# Patient Record
Sex: FEMALE | Race: WHITE | NOT HISPANIC OR LATINO | Employment: OTHER | URBAN - METROPOLITAN AREA
[De-identification: names, ages, dates, MRNs, and addresses within clinical notes are randomized per-mention and may not be internally consistent; named-entity substitution may affect disease eponyms.]

---

## 2017-01-04 ENCOUNTER — APPOINTMENT (OUTPATIENT)
Dept: LAB | Facility: CLINIC | Age: 72
End: 2017-01-04
Payer: MEDICARE

## 2017-01-04 ENCOUNTER — TRANSCRIBE ORDERS (OUTPATIENT)
Dept: LAB | Facility: CLINIC | Age: 72
End: 2017-01-04

## 2017-01-04 DIAGNOSIS — E78.2 MIXED HYPERLIPIDEMIA: ICD-10-CM

## 2017-01-04 DIAGNOSIS — I10 ESSENTIAL (PRIMARY) HYPERTENSION: ICD-10-CM

## 2017-01-04 LAB
ALBUMIN SERPL BCP-MCNC: 3.8 G/DL (ref 3.5–5)
ALP SERPL-CCNC: 82 U/L (ref 46–116)
ALT SERPL W P-5'-P-CCNC: 25 U/L (ref 12–78)
ANION GAP SERPL CALCULATED.3IONS-SCNC: 3 MMOL/L (ref 4–13)
AST SERPL W P-5'-P-CCNC: 17 U/L (ref 5–45)
BILIRUB SERPL-MCNC: 0.81 MG/DL (ref 0.2–1)
BUN SERPL-MCNC: 16 MG/DL (ref 5–25)
CALCIUM SERPL-MCNC: 8.6 MG/DL (ref 8.3–10.1)
CHLORIDE SERPL-SCNC: 106 MMOL/L (ref 100–108)
CHOLEST SERPL-MCNC: 189 MG/DL (ref 50–200)
CO2 SERPL-SCNC: 32 MMOL/L (ref 21–32)
CREAT SERPL-MCNC: 1.01 MG/DL (ref 0.6–1.3)
ERYTHROCYTE [DISTWIDTH] IN BLOOD BY AUTOMATED COUNT: 13.1 % (ref 11.6–15.1)
GFR SERPL CREATININE-BSD FRML MDRD: 53.9 ML/MIN/1.73SQ M
GLUCOSE SERPL-MCNC: 102 MG/DL (ref 65–140)
HCT VFR BLD AUTO: 47.1 % (ref 34.8–46.1)
HDLC SERPL-MCNC: 64 MG/DL (ref 40–60)
HGB BLD-MCNC: 16.2 G/DL (ref 11.5–15.4)
LDLC SERPL CALC-MCNC: 110 MG/DL (ref 0–100)
MCH RBC QN AUTO: 33.7 PG (ref 26.8–34.3)
MCHC RBC AUTO-ENTMCNC: 34.4 G/DL (ref 31.4–37.4)
MCV RBC AUTO: 98 FL (ref 82–98)
PLATELET # BLD AUTO: 203 THOUSANDS/UL (ref 149–390)
PMV BLD AUTO: 12.3 FL (ref 8.9–12.7)
POTASSIUM SERPL-SCNC: 4.1 MMOL/L (ref 3.5–5.3)
PROT SERPL-MCNC: 7.2 G/DL (ref 6.4–8.2)
RBC # BLD AUTO: 4.81 MILLION/UL (ref 3.81–5.12)
SODIUM SERPL-SCNC: 141 MMOL/L (ref 136–145)
TRIGL SERPL-MCNC: 75 MG/DL
TSH SERPL DL<=0.05 MIU/L-ACNC: 1.57 UIU/ML (ref 0.36–3.74)
WBC # BLD AUTO: 8.37 THOUSAND/UL (ref 4.31–10.16)

## 2017-01-04 PROCEDURE — 80061 LIPID PANEL: CPT

## 2017-01-04 PROCEDURE — 85027 COMPLETE CBC AUTOMATED: CPT

## 2017-01-04 PROCEDURE — 84443 ASSAY THYROID STIM HORMONE: CPT

## 2017-01-04 PROCEDURE — 80053 COMPREHEN METABOLIC PANEL: CPT

## 2017-01-04 PROCEDURE — 36415 COLL VENOUS BLD VENIPUNCTURE: CPT

## 2017-01-09 ENCOUNTER — ALLSCRIPTS OFFICE VISIT (OUTPATIENT)
Dept: OTHER | Facility: OTHER | Age: 72
End: 2017-01-09

## 2017-07-18 ENCOUNTER — ALLSCRIPTS OFFICE VISIT (OUTPATIENT)
Dept: OTHER | Facility: OTHER | Age: 72
End: 2017-07-18

## 2017-07-18 ENCOUNTER — APPOINTMENT (OUTPATIENT)
Dept: LAB | Facility: CLINIC | Age: 72
End: 2017-07-18
Payer: MEDICARE

## 2017-07-18 DIAGNOSIS — R50.9 FEVER: ICD-10-CM

## 2017-07-18 LAB
ERYTHROCYTE [DISTWIDTH] IN BLOOD BY AUTOMATED COUNT: 13.7 % (ref 11.6–15.1)
ERYTHROCYTE [SEDIMENTATION RATE] IN BLOOD: 14 MM/HOUR (ref 0–20)
HCT VFR BLD AUTO: 44.4 % (ref 34.8–46.1)
HGB BLD-MCNC: 15.2 G/DL (ref 11.5–15.4)
MCH RBC QN AUTO: 33.1 PG (ref 26.8–34.3)
MCHC RBC AUTO-ENTMCNC: 34.2 G/DL (ref 31.4–37.4)
MCV RBC AUTO: 97 FL (ref 82–98)
PLATELET # BLD AUTO: 214 THOUSANDS/UL (ref 149–390)
PMV BLD AUTO: 11.9 FL (ref 8.9–12.7)
RBC # BLD AUTO: 4.59 MILLION/UL (ref 3.81–5.12)
WBC # BLD AUTO: 5.52 THOUSAND/UL (ref 4.31–10.16)

## 2017-07-18 PROCEDURE — 85027 COMPLETE CBC AUTOMATED: CPT

## 2017-07-18 PROCEDURE — 36415 COLL VENOUS BLD VENIPUNCTURE: CPT

## 2017-07-18 PROCEDURE — 85652 RBC SED RATE AUTOMATED: CPT

## 2017-07-18 PROCEDURE — 86618 LYME DISEASE ANTIBODY: CPT

## 2017-07-18 PROCEDURE — 86617 LYME DISEASE ANTIBODY: CPT

## 2017-07-19 ENCOUNTER — GENERIC CONVERSION - ENCOUNTER (OUTPATIENT)
Dept: OTHER | Facility: OTHER | Age: 72
End: 2017-07-19

## 2017-07-19 LAB
B BURGDOR IGG SER IA-ACNC: 0.57
B BURGDOR IGM SER IA-ACNC: 7.93

## 2017-07-21 LAB
B BURGDOR IGG PATRN SER IB-IMP: POSITIVE
B BURGDOR IGM PATRN SER IB-IMP: POSITIVE
B BURGDOR18KD IGG SER QL IB: PRESENT
B BURGDOR23KD IGG SER QL IB: PRESENT
B BURGDOR23KD IGM SER QL IB: PRESENT
B BURGDOR28KD IGG SER QL IB: ABNORMAL
B BURGDOR30KD IGG SER QL IB: PRESENT
B BURGDOR39KD IGG SER QL IB: PRESENT
B BURGDOR39KD IGM SER QL IB: PRESENT
B BURGDOR41KD IGG SER QL IB: PRESENT
B BURGDOR41KD IGM SER QL IB: PRESENT
B BURGDOR45KD IGG SER QL IB: PRESENT
B BURGDOR58KD IGG SER QL IB: PRESENT
B BURGDOR66KD IGG SER QL IB: ABNORMAL
B BURGDOR93KD IGG SER QL IB: ABNORMAL

## 2017-09-12 ENCOUNTER — ALLSCRIPTS OFFICE VISIT (OUTPATIENT)
Dept: OTHER | Facility: OTHER | Age: 72
End: 2017-09-12

## 2017-10-02 ENCOUNTER — ALLSCRIPTS OFFICE VISIT (OUTPATIENT)
Dept: OTHER | Facility: OTHER | Age: 72
End: 2017-10-02

## 2017-10-02 ENCOUNTER — TRANSCRIBE ORDERS (OUTPATIENT)
Dept: ADMINISTRATIVE | Facility: HOSPITAL | Age: 72
End: 2017-10-02

## 2017-10-02 DIAGNOSIS — R47.81 SLURRED SPEECH: Primary | ICD-10-CM

## 2017-10-02 DIAGNOSIS — R47.81 SLURRED SPEECH: ICD-10-CM

## 2017-10-06 ENCOUNTER — HOSPITAL ENCOUNTER (OUTPATIENT)
Dept: ULTRASOUND IMAGING | Facility: HOSPITAL | Age: 72
Discharge: HOME | End: 2017-10-06
Attending: PSYCHIATRY & NEUROLOGY
Payer: MEDICARE

## 2017-10-06 ENCOUNTER — HOSPITAL ENCOUNTER (OUTPATIENT)
Dept: MRI IMAGING | Facility: HOSPITAL | Age: 72
Discharge: HOME | End: 2017-10-06
Attending: PSYCHIATRY & NEUROLOGY
Payer: MEDICARE

## 2017-10-06 DIAGNOSIS — R47.81 SLURRED SPEECH: ICD-10-CM

## 2017-10-06 PROCEDURE — 93880 EXTRACRANIAL BILAT STUDY: CPT

## 2017-10-10 ENCOUNTER — HOSPITAL ENCOUNTER (OUTPATIENT)
Dept: MRI IMAGING | Facility: HOSPITAL | Age: 72
Discharge: HOME | End: 2017-10-10
Attending: PSYCHIATRY & NEUROLOGY
Payer: MEDICARE

## 2017-10-10 PROCEDURE — 70551 MRI BRAIN STEM W/O DYE: CPT

## 2017-10-27 NOTE — CONSULTS
Assessment  1  Slurred speech (424 59) (R47 81)    Plan  Slurred speech    · Aspirin EC 81 MG Oral Tablet Delayed Release; take 2 tablet daily   Rx By: Berenice Arvizu; Dispense: 0 Days ; #:60 X 500 Tablet Delayed Release Bottle; Refill: 5;For: Slurred speech; GENE = N; Record   · * MRI BRAIN WO CONTRAST; Status:Need Information - Financial Authorization; Requested FZM:[id number];    Perform:[facility]; FUE:60UOQ6266; Ordered; For:Slurred speech; Ordered By:Tyler Huggins;   · VAS CAROTID COMPLETE STUDY; SIDE:Bilateral; Status:Hold For - Scheduling;  Requested SZW:88EMK4123;    Perform:[facility]; LGR:42AAK5817; Ordered; For:Slurred speech; Ordered By:Tyler Huggins;   · Follow-up visit in 2 months Evaluation and Treatment  Follow-up  Status: Complete   Done: 70XBW2020   Ordered; For: Slurred speech; Ordered By: Berenice Arvizu Performed:  Due: 70ZFQ7744; Last Updated By: Freddy Askew; 10/2/2017 1:41:35 PM    Discussion/Summary  Discussion Summary:   Transient speech disturbance, rule out related to her underlying Lyme disease, rule out TIA  Have recommended increasing her aspirin to 2 and will obtain an MRI of her head as well as carotid ultrasound  If she has any further recurrent symptoms she will report to the emergency room right away I will otherwise see her back in follow-up in 6-8 weeks  Chief Complaint  Chief Complaint Free Text Note Form: Patient is a 66year old right handed lady with a diagnosis of Lyme disease 7/11/2017, referred by Dr Yaneth Padron for slurred speech on 7/27/2017  History of Present Illness  HPI: Bassam Grijalva presents today with the above complaints  She states that she was evaluated on July 18 due to recent fevers as well as increasing arthralgias, increasing urinary incontinence and fatigues  She was diagnosed with Lyme disease with both positive IgM and IgG  She states she was started on doxycycline which she took for 4 weeks   She states that around 27 July she was hurrying up the steps as she was late for dinner appointment  She states when she got to the steps she noticed that she was drooling out of the right side of her mouth and when she went to speak to her  she noticed that her speech was slurred  She states this whole episode lasted for less than a minute and resolved  She states she did not look at her face in the mirror to see if there was an asymmetry in either her  and her son noticed any change in the way she looked  She denied any weakness or numbness in her arms or legs and did not notice any problems with her balance  She states she never had an episode like this occurring in the past and has not had any similar episodes since that time  She states she has always been taking an 81 mg aspirin  She states that she did continue to go out to dinner with her family and later spoke with her primary doctor who recommended that she schedule appointment to be evaluated here  Review of Systems  Neurological ROS:   Constitutional: no fever, no chills, no recent weight gain, no recent weight loss, no complaints of feeling tired, no changes in appetite  HEENT:  no sinus problems, not feeling congested, no blurred vision, no dryness of the eyes, no eye pain, no hearing loss, no tinnitus, no mouth sores, no sore throat, no hoarseness, no dysphagia, no masses, no bleeding  Cardiovascular:  no chest pain or pressure, no palpitations present, the heart rate was not rapid or irregular, no swelling in the arms or legs, no poor circulation  Respiratory:  no unusual or persistant cough, no shortness of breath with or without exertion  Gastrointestinal:  no nausea, no vomiting, no diarrhea, no abdominal pain, no changes in bowel habits, no melena, no loss of bowel control  Genitourinary: incontinence  Musculoskeletal: arthralgias  Integumentary skin lesion(s): Mary Tamez    Psychiatric:  no anxiety, no depression, no mood swings, no psychiatric hospitalizations, no sleep problems  Endocrine  no unusual weight loss or gain, no excessive urination, no excessive thirst, no hair loss or gain, no hot or cold intolerance, no menstrual period change or irregularity, no loss of sexual ability or drive, no erection difficulty, no nipple discharge  Hematologic/Lymphatic:  no unusual bleeding, no tendency for easy bruising, no clotting skin or lumps  Neurological General: waking up at night  Neurological Mental Status:  no confusion, no mood swings, no alteration or loss of consciousness, no difficulty expressing/understanding speech, no memory problems  Neurological Cranial Nerves:  no blurry or double vision, no loss of vision, no face drooping, no facial numbness or weakness, no taste or smell loss/changes, no hearing loss or ringing, no vertigo or dizziness, no dysphagia, no slurred speech  Neurological Motor findings include:  no tremor, no twitching, no cramping(pre/post exercise), no atrophy  Neurological Coordination:  no unsteadiness, no vertigo or dizziness, no clumsiness, no problems reaching for objects  Neurological Sensory:  no numbness, no pain, no tingling, does not fall when eyes closed or taking a shower  Neurological Gait:  no difficulty walking, not falling to one side, no sensation of being pushed, has not had falls  ROS Reviewed:   ROS reviewed  Active Problems  1  Abscess of back (682 2) (L02 212)   2  Aphasia, mixed (784 3) (R47 01)   3  Elevated hematocrit (282 7) (R71 8)   4  Encounter for routine gynecological examination with Papanicolaou smear of cervix   (V72 31,V76 2) (Z01 419)   5  Encounter for screening colonoscopy (V76 51) (Z12 11)   6  Fever, unspecified fever cause (780 60) (R50 9)   7  Follow up (V67 9) (Z09)   8  Hyperlipidemia, mixed (272 2) (E78 2)   9  Hypertension, essential (401 9) (I10)   10  Lyme disease (088 81) (A69 20)   11   Migraine with aura and without status migrainosus, not intractable (346 00) (G43 109) 12  Need for influenza vaccination (V04 81) (Z23)   13  Post-menopausal bleeding (627 1) (N95 0)   14  Screening for genitourinary condition (V81 6) (Z13 89)   15  Screening for neurological condition (V80 09) (Z13 89)   16  Vertigo (780 4) (R42)   17  Visit for screening mammogram (V76 12) (Z12 31)    Past Medical History  1  History of hyperlipidemia (V12 29) (Z86 39)   2  History of hypertension (V12 59) (Z86 79)   3  History of transient cerebral ischemia (V12 54) (Z49 72)  Active Problems And Past Medical History Reviewed: The active problems and past medical history were reviewed and updated today  Surgical History  1  Denied: History Of Prior Surgery  Surgical History Reviewed: The surgical history was reviewed and updated today  Family History  Mother    1  Family history of lung cancer (V16 1) (Z80 1)   2  Denied: Family history of mental disorder   3  Denied: Family history of substance abuse  Father    4  Family history of cardiac disorder (V17 49) (Z82 49)  Family History Reviewed: The family history was reviewed and updated today  Social History   · Always uses seat belt   · Does not use illicit drugs (N38 02) (Z78 9)   · Former smoker (Q18 16) (D22 865)   ·    · Occasional alcohol use   · Retired  Social History Reviewed: The social history was reviewed and updated today  Current Meds   1  Lovastatin 10 MG Oral Tablet; TAKE 1 TABLET BY MOUTH AT  BEDTIME; Therapy: 22ECG7369 to (KNROVIIC:50FWE1803)  Requested for: 33XDP5043; Last   Rx:09Jan2017 Ordered   2  Metoprolol Tartrate 25 MG Oral Tablet; take 1 tablet by mouth once daily; Therapy: 77UGF2482 to (GLQUYQSD:78EWD1868)  Requested for: 12BZM8884; Last   Rx:09Jan2017 Ordered   3  Rizatriptan Benzoate 10 MG Oral Tablet; TAKE 1 TABLET AT ONSET OF HEADACHE  MAY   REPEAT EVERY 2 HOURS AS NEEDED  MAXIMUM 3 TABLETS IN 24 HOURS; Therapy: 73HUF5583 to (Last Rx:11Hsf1532) Ordered  Medication List Reviewed:    The medication list was reviewed and updated today  Allergies  1  No Known Drug Allergies    Vitals  Signs   Recorded: 37HEL0615 01:02PM   Heart Rate: 60  Systolic: 154, LUE, Sitting  Diastolic: 73, LUE, Sitting  Height: 5 ft 4 5 in  Weight: 219 lb 2 oz  BMI Calculated: 37 03  BSA Calculated: 2 04  Pain Scale: 0    Physical Exam  GENERAL:  Cooperative in no acute distress  Well-developed and well-nourished    HEAD and NECK   Head is atraumatic normocephalic with no lesions or masses  Neck is supple with full range of motion    CARDIOVASCULAR  Carotid Arteries-no carotid bruits  NEUROLOGIC:  Mental Status-the patient is awake alert and oriented without aphasia or apraxia  Cranial Nerves: Visual fields are full to confrontation  Disks are flat  Extraocular movements are full  Pupils are 2-1/2 mm and reactive  Face is symmetrical to light touch  Movements of facial expression move symmetrically  Hearing is normal to finger rub bilaterally  Soft palate lifts symmetrically  Shoulder shrug is symmetrical  Tongue is midline without atrophy  Motor: No drift is noted on arm extension  Strength is full in the upper and lower extremities with normal bulk and tone  Sensory: Intact to temperature and vibratory sensation in the upper and lower extremities bilaterally  Cortical function is intact  Coordination: Finger to nose testing is performed accurately  Romberg is negative  Gait reveals a normal base with symmetrical arm swing   Tandem walk is normal   Reflexes: 1/4 in the biceps, triceps, brachial radialis, knee jerk and ankle jerk regions Toes are downgoing        Future Appointments    Date/Time Provider Specialty Site   01/19/2018 09:00 AM Chela Bowling DO Family Medicine [address]     Signatures   Electronically signed by : Damian Luciano MD; Oct  2 2017  1:42PM EST                       (Author)

## 2017-11-21 ENCOUNTER — GENERIC CONVERSION - ENCOUNTER (OUTPATIENT)
Dept: OTHER | Facility: OTHER | Age: 72
End: 2017-11-21

## 2017-11-27 RX ORDER — LOVASTATIN 10 MG/1
10 TABLET ORAL
COMMUNITY
End: 2018-11-19 | Stop reason: SDUPTHER

## 2017-11-27 RX ORDER — RIZATRIPTAN BENZOATE 10 MG/1
10 TABLET ORAL ONCE AS NEEDED
Status: ON HOLD | COMMUNITY
End: 2017-11-28 | Stop reason: ALTCHOICE

## 2017-11-27 RX ORDER — ASPIRIN 81 MG/1
81 TABLET ORAL DAILY
COMMUNITY
End: 2018-02-23 | Stop reason: ALTCHOICE

## 2017-11-28 ENCOUNTER — ANESTHESIA EVENT (OUTPATIENT)
Dept: PERIOP | Facility: HOSPITAL | Age: 72
End: 2017-11-28
Payer: MEDICARE

## 2017-11-28 ENCOUNTER — HOSPITAL ENCOUNTER (OUTPATIENT)
Facility: HOSPITAL | Age: 72
Setting detail: OUTPATIENT SURGERY
Discharge: HOME | End: 2017-11-28
Attending: INTERNAL MEDICINE | Admitting: INTERNAL MEDICINE
Payer: MEDICARE

## 2017-11-28 ENCOUNTER — GENERIC CONVERSION - ENCOUNTER (OUTPATIENT)
Dept: OTHER | Facility: OTHER | Age: 72
End: 2017-11-28

## 2017-11-28 ENCOUNTER — ANESTHESIA (OUTPATIENT)
Dept: PERIOP | Facility: HOSPITAL | Age: 72
End: 2017-11-28
Payer: MEDICARE

## 2017-11-28 VITALS
WEIGHT: 218 LBS | HEIGHT: 66 IN | TEMPERATURE: 97.5 F | DIASTOLIC BLOOD PRESSURE: 69 MMHG | RESPIRATION RATE: 20 BRPM | OXYGEN SATURATION: 97 % | BODY MASS INDEX: 35.03 KG/M2 | HEART RATE: 79 BPM | SYSTOLIC BLOOD PRESSURE: 128 MMHG

## 2017-11-28 DIAGNOSIS — K62.5 BRBPR (BRIGHT RED BLOOD PER RECTUM): ICD-10-CM

## 2017-11-28 PROCEDURE — 88305 TISSUE EXAM BY PATHOLOGIST: CPT | Performed by: INTERNAL MEDICINE

## 2017-11-28 NOTE — DISCHARGE INSTRUCTIONS
Flexible Sigmoidoscopy   WHAT YOU NEED TO KNOW:   A flexible sigmoidoscopy is a procedure to look inside your rectum and sigmoid colon  The sigmoid colon is the lower part of your intestines, closest to your rectum  Healthcare providers insert a sigmoidoscope into your rectum  This is a soft, bendable tube with a light and tiny camera on the end  Pictures of your colon appear on a monitor during the procedure  A flexible sigmoidoscopy may help diagnose colon diseases, inflammation, polyps (growths), or infections  DISCHARGE INSTRUCTIONS:   Medicines:   · Pain medicine: You may be given medicine to take away or decrease pain  Do not wait until the pain is severe before you take your medicine  · Bowel movement softeners: This medicine makes it easier for you to have a bowel movement  You may need this medicine to prevent constipation  · Take your medicine as directed  Contact your healthcare provider if you think your medicine is not helping or if you have side effects  Tell him or her if you are allergic to any medicine  Keep a list of the medicines, vitamins, and herbs you take  Include the amounts, and when and why you take them  Bring the list or the pill bottles to follow-up visits  Carry your medicine list with you in case of an emergency  Follow up with your healthcare provider as directed: Ask when you should expect the results from your procedure  Write down your questions so you remember to ask them during your visits  Prevent constipation:   · Eat a variety of healthy foods:  Healthy foods include fruits, vegetables, whole-grain breads, low-fat dairy products, beans, lean meats, and fish  · Drink liquids as directed:  Ask your healthcare provider how much liquid to drink each day and which liquids are best for you  · Exercise:  Ask your healthcare provider about the best exercise plan for you  Contact your healthcare provider if:   · You have a fever      · You feel full or bloated  · Your signs and symptoms get worse  · You have nausea or vomiting  · You have questions or concerns about your condition or care  Seek care immediately or call 911 if:   · You are not able to have a bowel movement  · You have blood in your bowel movement  · Your vomit has blood or bile (yellow or green fluid) in it  · Your abdomen becomes tender and hard  © 2017 [address] Information is for End User's use only and may not be sold, redistributed or otherwise used for commercial purposes  All illustrations and images included in CareNotes® are the copyrighted property of [employer]  or [address]  The above information is an  only  It is not intended as medical advice for individual conditions or treatments  Talk to your doctor, nurse or pharmacist before following any medical regimen to see if it is safe and effective for you

## 2017-11-28 NOTE — OP NOTE
**** GI/ENDOSCOPY REPORT ****     PATIENT NAME: Samuel Nieves - VISIT ID:  Patient ID: [id number] YOB: 1945     INTRODUCTION: Sigmoidoscopy - A 67 female patient presents for an   outpatient Sigmoidoscopy at [address] [place] [address]  PREVIOUS COLONOSCOPY: 1 year     INDICATIONS: Rectal bleeding  CONSENT: The benefits, risks, and alternatives to the procedure were   discussed and informed consent was obtained from the patient  PREPARATION:  EKG, pulse, pulse oximetry and blood pressure were monitored   throughout the procedure  MEDICATIONS: Anesthesia-check records     RECTAL EXAM: Normal rectal exam      PROCEDURE:  The endoscope was passed with ease through the anus under   direct visualization and advanced to the splenic flexure  The scope was   withdrawn and the mucosa was carefully examined  FINDINGS:  There was evidence of moderately severe diverticulosis in the   descending and sigmoid colon  There was evidence of colitis in the   mid-sigmoid, 35 cm from the entry site, in a 5cm segment  The mucosa   appeared edematous, erythematous, and hemorrhagic  A biopsy was taken  Normal retroversion     COMPLICATIONS: There were no complications  IMPRESSIONS: Moderately severe diverticulosis found in the descending and   sigmoid colon  Colitis found in the mid-sigmoid, 35 cm from the entry   site  Biopsy taken  RECOMMENDATIONS: Begin medication as prescribed  Begin taking Rowasa enema   via rectum every 12 hours  Follow-up on the results of the biopsy   specimens in 1 week  ESTIMATED BLOOD LOSS: Insignificant  PATHOLOGY SPECIMENS: Biopsy taken  Associated finding: Colitis       PROCEDURE CODES: 61953 - flexible sigmoidoscopy with biopsy     ICD-9 Codes: 569 3 Hemorrhage of rectum and anus 562 10 Diverticulosis of   colon (without mention of hemorrhage) 558 9 Other and unspecified   noninfectious gastroenteritides and colitis     ICD-10 Codes: K62 5 Hemorrhage of anus and rectum K57 Diverticular disease   of intestine K52 9 Noninfective gastroenteritis and colitis, unspecified     PERFORMED BY: MARCOS Rivero  on 11/28/2017  Version 1, electronically signed by MARCOS Abraham Mt , D O  on   11/28/2017 at 11:03

## 2018-01-09 ENCOUNTER — ALLSCRIPTS OFFICE VISIT (OUTPATIENT)
Dept: OTHER | Facility: OTHER | Age: 73
End: 2018-01-09

## 2018-01-10 NOTE — PROGRESS NOTES
Assessment   1  Slurred speech (787 49) (U12 44)    Plan   Slurred speech    · Follow-up PRN Evaluation and Treatment  Follow-up  Status: Complete  Done:    37SNP6223 05:10PM   Ordered; For: Slurred speech; Ordered By: Milton Thompson Performed:  Due: 66BDY9747    Discussion/Summary   Discussion Summary:    Vel Crum will continue with present dose of aspirin  If she has new neurologic symptoms she will notify me otherwise I will plan to see her back on an as-needed basis  Chief Complaint   Chief Complaint Free Text Note Form: Patient present for slurred speech  History of Present Illness   HPI: Vel Crum returns in follow-up today  She reports no complaints since here last  She has had no speech disturbance, no vision disturbances not had any migraine headaches  She denies any localizing numbness or weakness  Review of Systems   Neurological ROS:      Constitutional: no fever, no chills, no recent weight gain, no recent weight loss, no complaints of feeling tired, no changes in appetite  HEENT:  no sinus problems, not feeling congested, no blurred vision, no dryness of the eyes, no eye pain, no hearing loss, no tinnitus, no mouth sores, no sore throat, no hoarseness, no dysphagia, no masses, no bleeding  Cardiovascular:  no chest pain or pressure, no palpitations present, the heart rate was not rapid or irregular, no swelling in the arms or legs, no poor circulation  Respiratory:  no unusual or persistant cough, no shortness of breath with or without exertion  Gastrointestinal:  no nausea, no vomiting, no diarrhea, no abdominal pain, no changes in bowel habits, no melena, no loss of bowel control  Genitourinary:  no incontinence, no feelings of urinary urgency, no increase in frequency, no urinary hesitancy, no dysuria, no hematuria  Musculoskeletal:  no arthralgias, no myalgias, no immobility or loss of function, no head/neck/back pain, no pain while walking  Integumentary  no masses, no rash, no skin lesions, no livedo reticularis  Psychiatric:  no anxiety, no depression, no mood swings, no psychiatric hospitalizations, no sleep problems  Endocrine  no unusual weight loss or gain, no excessive urination, no excessive thirst, no hair loss or gain, no hot or cold intolerance, no menstrual period change or irregularity, no loss of sexual ability or drive, no erection difficulty, no nipple discharge  Hematologic/Lymphatic:  no unusual bleeding, no tendency for easy bruising, no clotting skin or lumps  Neurological General:  no headache, no nausea or vomiting, no lightheadedness, no convulsions, no blackouts, no syncope, no trauma, no photopsia, no increased sleepiness, no trouble falling asleep, no snoring, no awakening at night  Neurological Mental Status:  no confusion, no mood swings, no alteration or loss of consciousness, no difficulty expressing/understanding speech, no memory problems  Neurological Cranial Nerves:  no blurry or double vision, no loss of vision, no face drooping, no facial numbness or weakness, no taste or smell loss/changes, no hearing loss or ringing, no vertigo or dizziness, no dysphagia, no slurred speech  Neurological Motor findings include:  no tremor, no twitching, no cramping(pre/post exercise), no atrophy  Neurological Coordination:  no unsteadiness, no vertigo or dizziness, no clumsiness, no problems reaching for objects  Neurological Sensory:  no numbness, no pain, no tingling, does not fall when eyes closed or taking a shower  Neurological Gait:  no difficulty walking, not falling to one side, no sensation of being pushed, has not had falls  ROS Reviewed:    ROS reviewed  Active Problems   1  Abscess of back (682 2) (L02 212)   2  Aphasia, mixed (784 3) (R47 01)   3  Bright red rectal bleeding (569 3) (K62 5)   4  Chronic colitis (558 9) (K52 9)   5  Elevated hematocrit (282 7) (R71 8)   6  Encounter for routine gynecological examination with Papanicolaou smear of cervix     (V72 31,V76 2) (Z01 419)   7  Encounter for screening colonoscopy (V76 51) (Z12 11)   8  Fever, unspecified fever cause (780 60) (R50 9)   9  Follow up (V67 9) (Z09)   10  Hyperlipidemia, mixed (272 2) (E78 2)   11  Hypertension, essential (401 9) (I10)   12  Lyme disease (088 81) (A69 20)   13  Migraine with aura and without status migrainosus, not intractable (346 00) (G43 109)   14  Need for influenza vaccination (V04 81) (Z23)   15  Post-menopausal bleeding (627 1) (N95 0)   16  Screening for genitourinary condition (V81 6) (Z13 89)   17  Screening for neurological condition (V80 09) (Z13 89)   18  Slurred speech (784 59) (R47 81)   19  Vertigo (780 4) (R42)   20  Visit for screening mammogram (V76 12) (Z12 31)    Past Medical History   1  History of hyperlipidemia (V12 29) (Z86 39)   2  History of hypertension (V12 59) (Z86 79)   3  History of transient cerebral ischemia (V12 54) (U05 79)  Active Problems And Past Medical History Reviewed: The active problems and past medical history were reviewed and updated today  Surgical History   1  Denied: History Of Prior Surgery  Surgical History Reviewed: The surgical history was reviewed and updated today  Family History   Mother    1  Family history of lung cancer (V16 1) (Z80 1)   2  Denied: Family history of mental disorder   3  Denied: Family history of substance abuse  Father    4  Family history of cardiac disorder (V17 49) (Z82 49)  Family History Reviewed: The family history was reviewed and updated today  Social History    · Always uses seat belt   · Does not use illicit drugs (I91 77) (Z78 9)   · Former smoker (U83 73) (N01 639)   ·    · Occasional alcohol use   · Retired  Social History Reviewed: The social history was reviewed and updated today  Current Meds    1   Aspirin EC 81 MG Oral Tablet Delayed Release; take 2 tablet daily; Therapy: 77SXZ9717 to (Last Rx:69Oie8256) Ordered   2  Lovastatin 10 MG Oral Tablet; TAKE 1 TABLET BY MOUTH AT  BEDTIME; Therapy: 92BCU5335 to (Alexis Giron)  Requested for: 95VUT3587; Last     Rx:91Xad0043 Ordered   3  Metoprolol Tartrate 25 MG Oral Tablet; take 1 tablet by mouth once daily; Therapy: 87DNN0122 to (Evaluate:37Xxo6459)  Requested for: 00PLS9718; Last     Rx:07Qrx4902 Ordered  Medication List Reviewed: The medication list was reviewed and updated today  Allergies   1  No Known Drug Allergies  2  No Known Environmental Allergies   3  No Known Food Allergies    Vitals   Signs   Recorded: 91TED5889 04:57PM   Heart Rate: 95  Systolic: 169  Diastolic: 84  Height: 5 ft 3 in  Weight: 220 lb   BMI Calculated: 38 97  BSA Calculated: 2 01    Physical Exam        Constitutional      General appearance: No acute distress, well appearing and well nourished  HEENT/NECK: Head is atraumatic normocephalic, neck is supple     NEUROLOGIC:     Mental Status: Awake and alert without aphasia     Cranial Nerves: Extraocular movements are full  Face is symmetrical     Motor:  No drift is noted on arm extension     Coordination:  Finger-to-nose testing is performed accurately  Romberg is negative  Gait is stable          Results/Data   Carotid ultrasound demonstrated no stenosis    MRI of the brain was remarkable for 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

## 2018-01-12 VITALS
BODY MASS INDEX: 36.51 KG/M2 | SYSTOLIC BLOOD PRESSURE: 122 MMHG | DIASTOLIC BLOOD PRESSURE: 73 MMHG | HEIGHT: 65 IN | WEIGHT: 219.13 LBS | HEART RATE: 60 BPM

## 2018-01-13 VITALS
DIASTOLIC BLOOD PRESSURE: 88 MMHG | OXYGEN SATURATION: 98 % | HEART RATE: 65 BPM | WEIGHT: 215 LBS | HEIGHT: 66 IN | BODY MASS INDEX: 34.55 KG/M2 | SYSTOLIC BLOOD PRESSURE: 128 MMHG

## 2018-01-14 VITALS
BODY MASS INDEX: 35.03 KG/M2 | WEIGHT: 218 LBS | HEART RATE: 64 BPM | HEIGHT: 66 IN | DIASTOLIC BLOOD PRESSURE: 82 MMHG | SYSTOLIC BLOOD PRESSURE: 134 MMHG | OXYGEN SATURATION: 98 %

## 2018-01-14 VITALS
HEART RATE: 62 BPM | SYSTOLIC BLOOD PRESSURE: 140 MMHG | HEIGHT: 66 IN | BODY MASS INDEX: 35.36 KG/M2 | OXYGEN SATURATION: 99 % | DIASTOLIC BLOOD PRESSURE: 90 MMHG | WEIGHT: 220 LBS

## 2018-01-19 ENCOUNTER — ALLSCRIPTS OFFICE VISIT (OUTPATIENT)
Dept: OTHER | Facility: OTHER | Age: 73
End: 2018-01-19

## 2018-01-19 DIAGNOSIS — E78.2 MIXED HYPERLIPIDEMIA: ICD-10-CM

## 2018-01-19 DIAGNOSIS — Z12.31 ENCOUNTER FOR SCREENING MAMMOGRAM FOR MALIGNANT NEOPLASM OF BREAST: ICD-10-CM

## 2018-01-19 DIAGNOSIS — I48.91 ATRIAL FIBRILLATION (HCC): ICD-10-CM

## 2018-01-19 DIAGNOSIS — I10 ESSENTIAL (PRIMARY) HYPERTENSION: ICD-10-CM

## 2018-01-20 NOTE — PROGRESS NOTES
Assessment   1  Encounter for preventive health examination (V70 0) (Z00 00)   2  Visit for screening mammogram (V76 12) (Z12 31)   3  Hyperlipidemia, mixed (272 2) (E78 2)   4  Hypertension, essential (401 9) (I10)   5  Other insomnia (780 52) (G47 09)   6  Right hip pain (719 45) (M25 551)   7  Persistent atrial fibrillation (427 31) (I48 1)    Plan   Hyperlipidemia, mixed, Hypertension, essential    · (1) CBC/ PLT (NO DIFF); Status:Active; Requested URX:[id number];    · (1) COMPREHENSIVE METABOLIC PANEL; Status:Active; Requested TCO:06WGK2031;    · (1) LIPID PANEL, FASTING; Status:Active; Requested YGC:25KVG5442;    · (1) TSH; Status:Active; Requested NQT:93DCW4488; Persistent atrial fibrillation    · 1 Radha Lee MD, Hesham Dueñas (Cardiology) Co-Management  *  Status: Active  Requested for:    42NGS0969  Care Summary provided  : Yes   · EKG/ECG- POC; Status:Complete;   Done: 87BKM6622 10:09AM  Screening for genitourinary condition    · *VB - Urinary Incontinence Screen (Dx Z13 89 Screen for UI); Status:Complete -    Retrospective By Protocol Authorization;   Done: 41BRO0318 09:04AM  Visit for screening mammogram    · * MAMMO SCREENING BILATERAL W CAD; Status:Hold For - Scheduling; Requested    for:19Jan2018; Discussion/Summary      She does not want to take anything for sleep, will let us know if she does aspirin for now, will refer to cardiology 2 tylenol twice daily for the hip, avoid nsaids due to a-fib        The patient was counseled regarding diagnostic results,-- instructions for management,-- prognosis  Possible side effects of new medications were reviewed with the patient/guardian today  The treatment plan was reviewed with the patient/guardian  The patient/guardian understands and agrees with the treatment plan      History of Present Illness   The patient is being seen for an initial evaluation of hip pain        The patient presents with complaints of gradual onset of constant episodes of moderate right hip pain, described as aching, non-radiating  Episodes started about 1 week ago  Symptoms are made worse by movement  Symptoms are worsening  The patient is currently experiencing symptoms  The patient is not currently being treated for this problem  The patient states her hyperlipidemia has been under good control since the last visit  Comorbid Illnesses: hypertension  Symptoms:      Medications: the patient is adherent with her medication regimen  -- She denies medication side effects  The patient presents for follow-up of essential hypertension  The patient states she has been doing well with her blood pressure control since the last visit  She has no significant interval events  Symptoms: The patient is currently asymptomatic  Medications: the patient is adherent with her medication regimen  -- She denies medication side effects  Review of Systems        Constitutional: No fever, no chills, feels well, no tiredness, no recent weight gain or weight loss  Eyes: No complaints of eye pain, no red eyes, no eyesight problems, no discharge, no dry eyes, no itching of eyes  ENT: no complaints of earache, no loss of hearing, no nose bleeds, no nasal discharge, no sore throat, no hoarseness  Cardiovascular: No complaints of slow heart rate, no fast heart rate, no chest pain, no palpitations, no leg claudication, no lower extremity edema  Respiratory: No complaints of shortness of breath, no wheezing, no cough, no SOB on exertion, no orthopnea, no PND  Gastrointestinal: No complaints of abdominal pain, no constipation, no nausea or vomiting, no diarrhea, no bloody stools  Genitourinary: No complaints of dysuria, no incontinence, no pelvic pain, no dysmenorrhea, no vaginal discharge or bleeding  Musculoskeletal: right hip pain  Integumentary: No complaints of skin rash or lesions, no itching, no skin wounds, no breast pain or lump  Neurological: No complaints of headache, no confusion, no convulsions, no numbness, no dizziness or fainting, no tingling, no limb weakness, no difficulty walking  Psychiatric: insomnia  Endocrine: No complaints of proptosis, no hot flashes, no muscle weakness, no deepening of the voice, no feelings of weakness  Hematologic/Lymphatic: No complaints of swollen glands, no swollen glands in the neck, does not bleed easily, does not bruise easily  Active Problems   1  Abscess of back (682 2) (L02 212)   2  Aphasia, mixed (784 3) (R47 01)   3  Bright red rectal bleeding (569 3) (K62 5)   4  Chronic colitis (558 9) (K52 9)   5  Elevated hematocrit (282 7) (R71 8)   6  Encounter for routine gynecological examination with Papanicolaou smear of cervix     (V72 31,V76 2) (Z01 419)   7  Encounter for screening colonoscopy (V76 51) (Z12 11)   8  Fever, unspecified fever cause (780 60) (R50 9)   9  Follow up (V67 9) (Z09)   10  Hyperlipidemia, mixed (272 2) (E78 2)   11  Hypertension, essential (401 9) (I10)   12  Lyme disease (088 81) (A69 20)   13  Migraine with aura and without status migrainosus, not intractable (346 00) (G43 109)   14  Need for influenza vaccination (V04 81) (Z23)   15  Post-menopausal bleeding (627 1) (N95 0)   16  Screening for genitourinary condition (V81 6) (Z13 89)   17  Screening for neurological condition (V80 09) (Z13 89)   18  Slurred speech (784 59) (R47 81)   19  Vertigo (780 4) (R42)   20  Visit for screening mammogram (V76 12) (Z12 31)    Past Medical History   1  History of hyperlipidemia (V12 29) (Z86 39)   2  History of hypertension (V12 59) (Z86 79)   3  History of transient cerebral ischemia (V12 54) (Z86 73)     The active problems and past medical history were reviewed and updated today  Surgical History   1  Denied: History Of Prior Surgery     The surgical history was reviewed and updated today  Family History   Mother    1   Family history of lung cancer (V16 1) (Z80 1)   2  Denied: Family history of mental disorder   3  Denied: Family history of substance abuse  Father    4  Family history of cardiac disorder (V17 49) (Z82 49)     The family history was reviewed and updated today  Social History    · Always uses seat belt   · Does not use illicit drugs (Z97 43) (Z78 9)   · Former smoker (V15 82) (G78 452)   ·    · Occasional alcohol use   · Retired  The social history was reviewed and updated today  Current Meds    1  Aspirin EC 81 MG Oral Tablet Delayed Release; take 2 tablet daily; Therapy: 26NGJ6602 to (Last Rx:02Oct2017) Ordered   2  Lovastatin 10 MG Oral Tablet; TAKE 1 TABLET BY MOUTH AT  BEDTIME; Therapy: 95MKF5397 to (Patience Rim)  Requested for: 55HQF7059; Last     Rx:06Oct2017 Ordered   3  Metoprolol Tartrate 25 MG Oral Tablet; take 1 tablet by mouth once daily; Therapy: 13BBP4370 to (Evaluate:57Xgs8760)  Requested for: 59Vfp5763; Last     Rx:77Nfx1757 Ordered     The medication list was reviewed and updated today  Allergies   1  No Known Drug Allergies  2  No Known Environmental Allergies   3  No Known Food Allergies    Vitals   Vital Signs    Recorded: 95QIJ3432 08:52AM   Heart Rate 595   Systolic 759   Diastolic 86   Height 5 ft 3 in   Weight 223 lb    BMI Calculated 39 5   BSA Calculated 2 03   O2 Saturation 96     Physical Exam        Constitutional      General appearance: No acute distress, well appearing and well nourished  Ears, Nose, Mouth, and Throat      Oropharynx: Normal with no erythema, edema, exudate or lesions  Pulmonary      Auscultation of lungs: Clear to auscultation  Cardiovascular      Auscultation of heart: Abnormal   The rhythm was irregularly irregular  Abdomen      Abdomen: Non-tender, no masses  Musculoskeletal      Gait and station: Normal        Neurologic      Cranial nerves: Cranial nerves 2-12 intact         Psychiatric      Orientation to person, place, and time: Normal        Mood and affect: Normal           Results/Data   PHQ-9 Adult Depression Screening 49JXI5363 09:04AM User, [email or web address]      Test Name Result Flag Reference   PHQ-9 Adult Depression Score 0     Over the last two weeks, how often have you been bothered by any of the following problems? Little interest or pleasure in doing things: Not at all - 0     Feeling down, depressed, or hopeless: Not at all - 0     Trouble falling or staying asleep, or sleeping too much: Not at all - 0     Feeling tired or having little energy: Not at all - 0     Poor appetite or over eating: Not at all - 0     Feeling bad about yourself - or that you are a failure or have let yourself or your family down: Not at all - 0     Trouble concentrating on things, such as reading the newspaper or watching television: Not at all - 0     Moving or speaking so slowly that other people could have noticed  Or the opposite -  being so fidgety or restless that you have been moving around a lot more than usual: Not at all - 0     Thoughts that you would be better off dead, or of hurting yourself in some way: Not at all - 0   PHQ-9 Adult Depression Screening Negative     PHQ-9 Difficulty Level Not difficult at all     PHQ-9 Severity No Depression        *VB - Urinary Incontinence Screen (Dx Z13 89 Screen for UI) 01TEE7315 09:04AM Josh Gabriel   Pt reports no UI      Test Name Result Flag Reference   Urinary Incontinence Assessment [id number]        Falls Risk Assessment (Dx Z13 89 Screen for Neurologic Disorder) 68XUX5848 09:03AM User, [facility]      Test Name Result Flag Reference   Falls Risk      No falls in the past year           Rhythm and rate: atrial fibrillation  ST segment: the ST segments are normal       Health Management   Encounter for screening colonoscopy   COLONOSCOPY; every 5 years; Last 85Kxx0599; Next Due: 03Dwf7237;  Active    Signatures    Electronically signed by : Julisa Shaikh DO; Jan 19 2018 10:10AM EST (Author)

## 2018-01-22 ENCOUNTER — TRANSCRIBE ORDERS (OUTPATIENT)
Dept: LAB | Facility: CLINIC | Age: 73
End: 2018-01-22

## 2018-01-22 ENCOUNTER — APPOINTMENT (OUTPATIENT)
Dept: LAB | Facility: CLINIC | Age: 73
End: 2018-01-22
Payer: MEDICARE

## 2018-01-22 ENCOUNTER — TRANSCRIBE ORDERS (OUTPATIENT)
Dept: MAMMOGRAPHY | Facility: CLINIC | Age: 73
End: 2018-01-22

## 2018-01-22 VITALS
BODY MASS INDEX: 38.98 KG/M2 | WEIGHT: 220 LBS | DIASTOLIC BLOOD PRESSURE: 84 MMHG | HEART RATE: 95 BPM | HEIGHT: 63 IN | SYSTOLIC BLOOD PRESSURE: 120 MMHG

## 2018-01-22 VITALS
HEIGHT: 65 IN | BODY MASS INDEX: 36.49 KG/M2 | DIASTOLIC BLOOD PRESSURE: 80 MMHG | WEIGHT: 219 LBS | SYSTOLIC BLOOD PRESSURE: 122 MMHG

## 2018-01-22 DIAGNOSIS — I10 ESSENTIAL (PRIMARY) HYPERTENSION: ICD-10-CM

## 2018-01-22 DIAGNOSIS — E78.2 MIXED HYPERLIPIDEMIA: ICD-10-CM

## 2018-01-22 LAB
ALBUMIN SERPL BCP-MCNC: 3.8 G/DL (ref 3.5–5)
ALP SERPL-CCNC: 90 U/L (ref 46–116)
ALT SERPL W P-5'-P-CCNC: 27 U/L (ref 12–78)
ANION GAP SERPL CALCULATED.3IONS-SCNC: 6 MMOL/L (ref 4–13)
AST SERPL W P-5'-P-CCNC: 21 U/L (ref 5–45)
BILIRUB SERPL-MCNC: 0.92 MG/DL (ref 0.2–1)
BUN SERPL-MCNC: 17 MG/DL (ref 5–25)
CALCIUM SERPL-MCNC: 8.8 MG/DL (ref 8.3–10.1)
CHLORIDE SERPL-SCNC: 108 MMOL/L (ref 100–108)
CHOLEST SERPL-MCNC: 156 MG/DL (ref 50–200)
CO2 SERPL-SCNC: 25 MMOL/L (ref 21–32)
CREAT SERPL-MCNC: 1.01 MG/DL (ref 0.6–1.3)
ERYTHROCYTE [DISTWIDTH] IN BLOOD BY AUTOMATED COUNT: 13.5 % (ref 11.6–15.1)
GFR SERPL CREATININE-BSD FRML MDRD: 55 ML/MIN/1.73SQ M
GLUCOSE P FAST SERPL-MCNC: 92 MG/DL (ref 65–99)
HCT VFR BLD AUTO: 47.5 % (ref 34.8–46.1)
HDLC SERPL-MCNC: 63 MG/DL (ref 40–60)
HGB BLD-MCNC: 15.9 G/DL (ref 11.5–15.4)
LDLC SERPL CALC-MCNC: 76 MG/DL (ref 0–100)
MCH RBC QN AUTO: 32.5 PG (ref 26.8–34.3)
MCHC RBC AUTO-ENTMCNC: 33.5 G/DL (ref 31.4–37.4)
MCV RBC AUTO: 97 FL (ref 82–98)
PLATELET # BLD AUTO: 254 THOUSANDS/UL (ref 149–390)
PMV BLD AUTO: 12 FL (ref 8.9–12.7)
POTASSIUM SERPL-SCNC: 4 MMOL/L (ref 3.5–5.3)
PROT SERPL-MCNC: 7.5 G/DL (ref 6.4–8.2)
RBC # BLD AUTO: 4.89 MILLION/UL (ref 3.81–5.12)
SODIUM SERPL-SCNC: 139 MMOL/L (ref 136–145)
TRIGL SERPL-MCNC: 85 MG/DL
TSH SERPL DL<=0.05 MIU/L-ACNC: 2.05 UIU/ML (ref 0.36–3.74)
WBC # BLD AUTO: 8.19 THOUSAND/UL (ref 4.31–10.16)

## 2018-01-22 PROCEDURE — 80053 COMPREHEN METABOLIC PANEL: CPT

## 2018-01-22 PROCEDURE — 36415 COLL VENOUS BLD VENIPUNCTURE: CPT

## 2018-01-22 PROCEDURE — 80061 LIPID PANEL: CPT

## 2018-01-22 PROCEDURE — 84443 ASSAY THYROID STIM HORMONE: CPT

## 2018-01-22 PROCEDURE — 85027 COMPLETE CBC AUTOMATED: CPT

## 2018-01-23 ENCOUNTER — GENERIC CONVERSION - ENCOUNTER (OUTPATIENT)
Dept: OTHER | Facility: OTHER | Age: 73
End: 2018-01-23

## 2018-01-23 ENCOUNTER — ALLSCRIPTS OFFICE VISIT (OUTPATIENT)
Dept: OTHER | Facility: OTHER | Age: 73
End: 2018-01-23

## 2018-01-23 VITALS
HEART RATE: 120 BPM | HEIGHT: 63 IN | SYSTOLIC BLOOD PRESSURE: 120 MMHG | DIASTOLIC BLOOD PRESSURE: 86 MMHG | OXYGEN SATURATION: 96 % | WEIGHT: 223 LBS | BODY MASS INDEX: 39.51 KG/M2

## 2018-01-23 NOTE — PROGRESS NOTES
Assessment    1  Encounter for preventive health examination (V70 0) (Z00 00)   2  Visit for screening mammogram (V76 12) (Z12 31)    Plan  Screening for genitourinary condition    · *VB - Urinary Incontinence Screen (Dx Z13 89 Screen for UI); Status:Complete -  Retrospective By Protocol Authorization;   Done: 74PNJ2945 09:04AM  Visit for screening mammogram    · * MAMMO SCREENING BILATERAL W CAD; Status:Hold For - Scheduling; Requested  for:19Jan2018; Discussion/Summary  Impression: Subsequent Annual Wellness Visit  Cardiovascular screening and counseling: screening is current and counseling was given on maintaining a healthy weight  Diabetes screening and counseling: screening is current and counseling was given on maintaining a healthy weight  Colorectal cancer screening and counseling: screening is current  Breast cancer screening and counseling: due for a screening mammogram    Cervical cancer screening and counseling: screening is current  Osteoporosis screening and counseling: screening is current  Abdominal aortic aneurysm screening and counseling: screening not indicated  Glaucoma screening and counseling: screening is current  HIV screening and counseling: screening not indicated  Immunizations: the patient declines the influenza vaccination, influenza vaccination is recommended annually, the risks and benefits of pneumococcal vaccination were discussed with the patient, the patient declines the pneumococcal vaccination, the risks and benefits of the Zostavax vaccine were discussed with the patient, the patient declines the Zostavax vaccine, the risks and benefits of the Tdap vaccine were discussed with the patient and the patient declines the Tdap vaccine  Advance Directive Planning: complete and up to date  Patient Discussion: plan discussed with the patient, follow-up visit needed in one year        Chief Complaint  AWV      Advance Directives  Advance Directive [facility]:   YES - Patient has an advance health care directive  History of Present Illness  Welcome to Medicare and Wellness Visits: The patient is being seen for the subsequent annual wellness visit  Medicare Screening and Risk Factors   Hospitalizations: no previous hospitalizations  Medicare Screening Tests Risk Questions   Osteoporosis risk assessment: , female gender and over 48years of age  HIV risk assessment: none indicated  Drug and Alcohol Use: The patient is a former cigarette smoker, quit smoking 1977 and is a former smokeless tobacco user  The patient reports occasional alcohol use  She has never used illicit drugs  Diet and Physical Activity: Current diet includes well balanced meals  She exercises infrequently  Mood Disorder and Cognitive Impairment Screening: PHQ-9 Depression Scale   Over the past 2 weeks, how often have you been bothered by the following problems? 1 ) Little interest or pleasure in doing things? Not at all    2 ) Feeling down, depressed or hopeless? Not at all    3 ) Trouble falling asleep or sleeping too much? Not at all    4 ) Feeling tired or having little energy? Not at all    5 ) Poor appetite or overeating? Not at all    6 ) Feeling bad about yourself, or that you are a failure, or have let yourself or your family down? Not at all    7 ) Trouble concentrating on things, such as reading a newspaper or watching television? Not at all    8 ) Moving or speaking so slowly that other people could have noticed, or the opposite, moving or speaking faster than usual? Not at all    9 ) Thoughts that you would be off dead or of hurting yourself in some way? Not at all  Functional Ability/Level of Safety: Hearing is normal bilaterally  She denies hearing difficulties  She does not use a hearing aid   The patient is currently able to do activities of daily living without limitations, able to do instrumental activities of daily living without limitations, able to participate in social activities without limitations and able to drive without limitations  Activities of daily living details: does not need help using the phone, no transportation help needed, does not need help shopping, no meal preparation help needed, does not need help doing housework, does not need help doing laundry, does not need help managing medications and does not need help managing money  Fall risk factors: The patient fell 0 times in the past 12 months  Home safety risk factors:  no unfamiliar surroundings, no loose rugs, no poor household lighting, no uneven floors, no household clutter, grab bars in the bathroom and handrails on the stairs  Advance Directives: Advance directives: living will, durable power of  for health care directives and advance directives  Co-Managers and Medical Equipment/Suppliers: See Patient Care Team      Patient Care Team    Care Team Member Role Specialty Office Number   [facility]  Neurology [phone]     Active Problems    1  Abscess of back (682 2) (L02 212)   2  Aphasia, mixed (784 3) (R47 01)   3  Bright red rectal bleeding (569 3) (K62 5)   4  Chronic colitis (558 9) (K52 9)   5  Elevated hematocrit (282 7) (R71 8)   6  Encounter for routine gynecological examination with Papanicolaou smear of cervix   (V72 31,V76 2) (Z01 419)   7  Encounter for screening colonoscopy (V76 51) (Z12 11)   8  Fever, unspecified fever cause (780 60) (R50 9)   9  Follow up (V67 9) (Z09)   10  Hyperlipidemia, mixed (272 2) (E78 2)   11  Hypertension, essential (401 9) (I10)   12  Lyme disease (088 81) (A69 20)   13  Migraine with aura and without status migrainosus, not intractable (346 00) (G43 109)   14  Need for influenza vaccination (V04 81) (Z23)   15  Post-menopausal bleeding (627 1) (N95 0)   16  Screening for genitourinary condition (V81 6) (Z13 89)   17  Screening for neurological condition (V80 09) (Z13 89)   18  Slurred speech (784 59) (R47 81)   19   Vertigo (780 4) (R42)   20  Visit for screening mammogram (V76 12) (Z12 31)    Past Medical History    · History of hyperlipidemia (V12 29) (Z86 39)   · History of hypertension (V12 59) (Z86 79)   · History of transient cerebral ischemia (V12 54) (Z86 73)    Surgical History    · Denied: History Of Prior Surgery    Family History  Mother    · Family history of lung cancer (V16 1) (Z80 1)   · Denied: Family history of mental disorder   · Denied: Family history of substance abuse  Father    · Family history of cardiac disorder (V17 49) (Z82 49)    Social History    · Always uses seat belt   · Does not use illicit drugs (V98 03) (Z78 9)   · Former smoker (J44 84) (H99 462)   ·    · Occasional alcohol use   · Retired    Current Meds   1  Aspirin EC 81 MG Oral Tablet Delayed Release; take 2 tablet daily; Therapy: 42ZDR2964 to (Last Rx:02Oct2017) Ordered   2  Lovastatin 10 MG Oral Tablet; TAKE 1 TABLET BY MOUTH AT  BEDTIME; Therapy: 54LPS1147 to (Cinda May)  Requested for: 38WHF6553; Last   Rx:13Bgl6631 Ordered   3  Metoprolol Tartrate 25 MG Oral Tablet; take 1 tablet by mouth once daily; Therapy: 33BPX4391 to (Evaluate:44Niy1737)  Requested for: 23Guv5939; Last   Rx:24Ygz7700 Ordered    Allergies    1  No Known Drug Allergies    2  No Known Environmental Allergies   3  No Known Food Allergies    Immunizations   1    Influenza  Temporarily Deferred: Pt requests deferral, As of: 73WWW7206, Defer for 5 Years     Vitals  Signs    Heart Rate: 053  Systolic: 979  Diastolic: 86  Height: 5 ft 3 in  Weight: 223 lb   BMI Calculated: 39 5  BSA Calculated: 2 03  O2 Saturation: 96    Results/Data  PHQ-9 Adult Depression Screening 81ZFD6240 09:04AM User, [facility]     Test Name Result Flag Reference   PHQ-9 Adult Depression Score 0     Over the last two weeks, how often have you been bothered by any of the following problems?   Little interest or pleasure in doing things: Not at all - 0  Feeling down, depressed, or hopeless: Not at all - 0  Trouble falling or staying asleep, or sleeping too much: Not at all - 0  Feeling tired or having little energy: Not at all - 0  Poor appetite or over eating: Not at all - 0  Feeling bad about yourself - or that you are a failure or have let yourself or your family down: Not at all - 0  Trouble concentrating on things, such as reading the newspaper or watching television: Not at all - 0  Moving or speaking so slowly that other people could have noticed  Or the opposite -  being so fidgety or restless that you have been moving around a lot more than usual: Not at all - 0  Thoughts that you would be better off dead, or of hurting yourself in some way: Not at all - 0   PHQ-9 Adult Depression Screening Negative     PHQ-9 Difficulty Level Not difficult at all     PHQ-9 Severity No Depression       *VB - Urinary Incontinence Screen (Dx Z13 89 Screen for UI) 63LEV3087 09:04AM Livia Rodriguez   Pt reports no UI     Test Name Result Flag Reference   Urinary Incontinence Assessment 11YOH6496       Falls Risk Assessment (Dx Z13 89 Screen for Neurologic Disorder) 40FHN0840 09:03AM User, [facility]     Test Name Result Flag Reference   Falls Risk      No falls in the past year       Health Management  Encounter for screening colonoscopy   COLONOSCOPY; every 5 years; Last 47Mva8754; Next Due: 08Ous9541;  Active    Signatures   Electronically signed by : Néstor Esqueda DO; Jan 19 2018  9:31AM EST                       (Author)

## 2018-01-24 NOTE — PROGRESS NOTES
Assessment    1  Atrial fibrillation (427 31) (I48 91)   2  Hypertension, essential (401 9) (I10)   3  Hyperlipidemia, mixed (272 2) (E78 2)   4  History of transient cerebral ischemia (V12 54) (Z86 73)   5  Obesity (BMI 30-39 9) (278 00) (E66 9)    Plan    · Eliquis 5 MG Oral Tablet; Take 1 tablet twice daily   Rx By: Miles Cruz; Dispense: 30 Days ; #:60 Tablet; Refill: 2; For: Atrial fibrillation; GENE = N; Sent To: [id number]  · ECHO COMPLETE WITH CONTRAST IF INDICATED; Status:Hold For - Scheduling;  Requested KXH:53YPM9670; Perform:[facility]; OBA:06YKN3529;EAGTQLV; For:Atrial fibrillation; Ordered By:Marycruz Gomez;   · EKG/ECG- POC; Status:Complete;   Done: 31SCL8552   Perform: In Office; WPJ:[id number];FRANCOISE; For:Atrial fibrillation; Ordered By:Marycruz Gomez;   · NM MYOCARDIAL PERFUSION SPECT (RX STRESS AND/OR REST); Status:Hold For -  Scheduling; Requested GAT:48WPA7531; Perform:[facility]; FLK:73QGI6556;CZNIVSZ; For:Atrial fibrillation; Ordered By:Marycruz Gomez;    · From  Metoprolol Tartrate 25 MG Oral Tablet take 1 tablet by mouth once daily  To Metoprolol Tartrate 25 MG Oral Tablet TAKE 1 TABLET TWICE DAILY   Rx By: Miles Cruz; Dispense: 90 Days ; #:180 Tablet; Refill: 1; For: Hypertension, essential; GENE = N; Faxed To: [facility]    · Aspirin EC 81 MG Oral Tablet Delayed Release   Rx By: Sanjiv Jung; Dispense: 0 Days ; #:60 X 500 Tablet Delayed Release Bottle; Refill: 5; For: Slurred speech; GENE = N; Record; Last Updated By: Miles Cruz; 10/2/2017 1:36:11 PM    Discussion/Summary    EKG 01/23/2018 - atrial fibrillation with RVR  120bpm  RAD   Inferior and anterior ST-T changes are nonspecific   2D echocardiogram ordered to rule out underlying structural heart disease and pharmacological nuclear stress test ordered to rule out underlying ischemia as a cause of her atrial fibrillation    I had a detailed discussion with the patient regarding atrial fibrillation, its pathophysiology, stroke risk, need for anticoagulation, bleeding risk, rate versus rhythm control etc  Qs answered  Patient wants to start anticoagulation  CHADS2-VASC score = 5 (HTN, TIA, Age, Sex)  patient started on Eliquis 5 mg twice daily  Metoprolol dose increased to b i d  for better rate control  Continue beta-blocker for HTN     continue lovastatin for HLP  Lipid panel at goal    Recommend aggressive risk factor modification and therapeutic lifestyle changes  Low salt, low calorie, low fat, low cholesterol diet with regular exercise and to optimize weight  I will defer the ordering and monitoring of necessary lab studies to you, but I am available and happy to review and manage any of that data at your request in the future  Discussed concepts of atherosclerosis, including signs and symptoms of cardiac disease  Previous studies were reviewed  Safety measures were reviewed  Questions were entertained and answered  Patient was advised to report any problem requiring medical attention  Follow up with appropriate specialists and lab work as discussed  Return for follow up visit as scheduled or earlier, if needed  Further recommendations will be forthcoming after the above testing is performed and results available  Thank you for allowing me to participate in the care and evaluation of your patient  Should you have any questions, please feel free to contact me  Chief Complaint  Atrial fibrillation      History of Present Illness  HPI: Referred by Dr Rere Hilton for atrial fibrillation    Patient was recently diagnosed with atrial fibrillation on an EKG done at her PCP office  Patient had no symptoms from it  Patient denies palpitations, lightheadedness, syncope, SOB, chest pain / pressure, swelling feet, orthopnea, PND  HTN- she has had it for many years  Takes her medications regularly  Denies lightheadedness, headache, medication side effects      HLP -has had it for many years  Recent FLP reviewed- at goal  On lovastatin  No myalgia      Review of Systems      Cardiac: No complaints of chest pain, no palpitations, no fainting  Skin: No complaints of nonhealing sores or skin rash  Genitourinary: No complaints of recurrent urinary tract infections, frequent urination at night, difficult urination, blood in urine, kidney stones, loss of bladder control, kidney problems, denies any birth control or hormone replacement, is not post menopausal, not currently pregnant  Psychological: No complaints of feeling depressed, anxiety, panic attacks, or difficulty concentrating  General: No complaints of trouble sleeping, lack of energy, fatigue, appetite changes, weight changes, fever, frequent infections, or night sweats  Respiratory: No complaints of shortness of breath, cough with sputum, or wheezing  HEENT: No complaints of serious problems, hearing problems, nose problems, throat problems, or snoring  Gastrointestinal: No complaints of liver problems, nausea, vomiting, heartburn, constipation, bloody stools, diarrhea, problems swallowing, adbominal pain, or rectal bleeding  Hematologic: No complaints of bleeding disorders, anemia, blood clots, or excessive brusing  Neurological: No complaints of numbness, tingling, dizziness, weakness, seizures, headaches, syncope or fainting, AM fatigue, daytime sleepiness, no witnessed apnea episodes  Musculoskeletal: No complaints of arthritis, back pain, or painfull swelling  ROS reviewed  Active Problems    1  Abscess of back (682 2) (L02 212)   2  Aphasia, mixed (784 3) (R47 01)   3  Arthritis (716 90) (M19 90)   4  Bright red rectal bleeding (569 3) (K62 5)   5  Chronic colitis (558 9) (K52 9)   6  Elevated hematocrit (282 7) (R71 8)   7  Encounter for routine gynecological examination with Papanicolaou smear of cervix   (V72 31,V76 2) (Z01 419)   8   Encounter for screening colonoscopy (V76 51) (Z12 11)   9  Fever, unspecified fever cause (780 60) (R50 9)   10  Follow up (V67 9) (Z09)   11  Hyperlipidemia, mixed (272 2) (E78 2)   12  Hypertension, essential (401 9) (I10)   13  Lyme disease (088 81) (A69 20)   14  Migraine with aura and without status migrainosus, not intractable (346 00) (G43 109)   15  Need for influenza vaccination (V04 81) (Z23)   16  Other insomnia (780 52) (G47 09)   17  Persistent atrial fibrillation (427 31) (I48 1)   18  Post-menopausal bleeding (627 1) (N95 0)   19  Right hip pain (719 45) (M25 551)   20  Screening for genitourinary condition (V81 6) (Z13 89)   21  Screening for neurological condition (V80 09) (Z13 89)   22  Slurred speech (784 59) (R47 81)   23  Vertigo (780 4) (R42)   24  Visit for screening mammogram (V76 12) (Z12 31)    Past Medical History    · History of hyperlipidemia (V12 29) (Z86 39)   · History of hypertension (V12 59) (Z86 79)   · History of transient cerebral ischemia (V12 54) (Z86 73)    The active problems and past medical history were reviewed and updated today  Surgical History    · Denied: History Of Prior Surgery    The surgical history was reviewed and updated today  Family History  Mother    · Family history of lung cancer (V16 1) (Z80 1)   · Denied: Family history of mental disorder   · Denied: Family history of substance abuse  Father    · Family history of cardiac disorder (V17 49) (Z82 49)    The family history was reviewed and updated today  Social History    · Always uses seat belt   · Does not use illicit drugs (T62 76) (Z78 9)   · Former smoker (V15 82) (X14 543)   ·    · Occasional alcohol use   · Retired  The social history was reviewed and updated today  The social history was reviewed and is unchanged  Current Meds   1  Aspirin EC 81 MG Oral Tablet Delayed Release; take 2 tablet daily; Therapy: 08EZR9857 to (Last Rx:02Oct2017) Ordered   2   Lovastatin 10 MG Oral Tablet; TAKE 1 TABLET BY MOUTH AT BEDTIME; Therapy: 82OLL2658 to (Claudeen Poplar)  Requested for: 04LIQ8580; Last   Rx:06Oct2017 Ordered   3  Metoprolol Tartrate 25 MG Oral Tablet; take 1 tablet by mouth once daily; Therapy: 61MTH3809 to (Evaluate:38Yyg4211)  Requested for: 78Vdv0298; Last   Rx:93Nvb8417 Ordered   4  Tylenol 325 MG Oral Tablet; 2 TABLETS 2 TIMES A DAY; Therapy: 33MRA6934 to Recorded    The medication list was reviewed and updated today  Allergies    1  No Known Drug Allergies    2  No Known Environmental Allergies   3  No Known Food Allergies    Vitals  Signs    Heart Rate: 92  Systolic: 481  Diastolic: 82  Height: 5 ft 3 in  Weight: 219 lb 6 0 oz  BMI Calculated: 38 86  BSA Calculated: 2 01  O2 Saturation: 98    Physical Exam    Constitutional   General appearance: Abnormal   obese  Eyes   Conjunctiva and Sclera examination: Conjunctiva pink, sclera anicteric  Ears, Nose, Mouth, and Throat - External inspection of ears and nose: Normal without deformities or discharge  Oropharynx: Clear, nares are clear, mucous membranes are moist    Neck   Neck and thyroid: Normal, supple, trachea midline, no thyromegaly  Pulmonary   Respiratory effort: No increased work of breathing or signs of respiratory distress  Auscultation of lungs: Clear to auscultation, no rales, no rhonchi, no wheezing, good air movement  Cardiovascular   Palpation of heart: Abnormal   irregular rhythm  Auscultation of heart: Abnormal   S1 variable and S2 normal    Carotid pulses: Normal, 2+ bilaterally  Pedal pulses: Normal, 2+ bilaterally  Examination of extremities for edema and/or varicosities: Normal     Chest - Chest: Normal    Abdomen   Abdomen: Non-tender and no distention  Liver and spleen: No hepatomegaly or splenomegaly  Musculoskeletal Gait and station: Normal gait  Digits and nails: Normal without clubbing or cyanosis  Skin - Skin and subcutaneous tissue: Normal without rashes or lesions   Skin is warm and well perfused, normal turgor      Neurologic - Speech: Normal     Psychiatric - Orientation to person, place, and time: Normal  Mood and affect: Normal       Signatures   Electronically signed by : MARCOS Busby ; Jan 23 2018 12:10PM EST                       (Author)

## 2018-01-24 NOTE — RESULT NOTES
Verified Results  (1) CBC/ PLT (NO DIFF) [id number] 04:07PM [facility]Former[facility] Order Number: [id number]     Test Name Result Flag Reference   HEMATOCRIT 47 5 % H 34 8-46 1   HEMOGLOBIN 15 9 g/dL H 11 5-15 4   MCHC 33 5 g/dL  31 4-37 4   MCH 32 5 pg  26 8-34 3   MCV 97 fL  82-98   PLATELET COUNT 195 Thousands/uL  149-390   RBC COUNT 4 89 Million/uL  3 81-5 12   RDW 13 5 %  11 6-15 1   WBC COUNT 8 19 Thousand/uL  4 31-10 16   MPV 12 0 fL  8 9-12 7     (1) COMPREHENSIVE METABOLIC PANEL 42BCO6043 17:72AN Alex GreFormerly [facility] Order Number: [id number]     Test Name Result Flag Reference   SODIUM 139 mmol/L  136-145   POTASSIUM 4 0 mmol/L  3 5-5 3   CHLORIDE 108 mmol/L  100-108   CARBON DIOXIDE 25 mmol/L  21-32   ANION GAP (CALC) 6 mmol/L  4-13   BLOOD UREA NITROGEN 17 mg/dL  5-25   CREATININE 1 01 mg/dL  0 60-1 30   Standardized to IDMS reference method   CALCIUM 8 8 mg/dL  8 3-10 1   BILI, TOTAL 0 92 mg/dL  0 20-1 00   ALK PHOSPHATAS 90 U/L     ALT (SGPT) 27 U/L  12-78   Specimen collection should occur prior to Sulfasalazine and/or Sulfapyridine administration due to the potential for falsely depressed results  AST(SGOT) 21 U/L  5-45   Specimen collection should occur prior to Sulfasalazine administration due to the potential for falsely depressed results  ALBUMIN 3 8 g/dL  3 5-5 0   TOTAL PROTEIN 7 5 g/dL  6 4-8 2   eGFR 55 ml/min/1 73sq m     [facility] Disease Education Program recommendations are as follows:  GFR calculation is accurate only with a steady state creatinine  Chronic Kidney disease less than 60 ml/min/1 73 sq  meters  Kidney failure less than 15 ml/min/1 73 sq  meters  GLUCOSE FASTING 92 mg/dL  65-99   Specimen collection should occur prior to Sulfasalazine administration due to the potential for falsely depressed results  Specimen collection should occur prior to Sulfapyridine administration due to the potential for falsely elevated results       (1) LIPID PANEL, FASTING [id number] 04:07PM Tehresa Sapp Order Number: [id number]     Test Name Result Flag Reference   CHOLESTEROL 156 mg/dL     Cholesterol:    Desirable <200 mg/dl    Borderline 200-239 mg/dl    High>239   HDL,DIRECT 63 mg/dL H 40-60   HDL Cholesterol:    High>59 mg/dL    Low <41 mg/dL   LDL CHOLESTEROL CALCULATED 76 mg/dL  0-100   This screening LDL is a calculated result  It does not have the accuracy of the Direct Measured LDL in the monitoring of patients with hyperlipidemia and/or statin therapy  Direct Measure LDL (QXZ705) must be ordered separately in these patients  Triglyceride:        Normal <150 mg/dl   Borderline High 150-199 mg/dl   High 200-499 mg/dl   Very High >499 mg/dl   TRIGLYCERIDES 85 mg/dL  <=150   Specimen collection should occur prior to N-Acetylcysteine or Metamizole administration due to the potential for falsely depressed results  (1) TSH 22Jan2018 04:07PM Theresa Marin Order Number: [id number]     Test Name Result Flag Reference   TSH 2 050 uIU/mL  0 358-3 740   Patients undergoing fluorescein dye angiography may retain small amounts of fluorescein in the body for 48-72 hours post procedure  Samples containing fluorescein can produce falsely depressed TSH values  If the patient had this procedure,a specimen should be resubmitted post fluorescein clearance            The recommended reference ranges for TSH during pregnancy are as follows:  First trimester 0 1 to 2 5 uIU/mL  Second trimester  0 2 to 3 0 uIU/mL  Third trimester 0 3 to 3 0 uIU/m

## 2018-01-31 ENCOUNTER — HOSPITAL ENCOUNTER (OUTPATIENT)
Dept: MAMMOGRAPHY | Facility: CLINIC | Age: 73
Discharge: HOME | End: 2018-01-31
Payer: MEDICARE

## 2018-01-31 DIAGNOSIS — Z12.31 ENCOUNTER FOR SCREENING MAMMOGRAM FOR MALIGNANT NEOPLASM OF BREAST: ICD-10-CM

## 2018-01-31 PROCEDURE — 77067 SCR MAMMO BI INCL CAD: CPT

## 2018-01-31 PROCEDURE — 77063 BREAST TOMOSYNTHESIS BI: CPT

## 2018-02-08 ENCOUNTER — HOSPITAL ENCOUNTER (OUTPATIENT)
Dept: NON INVASIVE DIAGNOSTICS | Facility: CLINIC | Age: 73
Discharge: HOME | End: 2018-02-08
Payer: MEDICARE

## 2018-02-08 DIAGNOSIS — I48.91 ATRIAL FIBRILLATION (HCC): ICD-10-CM

## 2018-02-08 PROCEDURE — 78452 HT MUSCLE IMAGE SPECT MULT: CPT

## 2018-02-08 PROCEDURE — A9502 TC99M TETROFOSMIN: HCPCS

## 2018-02-08 PROCEDURE — 93306 TTE W/DOPPLER COMPLETE: CPT

## 2018-02-08 PROCEDURE — 93017 CV STRESS TEST TRACING ONLY: CPT

## 2018-02-08 RX ADMIN — REGADENOSON 0.4 MG: 0.08 INJECTION, SOLUTION INTRAVENOUS at 13:20

## 2018-02-09 LAB
ARRHY DURING EX: NORMAL
CHEST PAIN STATEMENT: NORMAL
MAX DIASTOLIC BP: 98 MMHG
MAX HEART RATE: 151 BPM
MAX PREDICTED HEART RATE: 147 BPM
MAX. SYSTOLIC BP: 148 MMHG
PROTOCOL NAME: NORMAL
REASON FOR TERMINATION: NORMAL
TARGET HR FORMULA: NORMAL
TEST INDICATION: NORMAL
TIME IN EXERCISE PHASE: NORMAL

## 2018-02-09 PROCEDURE — 78452 HT MUSCLE IMAGE SPECT MULT: CPT | Performed by: INTERNAL MEDICINE

## 2018-02-09 PROCEDURE — 93018 CV STRESS TEST I&R ONLY: CPT | Performed by: INTERNAL MEDICINE

## 2018-02-09 PROCEDURE — 93016 CV STRESS TEST SUPVJ ONLY: CPT | Performed by: INTERNAL MEDICINE

## 2018-02-23 ENCOUNTER — OFFICE VISIT (OUTPATIENT)
Dept: CARDIOLOGY CLINIC | Facility: CLINIC | Age: 73
End: 2018-02-23
Payer: MEDICARE

## 2018-02-23 ENCOUNTER — TELEPHONE (OUTPATIENT)
Dept: CARDIOLOGY CLINIC | Facility: CLINIC | Age: 73
End: 2018-02-23

## 2018-02-23 VITALS
BODY MASS INDEX: 34.87 KG/M2 | WEIGHT: 217 LBS | DIASTOLIC BLOOD PRESSURE: 76 MMHG | SYSTOLIC BLOOD PRESSURE: 124 MMHG | HEIGHT: 66 IN | OXYGEN SATURATION: 98 % | HEART RATE: 60 BPM

## 2018-02-23 DIAGNOSIS — E78.2 MIXED HYPERLIPIDEMIA: ICD-10-CM

## 2018-02-23 DIAGNOSIS — E66.9 OBESITY (BMI 30-39.9): ICD-10-CM

## 2018-02-23 DIAGNOSIS — I48.0 PAROXYSMAL ATRIAL FIBRILLATION (HCC): Primary | ICD-10-CM

## 2018-02-23 DIAGNOSIS — Z86.73 H/O TIA (TRANSIENT ISCHEMIC ATTACK) AND STROKE: ICD-10-CM

## 2018-02-23 PROBLEM — I10 ESSENTIAL HYPERTENSION: Status: RESOLVED | Noted: 2018-02-23 | Resolved: 2018-02-23

## 2018-02-23 PROBLEM — I10 ESSENTIAL HYPERTENSION: Status: ACTIVE | Noted: 2018-02-23

## 2018-02-23 PROCEDURE — 99214 OFFICE O/P EST MOD 30 MIN: CPT | Performed by: INTERNAL MEDICINE

## 2018-02-23 NOTE — PROGRESS NOTES
CARDIOLOGY OFFICE VISIT  [facility]  [address], Rolanda Hinds, [address] [facility], Kathy Arreola, [address]  Tel: [phone]      NAME: Jayant Hernandez  AGE: 67 y o  SEX: female  : 1945   MRN: [id number]      Chief Complaint:  Chief Complaint   Patient presents with    Results     echo and stress test         History of Present Illness:   Patient comes for follow up  States  she is doing well from cardiac stand point and denies chest pain / pressure / tightness, SOB, palpitations, lightheadedness, syncope, swelling feet, orthopnea, PND, claudication  PAF - Patient was diagnosed with atrial fibrillation on an EKG done at her PCP office  Patient had no symptoms from it  Patient denied palpitations, lightheadedness, syncope, SOB, chest pain / pressure, swelling feet, orthopnea, PND  HLP -  Has had hyperlipidemia for many years  Taking statin regularly along with diet control  Denies myalgia  PCP closely monitoring the blood work  Obesity -  Trying to lose weight  Past Medical History:  Past Medical History:   Diagnosis Date    Headache     Hyperlipidemia     Hypertension     Lyme disease     TIA (transient ischemic attack)     Vertigo          Past Surgical History:  Past Surgical History:   Procedure Laterality Date    COLONOSCOPY      NV SIGMOIDOSCOPY FLX DX W/COLLJ SPEC BR/WA IF PFRMD N/A 2017    Procedure: Swtai Damon;  Surgeon: Jazzy Leo MD;  Location: [facility]; Service: Gastroenterology    TONSILLECTOMY           Family History:  No family history on file        Social History:  Social History     Social History    Marital status: /Civil Union     Spouse name: N/A    Number of children: N/A    Years of education: N/A     Social History Main Topics    Smoking status: Former Smoker    Smokeless tobacco: Never Used    Alcohol use 4 2 oz/week     7 Glasses of wine per week      Comment: occ    Drug use: No    Sexual activity: Not on file     Other Topics Concern    Not on file     Social History Narrative    No narrative on file         Active Problems:  Patient Active Problem List   Diagnosis    Paroxysmal atrial fibrillation ([address] )    Mixed hyperlipidemia    Obesity (BMI 30-39  9)    H/O TIA (transient ischemic attack) and stroke         The following portions of the patient's history were reviewed and updated as appropriate: past medical history, past surgical history, past family history,  past social history, current medications, allergies and problem list       Review of Systems:  Constitutional: Denies fever, chills, fatigue  Eyes: Denies eye redness, eye discharge, double vision  ENT: Denies hearing loss, tinnitus, sneezing, nasal discharge, sore throat   Respiratory: Denies cough, expectoration, hemoptysis, shortness of breath  Cardiovascular: Denies chest pain, palpitations, orthopnea, PND, lower extremity swelling  Gastrointestinal: Denies abdominal pain, nausea, vomiting, hematemesis, diarrhea, bloody stools  Genito-Urinary: Denies dysuria, incontinence  Musculoskeletal: Denies back pain, joint pain, muscle pain  Neurologic: Denies confusion, lightheadedness, syncope, headache, focal weakness, sensory changes, seizures  Endocrine: Denies polyuria, polydipsia, temperature intolerance  Allergy and Immunology: Denies hives, insect bite sensitivity  Hematological and Lymphatic: Denies bleeding problems, swollen glands   Psychological: Denies depression, suicidal ideation, anxiety, panic  Dermatological: Denies pruritus, rash, skin lesion changes      Vitals:  Vitals:    02/23/18 1043   BP: 124/76   Pulse: 60   SpO2: 98%       Body mass index is 35 02 kg/m²  Weight (last 2 days)     Date/Time   Weight    02/23/18 1043  98 4 (217)                Physical Examination:  General: Patient is not in acute distress  Awake, alert, oriented in time, place and person   Responding to commands  Head: Normocephalic  Atraumatic  Eyes: Both pupils normal sized, round and reactive to light  Nonicteric  ENT: Normal external ear canals  Nares normal, no drainage  Lips and oral mucosa normal  Neck: Supple  JVP not raised  Trachea central  No thyromegaly  Lungs: Bilateral bronchovascular breath sounds with no crackles or rhonchi  Chest wall: No tenderness  Cardiovascular: RRR  S1 and S2 normal  Grade 3/6 PSM at LLSB  No rub or gallop  Gastrointestinal: Abdomen soft, nontender  No guarding or rigidity  Liver and spleen not palpable  Bowel sounds present  Neurologic: Patient is awake, alert, oriented in time, place and person  Responding to command  Moving all extremities  Integumentary:  No skin rash  Lymphatic: No cervical lymphadenopathy  Back: Symmetric   No CVA tenderness  Extremities: No clubbing, cyanosis or edema      Laboratory Results:  CBC with diff:   Lab Results   Component Value Date    WBC 8 19 01/22/2018    RBC 4 89 01/22/2018    HGB 15 9 (H) 01/22/2018    HCT 47 5 (H) 01/22/2018    MCV 97 01/22/2018    MCH 32 5 01/22/2018    RDW 13 5 01/22/2018     01/22/2018       CMP:  Lab Results   Component Value Date    CREATININE 1 01 01/22/2018    BUN 17 01/22/2018     01/22/2018    K 4 0 01/22/2018     01/22/2018    CO2 25 01/22/2018    GLUCOSE 102 01/04/2017    PROT 7 5 01/22/2018    ALKPHOS 90 01/22/2018    ALT 27 01/22/2018    AST 21 01/22/2018         Lipid Profile:   Lab Results   Component Value Date    CHOL 156 01/22/2018    CHOL 189 01/04/2017    CHOL 169 01/12/2016     Lab Results   Component Value Date    HDL 63 (H) 01/22/2018    HDL 64 (H) 01/04/2017    HDL 60 01/12/2016     Lab Results   Component Value Date    LDLCALC 76 01/22/2018    LDLCALC 110 (H) 01/04/2017    LDLCALC 93 01/12/2016     Lab Results   Component Value Date    TRIG 85 01/22/2018    TRIG 75 01/04/2017    TRIG 80 01/12/2016       Cardiac testing:   Results for orders placed during the hospital encounter of 02/08/18 Echo complete with contrast if indicated    Narrative [address], [address] [facility]  [phone]    Transthoracic Echocardiogram  2D, M-mode, and Color Doppler    Study date:  2018    Patient: Dirk Santana  MR number: [id number]  Account number: [de-identified]  : 1945  Age: 67 years  Gender: Female  Status: Outpatient  Location: [facility]  Height: 63 in  Weight: 219 lb  BP: 138/ 82 mmHg    Indications: Atrial Fibrillation  Diagnoses: I48 0 - Atrial fibrillation    Sonographer:  Hatch RCS  Interpreting Physician:  Arleth May MD  Primary Physician:  Parris Schwab DO  Referring Physician:  Arleth May MD  Group:  [facility]    SUMMARY    LEFT VENTRICLE:  Systolic function was moderately reduced  Ejection fraction was estimated in the range of 40 % to 45 %, likely underestimated due to rapid atrial fibrillation  Although no diagnostic regional wall motion abnormality was identified, this possibility cannot be completely excluded on the basis of this study  Wall thickness was mildly increased  There was mild concentric hypertrophy  RIGHT VENTRICLE:  The size was normal   Systolic function was mildly reduced  LEFT ATRIUM:  The atrium was mildly dilated  MITRAL VALVE:  There was mild regurgitation  AORTIC VALVE:  There was mild regurgitation  TRICUSPID VALVE:  There was mild to moderate regurgitation  Pulmonary artery systolic pressure was within the normal range  PULMONIC VALVE:  There was trace regurgitation  HISTORY: PRIOR HISTORY: Hyperlipidemia  Atrial fibrillation  Risk factors: hypertension and morbid obesity  Cerebrovascular disease  PROCEDURE: The study was performed in the [address] [place] [address]  This was a routine study  The transthoracic approach was used  The study included complete 2D imaging, M-mode, and color Doppler   The heart rate was 127 bpm, at the  start of the study  Images were obtained from the parasternal, apical, subcostal, and suprasternal notch acoustic windows  Image quality was adequate  LEFT VENTRICLE: Size was normal  Systolic function was moderately reduced  Ejection fraction was estimated in the range of 40 % to 45 %, likely underestimated due to rapid atrial fibrillation  Although no diagnostic regional wall motion  abnormality was identified, this possibility cannot be completely excluded on the basis of this study  Wall thickness was mildly increased  There was mild concentric hypertrophy  No evidence of apical thrombus  RIGHT VENTRICLE: The size was normal  Systolic function was mildly reduced  Wall thickness was normal     LEFT ATRIUM: The atrium was mildly dilated  RIGHT ATRIUM: Size was normal     MITRAL VALVE: There was annular calcification  There was normal leaflet separation  DOPPLER: The transmitral velocity was within the normal range  There was no evidence for stenosis  There was mild regurgitation  AORTIC VALVE: The valve was trileaflet  Leaflets exhibited mildly increased thickness and normal cuspal separation  DOPPLER: Transaortic velocity was within the normal range  There was no evidence for stenosis  There was mild  regurgitation  TRICUSPID VALVE: The valve structure was normal  There was normal leaflet separation  DOPPLER: The transtricuspid velocity was within the normal range  There was no evidence for stenosis  There was mild to moderate regurgitation  Pulmonary  artery systolic pressure was within the normal range  PULMONIC VALVE: Leaflets exhibited normal thickness, no calcification, and normal cuspal separation  DOPPLER: The transpulmonic velocity was within the normal range  There was trace regurgitation  PERICARDIUM: There was no pericardial effusion  The pericardium was normal in appearance  AORTA: The root exhibited normal size      SYSTEMIC VEINS: IVC: The inferior vena cava was not well visualized  SYSTEM MEASUREMENT TABLES    Apical four chamber  4 chamber Left Atrium Volume Index; Planimetry; End Systole; Apical four chamber;: 21 26 cm2  Left Ventricular Diastolic Area; Method of Disks, Single Plane; End Diastole; Apical four chamber;: 23 86 cm2  Left Ventricular Ejection Fraction; Method of Disks, Single Plane; Apical four chamber;: 49 7 %  Left Ventricular systolic Area; Method of Disks, Single Plane; End Systole; Apical four chamber;: 15 54 cm2  Right Atrium Systolic Area; Planimetry; End Systole; Apical four chamber;: 15 89 cm2  Right Ventricular Internal Diastolic Dimension; End Diastole; Apical four chamber;: 25 6 mm  TAPSE: 14 4 mm    Unspecified Scan Mode  AR Vmax; Regurgitant Flow; Diastole;: 390 6 cm/s  Aortic Root Diameter; End Systole;: 33 mm  Gradient Pressure, Peak; Simplified Bernoulli; Antegrade Flow; Systole;: 4 9 mm[Hg]  Gradient pressure, average; Simplified Bernoulli; Antegrade Flow; Systole;: 2 3 mm[Hg]  Left Atrium to Aortic Root Ratio;: 1 33  Left atrial diameter; End Diastole;: 44 mm  Pressure Half Time;: 0 46 s  Cardiac Output; Teichholz; Systole;: 2 73 L/min  Heart rate; Teichholz;: 123 /min  Interventricular Septum Diastolic Thickness; Teichholz; End Diastole;: 12 7 mm  Left Ventricle Internal End Diastolic Dimension; Teichholz;: 42 9 mm  Left Ventricle Internal Systolic Dimension; Teichholz; End Systole;: 37 6 mm  Left Ventricle Mass; Mass AVCube with Teichholz; End Diastole;: 167 g  Left Ventricle Posterior Wall Diastolic Thickness; Teichholz; End Diastole;: 9 7 mm  Left Ventricular Ejection Fraction; Teichholz;: 26 9 %  Left Ventricular End Diastolic Volume; Teichholz;: 82 6 ml  Left Ventricular End Systolic Volume; Teichholz;: 60 4 ml  Left Ventricular Fractional Shortening;: 12 4 %  Stroke volume;  Teichholz; Systole;: 22 2 ml  Maximum Tricuspid valve regurgitation pressure gradient; Regurgitant Flow; Systole;: 28 1 mm[Hg]    [facility]    Prepared and electronically signed by    Matt Carr MD  Signed 10-Feb-2018 13:06:38           Medications:    Current Outpatient Prescriptions:     apixaban (ELIQUIS) 5 mg, Take 5 mg by mouth 2 (two) times a day, Disp: , Rfl:     lovastatin (MEVACOR) 10 MG tablet, Take 10 mg by mouth daily at bedtime, Disp: , Rfl:     metoprolol tartrate (LOPRESSOR) 25 mg tablet, Take 25 mg by mouth every 12 (twelve) hours, Disp: , Rfl:       Allergies:  No Known Allergies      Assessment and Plan:  1  Paroxysmal atrial fibrillation (HCC)  I have had a detailed discussion with the patient regarding atrial fibrillation, its pathophysiology, stroke risk, need for anticoagulation, bleeding risk, rate versus rhythm control etc  Qs were answered  Patient was started on  Anticoagulation as CHADS2-VASC score = 5 (HTN, TIA, Age, Sex)  patient started on Eliquis 5 mg twice daily  Metoprolol for rate control -  Dose was decreased as patient was bradycardic at home along with soft BP    2  Mixed hyperlipidemia   continue statin    3  Obesity (BMI 30-39 9)   counseled to try to lose weight    4  CMP   on beta-blocker  Ace inhibitor/ARB could not be added due to soft BP  Tight rate/rhythm control  EP evaluation    Recommend aggressive risk factor modification and therapeutic lifestyle changes  Low-salt, low-calorie, low-fat, low-cholesterol diet with regular exercise and to optimize weight  I will defer the ordering and monitoring of necessity lab studies to you, but I am available and happy to review and manage any of the data at your request in the future  Discussed concepts of atherosclerosis, including signs and symptoms of cardiac disease  Previous studies were reviewed  Safety measures were reviewed  Questions were entertained and answered  Patient was advised to report any problems requiring medical attention  Follow-up with PCP and appropriate specialist and lab work as discussed  Return for follow up visit as scheduled or earlier, if needed  Thank you for allowing me to participate in the care and evaluation of your patient  Should you have any questions, please feel free to contact me        Arleth May MD  7/37/1486,18:67 AM

## 2018-02-27 DIAGNOSIS — I48.0 PAROXYSMAL ATRIAL FIBRILLATION (HCC): Primary | ICD-10-CM

## 2018-03-28 ENCOUNTER — OFFICE VISIT (OUTPATIENT)
Dept: FAMILY MEDICINE CLINIC | Facility: CLINIC | Age: 73
End: 2018-03-28
Payer: MEDICARE

## 2018-03-28 VITALS
HEIGHT: 66 IN | SYSTOLIC BLOOD PRESSURE: 122 MMHG | BODY MASS INDEX: 34.55 KG/M2 | HEART RATE: 60 BPM | TEMPERATURE: 96.7 F | DIASTOLIC BLOOD PRESSURE: 88 MMHG | WEIGHT: 215 LBS | OXYGEN SATURATION: 97 %

## 2018-03-28 DIAGNOSIS — J32.9 RHINOSINUSITIS: Primary | ICD-10-CM

## 2018-03-28 DIAGNOSIS — J31.0 RHINOSINUSITIS: Primary | ICD-10-CM

## 2018-03-28 PROBLEM — R47.01 APHASIA, MIXED: Status: ACTIVE | Noted: 2017-07-28

## 2018-03-28 PROCEDURE — 99213 OFFICE O/P EST LOW 20 MIN: CPT | Performed by: FAMILY MEDICINE

## 2018-03-28 RX ORDER — HYDROCORTISONE 100 MG/60ML
SUSPENSION RECTAL
COMMUNITY
Start: 2017-11-29 | End: 2018-05-29

## 2018-03-28 RX ORDER — RIZATRIPTAN BENZOATE 10 MG/1
1 TABLET ORAL EVERY 2 HOUR PRN
COMMUNITY
Start: 2016-01-07 | End: 2018-05-29

## 2018-03-28 RX ORDER — AMOXICILLIN AND CLAVULANATE POTASSIUM 875; 125 MG/1; MG/1
1 TABLET, FILM COATED ORAL EVERY 12 HOURS SCHEDULED
Qty: 20 TABLET | Refills: 0 | Status: SHIPPED | OUTPATIENT
Start: 2018-03-28 | End: 2018-04-07

## 2018-03-28 RX ORDER — ASPIRIN 81 MG/1
2 TABLET ORAL DAILY
COMMUNITY
Start: 2017-10-02 | End: 2018-05-01 | Stop reason: ALTCHOICE

## 2018-03-28 RX ORDER — ACETAMINOPHEN 325 MG/1
TABLET ORAL
COMMUNITY
Start: 2018-01-23 | End: 2018-11-05

## 2018-03-28 NOTE — PROGRESS NOTES
Assessment/Plan:       Diagnoses and all orders for this visit:    Rhinosinusitis  -     amoxicillin-clavulanate (AUGMENTIN) 875-125 mg per tablet; Take 1 tablet by mouth every 12 (twelve) hours for 10 days    Other orders  -     acetaminophen (TYLENOL) 325 mg tablet; Take by mouth  -     Mesalamine (SFROWASA) 4 GM/60ML SF ENEM; Insert into the rectum  -     rizatriptan (MAXALT) 10 MG tablet; Take 1 tablet by mouth every 2 (two) hours as needed  -     hydrocortisone (CORTENEMA) 100 mg/60 mL enema; Insert into the rectum  -     aspirin (ECOTRIN LOW STRENGTH) 81 mg EC tablet; Take 2 tablets by mouth daily              Subjective:      Patient ID: Niesha Silverio is a 67 y o  female  Sinus Problem   This is a new problem  The current episode started in the past 7 days  The problem has been gradually worsening since onset  There has been no fever  Associated symptoms include chills, coughing, ear pain, sinus pressure and a sore throat  Past treatments include nothing  The following portions of the patient's history were reviewed and updated as appropriate:   She has a past medical history of Headache; Hyperlipidemia; Hypertension; Lyme disease; TIA (transient ischemic attack); and Vertigo  ,   does not have any pertinent problems on file  ,   has a past surgical history that includes Tonsillectomy; Colonoscopy; and pr sigmoidoscopy flx dx w/collj spec br/wa if pfrmd (N/A, 11/28/2017)  ,  family history includes Alzheimer's disease in her father; Dementia in her father; Lung cancer in her mother  ,   reports that she has quit smoking  She has never used smokeless tobacco  She reports that she drinks about 4 2 oz of alcohol per week   She reports that she does not use drugs  ,  has No Known Allergies     Current Outpatient Prescriptions   Medication Sig Dispense Refill    acetaminophen (TYLENOL) 325 mg tablet Take by mouth      apixaban (ELIQUIS) 5 mg Take 1 tablet (5 mg total) by mouth 2 (two) times a day 180 tablet 3    lovastatin (MEVACOR) 10 MG tablet Take 10 mg by mouth daily at bedtime      metoprolol tartrate (LOPRESSOR) 25 mg tablet Take 0 5 tablets (12 5 mg total) by mouth every 12 (twelve) hours 90 tablet 1    amoxicillin-clavulanate (AUGMENTIN) 875-125 mg per tablet Take 1 tablet by mouth every 12 (twelve) hours for 10 days 20 tablet 0    aspirin (ECOTRIN LOW STRENGTH) 81 mg EC tablet Take 2 tablets by mouth daily      hydrocortisone (CORTENEMA) 100 mg/60 mL enema Insert into the rectum      Mesalamine (SFROWASA) 4 GM/60ML SF ENEM Insert into the rectum      rizatriptan (MAXALT) 10 MG tablet Take 1 tablet by mouth every 2 (two) hours as needed       No current facility-administered medications for this visit  Review of Systems   Constitutional: Positive for chills  HENT: Positive for ear pain, rhinorrhea, sinus pain, sinus pressure and sore throat  Eyes: Negative for pain  Respiratory: Positive for cough  Cardiovascular: Negative for chest pain  Gastrointestinal: Negative for abdominal pain  Genitourinary: Negative for difficulty urinating  Objective:  Vitals:    03/28/18 1409   BP: 122/88   Pulse: 60   Temp: (!) 96 7 °F (35 9 °C)   SpO2: 97%   Weight: 97 5 kg (215 lb)   Height: 5' 6" (1 676 m)     Body mass index is 34 7 kg/m²  Physical Exam   Constitutional: She appears well-developed  HENT:   Head: Normocephalic  Nose: Mucosal edema, rhinorrhea and sinus tenderness present  Right sinus exhibits maxillary sinus tenderness and frontal sinus tenderness  Left sinus exhibits maxillary sinus tenderness and frontal sinus tenderness  Eyes: Conjunctivae are normal    Neck: Neck supple  Cardiovascular:   irregularly irregular   Pulmonary/Chest: No respiratory distress  She has no wheezes  Abdominal: There is no tenderness  Lymphadenopathy:     She has no cervical adenopathy  Nursing note and vitals reviewed

## 2018-03-28 NOTE — PATIENT INSTRUCTIONS
Rhinosinusitis   AMBULATORY CARE:   Rhinosinusitis (RS)  is inflammation of your nose and sinuses  It commonly begins as a virus, often as a common cold  Viruses usually last 7 to 10 days and do not need treatment  When the virus does not get better on its own, you may have bacterial RS  This means that bacteria have begun to grow inside your sinuses  Acute RS lasts less than 4 weeks  Chronic RS lasts 12 weeks or more  Recurrent RS is when you have 4 or more episodes of RS in one year  Your signs and symptoms  may be worse when you lie on your back or try to sleep  You may have any of the following:  · Stuffy nose and reduced sense of smell     · Runny nose with thick yellow or green mucus     · Pressure or pain on your face or a headache     · Pain in your teeth or bad breath     · Ear pain or pressure     · Fever or cough     · Tiredness  Seek care immediately if:   · You have double vision or you cannot see  · You have a stiff neck, a fever, or a bad headache  · Your eyeball bulges out or you cannot move your eye  · Your eye and eyelid are red, swollen, and painful  · You cannot open your eye  · You are more sleepy than normal, or you notice changes in your ability to think, move, or talk  · You have swelling of your forehead or scalp  Contact your healthcare provider if:   · Your symptoms are worse or do not improve after 3 to 5 days of treatment  · You have questions or concerns about your condition or care  Treatment for rhinosinusitis  may include any of the following:  · Acetaminophen  decreases pain and fever  It is available without a doctor's order  Ask how much to take and how often to take it  Follow directions  Acetaminophen can cause liver damage if not taken correctly  · NSAIDs , such as ibuprofen, help decrease swelling, pain, and fever  This medicine is available with or without a doctor's order   NSAIDs can cause stomach bleeding or kidney problems in certain people  If you take blood thinner medicine, always ask your healthcare provider if NSAIDs are safe for you  Always read the medicine label and follow directions  · Nasal steroid sprays  decrease inflammation in your nose and sinuses  · Decongestants  reduce swelling and drain mucus in the nose and sinuses  They may help you breathe easier  · Antihistamines  dry mucus in the nose and relieve sneezing  · Antibiotics  treat a bacterial infection and may be needed if your symptoms do not improve or they get worse  · Take your medicine as directed  Contact your healthcare provider if you think your medicine is not helping or if you have side effects  Tell him or her if you are allergic to any medicine  Keep a list of the medicines, vitamins, and herbs you take  Include the amounts, and when and why you take them  Bring the list or the pill bottles to follow-up visits  Carry your medicine list with you in case of an emergency  Self-care:   · Rinse your sinuses  Use a sinus rinse device to rinse your nasal passages with a saline (salt water) solution  This will help thin the mucus in your nose and rinse away pollen and dirt  It will also help reduce swelling so you can breathe normally  Ask your healthcare provider how often to do this  · Breathe in steam   Heat a bowl of water until you see steam  Lean over the bowl and make a tent over your head with a large towel  Breathe deeply for about 20 minutes  Be careful not to get too close to the steam or burn yourself  Do this 3 times a day  You can also breathe deeply when you take a hot shower  · Sleep with your head elevated  Place an extra pillow under your head before you go to sleep to help your sinuses drain  · Drink liquids as directed  Ask your healthcare provider how much liquid to drink each day and which liquids are best for you  Liquids will thin the mucus in your nose and help it drain   Avoid drinks that contain alcohol or caffeine  · Do not smoke, and avoid secondhand smoke  Nicotine and other chemicals in cigarettes and cigars can make your symptoms worse  Ask your healthcare provider for information if you currently smoke and need help to quit  E-cigarettes or smokeless tobacco still contain nicotine  Talk to your healthcare provider before you use these products  Follow up with your healthcare provider as directed: Follow up if your symptoms are worse or not better after 3 to 5 days of treatment  Write down your questions so you remember to ask them during your visits  © 2017 [address] [employer] Information is for End User's use only and may not be sold, redistributed or otherwise used for commercial purposes  All illustrations and images included in CareNotes® are the copyrighted property of [employer]  or [employer]  The above information is an  only  It is not intended as medical advice for individual conditions or treatments  Talk to your doctor, nurse or pharmacist before following any medical regimen to see if it is safe and effective for you

## 2018-04-09 ENCOUNTER — OFFICE VISIT (OUTPATIENT)
Dept: FAMILY MEDICINE CLINIC | Facility: CLINIC | Age: 73
End: 2018-04-09
Payer: MEDICARE

## 2018-04-09 VITALS
TEMPERATURE: 97.5 F | SYSTOLIC BLOOD PRESSURE: 134 MMHG | BODY MASS INDEX: 34.04 KG/M2 | HEART RATE: 113 BPM | HEIGHT: 66 IN | WEIGHT: 211.8 LBS | DIASTOLIC BLOOD PRESSURE: 86 MMHG | OXYGEN SATURATION: 98 %

## 2018-04-09 DIAGNOSIS — J06.9 URI, ACUTE: ICD-10-CM

## 2018-04-09 DIAGNOSIS — B09 VIRAL EXANTHEM: Primary | ICD-10-CM

## 2018-04-09 PROCEDURE — 99213 OFFICE O/P EST LOW 20 MIN: CPT | Performed by: FAMILY MEDICINE

## 2018-04-09 RX ORDER — TRIAMCINOLONE ACETONIDE 5 MG/G
CREAM TOPICAL 3 TIMES DAILY
Qty: 30 G | Refills: 0 | Status: SHIPPED | OUTPATIENT
Start: 2018-04-09 | End: 2018-05-29

## 2018-04-09 RX ORDER — AZITHROMYCIN 250 MG/1
TABLET, FILM COATED ORAL
Qty: 6 TABLET | Refills: 0 | Status: SHIPPED | OUTPATIENT
Start: 2018-04-09 | End: 2018-04-14

## 2018-04-09 RX ORDER — METHYLPREDNISOLONE 4 MG/1
TABLET ORAL
Qty: 21 TABLET | Refills: 0 | Status: SHIPPED | OUTPATIENT
Start: 2018-04-09 | End: 2018-05-29

## 2018-04-09 NOTE — PROGRESS NOTES
Assessment/Plan:           Problem List Items Addressed This Visit     URI, acute    Relevant Medications    azithromycin (ZITHROMAX) 250 mg tablet    Viral exanthem - Primary    Relevant Medications    triamcinolone (KENALOG) 0 5 % cream    Methylprednisolone 4 MG TBPK            Subjective:      Patient ID: Broderick Tejeda is a 67 y o  female  Patient comes in with persistent cough since he was treated with Augmentin for sinus infection  In last 2 days she has developed a rash on her left forearm        The following portions of the patient's history were reviewed and updated as appropriate: allergies, current medications, past family history, past medical history, past social history, past surgical history and problem list     Review of Systems   Constitutional: Negative  HENT: Positive for congestion and rhinorrhea  Respiratory: Positive for cough  Gastrointestinal: Negative  Objective:      /86   Pulse (!) 113   Temp 97 5 °F (36 4 °C)   Ht 5' 6" (1 676 m)   Wt 96 1 kg (211 lb 12 8 oz)   SpO2 98%   BMI 34 19 kg/m²          Physical Exam   Constitutional: She is oriented to person, place, and time  She appears well-developed  HENT:   Head: Normocephalic and atraumatic  Posterior oropharynx is injected   Eyes: EOM are normal  Pupils are equal, round, and reactive to light  Neck: Neck supple  No thyromegaly present  Cardiovascular: Normal rate, regular rhythm and normal heart sounds  Pulmonary/Chest: Effort normal and breath sounds normal    Abdominal: Soft  Musculoskeletal: Normal range of motion  Lymphadenopathy:     She has no cervical adenopathy  Neurological: She is alert and oriented to person, place, and time  Skin: Skin is warm and dry  Psychiatric: She has a normal mood and affect

## 2018-05-01 ENCOUNTER — TELEPHONE (OUTPATIENT)
Dept: FAMILY MEDICINE CLINIC | Facility: CLINIC | Age: 73
End: 2018-05-01

## 2018-05-01 ENCOUNTER — TELEPHONE (OUTPATIENT)
Dept: CARDIOLOGY CLINIC | Facility: CLINIC | Age: 73
End: 2018-05-01

## 2018-05-01 NOTE — TELEPHONE ENCOUNTER
Patient called with BP readings   She is currently on Metotoprol 25 mg, 1/2 tab q am and q pm  Patient is asymptomatic     76/62  77/55  65/44  96/72    Please advise and call back [phone]

## 2018-05-02 ENCOUNTER — TELEPHONE (OUTPATIENT)
Dept: CARDIOLOGY CLINIC | Facility: CLINIC | Age: 73
End: 2018-05-02

## 2018-05-02 NOTE — TELEPHONE ENCOUNTER
I called  Stepan Franks LMOM  Stepan Franks Asked her to not take the metoprolol   If BP still remains low, needs to be checked out in the ER

## 2018-05-02 NOTE — TELEPHONE ENCOUNTER
I called and s/w patient - she stated that Dr Be Males s/w her last night and that she was advised to HOLD her PM metoprolol dose  Patient did - took her BP his AM and BP was 91/57 (did not take AM med either)  Patient denies any symptoms  Please review and advise, thanks

## 2018-05-02 NOTE — TELEPHONE ENCOUNTER
Per second task    Radha Marcum MA       5/2/18 1:50 PM   Note      PT, called again and stated she is waiting for Dr Jessie Garcia to call her back regarding her blood pressure being to low  PT stated she will be going out and needs to speak to Jessie Kendrick ASAP so she can know what to do with her low blood pressure  Please review and advise,   I did tell patient earlier that you were not in the office

## 2018-05-02 NOTE — TELEPHONE ENCOUNTER
PT, called again and stated she is waiting for Dr Justin Dao to call her back regarding her blood pressure being to low  PT stated she will be going out and needs to speak to Justin Kendrikc ASAP so she can know what to do with her low blood pressure

## 2018-05-08 ENCOUNTER — TELEPHONE (OUTPATIENT)
Dept: CARDIOLOGY CLINIC | Facility: CLINIC | Age: 73
End: 2018-05-08

## 2018-05-08 NOTE — TELEPHONE ENCOUNTER
PT called & stated last week she was having trouble with her bp  PT was advised to stop taking her bp medication and take her bp machine to get checked  PT had to buy a new bp medication because her readings were off  Patient also stated she had to start taking her meds again because her pressure was high again  PT would like to know does Dr Nelson Sanchez want to see her in office

## 2018-05-10 ENCOUNTER — CLINICAL SUPPORT (OUTPATIENT)
Dept: CARDIOLOGY CLINIC | Facility: CLINIC | Age: 73
End: 2018-05-10

## 2018-05-10 VITALS — HEART RATE: 85 BPM | DIASTOLIC BLOOD PRESSURE: 110 MMHG | SYSTOLIC BLOOD PRESSURE: 134 MMHG

## 2018-05-10 DIAGNOSIS — I10 ESSENTIAL HYPERTENSION: Primary | ICD-10-CM

## 2018-05-10 NOTE — PROGRESS NOTES
Pt came in for bp check per Dr Tiffanie Cardoza (see prev encounter)  I checked her bp twice with a reading of 143/110 P85  to compare with her cuff  Her cuffs reading [id number] Pulse 150  I informed Dr Tiffanie Cardoza and he advised pt to purchase a different cuff because the numbers are totally off, pt stated her understanding and will purchase a new cuff

## 2018-05-15 ENCOUNTER — TELEPHONE (OUTPATIENT)
Dept: CARDIOLOGY CLINIC | Facility: CLINIC | Age: 73
End: 2018-05-15

## 2018-05-15 DIAGNOSIS — I48.0 PAROXYSMAL ATRIAL FIBRILLATION (HCC): ICD-10-CM

## 2018-05-15 NOTE — TELEPHONE ENCOUNTER
S/w pt, as per Dr Lindesy Monae pt is to take Metoprolol Tartrate 25mg BID instead of 12 5mg BID  Pt verbally understood

## 2018-05-15 NOTE — TELEPHONE ENCOUNTER
PT DROPPED OFF HER BP READINGS & WANTS DR VILLANUEVA TO LOOK THEM OVER & CALL HER IF HE NEEDS TO SEE HER FOR AN APPT

## 2018-05-29 ENCOUNTER — TELEPHONE (OUTPATIENT)
Dept: CARDIOLOGY CLINIC | Facility: CLINIC | Age: 73
End: 2018-05-29

## 2018-05-29 ENCOUNTER — OFFICE VISIT (OUTPATIENT)
Dept: CARDIOLOGY CLINIC | Facility: CLINIC | Age: 73
End: 2018-05-29
Payer: MEDICARE

## 2018-05-29 VITALS
SYSTOLIC BLOOD PRESSURE: 134 MMHG | OXYGEN SATURATION: 97 % | BODY MASS INDEX: 34.23 KG/M2 | DIASTOLIC BLOOD PRESSURE: 80 MMHG | HEIGHT: 66 IN | HEART RATE: 141 BPM | WEIGHT: 213 LBS

## 2018-05-29 DIAGNOSIS — I36.1 NONRHEUMATIC TRICUSPID VALVE REGURGITATION: ICD-10-CM

## 2018-05-29 DIAGNOSIS — E66.9 OBESITY (BMI 30-39.9): ICD-10-CM

## 2018-05-29 DIAGNOSIS — E78.2 MIXED HYPERLIPIDEMIA: ICD-10-CM

## 2018-05-29 DIAGNOSIS — Z86.73 H/O TIA (TRANSIENT ISCHEMIC ATTACK) AND STROKE: ICD-10-CM

## 2018-05-29 DIAGNOSIS — I34.0 NONRHEUMATIC MITRAL VALVE REGURGITATION: ICD-10-CM

## 2018-05-29 DIAGNOSIS — R06.02 SHORTNESS OF BREATH ON EXERTION: ICD-10-CM

## 2018-05-29 DIAGNOSIS — I10 ESSENTIAL HYPERTENSION: ICD-10-CM

## 2018-05-29 DIAGNOSIS — I42.8 OTHER CARDIOMYOPATHY (HCC): ICD-10-CM

## 2018-05-29 DIAGNOSIS — I48.0 PAROXYSMAL ATRIAL FIBRILLATION (HCC): Primary | ICD-10-CM

## 2018-05-29 DIAGNOSIS — I35.1 NONRHEUMATIC AORTIC VALVE INSUFFICIENCY: ICD-10-CM

## 2018-05-29 DIAGNOSIS — Z79.01 ANTICOAGULANT LONG-TERM USE: ICD-10-CM

## 2018-05-29 PROBLEM — I42.9 MYOCARDIOPATHY (HCC): Status: ACTIVE | Noted: 2018-05-29

## 2018-05-29 PROCEDURE — 99214 OFFICE O/P EST MOD 30 MIN: CPT | Performed by: INTERNAL MEDICINE

## 2018-05-29 RX ORDER — METOPROLOL SUCCINATE 100 MG/1
100 TABLET, EXTENDED RELEASE ORAL DAILY
Qty: 90 TABLET | Refills: 1
Start: 2018-05-29 | End: 2018-06-12 | Stop reason: SDUPTHER

## 2018-05-29 NOTE — PROGRESS NOTES
CARDIOLOGY OFFICE VISIT  [facility]  [facility] [address], [facility], [address] [facility], [place], [facility] Rohan Obando  Tel: [phone]      NAME: Lindsay Mabry  AGE: 67 y o  SEX: female  : 1945   MRN: [id number]      Chief Complaint:  Chief Complaint   Patient presents with    Shortness of Breath         History of Present Illness:   Patient comes for follow up  States she has been getting SOB easily and fatigued  Has a dry cough with occasional wheezing  Denies chest pain / pressure, palpitations, lightheadedness, syncope, swelling feet, orthopnea, PND, claudication  CMP - EF 40-45%  Normal stress test  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  Will need rhythm control or tight rate control    PAF - Patient was diagnosed with atrial fibrillation on an EKG done at her PCP office  Patient had no symptoms from it  Patient denied palpitations, lightheadedness, syncope, SOB, chest pain / pressure, swelling feet, orthopnea, PND       HLP -  Has had hyperlipidemia for many years  Taking statin regularly along with diet control  Denies myalgia  PCP closely monitoring the blood work      MR, AR, TR - stable  Asymptomatic    Obesity - Trying to lose weight  H/O TIA    Past Medical History:  Past Medical History:   Diagnosis Date    Headache     Hyperlipidemia     Hypertension     Lyme disease     TIA (transient ischemic attack)     Transient cerebral ischemia     Last assessed - 18    Vertigo          Past Surgical History:  Past Surgical History:   Procedure Laterality Date    COLONOSCOPY      WI SIGMOIDOSCOPY FLX DX W/COLLJ SPEC BR/WA IF PFRMD N/A 2017    Procedure: Kaitlynn Peace;  Surgeon: Ector Thakkar MD;  Location: [facility];   Service: Gastroenterology    TONSILLECTOMY           Family History:  Family History   Problem Relation Age of Onset    Lung cancer Mother     Dementia Father     Alzheimer's disease Father     Other Father Cardiac Disorder          Social History:  Social History     Social History    Marital status: /Civil Union     Spouse name: N/A    Number of children: N/A    Years of education: N/A     Occupational History    Retired      Social History Main Topics    Smoking status: Former Smoker    Smokeless tobacco: Never Used    Alcohol use 4 2 oz/week     7 Glasses of wine per week      Comment: occ    Drug use: No    Sexual activity: Not on file     Other Topics Concern    Not on file     Social History Narrative    Always uses seat belt             Active Problems:  Patient Active Problem List   Diagnosis    Paroxysmal atrial fibrillation ([address] )    Essential hypertension    Mixed hyperlipidemia    Obesity (BMI 30-39  9)    H/O TIA (transient ischemic attack) and stroke    Aphasia, mixed    Migraine with aura and without status migrainosus, not intractable    URI, acute    Viral exanthem    Myocardiopathy [address] [address] )    Shortness of breath on exertion    Anticoagulant long-term use    Nonrheumatic mitral valve regurgitation    Nonrheumatic aortic valve insufficiency    Nonrheumatic tricuspid valve regurgitation         The following portions of the patient's history were reviewed and updated as appropriate: past medical history, past surgical history, past family history,  past social history, current medications, allergies and problem list       Review of Systems:  Constitutional: Denies fever, chills  +fatigue  Eyes: Denies eye redness, eye discharge, double vision  ENT: Denies hearing loss, tinnitus, sneezing, nasal discharge, sore throat   Respiratory: Denies expectoration, hemoptysis   +shortness of breath, +cough,   Cardiovascular: Denies chest pain, palpitations, orthopnea, PND, lower extremity swelling  Gastrointestinal: Denies abdominal pain, nausea, vomiting, hematemesis, diarrhea, bloody stools  Genito-Urinary: Denies dysuria, incontinence  Musculoskeletal: Denies back pain, joint pain, muscle pain  Neurologic: Denies confusion, lightheadedness, syncope, headache, focal weakness, sensory changes, seizures  Endocrine: Denies polyuria, polydipsia, temperature intolerance  Allergy and Immunology: Denies hives, insect bite sensitivity  Hematological and Lymphatic: Denies bleeding problems, swollen glands   Psychological: Denies depression, suicidal ideation, anxiety, panic  Dermatological: Denies pruritus, rash, skin lesion changes      Vitals:  Vitals:    05/29/18 1353   BP: 134/80   Pulse: (!) 141   SpO2: 97%       Body mass index is 34 38 kg/m²  Weight (last 2 days)     Date/Time   Weight    05/29/18 1353  96 6 (213)                Physical Examination:  General: Patient is not in acute distress  Awake, alert, oriented in time, place and person  Responding to commands  Head: Normocephalic  Atraumatic  Eyes: Both pupils normal sized, round and reactive to light  Nonicteric  ENT: Normal external ear canals  Nares normal, no drainage  Lips and oral mucosa normal  Neck: Supple  JVP not raised  Trachea central  No thyromegaly  Lungs: Bilateral bronchovascular breath sounds with no crackles or rhonchi  Chest wall: No tenderness  Cardiovascular:  Tachycardic  Irregular rhythm  S1 variable and S2 normal  Grade 3/6 PSMs at apex and LLSB  No rub or gallop  Gastrointestinal: Abdomen soft, nontender  No guarding or rigidity  Liver and spleen not palpable  Bowel sounds present  Neurologic: Patient is awake, alert, oriented in time, place and person  Responding to command  Moving all extremities  Integumentary:  No skin rash  Lymphatic: No cervical lymphadenopathy  Back: Symmetric   No CVA tenderness  Extremities: No clubbing, cyanosis or edema      Laboratory Results:  CBC with diff:   Lab Results   Component Value Date    WBC 8 19 01/22/2018    RBC 4 89 01/22/2018    HGB 15 9 (H) 01/22/2018    HCT 47 5 (H) 01/22/2018    MCV 97 01/22/2018    MCH 32 5 01/22/2018    RDW 13 5 01/22/2018     01/22/2018 CMP:  Lab Results   Component Value Date    CREATININE 1 01 2018    BUN 17 2018     2018    K 4 0 2018     2018    CO2 25 2018    GLUCOSE 102 2017    PROT 7 5 2018    ALKPHOS 90 2018    ALT 27 2018    AST 21 2018         Lipid Profile:   Lab Results   Component Value Date    CHOL 156 2018    CHOL 189 2017    CHOL 169 2016     Lab Results   Component Value Date    HDL 63 (H) 2018    HDL 64 (H) 2017    HDL 60 2016     Lab Results   Component Value Date    LDLCALC 76 2018    LDLCALC 110 (H) 2017    LDLCALC 93 2016     Lab Results   Component Value Date    TRIG 85 2018    TRIG 75 2017    TRIG 80 2016       Cardiac testing:   Results for orders placed during the hospital encounter of 18   Echo complete with contrast if indicated    Narrative [place] [address] [address] [place] [address]  [phone]    Transthoracic Echocardiogram  2D, M-mode, and Color Doppler    Study date:  2018    Patient: Aniya Flores  MR number: [id number]  Account number: [de-identified]  : 1945  Age: 67 years  Gender: Female  Status: Outpatient  Location: [facility]  Height: 63 in  Weight: 219 lb  BP: 138/ 82 mmHg    Indications: Atrial Fibrillation  Diagnoses: I48 0 - Atrial fibrillation    Sonographer:  Cornelius RCS  Interpreting Physician:  Yolanda Colon MD  Primary Physician:  Roula Esparza DO  Referring Physician:  Yolanda Colon MD  Group:  [facility]    SUMMARY    LEFT VENTRICLE:  Systolic function was moderately reduced  Ejection fraction was estimated in the range of 40 % to 45 %, likely underestimated due to rapid atrial fibrillation    Although no diagnostic regional wall motion abnormality was identified, this possibility cannot be completely excluded on the basis of this study  Wall thickness was mildly increased  There was mild concentric hypertrophy  RIGHT VENTRICLE:  The size was normal   Systolic function was mildly reduced  LEFT ATRIUM:  The atrium was mildly dilated  MITRAL VALVE:  There was mild regurgitation  AORTIC VALVE:  There was mild regurgitation  TRICUSPID VALVE:  There was mild to moderate regurgitation  Pulmonary artery systolic pressure was within the normal range  PULMONIC VALVE:  There was trace regurgitation  HISTORY: PRIOR HISTORY: Hyperlipidemia  Atrial fibrillation  Risk factors: hypertension and morbid obesity  Cerebrovascular disease  PROCEDURE: The study was performed in the [address] [place] [address]  This was a routine study  The transthoracic approach was used  The study included complete 2D imaging, M-mode, and color Doppler  The heart rate was 127 bpm, at the  start of the study  Images were obtained from the parasternal, apical, subcostal, and suprasternal notch acoustic windows  Image quality was adequate  LEFT VENTRICLE: Size was normal  Systolic function was moderately reduced  Ejection fraction was estimated in the range of 40 % to 45 %, likely underestimated due to rapid atrial fibrillation  Although no diagnostic regional wall motion  abnormality was identified, this possibility cannot be completely excluded on the basis of this study  Wall thickness was mildly increased  There was mild concentric hypertrophy  No evidence of apical thrombus  RIGHT VENTRICLE: The size was normal  Systolic function was mildly reduced  Wall thickness was normal     LEFT ATRIUM: The atrium was mildly dilated  RIGHT ATRIUM: Size was normal     MITRAL VALVE: There was annular calcification  There was normal leaflet separation  DOPPLER: The transmitral velocity was within the normal range  There was no evidence for stenosis  There was mild regurgitation  AORTIC VALVE: The valve was trileaflet   Leaflets exhibited mildly increased thickness and normal cuspal separation  DOPPLER: Transaortic velocity was within the normal range  There was no evidence for stenosis  There was mild  regurgitation  TRICUSPID VALVE: The valve structure was normal  There was normal leaflet separation  DOPPLER: The transtricuspid velocity was within the normal range  There was no evidence for stenosis  There was mild to moderate regurgitation  Pulmonary  artery systolic pressure was within the normal range  PULMONIC VALVE: Leaflets exhibited normal thickness, no calcification, and normal cuspal separation  DOPPLER: The transpulmonic velocity was within the normal range  There was trace regurgitation  PERICARDIUM: There was no pericardial effusion  The pericardium was normal in appearance  AORTA: The root exhibited normal size  SYSTEMIC VEINS: IVC: The inferior vena cava was not well visualized  SYSTEM MEASUREMENT TABLES    Apical four chamber  4 chamber Left Atrium Volume Index; Planimetry; End Systole; Apical four chamber;: 21 26 cm2  Left Ventricular Diastolic Area; Method of Disks, Single Plane; End Diastole; Apical four chamber;: 23 86 cm2  Left Ventricular Ejection Fraction; Method of Disks, Single Plane; Apical four chamber;: 49 7 %  Left Ventricular systolic Area; Method of Disks, Single Plane; End Systole; Apical four chamber;: 15 54 cm2  Right Atrium Systolic Area; Planimetry; End Systole; Apical four chamber;: 15 89 cm2  Right Ventricular Internal Diastolic Dimension; End Diastole; Apical four chamber;: 25 6 mm  TAPSE: 14 4 mm    Unspecified Scan Mode  AR Vmax; Regurgitant Flow; Diastole;: 390 6 cm/s  Aortic Root Diameter; End Systole;: 33 mm  Gradient Pressure, Peak; Simplified Bernoulli; Antegrade Flow; Systole;: 4 9 mm[Hg]  Gradient pressure, average; Simplified Bernoulli; Antegrade Flow; Systole;: 2 3 mm[Hg]  Left Atrium to Aortic Root Ratio;: 1 33  Left atrial diameter;  End Diastole;: 44 mm  Pressure Half Time;: 0 46 s  Cardiac Output; Teichholz; Systole;: 2 73 L/min  Heart rate; Teichholz;: 123 /min  Interventricular Septum Diastolic Thickness; Teichholz; End Diastole;: 12 7 mm  Left Ventricle Internal End Diastolic Dimension; Teichholz;: 42 9 mm  Left Ventricle Internal Systolic Dimension; Teichholz; End Systole;: 37 6 mm  Left Ventricle Mass; Mass AVCube with Teichholz; End Diastole;: 167 g  Left Ventricle Posterior Wall Diastolic Thickness; Teichholz; End Diastole;: 9 7 mm  Left Ventricular Ejection Fraction; Teichholz;: 26 9 %  Left Ventricular End Diastolic Volume; Teichholz;: 82 6 ml  Left Ventricular End Systolic Volume; Teichholz;: 60 4 ml  Left Ventricular Fractional Shortening;: 12 4 %  Stroke volume; Juan Carlos Wills;: 22 2 ml  Maximum Tricuspid valve regurgitation pressure gradient; Regurgitant Flow; Systole;: 28 1 mm[Hg]    [facility]    Prepared and electronically signed by    Giovanna Griggs MD  Signed 10-Feb-2018 13:06:38         Medications:    Current Outpatient Prescriptions:     acetaminophen (TYLENOL) 325 mg tablet, Take by mouth, Disp: , Rfl:     apixaban (ELIQUIS) 5 mg, Take 1 tablet (5 mg total) by mouth 2 (two) times a day, Disp: 180 tablet, Rfl: 3    lovastatin (MEVACOR) 10 MG tablet, Take 10 mg by mouth daily at bedtime, Disp: , Rfl:     metoprolol tartrate (LOPRESSOR) 25 mg tablet, Take 0 5 tablets (12 5 mg total) by mouth every 12 (twelve) hours, Disp: 90 tablet, Rfl: 1      Allergies:  No Known Allergies      Assessment and Plan:  1  Paroxysmal atrial fibrillation (HCC)  In view of her new cardiomyopathy, cardioversion being planned  Risks benefits alternatives of cardioversion explained  In the meantime patient asked to increase the dose of beta-blocker (and change it to the succinate salt) for tighter rate control as she is in RVR today  Will add ACEI when able to    2  Other cardiomyopathy ([address] )  Likely tachycardia mediated  Beta-blocker dose increased  Patient is being set up for cardioversion  Will need to be started on ACE-inhibitor when she can tolerate it    3  Shortness of breath on exertion   multifactorial from mitral regurgitation / aortic regurgitation / tricuspid regurgitation, obesity, cardiomyopathy    4  Anticoagulant long-term use   continue Eliquis  Denies bleeding from any site    5  Essential hypertension   BP high, manolo diastolic values  Beta-blocker dose increased for better heart rate and BP control    6  Obesity (BMI 30-39 9)   counseled to try to lose weight    7  H/O TIA (transient ischemic attack) and stroke   currently on Eliquis    8  Mixed hyperlipidemia   continue statin and diet control  Her PCP closely monitors her blood work    9  Nonrheumatic mitral valve regurgitation, Nonrheumatic aortic valve insufficiency, Nonrheumatic tricuspid valve regurgitation   stable  Follow-up with serial echocardiograms    Recommend aggressive risk factor modification and therapeutic lifestyle changes  Low-salt, low-calorie, low-fat, low-cholesterol diet with regular exercise and to optimize weight  I will defer the ordering and monitoring of necessity lab studies to you, but I am available and happy to review and manage any of the data at your request in the future  Discussed concepts of atherosclerosis, including signs and symptoms of cardiac disease  Previous studies were reviewed  Safety measures were reviewed  Questions were entertained and answered  Patient was advised to report any problems requiring medical attention  Follow-up with PCP and appropriate specialist and lab work as discussed  Return for follow up visit as scheduled or earlier, if needed  Thank you for allowing me to participate in the care and evaluation of your patient  Should you have any questions, please feel free to contact me        Amol Rivera MD  5/29/2018,2:46 PM

## 2018-05-30 ENCOUNTER — TELEPHONE (OUTPATIENT)
Dept: CARDIOLOGY CLINIC | Facility: CLINIC | Age: 73
End: 2018-05-30

## 2018-05-30 DIAGNOSIS — I48.91 ATRIAL FIBRILLATION, UNSPECIFIED TYPE (HCC): Primary | ICD-10-CM

## 2018-06-05 ENCOUNTER — APPOINTMENT (OUTPATIENT)
Dept: LAB | Facility: CLINIC | Age: 73
End: 2018-06-05
Payer: MEDICARE

## 2018-06-05 LAB
ANION GAP SERPL CALCULATED.3IONS-SCNC: 6 MMOL/L (ref 4–13)
BASOPHILS # BLD AUTO: 0.07 THOUSANDS/ΜL (ref 0–0.1)
BASOPHILS NFR BLD AUTO: 1 % (ref 0–1)
BUN SERPL-MCNC: 15 MG/DL (ref 5–25)
CALCIUM SERPL-MCNC: 9.2 MG/DL (ref 8.3–10.1)
CHLORIDE SERPL-SCNC: 107 MMOL/L (ref 100–108)
CO2 SERPL-SCNC: 26 MMOL/L (ref 21–32)
CREAT SERPL-MCNC: 1.13 MG/DL (ref 0.6–1.3)
EOSINOPHIL # BLD AUTO: 0.15 THOUSAND/ΜL (ref 0–0.61)
EOSINOPHIL NFR BLD AUTO: 2 % (ref 0–6)
ERYTHROCYTE [DISTWIDTH] IN BLOOD BY AUTOMATED COUNT: 15.3 % (ref 11.6–15.1)
GFR SERPL CREATININE-BSD FRML MDRD: 48 ML/MIN/1.73SQ M
GLUCOSE SERPL-MCNC: 104 MG/DL (ref 65–140)
HCT VFR BLD AUTO: 51.1 % (ref 34.8–46.1)
HGB BLD-MCNC: 16.5 G/DL (ref 11.5–15.4)
IMM GRANULOCYTES # BLD AUTO: 0.03 THOUSAND/UL (ref 0–0.2)
IMM GRANULOCYTES NFR BLD AUTO: 0 % (ref 0–2)
INR PPP: 1.21 (ref 0.86–1.17)
LYMPHOCYTES # BLD AUTO: 1.46 THOUSANDS/ΜL (ref 0.6–4.47)
LYMPHOCYTES NFR BLD AUTO: 18 % (ref 14–44)
MCH RBC QN AUTO: 31.4 PG (ref 26.8–34.3)
MCHC RBC AUTO-ENTMCNC: 32.3 G/DL (ref 31.4–37.4)
MCV RBC AUTO: 97 FL (ref 82–98)
MONOCYTES # BLD AUTO: 0.97 THOUSAND/ΜL (ref 0.17–1.22)
MONOCYTES NFR BLD AUTO: 12 % (ref 4–12)
NEUTROPHILS # BLD AUTO: 5.49 THOUSANDS/ΜL (ref 1.85–7.62)
NEUTS SEG NFR BLD AUTO: 67 % (ref 43–75)
NRBC BLD AUTO-RTO: 0 /100 WBCS
PLATELET # BLD AUTO: 225 THOUSANDS/UL (ref 149–390)
PMV BLD AUTO: 11.9 FL (ref 8.9–12.7)
POTASSIUM SERPL-SCNC: 4.5 MMOL/L (ref 3.5–5.3)
PROTHROMBIN TIME: 15.4 SECONDS (ref 11.8–14.2)
RBC # BLD AUTO: 5.25 MILLION/UL (ref 3.81–5.12)
SODIUM SERPL-SCNC: 139 MMOL/L (ref 136–145)
WBC # BLD AUTO: 8.17 THOUSAND/UL (ref 4.31–10.16)

## 2018-06-05 PROCEDURE — 80048 BASIC METABOLIC PNL TOTAL CA: CPT

## 2018-06-05 PROCEDURE — 36415 COLL VENOUS BLD VENIPUNCTURE: CPT

## 2018-06-05 PROCEDURE — 85610 PROTHROMBIN TIME: CPT

## 2018-06-05 PROCEDURE — 85025 COMPLETE CBC W/AUTO DIFF WBC: CPT

## 2018-06-06 ENCOUNTER — TELEPHONE (OUTPATIENT)
Dept: CARDIOLOGY CLINIC | Facility: CLINIC | Age: 73
End: 2018-06-06

## 2018-06-06 NOTE — TELEPHONE ENCOUNTER
Called and spoke with pt, she said she's taking eliquis  I informed her she does not have to have her pt/inr checked any longer since she's on eliquis   She stated her understanding--MS

## 2018-06-06 NOTE — TELEPHONE ENCOUNTER
----- Message from Destinee Jones MD sent at 6/5/2018  7:07 PM EDT -----  Is she even taking Coumadin?    ----- Message -----  From: Lab, Background User  Sent: 6/5/2018   4:05 PM  To:  Destinee Jones MD

## 2018-06-08 ENCOUNTER — TELEPHONE (OUTPATIENT)
Dept: NON INVASIVE DIAGNOSTICS | Facility: HOSPITAL | Age: 73
End: 2018-06-08

## 2018-06-08 RX ORDER — SODIUM CHLORIDE 9 MG/ML
125 INJECTION, SOLUTION INTRAVENOUS CONTINUOUS
Status: CANCELLED | OUTPATIENT
Start: 2018-06-11

## 2018-06-11 ENCOUNTER — ANESTHESIA EVENT (OUTPATIENT)
Dept: INTERVENTIONAL RADIOLOGY/VASCULAR | Facility: HOSPITAL | Age: 73
End: 2018-06-11

## 2018-06-11 ENCOUNTER — HOSPITAL ENCOUNTER (OUTPATIENT)
Dept: INTERVENTIONAL RADIOLOGY/VASCULAR | Facility: HOSPITAL | Age: 73
Discharge: HOME | End: 2018-06-11
Admitting: PHYSICIAN ASSISTANT
Payer: MEDICARE

## 2018-06-11 ENCOUNTER — ANESTHESIA (OUTPATIENT)
Dept: INTERVENTIONAL RADIOLOGY/VASCULAR | Facility: HOSPITAL | Age: 73
End: 2018-06-11

## 2018-06-11 VITALS
BODY MASS INDEX: 35.16 KG/M2 | SYSTOLIC BLOOD PRESSURE: 99 MMHG | HEIGHT: 65 IN | HEART RATE: 59 BPM | OXYGEN SATURATION: 96 % | DIASTOLIC BLOOD PRESSURE: 63 MMHG | WEIGHT: 211 LBS | TEMPERATURE: 97.7 F | RESPIRATION RATE: 20 BRPM

## 2018-06-11 DIAGNOSIS — I48.0 PAROXYSMAL ATRIAL FIBRILLATION (HCC): ICD-10-CM

## 2018-06-11 PROCEDURE — 92960 CARDIOVERSION ELECTRIC EXT: CPT | Performed by: PHYSICIAN ASSISTANT

## 2018-06-11 PROCEDURE — 92960 CARDIOVERSION ELECTRIC EXT: CPT | Performed by: INTERNAL MEDICINE

## 2018-06-11 PROCEDURE — 92960 CARDIOVERSION ELECTRIC EXT: CPT

## 2018-06-11 RX ORDER — LIDOCAINE HYDROCHLORIDE 10 MG/ML
INJECTION, SOLUTION INFILTRATION; PERINEURAL AS NEEDED
Status: DISCONTINUED | OUTPATIENT
Start: 2018-06-11 | End: 2018-06-11 | Stop reason: SURG

## 2018-06-11 RX ORDER — PROPOFOL 10 MG/ML
INJECTION, EMULSION INTRAVENOUS AS NEEDED
Status: DISCONTINUED | OUTPATIENT
Start: 2018-06-11 | End: 2018-06-11 | Stop reason: SURG

## 2018-06-11 RX ORDER — SODIUM CHLORIDE 9 MG/ML
125 INJECTION, SOLUTION INTRAVENOUS CONTINUOUS
Status: DISCONTINUED | OUTPATIENT
Start: 2018-06-11 | End: 2018-06-15 | Stop reason: HOSPADM

## 2018-06-11 RX ADMIN — PROPOFOL 130 MG: 10 INJECTION, EMULSION INTRAVENOUS at 13:31

## 2018-06-11 RX ADMIN — LIDOCAINE HYDROCHLORIDE 50 MG: 10 INJECTION, SOLUTION INFILTRATION; PERINEURAL at 13:31

## 2018-06-11 RX ADMIN — SODIUM CHLORIDE: 0.9 INJECTION, SOLUTION INTRAVENOUS at 13:24

## 2018-06-11 RX ADMIN — PROPOFOL 20 MG: 10 INJECTION, EMULSION INTRAVENOUS at 13:33

## 2018-06-11 NOTE — DISCHARGE INSTRUCTIONS
Cardioversion   AMBULATORY CARE:   What you need to know about cardioversion:  Cardioversion is a procedure that uses medicine or electrical shocks to correct arrhythmias  An arrhythmias is a heartbeat that is too slow, too fast, or irregular  It may prevent your body from getting the blood and oxygen it needs  Your heart has 4 chambers, called the atria and ventricles  The atria are at the top of your heart, and the ventricles are at the bottom of your heart  Most arrhythmias that need cardioversion start in the atria  How to prepare for cardioversion:  You may need a transesophageal echocardiogram (LUIZA) before your cardioversion  A LUIZA is an ultrasound to check for clots in your heart  You may need to take blood thinner medicine for several weeks before your cardioversion  This will help prevent blood clots  Your healthcare provider will talk to you about how to prepare for cardioversion  He may tell you not to eat or drink anything after midnight on the day of your cardioversion  He will tell you what medicines to take or not take on the day of your cardioversion  Arrange for someone to drive you home and stay with you for 24 hours  This person can call 911 if you have problems after your cardioversion  What will happen during a chemical cardioversion:  You will be placed on a heart monitor  A heart monitor is an EKG that records your heart's electrical activity  Your healthcare provider will inject 1 or more medicines into your IV  The medicines will help change your heartbeat to a normal rhythm  You may feel light headed, dizzy, or nauseous  You may have pain or pressure in your chest, jaw, shoulders, or arms  These symptoms are normal, and usually last for a minute or less  You may need electrical cardioversion if chemical cardioversion does not change your heartbeat to a normal rhythm     What will happen during an electrical cardioversion:  You will be given medicine through your IV to keep you asleep and free from pain  You will be placed on a heart monitor  A heart monitor is an EKG that records your heart's electrical activity  A healthcare provider will also monitor your blood pressure and oxygen levels during the procedure  You may get oxygen through a mask placed over your nose and mouth or through small tubes placed in your nostrils  · In external cardioversion  your healthcare provider will place sticky pads on your chest and back  Your heart will be shocked with electricity through the pads  Your heart may be shocked more than once to help it return to its normal rhythm  · In internal cardioversion  your healthcare provider will insert a catheter through a vein and into your heart  Your heart will be shocked through the catheter  Your heart may be shocked more than once to help it return to its normal rhythm  What will happen after cardioversion:  Healthcare providers will monitor your heartbeat, blood pressure, and oxygen levels  You may feel drowsy from the medicine you were given during the procedure  Your chest may be red or sore where the pads were placed  This should go away in a few days  You may go home when they say it is okay or you may need to stay in the hospital    Risks of cardioversion:  Your skin may be burned from the electrical shocks  Cardioversion may cause a blood clot to travel to your heart or brain and cause a heart attack or stroke  You may develop a life-threatening arrhythmia or low blood pressure during cardioversion  You may need medicine or more electric shocks to treat this  Even with cardioversion, your heartbeat may not change or may not stay regular     Call 911 for any of the following:   · You have any of the following signs of a heart attack:      ¨ Squeezing, pressure, or pain in your chest that lasts longer than 5 minutes or returns    ¨ Discomfort or pain in your back, neck, jaw, stomach, or arm     ¨ Trouble breathing    ¨ Nausea or vomiting    ¨ Lightheadedness or a sudden cold sweat, especially with chest pain or trouble breathing    · You have any of the following signs of a stroke:      ¨ Numbness or drooping on one side of your face     ¨ Weakness in an arm or leg    ¨ Confusion or difficulty speaking    ¨ Dizziness, a severe headache, or vision loss    · You feel lightheaded, short of breath, and have chest pain  · You cough up blood  · You have trouble breathing  Seek care immediately if:   · You feel your heart beating fast or fluttering  · You feel weak or faint  · Your leg or arm is larger than usual, painful, and warm  Contact your healthcare provider if:   · Your skin is itchy, swollen, or you have a rash  · You have questions or concerns about your condition or care  Medicines: You may need any of the following:  · Heart medicines  help control your heart rate and rhythm  · Blood thinners    help prevent blood clots  Examples of blood thinners include heparin and warfarin  Clots can cause strokes, heart attacks, and death  The following are general safety guidelines to follow while you are taking a blood thinner:    ¨ Watch for bleeding and bruising while you take blood thinners  Watch for bleeding from your gums or nose  Watch for blood in your urine and bowel movements  Use a soft washcloth on your skin, and a soft toothbrush to brush your teeth  This can keep your skin and gums from bleeding  If you shave, use an electric shaver  Do not play contact sports  ¨ Tell your dentist and other healthcare providers that you take anticoagulants  Wear a bracelet or necklace that says you take this medicine  ¨ Do not start or stop any medicines unless your healthcare provider tells you to  Many medicines cannot be used with blood thinners  ¨ Tell your healthcare provider right away if you forget to take the medicine, or if you take too much  ¨ Warfarin  is a blood thinner that you may need to take  The following are things you should be aware of if you take warfarin  § Foods and medicines can affect the amount of warfarin in your blood  Do not make major changes to your diet while you take warfarin  Warfarin works best when you eat about the same amount of vitamin K every day  Vitamin K is found in green leafy vegetables and certain other foods  Ask for more information about what to eat when you are taking warfarin  § You will need to see your healthcare provider for follow-up visits when you are on warfarin  You will need regular blood tests  These tests are used to decide how much medicine you need  · Take your medicine as directed  Contact your healthcare provider if you think your medicine is not helping or if you have side effects  Tell him or her if you are allergic to any medicine  Keep a list of the medicines, vitamins, and herbs you take  Include the amounts, and when and why you take them  Bring the list or the pill bottles to follow-up visits  Carry your medicine list with you in case of an emergency  Self-care:   · Rest as directed  Do not drive for at least 24 hours  Ask your healthcare provider when you can return to your normal activities  · Check your heart rate and blood pressure as directed  Ask your healthcare provider what your heart rate and blood pressure should be  · Do not smoke  Nicotine and other chemicals in cigarettes and cigars can cause heart and lung damage  They can also increase your risk for another arrhythmia  Ask your healthcare provider for information if you currently smoke and need help to quit  E-cigarettes or smokeless tobacco still contain nicotine  Talk to your healthcare provider before you use these products  · Eat heart healthy foods  These include fruits, vegetables, whole-grain breads, low-fat dairy products, beans, lean meats, and fish  Replace butter and margarine with heart-healthy oils such as olive oil and canola oil  · Maintain a healthy weight  Ask your healthcare provider how much you should weigh  Ask him to help you create a weight loss plan if you are overweight  Follow up with your healthcare provider as directed:  Write down your questions so you remember to ask them during your visits  © 2017 [employer] [employer] Information is for End User's use only and may not be sold, redistributed or otherwise used for commercial purposes  All illustrations and images included in CareNotes® are the copyrighted property of [employer]  or [employer]  The above information is an  only  It is not intended as medical advice for individual conditions or treatments  Talk to your doctor, nurse or pharmacist before following any medical regimen to see if it is safe and effective for you

## 2018-06-11 NOTE — PROCEDURES
Cardioversion procedure including risks benefits alternatives were explained to the patient and spouse  Questions were answered  Informed consent was obtained from the patient  Patient was transferred to the GI lab  MAC was given by Anesthesia  200 J synchronized DC cardioversion was given and patient converted to sinus rhythm  MAc was eneded  Patient woke up without complications and was monitored in the recovery room till criteria were met and patient was discharged to home to follow up with me as an outpatient in 1 month

## 2018-06-11 NOTE — ANESTHESIA POSTPROCEDURE EVALUATION
Post-Op Assessment Note      CV Status:  Stable    Mental Status:  Alert, awake and lethargic    Hydration Status:  Euvolemic    PONV Controlled:  Controlled    Airway Patency:  Patent    Post Op Vitals Reviewed: Yes          Staff: CRNA           BP   108/72   Temp      Pulse 58   Resp   16   SpO2   94%

## 2018-06-12 DIAGNOSIS — I42.8 OTHER CARDIOMYOPATHY (HCC): ICD-10-CM

## 2018-06-12 DIAGNOSIS — I10 ESSENTIAL HYPERTENSION: Primary | ICD-10-CM

## 2018-06-12 RX ORDER — METOPROLOL SUCCINATE 50 MG/1
50 TABLET, EXTENDED RELEASE ORAL DAILY
Qty: 90 TABLET | Refills: 1 | Status: SHIPPED | OUTPATIENT
Start: 2018-06-12 | End: 2018-07-20 | Stop reason: SDUPTHER

## 2018-06-12 NOTE — TELEPHONE ENCOUNTER
Patient called and wanted to let Dr Teresa Hale know what she had experienced after her procedure on 6/11  PT stated she felt pain in her jaw and now today she woke up with it swollen, patient also she has a slight flutter that last about 2 seconds but patient still wanted to inform Dr Teresa Hale  PT would like a cb on # on file         You performed a Cardioversion on pt on Monday

## 2018-06-12 NOTE — TELEPHONE ENCOUNTER
Patient called and wanted to let Dr Sharyle Searles know what she had experienced after her procedure on 6/11  PT stated she felt pain in her jaw and now today she woke up with it swollen, patient also she has a slight flutter that last about 2 seconds but patient still wanted to inform Dr Sharyle Searles  PT would like a cb on # on file  Madie Lightning

## 2018-06-13 ENCOUNTER — TELEPHONE (OUTPATIENT)
Dept: CARDIOLOGY CLINIC | Facility: CLINIC | Age: 73
End: 2018-06-13

## 2018-06-13 NOTE — TELEPHONE ENCOUNTER
PT WAS TAKING METOPROLOL 25MG - 2 TABLETS IN THE AM AND 2 TABLETS IN THE PM  PT PICKED UP NEW SCRIPT FOR METOPROLOL 50MG AND IT SAYS TO TAKE 1 TABLET DAILY  PT WANTS TO KNOW WHICH IS CORRECT  PLEASE CALL SHANIQUA Caicedo

## 2018-06-13 NOTE — TELEPHONE ENCOUNTER
This is the long acting form of metoprolol - please take 2 pills once a day to make a total of 100 mg like before

## 2018-07-20 ENCOUNTER — OFFICE VISIT (OUTPATIENT)
Dept: CARDIOLOGY CLINIC | Facility: CLINIC | Age: 73
End: 2018-07-20
Payer: MEDICARE

## 2018-07-20 VITALS
OXYGEN SATURATION: 97 % | WEIGHT: 215 LBS | DIASTOLIC BLOOD PRESSURE: 82 MMHG | HEIGHT: 65 IN | SYSTOLIC BLOOD PRESSURE: 126 MMHG | HEART RATE: 86 BPM | BODY MASS INDEX: 35.82 KG/M2

## 2018-07-20 DIAGNOSIS — I42.8 OTHER CARDIOMYOPATHY (HCC): ICD-10-CM

## 2018-07-20 DIAGNOSIS — I10 ESSENTIAL HYPERTENSION: ICD-10-CM

## 2018-07-20 DIAGNOSIS — E66.9 OBESITY (BMI 30-39.9): ICD-10-CM

## 2018-07-20 DIAGNOSIS — I48.0 PAROXYSMAL ATRIAL FIBRILLATION (HCC): Primary | ICD-10-CM

## 2018-07-20 DIAGNOSIS — I42.9 CARDIOMYOPATHY, UNSPECIFIED TYPE (HCC): ICD-10-CM

## 2018-07-20 PROCEDURE — 99214 OFFICE O/P EST MOD 30 MIN: CPT | Performed by: INTERNAL MEDICINE

## 2018-07-20 RX ORDER — METOPROLOL SUCCINATE 100 MG/1
100 TABLET, EXTENDED RELEASE ORAL DAILY
Qty: 90 TABLET | Refills: 3 | Status: SHIPPED | OUTPATIENT
Start: 2018-07-20 | End: 2018-07-26 | Stop reason: SDUPTHER

## 2018-07-20 RX ORDER — LISINOPRIL 2.5 MG/1
2.5 TABLET ORAL DAILY
Qty: 90 TABLET | Refills: 3 | Status: SHIPPED | OUTPATIENT
Start: 2018-07-20 | End: 2018-07-26 | Stop reason: SDUPTHER

## 2018-07-20 RX ORDER — METOPROLOL SUCCINATE 100 MG/1
100 TABLET, EXTENDED RELEASE ORAL DAILY
Qty: 90 TABLET | Refills: 3 | Status: SHIPPED | OUTPATIENT
Start: 2018-07-20 | End: 2018-07-20 | Stop reason: SDUPTHER

## 2018-07-20 NOTE — PROGRESS NOTES
[facility] [place]  [address]  [id number]  Cardiology Consultation     Daphne Gutierrez  [id number]  1945      1  Paroxysmal atrial fibrillation (HCC)     2  Cardiomyopathy, unspecified type ([address] )     3  Essential hypertension     4  Obesity (BMI 30-39  9)         Chief Complaint   Patient presents with    Follow-up     cardioversion       HPI:  Patient with history of PAF s/p recent cardioversion, cardiomyopathy, HTN, HLP, MR, AR, TR, obesity, TIA / CVA here for follow up  Had some SOB and palpitations directly after cardioversion 6/11/2018, and subsequently had Toprol increased to 100 mg daily, with resolution of symptoms soon after  Currently, denies chest pain, SOB, lightheadedness, palpitations, leg swelling, syncope  CMP - EF 40-45%  Normal stress test  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  Will need rhythm control or tight rate control     PAF - Patient was diagnosed with atrial fibrillation on an EKG done at her PCP office  Patient had no symptoms from it  Patient denied palpitations, lightheadedness, syncope, SOB, chest pain / pressure, swelling feet, orthopnea, PND    HTN -  Takes her medications regularly  Denies lightheadedness, headache, medication side effects       HLP -  Has had hyperlipidemia for many years   Taking statin regularly along with diet control   Denies myalgia   PCP closely monitoring the blood work      MR, AR, TR - stable  Asymptomatic     Obesity - Trying to lose weight      H/O TIA      Patient Active Problem List   Diagnosis    Paroxysmal atrial fibrillation (HCC)    Essential hypertension    Mixed hyperlipidemia    Obesity (BMI 30-39  9)    H/O TIA (transient ischemic attack) and stroke    Aphasia, mixed    Migraine with aura and without status migrainosus, not intractable    URI, acute    Viral exanthem    Myocardiopathy (HCC)    Shortness of breath on exertion    Anticoagulant long-term use    Nonrheumatic mitral valve regurgitation    Nonrheumatic aortic valve insufficiency    Nonrheumatic tricuspid valve regurgitation     Past Medical History:   Diagnosis Date    Atrial fibrillation (HCC)     Headache     Hyperlipidemia     Hypertension     Lyme disease     Shortness of breath     TIA (transient ischemic attack)     Transient cerebral ischemia     Last assessed - 1/23/18    Vertigo      Social History     Social History    Marital status: /Civil Union     Spouse name: N/A    Number of children: N/A    Years of education: N/A     Occupational History    Retired      Social History Main Topics    Smoking status: Former Smoker    Smokeless tobacco: Never Used    Alcohol use 4 2 oz/week     7 Glasses of wine per week      Comment: occ    Drug use: No    Sexual activity: Not on file     Other Topics Concern    Not on file     Social History Narrative    Always uses seat belt          Family History   Problem Relation Age of Onset    Lung cancer Mother     Dementia Father     Alzheimer's disease Father     Other Father         Cardiac Disorder      Past Surgical History:   Procedure Laterality Date    COLONOSCOPY      [place] SIGMOIDOSCOPY FLX DX W/COLLJ SPEC BR/WA IF PFRMD N/A 11/28/2017    Procedure: [place] Plane;  Surgeon: Janett De León MD;  Location: [facility];   Service: Gastroenterology    TONSILLECTOMY         Current Outpatient Prescriptions:     acetaminophen (TYLENOL) 325 mg tablet, Take by mouth, Disp: , Rfl:     apixaban (ELIQUIS) 5 mg, Take 1 tablet (5 mg total) by mouth 2 (two) times a day, Disp: 180 tablet, Rfl: 3    lovastatin (MEVACOR) 10 MG tablet, Take 10 mg by mouth daily at bedtime, Disp: , Rfl:     metoprolol succinate (TOPROL-XL) 50 mg 24 hr tablet, Take 1 tablet (50 mg total) by mouth daily (Patient taking differently: Take 100 mg by mouth daily  ), Disp: 90 tablet, Rfl: 1  No Known Allergies  Vitals:    07/20/18 1236   BP: 126/82   Pulse: 86   SpO2: 97%   Weight: 97 5 kg (215 lb)   Height: 5' 5" (1 651 m)       Labs:  Orders Only on 05/30/2018   Component Date Value    WBC 06/05/2018 8 17     RBC 06/05/2018 5 25*    Hemoglobin 06/05/2018 16 5*    Hematocrit 06/05/2018 51 1*    MCV 06/05/2018 97     MCH 06/05/2018 31 4     MCHC 06/05/2018 32 3     RDW 06/05/2018 15 3*    MPV 06/05/2018 11 9     Platelets 21/33/0351 225     nRBC 06/05/2018 0     Neutrophils Relative 06/05/2018 67     Immat GRANS % 06/05/2018 0     Lymphocytes Relative 06/05/2018 18     Monocytes Relative 06/05/2018 12     Eosinophils Relative 06/05/2018 2     Basophils Relative 06/05/2018 1     Neutrophils Absolute 06/05/2018 5 49     Immature Grans Absolute 06/05/2018 0 03     Lymphocytes Absolute 06/05/2018 1 46     Monocytes Absolute 06/05/2018 0 97     Eosinophils Absolute 06/05/2018 0 15     Basophils Absolute 06/05/2018 0 07     Sodium 06/05/2018 139     Potassium 06/05/2018 4 5     Chloride 06/05/2018 107     CO2 06/05/2018 26     Anion Gap 06/05/2018 6     BUN 06/05/2018 15     Creatinine 06/05/2018 1 13     Glucose 06/05/2018 104     Calcium 06/05/2018 9 2     eGFR 06/05/2018 48     Protime 06/05/2018 15 4*    INR 06/05/2018 1 21*     Imaging: No results found  Review of Systems:  Review of Systems   Constitutional: Negative for diaphoresis, fatigue and fever  HENT: Negative for congestion, ear discharge, hearing loss, sinus pain and sore throat  Eyes: Negative for pain, redness and visual disturbance  Respiratory: Negative for cough, chest tightness, shortness of breath and wheezing  Cardiovascular: Negative for chest pain, palpitations and leg swelling  Gastrointestinal: Negative for abdominal distention, abdominal pain, constipation, diarrhea, nausea and vomiting  Endocrine: Negative for cold intolerance and heat intolerance  Genitourinary: Negative for difficulty urinating and hematuria     Musculoskeletal: Negative for arthralgias, back pain, gait problem, joint swelling, myalgias, neck pain and neck stiffness  Skin: Negative for color change, pallor, rash and wound  Neurological: Negative for dizziness, syncope, weakness, numbness and headaches  Hematological: Does not bruise/bleed easily  Psychiatric/Behavioral: Negative for agitation, behavioral problems and confusion  The patient is not nervous/anxious  /82   Pulse 86   Ht 5' 5" (1 651 m)   Wt 97 5 kg (215 lb)   SpO2 97%   BMI 35 78 kg/m²     Physical Exam:  Physical Exam   Constitutional: She is oriented to person, place, and time  She appears well-developed and well-nourished  HENT:   Head: Normocephalic and atraumatic  Eyes: Conjunctivae and EOM are normal  Pupils are equal, round, and reactive to light  Neck: Normal range of motion  Neck supple  Cardiovascular: Normal rate, regular rhythm, normal heart sounds and intact distal pulses  Exam reveals no gallop and no friction rub  No murmur heard  Pulmonary/Chest: Effort normal and breath sounds normal  No respiratory distress  She has no wheezes  She has no rales  She exhibits no tenderness  Abdominal: Soft  Bowel sounds are normal  She exhibits no distension  There is no rebound  Musculoskeletal: Normal range of motion  She exhibits no edema  Neurological: She is alert and oriented to person, place, and time  She has normal reflexes  Skin: Skin is warm and dry  Psychiatric: She has a normal mood and affect  Her behavior is normal  Judgment and thought content normal        Discussion/Summary:  1  Paroxysmal A Fib:   -s/p cardioversion  Currently in sinus rhythm  -Continue Toprol for rate control, Eliquis for anticoagulation    2  Cardiomyopathy:  -Previously suspected to be tachycardia-mediated  -ECHO Feb 2018 shows EF 40-45%  -Will order ECHO to assess EF and regional wall motion abnormalities, to be done October 2018  -Continue Toprol  Will start low dose lisinopril    3   HTN: Stable, continue present regimen    4  Obesity: Weight loss counseling    5  HLP: cont statin and diet control    6  MR, TR, AR:  Follow-up with serial echocardiograms    Recommend aggressive risk factor modification and therapeutic lifestyle changes  Low salt, low calorie, low fat, low cholesterol diet with regular exercise and to optimize weight  I will defer the ordering and monitoring of necessary lab studies to you, but I am available and happy to review and manage any of that data at your request in the future  Discussed concepts of atherosclerosis, including signs and symptoms of cardiac disease  Previous studies were reviewed  Safety measures were reviewed  Questions were entertained and answered  Patient was advised to report any problem requiring medical attention  Follow up with appropriate specialists and lab work as discussed  Return for follow up visit as scheduled or earlier, if needed  Thank you for allowing me to participate in the care and evaluation of your patient  Should you have any questions, please feel free to contact me

## 2018-07-26 ENCOUNTER — TELEPHONE (OUTPATIENT)
Dept: CARDIOLOGY CLINIC | Facility: CLINIC | Age: 73
End: 2018-07-26

## 2018-07-26 DIAGNOSIS — I42.8 OTHER CARDIOMYOPATHY (HCC): ICD-10-CM

## 2018-07-26 DIAGNOSIS — I10 ESSENTIAL HYPERTENSION: ICD-10-CM

## 2018-07-26 DIAGNOSIS — I42.9 CARDIOMYOPATHY, UNSPECIFIED TYPE (HCC): ICD-10-CM

## 2018-07-26 RX ORDER — LISINOPRIL 2.5 MG/1
2.5 TABLET ORAL DAILY
Qty: 90 TABLET | Refills: 3 | Status: SHIPPED | OUTPATIENT
Start: 2018-07-26 | End: 2019-02-15 | Stop reason: SDUPTHER

## 2018-07-26 RX ORDER — METOPROLOL SUCCINATE 100 MG/1
100 TABLET, EXTENDED RELEASE ORAL DAILY
Qty: 90 TABLET | Refills: 3 | Status: SHIPPED | OUTPATIENT
Start: 2018-07-26 | End: 2018-12-20 | Stop reason: SDUPTHER

## 2018-07-26 NOTE — TELEPHONE ENCOUNTER
PT SAID THAT DR VILLANUEVA ORDERED METOPROLOL & LISINOPRIL THROUGH [facility] & SHE RECEIVED AN EMAIL THAT THE MEDS CANT BE PROCESSED DUE TO "SOMETHING" MISSING ON THE REQ & PT WANTS THE MED REQ TO BE RESENT W/ THE CORRECTION

## 2018-08-03 ENCOUNTER — TELEPHONE (OUTPATIENT)
Dept: CARDIOLOGY CLINIC | Facility: CLINIC | Age: 73
End: 2018-08-03

## 2018-08-03 NOTE — TELEPHONE ENCOUNTER
Pt reports these blood pressures from the past week 110/96, 115/99, 104/89, 111/90, 113/98, 122/94, 103/84  Pt states that she feels a lot better since she has started Lisinopril

## 2018-08-03 NOTE — TELEPHONE ENCOUNTER
PT CALLED & WANTED TO LET DR Felton Yu KNOW THAT HER BP READINGS ARE NOT CONSISTENT SOME DAYS ITS HIGH & SOME DAYS ITS LOW  PT ALSO STATED SHE HAS BEEN TAKING HER LISINOPRIL AS DIRECTED  PT WOULD LIKE A CB TO DISCUSS

## 2018-09-07 ENCOUNTER — OFFICE VISIT (OUTPATIENT)
Dept: FAMILY MEDICINE CLINIC | Facility: CLINIC | Age: 73
End: 2018-09-07
Payer: MEDICARE

## 2018-09-07 VITALS
BODY MASS INDEX: 35.65 KG/M2 | WEIGHT: 214 LBS | HEIGHT: 65 IN | DIASTOLIC BLOOD PRESSURE: 90 MMHG | OXYGEN SATURATION: 98 % | SYSTOLIC BLOOD PRESSURE: 132 MMHG | HEART RATE: 90 BPM

## 2018-09-07 DIAGNOSIS — I48.0 PAROXYSMAL ATRIAL FIBRILLATION (HCC): Primary | ICD-10-CM

## 2018-09-07 DIAGNOSIS — I10 ESSENTIAL HYPERTENSION: ICD-10-CM

## 2018-09-07 DIAGNOSIS — E78.2 MIXED HYPERLIPIDEMIA: ICD-10-CM

## 2018-09-07 PROCEDURE — 99214 OFFICE O/P EST MOD 30 MIN: CPT | Performed by: FAMILY MEDICINE

## 2018-09-07 NOTE — PATIENT INSTRUCTIONS
Heart Healthy Diet   WHAT YOU NEED TO KNOW:   A heart healthy diet is an eating plan low in total fat, unhealthy fats, and sodium (salt)  A heart healthy diet helps decrease your risk for heart disease and stroke  Limit the amount of fat you eat to 25% to 35% of your total daily calories  Limit sodium to less than 2,300 mg each day  DISCHARGE INSTRUCTIONS:   Healthy fats:  Healthy fats can help improve cholesterol levels  The risk for heart disease is decreased when cholesterol levels are normal  Choose healthy fats, such as the following:  · Unsaturated fat  is found in foods such as soybean, canola, olive, corn, and safflower oils  It is also found in soft tub margarine that is made with liquid vegetable oil  · Omega-3 fat  is found in certain fish, such as salmon, tuna, and trout, and in walnuts and flaxseed  Unhealthy fats:  Unhealthy fats can cause unhealthy cholesterol levels in your blood and increase your risk of heart disease  Limit unhealthy fats, such as the following:  · Cholesterol  is found in animal foods, such as eggs and lobster, and in dairy products made from whole milk  Limit cholesterol to less than 300 milligrams (mg) each day  You may need to limit cholesterol to 200 mg each day if you have heart disease  · Saturated fat  is found in meats, such as terry and hamburger  It is also found in chicken or turkey skin, whole milk, and butter  Limit saturated fat to less than 7% of your total daily calories  Limit saturated fat to less than 6% if you have heart disease or are at increased risk for it  · Trans fat  is found in packaged foods, such as potato chips and cookies  It is also in hard margarine, some fried foods, and shortening  Avoid trans fats as much as possible    Heart healthy foods and drinks to include:  Ask your dietitian or healthcare provider how many servings to have from each of the following food groups:  · Grains:      ¨ Whole-wheat breads, cereals, and pastas, and brown rice    ¨ Low-fat, low-sodium crackers and chips    · Vegetables:      ¨ Broccoli, green beans, green peas, and spinach    ¨ Collards, kale, and lima beans    ¨ Carrots, sweet potatoes, tomatoes, and peppers    ¨ Canned vegetables with no salt added    · Fruits:      ¨ Bananas, peaches, pears, and pineapple    ¨ Grapes, raisins, and dates    ¨ Oranges, tangerines, grapefruit, orange juice, and grapefruit juice    ¨ Apricots, mangoes, melons, and papaya    ¨ Raspberries and strawberries    ¨ Canned fruit with no added sugar    · Low-fat dairy products:      ¨ Nonfat (skim) milk, 1% milk, and low-fat almond, cashew, or soy milks fortified with calcium    ¨ Low-fat cheese, regular or frozen yogurt, and cottage cheese    · Meats and proteins , such as lean cuts of beef and pork (loin, leg, round), skinless chicken and turkey, legumes, soy products, egg whites, and nuts  Foods and drinks to limit or avoid:  Ask your dietitian or healthcare provider about these and other foods that are high in unhealthy fat, sodium, and sugar:  · Snack or packaged foods , such as frozen dinners, cookies, macaroni and cheese, and cereals with more than 300 mg of sodium per serving    · Canned or dry mixes  for cakes, soups, sauces, or gravies    · Vegetables with added sodium , such as instant potatoes, vegetables with added sauces, or regular canned vegetables    · Other foods high in sodium , such as ketchup, barbecue sauce, salad dressing, pickles, olives, soy sauce, and miso    · High-fat dairy foods  such as whole or 2% milk, cream cheese, or sour cream, and cheeses     · High-fat protein foods  such as high-fat cuts of beef (T-bone steaks, ribs), chicken or turkey with skin, and organ meats, such as liver    · Cured or smoked meats , such as hot dogs, terry, and sausage    · Unhealthy fats and oils , such as butter, stick margarine, shortening, and cooking oils such as coconut or palm oil    · Food and drinks high in sugar , such as soft drinks (soda), sports drinks, sweetened tea, candy, cake, cookies, pies, and doughnuts  Other diet guidelines to follow:   · Eat more foods containing omega-3 fats  Eat fish high in omega-3 fats at least 2 times a week  · Limit alcohol  Too much alcohol can damage your heart and raise your blood pressure  Women should limit alcohol to 1 drink a day  Men should limit alcohol to 2 drinks a day  A drink of alcohol is 12 ounces of beer, 5 ounces of wine, or 1½ ounces of liquor  · Choose low-sodium foods  High-sodium foods can lead to high blood pressure  Add little or no salt to food you prepare  Use herbs and spices in place of salt  · Eat more fiber  to help lower cholesterol levels  Eat at least 5 servings of fruits and vegetables each day  Eat 3 ounces of whole-grain foods each day  Legumes (beans) are also a good source of fiber  Lifestyle guidelines:   · Do not smoke  Nicotine and other chemicals in cigarettes and cigars can cause lung and heart damage  Ask your healthcare provider for information if you currently smoke and need help to quit  E-cigarettes or smokeless tobacco still contain nicotine  Talk to your healthcare provider before you use these products  · Exercise regularly  to help you maintain a healthy weight and improve your blood pressure and cholesterol levels  Ask your healthcare provider about the best exercise plan for you  Do not start an exercise program without asking your healthcare provider  Follow up with your healthcare provider as directed:  Write down your questions so you remember to ask them during your visits  © 2017 [employer] [employer] Information is for End User's use only and may not be sold, redistributed or otherwise used for commercial purposes  All illustrations and images included in CareNotes® are the copyrighted property of [employer]  or [employer]  The above information is an  only   It is not intended as medical advice for individual conditions or treatments  Talk to your doctor, nurse or pharmacist before following any medical regimen to see if it is safe and effective for you

## 2018-09-07 NOTE — PROGRESS NOTES
Assessment/Plan:       Diagnoses and all orders for this visit:    Paroxysmal atrial fibrillation [facility])    Essential hypertension    Mixed hyperlipidemia        Paroxysmal atrial fibrillation [facility])  Seeing cardiologist, continue eliquis and metoprolol    Essential hypertension  Continue lisinopril    Mixed hyperlipidemia  Continue lovastatin      Continue current medications, will see her back in 6 months time, she is to follow up with her cardiologist in meantime  Subjective:      Patient ID: Archana Smith is a 67 y o  female  Patient is in today for checkup  She states she has seen her cardiologist since her last visit here with her new diagnosis of atrial fibrillation, she is taking Eliquis, and metoprolol  She does note some and of the day fatigue since she started the metoprolol  She is taking her lisinopril now for hypertension, she has not any problems with this, no compliance issues or side effects  She continues take her lovastatin for hyperlipidemia, no compliance issues, no side effects  The following portions of the patient's history were reviewed and updated as appropriate:   She has a past medical history of Atrial fibrillation ([facility] [address] ); Headache; Hyperlipidemia; Hypertension; Lyme disease; Shortness of breath; TIA (transient ischemic attack); Transient cerebral ischemia; and Vertigo  ,   does not have any pertinent problems on file  ,   has a past surgical history that includes Tonsillectomy; Colonoscopy; and pr sigmoidoscopy flx dx w/collj spec br/wa if pfrmd (N/A, 11/28/2017)  ,  family history includes Alzheimer's disease in her father; Dementia in her father; Lung cancer in her mother; Other in her father  ,   reports that she has quit smoking  She has never used smokeless tobacco  She reports that she drinks about 4 2 oz of alcohol per week   She reports that she does not use drugs  ,  has No Known Allergies     Current Outpatient Prescriptions   Medication Sig Dispense Refill    acetaminophen (TYLENOL) 325 mg tablet Take by mouth      apixaban (ELIQUIS) 5 mg Take 1 tablet (5 mg total) by mouth 2 (two) times a day 180 tablet 3    lisinopril (ZESTRIL) 2 5 mg tablet Take 1 tablet (2 5 mg total) by mouth daily 90 tablet 3    lovastatin (MEVACOR) 10 MG tablet Take 10 mg by mouth daily at bedtime      metoprolol succinate (TOPROL-XL) 100 mg 24 hr tablet Take 1 tablet (100 mg total) by mouth daily 90 tablet 3     No current facility-administered medications for this visit  Review of Systems   Constitutional: Positive for fatigue  Negative for chills and fever  HENT: Negative for congestion, ear pain, hearing loss, postnasal drip, rhinorrhea and sore throat  Eyes: Negative for pain and visual disturbance  Respiratory: Negative for chest tightness, shortness of breath and wheezing  Cardiovascular: Negative for chest pain and leg swelling  Gastrointestinal: Negative for abdominal distention, abdominal pain, constipation, diarrhea and vomiting  Endocrine: Negative for cold intolerance and heat intolerance  Genitourinary: Negative for difficulty urinating, frequency and urgency  Musculoskeletal: Negative for arthralgias and gait problem  Skin: Negative for color change  Neurological: Negative for dizziness, tremors, syncope, numbness and headaches  Hematological: Negative for adenopathy  Psychiatric/Behavioral: Negative for agitation, confusion and sleep disturbance  The patient is not nervous/anxious  Objective:  Vitals:    09/07/18 0819   BP: 132/90   Pulse: 90   SpO2: 98%   Weight: 97 1 kg (214 lb)   Height: 5' 5" (1 651 m)     Body mass index is 35 61 kg/m²  Physical Exam   Constitutional: She is oriented to person, place, and time  She appears well-developed and well-nourished  HENT:   Head: Normocephalic  Mouth/Throat: Oropharynx is clear and moist    Eyes: Conjunctivae are normal  No scleral icterus  Neck: Normal range of motion   No thyromegaly present  Cardiovascular: Normal rate and normal heart sounds  An irregularly irregular rhythm present  No murmur heard  Pulmonary/Chest: Effort normal and breath sounds normal  No respiratory distress  She has no wheezes  Abdominal: Soft  Bowel sounds are normal  She exhibits no distension  There is no tenderness  Musculoskeletal: Normal range of motion  She exhibits no edema or tenderness  Lymphadenopathy:     She has no cervical adenopathy  Neurological: She is alert and oriented to person, place, and time  No cranial nerve deficit  Skin: Skin is warm and dry  No rash noted  No pallor  Psychiatric: She has a normal mood and affect  Her behavior is normal    Nursing note and vitals reviewed

## 2018-10-22 ENCOUNTER — HOSPITAL ENCOUNTER (OUTPATIENT)
Dept: NON INVASIVE DIAGNOSTICS | Facility: CLINIC | Age: 73
Discharge: HOME | End: 2018-10-22
Payer: MEDICARE

## 2018-10-22 DIAGNOSIS — I48.0 PAROXYSMAL ATRIAL FIBRILLATION (HCC): ICD-10-CM

## 2018-10-22 DIAGNOSIS — I42.9 CARDIOMYOPATHY, UNSPECIFIED TYPE (HCC): ICD-10-CM

## 2018-10-22 PROCEDURE — 93325 DOPPLER ECHO COLOR FLOW MAPG: CPT | Performed by: INTERNAL MEDICINE

## 2018-10-22 PROCEDURE — 93308 TTE F-UP OR LMTD: CPT | Performed by: INTERNAL MEDICINE

## 2018-10-22 PROCEDURE — 93308 TTE F-UP OR LMTD: CPT

## 2018-10-22 PROCEDURE — 93321 DOPPLER ECHO F-UP/LMTD STD: CPT | Performed by: INTERNAL MEDICINE

## 2018-11-05 ENCOUNTER — OFFICE VISIT (OUTPATIENT)
Dept: CARDIOLOGY CLINIC | Facility: CLINIC | Age: 73
End: 2018-11-05
Payer: MEDICARE

## 2018-11-05 VITALS
HEART RATE: 56 BPM | BODY MASS INDEX: 35.74 KG/M2 | SYSTOLIC BLOOD PRESSURE: 122 MMHG | HEIGHT: 65 IN | RESPIRATION RATE: 18 BRPM | WEIGHT: 214.5 LBS | OXYGEN SATURATION: 98 % | DIASTOLIC BLOOD PRESSURE: 74 MMHG

## 2018-11-05 DIAGNOSIS — Z86.73 H/O TIA (TRANSIENT ISCHEMIC ATTACK) AND STROKE: ICD-10-CM

## 2018-11-05 DIAGNOSIS — I42.8 OTHER CARDIOMYOPATHY (HCC): ICD-10-CM

## 2018-11-05 DIAGNOSIS — I10 ESSENTIAL HYPERTENSION: ICD-10-CM

## 2018-11-05 DIAGNOSIS — I35.1 NONRHEUMATIC AORTIC VALVE INSUFFICIENCY: ICD-10-CM

## 2018-11-05 DIAGNOSIS — E66.9 OBESITY (BMI 30-39.9): ICD-10-CM

## 2018-11-05 DIAGNOSIS — I48.0 PAROXYSMAL ATRIAL FIBRILLATION (HCC): Primary | ICD-10-CM

## 2018-11-05 DIAGNOSIS — I48.91 ATRIAL FIBRILLATION, UNSPECIFIED TYPE (HCC): Primary | ICD-10-CM

## 2018-11-05 DIAGNOSIS — Z79.01 ANTICOAGULANT LONG-TERM USE: ICD-10-CM

## 2018-11-05 DIAGNOSIS — I36.1 NONRHEUMATIC TRICUSPID VALVE REGURGITATION: ICD-10-CM

## 2018-11-05 DIAGNOSIS — E78.2 MIXED HYPERLIPIDEMIA: ICD-10-CM

## 2018-11-05 DIAGNOSIS — I34.0 NONRHEUMATIC MITRAL VALVE REGURGITATION: ICD-10-CM

## 2018-11-05 PROCEDURE — 99215 OFFICE O/P EST HI 40 MIN: CPT | Performed by: INTERNAL MEDICINE

## 2018-11-05 RX ORDER — AMIODARONE HYDROCHLORIDE 400 MG/1
400 TABLET ORAL 2 TIMES DAILY
Qty: 100 TABLET | Refills: 1 | Status: SHIPPED | OUTPATIENT
Start: 2018-11-05 | End: 2018-12-20

## 2018-11-05 NOTE — PROGRESS NOTES
CARDIOLOGY OFFICE VISIT  [facility]  [address], [place], [facility] Rohan Obando  Tel: [phone]      NAME: Fritz Martinez  AGE: 75 y o  SEX: female  : 1945   MRN: [id number]      Chief Complaint:  Chief Complaint   Patient presents with    Follow-up     Pt states she had an echo done a few weeks ago, check up  Pt states she has been having fatigue and running of nose  History of Present Illness:   Patient comes for follow up  States she is actually feeling very good denies chest pain / pressure, SOB (at baseline), palpitations, lightheadedness, syncope, swelling feet, orthopnea, PND, claudication  CMP -  Even though the patient is feeling well, her EF by latest echo has gone down to 35-40% from prior EF 40-45%  And she is currently back in atrial fibrillation  Prior normal stress test  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  Plan on rhythm control now     PAF - Patient was diagnosed with atrial fibrillation on an EKG done at her PCP office  Patient had no symptoms from it  Patient denied palpitations, lightheadedness, syncope, SOB, chest pain / pressure, swelling feet, orthopnea, PND  She was cardioverted and was in sinus rhythm for a while but today she is back in atrial fibrillation     HLP -  Has had hyperlipidemia for many years  Maxwell Bras statin regularly along with diet control   Denies myalgia   PCP closely monitoring the blood work      MELISSA GALVEZ TR - stable   Asymptomatic     Obesity - Trying to lose weight      H/O TIA    Past Medical History:  Past Medical History:   Diagnosis Date    Atrial fibrillation ([address] )     Headache     Hyperlipidemia     Hypertension     Lyme disease     Shortness of breath     TIA (transient ischemic attack)     Transient cerebral ischemia     Last assessed - 18    Vertigo          Past Surgical History:  Past Surgical History:   Procedure Laterality Date    COLONOSCOPY      TN SIGMOIDOSCOPY FLX DX W/COLLJ SPEC BR/WA IF PFRMD N/A 11/28/2017    Procedure: Alfa Phipps;  Surgeon: Thomas Bowden MD;  Location: [facility]; Service: Gastroenterology    TONSILLECTOMY           Family History:  Family History   Problem Relation Age of Onset    Lung cancer Mother     Dementia Father     Alzheimer's disease Father     Other Father         Cardiac Disorder          Social History:  Social History     Social History    Marital status: /Civil Union     Spouse name: N/A    Number of children: N/A    Years of education: N/A     Occupational History    Retired      Social History Main Topics    Smoking status: Former Smoker    Smokeless tobacco: Never Used    Alcohol use 4 2 oz/week     7 Glasses of wine per week      Comment: occ    Drug use: No    Sexual activity: Not Asked     Other Topics Concern    None     Social History Narrative    Always uses seat belt             Active Problems:  Patient Active Problem List   Diagnosis    Paroxysmal atrial fibrillation ([address] )    Essential hypertension    Mixed hyperlipidemia    Obesity (BMI 30-39  9)    H/O TIA (transient ischemic attack) and stroke    Aphasia, mixed    Migraine with aura and without status migrainosus, not intractable    URI, acute    Viral exanthem    Myocardiopathy (HCC)    Shortness of breath on exertion    Anticoagulant long-term use    Nonrheumatic mitral valve regurgitation    Nonrheumatic aortic valve insufficiency    Nonrheumatic tricuspid valve regurgitation         The following portions of the patient's history were reviewed and updated as appropriate: past medical history, past surgical history, past family history,  past social history, current medications, allergies and problem list       Review of Systems:  Constitutional: Denies fever, chills, fatigue  Eyes: Denies eye redness, eye discharge, double vision  ENT: Denies hearing loss, tinnitus, sneezing, nasal discharge, sore throat   Respiratory: Denies cough, expectoration, hemoptysis  +shortness of breath  Cardiovascular: Denies chest pain, palpitations, orthopnea, PND, lower extremity swelling  Gastrointestinal: Denies abdominal pain, nausea, vomiting, hematemesis, diarrhea, bloody stools  Genito-Urinary: Denies dysuria, incontinence  Musculoskeletal: Denies back pain, joint pain, muscle pain  Neurologic: Denies confusion, lightheadedness, syncope, headache, focal weakness, sensory changes, seizures  Endocrine: Denies polyuria, polydipsia, temperature intolerance  Allergy and Immunology: Denies hives, insect bite sensitivity  Hematological and Lymphatic: Denies bleeding problems, swollen glands   Psychological: Denies depression, suicidal ideation, anxiety, panic  Dermatological: Denies pruritus, rash, skin lesion changes      Vitals:  Vitals:    11/05/18 1437   BP: 122/74   Pulse: 56   Resp: 18   SpO2: 98%       Body mass index is 35 69 kg/m²  Weight (last 2 days)     Date/Time   Weight    11/05/18 1437  97 3 (214 5)                Physical Examination:  General: Obese  Patient is not in acute distress  Awake, alert, oriented in time, place and person  Responding to commands  Head: Normocephalic  Atraumatic  Eyes: Both pupils normal sized, round and reactive to light  Nonicteric  ENT: Normal external ear canals  Nares normal, no drainage  Lips and oral mucosa normal  Neck: Supple  JVP not raised  Trachea central  No thyromegaly  Lungs: Bilateral bronchovascular breath sounds with no crackles or rhonchi  Chest wall: No tenderness  Cardiovascular:  Irregular rhythm  S1 variable and S2 normal  Grade 3/6 PSMs at apex and LLSB  No rub or gallop  Gastrointestinal: Abdomen soft, nontender  No guarding or rigidity  Liver and spleen not palpable  Bowel sounds present  Neurologic: Patient is awake, alert, oriented in time, place and person  Responding to command   Moving all extremities  Integumentary:  No skin rash  Lymphatic: No cervical lymphadenopathy  Back: Symmetric  No CVA tenderness  Extremities: No clubbing, cyanosis or edema      Laboratory Results:  CBC with diff:   Lab Results   Component Value Date    WBC 8 17 2018    RBC 5 25 (H) 2018    HGB 16 5 (H) 2018    HCT 51 1 (H) 2018    MCV 97 2018    MCH 31 4 2018    RDW 15 3 (H) 2018     2018       CMP:  Lab Results   Component Value Date    CREATININE 1 13 2018    BUN 15 2018    K 4 5 2018     2018    CO2 26 2018    ALKPHOS 90 2018    ALT 27 2018    AST 21 2018     Lipid Profile:   No results found for: CHOL  Lab Results   Component Value Date    HDL 63 (H) 2018    HDL 64 (H) 2017    HDL 60 2016     Lab Results   Component Value Date    LDLCALC 76 2018    LDLCALC 110 (H) 2017    LDLCALC 93 2016     Lab Results   Component Value Date    TRIG 85 2018    TRIG 75 2017    TRIG 80 2016       Cardiac testing:   Results for orders placed during the hospital encounter of 18   Echo complete with contrast if indicated    Narrative [address] [place] [address], [address] [place] [address]  [phone]    Transthoracic Echocardiogram  2D, M-mode, and Color Doppler    Study date:  2018    Patient: Ana Maria Gabriel  MR number: [id number]  Account number: [de-identified]  : 1945  Age: 67 years  Gender: Female  Status: Outpatient  Location: [facility]  Height: 63 in  Weight: 219 lb  BP: 138/ 82 mmHg    Indications: Atrial Fibrillation  Diagnoses: I48 0 - Atrial fibrillation    Sonographer:  Munson RCS  Interpreting Physician:  Hector Stout MD  Primary Physician:  Ravi Espinosa DO  Referring Physician:  Hector Stout MD  Group:  [facility]    SUMMARY    LEFT VENTRICLE:  Systolic function was moderately reduced   Ejection fraction was estimated in the range of 40 % to 45 %, likely underestimated due to rapid atrial fibrillation  Although no diagnostic regional wall motion abnormality was identified, this possibility cannot be completely excluded on the basis of this study  Wall thickness was mildly increased  There was mild concentric hypertrophy  RIGHT VENTRICLE:  The size was normal   Systolic function was mildly reduced  LEFT ATRIUM:  The atrium was mildly dilated  MITRAL VALVE:  There was mild regurgitation  AORTIC VALVE:  There was mild regurgitation  TRICUSPID VALVE:  There was mild to moderate regurgitation  Pulmonary artery systolic pressure was within the normal range  PULMONIC VALVE:  There was trace regurgitation  HISTORY: PRIOR HISTORY: Hyperlipidemia  Atrial fibrillation  Risk factors: hypertension and morbid obesity  Cerebrovascular disease  PROCEDURE: The study was performed in the [address] [place] [address]  This was a routine study  The transthoracic approach was used  The study included complete 2D imaging, M-mode, and color Doppler  The heart rate was 127 bpm, at the  start of the study  Images were obtained from the parasternal, apical, subcostal, and suprasternal notch acoustic windows  Image quality was adequate  LEFT VENTRICLE: Size was normal  Systolic function was moderately reduced  Ejection fraction was estimated in the range of 40 % to 45 %, likely underestimated due to rapid atrial fibrillation  Although no diagnostic regional wall motion  abnormality was identified, this possibility cannot be completely excluded on the basis of this study  Wall thickness was mildly increased  There was mild concentric hypertrophy  No evidence of apical thrombus  RIGHT VENTRICLE: The size was normal  Systolic function was mildly reduced  Wall thickness was normal     LEFT ATRIUM: The atrium was mildly dilated  RIGHT ATRIUM: Size was normal     MITRAL VALVE: There was annular calcification   There was normal leaflet separation  DOPPLER: The transmitral velocity was within the normal range  There was no evidence for stenosis  There was mild regurgitation  AORTIC VALVE: The valve was trileaflet  Leaflets exhibited mildly increased thickness and normal cuspal separation  DOPPLER: Transaortic velocity was within the normal range  There was no evidence for stenosis  There was mild  regurgitation  TRICUSPID VALVE: The valve structure was normal  There was normal leaflet separation  DOPPLER: The transtricuspid velocity was within the normal range  There was no evidence for stenosis  There was mild to moderate regurgitation  Pulmonary  artery systolic pressure was within the normal range  PULMONIC VALVE: Leaflets exhibited normal thickness, no calcification, and normal cuspal separation  DOPPLER: The transpulmonic velocity was within the normal range  There was trace regurgitation  PERICARDIUM: There was no pericardial effusion  The pericardium was normal in appearance  AORTA: The root exhibited normal size  SYSTEMIC VEINS: IVC: The inferior vena cava was not well visualized  SYSTEM MEASUREMENT TABLES    Apical four chamber  4 chamber Left Atrium Volume Index; Planimetry; End Systole; Apical four chamber;: 21 26 cm2  Left Ventricular Diastolic Area; Method of Disks, Single Plane; End Diastole; Apical four chamber;: 23 86 cm2  Left Ventricular Ejection Fraction; Method of Disks, Single Plane; Apical four chamber;: 49 7 %  Left Ventricular systolic Area; Method of Disks, Single Plane; End Systole; Apical four chamber;: 15 54 cm2  Right Atrium Systolic Area; Planimetry; End Systole; Apical four chamber;: 15 89 cm2  Right Ventricular Internal Diastolic Dimension; End Diastole; Apical four chamber;: 25 6 mm  TAPSE: 14 4 mm    Unspecified Scan Mode  AR Vmax; Regurgitant Flow; Diastole;: 390 6 cm/s  Aortic Root Diameter; End Systole;: 33 mm  Gradient Pressure, Peak; Simplified Bernoulli;  Antegrade Flow; Systole;: 4 9 mm[Hg]  Gradient pressure, average; Simplified Bernoulli; Antegrade Flow; Systole;: 2 3 mm[Hg]  Left Atrium to Aortic Root Ratio;: 1 33  Left atrial diameter; End Diastole;: 44 mm  Pressure Half Time;: 0 46 s  Cardiac Output; Teichholz; Systole;: 2 73 L/min  Heart rate; Teichholz;: 123 /min  Interventricular Septum Diastolic Thickness; Teichholz; End Diastole;: 12 7 mm  Left Ventricle Internal End Diastolic Dimension; Teichholz;: 42 9 mm  Left Ventricle Internal Systolic Dimension; Teichholz; End Systole;: 37 6 mm  Left Ventricle Mass; Mass AVCube with Teichholz; End Diastole;: 167 g  Left Ventricle Posterior Wall Diastolic Thickness; Teichholz; End Diastole;: 9 7 mm  Left Ventricular Ejection Fraction; Teichholz;: 26 9 %  Left Ventricular End Diastolic Volume; Teichholz;: 82 6 ml  Left Ventricular End Systolic Volume; Teichholz;: 60 4 ml  Left Ventricular Fractional Shortening;: 12 4 %  Stroke volume; Yoel Karla;: 22 2 ml  Maximum Tricuspid valve regurgitation pressure gradient; Regurgitant Flow; Systole;: 28 1 mm[Hg]    [facility]    Prepared and electronically signed by    Ivett Coyle MD  Signed 10-Feb-2018 13:06:38         EKG:  Atrial fibrillation with RVR  118 beats per min  Nonspecific anterior T-wave changes      Medications:    Current Outpatient Prescriptions:     acetaminophen (TYLENOL) 325 mg tablet, Take by mouth, Disp: , Rfl:     apixaban (ELIQUIS) 5 mg, Take 1 tablet (5 mg total) by mouth 2 (two) times a day, Disp: 180 tablet, Rfl: 3    lisinopril (ZESTRIL) 2 5 mg tablet, Take 1 tablet (2 5 mg total) by mouth daily, Disp: 90 tablet, Rfl: 3    lovastatin (MEVACOR) 10 MG tablet, Take 10 mg by mouth daily at bedtime, Disp: , Rfl:     metoprolol succinate (TOPROL-XL) 100 mg 24 hr tablet, Take 1 tablet (100 mg total) by mouth daily, Disp: 90 tablet, Rfl: 3      Allergies:  No Known Allergies      Assessment and Plan:  1   Paroxysmal atrial fibrillation ([address] )  In view of her new cardiomyopathy, cardioversion was prerformed  Patient stayed in sinus rhythm for a while but she is back in atrial fibrillation now  EF has also gone down  Rhythm control with Amiodarone is being started  Drug side effects discussed with the patient  Continue Eliquis for anticoagulation and beta-blocker for rate control     2  Other cardiomyopathy ([address] )  Likely tachycardia mediated  Cont Beta-blocker and ACEI  Rhythm control being started     3  Shortness of breath on exertion   multifactorial from mitral regurgitation / aortic regurgitation / tricuspid regurgitation, obesity, cardiomyopathy     4  Anticoagulant long-term use   continue Eliquis  Denies bleeding from any site     5  Essential hypertension   BP normal   Continue same meds     6  Obesity (BMI 30-39 9)   counseled to try to lose weight     7  H/O TIA (transient ischemic attack) and stroke   currently on Eliquis     8  Mixed hyperlipidemia   continue statin and diet control  Her PCP closely monitors her blood work     9  Nonrheumatic mitral valve regurgitation, Nonrheumatic aortic valve insufficiency, Nonrheumatic tricuspid valve regurgitation   stable  Follow-up with serial echocardiograms    Recommend aggressive risk factor modification and therapeutic lifestyle changes  Low-salt, low-calorie, low-fat, low-cholesterol diet with regular exercise and to optimize weight  I will defer the ordering and monitoring of necessity lab studies to you, but I am available and happy to review and manage any of the data at your request in the future  Discussed concepts of atherosclerosis, including signs and symptoms of cardiac disease  Previous studies were reviewed  Safety measures were reviewed  Questions were entertained and answered  Patient was advised to report any problems requiring medical attention  Follow-up with PCP and appropriate specialist and lab work as discussed      Return for follow up visit as scheduled or earlier, if needed  Thank you for allowing me to participate in the care and evaluation of your patient  Should you have any questions, please feel free to contact me        Tan Ness MD  11/5/2018,3:29 PM

## 2018-11-06 ENCOUNTER — TELEPHONE (OUTPATIENT)
Dept: CARDIOLOGY CLINIC | Facility: CLINIC | Age: 73
End: 2018-11-06

## 2018-11-06 DIAGNOSIS — I48.0 PAROXYSMAL ATRIAL FIBRILLATION (HCC): ICD-10-CM

## 2018-11-06 NOTE — TELEPHONE ENCOUNTER
Pt states that she will be willing to go to [facility] and wants to know if you have a specific doctor that you want her to see?

## 2018-11-09 NOTE — TELEPHONE ENCOUNTER
Please inform her that I have contacted [employer] again and hopefully she will hear from them soon    Otherwise let me know on Monday

## 2018-11-09 NOTE — TELEPHONE ENCOUNTER
Spoke with pt, she said she just got off the phone with the [facility] nettles and is scheduled on 12/20  She also has an appt with you on 12/10 and wants to know if she should keep that appt?

## 2018-11-19 DIAGNOSIS — E78.2 MIXED HYPERLIPIDEMIA: Primary | ICD-10-CM

## 2018-11-19 RX ORDER — LOVASTATIN 10 MG/1
TABLET ORAL
Qty: 90 TABLET | Refills: 1 | Status: SHIPPED | OUTPATIENT
Start: 2018-11-19 | End: 2019-02-15 | Stop reason: SDUPTHER

## 2018-12-16 DIAGNOSIS — I48.0 PAROXYSMAL ATRIAL FIBRILLATION (HCC): ICD-10-CM

## 2018-12-17 RX ORDER — APIXABAN 5 MG/1
TABLET, FILM COATED ORAL
Qty: 180 TABLET | Refills: 1 | Status: SHIPPED | OUTPATIENT
Start: 2018-12-17 | End: 2019-02-15 | Stop reason: SDUPTHER

## 2018-12-20 ENCOUNTER — OFFICE VISIT (OUTPATIENT)
Dept: CARDIOLOGY CLINIC | Facility: CLINIC | Age: 73
End: 2018-12-20
Payer: MEDICARE

## 2018-12-20 VITALS
HEART RATE: 114 BPM | WEIGHT: 214.8 LBS | HEIGHT: 65 IN | DIASTOLIC BLOOD PRESSURE: 82 MMHG | BODY MASS INDEX: 35.79 KG/M2 | SYSTOLIC BLOOD PRESSURE: 134 MMHG

## 2018-12-20 DIAGNOSIS — I48.19 PERSISTENT ATRIAL FIBRILLATION WITH RAPID VENTRICULAR RESPONSE (HCC): Primary | ICD-10-CM

## 2018-12-20 DIAGNOSIS — I48.91 ATRIAL FIBRILLATION, UNSPECIFIED TYPE (HCC): Primary | ICD-10-CM

## 2018-12-20 DIAGNOSIS — E66.9 OBESITY (BMI 30-39.9): ICD-10-CM

## 2018-12-20 DIAGNOSIS — E78.2 MIXED HYPERLIPIDEMIA: ICD-10-CM

## 2018-12-20 DIAGNOSIS — I42.8 OTHER CARDIOMYOPATHY (HCC): ICD-10-CM

## 2018-12-20 PROBLEM — I42.9 CARDIOMYOPATHY (HCC): Status: ACTIVE | Noted: 2018-12-20

## 2018-12-20 PROCEDURE — 99205 OFFICE O/P NEW HI 60 MIN: CPT | Performed by: INTERNAL MEDICINE

## 2018-12-20 PROCEDURE — 93000 ELECTROCARDIOGRAM COMPLETE: CPT | Performed by: INTERNAL MEDICINE

## 2018-12-20 RX ORDER — METOPROLOL SUCCINATE 100 MG/1
100 TABLET, EXTENDED RELEASE ORAL SEE ADMIN INSTRUCTIONS
Qty: 45 TABLET | Refills: 11 | Status: SHIPPED | OUTPATIENT
Start: 2018-12-20 | End: 2019-01-01 | Stop reason: HOSPADM

## 2018-12-20 NOTE — LETTER
December 23, 2018     Subhash Camacho, Doctor [id number] [address]    Patient: Kurt Rodriguez   YOB: 1945   Date of Visit: 12/20/2018       Dear Dr Caro Marie: Thank you for referring Bev Wood to me for evaluation  Below are my notes for this consultation  If you have questions, please do not hesitate to call me  I look forward to following your patient along with you  Sincerely,        Chemo Steiner MD        CC: Tobin Berumen MD  [facility]  Luzma Reddy MD  12/23/2018  9:59 AM  Sign at close encounter                                             Cardiology Consultation     Kurt Rodriguez  [id number]  1945  [facility] [address] [place] [address] Rd    1  Persistent atrial fibrillation with rapid ventricular response (HCC)  POCT ECG   2  Mixed hyperlipidemia     3   Obesity (BMI 30-39 9)     4  Other cardiomyopathy ([facility] [address] )             History of present illness  The patient is a very pleasant 75year old lady referred to me by Dr Orion Joshua for management of A fi + RVR + heart failure     She does have significant medical illnesses in the form of  PAF  Hypertension  Hyperlipidemia  CMP - suspected tachy mediated      It has been present for quite some time now  Initially to present as episodes of palpitation  This has progressively increased in frequency and duration    The patient is not complaining of anginal like chest pain or chest pressure  There is no worsening orthopnea, paroxysmal nocturnal dyspnea  There is some  leg swelling    Patient does get palpitation at times   There is no history of presyncope or syncope  There is rare history of transient  lightheadedness  There is exertional intolerance      There is history of snoring at night  There is occasional history of morning fatigability  There is occasional history of daytime sleepiness        Patient Active Problem List   Diagnosis    Persistent atrial fibrillation with rapid ventricular response (HCC)    Essential hypertension    Mixed hyperlipidemia    Obesity (BMI 30-39  9)    H/O TIA (transient ischemic attack) and stroke    Aphasia, mixed    Migraine with aura and without status migrainosus, not intractable    URI, acute    Viral exanthem    Myocardiopathy (HCC)    Shortness of breath on exertion    Anticoagulant long-term use    Nonrheumatic mitral valve regurgitation    Nonrheumatic aortic valve insufficiency    Nonrheumatic tricuspid valve regurgitation    Cardiomyopathy ([address] )     Past Medical History:   Diagnosis Date    Atrial fibrillation (HCC)     Headache     Hyperlipidemia     Hypertension     Lyme disease     Shortness of breath     TIA (transient ischemic attack)     Transient cerebral ischemia     Last assessed - 1/23/18    Vertigo      Social History     Social History    Marital status: /Civil Union     Spouse name: N/A    Number of children: N/A    Years of education: N/A     Occupational History    Retired      Social History Main Topics    Smoking status: Former Smoker    Smokeless tobacco: Never Used    Alcohol use 4 2 oz/week     7 Glasses of wine per week      Comment: occ    Drug use: No    Sexual activity: Not on file     Other Topics Concern    Not on file     Social History Narrative    Always uses seat belt          Family History   Problem Relation Age of Onset    Lung cancer Mother     Dementia Father     Alzheimer's disease Father     Other Father         Cardiac Disorder      Past Surgical History:   Procedure Laterality Date    COLONOSCOPY      DE SIGMOIDOSCOPY FLX DX W/COLLJ SPEC BR/WA IF PFRMD N/A 11/28/2017    Procedure: Hernán Mcfadden;  Surgeon: Adrianna Wasserman MD;  Location: [facility];   Service: Gastroenterology    TONSILLECTOMY         Current Outpatient Prescriptions:     ELIQUIS 5 MG, TAKE 1 TABLET BY MOUTH TWO TIMES DAILY, Disp: 180 tablet, Rfl: 1    lisinopril (ZESTRIL) 2 5 mg tablet, Take 1 tablet (2 5 mg total) by mouth daily, Disp: 90 tablet, Rfl: 3    lovastatin (MEVACOR) 10 MG tablet, TAKE 1 TABLET BY MOUTH AT  BEDTIME, Disp: 90 tablet, Rfl: 1    metoprolol succinate (TOPROL-XL) 100 mg 24 hr tablet, Take 1 tablet (100 mg total) by mouth daily, Disp: 90 tablet, Rfl: 3  No Known Allergies  Vitals:    12/20/18 0840   BP: 134/82   BP Location: Left arm   Patient Position: Sitting   Cuff Size: Large   Pulse: (!) 114   Weight: 97 4 kg (214 lb 12 8 oz)   Height: 5' 5" (1 651 m)       Labs:  Lab Results   Component Value Date    K 4 5 06/05/2018     06/05/2018    CO2 26 06/05/2018    BUN 15 06/05/2018    CREATININE 1 13 06/05/2018    CALCIUM 9 2 06/05/2018     No results found for: CKTOTAL, CKMB, CKMBINDEX, TROPONINI  Lab Results   Component Value Date    WBC 8 17 06/05/2018    HGB 16 5 (H) 06/05/2018    HCT 51 1 (H) 06/05/2018    MCV 97 06/05/2018     06/05/2018     Lab Results   Component Value Date    TRIG 85 01/22/2018    HDL 63 (H) 01/22/2018     Imaging:    Echo , Oct 22 2018  LEFT VENTRICLE:  Systolic function was moderately reduced  Ejection fraction was estimated in the range of 35 % to 40 %  This study was inadequate for the evaluation of regional wall motion  Wall thickness was mildly increased  There was mild concentric hypertrophy      RIGHT VENTRICLE:  The size was normal   Systolic function was mildly reduced      LEFT ATRIUM:  The atrium was mildly dilated      RIGHT ATRIUM:  The atrium was dilated      MITRAL VALVE:  There was mild regurgitation      AORTIC VALVE:  There was mild regurgitation      TRICUSPID VALVE:  There was mild to moderate regurgitation  Pulmonary artery systolic pressure was at the upper limits of normal      PERICARDIUM:  A small pericardial effusion was identified  There was no evidence of hemodynamic compromise              Nuclear stress  Feb 2018  SUMMARY:  - Stress results: There was no chest pain during stress  -  ECG conclusions: The stress ECG was negative for ischemia and normal   -  Perfusion imaging: There were no perfusion defects  A large sized perfusion defect seen in most part of the anterior and anterolateral wall in the rest and supine stress images was seen to be significantly improved in the prone stress images indicating that it was likely a breast  attenuation artifact   -  Gated SPECT: The calculated left ventricular ejection fraction was 50 %  Left ventricular ejection fraction was within normal limits by visual estimate  There was no diagnostic evidence for left ventricular regional abnormality      IMPRESSIONS: Normal study after pharmacologic stress  Myocardial perfusion imaging was normal at rest and with stress  Left ventricular systolic function was normal                Review of Systems:  Review of Systems   All other systems reviewed and are negative  as described in my history of present illness        Physical Exam:  Physical Exam   Constitutional: She is oriented to person, place, and time  She appears well-developed and well-nourished  No distress  Not in any distress at the current time   HENT:   Head: Normocephalic and atraumatic  Right Ear: External ear normal    Left Ear: External ear normal    Nose: Nose normal    Mouth/Throat: Uvula is midline and mucous membranes are normal    Posterior pharynx is crowded   Eyes: Pupils are equal, round, and reactive to light  Conjunctivae, EOM and lids are normal  No scleral icterus  No pallor  No cyanosis  No icterus   Neck: Trachea normal and normal range of motion  No JVD present  Carotid bruit is not present  No thyromegaly present  Short thick neck  No jugular lymphadenopathy   Cardiovascular: S1 normal, S2 normal, intact distal pulses and normal pulses  An irregularly irregular rhythm present  Tachycardia present  PMI is not displaced    Exam reveals no gallop, no S3, no S4 and no friction rub  Murmur heard  Pulmonary/Chest: Effort normal and breath sounds normal  No accessory muscle usage  No respiratory distress  She has no decreased breath sounds  She has no wheezes  She has no rhonchi  She has no rales  She exhibits no tenderness  Abdominal: Soft  Normal appearance and bowel sounds are normal  She exhibits no distension and no mass  There is no splenomegaly or hepatomegaly  There is no tenderness  Musculoskeletal: Normal range of motion  She exhibits no tenderness or deformity  Mild edema   Lymphadenopathy:     She has no cervical adenopathy  Neurological: She is alert and oriented to person, place, and time  Facial symmetry is retained  Extraocular movements are retained  Head neck tongue and palate movement are retained and symmetric   Skin: Skin is intact  No abrasion, no lesion and no rash noted  No erythema  Nails show no clubbing  Psychiatric: She has a normal mood and affect  Her speech is normal and behavior is normal  Thought content normal        Discussion/Summary:    1   Persistent atrial fibrillation  We had a very detailed discussion as far as management of atrial fibrillation   my detailed recommendation for this patient are as follows         1 - natural history of disease   atrial fibrillation is a disease of age   prevalence is 1% in the 4th decade , 2-3% by age 72 and 12-13% by age 80   since it increases with age , definitive therapy is indicated         2 - sleep apnea   untreated sleep apnea is the common cause of failure of medication as well as ablation   success rate of paroxysmal atrial fibrillation ablation falls from 80% in the 1st year, to 15% in the 1st year, for untreated severe sleep apnea   the patient has a history of occasional - snoring, morning fatigue at times and daytime sleepiness at times  physical examination for short thick neck and crowded posterior pharynx is -positive  patient is recommended to follow up with Pulmonary and Sleep Medicine - for REYNOLD        3 - thyroid function   hyperthyroidism is a common precipitator of atrial fibrillation   Check TSH - 2 05 in Jan 2018 and normal        4 -Aggressive management of  hypertension - controlled now  diabetes mellitus - check HbA1c  heart failure - currently systolic failure        5 - anticoagulation   patient's chads Vasc score is -5  congestive heart failure   hypertension   age   prior TIA   gender    patient is on eliquis     6 - rate control medication   beta-blocker - toprol XL  calcium channel blocker to be avoided as low EF        7 - rhythm control medication      class 1 C agent - flecainide and propafenone   patient will need a stress study to rule out any underlying ischemia before these can be started   flecainide will necessitate use of an additional beta-blocker -   propafenone has intrinsic beta-blocker activity    disease is persistent and less likely that Ic will work      class 3 agent - Sotalol and dofetilide   both will need hospital admission to monitor first 5 doses over the initial 2 and half days     sotalol has intrinsic beta-blocker properties   dofetilide may need an additional beta-blocker along with it for rate control  Keep potassium between 4 and 4 5   keep magnesium between 2 and 2 5   follow QTC   Follow creatinine     recommend dofetilide loading with DCCV         amiodarone   around this is very effective antiarrhythmic but has significantly long term toxicity   Hence certain basic studies are needed at baseline and thereafter at yearly intervals or   -hypo or hyperthyroidism , Check TSH    -can precipitate significant interstitial lung disease and hence DLCO at baseline and thereafter yearly   -long-term use causes cumulative toxicity in the liver- check  CMP   -corneal deposits advice and hence is ophthalmology evaluation    not a first choice agent before ablation       8 - ablation   this is the most effective method to achieve and maintain sinus rhythm    paroxysmal atrial fibrillation - 70-80% chance in the 1st year  and falls to 50% by 5 years   persistent atrial fibrillation - 50-60% chance in the 1st year and falls to 30% or less by 5 years      we use a combination of cryo and radiofrequency ablation      there is a 2-5% chance of serious complication which include - bleeding around access site, heart attack , stroke , bleeding around the heart requiring drainage , perforation requiring open heart surgery , phrenic nerve paralysis causing diaphragmatic paralysis, atrial esophageal fistula   we do deployed multiple methods to prevent such complication :  - heparin anticoagulation with ACT between 350 and 400s to prevent stroke   - coronary angiography if we are going to ablate close to any major blood vessel   -access to the pericardial drain if pericardial effusion were to happen   - pressure sensing catheters to reduce excessive pressure and chances of perforation   - esophageal temperature monitoring to reduce chances of injury             Summary of my recommendations:  Patient has develop CHF and hence all needs to be done relatively urgently  - check for REYNOLD  - HbA1c  - increase metoprolol for better rate control to 75 bid or 100 mg+50 mg  - Admit for dofetilide ( preferred) or sotalol loading   - DCCV if needed post medication loading   -If medication failed proceed with ablation  CRYO, NAVX, hold eliquis for day before and day of ablation

## 2018-12-20 NOTE — TELEPHONE ENCOUNTER
----- Message from Candida Younger sent at 12/20/2018 12:22 PM EST -----  Sotalol 160 BID 70 01 Q 180 Her plan does not cover generic Dofetilide Mail order price  ----- Message -----  From: Darlene [place]  Sent: 12/20/2018   9:39 AM  To: Geo Mendez, #    Please price Dofetilide 500mcg BID and Sotalol 160mg BID for this pt per Dr Ramana Singh   Pt will be admitted 12/26/2018 B

## 2018-12-20 NOTE — PROGRESS NOTES
Cardiology Consultation     Gary Herrera  [id number]  1945  HEART & VASCULAR Tabatha Rivera  [facility] [place]  [address]    1  Persistent atrial fibrillation with rapid ventricular response (HCC)  POCT ECG   2  Mixed hyperlipidemia     3  Obesity (BMI 30-39 9)     4  Other cardiomyopathy ([facility] [address] )             History of present illness  The patient is a very pleasant 75year old lady referred to me by Dr Kathryn Reina for management of A fi + RVR + heart failure     She does have significant medical illnesses in the form of  PAF  Hypertension  Hyperlipidemia  CMP - suspected tachy mediated      It has been present for quite some time now  Initially to present as episodes of palpitation  This has progressively increased in frequency and duration    The patient is not complaining of anginal like chest pain or chest pressure  There is no worsening orthopnea, paroxysmal nocturnal dyspnea  There is some  leg swelling    Patient does get palpitation at times   There is no history of presyncope or syncope  There is rare history of transient  lightheadedness  There is exertional intolerance      There is history of snoring at night  There is occasional history of morning fatigability  There is occasional history of daytime sleepiness        Patient Active Problem List   Diagnosis    Persistent atrial fibrillation with rapid ventricular response ([address] )    Essential hypertension    Mixed hyperlipidemia    Obesity (BMI 30-39  9)    H/O TIA (transient ischemic attack) and stroke    Aphasia, mixed    Migraine with aura and without status migrainosus, not intractable    URI, acute    Viral exanthem    Myocardiopathy (HCC)    Shortness of breath on exertion    Anticoagulant long-term use    Nonrheumatic mitral valve regurgitation    Nonrheumatic aortic valve insufficiency    Nonrheumatic tricuspid valve regurgitation    Cardiomyopathy [facility])     Past Medical History:   Diagnosis Date    Atrial fibrillation ([address] )     Headache     Hyperlipidemia     Hypertension     Lyme disease     Shortness of breath     TIA (transient ischemic attack)     Transient cerebral ischemia     Last assessed - 1/23/18    Vertigo      Social History     Social History    Marital status: /Civil Union     Spouse name: N/A    Number of children: N/A    Years of education: N/A     Occupational History    Retired      Social History Main Topics    Smoking status: Former Smoker    Smokeless tobacco: Never Used    Alcohol use 4 2 oz/week     7 Glasses of wine per week      Comment: occ    Drug use: No    Sexual activity: Not on file     Other Topics Concern    Not on file     Social History Narrative    Always uses seat belt          Family History   Problem Relation Age of Onset    Lung cancer Mother     Dementia Father     Alzheimer's disease Father     Other Father         Cardiac Disorder      Past Surgical History:   Procedure Laterality Date    COLONOSCOPY      TX SIGMOIDOSCOPY FLX DX W/COLLJ SPEC BR/WA IF PFRMD N/A 11/28/2017    Procedure: Freddie Mcfadden;  Surgeon: Sujatha Bell MD;  Location: [facility];   Service: Gastroenterology    TONSILLECTOMY         Current Outpatient Prescriptions:     ELIQUIS 5 MG, TAKE 1 TABLET BY MOUTH TWO  TIMES DAILY, Disp: 180 tablet, Rfl: 1    lisinopril (ZESTRIL) 2 5 mg tablet, Take 1 tablet (2 5 mg total) by mouth daily, Disp: 90 tablet, Rfl: 3    lovastatin (MEVACOR) 10 MG tablet, TAKE 1 TABLET BY MOUTH AT  BEDTIME, Disp: 90 tablet, Rfl: 1    metoprolol succinate (TOPROL-XL) 100 mg 24 hr tablet, Take 1 tablet (100 mg total) by mouth daily, Disp: 90 tablet, Rfl: 3  No Known Allergies  Vitals:    12/20/18 0840   BP: 134/82   BP Location: Left arm   Patient Position: Sitting   Cuff Size: Large   Pulse: (!) 114   Weight: 97 4 kg (214 lb 12 8 oz)   Height: 5' 5" (1 651 m)       Labs:  Lab Results   Component Value Date    K 4 5 06/05/2018     06/05/2018    CO2 26 06/05/2018    BUN 15 06/05/2018    CREATININE 1 13 06/05/2018    CALCIUM 9 2 06/05/2018     No results found for: CKTOTAL, CKMB, CKMBINDEX, TROPONINI  Lab Results   Component Value Date    WBC 8 17 06/05/2018    HGB 16 5 (H) 06/05/2018    HCT 51 1 (H) 06/05/2018    MCV 97 06/05/2018     06/05/2018     Lab Results   Component Value Date    TRIG 85 01/22/2018    HDL 63 (H) 01/22/2018     Imaging:    Echo , Oct 22 2018  LEFT VENTRICLE:  Systolic function was moderately reduced  Ejection fraction was estimated in the range of 35 % to 40 %  This study was inadequate for the evaluation of regional wall motion  Wall thickness was mildly increased  There was mild concentric hypertrophy      RIGHT VENTRICLE:  The size was normal   Systolic function was mildly reduced      LEFT ATRIUM:  The atrium was mildly dilated      RIGHT ATRIUM:  The atrium was dilated      MITRAL VALVE:  There was mild regurgitation      AORTIC VALVE:  There was mild regurgitation      TRICUSPID VALVE:  There was mild to moderate regurgitation  Pulmonary artery systolic pressure was at the upper limits of normal      PERICARDIUM:  A small pericardial effusion was identified  There was no evidence of hemodynamic compromise  Nuclear stress  Feb 2018  SUMMARY:  -  Stress results: There was no chest pain during stress  -  ECG conclusions: The stress ECG was negative for ischemia and normal   -  Perfusion imaging: There were no perfusion defects  A large sized perfusion defect seen in most part of the anterior and anterolateral wall in the rest and supine stress images was seen to be significantly improved in the prone stress images indicating that it was likely a breast  attenuation artifact   -  Gated SPECT: The calculated left ventricular ejection fraction was 50 %  Left ventricular ejection fraction was within normal limits by visual estimate   There was no diagnostic evidence for left ventricular regional abnormality      IMPRESSIONS: Normal study after pharmacologic stress  Myocardial perfusion imaging was normal at rest and with stress  Left ventricular systolic function was normal                Review of Systems:  Review of Systems   All other systems reviewed and are negative  as described in my history of present illness        Physical Exam:  Physical Exam   Constitutional: She is oriented to person, place, and time  She appears well-developed and well-nourished  No distress  Not in any distress at the current time   HENT:   Head: Normocephalic and atraumatic  Right Ear: External ear normal    Left Ear: External ear normal    Nose: Nose normal    Mouth/Throat: Uvula is midline and mucous membranes are normal    Posterior pharynx is crowded   Eyes: Pupils are equal, round, and reactive to light  Conjunctivae, EOM and lids are normal  No scleral icterus  No pallor  No cyanosis  No icterus   Neck: Trachea normal and normal range of motion  No JVD present  Carotid bruit is not present  No thyromegaly present  Short thick neck  No jugular lymphadenopathy   Cardiovascular: S1 normal, S2 normal, intact distal pulses and normal pulses  An irregularly irregular rhythm present  Tachycardia present  PMI is not displaced  Exam reveals no gallop, no S3, no S4 and no friction rub  Murmur heard  Pulmonary/Chest: Effort normal and breath sounds normal  No accessory muscle usage  No respiratory distress  She has no decreased breath sounds  She has no wheezes  She has no rhonchi  She has no rales  She exhibits no tenderness  Abdominal: Soft  Normal appearance and bowel sounds are normal  She exhibits no distension and no mass  There is no splenomegaly or hepatomegaly  There is no tenderness  Musculoskeletal: Normal range of motion  She exhibits no tenderness or deformity  Mild edema   Lymphadenopathy:     She has no cervical adenopathy  Neurological: She is alert and oriented to person, place, and time  Facial symmetry is retained  Extraocular movements are retained  Head neck tongue and palate movement are retained and symmetric   Skin: Skin is intact  No abrasion, no lesion and no rash noted  No erythema  Nails show no clubbing  Psychiatric: She has a normal mood and affect  Her speech is normal and behavior is normal  Thought content normal        Discussion/Summary:    1   Persistent atrial fibrillation  We had a very detailed discussion as far as management of atrial fibrillation   my detailed recommendation for this patient are as follows         1 - natural history of disease   atrial fibrillation is a disease of age   prevalence is 1% in the 4th decade , 2-3% by age 72 and 12-13% by age 80   since it increases with age , definitive therapy is indicated         2 - sleep apnea   untreated sleep apnea is the common cause of failure of medication as well as ablation   success rate of paroxysmal atrial fibrillation ablation falls from 80% in the 1st year, to 15% in the 1st year, for untreated severe sleep apnea   the patient has a history of occasional - snoring, morning fatigue at times and daytime sleepiness at times  physical examination for short thick neck and crowded posterior pharynx is -positive  patient is recommended to follow up with Pulmonary and Sleep Medicine - for REYNOLD        3 - thyroid function   hyperthyroidism is a common precipitator of atrial fibrillation   Check TSH - 2 05 in Jan 2018 and normal        4 -Aggressive management of  hypertension - controlled now  diabetes mellitus - check HbA1c  heart failure - currently systolic failure        5 - anticoagulation   patient's chads Vasc score is -5  congestive heart failure   hypertension   age   prior TIA   gender    patient is on eliquis     6 - rate control medication   beta-blocker - toprol XL  calcium channel blocker to be avoided as low EF        7 - rhythm control medication      class 1 C agent - flecainide and propafenone   patient will need a stress study to rule out any underlying ischemia before these can be started   flecainide will necessitate use of an additional beta-blocker -   propafenone has intrinsic beta-blocker activity    disease is persistent and less likely that Ic will work      class 3 agent - Sotalol and dofetilide   both will need hospital admission to monitor first 5 doses over the initial 2 and half days     sotalol has intrinsic beta-blocker properties   dofetilide may need an additional beta-blocker along with it for rate control  Keep potassium between 4 and 4 5   keep magnesium between 2 and 2 5   follow QTC   Follow creatinine     recommend dofetilide loading with DCCV         amiodarone   around this is very effective antiarrhythmic but has significantly long term toxicity   Hence certain basic studies are needed at baseline and thereafter at yearly intervals or   -hypo or hyperthyroidism , Check TSH    -can precipitate significant interstitial lung disease and hence DLCO at baseline and thereafter yearly   -long-term use causes cumulative toxicity in the liver- check  CMP   -corneal deposits advice and hence is ophthalmology evaluation    not a first choice agent before ablation       8 - ablation   this is the most effective method to achieve and maintain sinus rhythm      paroxysmal atrial fibrillation - 70-80% chance in the 1st year  and falls to 50% by 5 years   persistent atrial fibrillation - 50-60% chance in the 1st year and falls to 30% or less by 5 years      we use a combination of cryo and radiofrequency ablation      there is a 2-5% chance of serious complication which include - bleeding around access site, heart attack , stroke , bleeding around the heart requiring drainage , perforation requiring open heart surgery , phrenic nerve paralysis causing diaphragmatic paralysis, atrial esophageal fistula   we do deployed multiple methods to prevent such complication :  - heparin anticoagulation with ACT between 350 and 400s to prevent stroke   - coronary angiography if we are going to ablate close to any major blood vessel   -access to the pericardial drain if pericardial effusion were to happen   - pressure sensing catheters to reduce excessive pressure and chances of perforation   - esophageal temperature monitoring to reduce chances of injury             Summary of my recommendations:  Patient has develop CHF and hence all needs to be done relatively urgently  - check for REYNOLD  - HbA1c  - increase metoprolol for better rate control to 75 bid or 100 mg+50 mg  - Admit for dofetilide ( preferred) or sotalol loading   - DCCV if needed post medication loading   -If medication failed proceed with ablation  CRYO, NAVX, hold eliquis for day before and day of ablation          2  Cardiomyopathy , NYHA II, LVEF =35%  Suspected tachy mediated  Increasing beta blockers for better rate control  On ACEI        3  Hypertension  On lisinopril and metoprolol      4  Hyperlipidemia  On statin      5   Suspected REYNOLD  Need to be evaluated for CPAP

## 2018-12-26 ENCOUNTER — HOSPITAL ENCOUNTER (INPATIENT)
Facility: HOSPITAL | Age: 73
LOS: 6 days | Discharge: HOME | DRG: 244 | End: 2019-01-01
Attending: INTERNAL MEDICINE | Admitting: INTERNAL MEDICINE
Payer: MEDICARE

## 2018-12-26 DIAGNOSIS — I48.19 PERSISTENT ATRIAL FIBRILLATION WITH RAPID VENTRICULAR RESPONSE (HCC): Primary | ICD-10-CM

## 2018-12-26 LAB
ANION GAP SERPL CALCULATED.3IONS-SCNC: 8 MMOL/L (ref 4–13)
ATRIAL RATE: 129 BPM
ATRIAL RATE: 156 BPM
BASOPHILS # BLD AUTO: 0.05 THOUSANDS/ΜL (ref 0–0.1)
BASOPHILS NFR BLD AUTO: 1 % (ref 0–1)
BUN SERPL-MCNC: 14 MG/DL (ref 5–25)
CALCIUM SERPL-MCNC: 8.8 MG/DL (ref 8.3–10.1)
CHLORIDE SERPL-SCNC: 108 MMOL/L (ref 100–108)
CO2 SERPL-SCNC: 24 MMOL/L (ref 21–32)
CREAT SERPL-MCNC: 1 MG/DL (ref 0.6–1.3)
EOSINOPHIL # BLD AUTO: 0.14 THOUSAND/ΜL (ref 0–0.61)
EOSINOPHIL NFR BLD AUTO: 2 % (ref 0–6)
ERYTHROCYTE [DISTWIDTH] IN BLOOD BY AUTOMATED COUNT: 14 % (ref 11.6–15.1)
GFR SERPL CREATININE-BSD FRML MDRD: 56 ML/MIN/1.73SQ M
GLUCOSE SERPL-MCNC: 99 MG/DL (ref 65–140)
HCT VFR BLD AUTO: 48.3 % (ref 34.8–46.1)
HGB BLD-MCNC: 16.6 G/DL (ref 11.5–15.4)
IMM GRANULOCYTES # BLD AUTO: 0.02 THOUSAND/UL (ref 0–0.2)
IMM GRANULOCYTES NFR BLD AUTO: 0 % (ref 0–2)
LYMPHOCYTES # BLD AUTO: 1.24 THOUSANDS/ΜL (ref 0.6–4.47)
LYMPHOCYTES NFR BLD AUTO: 16 % (ref 14–44)
MCH RBC QN AUTO: 34.4 PG (ref 26.8–34.3)
MCHC RBC AUTO-ENTMCNC: 34.4 G/DL (ref 31.4–37.4)
MCV RBC AUTO: 100 FL (ref 82–98)
MONOCYTES # BLD AUTO: 0.88 THOUSAND/ΜL (ref 0.17–1.22)
MONOCYTES NFR BLD AUTO: 11 % (ref 4–12)
NEUTROPHILS # BLD AUTO: 5.67 THOUSANDS/ΜL (ref 1.85–7.62)
NEUTS SEG NFR BLD AUTO: 70 % (ref 43–75)
NRBC BLD AUTO-RTO: 0 /100 WBCS
PLATELET # BLD AUTO: 181 THOUSANDS/UL (ref 149–390)
PMV BLD AUTO: 11.2 FL (ref 8.9–12.7)
POTASSIUM SERPL-SCNC: 4 MMOL/L (ref 3.5–5.3)
QRS AXIS: 86 DEGREES
QRS AXIS: 87 DEGREES
QRSD INTERVAL: 82 MS
QRSD INTERVAL: 82 MS
QT INTERVAL: 334 MS
QT INTERVAL: 344 MS
QTC INTERVAL: 449 MS
QTC INTERVAL: 499 MS
RBC # BLD AUTO: 4.83 MILLION/UL (ref 3.81–5.12)
SODIUM SERPL-SCNC: 140 MMOL/L (ref 136–145)
T WAVE AXIS: 24 DEGREES
T WAVE AXIS: 8 DEGREES
VENTRICULAR RATE: 109 BPM
VENTRICULAR RATE: 127 BPM
WBC # BLD AUTO: 8 THOUSAND/UL (ref 4.31–10.16)

## 2018-12-26 PROCEDURE — 93005 ELECTROCARDIOGRAM TRACING: CPT

## 2018-12-26 PROCEDURE — 80048 BASIC METABOLIC PNL TOTAL CA: CPT | Performed by: PHYSICIAN ASSISTANT

## 2018-12-26 PROCEDURE — 93010 ELECTROCARDIOGRAM REPORT: CPT | Performed by: INTERNAL MEDICINE

## 2018-12-26 PROCEDURE — 99233 SBSQ HOSP IP/OBS HIGH 50: CPT | Performed by: INTERNAL MEDICINE

## 2018-12-26 PROCEDURE — 85025 COMPLETE CBC W/AUTO DIFF WBC: CPT | Performed by: PHYSICIAN ASSISTANT

## 2018-12-26 RX ORDER — SOTALOL HYDROCHLORIDE 120 MG/1
120 TABLET ORAL EVERY 12 HOURS SCHEDULED
Status: DISCONTINUED | OUTPATIENT
Start: 2018-12-26 | End: 2018-12-28

## 2018-12-26 RX ORDER — LISINOPRIL 2.5 MG/1
2.5 TABLET ORAL DAILY
Status: DISCONTINUED | OUTPATIENT
Start: 2018-12-26 | End: 2019-01-01 | Stop reason: HOSPADM

## 2018-12-26 RX ORDER — ACETAMINOPHEN 325 MG/1
650 TABLET ORAL EVERY 6 HOURS PRN
Status: DISCONTINUED | OUTPATIENT
Start: 2018-12-26 | End: 2019-01-01 | Stop reason: HOSPADM

## 2018-12-26 RX ORDER — METOPROLOL SUCCINATE 25 MG/1
25 TABLET, EXTENDED RELEASE ORAL DAILY
Status: DISCONTINUED | OUTPATIENT
Start: 2018-12-26 | End: 2018-12-28

## 2018-12-26 RX ORDER — PRAVASTATIN SODIUM 10 MG
10 TABLET ORAL
Status: DISCONTINUED | OUTPATIENT
Start: 2018-12-26 | End: 2019-01-01 | Stop reason: HOSPADM

## 2018-12-26 RX ADMIN — SOTALOL HYDROCHLORIDE 120 MG: 120 TABLET ORAL at 19:00

## 2018-12-26 RX ADMIN — PRAVASTATIN SODIUM 10 MG: 10 TABLET ORAL at 17:19

## 2018-12-26 RX ADMIN — APIXABAN 5 MG: 5 TABLET, FILM COATED ORAL at 17:18

## 2018-12-26 NOTE — UTILIZATION REVIEW
Initial Clinical Review    Admission: Date/Time/Statement: 12/26/18 @ 1149  Orders Placed This Encounter   Procedures    Inpatient Admission     Standing Status:   Standing     Number of Occurrences:   1     Order Specific Question:   Admitting Physician     Answer:   TAIWO RIVERA [[id number]]     Order Specific Question:   Level of Care     Answer:   Med Surg [[id number]]     Order Specific Question:   Estimated length of stay     Answer:   More than 2 Midnights     Order Specific Question:   Certification     Answer:   I certify that inpatient services are medically necessary for this patient for a duration of greater than two midnights  See H&P and MD Progress Notes for additional information about the patient's course of treatment  Chief Complaint: Direct Medical admission, A  Fib > intiate sotalol    History of Illness:   Iqra Hernandez is a 75y o  year old female with a history of persistent nonvalvular atrial fibrillation AC w/ eliquis, NICMP EF 35% on GDMT (ACEI, toprol XL) who presents to [facility] under direction of Dr Yoli Elliott for Sotalol loading for afib management  Vital Signs:   Temp Pulse Respirations Blood Pressure SpO2   -- 88 19 (!) 151/104 96 %      Temp src Heart Rate Source Patient Position - Orthostatic VS BP Location FiO2 (%)   -- -- Sitting Right arm --      Pain Score       --        Wt Readings from Last 1 Encounters:   12/26/18 96 4 kg (212 lb 8 oz)       Abnormal Labs/Diagnostic Test Results:  WBC Thousand/uL 8 00   HEMOGLOBIN g/dL 16 6*   HEMATOCRIT % 48 3*   PLATELETS Thousands/uL 181    Sodium  POTASSIUM mmol/L 4 0   CHLORIDE mmol/L 108   CO2 mmol/L 24   BUN mg/dL 14   CREATININE mg/dL 1 00   CALCIUM mg/dL 8 8           Past Medical/Surgical History:    Active Ambulatory Problems     Diagnosis Date Noted    Persistent atrial fibrillation with rapid ventricular response ([facility] [address] ) 02/23/2018    Essential hypertension 02/23/2018    Mixed hyperlipidemia 02/23/2018    Obesity (BMI 30-39 9) 02/23/2018  H/O TIA (transient ischemic attack) and stroke 02/23/2018    Aphasia, mixed 07/28/2017    Migraine with aura and without status migrainosus, not intractable 01/07/2016    URI, acute 04/09/2018    Viral exanthem 04/09/2018    Myocardiopathy ([facility] [address] ) 05/29/2018    Shortness of breath on exertion 05/29/2018    Anticoagulant long-term use 05/29/2018    Nonrheumatic mitral valve regurgitation 05/29/2018    Nonrheumatic aortic valve insufficiency 05/29/2018    Nonrheumatic tricuspid valve regurgitation 05/29/2018    Cardiomyopathy ([facility] [address] ) 12/20/2018     Past Medical History:   Diagnosis Date    Atrial fibrillation ([facility] )     Headache     Hyperlipidemia     Hypertension     Lyme disease     Shortness of breath     TIA (transient ischemic attack)     Transient cerebral ischemia     Vertigo        Admitting Diagnosis: Atrial fibrillation, unspecified type ([place] [address] )    Age/Sex: 75 y o  female    Assessment/Plan:   1  Persistent nonvalvular atrial fibrillation, symptomatic               * pt with documented EF 40-45% in feb 2018 with nuclear stress test showing no ischemia               * she is s/p DCCV in 6-2018 but has failed to maintain NSR w/ recent echo in oct showing a decrease in EF to 35-40%              * plan is to initiate Sotalol 120mg PO BID given her CrCl of >60 tonight at 7PM with 2 hours post ECG               * current QTc is difficult to calculate as she is in rate controlled a fib               * due to high addition of BB with Sotalol will cut back toprol XL to 25mg PO QDay               * plan for DCCV on Friday if she does not chemically convert to NSR     2   NICMP              * EF around 40%               * continue Toprol XL and ACEI               * no signs of acute CHF on exam   Admission Orders:  Scheduled Meds:   Current Facility-Administered Medications:  acetaminophen 650 mg Oral Q6H PRN Christiano Garduno PA-C   apixaban 5 mg Oral BID Christiano Garduno PA-C   lisinopril 2 5 mg Oral Daily Christiano FriendJENARO   metoprolol succinate 25 mg Oral Daily Christiano FriendJENARO   pravastatin 10 mg Oral Daily With [employer] FriendJENARO   sotalol 120 mg Oral Q12H [facility] Christiano FriendJENARO     CV diet  Up with assistance  ARMAAN Sinha SCDs  ECG 2 hours post sotalol

## 2018-12-26 NOTE — H&P
H&P Exam - Cardiology   Yajaira Granados 75 y o  female MRN: [id number]  Unit/Bed#: [facility] 402-01 Encounter: [id number]    Assessment/Plan   1  Persistent nonvalvular atrial fibrillation, symptomatic    * pt with documented EF 40-45% in feb 2018 with nuclear stress test showing no ischemia    * she is s/p DCCV in 6-2018 but has failed to maintain NSR w/ recent echo in oct showing a decrease in EF to 35-40%   * plan is to initiate Sotalol 120mg PO BID given her CrCl of >60 tonight at 7PM with 2 hours post ECG    * current QTc is difficult to calculate as she is in rate controlled a fib    * due to high addition of BB with Sotalol will cut back toprol XL to 25mg PO QDay    * plan for DCCV on Friday if she does not chemically convert to NSR    2  NICMP   * EF around 40%    * continue Toprol XL and ACEI    * no signs of acute CHF on exam       History of Present Illness   HPI:  Yajaira Granados is a 75y o  year old female with a history of persistent nonvalvular atrial fibrillation AC w/ eliquis, NICMP EF 35% on GDMT (ACEI, toprol XL) who presents to \[facility]"" under direction of Dr Astrid Reyes for Sotalol loading for afib management       From Dr Mayco Pederson last office note:   " The patient is a very pleasant 75year old lady referred to me by Dr Marlon Chan for management of A fi + RVR + heart failure      She does have significant medical illnesses in the form of  PAF  Hypertension  Hyperlipidemia  CMP - suspected tachy mediated        It has been present for quite some time now  Initially to present as episodes of palpitation  This has progressively increased in frequency and duration     The patient is not complaining of anginal like chest pain or chest pressure  There is no worsening orthopnea, paroxysmal nocturnal dyspnea  There is some  leg swelling     Patient does get palpitation at times   There is no history of presyncope or syncope  There is rare history of transient  lightheadedness  There is exertional intolerance        There is history of snoring at night  There is occasional history of morning fatigability  There is occasional history of daytime sleepiness    Summary of my recommendations:  Patient has develop CHF and hence all needs to be done relatively urgently  - check for REYNOLD  - HbA1c  - increase metoprolol for better rate control to 75 bid or 100 mg+50 mg  - Admit for dofetilide ( preferred) or sotalol loading   - DCCV if needed post medication loading   -If medication failed proceed with ablation  CRYO, NAVX, hold eliquis for day before and day of ablation"    Since this office visit no new changes occurred  Today she has no concerns or complaints  Review of Systems  ROS as noted above, otherwise 12 point review of systems was performed and is negative  Historical Information   Past Medical History:   Diagnosis Date    Atrial fibrillation ([address] )     Headache     Hyperlipidemia     Hypertension     Lyme disease     Shortness of breath     TIA (transient ischemic attack)     Transient cerebral ischemia     Last assessed - 1/23/18    Vertigo      Past Surgical History:   Procedure Laterality Date    COLONOSCOPY      TN SIGMOIDOSCOPY FLX DX W/COLLJ SPEC BR/WA IF PFRMD N/A 11/28/2017    Procedure: Tan Blount;  Surgeon: Herrera Cee MD;  Location: [facility];   Service: Gastroenterology    TONSILLECTOMY       Family History:   Family History   Problem Relation Age of Onset    Lung cancer Mother     Dementia Father     Alzheimer's disease Father     Other Father         Cardiac Disorder        Social History   History   Alcohol Use    4 2 oz/week    7 Glasses of wine per week     Comment: occ     History   Drug Use No     History   Smoking Status    Former Smoker   Smokeless Tobacco    Never Used         Meds/Allergies   all medications and allergies reviewed  Home Medications:   Prescriptions Prior to Admission   Medication    ELIQUIS 5 MG    lisinopril (ZESTRIL) 2 5 mg tablet    lovastatin (MEVACOR) 10 MG tablet    metoprolol succinate (TOPROL-XL) 100 mg 24 hr tablet       No Known Allergies    Objective   Vitals: Blood pressure (!) 151/104, pulse 88, resp  rate 19, height 5' 5" (1 651 m), weight 96 4 kg (212 lb 8 oz), SpO2 96 %  Orthostatic Blood Pressures      Most Recent Value   Blood Pressure   151/104 [nurse notified] filed at 12/26/2018 1142   Patient Position - Orthostatic VS  Sitting filed at 12/26/2018 1142          No intake or output data in the 24 hours ending 12/26/18 1329    Invasive Devices          No matching active lines, drains, or airways          Physical Exam   Constitutional: She is oriented to person, place, and time  She appears well-developed and well-nourished  HENT:   Head: Normocephalic and atraumatic  Eyes: Pupils are equal, round, and reactive to light  EOM are normal    Neck: Normal range of motion  Neck supple  Cardiovascular: Normal rate  An irregularly irregular rhythm present  Pulmonary/Chest: Effort normal and breath sounds normal    Abdominal: Soft  Bowel sounds are normal    Musculoskeletal: Normal range of motion  Neurological: She is alert and oriented to person, place, and time  Skin: Skin is warm and dry  Psychiatric: She has a normal mood and affect  Lab Results: I have personally reviewed pertinent lab results  Results from last 7 days  Lab Units 12/26/18  1247   WBC Thousand/uL 8 00   HEMOGLOBIN g/dL 16 6*   HEMATOCRIT % 48 3*   PLATELETS Thousands/uL 181       Results from last 7 days  Lab Units 12/26/18  1247   POTASSIUM mmol/L 4 0   CHLORIDE mmol/L 108   CO2 mmol/L 24   BUN mg/dL 14   CREATININE mg/dL 1 00   CALCIUM mg/dL 8 8                 Imaging: I have personally reviewed pertinent reports      Results for orders placed during the hospital encounter of 02/08/18   Echo complete with contrast if indicated    [facility]  [address] [facility], [address] [place] [address]  [phone]    Transthoracic Echocardiogram  2D, M-mode, and Color Doppler    Study date:  2018    Patient: Juan Reynolds  MR number: [id number]  Account number: [de-identified]  : 1945  Age: 67 years  Gender: Female  Status: Outpatient  Location: [facility]  Height: 63 in  Weight: 219 lb  BP: 138/ 82 mmHg    Indications: Atrial Fibrillation  Diagnoses: I48 0 - Atrial fibrillation    Sonographer:  Sheth RCS  Interpreting Physician:  Ashley Verma MD  Primary Physician:  Dayton Robison DO  Referring Physician:  Ashley Verma MD  Group:  [facility]    SUMMARY    LEFT VENTRICLE:  Systolic function was moderately reduced  Ejection fraction was estimated in the range of 40 % to 45 %, likely underestimated due to rapid atrial fibrillation  Although no diagnostic regional wall motion abnormality was identified, this possibility cannot be completely excluded on the basis of this study  Wall thickness was mildly increased  There was mild concentric hypertrophy  RIGHT VENTRICLE:  The size was normal   Systolic function was mildly reduced  LEFT ATRIUM:  The atrium was mildly dilated  MITRAL VALVE:  There was mild regurgitation  AORTIC VALVE:  There was mild regurgitation  TRICUSPID VALVE:  There was mild to moderate regurgitation  Pulmonary artery systolic pressure was within the normal range  PULMONIC VALVE:  There was trace regurgitation  HISTORY: PRIOR HISTORY: Hyperlipidemia  Atrial fibrillation  Risk factors: hypertension and morbid obesity  Cerebrovascular disease  PROCEDURE: The study was performed in the [address] [place] [address]  This was a routine study  The transthoracic approach was used  The study included complete 2D imaging, M-mode, and color Doppler  The heart rate was 127 bpm, at the  start of the study  Images were obtained from the parasternal, apical, subcostal, and suprasternal notch acoustic windows   Image quality was adequate  LEFT VENTRICLE: Size was normal  Systolic function was moderately reduced  Ejection fraction was estimated in the range of 40 % to 45 %, likely underestimated due to rapid atrial fibrillation  Although no diagnostic regional wall motion  abnormality was identified, this possibility cannot be completely excluded on the basis of this study  Wall thickness was mildly increased  There was mild concentric hypertrophy  No evidence of apical thrombus  RIGHT VENTRICLE: The size was normal  Systolic function was mildly reduced  Wall thickness was normal     LEFT ATRIUM: The atrium was mildly dilated  RIGHT ATRIUM: Size was normal     MITRAL VALVE: There was annular calcification  There was normal leaflet separation  DOPPLER: The transmitral velocity was within the normal range  There was no evidence for stenosis  There was mild regurgitation  AORTIC VALVE: The valve was trileaflet  Leaflets exhibited mildly increased thickness and normal cuspal separation  DOPPLER: Transaortic velocity was within the normal range  There was no evidence for stenosis  There was mild  regurgitation  TRICUSPID VALVE: The valve structure was normal  There was normal leaflet separation  DOPPLER: The transtricuspid velocity was within the normal range  There was no evidence for stenosis  There was mild to moderate regurgitation  Pulmonary  artery systolic pressure was within the normal range  PULMONIC VALVE: Leaflets exhibited normal thickness, no calcification, and normal cuspal separation  DOPPLER: The transpulmonic velocity was within the normal range  There was trace regurgitation  PERICARDIUM: There was no pericardial effusion  The pericardium was normal in appearance  AORTA: The root exhibited normal size  SYSTEMIC VEINS: IVC: The inferior vena cava was not well visualized  SYSTEM MEASUREMENT TABLES    Apical four chamber  4 chamber Left Atrium Volume Index; Planimetry; End Systole;  Apical four chamber;: 21 26 cm2  Left Ventricular Diastolic Area; Method of Disks, Single Plane; End Diastole; Apical four chamber;: 23 86 cm2  Left Ventricular Ejection Fraction; Method of Disks, Single Plane; Apical four chamber;: 49 7 %  Left Ventricular systolic Area; Method of Disks, Single Plane; End Systole; Apical four chamber;: 15 54 cm2  Right Atrium Systolic Area; Planimetry; End Systole; Apical four chamber;: 15 89 cm2  Right Ventricular Internal Diastolic Dimension; End Diastole; Apical four chamber;: 25 6 mm  TAPSE: 14 4 mm    Unspecified Scan Mode  AR Vmax; Regurgitant Flow; Diastole;: 390 6 cm/s  Aortic Root Diameter; End Systole;: 33 mm  Gradient Pressure, Peak; Simplified Bernoulli; Antegrade Flow; Systole;: 4 9 mm[Hg]  Gradient pressure, average; Simplified Bernoulli; Antegrade Flow; Systole;: 2 3 mm[Hg]  Left Atrium to Aortic Root Ratio;: 1 33  Left atrial diameter; End Diastole;: 44 mm  Pressure Half Time;: 0 46 s  Cardiac Output; Teichholz; Systole;: 2 73 L/min  Heart rate; Teichholz;: 123 /min  Interventricular Septum Diastolic Thickness; Teichholz; End Diastole;: 12 7 mm  Left Ventricle Internal End Diastolic Dimension; Teichholz;: 42 9 mm  Left Ventricle Internal Systolic Dimension; Teichholz; End Systole;: 37 6 mm  Left Ventricle Mass; Mass AVCube with Teichholz; End Diastole;: 167 g  Left Ventricle Posterior Wall Diastolic Thickness; Teichholz; End Diastole;: 9 7 mm  Left Ventricular Ejection Fraction; Teichholz;: 26 9 %  Left Ventricular End Diastolic Volume; Teichholz;: 82 6 ml  Left Ventricular End Systolic Volume; Teichholz;: 60 4 ml  Left Ventricular Fractional Shortening;: 12 4 %  Stroke volume;  Maxine Days;: 22 2 ml  Maximum Tricuspid valve regurgitation pressure gradient; Regurgitant Flow; Systole;: 28 1 mm[Hg]    [facility] Accredited Echocardiography Laboratory    Prepared and electronically signed by    Nani Crandall MD  Signed 10-Feb-2018 13:06:38         EKG (baseline ECG):

## 2018-12-26 NOTE — PLAN OF CARE
DISCHARGE PLANNING     Discharge to home or other facility with appropriate resources Progressing        SAFETY ADULT     Patient will remain free of falls Progressing     Maintain or return to baseline ADL function Progressing     Maintain or return mobility status to optimal level Progressing

## 2018-12-27 ENCOUNTER — EPISODE CHANGES (OUTPATIENT)
Dept: CASE MANAGEMENT | Facility: HOSPITAL | Age: 73
End: 2018-12-27

## 2018-12-27 LAB
ATRIAL RATE: 107 BPM
ATRIAL RATE: 136 BPM
QRS AXIS: 125 DEGREES
QRS AXIS: 29 DEGREES
QRSD INTERVAL: 80 MS
QRSD INTERVAL: 82 MS
QT INTERVAL: 352 MS
QT INTERVAL: 392 MS
QTC INTERVAL: 474 MS
QTC INTERVAL: 503 MS
T WAVE AXIS: -10 DEGREES
T WAVE AXIS: 213 DEGREES
VENTRICULAR RATE: 109 BPM
VENTRICULAR RATE: 99 BPM

## 2018-12-27 PROCEDURE — 93005 ELECTROCARDIOGRAM TRACING: CPT

## 2018-12-27 PROCEDURE — 93010 ELECTROCARDIOGRAM REPORT: CPT | Performed by: INTERNAL MEDICINE

## 2018-12-27 PROCEDURE — 99231 SBSQ HOSP IP/OBS SF/LOW 25: CPT | Performed by: INTERNAL MEDICINE

## 2018-12-27 RX ADMIN — APIXABAN 5 MG: 5 TABLET, FILM COATED ORAL at 09:31

## 2018-12-27 RX ADMIN — APIXABAN 5 MG: 5 TABLET, FILM COATED ORAL at 17:36

## 2018-12-27 RX ADMIN — METOPROLOL SUCCINATE 25 MG: 25 TABLET, EXTENDED RELEASE ORAL at 09:31

## 2018-12-27 RX ADMIN — SOTALOL HYDROCHLORIDE 120 MG: 120 TABLET ORAL at 18:51

## 2018-12-27 RX ADMIN — LISINOPRIL 2.5 MG: 2.5 TABLET ORAL at 09:31

## 2018-12-27 RX ADMIN — SOTALOL HYDROCHLORIDE 120 MG: 120 TABLET ORAL at 07:03

## 2018-12-27 RX ADMIN — PRAVASTATIN SODIUM 10 MG: 10 TABLET ORAL at 17:36

## 2018-12-27 NOTE — PROGRESS NOTES
Progress Note - Electrophysiology-Cardiology (EP)   Elio nAderson 75 y o  female MRN: [id number]  Unit/Bed#: [facility] 402-01 Encounter: [id number]      Assessment/Plan:   1  Persistent nonvalvular atrial fibrillation, symptomatic               * pt with documented EF 40-45% in feb 2018 with nuclear stress test showing no ischemia               * she is s/p DCCV in 6-2018 but has failed to maintain NSR w/ recent echo in oct showing a decrease in EF to 35-40%              * Sotalol dose 2/5 given with continued atrial fibrillation, QTc unable to be calculated due to her being in atrial fibrillation, will have better assessment of this once in NSR               * due to high addition of BB will continue with decreased dose of toprol XL 25mg PO QDay              * plan for DCCV tomorrow AM after her 4th dose of Sotalol, has not missed eliquis in the past 1 month, no need for LUIZA, keep NPO after midnight tonight       2  NICMP              * EF around 40%               * continue Toprol XL and ACEI     Subjective/Objective   Subjective:   Elio Anderson is a 75y o  year old female with a history of persistent nonvalvular atrial fibrillation AC w/ eliquis, NICMP EF 35% on GDMT (ACEI, toprol XL) who presents to [facility] under direction of Dr Carrie Ma for Sotalol loading for afib management  12-27-18:   Since admission patient has received 2 doses of Sotalol with continued atrial fibrillation  She has no concerns or complaints       Vitals: /84 (BP Location: Left arm)   Pulse 105   Temp 98 3 °F (36 8 °C) (Oral)   Resp 16   Ht 5' 5" (1 651 m)   Wt 96 4 kg (212 lb 8 oz)   SpO2 91%   BMI 35 36 kg/m²   Vitals:    12/26/18 1142   Weight: 96 4 kg (212 lb 8 oz)     Orthostatic Blood Pressures      Most Recent Value   Blood Pressure  121/84 filed at 12/27/2018 6013   Patient Position - Orthostatic VS  Sitting filed at 12/27/2018 8802            Intake/Output Summary (Last 24 hours) at 12/27/18 1051  Last data filed at 12/27/18 0733   Gross per 24 hour   Intake             1160 ml   Output              800 ml   Net              360 ml       Invasive Devices     Peripheral Intravenous Line            Peripheral IV 12/26/18 Left Wrist less than 1 day                            Scheduled Meds:  Current Facility-Administered Medications:  acetaminophen 650 mg Oral Q6H PRN Christiano Garduno, JENARO   apixaban 5 mg Oral BID Christiano Friend, JENARO   lisinopril 2 5 mg Oral Daily Christiano Friend, JENARO   metoprolol succinate 25 mg Oral Daily Christiano Friend, JENARO   pravastatin 10 mg Oral Daily [employer] Friend, JENARO   sotalol 120 mg Oral Q12H [address] Christiano Friend, JENARO     Continuous Infusions:   PRN Meds:   acetaminophen    Physical Exam:   GEN: NAD, alert and oriented, well appearing  SKIN: dry without significant lesions or rashes  HEENT: NCAT, PERRL, EOMs intact  NECK: No JVD or carotid bruits appreciated  CARDIOVASCULAR: regular rate, irregularly irregular rhythm, normal S1, S2 without murmurs, rubs, or gallops appreciated  LUNGS: Clear to auscultation bilaterally without wheezes, rhonchi, or rales  ABDOMEN: Soft, nontender, nondistended  EXTREMITIES/VASCULAR: perfused without clubbing, cyanosis, or edema b/l  PSYCH: Normal mood and affect  NEURO: CN ll-Xll grossly intact                Lab Results: I have personally reviewed pertinent lab results  Results from last 7 days  Lab Units 12/26/18  1247   WBC Thousand/uL 8 00   HEMOGLOBIN g/dL 16 6*   HEMATOCRIT % 48 3*   PLATELETS Thousands/uL 181       Results from last 7 days  Lab Units 12/26/18  1247   POTASSIUM mmol/L 4 0   CHLORIDE mmol/L 108   CO2 mmol/L 24   BUN mg/dL 14   CREATININE mg/dL 1 00   CALCIUM mg/dL 8 8                 Imaging: I have personally reviewed pertinent reports      Results for orders placed during the hospital encounter of 02/08/18   Echo complete with contrast if indicated    N[facility] [place] , [facility] [address]  [phone]    Transthoracic Echocardiogram  2D, M-mode, and Color Doppler    Study date:  2018    Patient: Marybel Montoya  MR number: [id number]  Account number: [de-identified]  : 1945  Age: 67 years  Gender: Female  Status: Outpatient  Location: [facility]  Height: 63 in  Weight: 219 lb  BP: 138/ 82 mmHg    Indications: Atrial Fibrillation  Diagnoses: I48 0 - Atrial fibrillation    Sonographer:  Brady RCS  Interpreting Physician:  Lea Sy MD  Primary Physician:  Eloisa Masterson DO  Referring Physician:  Lea Sy MD  Group:  [facility]    SUMMARY    LEFT VENTRICLE:  Systolic function was moderately reduced  Ejection fraction was estimated in the range of 40 % to 45 %, likely underestimated due to rapid atrial fibrillation  Although no diagnostic regional wall motion abnormality was identified, this possibility cannot be completely excluded on the basis of this study  Wall thickness was mildly increased  There was mild concentric hypertrophy  RIGHT VENTRICLE:  The size was normal   Systolic function was mildly reduced  LEFT ATRIUM:  The atrium was mildly dilated  MITRAL VALVE:  There was mild regurgitation  AORTIC VALVE:  There was mild regurgitation  TRICUSPID VALVE:  There was mild to moderate regurgitation  Pulmonary artery systolic pressure was within the normal range  PULMONIC VALVE:  There was trace regurgitation  HISTORY: PRIOR HISTORY: Hyperlipidemia  Atrial fibrillation  Risk factors: hypertension and morbid obesity  Cerebrovascular disease  PROCEDURE: The study was performed in the [address] [place] [address]  This was a routine study  The transthoracic approach was used  The study included complete 2D imaging, M-mode, and color Doppler  The heart rate was 127 bpm, at the  start of the study   Images were obtained from the parasternal, apical, subcostal, and suprasternal notch acoustic windows  Image quality was adequate  LEFT VENTRICLE: Size was normal  Systolic function was moderately reduced  Ejection fraction was estimated in the range of 40 % to 45 %, likely underestimated due to rapid atrial fibrillation  Although no diagnostic regional wall motion  abnormality was identified, this possibility cannot be completely excluded on the basis of this study  Wall thickness was mildly increased  There was mild concentric hypertrophy  No evidence of apical thrombus  RIGHT VENTRICLE: The size was normal  Systolic function was mildly reduced  Wall thickness was normal     LEFT ATRIUM: The atrium was mildly dilated  RIGHT ATRIUM: Size was normal     MITRAL VALVE: There was annular calcification  There was normal leaflet separation  DOPPLER: The transmitral velocity was within the normal range  There was no evidence for stenosis  There was mild regurgitation  AORTIC VALVE: The valve was trileaflet  Leaflets exhibited mildly increased thickness and normal cuspal separation  DOPPLER: Transaortic velocity was within the normal range  There was no evidence for stenosis  There was mild  regurgitation  TRICUSPID VALVE: The valve structure was normal  There was normal leaflet separation  DOPPLER: The transtricuspid velocity was within the normal range  There was no evidence for stenosis  There was mild to moderate regurgitation  Pulmonary  artery systolic pressure was within the normal range  PULMONIC VALVE: Leaflets exhibited normal thickness, no calcification, and normal cuspal separation  DOPPLER: The transpulmonic velocity was within the normal range  There was trace regurgitation  PERICARDIUM: There was no pericardial effusion  The pericardium was normal in appearance  AORTA: The root exhibited normal size  SYSTEMIC VEINS: IVC: The inferior vena cava was not well visualized      SYSTEM MEASUREMENT TABLES    Apical four chamber  4 chamber Left Atrium Volume Index; Planimetry; End Systole; Apical four chamber;: 21 26 cm2  Left Ventricular Diastolic Area; Method of Disks, Single Plane; End Diastole; Apical four chamber;: 23 86 cm2  Left Ventricular Ejection Fraction; Method of Disks, Single Plane; Apical four chamber;: 49 7 %  Left Ventricular systolic Area; Method of Disks, Single Plane; End Systole; Apical four chamber;: 15 54 cm2  Right Atrium Systolic Area; Planimetry; End Systole; Apical four chamber;: 15 89 cm2  Right Ventricular Internal Diastolic Dimension; End Diastole; Apical four chamber;: 25 6 mm  TAPSE: 14 4 mm    Unspecified Scan Mode  AR Vmax; Regurgitant Flow; Diastole;: 390 6 cm/s  Aortic Root Diameter; End Systole;: 33 mm  Gradient Pressure, Peak; Simplified Bernoulli; Antegrade Flow; Systole;: 4 9 mm[Hg]  Gradient pressure, average; Simplified Bernoulli; Antegrade Flow; Systole;: 2 3 mm[Hg]  Left Atrium to Aortic Root Ratio;: 1 33  Left atrial diameter; End Diastole;: 44 mm  Pressure Half Time;: 0 46 s  Cardiac Output; Teichholz; Systole;: 2 73 L/min  Heart rate; Teichholz;: 123 /min  Interventricular Septum Diastolic Thickness; Teichholz; End Diastole;: 12 7 mm  Left Ventricle Internal End Diastolic Dimension; Teichholz;: 42 9 mm  Left Ventricle Internal Systolic Dimension; Teichholz; End Systole;: 37 6 mm  Left Ventricle Mass; Mass AVCube with Teichholz; End Diastole;: 167 g  Left Ventricle Posterior Wall Diastolic Thickness; Teichholz; End Diastole;: 9 7 mm  Left Ventricular Ejection Fraction; Teichholz;: 26 9 %  Left Ventricular End Diastolic Volume; Teichholz;: 82 6 ml  Left Ventricular End Systolic Volume; Teichholz;: 60 4 ml  Left Ventricular Fractional Shortening;: 12 4 %  Stroke volume;  Teichholz; Systole;: 22 2 ml  Maximum Tricuspid valve regurgitation pressure gradient; Regurgitant Flow; Systole;: 28 1 mm[Hg]    [facility]    Prepared and electronically signed by    Alejandra Giraldo MD  Signed 10-Feb-2018 13:06:38         EKG (after 2nd dose of Sotalol):

## 2018-12-27 NOTE — SOCIAL WORK
CM met with patient and explained cm role  Pt alert and oriented  Pt reports she lives in a 2 story home with her  Naida Lamb [phone] with 2 cierra  Pt denies DME, VNA, and rehab  Pt reports being independent PTA, reports good support from family and friends, she drives and has transport home with  or family for d/c  Pts pharmacy is [facility] in [place]  POA is pts   Pt denies hx/admission for drugs/etoh and psych/mental health  CM reviewed d/c planning process including the following: identifying help at home, patient preference for d/c planning needs, Discharge Lounge, [facility] Meds to Bed program, availability of treatment team to discuss questions or concerns patient and/or family may have regarding understanding medications and recognizing signs and symptoms once discharged  CM also encouraged patient to follow up with all recommended appointments after discharge  Patient advised of importance for patient and family to participate in managing patients medical well being

## 2018-12-28 ENCOUNTER — APPOINTMENT (INPATIENT)
Dept: NON INVASIVE DIAGNOSTICS | Facility: HOSPITAL | Age: 73
DRG: 244 | End: 2018-12-28
Payer: MEDICARE

## 2018-12-28 LAB
ATRIAL RATE: 117 BPM
ATRIAL RATE: 133 BPM
ATRIAL RATE: 47 BPM
P AXIS: 73 DEGREES
PR INTERVAL: 180 MS
QRS AXIS: 21 DEGREES
QRS AXIS: 53 DEGREES
QRS AXIS: 96 DEGREES
QRSD INTERVAL: 78 MS
QRSD INTERVAL: 80 MS
QRSD INTERVAL: 82 MS
QT INTERVAL: 280 MS
QT INTERVAL: 348 MS
QT INTERVAL: 460 MS
QTC INTERVAL: 385 MS
QTC INTERVAL: 407 MS
QTC INTERVAL: 485 MS
T WAVE AXIS: -19 DEGREES
T WAVE AXIS: 10 DEGREES
T WAVE AXIS: 28 DEGREES
VENTRICULAR RATE: 114 BPM
VENTRICULAR RATE: 117 BPM
VENTRICULAR RATE: 47 BPM

## 2018-12-28 PROCEDURE — 92960 CARDIOVERSION ELECTRIC EXT: CPT

## 2018-12-28 PROCEDURE — 99232 SBSQ HOSP IP/OBS MODERATE 35: CPT | Performed by: PHYSICIAN ASSISTANT

## 2018-12-28 PROCEDURE — 99232 SBSQ HOSP IP/OBS MODERATE 35: CPT | Performed by: INTERNAL MEDICINE

## 2018-12-28 PROCEDURE — 93005 ELECTROCARDIOGRAM TRACING: CPT

## 2018-12-28 PROCEDURE — 5A2204Z RESTORATION OF CARDIAC RHYTHM, SINGLE: ICD-10-PCS | Performed by: INTERNAL MEDICINE

## 2018-12-28 PROCEDURE — 93010 ELECTROCARDIOGRAM REPORT: CPT | Performed by: INTERNAL MEDICINE

## 2018-12-28 RX ORDER — SOTALOL HYDROCHLORIDE 80 MG/1
80 TABLET ORAL EVERY 12 HOURS SCHEDULED
Status: DISCONTINUED | OUTPATIENT
Start: 2018-12-29 | End: 2019-01-01 | Stop reason: HOSPADM

## 2018-12-28 RX ORDER — PROPOFOL 10 MG/ML
INJECTION, EMULSION INTRAVENOUS AS NEEDED
Status: DISCONTINUED | OUTPATIENT
Start: 2018-12-28 | End: 2018-12-28 | Stop reason: SURG

## 2018-12-28 RX ORDER — SOTALOL HYDROCHLORIDE 80 MG/1
80 TABLET ORAL 2 TIMES DAILY
Qty: 60 TABLET | Refills: 11 | Status: SHIPPED | OUTPATIENT
Start: 2018-12-28 | End: 2019-01-04 | Stop reason: SDUPTHER

## 2018-12-28 RX ADMIN — APIXABAN 5 MG: 5 TABLET, FILM COATED ORAL at 08:25

## 2018-12-28 RX ADMIN — PRAVASTATIN SODIUM 10 MG: 10 TABLET ORAL at 17:11

## 2018-12-28 RX ADMIN — APIXABAN 5 MG: 5 TABLET, FILM COATED ORAL at 17:11

## 2018-12-28 RX ADMIN — PROPOFOL 90 MG: 10 INJECTION, EMULSION INTRAVENOUS at 11:28

## 2018-12-28 RX ADMIN — SOTALOL HYDROCHLORIDE 120 MG: 120 TABLET ORAL at 06:55

## 2018-12-28 RX ADMIN — LISINOPRIL 2.5 MG: 2.5 TABLET ORAL at 08:25

## 2018-12-28 RX ADMIN — METOPROLOL SUCCINATE 25 MG: 25 TABLET, EXTENDED RELEASE ORAL at 08:25

## 2018-12-28 NOTE — PROGRESS NOTES
Progress Note - Electrophysiology-Cardiology (EP)   Jayant Hernandez 75 y o  female MRN: [id number]  Unit/Bed#: [facility] 402-01 Encounter: [id number]      Assessment/Plan:   1  Persistent nonvalvular atrial fibrillation, symptomatic               * pt with documented EF 40-45% in feb 2018 with nuclear stress test showing no ischemia               * she is s/p DCCV in 6-2018 but has failed to maintain NSR w/ recent echo in oct showing a decrease in EF to 35-40%              * Sotalol dose 4/5 given and she is now s/p DCCV into sinus chad, QTc in sinus is 407ms              * plan to stop toprol XL due to bradycardia and reduce her Sotalol dose to 80mg PO BID     2  NICMP              * EF around 40%               * continue  ACEI    * toprol XL discontinued due to bradycardia     3  Sinus bradycardia   * patient post cardioversion had a HR in the mid 40's in sinus rhythm for which she was asymptomatic    * she likely has SSS due to her duration of a fib    * plan to decrease her Sotalol to 80mg PO BID start tomorrow AM with tonight's dose being held, plan to stop her toprol XL indefinitely    * if her HR does not recover on this dose of Sotalol prior to discharge she should have a DC PPM placed prior to discharge followed by an ablation for her a fib as an outpatient         Subjective/Objective   Subjective:   Jayant Hernandez is a 75y o  year old female with a history of persistent nonvalvular atrial fibrillation AC w/ eliquis, NICMP EF 35% on GDMT (ACEI, toprol XL) who presents to [facility] under direction of Dr Awa Henao for Sotalol loading for afib management  12-27-18:   Since admission patient has received 2 doses of Sotalol with continued atrial fibrillation  She has no concerns or complaints  12-28-18:   Since admission patient has underwent DCCV after her 4th dose of Sotalol  Restoring rhythm remains sinus bradycardia  She is without any concerns or complaints       Vitals: /63   Pulse (!) 120   Temp 97 8 °F (36 6 °C) (Oral)   Resp 20   Ht 5' 5" (1 651 m)   Wt 96 4 kg (212 lb 8 oz)   SpO2 97%   BMI 35 36 kg/m²   Vitals:    12/26/18 1142   Weight: 96 4 kg (212 lb 8 oz)     Orthostatic Blood Pressures      Most Recent Value   Blood Pressure  101/63 filed at 12/28/2018 1147   Patient Position - Orthostatic VS  Sitting filed at 12/28/2018 0801            Intake/Output Summary (Last 24 hours) at 12/28/18 1406  Last data filed at 12/28/18 1342   Gross per 24 hour   Intake             1485 ml   Output              850 ml   Net              635 ml       Invasive Devices     Peripheral Intravenous Line            Peripheral IV 12/26/18 Left Wrist 2 days                            Scheduled Meds:    Current Facility-Administered Medications:  acetaminophen 650 mg Oral Q6H  [place] JENARO Garduno   apixaban 5 mg Oral BID Christiano Garduno PA-C   lisinopril 2 5 mg Oral Daily Christiano Garduno PA-C   pravastatin 10 mg Oral Daily With [employer] FriendJENARO   [START ON 12/29/2018] sotalol 80 mg Oral Q12H [address] Hue Gan PA-C     Continuous Infusions:   PRN Meds:   acetaminophen    Physical Exam:   GEN: NAD, alert and oriented, well appearing  SKIN: dry without significant lesions or rashes  HEENT: NCAT, PERRL, EOMs intact  NECK: No JVD or carotid bruits appreciated  CARDIOVASCULAR: regular rate, irregularly irregular rhythm, normal S1, S2 without murmurs, rubs, or gallops appreciated  LUNGS: Clear to auscultation bilaterally without wheezes, rhonchi, or rales  ABDOMEN: Soft, nontender, nondistended  EXTREMITIES/VASCULAR: perfused without clubbing, cyanosis, or edema b/l  PSYCH: Normal mood and affect  NEURO: CN ll-Xll grossly intact                Lab Results: I have personally reviewed pertinent lab results        Results from last 7 days  Lab Units 12/26/18  1247   WBC Thousand/uL 8 00   HEMOGLOBIN g/dL 16 6*   HEMATOCRIT % 48 3*   PLATELETS Thousands/uL 181       Results from last 7 days  Lab Units 12/26/18  1247   POTASSIUM mmol/L 4  0   CHLORIDE mmol/L 108   CO2 mmol/L 24   BUN mg/dL 14   CREATININE mg/dL 1 00   CALCIUM mg/dL 8 8                 Imaging: I have personally reviewed pertinent reports  Results for orders placed during the hospital encounter of 18   Echo complete with contrast if indicated    Narrative [address], [address] [facility]  [phone]    Transthoracic Echocardiogram  2D, M-mode, and Color Doppler    Study date:  2018    Patient: Angela Wilder  MR number: [id number]  Account number: [de-identified]  : 1945  Age: 67 years  Gender: Female  Status: Outpatient  [facility]: [facility]  Height: 63 in  Weight: 219 lb  BP: 138/ 82 mmHg    Indications: Atrial Fibrillation  Diagnoses: I48 0 - Atrial fibrillation    Sonographer:  Urrutia RCS  Interpreting Physician:  Tan Ness MD  Primary Physician:  Theron Estrada DO  Referring Physician:  Tan Ness MD  Group:  [facility]    SUMMARY    LEFT VENTRICLE:  Systolic function was moderately reduced  Ejection fraction was estimated in the range of 40 % to 45 %, likely underestimated due to rapid atrial fibrillation  Although no diagnostic regional wall motion abnormality was identified, this possibility cannot be completely excluded on the basis of this study  Wall thickness was mildly increased  There was mild concentric hypertrophy  RIGHT VENTRICLE:  The size was normal   Systolic function was mildly reduced  LEFT ATRIUM:  The atrium was mildly dilated  MITRAL VALVE:  There was mild regurgitation  AORTIC VALVE:  There was mild regurgitation  TRICUSPID VALVE:  There was mild to moderate regurgitation  Pulmonary artery systolic pressure was within the normal range  PULMONIC VALVE:  There was trace regurgitation  HISTORY: PRIOR HISTORY: Hyperlipidemia  Atrial fibrillation  Risk factors: hypertension and morbid obesity   Cerebrovascular disease  PROCEDURE: The study was performed in the [address] [place] [address]  This was a routine study  The transthoracic approach was used  The study included complete 2D imaging, M-mode, and color Doppler  The heart rate was 127 bpm, at the  start of the study  Images were obtained from the parasternal, apical, subcostal, and suprasternal notch acoustic windows  Image quality was adequate  LEFT VENTRICLE: Size was normal  Systolic function was moderately reduced  Ejection fraction was estimated in the range of 40 % to 45 %, likely underestimated due to rapid atrial fibrillation  Although no diagnostic regional wall motion  abnormality was identified, this possibility cannot be completely excluded on the basis of this study  Wall thickness was mildly increased  There was mild concentric hypertrophy  No evidence of apical thrombus  RIGHT VENTRICLE: The size was normal  Systolic function was mildly reduced  Wall thickness was normal     LEFT ATRIUM: The atrium was mildly dilated  RIGHT ATRIUM: Size was normal     MITRAL VALVE: There was annular calcification  There was normal leaflet separation  DOPPLER: The transmitral velocity was within the normal range  There was no evidence for stenosis  There was mild regurgitation  AORTIC VALVE: The valve was trileaflet  Leaflets exhibited mildly increased thickness and normal cuspal separation  DOPPLER: Transaortic velocity was within the normal range  There was no evidence for stenosis  There was mild  regurgitation  TRICUSPID VALVE: The valve structure was normal  There was normal leaflet separation  DOPPLER: The transtricuspid velocity was within the normal range  There was no evidence for stenosis  There was mild to moderate regurgitation  Pulmonary  artery systolic pressure was within the normal range  PULMONIC VALVE: Leaflets exhibited normal thickness, no calcification, and normal cuspal separation   DOPPLER: The transpulmonic velocity was within the normal range  There was trace regurgitation  PERICARDIUM: There was no pericardial effusion  The pericardium was normal in appearance  AORTA: The root exhibited normal size  SYSTEMIC VEINS: IVC: The inferior vena cava was not well visualized  SYSTEM MEASUREMENT TABLES    Apical four chamber  4 chamber Left Atrium Volume Index; Planimetry; End Systole; Apical four chamber;: 21 26 cm2  Left Ventricular Diastolic Area; Method of Disks, Single Plane; End Diastole; Apical four chamber;: 23 86 cm2  Left Ventricular Ejection Fraction; Method of Disks, Single Plane; Apical four chamber;: 49 7 %  Left Ventricular systolic Area; Method of Disks, Single Plane; End Systole; Apical four chamber;: 15 54 cm2  Right Atrium Systolic Area; Planimetry; End Systole; Apical four chamber;: 15 89 cm2  Right Ventricular Internal Diastolic Dimension; End Diastole; Apical four chamber;: 25 6 mm  TAPSE: 14 4 mm    Unspecified Scan Mode  AR Vmax; Regurgitant Flow; Diastole;: 390 6 cm/s  Aortic Root Diameter; End Systole;: 33 mm  Gradient Pressure, Peak; Simplified Bernoulli; Antegrade Flow; Systole;: 4 9 mm[Hg]  Gradient pressure, average; Simplified Bernoulli; Antegrade Flow; Systole;: 2 3 mm[Hg]  Left Atrium to Aortic Root Ratio;: 1 33  Left atrial diameter; End Diastole;: 44 mm  Pressure Half Time;: 0 46 s  Cardiac Output; Teichholz; Systole;: 2 73 L/min  Heart rate; Teichholz;: 123 /min  Interventricular Septum Diastolic Thickness; Teichholz; End Diastole;: 12 7 mm  Left Ventricle Internal End Diastolic Dimension; Teichholz;: 42 9 mm  Left Ventricle Internal Systolic Dimension; Teichholz; End Systole;: 37 6 mm  Left Ventricle Mass; Mass AVCube with Teichholz; End Diastole;: 167 g  Left Ventricle Posterior Wall Diastolic Thickness; Teichholz; End Diastole;: 9 7 mm  Left Ventricular Ejection Fraction; Teichholz;: 26 9 %  Left Ventricular End Diastolic Volume;  Teichholz;: 82 6 ml  Left Ventricular End Systolic Volume; Angelina;: 60 4 ml  Left Ventricular Fractional Shortening;: 12 4 %  Stroke volume;  Siobhan Hubbard;: 22 2 ml  Maximum Tricuspid valve regurgitation pressure gradient; Regurgitant Flow; Systole;: 28 1 mm[Hg]    [facility]    Prepared and electronically signed by    Ashley Verma MD  Signed 10-Feb-2018 13:06:38     EKG (after 4th dose of Sotalol and cardioversion):       EKG (after 2nd dose of Sotalol):

## 2018-12-28 NOTE — DISCHARGE SUMMARY
Discharge Summary - Vinh Heath 75 y o  female MRN: [id number]    Unit/Bed#: [facility] 402-01 Encounter: [id number]      Admission Date: 12/26/2018   Discharge Date:  1/1/19    Discharge Diagnosis:   1  Persistent nonvalvular atrial fibrillation AC w/ eliquis now on Sotalol   2  NICMP EF 40%     Procedures Performed: DCCV after sotalol start; Medtronic dual chamber pacemaker  Physical Exam:  Vitals:    12/31/18 2307 01/01/19 0242 01/01/19 0707 01/01/19 1105   BP: 120/67 120/64 134/72 130/75   BP Location: Left arm Right arm Right arm Right arm   Pulse: 60 60 61 61   Resp: 17 17 18 18   Temp: 98 3 °F (36 8 °C) 98 3 °F (36 8 °C) 97 5 °F (36 4 °C) 97 5 °F (36 4 °C)   TempSrc: Oral Oral Oral Oral   SpO2: 94% 94% 93% 95%   Weight:       Height:           GEN: Madeline Good appears well, alert and oriented x 3, pleasant and cooperative   HEENT: pupils equal, round, and reactive to light; extraocular muscles intact  NECK: supple, no carotid bruits   HEART: regular rhythm, normal S1 and S2, no murmurs, clicks, gallops or rubs,   LUNGS: clear to auscultation bilaterally; no wheezes, rales, or rhonchi   ABDOMEN: normal bowel sounds, soft, no tenderness, no distention  EXTREMITIES: peripheral pulses normal; no clubbing, cyanosis, or edema  NEURO: no focal findings   SKIN: normal without suspicious lesions on exposed skin  Left subclavian dressing intact no hematoma or ecchymosis    HPI: Please refer to the initial history and physical as well as procedure notes for full details  Briefly, Vinh Heath is a 75y o  year old female with a history of persistent nonvalvular atrial fibrillation AC w/ eliquis, tachy induced cardiomyopathy EF 40%   She was seen by Dr Uli Smith as an outpatient, and Sotalol initiation was recommended  Patient presented this hospital admission to undergo this AAD loading  Hospital Course:   Vinh Heath is a 75 y o  female who was admitted elective for antiarrhythmic medication initiation    Patient was seen in the office by Dr Astrid Reyes and recommended to initiate therapy with Sotalol 120mg PO BID  They have been on chronic anticoagulation therapy with eliquis without missed dose  Patient underwent first 4 doses of AAD without complications  She presented in atrial fibrillation and remained throughout the hospital stay until she underwent DCCV on 12-28-18  Serial EKGs were performed 2 hours after each dose of AAD  There were no significant changes in QT/QTc with intiating doses  There were no significant occurrence of ventricular ectopy on telemetry  Electrolytes and renal function were monitored throughout their stay  After her cardioversion on 12-28-18 patient was found to be sinus bradycardia in the mid 40s for which she was asmyptomatic  On admission her toprol XL dose was reduced to 25mg PO QDay but after found to be markedly bradycardic her toprol XL was discontinued entirely  Her Sotalol was decreased to 80mg PO BID due to this bradycardia with her 5th dose on 12-28-18 being held  Therefore, a pacemaker was recommended at that time  She tolerated the procedure well  CXR immediately following the procedure showed appropriate lead placement without pneumothorax  She was then monitored overnight for further observation  There were no acute issues or events overnight  The following morning she denied all cardiac complaints, including chest pain/pressure, dyspnea, palpitations, dizziness, lightheadedness, or syncope  Her vital signs were reviewed and labs are stable  Telemetry showed atrial pacing  Chest xray this morning again showed appropriate device placement without pneumothorax  Device interrogation showed appropriate device function, including lead sensing, threshold, and impedance  2D echocardiogram today revealed no pericardial effusion  Her incision was clean, dry, and intact without swelling, hematoma, redness, bleeding, drainage, or signs of infection   Physical exam showed regular rate and rhythm without significant murmurs, rubs or gallops  Lungs were clear to auscultation bilaterally without wheezes, rhonchi, or rales  There was no significant edema  She was given routine post implantation discharge instructions and restrictions, including wound care, and these were explained in detail  She was instructed to remove her outer bandage tomorrow, leaving the steri-strips in place, and to keep the incision dry for one week  She was given a two week follow up appointment with our device clinic for device interrogation and site check, and she was instructed to follow up with her primary cardiologist as previously instructed  At time of discharge, patient was ambulating the cardiac unit without complaints of  lightheadedness, presyncope, syncope, chest pain, shortness of breath, orthopnea, PND, palpitations, or bleeding problems  Patient received education regarding AAD therapy, avoiding missed doses, pharmacy moitoring for drug to drug interactions, and concerning signs and symptoms that would prompt urgent evaluation  She is stable for discharge at this time with all questions answered  She was discussed in detail with Dr Muniz  who is in agreement with this discharge summary  She will follow with an ECG in 1 week and then with Isreal Fernandes PA-C on 2-3-18    Discharge Medications:  See after visit summary for reconciled discharge medications provided to patient and family      Prescriptions Prior to Admission   Medication    ELIQUIS 5 MG    lisinopril (ZESTRIL) 2 5 mg tablet    lovastatin (MEVACOR) 10 MG tablet    metoprolol succinate (TOPROL-XL) 100 mg 24 hr tablet         Pertininet Labs/diagnostics:  CBC with diff:   Results from last 7 days  Lab Units 12/26/18  1247   WBC Thousand/uL 8 00   HEMOGLOBIN g/dL 16 6*   HEMATOCRIT % 48 3*   MCV fL 100*   PLATELETS Thousands/uL 181   MCH pg 34 4*   MCHC g/dL 34 4   RDW % 14 0   MPV fL 11 2   NRBC AUTO /100 WBCs 0 BMP:  Results from last 7 days  Lab Units 12/26/18  1247   POTASSIUM mmol/L 4 0   CHLORIDE mmol/L 108   CO2 mmol/L 24   BUN mg/dL 14   CREATININE mg/dL 1 00   CALCIUM mg/dL 8 8       Complications: none    Condition at Discharge: good     Discharge instructions/Information to patient and family:   See after visit summary for information provided to patient and family  Provisions for Follow-Up Care:  See after visit summary for information related to follow-up care and any pertinent home health orders  Disposition: Home    Planned Readmission: No    Discharge Statement   I spent 45 minutes minutes discharging the patient  This time was spent on the day of discharge  I had direct contact with the patient on the day of discharge  Additional documentation is required if more than 30 minutes were spent on discharge   Evaluating the incision, discussing discharge instructions and restrictions, arranging follow up appointments, discussing medications

## 2018-12-28 NOTE — ANESTHESIA POSTPROCEDURE EVALUATION
Post-Op Assessment Note      CV Status:  Stable    Mental Status:  Awake    Hydration Status:  Stable    PONV Controlled:  Controlled    Airway Patency:  Patent    Post Op Vitals Reviewed: Yes          Staff: AnesthesiologistYULIET           BP   96/55   Temp      Pulse 47   Resp   18   SpO2   96

## 2018-12-28 NOTE — ANESTHESIA PREPROCEDURE EVALUATION
Review of Systems/Medical History  Patient summary reviewed  Chart reviewed  No history of anesthetic complications     Cardiovascular  Exercise tolerance (METS): <4,  Hyperlipidemia, Hypertension , Dysrhythmias , atrial fibrillation,    Pulmonary  Shortness of breath,        GI/Hepatic            Endo/Other     GYN       Hematology   Musculoskeletal       Neurology    TIA, Headaches,    Psychology       ECHO 2/8/18  SUMMARY     LEFT VENTRICLE:  Systolic function was moderately reduced  Ejection fraction was estimated in the range of 40 % to 45 %, likely underestimated due to rapid atrial fibrillation  Although no diagnostic regional wall motion abnormality was identified, this possibility cannot be completely excluded on the basis of this study  Wall thickness was mildly increased  There was mild concentric hypertrophy      RIGHT VENTRICLE:  The size was normal   Systolic function was mildly reduced      LEFT ATRIUM:  The atrium was mildly dilated      MITRAL VALVE:  There was mild regurgitation      AORTIC VALVE:  There was mild regurgitation      TRICUSPID VALVE:  There was mild to moderate regurgitation  Pulmonary artery systolic pressure was within the normal range      PULMONIC VALVE:  There was trace regurgitation      Lab Results   Component Value Date    WBC 8 00 12/26/2018    HGB 16 6 (H) 12/26/2018    HCT 48 3 (H) 12/26/2018     (H) 12/26/2018     12/26/2018     Lab Results   Component Value Date    K 4 0 12/26/2018    CO2 24 12/26/2018     12/26/2018    BUN 14 12/26/2018    CREATININE 1 00 12/26/2018     Lab Results   Component Value Date    INR 1 21 (H) 06/05/2018    PROTIME 15 4 (H) 06/05/2018     No results found for: PTT    No results found for: GLUCOSE    No results found for: HGBA1C        Physical Exam    Airway    Mallampati score: II  TM Distance: >3 FB  Neck ROM: full     Dental       Cardiovascular  Rhythm: irregular, Rate: normal,     Pulmonary      Other Findings        Anesthesia Plan  ASA Score- 3     Anesthesia Type- IV sedation with anesthesia with ASA Monitors  Additional Monitors:   Airway Plan:         Plan Factors-    Induction- intravenous  Postoperative Plan-     Informed Consent- Anesthetic plan and risks discussed with patient  I personally reviewed this patient with the CRNA  Discussed and agreed on the Anesthesia Plan with the CRNA  Daisy Morgan

## 2018-12-29 PROBLEM — R00.1 SINUS BRADYCARDIA: Status: ACTIVE | Noted: 2018-12-29

## 2018-12-29 LAB
ATRIAL RATE: 0 BPM
ATRIAL RATE: 48 BPM
P AXIS: 51 DEGREES
PR INTERVAL: 174 MS
QRS AXIS: 51 DEGREES
QRS AXIS: 90 DEGREES
QRSD INTERVAL: 38 MS
QRSD INTERVAL: 80 MS
QT INTERVAL: 420 MS
QT INTERVAL: 500 MS
QTC INTERVAL: 408 MS
QTC INTERVAL: 446 MS
T WAVE AXIS: 31 DEGREES
T WAVE AXIS: 90 DEGREES
VENTRICULAR RATE: 48 BPM
VENTRICULAR RATE: 57 BPM

## 2018-12-29 PROCEDURE — 93005 ELECTROCARDIOGRAM TRACING: CPT

## 2018-12-29 PROCEDURE — 93010 ELECTROCARDIOGRAM REPORT: CPT | Performed by: INTERNAL MEDICINE

## 2018-12-29 PROCEDURE — 99232 SBSQ HOSP IP/OBS MODERATE 35: CPT | Performed by: INTERNAL MEDICINE

## 2018-12-29 RX ADMIN — PRAVASTATIN SODIUM 10 MG: 10 TABLET ORAL at 16:23

## 2018-12-29 RX ADMIN — APIXABAN 5 MG: 5 TABLET, FILM COATED ORAL at 18:24

## 2018-12-29 RX ADMIN — APIXABAN 5 MG: 5 TABLET, FILM COATED ORAL at 08:31

## 2018-12-29 RX ADMIN — SOTALOL HYDROCHLORIDE 80 MG: 80 TABLET ORAL at 19:57

## 2018-12-29 RX ADMIN — SOTALOL HYDROCHLORIDE 80 MG: 80 TABLET ORAL at 06:54

## 2018-12-29 NOTE — NURSING NOTE
Pt intermittently going into a fib  Pt currently in sinus chad  No Sotalol dose ordered for evening  Cardiology fellow aware  Pt to get new EKG if goes back into a fib  Will continue to monitor pt closely

## 2018-12-29 NOTE — PROGRESS NOTES
Progress Note - Cardiology   Bakari Metzger 75 y o  female MRN: [id number]  Unit/Bed#: [facility] 402-01 Encounter: [id number]    Assessment:  Principal Problem:    Persistent atrial fibrillation with rapid ventricular response (HCC)    PAF - now cardioverted back to sinus rhythm  She is bradycardic currently  Plan:    Continue sotalol 80mg twice a day  Follow QTc, currently not prolonging  She is bradycardic, I feel she will need PPM placement, will arrange from Monday  [facility] Sunday night  Keep off toprol  NPO after midnight Sunday for [facility] [place], operated by [facility] Monday      Subjective/Objective     Subjective:   She was walking the hallway earlier, and for the first time felt a little lightheaded  Otherwise no chest pain or shortness of breath  Objective:    Vitals: /60   Pulse (!) 47   Temp 97 8 °F (36 6 °C) (Oral)   Resp 20   Ht 5' 5" (1 651 m)   Wt 96 4 kg (212 lb 8 oz)   SpO2 99%   BMI 35 36 kg/m²   Vitals:    12/26/18 1142   Weight: 96 4 kg (212 lb 8 oz)     Orthostatic Blood Pressures      Most Recent Value   Blood Pressure  104/60 filed at 12/29/2018 0831   Patient Position - Orthostatic VS  Sitting filed at 12/29/2018 9234            Intake/Output Summary (Last 24 hours) at 12/29/18 1047  Last data filed at 12/29/18 5251   Gross per 24 hour   Intake             1406 ml   Output              575 ml   Net              831 ml       Physical Exam:   General appearance: alert and in no acute distress  Head: Normocephalic, without obvious abnormality, atraumatic  Neck: no carotid bruit, no JVD and supple, symmetrical, trachea midline  Lungs: clear to auscultation bilaterally  Normal air entry  Normal effort  Heart: S1, S2 bradycardic, no murmurs    Abdomen: soft, non-tender; bowel sounds normal; no masses,  no organomegaly  Extremities: extremities normal, atraumatic, no cyanosis or edema  Pulses: 2+ and symmetric bilaterally  Skin: Skin color, texture, turgor normal  No rashes or lesions  Neurologic: Grossly normal  Alert and oriented  Medications:    Current Facility-Administered Medications:     acetaminophen (TYLENOL) tablet 650 mg, 650 mg, Oral, Q6H PRN, Christiano Garduno PA-C    apixaban (ELIQUIS) tablet 5 mg, 5 mg, Oral, BID, Christiano Garduno PA-C, 5 mg at 12/29/18 0831    lisinopril (ZESTRIL) tablet 2 5 mg, 2 5 mg, Oral, Daily, Christiano Garduno PA-C, Stopped at 12/29/18 0831    pravastatin (PRAVACHOL) tablet 10 mg, 10 mg, Oral, Daily With Dinner, Christiano Garduno PA-C, 10 mg at 12/28/18 1711    sotalol (BETAPACE) tablet 80 mg, 80 mg, Oral, Q12H TIFFANIE, Hue Gan PA-C, 80 mg at 12/29/18 9499    Lab Results:        Results from last 7 days  Lab Units 12/26/18  1247   WBC Thousand/uL 8 00   HEMOGLOBIN g/dL 16 6*   HEMATOCRIT % 48 3*   PLATELETS Thousands/uL 181           Results from last 7 days  Lab Units 12/26/18  1247   POTASSIUM mmol/L 4 0   CHLORIDE mmol/L 108   CO2 mmol/L 24   BUN mg/dL 14   CREATININE mg/dL 1 00   CALCIUM mg/dL 8 8               Telemetry: Personally reviewed   Sinus bradycardia

## 2018-12-30 LAB
ATRIAL RATE: 43 BPM
ATRIAL RATE: 47 BPM
ATRIAL RATE: 47 BPM
P AXIS: 36 DEGREES
P AXIS: 51 DEGREES
P AXIS: 52 DEGREES
PR INTERVAL: 176 MS
PR INTERVAL: 176 MS
PR INTERVAL: 180 MS
QRS AXIS: 49 DEGREES
QRS AXIS: 70 DEGREES
QRS AXIS: 72 DEGREES
QRSD INTERVAL: 82 MS
QRSD INTERVAL: 84 MS
QRSD INTERVAL: 84 MS
QT INTERVAL: 484 MS
QT INTERVAL: 494 MS
QT INTERVAL: 526 MS
QTC INTERVAL: 428 MS
QTC INTERVAL: 437 MS
QTC INTERVAL: 444 MS
T WAVE AXIS: 24 DEGREES
T WAVE AXIS: 27 DEGREES
T WAVE AXIS: 40 DEGREES
VENTRICULAR RATE: 43 BPM
VENTRICULAR RATE: 47 BPM
VENTRICULAR RATE: 47 BPM

## 2018-12-30 PROCEDURE — 99232 SBSQ HOSP IP/OBS MODERATE 35: CPT | Performed by: INTERNAL MEDICINE

## 2018-12-30 PROCEDURE — 93010 ELECTROCARDIOGRAM REPORT: CPT | Performed by: INTERNAL MEDICINE

## 2018-12-30 PROCEDURE — 93005 ELECTROCARDIOGRAM TRACING: CPT

## 2018-12-30 RX ADMIN — PRAVASTATIN SODIUM 10 MG: 10 TABLET ORAL at 16:47

## 2018-12-30 RX ADMIN — SOTALOL HYDROCHLORIDE 80 MG: 80 TABLET ORAL at 09:47

## 2018-12-30 RX ADMIN — APIXABAN 5 MG: 5 TABLET, FILM COATED ORAL at 08:19

## 2018-12-30 NOTE — PROGRESS NOTES
Progress Note - Cardiology   Michelle Bunn 75 y o  female MRN: [id number]  Unit/Bed#: [facility] 402-01 Encounter: [id number]    Assessment:  Principal Problem:    Persistent atrial fibrillation with rapid ventricular response (HCC)  Active Problems:    Sinus bradycardia    Anticoagulant long-term use    Nonrheumatic mitral valve regurgitation    PAF - now cardioverted back to sinus rhythm  She is bradycardic currently  Plan:    Continue sotalol 80mg twice a day  Follow QTc, currently not prolonging  Plan for PPM tomorrow  No Eliquis tonight  Keep off toprol  NPO after midnight  Subjective/Objective     Subjective:   Didn't walk much, but no further lightheadedness  Remains bradycardic in sinus  Objective:    Vitals: /58   Pulse 65   Temp 98 6 °F (37 °C) (Oral)   Resp 17   Ht 5' 5" (1 651 m)   Wt 96 4 kg (212 lb 8 oz)   SpO2 95%   BMI 35 36 kg/m²   Vitals:    12/26/18 1142   Weight: 96 4 kg (212 lb 8 oz)     Orthostatic Blood Pressures      Most Recent Value   Blood Pressure  105/58 filed at 12/30/2018 9098   Patient Position - Orthostatic VS  Sitting filed at 12/30/2018 0741            Intake/Output Summary (Last 24 hours) at 12/30/18 0920  Last data filed at 12/30/18 0474   Gross per 24 hour   Intake              710 ml   Output             1100 ml   Net             -390 ml     Physical Exam:  GEN: Michelle Bunn appears well, alert and oriented x 3, pleasant and cooperative   HEENT: pupils equal, round, and reactive to light; extraocular muscles intact  NECK: supple, no carotid bruits   HEART: bradycardic, regular    LUNGS: clear to auscultation bilaterally; no wheezes, rales, or rhonchi   ABDOMEN: normal bowel sounds, soft, no tenderness, no distention  EXTREMITIES: peripheral pulses normal; no clubbing, cyanosis, or edema  NEURO: no focal findings   SKIN: normal without suspicious lesions on exposed skin    Medications:    Current Facility-Administered Medications:     acetaminophen (TYLENOL) tablet 650 mg, 650 mg, Oral, Q6H PRN, Christiano Garduno PA-C    apixaban (ELIQUIS) tablet 5 mg, 5 mg, Oral, BID, Stephania Allen MD, 5 mg at 12/30/18 2362    lisinopril (ZESTRIL) tablet 2 5 mg, 2 5 mg, Oral, Daily, Christiano Garduno PA-C, Stopped at 12/29/18 0831    pravastatin (PRAVACHOL) tablet 10 mg, 10 mg, Oral, Daily With Dinner, Christiano Garduno PA-C, 10 mg at 12/29/18 1623    sotalol (BETAPACE) tablet 80 mg, 80 mg, Oral, Q12H [facility], Hue Gan PA-C, 80 mg at 12/29/18 1957    Lab Results:        Results from last 7 days  Lab Units 12/26/18  1247   WBC Thousand/uL 8 00   HEMOGLOBIN g/dL 16 6*   HEMATOCRIT % 48 3*   PLATELETS Thousands/uL 181           Results from last 7 days  Lab Units 12/26/18  1247   POTASSIUM mmol/L 4 0   CHLORIDE mmol/L 108   CO2 mmol/L 24   BUN mg/dL 14   CREATININE mg/dL 1 00   CALCIUM mg/dL 8 8     Telemetry: Personally reviewed  Sinus bradycardia  Brief 2:1 AV block

## 2018-12-31 ENCOUNTER — APPOINTMENT (INPATIENT)
Dept: NON INVASIVE DIAGNOSTICS | Facility: HOSPITAL | Age: 73
DRG: 244 | End: 2018-12-31
Payer: MEDICARE

## 2018-12-31 ENCOUNTER — APPOINTMENT (INPATIENT)
Dept: RADIOLOGY | Facility: HOSPITAL | Age: 73
DRG: 244 | End: 2018-12-31
Payer: MEDICARE

## 2018-12-31 LAB
ANION GAP SERPL CALCULATED.3IONS-SCNC: 6 MMOL/L (ref 4–13)
ATRIAL RATE: 61 BPM
BUN SERPL-MCNC: 17 MG/DL (ref 5–25)
CALCIUM SERPL-MCNC: 8.7 MG/DL (ref 8.3–10.1)
CHLORIDE SERPL-SCNC: 108 MMOL/L (ref 100–108)
CO2 SERPL-SCNC: 26 MMOL/L (ref 21–32)
CREAT SERPL-MCNC: 1.03 MG/DL (ref 0.6–1.3)
GFR SERPL CREATININE-BSD FRML MDRD: 54 ML/MIN/1.73SQ M
GLUCOSE SERPL-MCNC: 92 MG/DL (ref 65–140)
P AXIS: 64 DEGREES
POTASSIUM SERPL-SCNC: 3.8 MMOL/L (ref 3.5–5.3)
PR INTERVAL: 200 MS
QRS AXIS: 65 DEGREES
QRSD INTERVAL: 80 MS
QT INTERVAL: 454 MS
QTC INTERVAL: 457 MS
SODIUM SERPL-SCNC: 140 MMOL/L (ref 136–145)
T WAVE AXIS: 39 DEGREES
VENTRICULAR RATE: 61 BPM

## 2018-12-31 PROCEDURE — 02K83ZZ MAP CONDUCTION MECHANISM, PERCUTANEOUS APPROACH: ICD-10-PCS | Performed by: INTERNAL MEDICINE

## 2018-12-31 PROCEDURE — 80048 BASIC METABOLIC PNL TOTAL CA: CPT | Performed by: INTERNAL MEDICINE

## 2018-12-31 PROCEDURE — C1898 LEAD, PMKR, OTHER THAN TRANS: HCPCS

## 2018-12-31 PROCEDURE — C1785 PMKR, DUAL, RATE-RESP: HCPCS

## 2018-12-31 PROCEDURE — C1892 INTRO/SHEATH,FIXED,PEEL-AWAY: HCPCS | Performed by: INTERNAL MEDICINE

## 2018-12-31 PROCEDURE — 33208 INSRT HEART PM ATRIAL & VENT: CPT | Performed by: INTERNAL MEDICINE

## 2018-12-31 PROCEDURE — 02HK3JZ INSERTION OF PACEMAKER LEAD INTO RIGHT VENTRICLE, PERCUTANEOUS APPROACH: ICD-10-PCS | Performed by: INTERNAL MEDICINE

## 2018-12-31 PROCEDURE — 0JH606Z INSERTION OF PACEMAKER, DUAL CHAMBER INTO CHEST SUBCUTANEOUS TISSUE AND FASCIA, OPEN APPROACH: ICD-10-PCS | Performed by: INTERNAL MEDICINE

## 2018-12-31 PROCEDURE — 93005 ELECTROCARDIOGRAM TRACING: CPT

## 2018-12-31 PROCEDURE — 02H63JZ INSERTION OF PACEMAKER LEAD INTO RIGHT ATRIUM, PERCUTANEOUS APPROACH: ICD-10-PCS | Performed by: INTERNAL MEDICINE

## 2018-12-31 PROCEDURE — 71045 X-RAY EXAM CHEST 1 VIEW: CPT

## 2018-12-31 PROCEDURE — 93010 ELECTROCARDIOGRAM REPORT: CPT | Performed by: INTERNAL MEDICINE

## 2018-12-31 PROCEDURE — C1769 GUIDE WIRE: HCPCS | Performed by: INTERNAL MEDICINE

## 2018-12-31 RX ORDER — SODIUM CHLORIDE 9 MG/ML
INJECTION, SOLUTION INTRAVENOUS CONTINUOUS PRN
Status: DISCONTINUED | OUTPATIENT
Start: 2018-12-31 | End: 2018-12-31 | Stop reason: SURG

## 2018-12-31 RX ORDER — MIDAZOLAM HYDROCHLORIDE 1 MG/ML
INJECTION INTRAMUSCULAR; INTRAVENOUS AS NEEDED
Status: DISCONTINUED | OUTPATIENT
Start: 2018-12-31 | End: 2018-12-31 | Stop reason: SURG

## 2018-12-31 RX ORDER — PROPOFOL 10 MG/ML
INJECTION, EMULSION INTRAVENOUS CONTINUOUS PRN
Status: DISCONTINUED | OUTPATIENT
Start: 2018-12-31 | End: 2018-12-31 | Stop reason: SURG

## 2018-12-31 RX ORDER — FENTANYL CITRATE 50 UG/ML
INJECTION, SOLUTION INTRAMUSCULAR; INTRAVENOUS AS NEEDED
Status: DISCONTINUED | OUTPATIENT
Start: 2018-12-31 | End: 2018-12-31 | Stop reason: SURG

## 2018-12-31 RX ORDER — VANCOMYCIN HYDROCHLORIDE 1 G/20ML
INJECTION, POWDER, LYOPHILIZED, FOR SOLUTION INTRAVENOUS AS NEEDED
Status: DISCONTINUED | OUTPATIENT
Start: 2018-12-31 | End: 2018-12-31 | Stop reason: SURG

## 2018-12-31 RX ORDER — LIDOCAINE HYDROCHLORIDE 10 MG/ML
INJECTION, SOLUTION INFILTRATION; PERINEURAL CODE/TRAUMA/SEDATION MEDICATION
Status: COMPLETED | OUTPATIENT
Start: 2018-12-31 | End: 2018-12-31

## 2018-12-31 RX ORDER — GENTAMICIN SULFATE 40 MG/ML
INJECTION, SOLUTION INTRAMUSCULAR; INTRAVENOUS CODE/TRAUMA/SEDATION MEDICATION
Status: COMPLETED | OUTPATIENT
Start: 2018-12-31 | End: 2018-12-31

## 2018-12-31 RX ADMIN — SODIUM CHLORIDE: 0.9 INJECTION, SOLUTION INTRAVENOUS at 08:08

## 2018-12-31 RX ADMIN — GENTAMICIN SULFATE 80 MG: 40 INJECTION, SOLUTION INTRAMUSCULAR; INTRAVENOUS at 10:10

## 2018-12-31 RX ADMIN — PROPOFOL 100 MCG/KG/MIN: 10 INJECTION, EMULSION INTRAVENOUS at 08:16

## 2018-12-31 RX ADMIN — IOHEXOL 20 ML: 350 INJECTION, SOLUTION INTRAVENOUS at 08:48

## 2018-12-31 RX ADMIN — VANCOMYCIN HYDROCHLORIDE 1 G: 1 INJECTION, POWDER, LYOPHILIZED, FOR SOLUTION INTRAVENOUS at 08:07

## 2018-12-31 RX ADMIN — SOTALOL HYDROCHLORIDE 80 MG: 80 TABLET ORAL at 18:24

## 2018-12-31 RX ADMIN — FENTANYL CITRATE 50 MCG: 50 INJECTION, SOLUTION INTRAMUSCULAR; INTRAVENOUS at 08:16

## 2018-12-31 RX ADMIN — PRAVASTATIN SODIUM 10 MG: 10 TABLET ORAL at 17:27

## 2018-12-31 RX ADMIN — LIDOCAINE HYDROCHLORIDE 20 ML: 10 INJECTION, SOLUTION INFILTRATION; PERINEURAL at 08:41

## 2018-12-31 RX ADMIN — MIDAZOLAM 1 MG: 1 INJECTION INTRAMUSCULAR; INTRAVENOUS at 08:12

## 2018-12-31 RX ADMIN — LIDOCAINE HYDROCHLORIDE 5 ML: 10 INJECTION, SOLUTION INFILTRATION; PERINEURAL at 08:45

## 2018-12-31 NOTE — ANESTHESIA PREPROCEDURE EVALUATION
Review of Systems/Medical History  Patient summary reviewed  Chart reviewed  No history of anesthetic complications     Cardiovascular  Exercise tolerance (METS): <4,  Hyperlipidemia, Hypertension , Valvular heart disease , mitral regurgitation, Dysrhythmias , atrial fibrillation,   Comment: 10/22/18:SUMMARY     LEFT VENTRICLE:  Systolic function was moderately reduced  Ejection fraction was estimated in the range of 35 % to 40 %  This study was inadequate for the evaluation of regional wall motion  Wall thickness was mildly increased  There was mild concentric hypertrophy ,  Pulmonary  Shortness of breath,        GI/Hepatic  Negative GI/hepatic ROS          Negative  ROS        Endo/Other  Negative endo/other ROS      GYN       Hematology   Musculoskeletal  Negative musculoskeletal ROS        Neurology    TIA, Headaches,   Comment: [facility] vision 7/2017 TIA  Symptoms resolved Psychology           Physical Exam    Airway    Mallampati score: II  TM Distance: >3 FB  Neck ROM: full     Dental       Cardiovascular      Pulmonary      Other Findings        Anesthesia Plan  ASA Score- 3     Anesthesia Type- IV sedation with anesthesia with ASA Monitors  Additional Monitors:   Airway Plan:         Plan Factors-    Induction- intravenous  Postoperative Plan-     Informed Consent- Anesthetic plan and risks discussed with patient  I personally reviewed this patient with the CRNA  Discussed and agreed on the Anesthesia Plan with the CRNA  Nadiya Organ

## 2018-12-31 NOTE — PROCEDURES
PPM - His lead , LPF region    History and physical were reviewed  Patient was examined and history was reviewed  No change in patient's condition Since history and physical has been completed        The pre- operative diagnosis:  Persistent AF, post sotalol loading +  DCCV into sinus rhythm  Sick sinus syndrome, HR in 40s  Symptomatic bradycardia        Postoperative diagnosis:  Persistent AF, post sotalol loading +  DCCV into sinus rhythm  Sick sinus syndrome, HR in 40s  Symptomatic bradycardia        Procedure:  Dual chamber PPM  RA lead   RV lead - His lead - with LPF capture         Surgeon: [address] [place] [address] Drive -none    Specimens - none    Estimated blood loss- 10 ml    Findings-none    Complications none    Anesthesia-  Anesthesia by anaesthesiologist , local lidocaine by myself      Details of the device                Description of procedure: The patient was seen before the procedure  The details of the procedure was explained and patient agreed to the same  Appropriate consent was signed  The patient was brought to the electrophysiologic laboratory  Proper time out was done  Sterile dressing and draping was done  Local lidocaine was infiltrated, In the infraclavicular region at the site of device implant  Initial incision was made by a sharp number 10 blade  Thereafter electrocautery and blunt dissection was used to form a prepectoral pocket  Appropriate hemostasis was taken care of      20 mL of radiocontrast, followed by 20 mL of saline, was injected in a peripheral vein on the side of the implant  Under direct visualization, the axillary vein was  Punctured,extra-thoracic  A single guidewire was inserted, and taking all the way to the inferior vena cava to confirm that it is on the right side  We also confirmed by the left anterior oblique view that the wire is in the right side  Thereafter a second access was obtained using the fluoroscopic image of the venogram as a roadmap   Wire was once again taken to the inferior vena cava, and position checked in Azeri views To confirm the appropriate position  A 7F sheath passed over first wire  The atrial lead was placed in the ventricle for back up pacing if needed  A 7F sheath was passed over the second wire  A His sheath and lead was passed through the sheath  Mapping of His bundle done  Then went distally and inferiorly and left posterior fascicle (LPF) was mapped   Position of the lead was confirmed in both MOTA and Azeri views Appropriate sensing and thresholds were noted  Lead was screwed in LPF position with non selective capture  The sheath was slit and removed  Once again the lead was tested for appropriate sensing, impedance and threshold  The lead was tied down to the prepectoral fascia and muscle  The patient was paced from the LPF  for second part of procedure       The atrial lead in the ventricle was pulled back into the atrium  Wth the curved stylet in it, was maneuvered into the right atrial appendage  Appropriate sensing and thresholds were noted  The lead was screwed in  The sheath was removed  The lead was sutured to the pectoral muscle and fascia      The pocket was washed with large amounts of antibiotic saline  Appropriate hemostasis was taken care of  The lead was connected to the generator  The generator and leads were placed inside the pocket  The generator was tied down  Thereafter the device was interrogated and proper sensing and thresholds through the device were confirmed  Thrombin was placed in the pocket    The pocket was closed in 3 separate layers with intermittent sutures  Dressing was done with Steri-Strips, Telfa and Tegaderm  A modified pressure bandage was applied         Summary of the procedure: The patient came in to the laboratory for dual -chamber device placement   The device has been placed and patient tolerated the procedure well

## 2018-12-31 NOTE — UTILIZATION REVIEW
Continued Stay Review    Date: 12/30/18    Vital Signs: BP (P) 93/62 Comment: Map 73  Pulse 62   Temp (!) 97 4 °F (36 3 °C) (Axillary)   Resp 17   Ht 5' 5" (1 651 m)   Wt 96 4 kg (212 lb 8 oz)   SpO2 94%   BMI 35 36 kg/m²     Medications:   Scheduled Meds:   Current Facility-Administered Medications:  acetaminophen 650 mg Oral Q6H PRN   lisinopril 2 5 mg Oral Daily   pravastatin 10 mg Oral Daily With Dinner   sotalol 80 mg Oral Q12H [address]     Continuous Infusions:    PRN Meds:   acetaminophen    Age/Sex: 75 y o  female     Assessment:  Principal Problem:    Persistent atrial fibrillation with rapid ventricular response (HCC)  Active Problems:    Sinus bradycardia    Anticoagulant long-term use    Nonrheumatic mitral valve regurgitation     PAF - now cardioverted back to sinus rhythm  She is bradycardic currently      Plan:     Continue sotalol 80mg twice a day  Follow QTc, currently not prolonging  Plan for PPM tomorrow  No Eliquis tonight  Keep off toprol  NPO after midnight  Discharge Plan: TBD    [address] Utilization Review Department  Phone: [phone]; Fax [phone]  [email or web address]  ATTENTION: Please call with any questions or concerns to [phone]  and carefully listen to the prompts so that you are directed to the right person  Send all requests for admission clinical reviews, approved or denied determinations and any other requests to fax [phone]   All voicemails are confidential

## 2018-12-31 NOTE — PROGRESS NOTES
Pt NSB entire shift in mid 45s  Unable to give last night dose of sotalol or this mornings  Cardiology fellow Syed Szymanski made aware    Will discuss with day team

## 2018-12-31 NOTE — DISCHARGE INSTRUCTIONS
Please refer to post pacemaker implantation discharge instructions and restrictions and your pacemaker booklet/temporary card  Keep incision dry for one week  Do not use lotions/powders/creams on incision  Remove outer bandage 48 hours after implantation  Leave underlying steri-strips in place, they will either fall off on their own or will be removed at 2 week follow up appointment  No overhead reaching/pushing/pulling/lifting greater than 5-10lbs with left arm for one month  Please call the office if you notice redness, swelling, bleeding, or drainage from incision or if you develop fevers  AFTER PACEMAKER CARE:    If you have any questions, please call [phone] to speak with a nurse (8:30am-4pm, or [phone] after hours)  For appointments, please call [phone]  WHAT YOU SHOULD KNOW:   A pacemaker is a small, battery-powered device that is placed under your skin in your upper chest area with wires placed through a vein that lead directly into the heart  It helps regulate your heart rate and prevent your heart from beating too slowly                  AFTER YOU LEAVE:     Medicines:     · Pain medicine: You may need medicine to take away or decrease pain  ¨ Learn how to take your medicine  Ask what medicine and how much you should take  Be sure you know how, when, and how often to take it  Usually Over the counter pain medicine is sufficient to control pain (Acetominophen or Ibuprofen) Ask your doctor if you may take these  If this does not control your pain, narcotic pain killers may be prescribed, please call if you need prescription  ¨ Do not wait until the pain is severe before you take your medicine  Tell caregivers if your pain does not decrease  ¨ Pain medicine can make you dizzy or sleepy  Prevent falls by calling someone when you get out of bed or if you need help  Take your medicine as directed    Call your healthcare provider if you think your medicine is not helping or if you have side effects  Tell him if you are allergic to any medicine  Follow up with your cardiologist after your procedure: You will need a follow-up visit approximately 2 weeks after you leave the hospital  Your cardiologist will check your wound and make sure that your pacemaker is working correctly  Follow the instructions to check your pacemaker: Your cardiologist or primary healthcare provider will check your pacemaker and the battery regularly  He will use a computer to check your pacemaker over the telephone or wireless device which will be given to you  Pacemaker batteries usually last 5 to 10 years  The pacemaker unit will be replaced when the battery gets low  This is a simpler procedure than the original one to implant your pacemaker  Wound care:  Keep your incision dry for one week  Sponge/tub baths are preferred, try to avoid a shower for 7 days  Do not use lotions/powders/creams on incision  Remove outer bandage 48 hours after implantation  Leave underlying steri-strips in place, they will either fall off on their own or will be removed at 2 week follow up appointment  Please call the office if you notice redness, swelling, bleeding, or drainage from incision or if you develop fevers  Activity:   · Arm movement and lifting:  Be careful using the arm on the side of your pacemaker  Do not move your arm for the first 24 hours after your procedure  Do not  lift your arm above your shoulder or lift more than 10 pounds for one month after your procedure  Avoid pushing, pulling, or repetitive arm movements for one month  This helps the leads stay in place and helps your wound heal  Ask your caregiver when you can drive after your procedure  You may move your arm side to side without lifting above your shoulder, and do not need to wear a sling at home     · Typically driving is allowed after 1 week post pacemaker if you are stable, however in some cases it may be longer and you should discuss with your doctor before proceeding  · Sports:  Ask your caregiver when it is okay to play tennis, golf, basketball, or any sport that requires you to lift your arms  Do not play full contact sports, such as football, that could damage your pacemaker  Ask your cardiologist or primary healthcare provider how much and what kinds of physical activity are safe for you  Living with a pacemaker:   · Tell all caregivers you have a pacemaker: This includes surgeons, radiologists, and medical technicians  You may want to wear a medical alert ID bracelet or necklace that states that you have a pacemaker  · Carry your pacemaker ID card: Make sure you receive a pacemaker ID card  Carry it with you at all times  It lists important information about your pacemaker  Show it to airport security if you travel  · Avoid electrical interference:  Avoid welding equipment and other equipment with large magnets or electric fields  These things could interfere with how your pacemaker works  Use your cell phone on the ear opposite from your pacemaker  Do not carry your cell phone in your shirt pocket over your chest      · Some Pacemakers are MRI safe  Ask you doctor if it is safe to proceed with MRI and let the radiologist and staff know you have a pacemaker  · Do not touch the skin around your pacemaker: This can cause damage to the lead wires or move the pacemaker unit from where it should be  Contact your cardiologist or primary healthcare provider if:   · The area around your pacemaker has increasing amount of pain after surgery  The pain should improve over first few days after implantation  · The skin around your stitches has increasing redness, swelling, or has drainage  This may mean that you have an infection  · You have a fever  · You have chills, a cough, and feel weak or achy  These are also signs of infection  · Your feet or ankles are more swollen than your baseline  · Your Heart rate is less than 50 beats per minute     Seek care immediately if:   · Your bandage becomes soaked with blood  · Your pacemaker is swelling rapidly    · Your stitches open up  · You feel your heart suddenly beating very slowly or quickly  · You become too weak or dizzy to stand, or you pass out  · Your arm or leg feels warm, tender, and painful  It may look swollen and red  · You have chest pain that does not go away with rest or medicine  · You feel lightheaded, short of breath, and have chest pain  · You cough up blood  © 2014 [address] is for End User's use only and may not be sold, redistributed or otherwise used for commercial purposes  All illustrations and images included in CareNotes® are the copyrighted property of [employer] , [employer]  or Kumar Londno  The above information is an  only  It is not intended as medical advice for individual conditions or treatments  Talk to your doctor, nurse or pharmacist before following any medical regimen to see if it is safe and effective for you    Please refer to this medication list prior to beginning any new medications while on Sotalol:

## 2018-12-31 NOTE — PLAN OF CARE
DISCHARGE PLANNING     Discharge to home or other facility with appropriate resources Progressing        DISCHARGE PLANNING - CARE MANAGEMENT     Discharge to post-acute care or home with appropriate resources Progressing        Potential for Falls     Patient will remain free of falls Progressing        SAFETY ADULT     Patient will remain free of falls Progressing     Maintain or return to baseline ADL function Progressing     Maintain or return mobility status to optimal level Progressing

## 2019-01-01 ENCOUNTER — APPOINTMENT (INPATIENT)
Dept: NON INVASIVE DIAGNOSTICS | Facility: HOSPITAL | Age: 74
DRG: 244 | End: 2019-01-01
Payer: MEDICARE

## 2019-01-01 ENCOUNTER — APPOINTMENT (INPATIENT)
Dept: RADIOLOGY | Facility: HOSPITAL | Age: 74
DRG: 244 | End: 2019-01-01
Payer: MEDICARE

## 2019-01-01 VITALS
SYSTOLIC BLOOD PRESSURE: 130 MMHG | HEART RATE: 61 BPM | BODY MASS INDEX: 35.4 KG/M2 | WEIGHT: 212.5 LBS | OXYGEN SATURATION: 95 % | HEIGHT: 65 IN | RESPIRATION RATE: 18 BRPM | DIASTOLIC BLOOD PRESSURE: 75 MMHG | TEMPERATURE: 97.5 F

## 2019-01-01 LAB
ANION GAP SERPL CALCULATED.3IONS-SCNC: 9 MMOL/L (ref 4–13)
ATRIAL RATE: 60 BPM
ATRIAL RATE: 60 BPM
BUN SERPL-MCNC: 15 MG/DL (ref 5–25)
CALCIUM SERPL-MCNC: 8.6 MG/DL (ref 8.3–10.1)
CHLORIDE SERPL-SCNC: 109 MMOL/L (ref 100–108)
CO2 SERPL-SCNC: 21 MMOL/L (ref 21–32)
CREAT SERPL-MCNC: 1.01 MG/DL (ref 0.6–1.3)
ERYTHROCYTE [DISTWIDTH] IN BLOOD BY AUTOMATED COUNT: 14.1 % (ref 11.6–15.1)
GFR SERPL CREATININE-BSD FRML MDRD: 55 ML/MIN/1.73SQ M
GLUCOSE SERPL-MCNC: 97 MG/DL (ref 65–140)
HCT VFR BLD AUTO: 41.8 % (ref 34.8–46.1)
HGB BLD-MCNC: 14 G/DL (ref 11.5–15.4)
MCH RBC QN AUTO: 34.3 PG (ref 26.8–34.3)
MCHC RBC AUTO-ENTMCNC: 33.5 G/DL (ref 31.4–37.4)
MCV RBC AUTO: 103 FL (ref 82–98)
P AXIS: 58 DEGREES
PLATELET # BLD AUTO: 156 THOUSANDS/UL (ref 149–390)
PMV BLD AUTO: 12.5 FL (ref 8.9–12.7)
POTASSIUM SERPL-SCNC: 4 MMOL/L (ref 3.5–5.3)
PR INTERVAL: 172 MS
PR INTERVAL: 174 MS
QRS AXIS: 39 DEGREES
QRS AXIS: 57 DEGREES
QRSD INTERVAL: 76 MS
QRSD INTERVAL: 82 MS
QT INTERVAL: 414 MS
QT INTERVAL: 430 MS
QTC INTERVAL: 414 MS
QTC INTERVAL: 430 MS
RBC # BLD AUTO: 4.08 MILLION/UL (ref 3.81–5.12)
SODIUM SERPL-SCNC: 139 MMOL/L (ref 136–145)
T WAVE AXIS: 16 DEGREES
T WAVE AXIS: 39 DEGREES
VENTRICULAR RATE: 60 BPM
VENTRICULAR RATE: 60 BPM
WBC # BLD AUTO: 8.92 THOUSAND/UL (ref 4.31–10.16)

## 2019-01-01 PROCEDURE — 93010 ELECTROCARDIOGRAM REPORT: CPT | Performed by: INTERNAL MEDICINE

## 2019-01-01 PROCEDURE — 93308 TTE F-UP OR LMTD: CPT | Performed by: INTERNAL MEDICINE

## 2019-01-01 PROCEDURE — 85027 COMPLETE CBC AUTOMATED: CPT | Performed by: PHYSICIAN ASSISTANT

## 2019-01-01 PROCEDURE — 80048 BASIC METABOLIC PNL TOTAL CA: CPT | Performed by: PHYSICIAN ASSISTANT

## 2019-01-01 PROCEDURE — 93325 DOPPLER ECHO COLOR FLOW MAPG: CPT | Performed by: INTERNAL MEDICINE

## 2019-01-01 PROCEDURE — 93005 ELECTROCARDIOGRAM TRACING: CPT

## 2019-01-01 PROCEDURE — 71046 X-RAY EXAM CHEST 2 VIEWS: CPT

## 2019-01-01 PROCEDURE — 93308 TTE F-UP OR LMTD: CPT

## 2019-01-01 PROCEDURE — 93321 DOPPLER ECHO F-UP/LMTD STD: CPT | Performed by: INTERNAL MEDICINE

## 2019-01-01 RX ORDER — SOTALOL HYDROCHLORIDE 80 MG/1
80 TABLET ORAL EVERY 12 HOURS SCHEDULED
Qty: 60 TABLET | Refills: 3 | Status: SHIPPED | OUTPATIENT
Start: 2019-01-01 | End: 2019-01-04 | Stop reason: SDUPTHER

## 2019-01-01 RX ADMIN — LISINOPRIL 2.5 MG: 2.5 TABLET ORAL at 08:30

## 2019-01-01 RX ADMIN — SOTALOL HYDROCHLORIDE 80 MG: 80 TABLET ORAL at 07:05

## 2019-01-01 NOTE — NURSING NOTE
Went over discharge summary with patient and , pt aware and agreeable  Pt discharged via wheelchair accompanied by RN and

## 2019-01-02 ENCOUNTER — TRANSITIONAL CARE MANAGEMENT (OUTPATIENT)
Dept: FAMILY MEDICINE CLINIC | Facility: CLINIC | Age: 74
End: 2019-01-02

## 2019-01-03 ENCOUNTER — EPISODE CHANGES (OUTPATIENT)
Dept: CASE MANAGEMENT | Facility: HOSPITAL | Age: 74
End: 2019-01-03

## 2019-01-04 ENCOUNTER — PATIENT OUTREACH (OUTPATIENT)
Dept: FAMILY MEDICINE CLINIC | Facility: CLINIC | Age: 74
End: 2019-01-04

## 2019-01-04 DIAGNOSIS — I48.19 PERSISTENT ATRIAL FIBRILLATION WITH RAPID VENTRICULAR RESPONSE (HCC): ICD-10-CM

## 2019-01-04 RX ORDER — SOTALOL HYDROCHLORIDE 80 MG/1
80 TABLET ORAL EVERY 12 HOURS SCHEDULED
Qty: 180 TABLET | Refills: 3 | Status: SHIPPED | OUTPATIENT
Start: 2019-01-04 | End: 2019-01-10 | Stop reason: SDUPTHER

## 2019-01-04 NOTE — PROGRESS NOTES
Carolyn Elvin in good spirits  She stated that she lives with her  and he is a good emotional support for her  Has been independent with ADLs but knows she can not bare wait to arm until seen by cardiologist stated she is feeling well  Stated the incision is dry, clean and intact  Denied having drainage  Denies current pain, discomfort, SOB, edema  Reviewed over her medications  She is taking all listed medications as ordered  Did discuss medication management and importance of a good medication regime  She verbalized understanding  Stated that she has a good routine set and does not miss a dose  Stated blood pressure this morning was 127/70  Educated on s/s of heart failure and importance of notifying care coordination and or PCP with any change in symptoms prior to next f/u appointment  She verbalized understanding of the signs and symptoms  Reviewed over up coming [facility] appointment with PCP (Dr Michael You) as well as Cardiologist (Dr Lester Vasquez)  Provided her with date and time  Stated she will be attending and has no issues with transportation  Introduced her to [employer] (Cardiac arrhythimia)  She is in agreement  Provided her with contact information to care coordination  Will f/u

## 2019-01-07 ENCOUNTER — OFFICE VISIT (OUTPATIENT)
Dept: FAMILY MEDICINE CLINIC | Facility: CLINIC | Age: 74
End: 2019-01-07
Payer: MEDICARE

## 2019-01-07 VITALS
HEART RATE: 110 BPM | HEIGHT: 65 IN | TEMPERATURE: 96.1 F | SYSTOLIC BLOOD PRESSURE: 120 MMHG | BODY MASS INDEX: 35.16 KG/M2 | RESPIRATION RATE: 20 BRPM | WEIGHT: 211 LBS | DIASTOLIC BLOOD PRESSURE: 68 MMHG

## 2019-01-07 DIAGNOSIS — Z95.0 PACEMAKER: Primary | ICD-10-CM

## 2019-01-07 DIAGNOSIS — I10 ESSENTIAL HYPERTENSION: ICD-10-CM

## 2019-01-07 DIAGNOSIS — I48.19 PERSISTENT ATRIAL FIBRILLATION WITH RAPID VENTRICULAR RESPONSE (HCC): Primary | ICD-10-CM

## 2019-01-07 LAB
ATRIAL RATE: 107 BPM
ATRIAL RATE: 133 BPM
ATRIAL RATE: 48 BPM
P AXIS: 51 DEGREES
PR INTERVAL: 174 MS
QRS AXIS: 125 DEGREES
QRS AXIS: 51 DEGREES
QRS AXIS: 53 DEGREES
QRSD INTERVAL: 78 MS
QRSD INTERVAL: 80 MS
QRSD INTERVAL: 82 MS
QT INTERVAL: 348 MS
QT INTERVAL: 392 MS
QT INTERVAL: 500 MS
QTC INTERVAL: 446 MS
QTC INTERVAL: 485 MS
QTC INTERVAL: 503 MS
T WAVE AXIS: 213 DEGREES
T WAVE AXIS: 28 DEGREES
T WAVE AXIS: 31 DEGREES
VENTRICULAR RATE: 117 BPM
VENTRICULAR RATE: 48 BPM
VENTRICULAR RATE: 99 BPM

## 2019-01-07 PROCEDURE — 93010 ELECTROCARDIOGRAM REPORT: CPT | Performed by: INTERNAL MEDICINE

## 2019-01-07 PROCEDURE — 99495 TRANSJ CARE MGMT MOD F2F 14D: CPT | Performed by: FAMILY MEDICINE

## 2019-01-07 NOTE — PROGRESS NOTES
TCM Call (since 12/7/2018)     Date and time call was made  1/2/2019  3:29 PM    Hospital care reviewed  Records reviewed    Patient was hospitialized at  [facility]    Date of Admission  12/26/18    Date of discharge  01/01/19    Diagnosis  Persistent atrial fibrillation with rapid ventricular response ([facility] [address] )     Disposition  Home    Were the patients medications reviewed and updated  No    Current Symptoms  Arm pain - left side; Swelling    Arm pain left side severity  Mild    Arm pain, left side, onset  Progressive      TCM Call (since 12/7/2018)     Clinical progress swelling  Improving    Post hospital issues  None    Should patient be enrolled in anticoag monitoring? No    Scheduled for follow up? Yes    Did you obtain your prescribed medications  Yes    Do you need help managing your prescriptions or medications  No    Is transportation to your appointment needed  No    I have advised the patient to call PCP with any new or worsening symptoms  Awanda Lanes M  A  Living Arrangements  Spouse or Significiant other    Are you recieving any outpatient services  No    Are you recieving home care services  No    Are you using any community resources  No    Current waiver services  No    Have you fallen in the last 12 months  No    Interperter language line needed  No    Counseling  Patient            Assessment/Plan:       Diagnoses and all orders for this visit:    Persistent atrial fibrillation with rapid ventricular response ([facility] [address] )    Essential hypertension        Persistent atrial fibrillation with rapid ventricular response (HCC)  Continue sotalol, Eliquis  Follow up with cardiologist as scheduled on January 10th  Essential hypertension  Better controlled, since getting her heart rate better, continue the lisinopril  Monitor her blood pressure at home  Follow up with her cardiologist as scheduled on January 10th  Subjective:      Patient ID: Elio Anderson is a 75 y o  female  Patient is seen today for a transition of care management  She was admitted to [facility] for atrial fibrillation with rapid ventricular response  She developed significant bradycardia after medical treatment for her atrial fibrillation  A pacemaker was inserted, she states she is feeling much better since discharge  She is now taking only the sotalol for rate control  She is also taking the Eliquis  She is due to follow up with her cardiologist on January 10th  The following portions of the patient's history were reviewed and updated as appropriate:   She has a past medical history of Atrial fibrillation ([address] ); Headache; Hyperlipidemia; Hypertension; Lyme disease; Shortness of breath; TIA (transient ischemic attack); Transient cerebral ischemia; and Vertigo  ,   does not have any pertinent problems on file  ,   has a past surgical history that includes Tonsillectomy; Colonoscopy; and pr sigmoidoscopy flx dx w/collj spec br/wa if pfrmd (N/A, 11/28/2017)  ,  family history includes Alzheimer's disease in her father; Dementia in her father; Lung cancer in her mother; Other in her father  ,   reports that she quit smoking about 42 years ago  Her smoking use included Cigarettes  She has never used smokeless tobacco  She reports that she drinks about 4 2 oz of alcohol per week   She reports that she does not use drugs  ,  has No Known Allergies     Current Outpatient Prescriptions   Medication Sig Dispense Refill    ELIQUIS 5 MG TAKE 1 TABLET BY MOUTH TWO  TIMES DAILY 180 tablet 1    lisinopril (ZESTRIL) 2 5 mg tablet Take 1 tablet (2 5 mg total) by mouth daily 90 tablet 3    lovastatin (MEVACOR) 10 MG tablet TAKE 1 TABLET BY MOUTH AT  BEDTIME 90 tablet 1    sotalol (BETAPACE) 80 mg tablet Take 1 tablet (80 mg total) by mouth every 12 (twelve) hours 180 tablet 3     No current facility-administered medications for this visit          Review of Systems   Constitutional: Negative for chills, fatigue and fever  HENT: Negative for congestion, ear pain, hearing loss, postnasal drip, rhinorrhea and sore throat  Eyes: Negative for pain and visual disturbance  Respiratory: Negative for chest tightness, shortness of breath and wheezing  Cardiovascular: Negative for chest pain and leg swelling  Gastrointestinal: Negative for abdominal distention, abdominal pain, constipation, diarrhea and vomiting  Endocrine: Negative for cold intolerance and heat intolerance  Genitourinary: Negative for difficulty urinating, frequency and urgency  Musculoskeletal: Negative for arthralgias and gait problem  Skin: Negative for color change  Neurological: Negative for dizziness, tremors, syncope, numbness and headaches  Hematological: Negative for adenopathy  Psychiatric/Behavioral: Negative for agitation, confusion and sleep disturbance  The patient is not nervous/anxious  Objective:  Vitals:    01/07/19 1552 01/07/19 1643   BP: 120/68    Pulse: 68 (!) 110   Resp: 20    Temp: (!) 96 1 °F (35 6 °C)    Weight: 95 7 kg (211 lb)    Height: 5' 5" (1 651 m)      Body mass index is 35 11 kg/m²  Physical Exam   Constitutional: She is oriented to person, place, and time  She appears well-developed and well-nourished  HENT:   Head: Normocephalic  Mouth/Throat: Oropharynx is clear and moist    Eyes: Conjunctivae are normal  No scleral icterus  Neck: Normal range of motion  No thyromegaly present  Cardiovascular: Normal rate and normal heart sounds  An irregularly irregular rhythm present  No murmur heard  Pulmonary/Chest: Effort normal and breath sounds normal  No respiratory distress  She has no wheezes  Abdominal: Soft  Bowel sounds are normal  She exhibits no distension  There is no tenderness  Musculoskeletal: Normal range of motion  She exhibits no edema or tenderness  Lymphadenopathy:     She has no cervical adenopathy     Neurological: She is alert and oriented to person, place, and time  No cranial nerve deficit  Skin: Skin is warm and dry  No rash noted  No pallor  Left anterior chest wall incision, clean, dry, Steri-Strips in place  Psychiatric: She has a normal mood and affect  Her behavior is normal    Nursing note and vitals reviewed

## 2019-01-07 NOTE — ASSESSMENT & PLAN NOTE
Better controlled, since getting her heart rate better, continue the lisinopril  Monitor her blood pressure at home

## 2019-01-07 NOTE — PROGRESS NOTES
Per Dr Nilda Manzanares, a limited echo 1 week after pacemaker was requested  I called the patient and she denies any chest pain, lightheadedness, or dizziness  She does say that she saw her PCP yesterday and his office is a little bit of a walk from the parking lot  So when she did finally reach the front door, she was a little winded  I explained to her how Dr Nilda Manzanares would still like her to get a limited echo one week after placement  Patient was agreeable to this and I will call the girls at the office to help set up the apppointment

## 2019-01-08 ENCOUNTER — TELEPHONE (OUTPATIENT)
Dept: CARDIOLOGY CLINIC | Facility: CLINIC | Age: 74
End: 2019-01-08

## 2019-01-10 ENCOUNTER — OFFICE VISIT (OUTPATIENT)
Dept: CARDIOLOGY CLINIC | Facility: CLINIC | Age: 74
End: 2019-01-10
Payer: MEDICARE

## 2019-01-10 VITALS
WEIGHT: 212 LBS | SYSTOLIC BLOOD PRESSURE: 126 MMHG | DIASTOLIC BLOOD PRESSURE: 78 MMHG | OXYGEN SATURATION: 97 % | HEIGHT: 65 IN | BODY MASS INDEX: 35.32 KG/M2 | HEART RATE: 90 BPM

## 2019-01-10 DIAGNOSIS — E78.2 MIXED HYPERLIPIDEMIA: ICD-10-CM

## 2019-01-10 DIAGNOSIS — I36.1 NONRHEUMATIC TRICUSPID VALVE REGURGITATION: ICD-10-CM

## 2019-01-10 DIAGNOSIS — I42.8 OTHER CARDIOMYOPATHY (HCC): Primary | ICD-10-CM

## 2019-01-10 DIAGNOSIS — I34.0 NONRHEUMATIC MITRAL VALVE REGURGITATION: ICD-10-CM

## 2019-01-10 DIAGNOSIS — Z95.0 PRESENCE OF CARDIAC PACEMAKER: ICD-10-CM

## 2019-01-10 DIAGNOSIS — I35.1 NONRHEUMATIC AORTIC VALVE INSUFFICIENCY: ICD-10-CM

## 2019-01-10 DIAGNOSIS — I48.0 PAROXYSMAL ATRIAL FIBRILLATION (HCC): ICD-10-CM

## 2019-01-10 DIAGNOSIS — Z79.01 ANTICOAGULANT LONG-TERM USE: ICD-10-CM

## 2019-01-10 DIAGNOSIS — E66.9 OBESITY (BMI 30-39.9): ICD-10-CM

## 2019-01-10 DIAGNOSIS — I48.19 PERSISTENT ATRIAL FIBRILLATION WITH RAPID VENTRICULAR RESPONSE (HCC): ICD-10-CM

## 2019-01-10 PROCEDURE — 99215 OFFICE O/P EST HI 40 MIN: CPT | Performed by: INTERNAL MEDICINE

## 2019-01-10 PROCEDURE — 93000 ELECTROCARDIOGRAM COMPLETE: CPT

## 2019-01-10 RX ORDER — SOTALOL HYDROCHLORIDE 80 MG/1
120 TABLET ORAL EVERY 12 HOURS SCHEDULED
Qty: 270 TABLET | Refills: 1
Start: 2019-01-10 | End: 2019-01-15 | Stop reason: SDUPTHER

## 2019-01-10 NOTE — PROGRESS NOTES
**DELETED NOTE**     Review of Systems   All other systems reviewed and are negative  Physical Exam   Constitutional: She is oriented to person, place, and time  She appears well-developed and well-nourished  No distress  Not in any distress at the current time   HENT:   Head: Normocephalic and atraumatic  Right Ear: External ear normal    Left Ear: External ear normal    Nose: Nose normal    Mouth/Throat: Uvula is midline and mucous membranes are normal    Posterior pharynx is crowded   Eyes: Pupils are equal, round, and reactive to light  Conjunctivae, EOM and lids are normal  No scleral icterus  No pallor  No cyanosis  No icterus   Neck: Trachea normal and normal range of motion  No JVD present  Carotid bruit is not present  No thyromegaly present  Short thick neck  No jugular lymphadenopathy   Cardiovascular: S1 normal, S2 normal, intact distal pulses and normal pulses  An irregularly irregular rhythm present  Tachycardia present  PMI is not displaced  Exam reveals no gallop, no S3, no S4 and no friction rub  Murmur heard  Pulmonary/Chest: Effort normal and breath sounds normal  No accessory muscle usage  No respiratory distress  She has no decreased breath sounds  She has no wheezes  She has no rhonchi  She has no rales  She exhibits no tenderness  Abdominal: Soft  Normal appearance and bowel sounds are normal  She exhibits no distension and no mass  There is no splenomegaly or hepatomegaly  There is no tenderness  Musculoskeletal: Normal range of motion  She exhibits no tenderness or deformity  Mild edema   Lymphadenopathy:     She has no cervical adenopathy  Neurological: She is alert and oriented to person, place, and time  Facial symmetry is retained  Extraocular movements are retained  Head neck tongue and palate movement are retained and symmetric   Skin: Skin is intact  No abrasion, no lesion and no rash noted  No erythema  Nails show no clubbing     Psychiatric: She has a normal mood and affect   Her speech is normal and behavior is normal  Thought content normal

## 2019-01-10 NOTE — PROGRESS NOTES
CARDIOLOGY OFFICE VISIT  [facility]  [place] [address] [place] [address], 0 [facility], [place], [facility] Rohan Obando  Tel: [phone]      NAME: Fior Bailey  AGE: 75 y o  SEX: female  : 1945   MRN: [id number]      Chief Complaint:  Chief Complaint   Patient presents with    Follow-up     recent pacemaker implant - site check         History of Present Illness:   Patient comes for follow up s/p PPM implantation by Dr Ceci Vila  Fior Bailey is a 75 y o  female who was admitted electively for antiarrhythmic medication initiation at [facility] in Dec 2018   Patient was referred to and seen in the office by Dr Ceci Vila and recommended to initiate therapy with Sotalol 120mg PO BID   She had been on chronic anticoagulation therapy with eliquis without missed dose      Patient underwent first 4 doses of AAD without complications    She presented in atrial fibrillation and remained throughout the hospital stay until she underwent DCCV on 18   Serial EKGs were performed 2 hours after each dose of AAD   There were no significant changes in QT/QTc with intiating doses   There were no significant occurrence of ventricular ectopy on telemetry   Electrolytes and renal function were monitored throughout her stay      After her cardioversion on 18 patient was found to be sinus bradycardia in the mid 40s for which she was asmyptomatic  On admission her toprol XL dose was reduced to 25mg PO QD but after found to be markedly bradycardic her toprol XL was discontinued entirely  Her Sotalol was decreased to 80mg PO BID due to this bradycardia with her 5th dose on 18 being held  Therefore, a pacemaker was recommended at that time      States she is feeling good and denies chest pain / pressure, SOB (at baseline), palpitations, lightheadedness, syncope, swelling feet, orthopnea, PND, claudication    Also denies pain or redness at pacemaker site    CMP -  Even though the patient is feeling well, her EF by latest echo had gone down to 35-40% from prior EF 40-45%  Hence she was referred to EP for rhythm control  Prior normal stress test  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     PAF - Patient was diagnosed with atrial fibrillation on an EKG done at her PCP office  Patient had no symptoms from it  Patient denied palpitations, lightheadedness, syncope, SOB, chest pain / pressure, swelling feet, orthopnea, PND  She was cardioverted and was in sinus rhythm for a while but then went back into Afib  Hence she was referred to EP for rhythm management     HLP -  Has had hyperlipidemia for many years  Loralie Life statin regularly along with diet control   Denies myalgia   PCP closely monitoring the blood work      MELISSA GALVEZ TR - stable  Asymptomatic     Obesity - Trying to lose weight      H/O TIA    Past Medical History:  Past Medical History:   Diagnosis Date    Atrial fibrillation ([address] )     Headache     Hyperlipidemia     Hypertension     Lyme disease     Shortness of breath     TIA (transient ischemic attack)     Transient cerebral ischemia     Last assessed - 1/23/18    Vertigo          Past Surgical History:  Past Surgical History:   Procedure Laterality Date    COLONOSCOPY      MT SIGMOIDOSCOPY FLX DX W/COLLJ SPEC BR/WA IF PFRMD N/A 11/28/2017    Procedure: Betzy Madrid;  Surgeon: Syeda Quigley MD;  Location: [facility];   Service: Gastroenterology    TONSILLECTOMY           Family History:  Family History   Problem Relation Age of Onset    Lung cancer Mother     Dementia Father     Alzheimer's disease Father     Other Father         Cardiac Disorder          Social History:  Social History     Social History    Marital status: /Civil Union     Spouse name: N/A    Number of children: N/A    Years of education: N/A     Occupational History    Retired      Social History Main Topics    Smoking status: Former Smoker     Types: Cigarettes     Quit date: 1977  Smokeless tobacco: Never Used    Alcohol use 4 2 oz/week     7 Glasses of wine per week      Comment: occ    Drug use: No    Sexual activity: No     Other Topics Concern    Not on file     Social History Narrative    Always uses seat belt             Active Problems:  Patient Active Problem List   Diagnosis    Persistent atrial fibrillation with rapid ventricular response (HCC)    Essential hypertension    Mixed hyperlipidemia    Obesity (BMI 30-39  9)    H/O TIA (transient ischemic attack) and stroke    Aphasia, mixed    Migraine with aura and without status migrainosus, not intractable    URI, acute    Viral exanthem    Myocardiopathy ([address] )    Shortness of breath on exertion    Anticoagulant long-term use    Nonrheumatic mitral valve regurgitation    Nonrheumatic aortic valve insufficiency    Nonrheumatic tricuspid valve regurgitation    Cardiomyopathy (HCC)    Sinus bradycardia         The following portions of the patient's history were reviewed and updated as appropriate: past medical history, past surgical history, past family history,  past social history, current medications, allergies and problem list       Review of Systems:  Constitutional: Denies fever, chills, fatigue  Eyes: Denies eye redness, eye discharge, double vision  ENT: Denies hearing loss, tinnitus, sneezing, nasal discharge, sore throat   Respiratory: Denies cough, expectoration, hemoptysis   +shortness of breath  Cardiovascular: Denies chest pain, palpitations, orthopnea, PND, lower extremity swelling  Gastrointestinal: Denies abdominal pain, nausea, vomiting, hematemesis, diarrhea, bloody stools  Genito-Urinary: Denies dysuria, incontinence  Musculoskeletal: Denies back pain, joint pain, muscle pain  Neurologic: Denies confusion, lightheadedness, syncope, headache, focal weakness, sensory changes, seizures  Endocrine: Denies polyuria, polydipsia, temperature intolerance  Allergy and Immunology: Denies hives, insect bite sensitivity  Hematological and Lymphatic: Denies bleeding problems, swollen glands   Psychological: Denies depression, suicidal ideation, anxiety, panic  Dermatological: Denies pruritus, rash, skin lesion changes      Vitals:  Vitals:    01/10/19 0959   BP: 126/78   Pulse: 90   SpO2: 97%       Body mass index is 35 28 kg/m²  Weight (last 2 days)     Date/Time   Weight    01/10/19 0959  96 2 (212)                Physical Examination:  General: Obese  Patient is not in acute distress  Awake, alert, oriented in time, place and person  Responding to commands  Head: Normocephalic  Atraumatic  Eyes: Both pupils normal sized, round and reactive to light  Nonicteric  ENT: Normal external ear canals  Nares normal, no drainage  Lips and oral mucosa normal  Neck: Supple  JVP not raised  Trachea central  No thyromegaly  Lungs: Bilateral bronchovascular breath sounds with no crackles or rhonchi  Chest wall: No tenderness  Cardiovascular:  Irregular rhythm  S1 variable and S2 normal  Grade 3/6 PSMs at apex and LLSB  No rub or gallop  Gastrointestinal: Abdomen soft, nontender  No guarding or rigidity  Liver and spleen not palpable  Bowel sounds present  Neurologic: Patient is awake, alert, oriented in time, place and person  Responding to command  Moving all extremities  Integumentary:  No skin rash  Lymphatic: No cervical lymphadenopathy  Back: Symmetric   No CVA tenderness  Extremities: No clubbing, cyanosis or edema      Laboratory Results:  CBC with diff:   Lab Results   Component Value Date    WBC 8 92 01/01/2019    RBC 4 08 01/01/2019    HGB 14 0 01/01/2019    HCT 41 8 01/01/2019     (H) 01/01/2019    MCH 34 3 01/01/2019    RDW 14 1 01/01/2019     01/01/2019       CMP:  Lab Results   Component Value Date    CREATININE 1 01 01/01/2019    BUN 15 01/01/2019    K 4 0 01/01/2019     (H) 01/01/2019    CO2 21 01/01/2019    ALKPHOS 90 01/22/2018    ALT 27 01/22/2018    AST 21 01/22/2018     Lipid Profile: No results found for: CHOL  Lab Results   Component Value Date    HDL 63 (H) 2018    HDL 64 (H) 2017    HDL 60 2016     Lab Results   Component Value Date    LDLCALC 76 2018    LDLCALC 110 (H) 2017    LDLCALC 93 2016     Lab Results   Component Value Date    TRIG 85 2018    TRIG 75 2017    TRIG 80 2016       Cardiac testing:   Results for orders placed during the hospital encounter of 18   Echo complete with contrast if indicated    Narrative [facility], [address] [facility]  [phone]    Transthoracic Echocardiogram  2D, M-mode, and Color Doppler    Study date:  2018    Patient: Camille Ball  MR number: [id number]  Account number: [de-identified]  : 1945  Age: 67 years  Gender: Female  Status: Outpatient  Location: [facility]  Height: 63 in  Weight: 219 lb  BP: 138/ 82 mmHg    Indications: Atrial Fibrillation  Diagnoses: I48 0 - Atrial fibrillation    Sonographer:  Martins RCS  Interpreting Physician:  Vaishali Corbett MD  Primary Physician:  Marjorie Stoner DO  Referring Physician:  Vaishali Corbett MD  Group:  [facility]    SUMMARY    LEFT VENTRICLE:  Systolic function was moderately reduced  Ejection fraction was estimated in the range of 40 % to 45 %, likely underestimated due to rapid atrial fibrillation  Although no diagnostic regional wall motion abnormality was identified, this possibility cannot be completely excluded on the basis of this study  Wall thickness was mildly increased  There was mild concentric hypertrophy  RIGHT VENTRICLE:  The size was normal   Systolic function was mildly reduced  LEFT ATRIUM:  The atrium was mildly dilated  MITRAL VALVE:  There was mild regurgitation  AORTIC VALVE:  There was mild regurgitation  TRICUSPID VALVE:  There was mild to moderate regurgitation    Pulmonary artery systolic pressure was within the normal range  PULMONIC VALVE:  There was trace regurgitation  HISTORY: PRIOR HISTORY: Hyperlipidemia  Atrial fibrillation  Risk factors: hypertension and morbid obesity  Cerebrovascular disease  PROCEDURE: The study was performed in the [address] [place] [address]  This was a routine study  The transthoracic approach was used  The study included complete 2D imaging, M-mode, and color Doppler  The heart rate was 127 bpm, at the  start of the study  Images were obtained from the parasternal, apical, subcostal, and suprasternal notch acoustic windows  Image quality was adequate  LEFT VENTRICLE: Size was normal  Systolic function was moderately reduced  Ejection fraction was estimated in the range of 40 % to 45 %, likely underestimated due to rapid atrial fibrillation  Although no diagnostic regional wall motion  abnormality was identified, this possibility cannot be completely excluded on the basis of this study  Wall thickness was mildly increased  There was mild concentric hypertrophy  No evidence of apical thrombus  RIGHT VENTRICLE: The size was normal  Systolic function was mildly reduced  Wall thickness was normal     LEFT ATRIUM: The atrium was mildly dilated  RIGHT ATRIUM: Size was normal     MITRAL VALVE: There was annular calcification  There was normal leaflet separation  DOPPLER: The transmitral velocity was within the normal range  There was no evidence for stenosis  There was mild regurgitation  AORTIC VALVE: The valve was trileaflet  Leaflets exhibited mildly increased thickness and normal cuspal separation  DOPPLER: Transaortic velocity was within the normal range  There was no evidence for stenosis  There was mild  regurgitation  TRICUSPID VALVE: The valve structure was normal  There was normal leaflet separation  DOPPLER: The transtricuspid velocity was within the normal range  There was no evidence for stenosis  There was mild to moderate regurgitation  Pulmonary  artery systolic pressure was within the normal range  PULMONIC VALVE: Leaflets exhibited normal thickness, no calcification, and normal cuspal separation  DOPPLER: The transpulmonic velocity was within the normal range  There was trace regurgitation  PERICARDIUM: There was no pericardial effusion  The pericardium was normal in appearance  AORTA: The root exhibited normal size  SYSTEMIC VEINS: IVC: The inferior vena cava was not well visualized  SYSTEM MEASUREMENT TABLES    Apical four chamber  4 chamber Left Atrium Volume Index; Planimetry; End Systole; Apical four chamber;: 21 26 cm2  Left Ventricular Diastolic Area; Method of Disks, Single Plane; End Diastole; Apical four chamber;: 23 86 cm2  Left Ventricular Ejection Fraction; Method of Disks, Single Plane; Apical four chamber;: 49 7 %  Left Ventricular systolic Area; Method of Disks, Single Plane; End Systole; Apical four chamber;: 15 54 cm2  Right Atrium Systolic Area; Planimetry; End Systole; Apical four chamber;: 15 89 cm2  Right Ventricular Internal Diastolic Dimension; End Diastole; Apical four chamber;: 25 6 mm  TAPSE: 14 4 mm    Unspecified Scan Mode  AR Vmax; Regurgitant Flow; Diastole;: 390 6 cm/s  Aortic Root Diameter; End Systole;: 33 mm  Gradient Pressure, Peak; Simplified Bernoulli; Antegrade Flow; Systole;: 4 9 mm[Hg]  Gradient pressure, average; Simplified Bernoulli; Antegrade Flow; Systole;: 2 3 mm[Hg]  Left Atrium to Aortic Root Ratio;: 1 33  Left atrial diameter; End Diastole;: 44 mm  Pressure Half Time;: 0 46 s  Cardiac Output; Teichholz; Systole;: 2 73 L/min  Heart rate; Teichholz;: 123 /min  Interventricular Septum Diastolic Thickness; Teichholz; End Diastole;: 12 7 mm  Left Ventricle Internal End Diastolic Dimension; Teichholz;: 42 9 mm  Left Ventricle Internal Systolic Dimension; Teichholz; End Systole;: 37 6 mm  Left Ventricle Mass;  Mass AVCube with Teichholz; End Diastole;: 167 g  Left Ventricle Posterior Wall Diastolic Thickness; Teichholz; End Diastole;: 9 7 mm  Left Ventricular Ejection Fraction; Teichholz;: 26 9 %  Left Ventricular End Diastolic Volume; Teichholz;: 82 6 ml  Left Ventricular End Systolic Volume; Teichholz;: 60 4 ml  Left Ventricular Fractional Shortening;: 12 4 %  Stroke volume; Renetta Fernandez;: 22 2 ml  Maximum Tricuspid valve regurgitation pressure gradient; Regurgitant Flow; Systole;: 28 1 mm[Hg]    [facility] Accredited Echocardiography Laboratory    Prepared and electronically signed by    Isaiah Preciado MD  Signed 10-Feb-2018 13:06:38         EKG:  Atrial fibrillation with RVR  110 beats per min  Nonspecific inferior and anterior T-wave changes      Medications:    Current Outpatient Prescriptions:     ELIQUIS 5 MG, TAKE 1 TABLET BY MOUTH TWO  TIMES DAILY, Disp: 180 tablet, Rfl: 1    lisinopril (ZESTRIL) 2 5 mg tablet, Take 1 tablet (2 5 mg total) by mouth daily, Disp: 90 tablet, Rfl: 3    lovastatin (MEVACOR) 10 MG tablet, TAKE 1 TABLET BY MOUTH AT  BEDTIME, Disp: 90 tablet, Rfl: 1    sotalol (BETAPACE) 80 mg tablet, Take 1 tablet (80 mg total) by mouth every 12 (twelve) hours, Disp: 180 tablet, Rfl: 3      Allergies:  No Known Allergies      Assessment and Plan:  1  Paroxysmal atrial fibrillation s/p Medtronic PPM  In view of her new cardiomyopathy, cardioversion was prerformed  Patient stayed in sinus rhythm for a while but then she went back in atrial fibrillation  EF also went down  She was sent to EP for rhythm control and they placed her on sotalol and did DCCV  She was discharged in [place] [facility]  Today she is back in atrial fibrillation  I increased her Sotalol dose to 120 mg b i d  Continue Eliquis for anticoagulation      2  Other cardiomyopathy ([address] )  Likely tachycardia mediated  Cont Beta-blocker and ACEI       3   Shortness of breath on exertion   multifactorial from mitral regurgitation / aortic regurgitation / tricuspid regurgitation, obesity, cardiomyopathy     4  Anticoagulant long-term use   continue Eliquis  Denies bleeding from any site     5  Essential hypertension   BP normal       6  Obesity (BMI 30-39 9)   counseled to try to lose weight     7  H/O TIA (transient ischemic attack) and stroke   currently on Eliquis, statin     8  Mixed hyperlipidemia   continue statin and diet control  Her PCP closely monitors her blood work     9  Nonrheumatic mitral valve regurgitation, Nonrheumatic aortic valve insufficiency, Nonrheumatic tricuspid valve regurgitation   stable  Follow-up with serial echocardiograms    Recommend aggressive risk factor modification and therapeutic lifestyle changes  Low-salt, low-calorie, low-fat, low-cholesterol diet with regular exercise and to optimize weight  I will defer the ordering and monitoring of necessity lab studies to you, but I am available and happy to review and manage any of the data at your request in the future  Discussed concepts of atherosclerosis, including signs and symptoms of cardiac disease  Previous studies were reviewed  Safety measures were reviewed  Questions were entertained and answered  Patient was advised to report any problems requiring medical attention  Follow-up with PCP and appropriate specialist and lab work as discussed  Return for follow up visit as scheduled or earlier, if needed  Thank you for allowing me to participate in the care and evaluation of your patient  Should you have any questions, please feel free to contact me        Jose Rios MD  1/10/2019,12:28 PM

## 2019-01-11 ENCOUNTER — HOSPITAL ENCOUNTER (OUTPATIENT)
Dept: NON INVASIVE DIAGNOSTICS | Facility: CLINIC | Age: 74
Discharge: HOME | End: 2019-01-11
Payer: MEDICARE

## 2019-01-11 ENCOUNTER — IN-CLINIC DEVICE VISIT (OUTPATIENT)
Dept: CARDIOLOGY CLINIC | Facility: CLINIC | Age: 74
End: 2019-01-11

## 2019-01-11 DIAGNOSIS — Z95.0 PACEMAKER: ICD-10-CM

## 2019-01-11 DIAGNOSIS — I48.19 PERSISTENT ATRIAL FIBRILLATION (HCC): Primary | ICD-10-CM

## 2019-01-11 DIAGNOSIS — Z95.0 PRESENCE OF CARDIAC PACEMAKER: ICD-10-CM

## 2019-01-11 DIAGNOSIS — I49.5 SSS (SICK SINUS SYNDROME) (HCC): ICD-10-CM

## 2019-01-11 PROCEDURE — 99024 POSTOP FOLLOW-UP VISIT: CPT | Performed by: INTERNAL MEDICINE

## 2019-01-11 PROCEDURE — 93304 ECHO TRANSTHORACIC: CPT

## 2019-01-11 PROCEDURE — 93308 TTE F-UP OR LMTD: CPT | Performed by: INTERNAL MEDICINE

## 2019-01-11 PROCEDURE — 93321 DOPPLER ECHO F-UP/LMTD STD: CPT | Performed by: INTERNAL MEDICINE

## 2019-01-11 PROCEDURE — 93325 DOPPLER ECHO COLOR FLOW MAPG: CPT | Performed by: INTERNAL MEDICINE

## 2019-01-11 NOTE — PROGRESS NOTES
Results for orders placed or performed in visit on 01/11/19   Cardiac EP device report    Narrative    MDT DUAL CHAMBER PM (NS HIS)  DEVICE INTERROGATED IN THE [facility] OFFICE:  WOUND CHECK: INCISION CLEAN AND DRY WITH EDGES APPROXIMATED; STERI-STRIPS REMOVED; WOUND CARE AND RESTRICTIONS REVIEWED WITH PATIENT  BATTERY VOLTAGE ADEQUATE (14 7 YRS)  AP: 6 9%  : 2 9%  ALL LEAD PARAMETERS WITHIN NORMAL LIMITS  82 FAST A&V EPISODES & 22 AT/AF EPISODES W/ EGRAMS SHOWING -186 BPM   AF IN PROGRESS, HX OF SAME  PT SEEN YESTERDAY DY DR Dolphus Aase AND INCREASE MADE TO SOTALOL  PT TAKES ELIQUIS  EF: 50% (ECHO 01/01/19)  NO PROGRAMMING CHANGES MADE TO DEVICE PARAMETERS  PACEMAKER FUNCTIONING APPROPRIATELY    [address] [place] [address]

## 2019-01-15 ENCOUNTER — TELEPHONE (OUTPATIENT)
Dept: CARDIOLOGY CLINIC | Facility: CLINIC | Age: 74
End: 2019-01-15

## 2019-01-15 DIAGNOSIS — I48.19 PERSISTENT ATRIAL FIBRILLATION WITH RAPID VENTRICULAR RESPONSE (HCC): ICD-10-CM

## 2019-01-15 RX ORDER — SOTALOL HYDROCHLORIDE 80 MG/1
120 TABLET ORAL EVERY 12 HOURS SCHEDULED
Qty: 270 TABLET | Refills: 3 | Status: SHIPPED | OUTPATIENT
Start: 2019-01-15 | End: 2019-01-16 | Stop reason: SDUPTHER

## 2019-01-16 DIAGNOSIS — I48.19 PERSISTENT ATRIAL FIBRILLATION WITH RAPID VENTRICULAR RESPONSE (HCC): ICD-10-CM

## 2019-01-16 RX ORDER — SOTALOL HYDROCHLORIDE 80 MG/1
120 TABLET ORAL EVERY 12 HOURS SCHEDULED
Qty: 88 TABLET | Refills: 0 | Status: SHIPPED | OUTPATIENT
Start: 2019-01-16 | End: 2019-02-15 | Stop reason: SDUPTHER

## 2019-01-16 NOTE — TELEPHONE ENCOUNTER
PT CALLED & SAID THAT [facility] DIDN'T GET THE REQ YET FOR THE REFILL & PT IS GETTING NERVOUS B/C SHE IS STARTING TO RUN OUT OF THEM & WOULD ALSO LIKE A REFILL SENT TO THE [facility] IN [place]

## 2019-01-16 NOTE — TELEPHONE ENCOUNTER
S/w pt and informed her on below  Pt verbally understood and stated she would like a 30 day supply sent to [facility] in [facility] because she is unsure of when [facility] will send out med

## 2019-01-16 NOTE — TELEPHONE ENCOUNTER
S/w Lyubov from [place] and informed her that AS increased pt's sotalol  Raul Valentine updated med instructions for pt and stated that med will be filled today

## 2019-01-22 ENCOUNTER — TELEPHONE (OUTPATIENT)
Dept: CARDIOLOGY CLINIC | Facility: CLINIC | Age: 74
End: 2019-01-22

## 2019-01-22 DIAGNOSIS — I48.0 PAROXYSMAL ATRIAL FIBRILLATION (HCC): Primary | ICD-10-CM

## 2019-01-22 RX ORDER — METOPROLOL SUCCINATE 50 MG/1
50 TABLET, EXTENDED RELEASE ORAL 2 TIMES DAILY
Qty: 60 TABLET | Refills: 2 | Status: SHIPPED | OUTPATIENT
Start: 2019-01-22 | End: 2019-02-12 | Stop reason: HOSPADM

## 2019-01-22 NOTE — TELEPHONE ENCOUNTER
----- Message from Isaiah Preciado MD sent at 1/22/2019  4:21 PM EST -----  Please inform patient that her heart rate is going high  I have added metoprolol succinate 50 mg b i d  and sent it to the pharmacy    Please pick it up and start taking it

## 2019-01-28 ENCOUNTER — TELEPHONE (OUTPATIENT)
Dept: CARDIOLOGY CLINIC | Facility: CLINIC | Age: 74
End: 2019-01-28

## 2019-01-28 DIAGNOSIS — I48.19 PERSISTENT ATRIAL FIBRILLATION (HCC): Primary | ICD-10-CM

## 2019-01-28 NOTE — TELEPHONE ENCOUNTER
Per [facility], PAPATRICK, pls schedule EKG this week, and Echo in 2-3 weeks  Pt sched for EKG in [place] this Friday, 2/1/19 @ 1020 AM,  and Echo on 2/11/19 @ 4 PM, same office  Pt is aware of dates, times and location

## 2019-01-30 ENCOUNTER — PATIENT OUTREACH (OUTPATIENT)
Dept: FAMILY MEDICINE CLINIC | Facility: CLINIC | Age: 74
End: 2019-01-30

## 2019-01-30 NOTE — PROGRESS NOTES
Tulio Abrams stated that she has been feeling very well  Vitals have been stable  Denies SOB, palpitations, lightheadedness, swelling, or pain  Is up to date with PCP as well as cardiologist  Reviewed over next f/u appointments  She wrote down date and time  She is also up to date with her lab levels  Discussed good heart healthy diet low in sodium, saturated fat and low cholesterol  Also discussed over examples of appropriate foods/meals to eat and foods to avoid  She verbalized understanding  Pace maker site has been doing well also  Denied redness  She continues to tolerate her prescribed medications  Currently does not need any refills  Continues to have care management contact information if need be  Will f/u

## 2019-02-01 ENCOUNTER — CLINICAL SUPPORT (OUTPATIENT)
Dept: CARDIOLOGY CLINIC | Facility: CLINIC | Age: 74
End: 2019-02-01
Payer: MEDICARE

## 2019-02-01 ENCOUNTER — TELEPHONE (OUTPATIENT)
Dept: CARDIOLOGY CLINIC | Facility: CLINIC | Age: 74
End: 2019-02-01

## 2019-02-01 VITALS
BODY MASS INDEX: 35.45 KG/M2 | WEIGHT: 213 LBS | OXYGEN SATURATION: 98 % | HEART RATE: 82 BPM | SYSTOLIC BLOOD PRESSURE: 132 MMHG | DIASTOLIC BLOOD PRESSURE: 90 MMHG

## 2019-02-01 DIAGNOSIS — R94.31 ABNORMAL EKG: Primary | ICD-10-CM

## 2019-02-01 PROCEDURE — 93000 ELECTROCARDIOGRAM COMPLETE: CPT | Performed by: INTERNAL MEDICINE

## 2019-02-09 ENCOUNTER — HOSPITAL ENCOUNTER (INPATIENT)
Facility: HOSPITAL | Age: 74
LOS: 3 days | Discharge: HOME | DRG: 948 | End: 2019-02-12
Attending: EMERGENCY MEDICINE | Admitting: INTERNAL MEDICINE
Payer: MEDICARE

## 2019-02-09 ENCOUNTER — APPOINTMENT (EMERGENCY)
Dept: RADIOLOGY | Facility: HOSPITAL | Age: 74
DRG: 948 | End: 2019-02-09
Payer: MEDICARE

## 2019-02-09 DIAGNOSIS — R07.9 CHEST PAIN: Primary | ICD-10-CM

## 2019-02-09 DIAGNOSIS — I48.0 PAROXYSMAL ATRIAL FIBRILLATION (HCC): Chronic | ICD-10-CM

## 2019-02-09 DIAGNOSIS — R77.8 ELEVATED TROPONIN: ICD-10-CM

## 2019-02-09 PROBLEM — R79.89 ELEVATED TROPONIN: Status: ACTIVE | Noted: 2019-02-09

## 2019-02-09 PROBLEM — I21.4 NSTEMI (NON-ST ELEVATED MYOCARDIAL INFARCTION) (HCC): Status: ACTIVE | Noted: 2019-02-09

## 2019-02-09 LAB
ALBUMIN SERPL BCP-MCNC: 3.5 G/DL (ref 3.5–5)
ALP SERPL-CCNC: 101 U/L (ref 46–116)
ALT SERPL W P-5'-P-CCNC: 20 U/L (ref 12–78)
ANION GAP SERPL CALCULATED.3IONS-SCNC: 7 MMOL/L (ref 4–13)
AST SERPL W P-5'-P-CCNC: 24 U/L (ref 5–45)
ATRIAL RATE: 62 BPM
ATRIAL RATE: 66 BPM
ATRIAL RATE: 68 BPM
ATRIAL RATE: 70 BPM
BASOPHILS # BLD AUTO: 0.06 THOUSANDS/ΜL (ref 0–0.1)
BASOPHILS NFR BLD AUTO: 1 % (ref 0–1)
BILIRUB SERPL-MCNC: 0.6 MG/DL (ref 0.2–1)
BUN SERPL-MCNC: 18 MG/DL (ref 5–25)
CALCIUM SERPL-MCNC: 9 MG/DL (ref 8.3–10.1)
CHLORIDE SERPL-SCNC: 104 MMOL/L (ref 100–108)
CO2 SERPL-SCNC: 29 MMOL/L (ref 21–32)
CREAT SERPL-MCNC: 1.12 MG/DL (ref 0.6–1.3)
EOSINOPHIL # BLD AUTO: 0.17 THOUSAND/ΜL (ref 0–0.61)
EOSINOPHIL NFR BLD AUTO: 2 % (ref 0–6)
ERYTHROCYTE [DISTWIDTH] IN BLOOD BY AUTOMATED COUNT: 13 % (ref 11.6–15.1)
GFR SERPL CREATININE-BSD FRML MDRD: 49 ML/MIN/1.73SQ M
GLUCOSE SERPL-MCNC: 100 MG/DL (ref 65–140)
HCT VFR BLD AUTO: 45.6 % (ref 34.8–46.1)
HGB BLD-MCNC: 15.6 G/DL (ref 11.5–15.4)
IMM GRANULOCYTES # BLD AUTO: 0.02 THOUSAND/UL (ref 0–0.2)
IMM GRANULOCYTES NFR BLD AUTO: 0 % (ref 0–2)
LYMPHOCYTES # BLD AUTO: 1.08 THOUSANDS/ΜL (ref 0.6–4.47)
LYMPHOCYTES NFR BLD AUTO: 13 % (ref 14–44)
MCH RBC QN AUTO: 34.3 PG (ref 26.8–34.3)
MCHC RBC AUTO-ENTMCNC: 34.2 G/DL (ref 31.4–37.4)
MCV RBC AUTO: 100 FL (ref 82–98)
MONOCYTES # BLD AUTO: 1.06 THOUSAND/ΜL (ref 0.17–1.22)
MONOCYTES NFR BLD AUTO: 13 % (ref 4–12)
NEUTROPHILS # BLD AUTO: 5.83 THOUSANDS/ΜL (ref 1.85–7.62)
NEUTS SEG NFR BLD AUTO: 71 % (ref 43–75)
NRBC BLD AUTO-RTO: 0 /100 WBCS
NT-PROBNP SERPL-MCNC: 403 PG/ML
P AXIS: 118 DEGREES
P AXIS: 52 DEGREES
P AXIS: 76 DEGREES
PLATELET # BLD AUTO: 175 THOUSANDS/UL (ref 149–390)
PMV BLD AUTO: 11.5 FL (ref 8.9–12.7)
POTASSIUM SERPL-SCNC: 4.1 MMOL/L (ref 3.5–5.3)
PR INTERVAL: 164 MS
PR INTERVAL: 164 MS
PR INTERVAL: 192 MS
PR INTERVAL: 206 MS
PROT SERPL-MCNC: 7.2 G/DL (ref 6.4–8.2)
QRS AXIS: 20 DEGREES
QRS AXIS: 20 DEGREES
QRS AXIS: 38 DEGREES
QRS AXIS: 42 DEGREES
QRSD INTERVAL: 80 MS
QRSD INTERVAL: 84 MS
QRSD INTERVAL: 84 MS
QRSD INTERVAL: 86 MS
QT INTERVAL: 416 MS
QT INTERVAL: 422 MS
QT INTERVAL: 426 MS
QT INTERVAL: 442 MS
QTC INTERVAL: 436 MS
QTC INTERVAL: 448 MS
QTC INTERVAL: 448 MS
QTC INTERVAL: 460 MS
RBC # BLD AUTO: 4.55 MILLION/UL (ref 3.81–5.12)
SODIUM SERPL-SCNC: 140 MMOL/L (ref 136–145)
T WAVE AXIS: 10 DEGREES
T WAVE AXIS: 2 DEGREES
T WAVE AXIS: 7 DEGREES
T WAVE AXIS: 9 DEGREES
TROPONIN I SERPL-MCNC: 0.06 NG/ML
VENTRICULAR RATE: 62 BPM
VENTRICULAR RATE: 66 BPM
VENTRICULAR RATE: 68 BPM
VENTRICULAR RATE: 70 BPM
WBC # BLD AUTO: 8.22 THOUSAND/UL (ref 4.31–10.16)

## 2019-02-09 PROCEDURE — 99285 EMERGENCY DEPT VISIT HI MDM: CPT

## 2019-02-09 PROCEDURE — 99222 1ST HOSP IP/OBS MODERATE 55: CPT | Performed by: INTERNAL MEDICINE

## 2019-02-09 PROCEDURE — 80053 COMPREHEN METABOLIC PANEL: CPT | Performed by: EMERGENCY MEDICINE

## 2019-02-09 PROCEDURE — 85025 COMPLETE CBC W/AUTO DIFF WBC: CPT | Performed by: EMERGENCY MEDICINE

## 2019-02-09 PROCEDURE — 71046 X-RAY EXAM CHEST 2 VIEWS: CPT

## 2019-02-09 PROCEDURE — 99223 1ST HOSP IP/OBS HIGH 75: CPT | Performed by: INTERNAL MEDICINE

## 2019-02-09 PROCEDURE — 83880 ASSAY OF NATRIURETIC PEPTIDE: CPT | Performed by: EMERGENCY MEDICINE

## 2019-02-09 PROCEDURE — 93005 ELECTROCARDIOGRAM TRACING: CPT

## 2019-02-09 PROCEDURE — 93010 ELECTROCARDIOGRAM REPORT: CPT | Performed by: INTERNAL MEDICINE

## 2019-02-09 PROCEDURE — 84484 ASSAY OF TROPONIN QUANT: CPT | Performed by: EMERGENCY MEDICINE

## 2019-02-09 PROCEDURE — 1123F ACP DISCUSS/DSCN MKR DOCD: CPT | Performed by: INTERNAL MEDICINE

## 2019-02-09 PROCEDURE — 36415 COLL VENOUS BLD VENIPUNCTURE: CPT | Performed by: EMERGENCY MEDICINE

## 2019-02-09 RX ORDER — NITROGLYCERIN 0.4 MG/1
0.4 TABLET SUBLINGUAL
Status: DISCONTINUED | OUTPATIENT
Start: 2019-02-09 | End: 2019-02-12 | Stop reason: HOSPADM

## 2019-02-09 RX ORDER — MAGNESIUM HYDROXIDE/ALUMINUM HYDROXICE/SIMETHICONE 120; 1200; 1200 MG/30ML; MG/30ML; MG/30ML
30 SUSPENSION ORAL EVERY 4 HOURS PRN
Status: DISCONTINUED | OUTPATIENT
Start: 2019-02-09 | End: 2019-02-12 | Stop reason: HOSPADM

## 2019-02-09 RX ORDER — ACETAMINOPHEN 325 MG/1
650 TABLET ORAL EVERY 6 HOURS PRN
Status: DISCONTINUED | OUTPATIENT
Start: 2019-02-09 | End: 2019-02-12 | Stop reason: HOSPADM

## 2019-02-09 RX ORDER — LISINOPRIL 2.5 MG/1
2.5 TABLET ORAL DAILY
Status: DISCONTINUED | OUTPATIENT
Start: 2019-02-09 | End: 2019-02-12 | Stop reason: HOSPADM

## 2019-02-09 RX ORDER — METOPROLOL SUCCINATE 50 MG/1
50 TABLET, EXTENDED RELEASE ORAL 2 TIMES DAILY
Status: DISCONTINUED | OUTPATIENT
Start: 2019-02-09 | End: 2019-02-11

## 2019-02-09 RX ORDER — PANTOPRAZOLE SODIUM 40 MG/1
40 TABLET, DELAYED RELEASE ORAL
Status: DISCONTINUED | OUTPATIENT
Start: 2019-02-09 | End: 2019-02-12 | Stop reason: HOSPADM

## 2019-02-09 RX ORDER — PRAVASTATIN SODIUM 20 MG
10 TABLET ORAL
Status: DISCONTINUED | OUTPATIENT
Start: 2019-02-09 | End: 2019-02-10

## 2019-02-09 RX ORDER — ASPIRIN 325 MG
325 TABLET ORAL ONCE
Status: COMPLETED | OUTPATIENT
Start: 2019-02-09 | End: 2019-02-09

## 2019-02-09 RX ORDER — ONDANSETRON 2 MG/ML
4 INJECTION INTRAMUSCULAR; INTRAVENOUS EVERY 6 HOURS PRN
Status: DISCONTINUED | OUTPATIENT
Start: 2019-02-09 | End: 2019-02-12 | Stop reason: HOSPADM

## 2019-02-09 RX ORDER — SOTALOL HYDROCHLORIDE 80 MG/1
120 TABLET ORAL EVERY 12 HOURS SCHEDULED
Status: DISCONTINUED | OUTPATIENT
Start: 2019-02-09 | End: 2019-02-12 | Stop reason: HOSPADM

## 2019-02-09 RX ADMIN — ACETAMINOPHEN 650 MG: 325 TABLET, FILM COATED ORAL at 18:03

## 2019-02-09 RX ADMIN — NITROGLYCERIN 0.4 MG: 0.4 TABLET SUBLINGUAL at 11:01

## 2019-02-09 RX ADMIN — APIXABAN 5 MG: 5 TABLET, FILM COATED ORAL at 20:15

## 2019-02-09 RX ADMIN — METOPROLOL SUCCINATE 50 MG: 50 TABLET, EXTENDED RELEASE ORAL at 09:39

## 2019-02-09 RX ADMIN — PANTOPRAZOLE SODIUM 40 MG: 40 TABLET, DELAYED RELEASE ORAL at 11:02

## 2019-02-09 RX ADMIN — SOTALOL HYDROCHLORIDE 120 MG: 80 TABLET ORAL at 09:38

## 2019-02-09 RX ADMIN — ASPIRIN 325 MG: 325 TABLET ORAL at 00:41

## 2019-02-09 RX ADMIN — PRAVASTATIN SODIUM 10 MG: 20 TABLET ORAL at 16:48

## 2019-02-09 RX ADMIN — NITROGLYCERIN 0.4 MG: 0.4 TABLET SUBLINGUAL at 09:41

## 2019-02-09 RX ADMIN — ALUMINUM HYDROXIDE, MAGNESIUM HYDROXIDE, AND SIMETHICONE 30 ML: 200; 200; 20 SUSPENSION ORAL at 11:03

## 2019-02-09 RX ADMIN — NITROGLYCERIN 0.4 MG: 0.4 TABLET SUBLINGUAL at 04:07

## 2019-02-09 RX ADMIN — LISINOPRIL 2.5 MG: 2.5 TABLET ORAL at 09:39

## 2019-02-09 RX ADMIN — SOTALOL HYDROCHLORIDE 120 MG: 80 TABLET ORAL at 20:16

## 2019-02-09 RX ADMIN — APIXABAN 5 MG: 5 TABLET, FILM COATED ORAL at 09:38

## 2019-02-09 RX ADMIN — METOPROLOL SUCCINATE 50 MG: 50 TABLET, EXTENDED RELEASE ORAL at 20:15

## 2019-02-09 NOTE — ED PROVIDER NOTES
History  Chief Complaint   Patient presents with    Chest Pain     Onset 2 hours ago, pace make placed on 1/1/19  Pain in center of chest, does not radiate  HPI     11-year-old female with history of AFib on Eliquis, status post prior cardioversion, pacemaker recently placed on January 1st, CHF with LVEF of 35-40%, who presents for evaluation of substernal chest pain  Pain is described as a tightness, started a few hours ago  Does not radiate to her back, arms, or jaw  Denies shortness of breath  Pain is occasionally pleuritic, but nonexertional   Denies cough, fevers, or chills  She has never had pain like this before  Denies history of coronary artery disease, and she had a stress test 1 year ago that had no inducible ischemia  Patient has history of hypertension and hyperlipidemia  Does not smoke  No history of DVT or PE and she reports compliance with her home eliquis  Denies calf swelling or tenderness  No dizziness  No aggravating or alleviating factors or other associated symptoms  Prior to Admission Medications   Prescriptions Last Dose Informant Patient Reported? Taking?    ELIQUIS 5 MG  Self No No   Sig: TAKE 1 TABLET BY MOUTH TWO  TIMES DAILY   lisinopril (ZESTRIL) 2 5 mg tablet  Self No No   Sig: Take 1 tablet (2 5 mg total) by mouth daily   lovastatin (MEVACOR) 10 MG tablet  Self No No   Sig: TAKE 1 TABLET BY MOUTH AT  BEDTIME   metoprolol succinate (TOPROL-XL) 50 mg 24 hr tablet   No No   Sig: Take 1 tablet (50 mg total) by mouth 2 (two) times a day   sotalol (BETAPACE) 80 mg tablet   No No   Sig: Take 1 5 tablets (120 mg total) by mouth every 12 (twelve) hours      Facility-Administered Medications: None       Past Medical History:   Diagnosis Date    Atrial fibrillation (HCC)     Headache     Hyperlipidemia     Hypertension     Lyme disease     Shortness of breath     TIA (transient ischemic attack)     Transient cerebral ischemia     Last assessed - 1/23/18    Vertigo        Past Surgical History:   Procedure Laterality Date    CARDIAC PACEMAKER PLACEMENT      COLONOSCOPY      NC SIGMOIDOSCOPY FLX DX W/COLLJ SPEC BR/WA IF PFRMD N/A 11/28/2017    Procedure: Bryce Cody;  Surgeon: Rachel Newton MD;  Location: [facility]; Service: Gastroenterology    TONSILLECTOMY         Family History   Problem Relation Age of Onset    Lung cancer Mother     Dementia Father     Alzheimer's disease Father     Other Father         Cardiac Disorder      I have reviewed and agree with the history as documented  Social History   Substance Use Topics    Smoking status: Former Smoker     Types: Cigarettes     Quit date: 1977    Smokeless tobacco: Never Used    Alcohol use 4 2 oz/week     7 Glasses of wine per week      Comment: occ        Review of Systems   Constitutional: Negative for chills, diaphoresis and fever  HENT: Negative for congestion  Eyes: Negative for visual disturbance  Respiratory: Negative for cough and shortness of breath  Cardiovascular: Positive for chest pain  Negative for palpitations and leg swelling  Gastrointestinal: Negative for abdominal pain, diarrhea, nausea and vomiting  Genitourinary: Negative for dysuria and frequency  Musculoskeletal: Negative for arthralgias, back pain, neck pain and neck stiffness  Skin: Negative for rash  Neurological: Negative for weakness, numbness and headaches  Psychiatric/Behavioral: Negative for agitation, behavioral problems and confusion  Physical Exam  Physical Exam   Constitutional: She is oriented to person, place, and time  She appears well-developed and well-nourished  No distress  HENT:   Head: Normocephalic and atraumatic  Right Ear: External ear normal    Left Ear: External ear normal    Nose: Nose normal    Mouth/Throat: Oropharynx is clear and moist    Eyes: Conjunctivae are normal    Neck: Normal range of motion  Neck supple     Cardiovascular: Normal rate, regular rhythm, normal heart sounds and intact distal pulses  Exam reveals no gallop and no friction rub  No murmur heard  Pulmonary/Chest: Effort normal and breath sounds normal  No respiratory distress  She has no wheezes  She has no rales  Abdominal: Soft  Bowel sounds are normal  She exhibits no distension  There is no tenderness  There is no guarding  Musculoskeletal: Normal range of motion  She exhibits no edema or deformity  Neurological: She is alert and oriented to person, place, and time  She exhibits normal muscle tone  Skin: Skin is warm and dry  She is not diaphoretic  Psychiatric: She has a normal mood and affect         Vital Signs  ED Triage Vitals   Temperature Pulse Respirations Blood Pressure SpO2   02/09/19 0010 02/09/19 0015 02/09/19 0015 02/09/19 0015 02/09/19 0015   98 1 °F (36 7 °C) 71 18 (!) 160/109 98 %      Temp Source Heart Rate Source Patient Position - Orthostatic VS BP Location FiO2 (%)   02/09/19 0010 02/09/19 0015 02/09/19 0015 02/09/19 0015 --   Oral Monitor Lying Right arm       Pain Score       02/09/19 0010       5           Vitals:    02/09/19 0015 02/09/19 0149   BP: (!) 160/109 151/72   Pulse: 71 63   Patient Position - Orthostatic VS: Lying Lying       Visual Acuity      ED Medications  Medications   aspirin tablet 325 mg (325 mg Oral Given 2/9/19 0041)       Diagnostic Studies  Results Reviewed     Procedure Component Value Units Date/Time    NT-BNP PRO (BNP for [facility], [facility], [facility], [place], [facility], [facility], [facility], [place] campuses) [[id number]]  (Abnormal) Collected:  02/09/19 0049    Lab Status:  Final result Specimen:  Blood from Arm, Right Updated:  02/09/19 0156     NT-proBNP 403 (H) pg/mL     Troponin I [[id number]]  (Abnormal) Collected:  02/09/19 0049    Lab Status:  Final result Specimen:  Blood from Arm, Right Updated:  02/09/19 0121     Troponin I 0 06 (H) ng/mL     Comprehensive metabolic panel [[id number]] Collected:  02/09/19 0049    Lab Status:  Final result Specimen:  Blood from Arm, Right Updated:  02/09/19 0118     Sodium 140 mmol/L      Potassium 4 1 mmol/L      Chloride 104 mmol/L      CO2 29 mmol/L      ANION GAP 7 mmol/L      BUN 18 mg/dL      Creatinine 1 12 mg/dL      Glucose 100 mg/dL      Calcium 9 0 mg/dL      AST 24 U/L      ALT 20 U/L      Alkaline Phosphatase 101 U/L      Total Protein 7 2 g/dL      Albumin 3 5 g/dL      Total Bilirubin 0 60 mg/dL      eGFR 49 ml/min/1 73sq m     Narrative:         National Kidney Disease Education Program recommendations are as follows:  GFR calculation is accurate only with a steady state creatinine  Chronic Kidney disease less than 60 ml/min/1 73 sq  meters  Kidney failure less than 15 ml/min/1 73 sq  meters      CBC and differential [[id number]]  (Abnormal) Collected:  02/09/19 0049    Lab Status:  Final result Specimen:  Blood from Arm, Right Updated:  02/09/19 0104     WBC 8 22 Thousand/uL      RBC 4 55 Million/uL      Hemoglobin 15 6 (H) g/dL      Hematocrit 45 6 %       (H) fL      MCH 34 3 pg      MCHC 34 2 g/dL      RDW 13 0 %      MPV 11 5 fL      Platelets 344 Thousands/uL      nRBC 0 /100 WBCs      Neutrophils Relative 71 %      Immat GRANS % 0 %      Lymphocytes Relative 13 (L) %      Monocytes Relative 13 (H) %      Eosinophils Relative 2 %      Basophils Relative 1 %      Neutrophils Absolute 5 83 Thousands/µL      Immature Grans Absolute 0 02 Thousand/uL      Lymphocytes Absolute 1 08 Thousands/µL      Monocytes Absolute 1 06 Thousand/µL      Eosinophils Absolute 0 17 Thousand/µL      Basophils Absolute 0 06 Thousands/µL     Troponin I [[id number]]     Lab Status:  No result Specimen:  Blood                  XR chest 2 views    (Results Pending)              Procedures  Procedures       Phone Contacts  ED Phone Contact    ED Course         HEART Risk Score      Most Recent Value   History  1 Filed at: 02/09/2019 0129   ECG  1 Filed at: 02/09/2019 0129   Age  2 Filed at: 02/09/2019 0129   Risk Factors  1 Filed at: 02/09/2019 0129   Troponin  1 Filed at: 02/09/2019 0129   Heart Score Risk Calculator   History  1 Filed at: 02/09/2019 0129   ECG  1 Filed at: 02/09/2019 0129   Age  2 Filed at: 02/09/2019 0129   Risk Factors  1 Filed at: 02/09/2019 0129   Troponin  1 Filed at: 02/09/2019 0129   HEART Score  6 Filed at: 02/09/2019 0129   HEART Score  6 Filed at: 02/09/2019 2920        Identification of Seniors at Risk      Most Recent Value   (ISAR) Identification of Seniors at Risk   Before the illness or injury that brought you to the Emergency, did you need someone to help you on a regular basis? 0 Filed at: 02/09/2019 0010   In the last 24 hours, have you needed more help than usual?  0 Filed at: 02/09/2019 0010   Have you been hospitalized for one or more nights during the past 6 months? 1 Filed at: 02/09/2019 0010   In general, do you see well?  0 Filed at: 02/09/2019 0010   In general, do you have serious problems with your memory? 0 Filed at: 02/09/2019 0010   Do you take more than three different medications every day? 1 Filed at: 02/09/2019 0010   ISAR Score  2 Filed at: 02/09/2019 0010                          MDM  Number of Diagnoses or Management Options  Chest pain: new and requires workup  Elevated troponin: new and requires workup  Diagnosis management comments: Generally well appearing  Afebrile and hemodynamically stable  Patient endorses 5/10 chest tightness  I personally interpreted the patient's EKG was, which shows an atrial paced rhythm, 60 beats per minute, normal intervals, less than 1 mm of ST elevation in leads 1 and aVL without reciprocal depressions, T-wave inversion in lead 3 unchanged from prior, nonspecific T-wave abnormality in lead V3 unchanged from prior  Patient given 324 mg of aspirin  First troponin mildly elevated to 0 06    Repeat EKG obtained 1 hour after the first one, which I personally interpreted, that shows normal rate, atrially paced rhythm, normal intervals, with resolution of mild prior ST elevation in leads 1 and aVL, no acute ischemic changes, T-wave inversion in lead 3 unchanged from prior  Remainder of labs remarkable for no leukocytosis, hemoglobin 15 6, unremarkable CMP  BNP sent and pending  Chest x-ray with no acute cardiopulmonary abnormalities on my read  Case discussed with Dr Linnette Villa with [facility]  Patient will be admitted for further workup of her chest pain  As patient has been compliant with her Eliquis and given very mild troponin elevation with vast improvement in chest pain, will hold off on starting heparin for now until 2nd troponin is obtained  Admitting provider is comfortable with this plan  I have a low suspicion for pulmonary embolism in the absence of shortness of breath and given compliance with home Eliquis  History and description of pain not consistent with aortic dissection  Patient admitted in stable condition  Amount and/or Complexity of Data Reviewed  Clinical lab tests: ordered and reviewed  Tests in the radiology section of CPT®: ordered and reviewed  Review and summarize past medical records: yes  Independent visualization of images, tracings, or specimens: yes    Patient Progress  Patient progress: stable         Disposition  Final diagnoses:   Chest pain   Elevated troponin     Time reflects when diagnosis was documented in both MDM as applicable and the Disposition within this note     Time User Action Codes Description Comment    2/9/2019  1:33 AM Gio Gotti Add [R07 9] Chest pain     2/9/2019  1:33 AM Gio Gotti Add [R74 8] Elevated troponin       ED Disposition     ED Disposition Condition Date/Time Comment    Admit  Sat Feb 9, 2019  1:34 AM Case was discussed with [facility] and the patient's admission status was agreed to be Admission Status: inpatient status to the service of Dr Linnette Villa           Follow-up Information    None         Patient's Medications   Discharge Prescriptions    No medications on file     No discharge procedures on file      ED Provider  Electronically Signed by           Madalyn Davis MD  02/09/19 7203

## 2019-02-09 NOTE — H&P
H&P- Blu Rosenthal [id number], 75 y o  female MRN: [id number]    Unit/Bed#: -01 Encounter: [id number]    Primary Care Provider: Lev Coe DO   Date and time admitted to hospital: 2/9/2019 12:07 AM        * NSTEMI (non-ST elevated myocardial infarction) [facility])   Assessment & Plan    Admit to step-down  Serial troponin and EKG  ASA daily  No heparin drip aspiration is on Eliquis and took  her dose in the evening  Consult Cardiology in the morning  Echo in a m  Paroxysmal atrial fibrillation (HCC)   Assessment & Plan    Status post pacemaker,   post electrical conversion,   -continue home medication     Essential hypertension   Assessment & Plan    Continue home medication     Mixed hyperlipidemia   Assessment & Plan    Continue statin         VTE Prophylaxis: Apixaban (Eliquis)  / reason for no mechanical VTE prophylaxis Already anticoagulated   Code Status:  Full code  POLST: POLST form is not discussed and not completed at this time  Discussion with family:  Patient    Anticipated Length of Stay:  Patient will be admitted on an Inpatient basis with an anticipated length of stay of  more than 2 midnights  Justification for Hospital Stay:  Management of this episode of NSTEMI    Total Time for Visit, including Counseling / Coordination of Care: 45 minutes  Greater than 50% of this total time spent on direct patient counseling and coordination of care  Chief Complaint:   Chest pain    History of Present Illness:    Blu Rosenthal is a 75 y o  female who presents with chest pain, sudden onset, retrosternal, severe, continuous, worse with respiration, no radiating, not associated symptoms, no relieved by aspirin  ER course:  Given ASA, troponin slightly elevated 0 06, note cruciate treated as she took a dose of Eliquis prior to chest pain episode at 9:00 p m      Will admit patient for further evaluation      Review of Systems:    Review of Systems  A comprehensive 10 point review system conducted all negative except as mentioned in HPI  Past Medical and Surgical History:     Past Medical History:   Diagnosis Date    Atrial fibrillation ([address] )     Headache     Hyperlipidemia     Hypertension     Lyme disease     Shortness of breath     TIA (transient ischemic attack)     Transient cerebral ischemia     Last assessed - 1/23/18    Vertigo        Past Surgical History:   Procedure Laterality Date    CARDIAC PACEMAKER PLACEMENT      COLONOSCOPY      [place] SIGMOIDOSCOPY FLX DX W/COLLJ SPEC BR/WA IF PFRMD N/A 11/28/2017    Procedure: Tay Rogers;  Surgeon: Emilia Valencia MD;  Location: [facility]; Service: Gastroenterology    TONSILLECTOMY         Meds/Allergies:    Prior to Admission medications    Medication Sig Start Date End Date Taking? Authorizing Provider   ELIQUIS 5 MG TAKE 1 TABLET BY MOUTH TWO  TIMES DAILY 12/17/18   Saul Renee MD   lisinopril (ZESTRIL) 2 5 mg tablet Take 1 tablet (2 5 mg total) by mouth daily 7/26/18   Saul Renee MD   lovastatin (MEVACOR) 10 MG tablet TAKE 1 TABLET BY MOUTH AT  BEDTIME 11/19/18   Patrick Velasquez DO   metoprolol succinate (TOPROL-XL) 50 mg 24 hr tablet Take 1 tablet (50 mg total) by mouth 2 (two) times a day 1/22/19   Saul Renee MD   sotalol (BETAPACE) 80 mg tablet Take 1 5 tablets (120 mg total) by mouth every 12 (twelve) hours 1/16/19 1/16/20  Saul Renee MD     I have reviewed home medications with patient personally      Allergies: No Known Allergies    Social History:     Marital Status: /Civil Union   Occupation:  Retired  Patient Pre-hospital Living Situation:  Home with   Patient Pre-hospital Level of Mobility:  Ambulatory  Patient Pre-hospital Diet Restrictions:  Cardiac  Substance Use History:   History   Alcohol Use    4 2 oz/week    7 Glasses of wine per week     Comment: occ     History   Smoking Status    Former Smoker    Types: Cigarettes    Quit date: 1977   Smokeless Tobacco    Never Used History   Drug Use No       Family History:    non-contributory    Physical Exam:     Vitals:   Blood Pressure: 151/72 (02/09/19 0149)  Pulse: 63 (02/09/19 0149)  Temperature: 98 1 °F (36 7 °C) (02/09/19 0010)  Temp Source: Oral (02/09/19 0010)  Respirations: 18 (02/09/19 0149)  Weight - Scale: 96 6 kg (212 lb 15 4 oz) (02/09/19 0010)  SpO2: 96 % (02/09/19 0149)    Physical Exam   Constitutional: She is oriented to person, place, and time  She appears well-developed and well-nourished  HENT:   Head: Normocephalic and atraumatic  Eyes: Pupils are equal, round, and reactive to light  EOM are normal    Cardiovascular: Normal rate and regular rhythm  Pulmonary/Chest: Effort normal and breath sounds normal    Abdominal: Soft  There is no tenderness  There is no rebound and no guarding  Musculoskeletal: Normal range of motion  She exhibits no tenderness or deformity  Neurological: She is alert and oriented to person, place, and time  Skin: Skin is warm and dry  Psychiatric: She has a normal mood and affect  Additional Data:     Lab Results: I have personally reviewed pertinent reports  Results from last 7 days  Lab Units 02/09/19  0049   WBC Thousand/uL 8 22   HEMOGLOBIN g/dL 15 6*   HEMATOCRIT % 45 6   PLATELETS Thousands/uL 175   NEUTROS PCT % 71   LYMPHS PCT % 13*   MONOS PCT % 13*   EOS PCT % 2       Results from last 7 days  Lab Units 02/09/19  0049   SODIUM mmol/L 140   POTASSIUM mmol/L 4 1   CHLORIDE mmol/L 104   CO2 mmol/L 29   BUN mg/dL 18   CREATININE mg/dL 1 12   ANION GAP mmol/L 7   CALCIUM mg/dL 9 0   ALBUMIN g/dL 3 5   TOTAL BILIRUBIN mg/dL 0 60   ALK PHOS U/L 101   ALT U/L 20   AST U/L 24   GLUCOSE RANDOM mg/dL 100                       Imaging: I have personally reviewed pertinent reports  XR chest 2 views   Final Result by Greta Wisdom DO (02/09 0202)      No acute cardiopulmonary disease is seen  Cardiomegaly suspected    Large hiatal hernia and other findings as above  Workstation performed: [id number]             EKG, Pathology, and Other Studies Reviewed on Admission:   · EKG:  NSR    Allscripts / Epic Records Reviewed: Yes     ** Please Note: This note has been constructed using a voice recognition system   **

## 2019-02-09 NOTE — CONSULTS
Consultation - Cardiology  [facility] 75 y o  female MRN: [id number]  Unit/Bed#: -01 Encounter: [id number]    [address]    Physician Requesting Consult: Ryan Regalado MD  Reason for Consult / Principal Problem: Chest discomfort    Chief Complaint   Patient presents with    Chest Pain     Onset 2 hours ago, pace make placed on 1/1/19  Pain in center of chest, does not radiate  HPI: Cardiologist Dr Wade is a 75y o  year old female who has a history of paroxysmal atrial fibrillation on anticoagulation  Patient also has history of pacemaker implantation  Patient presented with pain at the center of the chest while at rest   This is not related to exertion  Patient does not have any history of coronary artery disease or MI in the past   Patient denies any history of nausea vomiting  Patient also denies any abdominal discomfort  No history of diarrhea  Patient denies any history of shortness of breath with exertion  Last stress test was approximately 1 year ago  Patient had pacemaker implantation a few months ago  Patient states sublingual nitroglycerin takes the edge off the pain  Patient denies any history of hiatal hernia or reflux  She states that she has been compliant with all her present medications  No bleeding issues      REVIEW OF SYSTEMS:  Constitutional:  Denies fever or chills   Eyes:  Denies change in visual acuity   HENT:  Denies nasal congestion or sore throat   Respiratory:  Denies cough or shortness of breath   Cardiovascular:  Retrosternal chest discomfort  GI:  Denies abdominal pain, nausea, vomiting, bloody stools or diarrhea   :  Denies dysuria, frequency, difficulty in micturition and nocturia  Musculoskeletal:  Denies back pain or joint pain   Neurologic:  Denies headache, focal weakness or sensory changes   Endocrine:  Denies polyuria or polydipsia   Lymphatic:  Denies swollen glands   Psychiatric:  Denies depression or anxiety     Historical Information   Past Medical History:   Diagnosis Date    Atrial fibrillation ([address] )     Headache     Hyperlipidemia     Hypertension     Lyme disease     Shortness of breath     TIA (transient ischemic attack)     Transient cerebral ischemia     Last assessed - 1/23/18    Vertigo      Past Surgical History:   Procedure Laterality Date    CARDIAC PACEMAKER PLACEMENT      COLONOSCOPY      WV SIGMOIDOSCOPY FLX DX W/COLLJ SPEC BR/WA IF PFRMD N/A 11/28/2017    Procedure: Judie Astorga;  Surgeon: Mana Castro MD;  Location: [facility]; Service: Gastroenterology    TONSILLECTOMY       History   Alcohol Use    4 2 oz/week    7 Glasses of wine per week     Comment: occ     History   Drug Use No     History   Smoking Status    Former Smoker    Types: Cigarettes    Quit date: 1977   Smokeless Tobacco    Never Used     Family History: non-contributory    MEDS & ALLERGIES:  all current active meds have been reviewed     No Known Allergies    OBJECTIVE:  Vitals:   Vitals:    02/09/19 0700   BP: 162/69   Pulse: 68   Resp: 18   Temp: 98 1 °F (36 7 °C)   SpO2: 95%     Body mass index is 35 44 kg/m²  Systolic (05HBX), YMR:014 , Min:151 , HJE:124     Diastolic (01BXZ), ZEJ:97, Min:69, Max:109      Intake/Output Summary (Last 24 hours) at 02/09/19 1037  Last data filed at 02/09/19 0835   Gross per 24 hour   Intake                0 ml   Output              700 ml   Net             -700 ml     Weight (last 2 days)     Date/Time   Weight    02/09/19 0010  96 6 (212 96)            Invasive Devices     Peripheral Intravenous Line            Peripheral IV 02/09/19 Right Antecubital less than 1 day                PHYSICAL EXAMS:  General:  Patient is not in acute distress, laying in the bed comfortably, awake, alert responding to commands  Head: Normocephalic, Atraumatic  HEENT:  Both pupils normal-size atraumatic, normocephalic, nonicteric  Neck:  JVP not raised   Trachea central  Respiratory: Bronchovascular breathing all over the chest without any accompaniment  Cardiovascular:  S1-S2 normal without any murmur rails or rub  GI:  Abdomen soft nontender   Liver and spleen normal size    Integument:  No skin rashes or ulceration  Lymphatic:  No cervical or inguinal lymphadenopathy  Neurologic:  Patient is awake alert, responding to command, well-oriented to time and place and person    LABORATORY RESULTS:    Results from last 7 days  Lab Units 02/09/19  0704 02/09/19  0410 02/09/19  0049   TROPONIN I ng/mL 0 06* 0 06* 0 06*     CBC with diff:   Results from last 7 days  Lab Units 02/09/19  0049   WBC Thousand/uL 8 22   HEMOGLOBIN g/dL 15 6*   HEMATOCRIT % 45 6   MCV fL 100*   PLATELETS Thousands/uL 175   MCH pg 34 3   MCHC g/dL 34 2   RDW % 13 0   MPV fL 11 5   NRBC AUTO /100 WBCs 0       CMP:  Results from last 7 days  Lab Units 02/09/19  0049   POTASSIUM mmol/L 4 1   CHLORIDE mmol/L 104   CO2 mmol/L 29   BUN mg/dL 18   CREATININE mg/dL 1 12   CALCIUM mg/dL 9 0   AST U/L 24   ALT U/L 20   ALK PHOS U/L 101   EGFR ml/min/1 73sq m 49       BMP:  Results from last 7 days  Lab Units 02/09/19  0049   POTASSIUM mmol/L 4 1   CHLORIDE mmol/L 104   CO2 mmol/L 29   BUN mg/dL 18   CREATININE mg/dL 1 12   CALCIUM mg/dL 9 0         Results from last 7 days  Lab Units 02/09/19  0049   NT-PRO BNP pg/mL 403*                        Lipid Profile:   No results found for: CHOL  Lab Results   Component Value Date    HDL 63 (H) 01/22/2018    HDL 64 (H) 01/04/2017    HDL 60 01/12/2016     Lab Results   Component Value Date    LDLCALC 76 01/22/2018    LDLCALC 110 (H) 01/04/2017    LDLCALC 93 01/12/2016     Lab Results   Component Value Date    TRIG 85 01/22/2018    TRIG 75 01/04/2017    TRIG 80 01/12/2016       Cardiac testing:   Results for orders placed during the hospital encounter of 02/08/18   Echo complete with contrast if indicated    Narrative [facility] [address] [place] [id number]  [phone]    Transthoracic Echocardiogram  2D, M-mode, and Color Doppler    Study date:  2018    Patient: Mercy Cid  MR number: [id number]  Account number: [de-identified]  : 1945  Age: 67 years  Gender: Female  Status: Outpatient  Location: [facility]  Height: 63 in  Weight: 219 lb  BP: 138/ 82 mmHg    Indications: Atrial Fibrillation  Diagnoses: I48 0 - Atrial fibrillation    Sonographer:  Ahumada RCS  Interpreting Physician:  Marcus Krueger MD  Primary Physician:  Rosie Mcdowell DO  Referring Physician:  Marcus Krueger MD  Group:  [facility]    SUMMARY    LEFT VENTRICLE:  Systolic function was moderately reduced  Ejection fraction was estimated in the range of 40 % to 45 %, likely underestimated due to rapid atrial fibrillation  Although no diagnostic regional wall motion abnormality was identified, this possibility cannot be completely excluded on the basis of this study  Wall thickness was mildly increased  There was mild concentric hypertrophy  RIGHT VENTRICLE:  The size was normal   Systolic function was mildly reduced  LEFT ATRIUM:  The atrium was mildly dilated  MITRAL VALVE:  There was mild regurgitation  AORTIC VALVE:  There was mild regurgitation  TRICUSPID VALVE:  There was mild to moderate regurgitation  Pulmonary artery systolic pressure was within the normal range  PULMONIC VALVE:  There was trace regurgitation  HISTORY: PRIOR HISTORY: Hyperlipidemia  Atrial fibrillation  Risk factors: hypertension and morbid obesity  Cerebrovascular disease  PROCEDURE: The study was performed in the [address] [place] [address]  This was a routine study  The transthoracic approach was used  The study included complete 2D imaging, M-mode, and color Doppler  The heart rate was 127 bpm, at the  start of the study   Images were obtained from the parasternal, apical, subcostal, and suprasternal notch acoustic windows  Image quality was adequate  LEFT VENTRICLE: Size was normal  Systolic function was moderately reduced  Ejection fraction was estimated in the range of 40 % to 45 %, likely underestimated due to rapid atrial fibrillation  Although no diagnostic regional wall motion  abnormality was identified, this possibility cannot be completely excluded on the basis of this study  Wall thickness was mildly increased  There was mild concentric hypertrophy  No evidence of apical thrombus  RIGHT VENTRICLE: The size was normal  Systolic function was mildly reduced  Wall thickness was normal     LEFT ATRIUM: The atrium was mildly dilated  RIGHT ATRIUM: Size was normal     MITRAL VALVE: There was annular calcification  There was normal leaflet separation  DOPPLER: The transmitral velocity was within the normal range  There was no evidence for stenosis  There was mild regurgitation  AORTIC VALVE: The valve was trileaflet  Leaflets exhibited mildly increased thickness and normal cuspal separation  DOPPLER: Transaortic velocity was within the normal range  There was no evidence for stenosis  There was mild  regurgitation  TRICUSPID VALVE: The valve structure was normal  There was normal leaflet separation  DOPPLER: The transtricuspid velocity was within the normal range  There was no evidence for stenosis  There was mild to moderate regurgitation  Pulmonary  artery systolic pressure was within the normal range  PULMONIC VALVE: Leaflets exhibited normal thickness, no calcification, and normal cuspal separation  DOPPLER: The transpulmonic velocity was within the normal range  There was trace regurgitation  PERICARDIUM: There was no pericardial effusion  The pericardium was normal in appearance  AORTA: The root exhibited normal size  SYSTEMIC VEINS: IVC: The inferior vena cava was not well visualized      SYSTEM MEASUREMENT TABLES    Apical four chamber  4 chamber Left Atrium Volume Index; Planimetry; End Systole; Apical four chamber;: 21 26 cm2  Left Ventricular Diastolic Area; Method of Disks, Single Plane; End Diastole; Apical four chamber;: 23 86 cm2  Left Ventricular Ejection Fraction; Method of Disks, Single Plane; Apical four chamber;: 49 7 %  Left Ventricular systolic Area; Method of Disks, Single Plane; End Systole; Apical four chamber;: 15 54 cm2  Right Atrium Systolic Area; Planimetry; End Systole; Apical four chamber;: 15 89 cm2  Right Ventricular Internal Diastolic Dimension; End Diastole; Apical four chamber;: 25 6 mm  TAPSE: 14 4 mm    Unspecified Scan Mode  AR Vmax; Regurgitant Flow; Diastole;: 390 6 cm/s  Aortic Root Diameter; End Systole;: 33 mm  Gradient Pressure, Peak; Simplified Bernoulli; Antegrade Flow; Systole;: 4 9 mm[Hg]  Gradient pressure, average; Simplified Bernoulli; Antegrade Flow; Systole;: 2 3 mm[Hg]  Left Atrium to Aortic Root Ratio;: 1 33  Left atrial diameter; End Diastole;: 44 mm  Pressure Half Time;: 0 46 s  Cardiac Output; Teichholz; Systole;: 2 73 L/min  Heart rate; Teichholz;: 123 /min  Interventricular Septum Diastolic Thickness; Teichholz; End Diastole;: 12 7 mm  Left Ventricle Internal End Diastolic Dimension; Teichholz;: 42 9 mm  Left Ventricle Internal Systolic Dimension; Teichholz; End Systole;: 37 6 mm  Left Ventricle Mass; Mass AVCube with Teichholz; End Diastole;: 167 g  Left Ventricle Posterior Wall Diastolic Thickness; Teichholz; End Diastole;: 9 7 mm  Left Ventricular Ejection Fraction; Teichholz;: 26 9 %  Left Ventricular End Diastolic Volume; Teichholz;: 82 6 ml  Left Ventricular End Systolic Volume; Teichholz;: 60 4 ml  Left Ventricular Fractional Shortening;: 12 4 %  Stroke volume;  Teichholz; Systole;: 22 2 ml  Maximum Tricuspid valve regurgitation pressure gradient; Regurgitant Flow; Systole;: 28 1 mm[Hg]    [facility]    Prepared and electronically signed by    Lea Sy MD  Signed 10-Feb-2018 13:06:38       No results found for this or any previous visit  No procedure found  No results found for this or any previous visit  Imaging: I have personally reviewed pertinent reports  EKG reviewed personally:   Sinus rhythm with nonspecific ST-T abnormality        Assessment/Plan:  Active Problems:    Essential hypertension    Mixed hyperlipidemia    Obesity (BMI 30-39  9)    Elevated troponin    Paroxysmal atrial fibrillation (HCC)    Patient with known history of hypertension hyperlipidemia and paroxysmal atrial fibrillation on anticoagulation and status post pacemaker admitted with symptoms of retrosternal chest discomfort not related to exertion  At least by history it sounds atypical   Patient has borderline elevated troponins of unclear significance  Possible etiologies for her symptoms were discussed with patient and family  Patient will need a pharmacological nuclear stress test to rule out ischemia  If the stress test is negative for ischemia, patient may need GI evaluation  Trial of Maalox as well as proton pump inhibitors  Continue telemetry  Echocardiogram to evaluate ejection fraction and assess for any evidence of wall motion abnormalities  Medications reviewed  Patient understands the risks and benefits of anticoagulation to prevent thromboembolic risk from atrial fibrillation  Plan of care discussed at length with patient and family  Patient is agreeable with the plan of care  Also discussed with Dr Wale Fierro of the [place] [id number] [facility]  Code Status: Level 1 - Full Code    Thank you for allowing us to participate in this patient's care  This pt will follow up with Dr Jayshree Jennings,  once discharged  Counseling / Coordination of Care  Total floor / unit time spent today 35  minutes  Greater than 50% of total time was spent with the patient and / or family counseling and / or coordination of care    A description of the counseling / coordination of care: Review of history, current assessment, development of a plan  Jazzy Chris MD  2/9/2019,10:37 AM    Portions of the record may have been created with voice recognition software   Occasional wrong word or "sound a like" substitutions may have occurred due to the inherent limitations of voice recognition software   Read the chart carefully and recognize, using context, where substitutions have occurred

## 2019-02-09 NOTE — QUICK NOTE
This is a post admit follow-up  The patient had minimally elevated troponins  They have been stable  It is a very atypical presentation  The patient is also on anticoagulation cell less likely PE  Could be a GI related pain at the patient experiencing  Will see what Cardiology says  Patient may need to stay for stress test secondary to risk factors but will leave that up to Cardiology  Patient's lungs are clear auscultation bilaterally  Heart S1-S2 heard with regular rate and rhythm  Vital signs were reviewed    Labs reviewed

## 2019-02-09 NOTE — PLAN OF CARE
CARDIOVASCULAR - ADULT     Maintains optimal cardiac output and hemodynamic stability Progressing     Absence of cardiac dysrhythmias or at baseline rhythm Progressing        DISCHARGE PLANNING     Discharge to home or other facility with appropriate resources Progressing        Knowledge Deficit     Patient/family/caregiver demonstrates understanding of disease process, treatment plan, medications, and discharge instructions Progressing        METABOLIC, FLUID AND ELECTROLYTES - ADULT     Electrolytes maintained within normal limits Progressing     Fluid balance maintained Progressing        PAIN - ADULT     Verbalizes/displays adequate comfort level or baseline comfort level Progressing

## 2019-02-10 ENCOUNTER — APPOINTMENT (INPATIENT)
Dept: NON INVASIVE DIAGNOSTICS | Facility: HOSPITAL | Age: 74
DRG: 948 | End: 2019-02-10
Payer: MEDICARE

## 2019-02-10 LAB — TROPONIN I SERPL-MCNC: 0.05 NG/ML

## 2019-02-10 PROCEDURE — 84484 ASSAY OF TROPONIN QUANT: CPT | Performed by: EMERGENCY MEDICINE

## 2019-02-10 PROCEDURE — 93306 TTE W/DOPPLER COMPLETE: CPT

## 2019-02-10 PROCEDURE — 99232 SBSQ HOSP IP/OBS MODERATE 35: CPT | Performed by: FAMILY MEDICINE

## 2019-02-10 PROCEDURE — 93306 TTE W/DOPPLER COMPLETE: CPT | Performed by: INTERNAL MEDICINE

## 2019-02-10 PROCEDURE — 99232 SBSQ HOSP IP/OBS MODERATE 35: CPT | Performed by: INTERNAL MEDICINE

## 2019-02-10 RX ORDER — PRAVASTATIN SODIUM 20 MG
10 TABLET ORAL DAILY
Status: DISCONTINUED | OUTPATIENT
Start: 2019-02-10 | End: 2019-02-12 | Stop reason: HOSPADM

## 2019-02-10 RX ADMIN — APIXABAN 5 MG: 5 TABLET, FILM COATED ORAL at 08:37

## 2019-02-10 RX ADMIN — LISINOPRIL 2.5 MG: 2.5 TABLET ORAL at 08:35

## 2019-02-10 RX ADMIN — ACETAMINOPHEN 650 MG: 325 TABLET, FILM COATED ORAL at 06:24

## 2019-02-10 RX ADMIN — PRAVASTATIN SODIUM 10 MG: 20 TABLET ORAL at 20:45

## 2019-02-10 RX ADMIN — SOTALOL HYDROCHLORIDE 120 MG: 80 TABLET ORAL at 08:35

## 2019-02-10 RX ADMIN — METOPROLOL SUCCINATE 50 MG: 50 TABLET, EXTENDED RELEASE ORAL at 20:45

## 2019-02-10 RX ADMIN — SOTALOL HYDROCHLORIDE 120 MG: 80 TABLET ORAL at 20:45

## 2019-02-10 RX ADMIN — METOPROLOL SUCCINATE 50 MG: 50 TABLET, EXTENDED RELEASE ORAL at 08:38

## 2019-02-10 RX ADMIN — APIXABAN 5 MG: 5 TABLET, FILM COATED ORAL at 20:45

## 2019-02-10 RX ADMIN — PANTOPRAZOLE SODIUM 40 MG: 40 TABLET, DELAYED RELEASE ORAL at 06:23

## 2019-02-10 NOTE — PROGRESS NOTES
General Cardiology   Progress Note   Love David 75 y o  female MRN: [id number]  Unit/Bed#: -01 Encounter: [id number]      SUBJECTIVE:   No significant events overnight  Patient feels much better  Patient for pharmacological nuclear stress test tomorrow  Echocardiogram is being done today  No new complaints  REVIEW OF SYSTEMS:  Constitutional:  Denies fever or chills   Eyes:  Denies change in visual acuity   HENT:  Denies nasal congestion or sore throat   Respiratory:  Denies cough or shortness of breath   Cardiovascular:  Denies chest pain or edema   GI:  Denies abdominal pain, nausea, vomiting, bloody stools or diarrhea   :  Denies dysuria, frequency, difficulty in micturition and nocturia  Musculoskeletal:  Denies back pain or joint pain   Neurologic:  Denies headache, focal weakness or sensory changes   Endocrine:  Denies polyuria or polydipsia   Lymphatic:  Denies swollen glands   Psychiatric:  Denies depression or anxiety     OBJECTIVE:   Vitals:  Vitals:    02/10/19 0931   BP: 128/74   Pulse: 72   Resp:    Temp:    SpO2:      Body mass index is 35 44 kg/m²  Systolic (81AJQ), WIK:690 , Min:114 , FMZ:842     Diastolic (51CVZ), RTS:91, Min:58, Max:76      Intake/Output Summary (Last 24 hours) at 2/10/2019 1003  Last data filed at 2/10/2019 0900  Gross per 24 hour   Intake 480 ml   Output --   Net 480 ml     Weight (last 2 days)     Date/Time   Weight    02/09/19 0010   96 6 (212 96)                  PHYSICAL EXAMS:  General:  Patient is not in acute distress, laying in the bed comfortably, awake, alert responding to commands  Head: Normocephalic, Atraumatic  HEENT:  Both pupils normal-size atraumatic, normocephalic, nonicteric  Neck:  JVP not raised  Trachea central  Respiratory:  Bronchovascular breathing all over the chest without any accompaniment  Cardiovascular:  Regular with periods of irregularity  GI:  Abdomen soft nontender   Liver and spleen normal size  Lymphatic:  No cervical or inguinal lymphadenopathy  Neurologic:  Patient is awake alert, responding to command, well-oriented to time and place and person moving     LABORATORY RESULTS:  Results from last 7 days   Lab Units 02/10/19  0636 19  0704 19  0410   TROPONIN I ng/mL 0 05* 0 06* 0 06*       CBC with diff:   Results from last 7 days   Lab Units 19  0049   WBC Thousand/uL 8 22   HEMOGLOBIN g/dL 15 6*   HEMATOCRIT % 45 6   MCV fL 100*   PLATELETS Thousands/uL 175   MCH pg 34 3   MCHC g/dL 34 2   RDW % 13 0   MPV fL 11 5   NRBC AUTO /100 WBCs 0       CMP:  Results from last 7 days   Lab Units 19  0049   POTASSIUM mmol/L 4 1   CHLORIDE mmol/L 104   CO2 mmol/L 29   BUN mg/dL 18   CREATININE mg/dL 1 12   CALCIUM mg/dL 9 0   AST U/L 24   ALT U/L 20   ALK PHOS U/L 101   EGFR ml/min/1 73sq m 49       BMP:  Results from last 7 days   Lab Units 19  0049   POTASSIUM mmol/L 4 1   CHLORIDE mmol/L 104   CO2 mmol/L 29   BUN mg/dL 18   CREATININE mg/dL 1 12   CALCIUM mg/dL 9 0       Results from last 7 days   Lab Units 19  0049   NT-PRO BNP pg/mL 403*                        Lipid Profile:   No results found for: CHOL  Lab Results   Component Value Date    HDL 63 (H) 2018    HDL 64 (H) 2017    HDL 60 2016     Lab Results   Component Value Date    LDLCALC 76 2018    LDLCALC 110 (H) 2017    LDLCALC 93 2016     Lab Results   Component Value Date    TRIG 85 2018    TRIG 75 2017    TRIG 80 2016       Cardiac testing:  Results for orders placed during the hospital encounter of 18   Echo complete with contrast if indicated    Narrative [place] [address], [address] [facility]  [phone]    Transthoracic Echocardiogram  2D, M-mode, and Color Doppler    Study date:  2018    Patient: Madyson Peterson  MR number: [id number]  Account number: [de-identified]  : 1945  Age: 67 years  Gender: Female  Status: Outpatient  Location:   [facility]  Height: 63 in  Weight: 219 lb  BP: 138/ 82 mmHg    Indications: Atrial Fibrillation  Diagnoses: I48 0 - Atrial fibrillation    Sonographer:  Hatch RCS  Interpreting Physician:  Arleth May MD  Primary Physician:  Parris Schwab DO  Referring Physician:  Arleth May MD  Group:  [facility]    SUMMARY    LEFT VENTRICLE:  Systolic function was moderately reduced  Ejection fraction was estimated in the range of 40 % to 45 %, likely underestimated due to rapid atrial fibrillation  Although no diagnostic regional wall motion abnormality was identified, this possibility cannot be completely excluded on the basis of this study  Wall thickness was mildly increased  There was mild concentric hypertrophy  RIGHT VENTRICLE:  The size was normal   Systolic function was mildly reduced  LEFT ATRIUM:  The atrium was mildly dilated  MITRAL VALVE:  There was mild regurgitation  AORTIC VALVE:  There was mild regurgitation  TRICUSPID VALVE:  There was mild to moderate regurgitation  Pulmonary artery systolic pressure was within the normal range  PULMONIC VALVE:  There was trace regurgitation  HISTORY: PRIOR HISTORY: Hyperlipidemia  Atrial fibrillation  Risk factors: hypertension and morbid obesity  Cerebrovascular disease  PROCEDURE: The study was performed in the [address] [place] [address]  This was a routine study  The transthoracic approach was used  The study included complete 2D imaging, M-mode, and color Doppler  The heart rate was 127 bpm, at the  start of the study  Images were obtained from the parasternal, apical, subcostal, and suprasternal notch acoustic windows  Image quality was adequate  LEFT VENTRICLE: Size was normal  Systolic function was moderately reduced  Ejection fraction was estimated in the range of 40 % to 45 %, likely underestimated due to rapid atrial fibrillation   Although no diagnostic regional wall motion  abnormality was identified, this possibility cannot be completely excluded on the basis of this study  Wall thickness was mildly increased  There was mild concentric hypertrophy  No evidence of apical thrombus  RIGHT VENTRICLE: The size was normal  Systolic function was mildly reduced  Wall thickness was normal     LEFT ATRIUM: The atrium was mildly dilated  RIGHT ATRIUM: Size was normal     MITRAL VALVE: There was annular calcification  There was normal leaflet separation  DOPPLER: The transmitral velocity was within the normal range  There was no evidence for stenosis  There was mild regurgitation  AORTIC VALVE: The valve was trileaflet  Leaflets exhibited mildly increased thickness and normal cuspal separation  DOPPLER: Transaortic velocity was within the normal range  There was no evidence for stenosis  There was mild  regurgitation  TRICUSPID VALVE: The valve structure was normal  There was normal leaflet separation  DOPPLER: The transtricuspid velocity was within the normal range  There was no evidence for stenosis  There was mild to moderate regurgitation  Pulmonary  artery systolic pressure was within the normal range  PULMONIC VALVE: Leaflets exhibited normal thickness, no calcification, and normal cuspal separation  DOPPLER: The transpulmonic velocity was within the normal range  There was trace regurgitation  PERICARDIUM: There was no pericardial effusion  The pericardium was normal in appearance  AORTA: The root exhibited normal size  SYSTEMIC VEINS: IVC: The inferior vena cava was not well visualized  SYSTEM MEASUREMENT TABLES    Apical four chamber  4 chamber Left Atrium Volume Index; Planimetry; End Systole; Apical four chamber;: 21 26 cm2  Left Ventricular Diastolic Area; Method of Disks, Single Plane; End Diastole; Apical four chamber;: 23 86 cm2  Left Ventricular Ejection Fraction; Method of Disks, Single Plane;  Apical four chamber;: 49 7 %  Left Ventricular systolic Area; Method of Disks, Single Plane; End Systole; Apical four chamber;: 15 54 cm2  Right Atrium Systolic Area; Planimetry; End Systole; Apical four chamber;: 15 89 cm2  Right Ventricular Internal Diastolic Dimension; End Diastole; Apical four chamber;: 25 6 mm  TAPSE: 14 4 mm    Unspecified Scan Mode  AR Vmax; Regurgitant Flow; Diastole;: 390 6 cm/s  Aortic Root Diameter; End Systole;: 33 mm  Gradient Pressure, Peak; Simplified Bernoulli; Antegrade Flow; Systole;: 4 9 mm[Hg]  Gradient pressure, average; Simplified Bernoulli; Antegrade Flow; Systole;: 2 3 mm[Hg]  Left Atrium to Aortic Root Ratio;: 1 33  Left atrial diameter; End Diastole;: 44 mm  Pressure Half Time;: 0 46 s  Cardiac Output; Teichholz; Systole;: 2 73 L/min  Heart rate; Teichholz;: 123 /min  Interventricular Septum Diastolic Thickness; Teichholz; End Diastole;: 12 7 mm  Left Ventricle Internal End Diastolic Dimension; Teichholz;: 42 9 mm  Left Ventricle Internal Systolic Dimension; Teichholz; End Systole;: 37 6 mm  Left Ventricle Mass; Mass AVCube with Teichholz; End Diastole;: 167 g  Left Ventricle Posterior Wall Diastolic Thickness; Teichholz; End Diastole;: 9 7 mm  Left Ventricular Ejection Fraction; Teichholz;: 26 9 %  Left Ventricular End Diastolic Volume; Teichholz;: 82 6 ml  Left Ventricular End Systolic Volume; Teichholz;: 60 4 ml  Left Ventricular Fractional Shortening;: 12 4 %  Stroke volume; Maxine Days;: 22 2 ml  Maximum Tricuspid valve regurgitation pressure gradient; Regurgitant Flow; Systole;: 28 1 mm[Hg]    [facility] Acc[facility]    Prepared and electronically signed by    Nani Crandall MD  Signed 10-Feb-2018 13:06:38       No results found for this or any previous visit  No results found for this or any previous visit  No procedure found  No results found for this or any previous visit      Meds/Allergies   all current active meds have been reviewed  Medications Prior to Admission   Medication    ELIQUIS 5 MG    lisinopril (ZESTRIL) 2 5 mg tablet    lovastatin (MEVACOR) 10 MG tablet    metoprolol succinate (TOPROL-XL) 50 mg 24 hr tablet    sotalol (BETAPACE) 80 mg tablet          ASSESSMENT & PLAN   Principal Problem:    NSTEMI (non-ST elevated myocardial infarction) ([facility] [address] )  Active Problems:    Essential hypertension    Mixed hyperlipidemia    Obesity (BMI 30-39  9)    Elevated troponin    Paroxysmal atrial fibrillation (HCC)    Patient with retrosternal chest discomfort which appears atypical   Patient has borderline elevated troponin of questionable significance  Possible etiologies for chest discomfort reviewed with patient and family  Patient will be scheduled for pharmacological nuclear stress test to assess for ischemia  Echocardiogram to be done today to evaluate ejection fraction and assess for wall motion abnormalities  Continue anticoagulation for approximately atrial fibrillation  Plan of care was discussed with patient  She is agreeable with the plan of care  Korey Cloud MD  2/10/2019,10:03 AM    Portions of the record may have been created with voice recognition software   Occasional wrong word or "sound a like" substitutions may have occurred due to the inherent limitations of voice recognition software   Read the chart carefully and recognize, using context, where substitutions have occurred

## 2019-02-10 NOTE — PLAN OF CARE
Problem: PAIN - ADULT  Goal: Verbalizes/displays adequate comfort level or baseline comfort level  Description  Interventions:  - Encourage patient to monitor pain and request assistance  - Assess pain using appropriate pain scale  - Administer analgesics based on type and severity of pain and evaluate response  - Implement non-pharmacological measures as appropriate and evaluate response  - Consider cultural and social influences on pain and pain management  - Notify physician/advanced practitioner if interventions unsuccessful or patient reports new pain   Outcome: Progressing     Problem: DISCHARGE PLANNING  Goal: Discharge to home or other facility with appropriate resources  Description  INTERVENTIONS:  - Identify barriers to discharge w/patient and caregiver  - Arrange for needed discharge resources and transportation as appropriate  - Identify discharge learning needs (meds, wound care, etc )  - Arrange for interpretive services to assist at discharge as needed  - Refer to Case Management Department for coordinating discharge planning if the patient needs post-hospital services based on physician/advanced practitioner order or complex needs related to functional status, cognitive ability, or social support system   Outcome: Progressing     Problem: Knowledge Deficit  Goal: Patient/family/caregiver demonstrates understanding of disease process, treatment plan, medications, and discharge instructions  Description  Complete learning assessment and assess knowledge base    Interventions:  - Provide teaching at level of understanding  - Provide teaching via preferred learning methods   Outcome: Progressing     Problem: CARDIOVASCULAR - ADULT  Goal: Maintains optimal cardiac output and hemodynamic stability  Description  INTERVENTIONS:  - Monitor I/O, vital signs and rhythm  - Monitor for S/S and trends of decreased cardiac output i e  bleeding, hypotension  - Administer and titrate ordered vasoactive medications to optimize hemodynamic stability  - Assess quality of pulses, skin color and temperature  - Assess for signs of decreased coronary artery perfusion - ex   Angina  - Instruct patient to report change in severity of symptoms   Outcome: Progressing  Goal: Absence of cardiac dysrhythmias or at baseline rhythm  Description  INTERVENTIONS:  - Continuous cardiac monitoring, monitor vital signs, obtain 12 lead EKG if indicated  - Administer antiarrhythmic and heart rate control medications as ordered  - Monitor electrolytes and administer replacement therapy as ordered   Outcome: Progressing     Problem: METABOLIC, FLUID AND ELECTROLYTES - ADULT  Goal: Electrolytes maintained within normal limits  Description  INTERVENTIONS:  - Monitor labs and assess patient for signs and symptoms of electrolyte imbalances  - Administer electrolyte replacement as ordered  - Monitor response to electrolyte replacements, including repeat lab results as appropriate  - Instruct patient on fluid and nutrition as appropriate   Outcome: Progressing  Goal: Fluid balance maintained  Description  INTERVENTIONS:  - Monitor labs and assess for signs and symptoms of volume excess or deficit  - Monitor I/O and WT  - Instruct patient on fluid and nutrition as appropriate   Outcome: Progressing

## 2019-02-10 NOTE — UTILIZATION REVIEW
Initial Clinical Review    Admission: Date/Time/Statement: 2/9/19 @ 0135   Orders Placed This Encounter   Procedures    Inpatient Admission (expected length of stay for this patient Order details is greater than two midnights)     Standing Status:   Standing     Number of Occurrences:   1     Order Specific Question:   Admitting Physician     Answer:   Harrison Guerra [[id number]]     Order Specific Question:   Level of Care     Answer:   Med Surg [[id number]]     Order Specific Question:   Estimated length of stay     Answer:   More than 2 Midnights     Order Specific Question:   Certification     Answer:   I certify that inpatient services are medically necessary for this patient for a duration of greater than two midnights  See H&P and MD Progress Notes for additional information about the patient's course of treatment  ED: Date/Time/Mode of Arrival:   ED Arrival Information     Expected Arrival Acuity Means of Arrival Escorted By Service Admission Type    - 2/9/2019 00:05 Emergent Walk-In Family Member General Medicine Emergency    Arrival Complaint    Chest Pain        Chief Complaint:   Chief Complaint   Patient presents with    Chest Pain     Onset 2 hours ago, pace make placed on 1/1/19  Pain in center of chest, does not radiate  History of Illness:    Elmo Elkins is a 75 y o  female who presents with chest pain, sudden onset, retrosternal, severe, continuous, worse with respiration, no radiating, not associated symptoms, no relieved by aspirin  ER course:  Given ASA, troponin slightly elevated 0 06, note cruciate treated as she took a dose of Eliquis prior to chest pain episode at 9:00 p m  [facility]        ED Vital Signs:   ED Triage Vitals   Temperature Pulse Respirations Blood Pressure SpO2   02/09/19 0010 02/09/19 0015 02/09/19 0015 02/09/19 0015 02/09/19 0015   98 1 °F (36 7 °C) 71 18 (!) 160/109 98 %      Temp Source Heart Rate Source Patient Position - Orthostatic VS BP Location FiO2 (%)   02/09/19 0010 02/09/19 0015 02/09/19 0015 02/09/19 0015 --   Oral Monitor Lying Right arm       Pain Score       02/09/19 0010       5        Wt Readings from Last 1 Encounters:   02/09/19 96 6 kg (212 lb 15 4 oz)     Vital Signs (abnormal): wnl     Pertinent Labs/Diagnostic Test Results:  Trop  0 06 BNP  403, H&H   15 6  45 6  CXR - wnl   EKG- NSR   ED Treatment:   Medication Administration from 02/09/2019 0005 to 02/09/2019 1472       Date/Time Order Dose Route Action Action by Comments     02/09/2019 0041 aspirin tablet 325 mg 325 mg Oral Given Remberto Smith RN         Past Medical/Surgical History:    Active Ambulatory Problems     Diagnosis Date Noted    Persistent atrial fibrillation with rapid ventricular response ([facility] [address] ) 02/23/2018    Essential hypertension 02/23/2018    Mixed hyperlipidemia 02/23/2018    Obesity (BMI 30-39 9) 02/23/2018    H/O TIA (transient ischemic attack) and stroke 02/23/2018    Aphasia, mixed 07/28/2017    Migraine with aura and without status migrainosus, not intractable 01/07/2016    URI, acute 04/09/2018    Viral exanthem 04/09/2018    Myocardiopathy ([facility] [address] ) 05/29/2018    Shortness of breath on exertion 05/29/2018    Anticoagulant long-term use 05/29/2018    Nonrheumatic mitral valve regurgitation 05/29/2018    Nonrheumatic aortic valve insufficiency 05/29/2018    Nonrheumatic tricuspid valve regurgitation 05/29/2018    Cardiomyopathy ([address] ) 12/20/2018    Sinus bradycardia 12/29/2018       Past Medical History:   Diagnosis Date    Atrial fibrillation ([facility] [address] )     Headache     Hyperlipidemia     Hypertension     Lyme disease     Shortness of breath     TIA (transient ischemic attack)     Transient cerebral ischemia     Vertigo      Admitting Diagnosis: Chest pain [R07 9]  Elevated troponin [R74 8]  Age/Sex: 75 y o  female  Assessment/Plan:        * NSTEMI (non-ST elevated myocardial infarction) ([facility] [address] )   Assessment & Plan     Admit to step-down  Serial troponin and EKG  ASA daily  No heparin drip aspiration is on Eliquis and took  her dose in the evening  Consult Cardiology in the morning  Echo in a m       Paroxysmal atrial fibrillation ([address] )   Assessment & Plan     Status post pacemaker,   post electrical conversion,   -continue home medication      Essential hypertension   Assessment & Plan     Continue home medication      Mixed hyperlipidemia   Assessment & Plan     Continue statin            VTE Prophylaxis: Apixaban (Eliquis)  / reason for no mechanical VTE prophylaxis Already anticoagulated   Code Status:  Full code  POLST: POLST form is not discussed and not completed at this time  Discussion with family:  Patient     Anticipated Length of Stay:  Patient will be admitted on an Inpatient basis with an anticipated length of stay of  more than 2 midnights     Justification for Hospital Stay:  Management of this episode of NSTEMI        Admission Orders:  Scheduled Meds:   Current Facility-Administered Medications:  acetaminophen 650 mg Oral Q6H PRN    aluminum-magnesium hydroxide-simethicone 30 mL Oral Q4H PRN    apixaban 5 mg Oral BID    lisinopril 2 5 mg Oral Daily    metoprolol succinate 50 mg Oral BID    nitroglycerin 0 4 mg Sublingual Q5 Min PRN    ondansetron 4 mg Intravenous Q6H PRN    pantoprazole 40 mg Oral Early Morning    pravastatin 10 mg Oral Daily With Dinner    sotalol 120 mg Oral Q12H [address]      Cardiac diet   Cardiology consult   Serial trop   #1  0 06  #2   0 06  #3  0 06   #4  0 05  EKG prn cp   EKG w/ 2nd and 3rd trop   Tele   Echo   Stress test

## 2019-02-10 NOTE — PROGRESS NOTES
Progress Note - Rosio Ruiz 1945, 75 y o  female MRN: [id number]    Unit/Bed#: -01 Encounter: [id number]    Primary Care Provider: Earlene Hillman DO   Date and time admitted to hospital: 2019 12:07 AM        Paroxysmal atrial fibrillation [facility])  Assessment & Plan  Status post pacemaker,   post electrical conversion,   -continue home medication    Elevated troponin  Assessment & Plan  Very mild elevation in troponin  This is a very unlikely acute coronary syndrome based on symptoms  Patient for stress test per Cardiology tomorrow    Obesity (BMI 30-39  9)  Assessment & Plan  Encourage weight    Mixed hyperlipidemia  Assessment & Plan  Continue statin    Essential hypertension  Assessment & Plan  Continue home medication    * NSTEMI (non-ST elevated myocardial infarction) [facility])  Assessment & Plan  Await echocardiogram   Cardiology evaluation  Not a NSTEMI  Monitor  For stress tomorrow        VTE Pharmacologic Prophylaxis:   Pharmacologic: Apixaban (Eliquis)  Mechanical VTE Prophylaxis in Place: Yes    Patient Centered Rounds: I have performed bedside rounds with nursing staff today  Discussions with Specialists or Other Care Team Provider:  Discussed with Cardiology yesterday    Education and Discussions with Family / Patient:  Patient    Time Spent for Care: 15 minutes  More than 50% of total time spent on counseling and coordination of care as described above      Current Length of Stay: 1 day(s)    Current Patient Status: Inpatient   Certification Statement: The patient will continue to require additional inpatient hospital stay due to Needing a stress test based on risk factors and slightly elevated troponin    Discharge Plan:  Anticipate discharge tomorrow after stress test    Code Status: Level 1 - Full Code      Subjective:   Patient seen examined    Objective:     Vitals:   Temp (24hrs), Av 2 °F (36 8 °C), Min:97 7 °F (36 5 °C), Max:98 7 °F (37 1 °C)    Temp:  [97 7 °F (36 5 °C)-98 7 °F (37 1 °C)] 98 4 °F (36 9 °C)  HR:  [62-72] 72  Resp:  [16-20] 16  BP: (114-154)/(58-76) 141/76  SpO2:  [94 %-97 %] 94 %  Body mass index is 35 44 kg/m²  Input and Output Summary (last 24 hours): Intake/Output Summary (Last 24 hours) at 2/10/2019 0733  Last data filed at 2/9/2019 1726  Gross per 24 hour   Intake 240 ml   Output 700 ml   Net -460 ml       Physical Exam:     Physical Exam  (   General Appearance:    Alert, cooperative, no distress, appears stated age                               Lungs:     Clear to auscultation bilaterally, respirations unlabored  Heart:    Regular rate and rhythm, S1 and S2 normal, no murmur, rub    or gallop   Abdomen:     Soft, non-tender, bowel sounds active all four quadrants,     no masses, no organomegaly           Extremities:   Extremities normal, atraumatic, no cyanosis or edema       Additional Data:     Labs:    Results from last 7 days   Lab Units 02/09/19  0049   WBC Thousand/uL 8 22   HEMOGLOBIN g/dL 15 6*   HEMATOCRIT % 45 6   PLATELETS Thousands/uL 175   NEUTROS PCT % 71   LYMPHS PCT % 13*   MONOS PCT % 13*   EOS PCT % 2     Results from last 7 days   Lab Units 02/09/19  0049   SODIUM mmol/L 140   POTASSIUM mmol/L 4 1   CHLORIDE mmol/L 104   CO2 mmol/L 29   BUN mg/dL 18   CREATININE mg/dL 1 12   ANION GAP mmol/L 7   CALCIUM mg/dL 9 0   ALBUMIN g/dL 3 5   TOTAL BILIRUBIN mg/dL 0 60   ALK PHOS U/L 101   ALT U/L 20   AST U/L 24   GLUCOSE RANDOM mg/dL 100                           * I Have Reviewed All Lab Data Listed Above  * Additional Pertinent Lab Tests Reviewed:  [address] Admission Reviewed    **    Recent Cultures (last 7 days):           Last 24 Hours Medication List:     Current Facility-Administered Medications:  acetaminophen 650 mg Oral Q6H PRN Jhon Monroy MD   aluminum-magnesium hydroxide-simethicone 30 mL Oral Q4H PRN Cheri Rodriguez MD   apixaban 5 mg Oral BID Laney España MD   lisinopril 2 5 mg Oral Daily Aislinn Santo MD   metoprolol succinate 50 mg Oral BID Aislinn Santo MD   nitroglycerin 0 4 mg Sublingual Q5 Min PRN Aislinn Santo MD   ondansetron 4 mg Intravenous Q6H PRN Aislinn Santo MD   pantoprazole 40 mg Oral Early Morning Davi Cho MD   pravastatin 10 mg Oral Daily With Margoth West MD   sotalol 120 mg Oral Q12H [address] Aislinn Santo MD        Today, Patient Was Seen By: Marciano Anderson MD    ** Please Note: Dictation voice to text software may have been used in the creation of this document   **

## 2019-02-11 ENCOUNTER — APPOINTMENT (INPATIENT)
Dept: NUCLEAR MEDICINE | Facility: HOSPITAL | Age: 74
DRG: 948 | End: 2019-02-11
Payer: MEDICARE

## 2019-02-11 ENCOUNTER — APPOINTMENT (INPATIENT)
Dept: NON INVASIVE DIAGNOSTICS | Facility: HOSPITAL | Age: 74
DRG: 948 | End: 2019-02-11
Payer: MEDICARE

## 2019-02-11 ENCOUNTER — PATIENT OUTREACH (OUTPATIENT)
Dept: FAMILY MEDICINE CLINIC | Facility: CLINIC | Age: 74
End: 2019-02-11

## 2019-02-11 LAB
ARRHY DURING EX: NORMAL
CHEST PAIN STATEMENT: NORMAL
MAX DIASTOLIC BP: 86 MMHG
MAX HEART RATE: 164 BPM
MAX PREDICTED HEART RATE: 146 BPM
MAX. SYSTOLIC BP: 175 MMHG
PROTOCOL NAME: NORMAL
REASON FOR TERMINATION: NORMAL
TARGET HR FORMULA: NORMAL
TEST INDICATION: NORMAL
TIME IN EXERCISE PHASE: NORMAL

## 2019-02-11 PROCEDURE — 93016 CV STRESS TEST SUPVJ ONLY: CPT | Performed by: INTERNAL MEDICINE

## 2019-02-11 PROCEDURE — 78452 HT MUSCLE IMAGE SPECT MULT: CPT

## 2019-02-11 PROCEDURE — 99232 SBSQ HOSP IP/OBS MODERATE 35: CPT | Performed by: INTERNAL MEDICINE

## 2019-02-11 PROCEDURE — 93018 CV STRESS TEST I&R ONLY: CPT | Performed by: INTERNAL MEDICINE

## 2019-02-11 PROCEDURE — 78452 HT MUSCLE IMAGE SPECT MULT: CPT | Performed by: INTERNAL MEDICINE

## 2019-02-11 PROCEDURE — 99233 SBSQ HOSP IP/OBS HIGH 50: CPT | Performed by: FAMILY MEDICINE

## 2019-02-11 PROCEDURE — A9502 TC99M TETROFOSMIN: HCPCS

## 2019-02-11 PROCEDURE — 93017 CV STRESS TEST TRACING ONLY: CPT

## 2019-02-11 RX ORDER — METOPROLOL TARTRATE 5 MG/5ML
5 INJECTION INTRAVENOUS ONCE
Status: DISCONTINUED | OUTPATIENT
Start: 2019-02-11 | End: 2019-02-11

## 2019-02-11 RX ORDER — DILTIAZEM HYDROCHLORIDE 5 MG/ML
10 INJECTION INTRAVENOUS ONCE
Status: COMPLETED | OUTPATIENT
Start: 2019-02-11 | End: 2019-02-11

## 2019-02-11 RX ORDER — METOPROLOL TARTRATE 5 MG/5ML
10 INJECTION INTRAVENOUS ONCE
Status: COMPLETED | OUTPATIENT
Start: 2019-02-11 | End: 2019-02-11

## 2019-02-11 RX ADMIN — DILTIAZEM HYDROCHLORIDE 10 MG: 5 INJECTION INTRAVENOUS at 12:38

## 2019-02-11 RX ADMIN — METOPROLOL SUCCINATE 75 MG: 25 TABLET, EXTENDED RELEASE ORAL at 20:33

## 2019-02-11 RX ADMIN — SOTALOL HYDROCHLORIDE 120 MG: 80 TABLET ORAL at 10:44

## 2019-02-11 RX ADMIN — LISINOPRIL 2.5 MG: 2.5 TABLET ORAL at 10:44

## 2019-02-11 RX ADMIN — PANTOPRAZOLE SODIUM 40 MG: 40 TABLET, DELAYED RELEASE ORAL at 05:39

## 2019-02-11 RX ADMIN — METOPROLOL SUCCINATE 50 MG: 50 TABLET, EXTENDED RELEASE ORAL at 10:43

## 2019-02-11 RX ADMIN — SOTALOL HYDROCHLORIDE 120 MG: 80 TABLET ORAL at 20:31

## 2019-02-11 RX ADMIN — REGADENOSON 0.4 MG: 0.08 INJECTION, SOLUTION INTRAVENOUS at 09:32

## 2019-02-11 RX ADMIN — APIXABAN 5 MG: 5 TABLET, FILM COATED ORAL at 10:45

## 2019-02-11 RX ADMIN — METOPROLOL TARTRATE 10 MG: 5 INJECTION, SOLUTION INTRAVENOUS at 13:43

## 2019-02-11 RX ADMIN — PRAVASTATIN SODIUM 10 MG: 20 TABLET ORAL at 20:32

## 2019-02-11 RX ADMIN — APIXABAN 5 MG: 5 TABLET, FILM COATED ORAL at 20:33

## 2019-02-11 NOTE — PROGRESS NOTES
General Cardiology   Progress Note   Michelle Romano 75 y o  female MRN: [id number]  Unit/Bed#: -01 Encounter: [id number]        Subjective:   Patient feeling well today denies chest pain or discomfort, shortness of breath, pain or swelling extremities  REVIEW OF SYSTEMS:  Constitutional:  Denies fever or chills   Eyes:  Denies change in visual acuity   HENT:  Denies nasal congestion or sore throat   Respiratory:  Denies cough or shortness of breath   Cardiovascular:  Denies chest pain or edema   GI:  Denies abdominal pain, nausea, vomiting, bloody stools or diarrhea   :  Denies dysuria, frequency, difficulty in micturition and nocturia  Musculoskeletal:  Denies back pain or joint pain   Neurologic:  Denies headache, focal weakness or sensory changes   Endocrine:  Denies polyuria or polydipsia   Lymphatic:  Denies swollen glands   Psychiatric:  Denies depression or anxiety     Objective:   Vitals:  Vitals:    02/11/19 1100   BP: 138/93   Pulse: (!) 130   Resp: 20   Temp: 97 8 °F (36 6 °C)   SpO2: 98%       Body mass index is 35 44 kg/m²  Systolic (02FZA), MHI:304 , Min:128 , DTO:112     Diastolic (73DLZ), OVQ:83, Min:63, Max:93      Intake/Output Summary (Last 24 hours) at 2/11/2019 1233  Last data filed at 2/11/2019 0738  Gross per 24 hour   Intake 120 ml   Output --   Net 120 ml     Weight (last 2 days)     Date/Time   Weight    02/09/19 0010   96 6 (212 96)              Telemetry Review:  AFib with RVR rates in the 130s  PHYSICAL EXAMS:  General:  Patient is not in acute distress, laying in the bed comfortably, awake, alert responding to commands  Head: Normocephalic, Atraumatic  HEENT: White sclera, pink conjunctiva,  PERRLA,pharynx benign  Neck:  Supple, no neck vein distention, carotids+2/+2 no bruits, thyromegaly, adenopathy  Respiratory: clear to P/A  Cardiovascular: + irregular rhythm  PMI normal, S1-S2 normal, No  Murmurs, thrills, gallops, rubs  GI:  Abdomen soft nontender   No hepatosplenomegaly, adenopathy, ascites,or rebound tenderness  Extremities: No edema, normal pulses, no calf tenderness, no joint deformities, no venous disease   Integument:  No skin rashes or ulceration  Lymphatic:  No cervical or inguinal lymphadenopathy  Neurologic:  Patient is awake alert, responding to command, well-oriented to time and place and person moving all extremities      LABORATORY RESULTS:  Results from last 7 days   Lab Units 02/10/19  0636 02/09/19  0704 02/09/19  0410   TROPONIN I ng/mL 0 05* 0 06* 0 06*     CBC with diff:   Results from last 7 days   Lab Units 02/09/19  0049   WBC Thousand/uL 8 22   HEMOGLOBIN g/dL 15 6*   HEMATOCRIT % 45 6   MCV fL 100*   PLATELETS Thousands/uL 175   MCH pg 34 3   MCHC g/dL 34 2   RDW % 13 0   MPV fL 11 5   NRBC AUTO /100 WBCs 0       CMP:  Results from last 7 days   Lab Units 02/09/19  0049   POTASSIUM mmol/L 4 1   CHLORIDE mmol/L 104   CO2 mmol/L 29   BUN mg/dL 18   CREATININE mg/dL 1 12   CALCIUM mg/dL 9 0   AST U/L 24   ALT U/L 20   ALK PHOS U/L 101   EGFR ml/min/1 73sq m 49       BMP:  Results from last 7 days   Lab Units 02/09/19  0049   POTASSIUM mmol/L 4 1   CHLORIDE mmol/L 104   CO2 mmol/L 29   BUN mg/dL 18   CREATININE mg/dL 1 12   CALCIUM mg/dL 9 0         Results from last 7 days   Lab Units 02/09/19  0049   NT-PRO BNP pg/mL 403*                        Lipid Profile:   No results found for: CHOL  Lab Results   Component Value Date    HDL 63 (H) 01/22/2018    HDL 64 (H) 01/04/2017    HDL 60 01/12/2016     Lab Results   Component Value Date    LDLCALC 76 01/22/2018    LDLCALC 110 (H) 01/04/2017    LDLCALC 93 01/12/2016     Lab Results   Component Value Date    TRIG 85 01/22/2018    TRIG 75 01/04/2017    TRIG 80 01/12/2016       Cardiac testing:   Results for orders placed during the hospital encounter of 02/09/19   Echo complete with contrast if indicated    Narrative [address] [place] [id number]  [phone]    Transthoracic Echocardiogram  2D, M-mode, and Color Doppler    Study date:  10-Feb-2019    Patient: Kelle Richard  MR number: [id number]  Account number: [de-identified]  : 1945  Age: 75 years  Gender: Female  Status: Inpatient  Location: Bedside  Height: 65 in  Weight: 212 lb  BP: 151/ 72 mmHg    Indications: CAD  Diagnoses: I25 10 - Atherosclerotic heart disease of native coronary artery without angina pectoris    Sonographer:  Michelle RCS  Referring Physician:  Bryce Jo MD  Group:  [facility]  Interpreting Physician:  Golden Lei MD    SUMMARY    LEFT VENTRICLE:  Ejection fraction was estimated to be 60 %  There were no regional wall motion abnormalities  RIGHT VENTRICLE:  Estimated peak pressure was at least 40 mmHg  Pacer leads noted  LEFT ATRIUM:  The atrium was moderately dilated  RIGHT ATRIUM:  The atrium was dilated  MITRAL VALVE:  There was mild to moderate regurgitation  AORTIC VALVE:  There was mild to moderate regurgitation  TRICUSPID VALVE:  There was moderate regurgitation  PERICARDIUM:  A small pericardial effusion was identified  HISTORY: PRIOR HISTORY: NSTEMI  Elevated troponin  TIA  CVA  Bradycardia  Medication-treated hyperlipidemia  Cardiomyopathy  Atrial fibrillation  Risk factors: hypertension  PRIOR PROCEDURES: Pacemaker implantation  PROCEDURE: The procedure was performed at the bedside  This was a routine study  The transthoracic approach was used  The study included complete 2D imaging, M-mode, and color Doppler  The heart rate was 51 bpm, at the start of the study  Images were obtained from the parasternal, apical, subcostal, and suprasternal notch acoustic windows  Image quality was good  LEFT VENTRICLE: Size was normal  Ejection fraction was estimated to be 60 %  There were no regional wall motion abnormalities   DOPPLER: There was an increased relative contribution of atrial contraction to ventricular filling  RIGHT VENTRICLE: The size was normal  Systolic function was normal  Wall thickness was normal  DOPPLER: Estimated peak pressure was at least 40 mmHg  Pacer leads noted  LEFT ATRIUM: The atrium was moderately dilated  RIGHT ATRIUM: The atrium was dilated  MITRAL VALVE: There was annular calcification  DOPPLER: There was mild to moderate regurgitation  AORTIC VALVE: The valve was not well visualized  DOPPLER: There was mild to moderate regurgitation  TRICUSPID VALVE: The valve structure was normal  There was normal leaflet separation  DOPPLER: The transtricuspid velocity was within the normal range  There was no evidence for stenosis  There was moderate regurgitation  PULMONIC VALVE: Leaflets exhibited normal thickness, no calcification, and normal cuspal separation  DOPPLER: The transpulmonic velocity was within the normal range  There was no regurgitation  PERICARDIUM: A small pericardial effusion was identified  AORTA: The root exhibited normal size  SYSTEM MEASUREMENT TABLES    2D  %FS: 31 4 %  Ao Diam: 3 6 cm  EDV(Teich): 116 1 ml  EF(Teich): 59 1 %  ESV(Teich): 47 5 ml  IVSd: 1 cm  LA Area: 29 7 cm2  LA Diam: 3 8 cm  LVEDV MOD A4C: 72 1 ml  LVEF MOD A4C: 47 4 %  LVESV MOD A4C: 37 9 ml  LVIDd: 5 cm  LVIDs: 3 4 cm  LVLd A4C: 6 2 cm  LVLs A4C: 5 5 cm  LVPWd: 1 1 cm  RA Area: 15 1 cm2  RVIDd: 3 7 cm  SV MOD A4C: 34 2 ml  SV(Teich): 68 6 ml    CW  AR Dec Montmorency: 3 2 m/s2  AR Dec Time: 1482 7 ms  AR PHT: 430 ms  AR Vmax: 4 7 m/s  AR maxP 1 mmHg  AV Env  Ti: 296 9 ms  AV VTI: 23 7 cm  AV Vmax: 1 3 m/s  AV Vmean: 0 8 m/s  AV maxP 8 mmHg  AV meanPG: 3 1 mmHg  TR Vmax: 3 2 m/s  TR maxP 6 mmHg    MM  TAPSE: 2 cm    PW  E': 0 m/s  E/E': 22 6  LVOT Env  Ti: 315 9 ms  LVOT VTI: 21 9 cm  LVOT Vmax: 0 9 m/s  LVOT Vmean: 0 7 m/s  LVOT maxPG: 3 5 mmHg  LVOT meanP 1 mmHg  MV A Gal: 0 4 m/s  MV Dec [place]morency: 5 5 m/s2  MV DecT: 189 4 ms  MV E Gal: 1 m/s  MV E/A Ratio: 2 5  MV PHT: 54 9 ms  MVA By PHT: 4 cm2    [address] Accredited Ech[facility]    Prepared and electronically signed by    Tim Lynch MD  Signed 10-Feb-2019 13:35:09       Meds/Allergies   current meds:   Current Facility-Administered Medications   Medication Dose Route Frequency    acetaminophen (TYLENOL) tablet 650 mg  650 mg Oral Q6H PRN    aluminum-magnesium hydroxide-simethicone (MYLANTA) 200-200-20 mg/5 mL oral suspension 30 mL  30 mL Oral Q4H PRN    apixaban (ELIQUIS) tablet 5 mg  5 mg Oral BID    lisinopril (ZESTRIL) tablet 2 5 mg  2 5 mg Oral Daily    metoprolol succinate (TOPROL-XL) 24 hr tablet 50 mg  50 mg Oral BID    nitroglycerin (NITROSTAT) SL tablet 0 4 mg  0 4 mg Sublingual Q5 Min PRN    ondansetron (ZOFRAN) injection 4 mg  4 mg Intravenous Q6H PRN    pantoprazole (PROTONIX) EC tablet 40 mg  40 mg Oral Early Morning    pravastatin (PRAVACHOL) tablet 10 mg  10 mg Oral Daily    sotalol (BETAPACE) tablet 120 mg  120 mg Oral Q12H [address]     Medications Prior to Admission   Medication    ELIQUIS 5 MG    lisinopril (ZESTRIL) 2 5 mg tablet    lovastatin (MEVACOR) 10 MG tablet    metoprolol succinate (TOPROL-XL) 50 mg 24 hr tablet    sotalol (BETAPACE) 80 mg tablet            Assessment/Plan:  1- Atrial fibrillation with RVR  Rate uncontrolled at this time, on telemetry with rates in the 130s  Spoke with [facility] will administer IV Cardizem 10 mg x1 now and reassess  Patient is clear for d/c when rates are controlled  Will schedule outpatient appointment upon discharge  Continue with Toprol, sotalol  Continue with Eliquis b i d  for anticoagulation  Denies bleeding episodes    2- NSTEMI type 2  Troponins peaked at 0 06  Echo shows EF of 60%, moderately dilated LA, dilated RA, mild to moderate MR, mild-to-moderate AR, moderate TR, RV peak pressure 40mmHg     Nuclear stress test normal for reversible ischemia, official read pending  3- Hypertension  Controlled, continue with lisinopril    4- Hyperlipidemia  Continue with lovastatin    Counseling / Coordination of Care  Total floor / unit time spent today 20 minutes  Greater than 50% of total time was spent with the patient and / or family counseling and / or coordination of care  ** Please Note: Dragon 360 Dictation voice to text software may have been used in the creation of this document   **

## 2019-02-11 NOTE — PROGRESS NOTES
Progress Note - Radha Ang 1945, 75 y o  female MRN: [id number]    Unit/Bed#: -01 Encounter: [id number]    Primary Care Provider: Néstor Esqueda DO   Date and time admitted to hospital: 2/9/2019 12:07 AM        Paroxysmal atrial fibrillation [facility])  Assessment & Plan  Status post pacemaker,   post electrical conversion,   -patient is now AFib with RVR status post stress test   See above for the plan    Elevated troponin  Assessment & Plan  Very mild elevation in troponin  This is a very unlikely acute coronary syndrome based on symptoms  Patient has stress test today which was otherwise unremarkable  However patient sotalol and beta-blocker were held this morning and now she is in AFib with RVR  I did give her 10 mg of Lopressor as well as 10 mg of Cardizem  She is still going into the 130s to 140s  I did discuss with Dr Ismael Davison  I will check her blood pressure after an hour  For blood pressure is okay I will give her another dose of Cardizem and see whether she breaks and her heart rate remained stable then I can discharge her  I would increase her Toprol-XL to 75 b i d  She is on 50 b i d  right now  Otherwise if we cannot maintain the patient's heart rate she will need to stay 1 more day    Obesity (BMI 30-39  9)  Assessment & Plan  Encourage weight    Mixed hyperlipidemia  Assessment & Plan  Continue statin    Essential hypertension  Assessment & Plan  Continue home medication    * NSTEMI (non-ST elevated myocardial infarction) [facility])  Assessment & Plan  Await echocardiogram   Cardiology evaluation  Not a NSTEMI  Monitor  Status post stress test   Now patient's heart rate did uncontrolled  Patient did not have her sotalol and beta-blocker prior to stress test      VTE Pharmacologic Prophylaxis:   Pharmacologic: Apixaban (Eliquis)  Mechanical VTE Prophylaxis in Place: Yes    Patient Centered Rounds: I have performed bedside rounds with nursing staff today      Discussions with Specialists or Other Care Team Provider:  Cardiology    Education and Discussions with Family / Patient:  Discussed with  at the bedside    Time Spent for Care:  35 minutes  More than 50% of total time spent on counseling and coordination of care as described above  Went to see the patient multiple times given her heart rate  Multiple discussions with Cardiology as well    Current Length of Stay: 2 day(s)    Current Patient Status: Inpatient   Certification Statement: The patient will continue to require additional inpatient hospital stay due to Having atrial fibrillation with RVR needing close monitoring    Discharge Plan:  Hopeful discharge tomorrow morning as long as heart rate is stable    Code Status: Level 1 - Full Code      Subjective:   Patient seen examined she is asymptomatic at this time  Objective:     Vitals:   Temp (24hrs), Av 1 °F (36 7 °C), Min:97 6 °F (36 4 °C), Max:98 6 °F (37 °C)    Temp:  [97 6 °F (36 4 °C)-98 6 °F (37 °C)] 97 8 °F (36 6 °C)  HR:  [] 117  Resp:  [18-20] 20  BP: (120-181)/(63-96) 150/96  SpO2:  [95 %-98 %] 96 %  Body mass index is 35 44 kg/m²  Input and Output Summary (last 24 hours):        Intake/Output Summary (Last 24 hours) at 2019 1453  Last data filed at 2019 9312  Gross per 24 hour   Intake 120 ml   Output --   Net 120 ml       Physical Exam:     Physical Exam  (   General Appearance:    Alert, cooperative, no distress, appears stated age                               Lungs:     Clear to auscultation bilaterally, respirations unlabored       Heart:  Irregularly irregular   Abdomen:     Soft, non-tender, bowel sounds active all four quadrants,     no masses, no organomegaly           Extremities:   Extremities normal, atraumatic, no cyanosis or edema   Pulses:   2+ and symmetric all extremities       Additional Data:     Labs:    Results from last 7 days   Lab Units 19  0049   WBC Thousand/uL 8 22   HEMOGLOBIN g/dL 15 6* HEMATOCRIT % 45 6   PLATELETS Thousands/uL 175   NEUTROS PCT % 71   LYMPHS PCT % 13*   MONOS PCT % 13*   EOS PCT % 2     Results from last 7 days   Lab Units 02/09/19  0049   SODIUM mmol/L 140   POTASSIUM mmol/L 4 1   CHLORIDE mmol/L 104   CO2 mmol/L 29   BUN mg/dL 18   CREATININE mg/dL 1 12   ANION GAP mmol/L 7   CALCIUM mg/dL 9 0   ALBUMIN g/dL 3 5   TOTAL BILIRUBIN mg/dL 0 60   ALK PHOS U/L 101   ALT U/L 20   AST U/L 24   GLUCOSE RANDOM mg/dL 100                           * I Have Reviewed All Lab Data Listed Above  * Additional Pertinent Lab Tests Reviewed: [address] Admission Reviewed      Recent Cultures (last 7 days):           Last 24 Hours Medication List:     Current Facility-Administered Medications:  acetaminophen 650 mg Oral Q6H PRN Jigna Chavez MD   aluminum-magnesium hydroxide-simethicone 30 mL Oral Q4H PRN Diony Jasmine MD   apixaban 5 mg Oral BID Hector Busby MD   lisinopril 2 5 mg Oral Daily Hector Busby MD   metoprolol succinate 75 mg Oral BID Brenna Chew MD   nitroglycerin 0 4 mg Sublingual Q5 Min PRN Hector Busby MD   ondansetron 4 mg Intravenous Q6H PRN Hector Busby MD   pantoprazole 40 mg Oral Early Morning Diony Jasmine MD   pravastatin 10 mg Oral Daily CHRYSTAL Martinez   sotalol 120 mg Oral Q12H Candelaria Nguyễn MD        Today, Patient Was Seen By: Brenna Chew MD    ** Please Note: Dictation voice to text software may have been used in the creation of this document   **

## 2019-02-11 NOTE — PLAN OF CARE
Problem: PAIN - ADULT  Goal: Verbalizes/displays adequate comfort level or baseline comfort level  Description  Interventions:  - Encourage patient to monitor pain and request assistance  - Assess pain using appropriate pain scale  - Administer analgesics based on type and severity of pain and evaluate response  - Implement non-pharmacological measures as appropriate and evaluate response  - Consider cultural and social influences on pain and pain management  - Notify physician/advanced practitioner if interventions unsuccessful or patient reports new pain   Outcome: Progressing     Problem: DISCHARGE PLANNING  Goal: Discharge to home or other facility with appropriate resources  Description  INTERVENTIONS:  - Identify barriers to discharge w/patient and caregiver  - Arrange for needed discharge resources and transportation as appropriate  - Identify discharge learning needs (meds, wound care, etc )  - Arrange for interpretive services to assist at discharge as needed  - Refer to Case Management Department for coordinating discharge planning if the patient needs post-hospital services based on physician/advanced practitioner order or complex needs related to functional status, cognitive ability, or social support system   Outcome: Progressing     Problem: CARDIOVASCULAR - ADULT  Goal: Maintains optimal cardiac output and hemodynamic stability  Description  INTERVENTIONS:  - Monitor I/O, vital signs and rhythm  - Monitor for S/S and trends of decreased cardiac output i e  bleeding, hypotension  - Administer and titrate ordered vasoactive medications to optimize hemodynamic stability  - Assess quality of pulses, skin color and temperature  - Assess for signs of decreased coronary artery perfusion - ex   Angina  - Instruct patient to report change in severity of symptoms   Outcome: Progressing  Goal: Absence of cardiac dysrhythmias or at baseline rhythm  Description  INTERVENTIONS:  - Continuous cardiac monitoring, monitor vital signs, obtain 12 lead EKG if indicated  - Administer antiarrhythmic and heart rate control medications as ordered  - Monitor electrolytes and administer replacement therapy as ordered   Outcome: Progressing     Problem: METABOLIC, FLUID AND ELECTROLYTES - ADULT  Goal: Electrolytes maintained within normal limits  Description  INTERVENTIONS:  - Monitor labs and assess patient for signs and symptoms of electrolyte imbalances  - Administer electrolyte replacement as ordered  - Monitor response to electrolyte replacements, including repeat lab results as appropriate  - Instruct patient on fluid and nutrition as appropriate   Outcome: Progressing  Goal: Fluid balance maintained  Description  INTERVENTIONS:  - Monitor labs and assess for signs and symptoms of volume excess or deficit  - Monitor I/O and WT  - Instruct patient on fluid and nutrition as appropriate   Outcome: Progressing

## 2019-02-11 NOTE — PHYSICIAN ADVISOR
Current patient class: Inpatient  The patient is currently on Hospital Day: 3 at [address]      The patient was admitted to the hospital at [phone] on 2/9/19 for the following diagnosis:  Chest pain [R07 9]  Elevated troponin [R74 8]       There is documentation in the medical record of an expected length of stay of at least 2 midnights  The patient is therefore expected to satisfy the 2 midnight benchmark and given the 2 midnight presumption is appropriate for INPATIENT ADMISSION  Given this expectation of a satisfying stay, CMS instructs us that the patient is most often appropriate for inpatient admission under part A provided medical necessity is documented in the chart  After review of the relevant documentation, labs, vital signs and test results, the patient is appropriate for INPATIENT ADMISSION  Admission to the hospital as an inpatient is a complex decision making process which requires the practitioner to consider the patients presenting complaint, history and physical examination and all relevant testing  With this in mind, in this case, the patient was deemed appropriate for INPATIENT ADMISSION  After review of the documentation and testing available at the time of the admission I concur with this clinical determination of medical necessity  Rationale is as follows: The patient is a 75 yrs old Female who presented to the ED at 2/9/2019 12:07 AM with a chief complaint of Chest Pain (Onset 2 hours ago, pace make placed on 1/1/19  Pain in center of chest, does not radiate  )    Given the need for further hospitalization, and along with the documentation of medical necessity present in the chart, the patient is appropriate for inpatient admission  The patient is expected to satisfy the 2 midnight benchmark, and will require further acute medical care  The patient does have comorbid conditions which increases the risk for significant adverse outcome   Given this the patient is appropriate for inpatient admission  The patients vitals on arrival were ED Triage Vitals   Temperature Pulse Respirations Blood Pressure SpO2   02/09/19 0010 02/09/19 0015 02/09/19 0015 02/09/19 0015 02/09/19 0015   98 1 °F (36 7 °C) 71 18 (!) 160/109 98 %      Temp Source Heart Rate Source Patient Position - Orthostatic VS BP Location FiO2 (%)   02/09/19 0010 02/09/19 0015 02/09/19 0015 02/09/19 0015 --   Oral Monitor Lying Right arm       Pain Score       02/09/19 0010       5           Past Medical History:   Diagnosis Date    Atrial fibrillation (HCC)     Headache     Hyperlipidemia     Hypertension     Lyme disease     Shortness of breath     TIA (transient ischemic attack)     Transient cerebral ischemia     Last assessed - 1/23/18    Vertigo      Past Surgical History:   Procedure Laterality Date    CARDIAC PACEMAKER PLACEMENT      COLONOSCOPY      ME SIGMOIDOSCOPY FLX DX W/COLLJ SPEC BR/WA IF PFRMD N/A 11/28/2017    Procedure: Betzy Madrid;  Surgeon: Syeda Quigley MD;  Location: [facility]; Service: Gastroenterology    TONSILLECTOMY             Consults have been placed to:   IP CONSULT TO CARDIOLOGY    Vitals:    02/10/19 1100 02/10/19 1500 02/10/19 2045 02/10/19 2300   BP: 144/80 135/63 128/74 133/77   BP Location: Left arm Left arm  Right arm   Pulse: 68 72 68 65   Resp: 18 18  18   Temp: 98 3 °F (36 8 °C) 98 6 °F (37 °C)  98 3 °F (36 8 °C)   TempSrc: Oral Oral  Oral   SpO2: 96% 96%  95%   Weight:       Height:           Most recent labs:    Recent Labs     02/09/19  0049  02/10/19  0636   WBC 8 22  --   --    HGB 15 6*  --   --    HCT 45 6  --   --      --   --    K 4 1  --   --    CALCIUM 9 0  --   --    BUN 18  --   --    CREATININE 1 12  --   --    TROPONINI 0 06*   < > 0 05*   AST 24  --   --    ALT 20  --   --    ALKPHOS 101  --   --     < > = values in this interval not displayed         Scheduled Meds:  Current Facility-Administered Medications:  acetaminophen 650 mg Oral Q6H PRN Julienne Bautista MD   aluminum-magnesium hydroxide-simethicone 30 mL Oral Q4H PRN Vidal Cruz MD   apixaban 5 mg Oral BID Damian Ruiz MD   lisinopril 2 5 mg Oral Daily Damian Ruiz MD   metoprolol succinate 50 mg Oral BID Damian Ruiz MD   nitroglycerin 0 4 mg Sublingual Q5 Min PRN Damian Ruiz MD   ondansetron 4 mg Intravenous Q6H PRN Damian Ruiz MD   pantoprazole 40 mg Oral Early Morning Vidal Cruz MD   pravastatin 10 mg Oral Daily CHRYSTAL Silva   sotalol 120 mg Oral Q12H [facility] Damian Ruiz MD     Continuous Infusions:   PRN Meds:   acetaminophen    aluminum-magnesium hydroxide-simethicone    nitroglycerin    ondansetron    Surgical procedures (if appropriate):

## 2019-02-11 NOTE — PROGRESS NOTES
Chart review completed  Patient currently inpatient at [facility] with diagnosis of NSTEM-non ST elevated MI  Will f/u once discharged

## 2019-02-12 VITALS
HEART RATE: 68 BPM | SYSTOLIC BLOOD PRESSURE: 118 MMHG | DIASTOLIC BLOOD PRESSURE: 69 MMHG | OXYGEN SATURATION: 97 % | RESPIRATION RATE: 19 BRPM | HEIGHT: 65 IN | BODY MASS INDEX: 35.48 KG/M2 | TEMPERATURE: 97.5 F | WEIGHT: 212.96 LBS

## 2019-02-12 LAB
ATRIAL RATE: 68 BPM
P AXIS: 76 DEGREES
PR INTERVAL: 164 MS
QRS AXIS: 42 DEGREES
QRSD INTERVAL: 80 MS
QT INTERVAL: 422 MS
QTC INTERVAL: 448 MS
T WAVE AXIS: 9 DEGREES
VENTRICULAR RATE: 68 BPM

## 2019-02-12 PROCEDURE — 99238 HOSP IP/OBS DSCHRG MGMT 30/<: CPT | Performed by: INTERNAL MEDICINE

## 2019-02-12 PROCEDURE — 93010 ELECTROCARDIOGRAM REPORT: CPT | Performed by: INTERNAL MEDICINE

## 2019-02-12 RX ORDER — METOPROLOL SUCCINATE 25 MG/1
75 TABLET, EXTENDED RELEASE ORAL 2 TIMES DAILY
Qty: 180 TABLET | Refills: 0 | Status: SHIPPED | OUTPATIENT
Start: 2019-02-12 | End: 2019-02-15 | Stop reason: SDUPTHER

## 2019-02-12 RX ADMIN — LISINOPRIL 2.5 MG: 2.5 TABLET ORAL at 09:30

## 2019-02-12 RX ADMIN — SOTALOL HYDROCHLORIDE 120 MG: 80 TABLET ORAL at 09:30

## 2019-02-12 RX ADMIN — PANTOPRAZOLE SODIUM 40 MG: 40 TABLET, DELAYED RELEASE ORAL at 05:32

## 2019-02-12 RX ADMIN — APIXABAN 5 MG: 5 TABLET, FILM COATED ORAL at 09:29

## 2019-02-12 RX ADMIN — METOPROLOL SUCCINATE 75 MG: 25 TABLET, EXTENDED RELEASE ORAL at 09:29

## 2019-02-12 NOTE — PLAN OF CARE
Problem: DISCHARGE PLANNING - CARE MANAGEMENT  Goal: Discharge to post-acute care or home with appropriate resources  Description  INTERVENTIONS:  - Conduct assessment to determine patient/family and health care team treatment goals, and need for post-acute services based on payer coverage, community resources, and patient preferences, and barriers to discharge  - Address psychosocial, clinical, and financial barriers to discharge as identified in assessment in conjunction with the patient/family and health care team  - Arrange appropriate level of post-acute services according to patient?s   needs and preference and payer coverage in collaboration with the physician and health care team  - Communicate with and update the patient/family, physician, and health care team regarding progress on the discharge plan  - Arrange appropriate transportation to post-acute venues  Outcome: Progressing   Imm letter reviewed  Home this day if stable  No needs

## 2019-02-12 NOTE — SOCIAL WORK
Cm met with patient at bedside, patient accompanied by her SO  Patient alert and oriented during interview and reports being aware of her discharge home today  Patient reports being completely independent with ADL's, no dme use, vna or str  Patient mentioned having adequate family support at home and reported no need in additional dme or services  Patient reports filling her prescriptions at [facility] or mail order  With no co-payment barriers  Patient mentioned having a POA at this time  Cm reviewed patient medicare rights  Patient reports understanding her medicare rights  No reported needs at this time  CM reviewed discharge planning process including the following: identifying help at home, patient preference for discharge planning needs, pharmacy preference, and availability of treatment team to discuss questions or concerns patient and/or family may have regarding understanding medications and recognizing signs and symptoms once discharged  CM also encouraged patient to follow up with all recommended appointments after discharge  Patient advised of importance for patient and family to participate in managing patients medical well being  CM name and role reviewed  Discharge Checklist reviewed and CM will continue to monitor for progress toward discharge goals in nursing and provider rounds

## 2019-02-12 NOTE — DISCHARGE SUMMARY
Discharge Summary - [facility] Internal Medicine    Patient Information: Vinh Heath 75 y o  female MRN: [id number]  Unit/Bed#: -01 Encounter: [id number]    Discharging Physician / Practitioner: Yajaira Schilling MD  PCP: Shabnam Sánchez DO  Admission Date: 2/9/2019  Discharge Date: 02/12/19    Reason for Admission:  Chest pain    Discharge Diagnoses:     Principal Problem:  ·   Non MI Troponin Elevation : likely due to PAF with tachycardia  Nuclear stress test completed showing a normal study without evidence for perfusion abnormality  Left ventricular systolic function was within normal limits with an EF of 55% on visual examination  Gated SPECT 44% deemed not accurate  Patient will continue on antihypertensives, statin  She is on Eliquis for atrial fibrillation and for aspirin is not added  Active Problems:  ·   Essential hypertension:  Continue metoprolol at increased dose 75 mg b i d   ·   Mixed hyperlipidemia:  Continue home statin  ·   Obesity (BMI 30-39  9):  Lifestyle modifications and nutritional counseling  ·   Elevated troponin: , likely rate related to PAF  ·   Paroxysmal atrial fibrillation ([address] ):  Metoprolol increased to 75 mg b i d   Patient is to continue sotalol as well  History of pacemaker with previous electrocardioversion  · Hiatal hernia:  Patient was placed on Protonix during this admission but does not take anything at home  If patient has persistent chest pain or abdominal discomfort would recommend GI evaluation and resumption of a PPI  I have discontinued this at discharge however due to lack of symptoms  Resolved Problems:    * No resolved hospital problems  *      Consultations During Hospital Stay:  · Cardiology    Procedures Performed:     · Chest x-ray no acute cardiopulmonary disease  · Nucleolar stress normal study imaged without artifact  No evidence for perfusion abnormality    LV systolic function was normal   Significant Findings / Test Results:     · As above  · Last creatinine 1 12  · Last hemoglobin 15 6  Incidental Findings:   · None    Test Results Pending at Discharge (will require follow up): · None     Outpatient Tests Requested:  · None    Complications:  None    Hospital Course:     Adela oDrado is a 75 y o  female patient who originally presented to the hospital on 2/9/2019 due to chest pain  Patient was found to have an elevation in her troponins is which was slight  She was seen evaluated by Cardiology  She underwent stress testing which showed normal left ventricular function and no evidence for ischemia  Patient does have a hiatal hernia and was placed on a PPI during this admission however she has had no GI type symptoms and she has no further chest pain  A discharge I have discontinued PPI in favor of having her follow with the PCP if she has persistent discomfort a GI workup with resumption of PPI would not be unreasonable  Patient was noted to have paroxysmal atrial fibrillation with increased heart rates her metoprolol was increased to 75 mg b i d   Patient will follow up with Dr Torres Postal post hospital stay  Condition at Discharge: good     Discharge Day Visit / Exam:     Subjective:  Patient is feeling quite well  She tolerated the up titration in her metoprolol    Vitals: Blood Pressure: 148/75 (02/12/19 0715)  Pulse: 67 (02/12/19 0715)  Temperature: 98 2 °F (36 8 °C) (02/12/19 0715)  Temp Source: Oral (02/12/19 0715)  Respirations: 18 (02/12/19 0715)  Height: 5' 5" (165 1 cm) (02/09/19 0835)  Weight - Scale: 96 6 kg (212 lb 15 4 oz) (02/09/19 0010)  SpO2: 97 % (02/12/19 0715)  Exam:   Physical Exam    General well-developed reasonably nourished female no acute distress normocephalic atraumatic pupils equal round and reactive to light extraocular muscles intact mucous membranes moist neck is supple there is no JVD no lymph nodes no carotid bruits chest is clear to auscultation is no rhonchi rales or wheezes  Cardiovascular regular rate rhythm positive S1 and S2 heard appreciate an S3-S4 murmur or gallop  Abdomen obese soft nontender nondistended with positive bowel sounds no hepatosplenomegaly no guarding or rebound  Neurologically patient awake alert oriented cranial nerves 2-12 intact    Discharge instructions/Information to patient and family:   See after visit summary for information provided to patient and family  Provisions for Follow-Up Care:  See after visit summary for information related to follow-up care and any pertinent home health orders  Disposition:     Home    For Discharges to [facility] SN[facility]:   · Not Applicable to this Patient - Not Applicable to this Patient    Planned Readmission:  None     Discharge Statement:  I spent 25 minutes discharging the patient  This time was spent on the day of discharge  I had direct contact with the patient on the day of discharge  Greater than 50% of the total time was spent examining patient, answering all patient questions, arranging and discussing plan of care with patient as well as directly providing post-discharge instructions  Additional time then spent on discharge activities  Discharge Medications:  See after visit summary for reconciled discharge medications provided to patient and family        ** Please Note: This note has been constructed using a voice recognition system **

## 2019-02-12 NOTE — PLAN OF CARE
Problem: PAIN - ADULT  Goal: Verbalizes/displays adequate comfort level or baseline comfort level  Description  Interventions:  - Encourage patient to monitor pain and request assistance  - Assess pain using appropriate pain scale  - Administer analgesics based on type and severity of pain and evaluate response  - Implement non-pharmacological measures as appropriate and evaluate response  - Consider cultural and social influences on pain and pain management  - Notify physician/advanced practitioner if interventions unsuccessful or patient reports new pain   2/12/2019 1115 by Billie Dickey RN  Outcome: Adequate for Discharge  2/12/2019 0730 by Billie Dickey RN  Outcome: Progressing     Problem: DISCHARGE PLANNING  Goal: Discharge to home or other facility with appropriate resources  Description  INTERVENTIONS:  - Identify barriers to discharge w/patient and caregiver  - Arrange for needed discharge resources and transportation as appropriate  - Identify discharge learning needs (meds, wound care, etc )  - Arrange for interpretive services to assist at discharge as needed  - Refer to Case Management Department for coordinating discharge planning if the patient needs post-hospital services based on physician/advanced practitioner order or complex needs related to functional status, cognitive ability, or social support system   2/12/2019 1115 by Billie Dickey RN  Outcome: Adequate for Discharge  2/12/2019 0730 by Billie Dickey RN  Outcome: Progressing     Problem: Knowledge Deficit  Goal: Patient/family/caregiver demonstrates understanding of disease process, treatment plan, medications, and discharge instructions  Description  Complete learning assessment and assess knowledge base    Interventions:  - Provide teaching at level of understanding  - Provide teaching via preferred learning methods   2/12/2019 1115 by Billie Dickey RN  Outcome: Adequate for Discharge  2/12/2019 0730 by Billie Dickey RN  Outcome: Progressing     Problem: CARDIOVASCULAR - ADULT  Goal: Maintains optimal cardiac output and hemodynamic stability  Description  INTERVENTIONS:  - Monitor I/O, vital signs and rhythm  - Monitor for S/S and trends of decreased cardiac output i e  bleeding, hypotension  - Administer and titrate ordered vasoactive medications to optimize hemodynamic stability  - Assess quality of pulses, skin color and temperature  - Assess for signs of decreased coronary artery perfusion - ex   Angina  - Instruct patient to report change in severity of symptoms   2/12/2019 1115 by Michael Gardner RN  Outcome: Adequate for Discharge  2/12/2019 0730 by Michael Gardner RN  Outcome: Progressing  Goal: Absence of cardiac dysrhythmias or at baseline rhythm  Description  INTERVENTIONS:  - Continuous cardiac monitoring, monitor vital signs, obtain 12 lead EKG if indicated  - Administer antiarrhythmic and heart rate control medications as ordered  - Monitor electrolytes and administer replacement therapy as ordered   2/12/2019 1115 by Michael Gardner RN  Outcome: Adequate for Discharge  2/12/2019 0730 by Michael Gardner RN  Outcome: Progressing     Problem: METABOLIC, FLUID AND ELECTROLYTES - ADULT  Goal: Electrolytes maintained within normal limits  Description  INTERVENTIONS:  - Monitor labs and assess patient for signs and symptoms of electrolyte imbalances  - Administer electrolyte replacement as ordered  - Monitor response to electrolyte replacements, including repeat lab results as appropriate  - Instruct patient on fluid and nutrition as appropriate   2/12/2019 1115 by Michael Gardner RN  Outcome: Adequate for Discharge  2/12/2019 0730 by Michael Gardner RN  Outcome: Progressing  Goal: Fluid balance maintained  Description  INTERVENTIONS:  - Monitor labs and assess for signs and symptoms of volume excess or deficit  - Monitor I/O and WT  - Instruct patient on fluid and nutrition as appropriate   2/12/2019 1115 by Juan Newsome Noel Barron RN  Outcome: Adequate for Discharge  2/12/2019 0730 by Jose Whitehead RN  Outcome: Progressing     Problem: Potential for Falls  Goal: Patient will remain free of falls  Description  INTERVENTIONS:  - Assess patient frequently for physical needs  -  Identify cognitive and physical deficits and behaviors that affect risk of falls    -  [place] fall precautions as indicated by assessment   - Educate patient/family on patient safety including physical limitations  - Instruct patient to call for assistance with activity based on assessment  - Modify environment to reduce risk of injury  - Consider OT/PT consult to assist with strengthening/mobility  Outcome: Adequate for Discharge

## 2019-02-13 ENCOUNTER — OFFICE VISIT (OUTPATIENT)
Dept: CARDIOLOGY CLINIC | Facility: CLINIC | Age: 74
End: 2019-02-13
Payer: MEDICARE

## 2019-02-13 ENCOUNTER — TRANSITIONAL CARE MANAGEMENT (OUTPATIENT)
Dept: FAMILY MEDICINE CLINIC | Facility: CLINIC | Age: 74
End: 2019-02-13

## 2019-02-13 VITALS
DIASTOLIC BLOOD PRESSURE: 90 MMHG | SYSTOLIC BLOOD PRESSURE: 138 MMHG | HEART RATE: 80 BPM | WEIGHT: 206.7 LBS | HEIGHT: 65 IN | BODY MASS INDEX: 34.44 KG/M2

## 2019-02-13 DIAGNOSIS — I10 ESSENTIAL HYPERTENSION: ICD-10-CM

## 2019-02-13 DIAGNOSIS — Z79.01 ANTICOAGULANT LONG-TERM USE: ICD-10-CM

## 2019-02-13 DIAGNOSIS — I42.8 OTHER CARDIOMYOPATHY (HCC): ICD-10-CM

## 2019-02-13 DIAGNOSIS — I48.19 PERSISTENT ATRIAL FIBRILLATION (HCC): Primary | ICD-10-CM

## 2019-02-13 DIAGNOSIS — R00.1 SINUS BRADYCARDIA: ICD-10-CM

## 2019-02-13 PROCEDURE — 93000 ELECTROCARDIOGRAM COMPLETE: CPT | Performed by: PHYSICIAN ASSISTANT

## 2019-02-13 PROCEDURE — 99214 OFFICE O/P EST MOD 30 MIN: CPT | Performed by: PHYSICIAN ASSISTANT

## 2019-02-13 NOTE — PROGRESS NOTES
Cardiology Follow Up    [facility]  [birth year]  [id number]  HEART & VASCULAR [facility]  [address] [facility] [place]  [address]        Assessment/Plan     1  Persistent atrial fibrillation (HCC)  POCT ECG   2  Anticoagulant long-term use     3  Sinus bradycardia     4  Other cardiomyopathy ([address] )     5  Essential hypertension         ASSESSMENT:  1  Symptomatic persistent atrial fibrillation maintained on sotalol antiarrhythmic therapy   A ) prior LUIZA/CV 12/2018   B ) maintained on Eliquis anticoagulation (CHADS2 Vasc = 7)  2  Tachy-chad 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 12/2018  3  History of tachy mediated cardiomyopathy with LVEF as low as 35-40% per echo 10/2018, now improved to 60% per echo 02/2019  4  Recent episode of atypical chest pain, felt to be GI in nature   A ) nonischemic nuclear stress test 02/2018   B ) echocardiograms 1/1/2018, 1/11/2018, and 2/10/2018 all show small pericardial effusion which appears unchanged  5  Hypertension  6  Hyperlipidemia  7  History of prior TIA  8  Obesity with BMI 34    DISCUSSION/SUMMARY:  1  Persistent atrial fibrillation maintained on sotalol antiarrhythmic therapy - device interrogation today shows that she has a 1 7% AFib burden  She was largely in AFib throughout the day on 2/11/2018, but is currently in normal sinus rhythm  Her QTC today is stable on sotalol, and most recent creatinine was 1 12  Her beta-blocker was recently increased, and she seems to be tolerating this well  She is also maintained on Eliquis anticoagulation  For now she will continue her current regimen  We discussed treatment options for atrial fibrillation moving forward, including other medications (amiodarone) versus AFib ablation    We will continue to monitor her pacemaker for overall AFib burden, but I explained that if she has increased burden or continues to have persistent AFib with rapid ventricular response she may benefit from an ablation  Given her history of prior tachy-mediated cardiomyopathy, she would benefit from a rhythm control approach  2  History of tachy mediated cardiomyopathy, most recent LVEF 60% - she is euvolemic on exam, and is currently not on a diuretic  Given this history I feel a rhythm control approach is more appropriate, and she will continue the above medications  3  Tachy-chad syndrome with Medtronic dual-chamber pacemaker in situ, recent episode of atypical chest pain - her device is functioning appropriately  Serial echocardiograms since implant have showed a stable small pericardial effusion  If she continues to have chest pain in the future, we may need to consider pericarditis due to pacemaker lead placement  For now however, she has no symptoms and we will continue to monitor  4  Hypertension - slightly elevated today, but appears to have been better controlled in the past   I recommend that we continue to monitor this, and can up titrate medications if it is consistently elevated  She will continue her current regimen including lisinopril and Toprol-XL  She will continue to follow up with her primary cardiologist, but I have recommended that she return in 6 months to see Dr Gary Brannon to re-evaluate her overall AFib burden and see if any other interventions are required moving forward  She will contact us in the meantime with any questions, concerns, or changes in symptoms  History of Present Illness     HPI/INTERVAL HISTORY: Elmo Elkins is a 75 y o  female with a history of persistent atrial fibrillation with periods of rapid ventricular response, hypertension, hyperlipidemia, prior TIA, and obesity  SHe typically follows with Dr Elizabeth Whitman as an outpatient  In the past her EF was normal, however she was not found to have persistent atrial fibrillation, and echocardiogram 10/2018 showed reduced ejection fraction 35-40%    She was referred to Dr Gary Brannon, and later was admitted to begin sotalol antiarrhythmic therapy  After undergoing a cardioversion, she was found to have sinus bradycardia with heart rate in the 40s despite holding her beta-blocker  Thus, she underwent dual-chamber pacemaker implantation 12/31/2018  She had a limited echo the following day which showed a small pericardial effusion  She had another one 10 days later, with unchanged findings  She followed up with Dr Forest Pham on 1/10/2018, at which time she was back in atrial fibrillation  Her sotalol at that time was increased back to 120 mg twice daily (it had been previously decrease in the hospital prior to pacemaker implantation)  She later appeared to be doing well, however on 2/8/2018 she had sudden onset of central chest pain  There was no radiation, and she stated that it was slightly worse with inspiration  She was admitted to [facility], and underwent a repeat echocardiogram which again showed a small effusion  She underwent a nuclear stress test which showed no evidence of ischemia  It was ultimately thought that this could be GI in nature, and she did respond to antacids  While she was admitted, she was found to have periods of rapid atrial fibrillation, thus her beta-blocker was increased  She presents today for post hospital follow-up  Since she was discharged, she states she has been doing well  She denies recurrent chest pain, including when lying flat and with deep inspiration  She also denies shortness of breath, palpitations, dizziness, lightheadedness, presyncope, or syncope  She denies edema or signs of volume overload  She states that when she was in atrial fibrillation her main symptoms were fatigue and dyspnea with minimal exertion  She states these have completely resolved, and she is feeling well  Review of Systems  ROS as noted above, otherwise 12 point review of systems was performed and is negative         Historical Information   Social History Socioeconomic History    Marital status: /Civil Union     Spouse name: Not on file    Number of children: Not on file    Years of education: Not on file    Highest education level: Not on file   Occupational History    Occupation: Retired   Social Needs    Financial resource strain: Not on file    Food insecurity:     Worry: Not on file     Inability: Not on file   [employer] needs:     Medical: Not on file     Non-medical: Not on file   Tobacco Use    Smoking status: Former Smoker     Types: Cigarettes     Last attempt to quit:      Years since quittin 1    Smokeless tobacco: Never Used   Substance and Sexual Activity    Alcohol use:  Yes     Alcohol/week: 4 2 oz     Types: 7 Glasses of wine per week     Comment: occ    Drug use: No    Sexual activity: Never     Partners: Male     Birth control/protection: None   Lifestyle    Physical activity:     Days per week: Not on file     Minutes per session: Not on file    Stress: Not on file   Relationships    Social connections:     Talks on phone: Not on file     Gets together: Not on file     Attends Christianity service: Not on file     Active member of club or organization: Not on file     Attends meetings of clubs or organizations: Not on file     Relationship status: Not on file    Intimate partner violence:     Fear of current or ex partner: Not on file     Emotionally abused: Not on file     Physically abused: Not on file     Forced sexual activity: Not on file   Other Topics Concern    Not on file   Social History Narrative    Always uses seat belt     Past Medical History:   Diagnosis Date    Atrial fibrillation ([facility] [address] )     Headache     Hyperlipidemia     Hypertension     Lyme disease     Shortness of breath     TIA (transient ischemic attack)     Transient cerebral ischemia     Last assessed - 18    Vertigo      Past Surgical History:   Procedure Laterality Date    CARDIAC PACEMAKER PLACEMENT      COLONOSCOPY  ME SIGMOIDOSCOPY FLX DX W/COLLJ SPEC BR/WA IF PFRMD N/A 2017    Procedure: Tahir Hanna;  Surgeon: Ambrocio Wallace MD;  Location: [facility]; Service: Gastroenterology    TONSILLECTOMY       Social History     Substance and Sexual Activity   Alcohol Use Yes    Alcohol/week: 4 2 oz    Types: 7 Glasses of wine per week    Comment: occ     Social History     Substance and Sexual Activity   Drug Use No     Social History     Tobacco Use   Smoking Status Former Smoker    Types: Cigarettes    Last attempt to quit:     Years since quittin 1   Smokeless Tobacco Never Used     Family History   Problem Relation Age of Onset    Lung cancer Mother     Dementia Father     Alzheimer's disease Father     Other Father         Cardiac Disorder        Meds/Allergies       Current Outpatient Medications:     ELIQUIS 5 MG, TAKE 1 TABLET BY MOUTH TWO  TIMES DAILY, Disp: 180 tablet, Rfl: 1    lisinopril (ZESTRIL) 2 5 mg tablet, Take 1 tablet (2 5 mg total) by mouth daily, Disp: 90 tablet, Rfl: 3    lovastatin (MEVACOR) 10 MG tablet, TAKE 1 TABLET BY MOUTH AT  BEDTIME, Disp: 90 tablet, Rfl: 1    metoprolol succinate (TOPROL-XL) 25 mg 24 hr tablet, Take 3 tablets (75 mg total) by mouth 2 (two) times a day, Disp: 180 tablet, Rfl: 0    sotalol (BETAPACE) 80 mg tablet, Take 1 5 tablets (120 mg total) by mouth every 12 (twelve) hours, Disp: 88 tablet, Rfl: 0    No Known Allergies    Objective   Vitals: Blood pressure 138/90, pulse 80, height 5' 5" (1 651 m), weight 93 8 kg (206 lb 11 2 oz)          Physical Exam  GEN: NAD, alert and oriented, well appearing  SKIN: dry without significant lesions or rashes  HEENT: NCAT, PERRL, EOMs intact  NECK: No JVD appreciated  CARDIOVASCULAR: RRR, normal S1, S2 without rubs or gallops appreciated; + murmur  LUNGS: Clear to auscultation bilaterally without wheezes, rhonchi, or rales  ABDOMEN: Soft, nontender, nondistended  EXTREMITIES/VASCULAR: perfused without clubbing, cyanosis, or edema b/l  PSYCH: Normal mood and affect  NEURO: CN ll-Xll grossly intact        Labs:  Admission on 02/09/2019, Discharged on 02/12/2019   Component Date Value    WBC 02/09/2019 8 22     RBC 02/09/2019 4 55     Hemoglobin 02/09/2019 15 6*    Hematocrit 02/09/2019 45 6     MCV 02/09/2019 100*    MCH 02/09/2019 34 3     MCHC 02/09/2019 34 2     RDW 02/09/2019 13 0     MPV 02/09/2019 11 5     Platelets 89/17/7655 175     nRBC 02/09/2019 0     Neutrophils Relative 02/09/2019 71     Immat GRANS % 02/09/2019 0     Lymphocytes Relative 02/09/2019 13*    Monocytes Relative 02/09/2019 13*    Eosinophils Relative 02/09/2019 2     Basophils Relative 02/09/2019 1     Neutrophils Absolute 02/09/2019 5 83     Immature Grans Absolute 02/09/2019 0 02     Lymphocytes Absolute 02/09/2019 1 08     Monocytes Absolute 02/09/2019 1 06     Eosinophils Absolute 02/09/2019 0 17     Basophils Absolute 02/09/2019 0 06     Sodium 02/09/2019 140     Potassium 02/09/2019 4 1     Chloride 02/09/2019 104     CO2 02/09/2019 29     ANION GAP 02/09/2019 7     BUN 02/09/2019 18     Creatinine 02/09/2019 1 12     Glucose 02/09/2019 100     Calcium 02/09/2019 9 0     AST 02/09/2019 24     ALT 02/09/2019 20     Alkaline Phosphatase 02/09/2019 101     Total Protein 02/09/2019 7 2     Albumin 02/09/2019 3 5     Total Bilirubin 02/09/2019 0 60     eGFR 02/09/2019 49     Troponin I 02/09/2019 0 06*    Troponin I 02/09/2019 0 06*    NT-proBNP 02/09/2019 403*    Troponin I 02/09/2019 0 06*    Troponin I 02/10/2019 0 05*    Ventricular Rate 02/09/2019 68     Atrial Rate 02/09/2019 68     RI Interval 02/09/2019 164     QRSD Interval 02/09/2019 80     QT Interval 02/09/2019 422     QTC Interval 02/09/2019 448     P Axis 02/09/2019 76     QRS Axis 02/09/2019 42     T Wave Axis 02/09/2019 9     Ventricular Rate 02/09/2019 66     Atrial Rate 02/09/2019 66     RI Interval 02/09/2019 164     QRSD Interval 02/09/2019 84     QT Interval 02/09/2019 416     QTC Interval 02/09/2019 436     P Axis 02/09/2019 52     QRS Axis 02/09/2019 20     T Wave Axis 02/09/2019 2     Ventricular Rate 02/09/2019 62     Atrial Rate 02/09/2019 62     CO Interval 02/09/2019 192     QRSD Interval 02/09/2019 84     QT Interval 02/09/2019 442     QTC Interval 02/09/2019 448     P Axis 02/09/2019 118     QRS Axis 02/09/2019 38     T Wave Axis 02/09/2019 7     Ventricular Rate 02/09/2019 70     Atrial Rate 02/09/2019 70     CO Interval 02/09/2019 206     QRSD Interval 02/09/2019 86     QT Interval 02/09/2019 426     QTC Interval 02/09/2019 460     QRS Axis 02/09/2019 20     T Wave Axis 02/09/2019 10     Protocol Name 02/11/2019 AGNES SIT     Time In Exercise Phase 02/11/2019 00:03:00     MAX  SYSTOLIC BP 93/06/4453 280     Max Diastolic Bp 85/16/5983 86     Max Heart Rate 02/11/2019 164     Max Predicted Heart Rate 02/11/2019 146     Reason for Termination 02/11/2019 Protocol Complete     Test Indication 02/11/2019 Screening for CAD     Target Hr Formular 02/11/2019 (220 - Age)*100%     Arrhy During Ex 02/11/2019 ventricular premature beats     Chest Pain Statement 02/11/2019 none     Ventricular Rate 02/09/2019 68     Atrial Rate 02/09/2019 68     CO Interval 02/09/2019 164     QRSD Interval 02/09/2019 80     QT Interval 02/09/2019 422     QTC Interval 02/09/2019 448     P Axis 02/09/2019 76     QRS Axis 02/09/2019 42     T Wave [place] 02/09/2019 9    Admission on 12/26/2018, Discharged on 01/01/2019   No results displayed because visit has over 200 results  Imaging: I have personally reviewed pertinent reports        ECHO:   Results for orders placed during the hospital encounter of 02/09/19   Echo complete with contrast if indicated    Narrative [address] [place] [address] 89  [phone]    Transthoracic Echocardiogram  2D, M-mode, and Color Doppler    Study date:  10-Feb-2019    Patient: Karon Beard  MR number: [id number]  Account number: [de-identified]  : 1945  Age: 75 years  Gender: Female  Status: Inpatient  Location: Bedside  Height: 65 in  Weight: 212 lb  BP: 151/ 72 mmHg    Indications: CAD  Diagnoses: I25 10 - Atherosclerotic heart disease of native coronary artery without angina pectoris    Sonographer:  Odom RCS  Referring Physician:  Asmita Flowers MD  Group:  [facility]  Interpreting Physician:  Abelardo Dill MD    SUMMARY    LEFT VENTRICLE:  Ejection fraction was estimated to be 60 %  There were no regional wall motion abnormalities  RIGHT VENTRICLE:  Estimated peak pressure was at least 40 mmHg  Pacer leads noted  LEFT ATRIUM:  The atrium was moderately dilated  RIGHT ATRIUM:  The atrium was dilated  MITRAL VALVE:  There was mild to moderate regurgitation  AORTIC VALVE:  There was mild to moderate regurgitation  TRICUSPID VALVE:  There was moderate regurgitation  PERICARDIUM:  A small pericardial effusion was identified  HISTORY: PRIOR HISTORY: NSTEMI  Elevated troponin  TIA  CVA  Bradycardia  Medication-treated hyperlipidemia  Cardiomyopathy  Atrial fibrillation  Risk factors: hypertension  PRIOR PROCEDURES: Pacemaker implantation  PROCEDURE: The procedure was performed at the bedside  This was a routine study  The transthoracic approach was used  The study included complete 2D imaging, M-mode, and color Doppler  The heart rate was 51 bpm, at the start of the study  Images were obtained from the parasternal, apical, subcostal, and suprasternal notch acoustic windows  Image quality was good  LEFT VENTRICLE: Size was normal  Ejection fraction was estimated to be 60 %  There were no regional wall motion abnormalities   DOPPLER: There was an increased relative contribution of atrial contraction to ventricular filling  RIGHT VENTRICLE: The size was normal  Systolic function was normal  Wall thickness was normal  DOPPLER: Estimated peak pressure was at least 40 mmHg  Pacer leads noted  LEFT ATRIUM: The atrium was moderately dilated  RIGHT ATRIUM: The atrium was dilated  MITRAL VALVE: There was annular calcification  DOPPLER: There was mild to moderate regurgitation  AORTIC VALVE: The valve was not well visualized  DOPPLER: There was mild to moderate regurgitation  TRICUSPID VALVE: The valve structure was normal  There was normal leaflet separation  DOPPLER: The transtricuspid velocity was within the normal range  There was no evidence for stenosis  There was moderate regurgitation  PULMONIC VALVE: Leaflets exhibited normal thickness, no calcification, and normal cuspal separation  DOPPLER: The transpulmonic velocity was within the normal range  There was no regurgitation  PERICARDIUM: A small pericardial effusion was identified  AORTA: The root exhibited normal size  SYSTEM MEASUREMENT TABLES    2D  %FS: 31 4 %  Ao Diam: 3 6 cm  EDV(Teich): 116 1 ml  EF(Teich): 59 1 %  ESV(Teich): 47 5 ml  IVSd: 1 cm  LA Area: 29 7 cm2  LA Diam: 3 8 cm  LVEDV MOD A4C: 72 1 ml  LVEF MOD A4C: 47 4 %  LVESV MOD A4C: 37 9 ml  LVIDd: 5 cm  LVIDs: 3 4 cm  LVLd A4C: 6 2 cm  LVLs A4C: 5 5 cm  LVPWd: 1 1 cm  RA Area: 15 1 cm2  RVIDd: 3 7 cm  SV MOD A4C: 34 2 ml  SV(Teich): 68 6 ml    CW  AR Dec [place]: 3 2 m/s2  AR Dec Time: 1482 7 ms  AR PHT: 430 ms  AR Vmax: 4 7 m/s  AR maxP 1 mmHg  AV Env  Ti: 296 9 ms  AV VTI: 23 7 cm  AV Vmax: 1 3 m/s  AV Vmean: 0 8 m/s  AV maxP 8 mmHg  AV meanPG: 3 1 mmHg  TR Vmax: 3 2 m/s  TR maxP 6 mmHg    MM  TAPSE: 2 cm    PW  E': 0 m/s  E/E': 22 6  LVOT Env  Ti: 315 9 ms  LVOT VTI: 21 9 cm  LVOT Vmax: 0 9 m/s  LVOT Vmean: 0 7 m/s  LVOT maxPG: 3 5 mmHg  LVOT meanP 1 mmHg  MV A Gal: 0 4 m/s  MV Dec [place]: 5 5 m/s2  MV DecT: 189 4 ms  MV E Gal: 1 m/s  MV E/A Ratio: 2 5  MV PHT: 54 9 ms  MVA By PHT: 4 cm2    [address] Accredited Echocardiography Laboratory    Prepared and electronically signed by    Cheri Rodriguez MD  Signed 10-Feb-2019 13:35:09         NUCLEAR STRESS TEST 2/2019: PERFUSION DEFECTS:  -  There was a small to moderate, mildly severe, paradoxical (reverse redistribution) myocardial perfusion defect of the inferior and apical wall likely due to diaphragm attenuation      GATED SPECT:  The calculated left ventricular ejection fraction was 44 %  (Probabaly not accurate)  LVEF by visual estimation is 55%  Left ventricular ejection fraction was within normal limits by visual estimate  There was no left ventricular regional  abnormality      SUMMARY:  -  Stress results: There was no chest pain during stress  -  ECG conclusions: The stress ECG was negative for ischemia and normal   -  Perfusion imaging: There was a small to moderate, mildly severe, paradoxical (reverse redistribution) myocardial perfusion defect of the inferior and apical wall likely due to diaphragm attenuation   -  Gated SPECT: The calculated left ventricular ejection fraction was 44 %  (Probabaly not accurate)  LVEF by visual estimation is 55%  Left ventricular ejection fraction was within normal limits by visual estimate  There was no left  ventricular regional abnormality      IMPRESSIONS: Normal study  There was image artifact, without diagnostic evidence for perfusion abnormality  Left ventricular systolic function was normal       EKG:  Atrial pacing with intact ventricular conduction, heart rate 80 beats per minute, QTC approximately 450 milliseconds, T-wave abnormalities noted in inferior leads which were present on prior EKGs      DEVICE INTERROGATION:  I personally interrogated her device today which showed 24 episodes of atrial fibrillation, overall burden 1 7%  They seemed to all occur on 2/11/2018  No other arrhythmias noted  Atrial pacing 87%, V pacing less than 0 1%    Impedances, threshold, and sensing appears stable

## 2019-02-15 ENCOUNTER — TELEPHONE (OUTPATIENT)
Dept: CARDIOLOGY CLINIC | Facility: CLINIC | Age: 74
End: 2019-02-15

## 2019-02-15 ENCOUNTER — OFFICE VISIT (OUTPATIENT)
Dept: CARDIOLOGY CLINIC | Facility: CLINIC | Age: 74
End: 2019-02-15
Payer: MEDICARE

## 2019-02-15 VITALS
HEART RATE: 87 BPM | HEIGHT: 65 IN | OXYGEN SATURATION: 97 % | DIASTOLIC BLOOD PRESSURE: 84 MMHG | WEIGHT: 210 LBS | BODY MASS INDEX: 34.99 KG/M2 | SYSTOLIC BLOOD PRESSURE: 128 MMHG

## 2019-02-15 DIAGNOSIS — I35.1 NONRHEUMATIC AORTIC VALVE INSUFFICIENCY: ICD-10-CM

## 2019-02-15 DIAGNOSIS — I42.9 CARDIOMYOPATHY, UNSPECIFIED TYPE (HCC): ICD-10-CM

## 2019-02-15 DIAGNOSIS — Z95.0 PRESENCE OF CARDIAC PACEMAKER: ICD-10-CM

## 2019-02-15 DIAGNOSIS — I10 ESSENTIAL HYPERTENSION: ICD-10-CM

## 2019-02-15 DIAGNOSIS — E78.2 MIXED HYPERLIPIDEMIA: ICD-10-CM

## 2019-02-15 DIAGNOSIS — I48.19 PERSISTENT ATRIAL FIBRILLATION (HCC): ICD-10-CM

## 2019-02-15 DIAGNOSIS — Z79.01 ANTICOAGULANT LONG-TERM USE: Primary | ICD-10-CM

## 2019-02-15 DIAGNOSIS — I49.5 SSS (SICK SINUS SYNDROME) (HCC): ICD-10-CM

## 2019-02-15 DIAGNOSIS — E66.9 OBESITY (BMI 30-39.9): ICD-10-CM

## 2019-02-15 DIAGNOSIS — I36.1 NONRHEUMATIC TRICUSPID VALVE REGURGITATION: ICD-10-CM

## 2019-02-15 DIAGNOSIS — I34.0 NONRHEUMATIC MITRAL VALVE REGURGITATION: ICD-10-CM

## 2019-02-15 PROCEDURE — 99214 OFFICE O/P EST MOD 30 MIN: CPT | Performed by: INTERNAL MEDICINE

## 2019-02-15 RX ORDER — LOVASTATIN 10 MG/1
10 TABLET ORAL
Qty: 90 TABLET | Refills: 3 | Status: SHIPPED | OUTPATIENT
Start: 2019-02-15 | End: 2019-02-15 | Stop reason: SDUPTHER

## 2019-02-15 RX ORDER — METOPROLOL SUCCINATE 25 MG/1
75 TABLET, EXTENDED RELEASE ORAL 2 TIMES DAILY
Qty: 270 TABLET | Refills: 3 | Status: SHIPPED | OUTPATIENT
Start: 2019-02-15 | End: 2019-06-14

## 2019-02-15 RX ORDER — LISINOPRIL 2.5 MG/1
2.5 TABLET ORAL DAILY
Qty: 90 TABLET | Refills: 3 | Status: SHIPPED | OUTPATIENT
Start: 2019-02-15 | End: 2019-12-20 | Stop reason: SDUPTHER

## 2019-02-15 RX ORDER — SOTALOL HYDROCHLORIDE 80 MG/1
120 TABLET ORAL EVERY 12 HOURS SCHEDULED
Qty: 270 TABLET | Refills: 3 | Status: SHIPPED | OUTPATIENT
Start: 2019-02-15 | End: 2019-02-15 | Stop reason: SDUPTHER

## 2019-02-15 RX ORDER — LISINOPRIL 2.5 MG/1
2.5 TABLET ORAL DAILY
Qty: 90 TABLET | Refills: 3 | Status: SHIPPED | OUTPATIENT
Start: 2019-02-15 | End: 2019-02-15 | Stop reason: SDUPTHER

## 2019-02-15 RX ORDER — LOVASTATIN 10 MG/1
10 TABLET ORAL
Qty: 90 TABLET | Refills: 3 | Status: SHIPPED | OUTPATIENT
Start: 2019-02-15 | End: 2020-04-02

## 2019-02-15 RX ORDER — METOPROLOL SUCCINATE 25 MG/1
75 TABLET, EXTENDED RELEASE ORAL 2 TIMES DAILY
Qty: 270 TABLET | Refills: 3 | Status: SHIPPED | OUTPATIENT
Start: 2019-02-15 | End: 2019-02-15 | Stop reason: SDUPTHER

## 2019-02-15 RX ORDER — SOTALOL HYDROCHLORIDE 80 MG/1
120 TABLET ORAL EVERY 12 HOURS SCHEDULED
Qty: 270 TABLET | Refills: 3 | Status: SHIPPED | OUTPATIENT
Start: 2019-02-15 | End: 2019-12-20 | Stop reason: SDUPTHER

## 2019-02-15 NOTE — PROGRESS NOTES
CARDIOLOGY OFFICE VISIT  [facility]  [address], [place], [facility] Rohan Obando  Tel: [phone]      NAME: Niesha Silverio  AGE: 75 y o  SEX: female  : 1945   MRN: [id number]      Chief Complaint:  Chief Complaint   Patient presents with    Follow-up     1 month - cardiomyopathy         History of Present Illness:   Patient comes for follow up  States she is feeling "so much better  She has so much energy to do her daily activity including shopping  States she feels like a different person"  Niesha Silverio is a 75 y o  female who was admitted electively for antiarrhythmic medication initiation at [address] [place] [address] in Dec 2018   Patient was referred to and seen in the office by Dr Lucretia Smith and recommended to initiate therapy with Sotalol 120mg PO BID   She had been on chronic anticoagulation therapy with eliquis without missed dose      Patient underwent first 4 doses of AAD without complications    She presented in atrial fibrillation and remained throughout the hospital stay until she underwent DCCV on 18   Serial EKGs were performed 2 hours after each dose of AAD   There were no significant changes in QT/QTc with intiating doses   There were no significant occurrence of ventricular ectopy on telemetry   Electrolytes and renal function were monitored throughout her stay      After her cardioversion on 18 patient was found to be sinus bradycardia in the mid 40s for which she was asmyptomatic  On admission her toprol XL dose was reduced to 25mg PO QD but after found to be markedly bradycardic her toprol XL was discontinued entirely  Her Sotalol was decreased to 80mg PO BID due to this bradycardia with her 5th dose on 18 being held   Therefore, a pacemaker was recommended at that time      States she is feeling great and denies chest pain / pressure, SOB (at baseline), palpitations, lightheadedness, syncope, swelling feet, orthopnea, PND, claudication  CMP -  Even though the patient was feeling well, her EF by latest echo had gone down to 35-40% from prior EF 40-45%  Hence she was referred to EP for rhythm control  Prior normal stress test  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  After rhythm control her EF has recovered back to 60% (Feb 2019)     PAF - Patient was diagnosed with atrial fibrillation on an EKG done at her PCP office  Patient had no symptoms from it  Patient denied palpitations, lightheadedness, syncope, SOB, chest pain / pressure, swelling feet, orthopnea, PND  She was cardioverted and was in sinus rhythm for a while but then went back into Afib  Hence she was referred to EP for rhythm management due to worsening CMP     HLP -  Has had hyperlipidemia for many years  Charmaine Ochoa statin regularly along with diet control   Denies myalgia   PCP closely monitoring the blood work      MELISSA GALVEZ TR - stable  Asymptomatic     Obesity - Trying to lose weight      H/O TIA    Past Medical History:  Past Medical History:   Diagnosis Date    Atrial fibrillation ([facility] [address] )     Headache     Hyperlipidemia     Hypertension     Lyme disease     Shortness of breath     TIA (transient ischemic attack)     Transient cerebral ischemia     Last assessed - 1/23/18    Vertigo          Past Surgical History:  Past Surgical History:   Procedure Laterality Date    CARDIAC PACEMAKER PLACEMENT      COLONOSCOPY      NV SIGMOIDOSCOPY FLX DX W/COLLJ SPEC BR/WA IF PFRMD N/A 11/28/2017    Procedure: Cristina Marmolejo;  Surgeon: Tita Luevano MD;  Location: [facility];   Service: Gastroenterology    TONSILLECTOMY           Family History:  Family History   Problem Relation Age of Onset    Lung cancer Mother     Dementia Father     Alzheimer's disease Father     Other Father         Cardiac Disorder          Social History:  Social History     Socioeconomic History    Marital status: /Civil Union     Spouse name: Not on file    Number of children: Not on file    Years of education: Not on file    Highest education level: Not on file   Occupational History    Occupation: Retired   Social Needs    Financial resource strain: Not on file    Food insecurity:     Worry: Not on file     Inability: Not on file   CEYX needs:     Medical: Not on file     Non-medical: Not on file   Tobacco Use    Smoking status: Former Smoker     Types: Cigarettes     Last attempt to quit:      Years since quittin 1    Smokeless tobacco: Never Used   Substance and Sexual Activity    Alcohol use: Yes     Alcohol/week: 4 2 oz     Types: 7 Glasses of wine per week     Comment: occ    Drug use: No    Sexual activity: Never     Partners: Male     Birth control/protection: None   Lifestyle    Physical activity:     Days per week: Not on file     Minutes per session: Not on file    Stress: Not on file   Relationships    Social connections:     Talks on phone: Not on file     Gets together: Not on file     Attends [employer]ism service: Not on file     Active member of club or organization: Not on file     Attends meetings of clubs or organizations: Not on file     Relationship status: Not on file    Intimate partner violence:     Fear of current or ex partner: Not on file     Emotionally abused: Not on file     Physically abused: Not on file     Forced sexual activity: Not on file   Other Topics Concern    Not on file   Social History Narrative    Always uses seat belt         Active Problems:  Patient Active Problem List   Diagnosis    Persistent atrial fibrillation with rapid ventricular response ([facility] [address] )    Essential hypertension    Mixed hyperlipidemia    Obesity (BMI 30-39  9)    H/O TIA (transient ischemic attack) and stroke    Aphasia, mixed    Migraine with aura and without status migrainosus, not intractable    URI, acute    Viral exanthem    Myocardiopathy (HCC)    Shortness of breath on exertion    Anticoagulant long-term use    Nonrheumatic mitral valve regurgitation    Nonrheumatic aortic valve insufficiency    Nonrheumatic tricuspid valve regurgitation    Cardiomyopathy (HCC)    Sinus bradycardia    Elevated troponin    Paroxysmal atrial fibrillation (HCC)    NSTEMI (non-ST elevated myocardial infarction) (HCC)         The following portions of the patient's history were reviewed and updated as appropriate: past medical history, past surgical history, past family history,  past social history, current medications, allergies and problem list       Review of Systems:  Constitutional: Denies fever, chills, fatigue  Eyes: Denies eye redness, eye discharge, double vision  ENT: Denies hearing loss, tinnitus, sneezing, nasal discharge, sore throat   Respiratory: Denies cough, expectoration, hemoptysis  +shortness of breath  Cardiovascular: Denies chest pain, palpitations, orthopnea, PND, lower extremity swelling  Gastrointestinal: Denies abdominal pain, nausea, vomiting, hematemesis, diarrhea, bloody stools  Genito-Urinary: Denies dysuria, incontinence  Musculoskeletal: Denies back pain, joint pain, muscle pain  Neurologic: Denies confusion, lightheadedness, syncope, headache, focal weakness, sensory changes, seizures  Endocrine: Denies polyuria, polydipsia, temperature intolerance  Allergy and Immunology: Denies hives, insect bite sensitivity  Hematological and Lymphatic: Denies bleeding problems, swollen glands   Psychological: Denies depression, suicidal ideation, anxiety, panic  Dermatological: Denies pruritus, rash, skin lesion changes      Vitals:  Vitals:    02/15/19 0831   BP: 128/84   Pulse: 87   SpO2: 97%       Body mass index is 34 95 kg/m²  Weight (last 2 days)     Date/Time   Weight    02/15/19 0831   95 3 (210)                Physical Examination:  General: Obese  Patient is not in acute distress  Awake, alert, oriented in time, place and person  Responding to commands  Head: Normocephalic  Atraumatic  Eyes:  Both pupils normal sized, round and reactive to light  Nonicteric  ENT: Normal external ear canals  Nares normal, no drainage  Lips and oral mucosa normal  Neck: Supple  JVP not raised  Trachea central  No thyromegaly  Lungs: Bilateral bronchovascular breath sounds with no crackles or rhonchi  Chest wall: No tenderness  Cardiovascular:  RRR  S1 and S2 normal  Grade 3/6 PSMs at apex and LLSB  No rub or gallop  Gastrointestinal: Abdomen soft, nontender  No guarding or rigidity  Liver and spleen not palpable  Bowel sounds present  Neurologic: Patient is awake, alert, oriented in time, place and person  Responding to command  Moving all extremities  Integumentary:  No skin rash  Lymphatic: No cervical lymphadenopathy  Back: Symmetric   No CVA tenderness  Extremities: No clubbing, cyanosis or edema      Laboratory Results:  CBC with diff:   Lab Results   Component Value Date    WBC 8 22 02/09/2019    RBC 4 55 02/09/2019    HGB 15 6 (H) 02/09/2019    HCT 45 6 02/09/2019     (H) 02/09/2019    MCH 34 3 02/09/2019    RDW 13 0 02/09/2019     02/09/2019       CMP:  Lab Results   Component Value Date    CREATININE 1 12 02/09/2019    BUN 18 02/09/2019    K 4 1 02/09/2019     02/09/2019    CO2 29 02/09/2019    ALKPHOS 101 02/09/2019    ALT 20 02/09/2019    AST 24 02/09/2019     Lipid Profile:   No results found for: CHOL  Lab Results   Component Value Date    HDL 63 (H) 01/22/2018    HDL 64 (H) 01/04/2017    HDL 60 01/12/2016     Lab Results   Component Value Date    LDLCALC 76 01/22/2018    LDLCALC 110 (H) 01/04/2017    LDLCALC 93 01/12/2016     Lab Results   Component Value Date    TRIG 85 01/22/2018    TRIG 75 01/04/2017    TRIG 80 01/12/2016       Cardiac testing:   Results for orders placed during the hospital encounter of 02/08/18   Echo complete with contrast if indicated    Narrative [place] [address], [address] [facility]  [phone]    Transthoracic Echocardiogram  2D, M-mode, and Color Doppler    Study date:  2018    Patient: Eri Friend  MR number: [id number]  Account number: [de-identified]  : 1945  Age: 67 years  Gender: Female  Status: Outpatient  [facility]: [facility]  Height: 63 in  Weight: 219 lb  BP: 138/ 82 mmHg    Indications: Atrial Fibrillation  Diagnoses: I48 0 - Atrial fibrillation    Sonographer:  Conway RCS  Interpreting Physician:  Jose Rios MD  Primary Physician:  Manuel Potts DO  Referring Physician:  Jose Rios MD  Group:  [facility]    SUMMARY    LEFT VENTRICLE:  Systolic function was moderately reduced  Ejection fraction was estimated in the range of 40 % to 45 %, likely underestimated due to rapid atrial fibrillation  Although no diagnostic regional wall motion abnormality was identified, this possibility cannot be completely excluded on the basis of this study  Wall thickness was mildly increased  There was mild concentric hypertrophy  RIGHT VENTRICLE:  The size was normal   Systolic function was mildly reduced  LEFT ATRIUM:  The atrium was mildly dilated  MITRAL VALVE:  There was mild regurgitation  AORTIC VALVE:  There was mild regurgitation  TRICUSPID VALVE:  There was mild to moderate regurgitation  Pulmonary artery systolic pressure was within the normal range  PULMONIC VALVE:  There was trace regurgitation  HISTORY: PRIOR HISTORY: Hyperlipidemia  Atrial fibrillation  Risk factors: hypertension and morbid obesity  Cerebrovascular disease  PROCEDURE: The study was performed in the [address] [place] [address]  This was a routine study  The transthoracic approach was used  The study included complete 2D imaging, M-mode, and color Doppler  The heart rate was 127 bpm, at the  start of the study  Images were obtained from the parasternal, apical, subcostal, and suprasternal notch acoustic windows  Image quality was adequate      LEFT VENTRICLE: Size was normal  Systolic function was moderately reduced  Ejection fraction was estimated in the range of 40 % to 45 %, likely underestimated due to rapid atrial fibrillation  Although no diagnostic regional wall motion  abnormality was identified, this possibility cannot be completely excluded on the basis of this study  Wall thickness was mildly increased  There was mild concentric hypertrophy  No evidence of apical thrombus  RIGHT VENTRICLE: The size was normal  Systolic function was mildly reduced  Wall thickness was normal     LEFT ATRIUM: The atrium was mildly dilated  RIGHT ATRIUM: Size was normal     MITRAL VALVE: There was annular calcification  There was normal leaflet separation  DOPPLER: The transmitral velocity was within the normal range  There was no evidence for stenosis  There was mild regurgitation  AORTIC VALVE: The valve was trileaflet  Leaflets exhibited mildly increased thickness and normal cuspal separation  DOPPLER: Transaortic velocity was within the normal range  There was no evidence for stenosis  There was mild  regurgitation  TRICUSPID VALVE: The valve structure was normal  There was normal leaflet separation  DOPPLER: The transtricuspid velocity was within the normal range  There was no evidence for stenosis  There was mild to moderate regurgitation  Pulmonary  artery systolic pressure was within the normal range  PULMONIC VALVE: Leaflets exhibited normal thickness, no calcification, and normal cuspal separation  DOPPLER: The transpulmonic velocity was within the normal range  There was trace regurgitation  PERICARDIUM: There was no pericardial effusion  The pericardium was normal in appearance  AORTA: The root exhibited normal size  SYSTEMIC VEINS: IVC: The inferior vena cava was not well visualized  SYSTEM MEASUREMENT TABLES    Apical four chamber  4 chamber Left Atrium Volume Index; Planimetry; End Systole;  Apical four chamber;: 21 26 cm2  Left Ventricular Diastolic Area; Method of Disks, Single Plane; End Diastole; Apical four chamber;: 23 86 cm2  Left Ventricular Ejection Fraction; Method of Disks, Single Plane; Apical four chamber;: 49 7 %  Left Ventricular systolic Area; Method of Disks, Single Plane; End Systole; Apical four chamber;: 15 54 cm2  Right Atrium Systolic Area; Planimetry; End Systole; Apical four chamber;: 15 89 cm2  Right Ventricular Internal Diastolic Dimension; End Diastole; Apical four chamber;: 25 6 mm  TAPSE: 14 4 mm    Unspecified Scan Mode  AR Vmax; Regurgitant Flow; Diastole;: 390 6 cm/s  Aortic Root Diameter; End Systole;: 33 mm  Gradient Pressure, Peak; Simplified Bernoulli; Antegrade Flow; Systole;: 4 9 mm[Hg]  Gradient pressure, average; Simplified Bernoulli; Antegrade Flow; Systole;: 2 3 mm[Hg]  Left Atrium to Aortic Root Ratio;: 1 33  Left atrial diameter; End Diastole;: 44 mm  Pressure Half Time;: 0 46 s  Cardiac Output; Teichholz; Systole;: 2 73 L/min  Heart rate; Teichholz;: 123 /min  Interventricular Septum Diastolic Thickness; Teichholz; End Diastole;: 12 7 mm  Left Ventricle Internal End Diastolic Dimension; Teichholz;: 42 9 mm  Left Ventricle Internal Systolic Dimension; Teichholz; End Systole;: 37 6 mm  Left Ventricle Mass; Mass AVCube with Teichholz; End Diastole;: 167 g  Left Ventricle Posterior Wall Diastolic Thickness; Teichholz; End Diastole;: 9 7 mm  Left Ventricular Ejection Fraction; Teichholz;: 26 9 %  Left Ventricular End Diastolic Volume; Teichholz;: 82 6 ml  Left Ventricular End Systolic Volume; Teichholz;: 60 4 ml  Left Ventricular Fractional Shortening;: 12 4 %  Stroke volume; Russ Katarina;: 22 2 ml  Maximum Tricuspid valve regurgitation pressure gradient; Regurgitant Flow; Systole;: 28 1 mm[Hg]    [facility] Accredited Ech[facility]ography Laboratory    Prepared and electronically signed by    Lea Sy MD  Signed 10-Feb-2018 13:06:38         EKG:  Atrial fibrillation with RVR    110 beats per min  Nonspecific inferior and anterior T-wave changes      Medications:    Current Outpatient Medications:     apixaban (ELIQUIS) 5 mg, Take 1 tablet (5 mg total) by mouth 2 (two) times a day, Disp: 180 tablet, Rfl: 3    lisinopril (ZESTRIL) 2 5 mg tablet, Take 1 tablet (2 5 mg total) by mouth daily, Disp: 90 tablet, Rfl: 3    lovastatin (MEVACOR) 10 MG tablet, Take 1 tablet (10 mg total) by mouth daily at bedtime, Disp: 90 tablet, Rfl: 3    metoprolol succinate (TOPROL-XL) 25 mg 24 hr tablet, Take 3 tablets (75 mg total) by mouth 2 (two) times a day, Disp: 270 tablet, Rfl: 3    sotalol (BETAPACE) 80 mg tablet, Take 1 5 tablets (120 mg total) by mouth every 12 (twelve) hours, Disp: 270 tablet, Rfl: 3      Allergies:  No Known Allergies      Assessment and Plan:  1  Persistent atrial fibrillation  Maintained on sotalol and metoprolol  On Eliquis for anticoagulation      2  H/O tachycardia mediated cardiomyopathy ([address] )  EF has improved back to her 60% per echo February 2019  In October 2018 her EF was 35-40%     3  Shortness of breath on exertion    was likely from cardiomyopathy  Resolved     4  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)  Regular home monitoring with yearly monitoring at [place]     5  Essential hypertension   BP normal       6  Obesity (BMI 30-39 9)   counseled to try to lose weight     7  H/O TIA (transient ischemic attack) and stroke   currently on Eliquis, statin     8  Mixed hyperlipidemia   continue statin and diet control  Her PCP closely monitors her blood work     9  Nonrheumatic mitral valve regurgitation, Nonrheumatic aortic valve insufficiency, Nonrheumatic tricuspid valve regurgitation   stable  Follow-up with serial echocardiograms    Recommend aggressive risk factor modification and therapeutic lifestyle changes  Low-salt, low-calorie, low-fat, low-cholesterol diet with regular exercise and to optimize weight    I will defer the ordering and monitoring of necessity lab studies to you, but I am available and happy to review and manage any of the data at your request in the future  Discussed concepts of atherosclerosis, including signs and symptoms of cardiac disease  Previous studies were reviewed  Safety measures were reviewed  Questions were entertained and answered  Patient was advised to report any problems requiring medical attention  Follow-up with PCP and appropriate specialist and lab work as discussed  Return for follow up visit as scheduled or earlier, if needed  Thank you for allowing me to participate in the care and evaluation of your patient  Should you have any questions, please feel free to contact me        Jose Rios MD  2/15/2019,9:21 AM

## 2019-02-18 ENCOUNTER — PATIENT OUTREACH (OUTPATIENT)
Dept: FAMILY MEDICINE CLINIC | Facility: CLINIC | Age: 74
End: 2019-02-18

## 2019-02-18 NOTE — PROGRESS NOTES
Archana Cuevas in great spirits  She stated that she is feeling a lot better since discharge  Blood pressure this morning was 117/68  Denied palpitations, SOB, swelling, pain  She did f/u with cardiologist as ordered  Discussed f/u appointment with PCP (Dr Arya Muñoz) on 2/20/19  She stated she will be attending and has no issues with transportation to appointment  She denied needing home health at this time  Stated that she is doing very well managing at home  Stated she is back to her normal routine  She continues to tolerate her prescribed medications  Reviewed over s/s of heart failure and importance of notifying care management and or PCP with any change in symptoms prior to next f/u  Discussed when to get help  She verbalized understanding  Also discussed good heart management and discussed again heart healthy meals  She stated her goal is to try to eat better  Try to limit her salt intake  Stated that she knows she needs to work on picking healthier food choices  Gave a few examples again of foods to avoid such as terry, fried foods, ham or sausage and so on  Also foods that are healthier choices  Educated on importance of recording weight daily and importance of notify care management and or PCP with a weight gain more than 2lbs in 1 day or 5 lbs in 1 week  Provided her with care management contact information again  Made her aware that she is no longer eligible for BPCI program but would follow her for complex management  She is in agreement  Will f/u

## 2019-02-20 ENCOUNTER — TELEPHONE (OUTPATIENT)
Dept: FAMILY MEDICINE CLINIC | Facility: CLINIC | Age: 74
End: 2019-02-20

## 2019-02-20 NOTE — TELEPHONE ENCOUNTER
Pt canceled her TCM appt d/t inclement weather  Pt has appt on 3/8  She is wondering if she can just keep that appt? She has appts with her  in [place] with a hearing specialist on 3/1, which was our PAC's first appt available

## 2019-02-28 ENCOUNTER — PATIENT OUTREACH (OUTPATIENT)
Dept: FAMILY MEDICINE CLINIC | Facility: CLINIC | Age: 74
End: 2019-02-28

## 2019-02-28 NOTE — PROGRESS NOTES
Senora Dakins stated that she is doing very well and does not need anything at this time  Has no questions or concerns  Stated that her vitals have been stable  Denies SOB, pain, swelling or weakness  Continues to tolerate her prescribed medications  Reviewed over upcoming appointment with PCP (Dr Ilya Cooper) on 3/8/19  She stated that she will be attending and has no issues with transportation  Did discuss again with her ED utilization and importance of notifying PCP and or care management with any change in symptoms prior to next f/u  Also discussed other ED alternatives  She verbalized understanding  Continues to have care management contact information if need be  Will f/u

## 2019-03-08 ENCOUNTER — OFFICE VISIT (OUTPATIENT)
Dept: FAMILY MEDICINE CLINIC | Facility: CLINIC | Age: 74
End: 2019-03-08
Payer: MEDICARE

## 2019-03-08 VITALS
BODY MASS INDEX: 34.99 KG/M2 | HEIGHT: 65 IN | DIASTOLIC BLOOD PRESSURE: 90 MMHG | HEART RATE: 80 BPM | WEIGHT: 210 LBS | OXYGEN SATURATION: 97 % | TEMPERATURE: 98.7 F | SYSTOLIC BLOOD PRESSURE: 130 MMHG

## 2019-03-08 DIAGNOSIS — E66.9 OBESITY (BMI 30.0-34.9): ICD-10-CM

## 2019-03-08 DIAGNOSIS — I42.8 OTHER CARDIOMYOPATHY (HCC): ICD-10-CM

## 2019-03-08 DIAGNOSIS — E78.2 MIXED HYPERLIPIDEMIA: ICD-10-CM

## 2019-03-08 DIAGNOSIS — Z12.31 SCREENING MAMMOGRAM, ENCOUNTER FOR: ICD-10-CM

## 2019-03-08 DIAGNOSIS — I48.0 PAROXYSMAL ATRIAL FIBRILLATION (HCC): Primary | Chronic | ICD-10-CM

## 2019-03-08 DIAGNOSIS — Z00.00 HEALTH CARE MAINTENANCE: ICD-10-CM

## 2019-03-08 PROBLEM — I42.9 CARDIOMYOPATHY (HCC): Status: RESOLVED | Noted: 2018-12-20 | Resolved: 2019-03-08

## 2019-03-08 PROBLEM — I21.4 NSTEMI (NON-ST ELEVATED MYOCARDIAL INFARCTION) (HCC): Status: RESOLVED | Noted: 2019-02-09 | Resolved: 2019-03-08

## 2019-03-08 PROBLEM — I48.19 PERSISTENT ATRIAL FIBRILLATION WITH RAPID VENTRICULAR RESPONSE (HCC): Status: RESOLVED | Noted: 2018-02-23 | Resolved: 2019-03-08

## 2019-03-08 PROCEDURE — 99214 OFFICE O/P EST MOD 30 MIN: CPT | Performed by: FAMILY MEDICINE

## 2019-03-08 PROCEDURE — G0439 PPPS, SUBSEQ VISIT: HCPCS | Performed by: FAMILY MEDICINE

## 2019-03-08 NOTE — ASSESSMENT & PLAN NOTE
Rate controlled, continue her Eliquis, sotalol and metoprolol    This is followed by her cardiologist

## 2019-03-08 NOTE — PROGRESS NOTES
Assessment/Plan:       Diagnoses and all orders for this visit:    Paroxysmal atrial fibrillation ([facility] [address] )    Other cardiomyopathy ([facility] )    Mixed hyperlipidemia    Health care maintenance    Screening mammogram, encounter for  -     Mammo screening bilateral w cad; Future        Paroxysmal atrial fibrillation (HCC)  Rate controlled, continue her Eliquis, sotalol and metoprolol  This is followed by her cardiologist     Myocardiopathy [facility])  Followed by her cardiologist, continue her beta-blocker, blood pressure control  Mixed hyperlipidemia  Continue lovastatin, will repeat her blood work after her next visit  Continue current medications, follow up with her cardiologist scheduled  Will see her back in 6 months time period    Subjective:      Patient ID: Olivia Diamond is a 75 y o  female  Patient comes today for follow-up on her paroxysmal atrial fibrillation, she was hospitalized for this in February  She is now taking sotalol in addition to her metoprolol, and Eliquis  She is up-to-date with her cardiologist for her cardiomyopathy and her paroxysmal atrial fibrillation  She is compliant with her medications, she denies any bleeding  She continues to take her lisinopril for hypertension, no compliance issues or side effects  She is taking her lovastatin for cholesterol, no compliance issues or side effects  The following portions of the patient's history were reviewed and updated as appropriate:   She has a past medical history of Atrial fibrillation ([facility] [address] ), Headache, Hyperlipidemia, Hypertension, Lyme disease, Shortness of breath, TIA (transient ischemic attack), Transient cerebral ischemia, and Vertigo  ,  does not have any pertinent problems on file  ,   has a past surgical history that includes Tonsillectomy; Colonoscopy; pr sigmoidoscopy flx dx w/collj spec br/wa if pfrmd (N/A, 11/28/2017); and Cardiac pacemaker placement  ,  family history includes Alzheimer's disease in her father; Dementia in her father; Lung cancer in her mother; Other in her father  ,   reports that she quit smoking about 42 years ago  Her smoking use included cigarettes  She has never used smokeless tobacco  She reports that she drinks about 4 2 oz of alcohol per week  She reports that she does not use drugs  ,  has No Known Allergies     Current Outpatient Medications   Medication Sig Dispense Refill    apixaban (ELIQUIS) 5 mg Take 1 tablet (5 mg total) by mouth 2 (two) times a day 180 tablet 3    lisinopril (ZESTRIL) 2 5 mg tablet Take 1 tablet (2 5 mg total) by mouth daily 90 tablet 3    lovastatin (MEVACOR) 10 MG tablet Take 1 tablet (10 mg total) by mouth daily at bedtime 90 tablet 3    metoprolol succinate (TOPROL-XL) 25 mg 24 hr tablet Take 3 tablets (75 mg total) by mouth 2 (two) times a day 270 tablet 3    sotalol (BETAPACE) 80 mg tablet Take 1 5 tablets (120 mg total) by mouth every 12 (twelve) hours 270 tablet 3     No current facility-administered medications for this visit  Review of Systems   Constitutional: Negative for chills, fatigue and fever  HENT: Negative for congestion, ear pain, hearing loss, postnasal drip, rhinorrhea and sore throat  Eyes: Negative for pain and visual disturbance  Respiratory: Negative for chest tightness, shortness of breath and wheezing  Cardiovascular: Negative for chest pain and leg swelling  Gastrointestinal: Negative for abdominal distention, abdominal pain, constipation, diarrhea and vomiting  Endocrine: Negative for cold intolerance and heat intolerance  Genitourinary: Negative for difficulty urinating, frequency and urgency  Musculoskeletal: Negative for arthralgias and gait problem  Skin: Negative for color change  Neurological: Negative for dizziness, tremors, syncope, numbness and headaches  Hematological: Negative for adenopathy  Psychiatric/Behavioral: Negative for agitation, confusion and sleep disturbance  The patient is not nervous/anxious  Objective:  Vitals:    03/08/19 0816   BP: 130/90   Pulse: 80   Temp: 98 7 °F (37 1 °C)   SpO2: 97%   Weight: 95 3 kg (210 lb)   Height: 5' 5" (1 651 m)     Body mass index is 34 95 kg/m²  Physical Exam   Constitutional: She is oriented to person, place, and time  She appears well-developed and well-nourished  HENT:   Head: Normocephalic  Mouth/Throat: Oropharynx is clear and moist    Eyes: Conjunctivae are normal  No scleral icterus  Neck: Normal range of motion  No thyromegaly present  Cardiovascular: Normal rate, regular rhythm and normal heart sounds  No murmur heard  Pulmonary/Chest: Effort normal and breath sounds normal  No respiratory distress  She has no wheezes  Abdominal: Soft  Bowel sounds are normal  She exhibits no distension  There is no tenderness  Musculoskeletal: Normal range of motion  She exhibits no edema or tenderness  Lymphadenopathy:     She has no cervical adenopathy  Neurological: She is alert and oriented to person, place, and time  No cranial nerve deficit  Skin: Skin is warm and dry  No rash noted  No pallor  Psychiatric: She has a normal mood and affect  Her behavior is normal    Nursing note and vitals reviewed  BMI Counseling: Body mass index is 34 95 kg/m²  Discussed the patient's BMI with her  The BMI is above average  BMI counseling and education was provided to the patient  Nutrition recommendations include reducing portion sizes, decreasing overall calorie intake and 3-5 servings of fruits/vegetables daily  Exercise recommendations include moderate aerobic physical activity for 150 minutes/week

## 2019-03-08 NOTE — PATIENT INSTRUCTIONS
Obesity   AMBULATORY CARE:   Obesity  is when your body mass index (BMI) is greater than 30  Your healthcare provider will use your height and weight to measure your BMI  The risks of obesity include  many health problems, such as injuries or physical disability  You may need tests to check for the following:  · Diabetes     · High blood pressure or high cholesterol     · Heart disease     · Gallbladder or liver disease     · Cancer of the colon, breast, prostate, liver, or kidney     · Sleep apnea     · Arthritis or gout  Seek care immediately if:   · You have a severe headache, confusion, or difficulty speaking  · You have weakness on one side of your body  · You have chest pain, sweating, or shortness of breath  Contact your healthcare provider if:   · You have symptoms of gallbladder or liver disease, such as pain in your upper abdomen  · You have knee or hip pain and discomfort while walking  · You have symptoms of diabetes, such as intense hunger and thirst, and frequent urination  · You have symptoms of sleep apnea, such as snoring or daytime sleepiness  · You have questions or concerns about your condition or care  Treatment for obesity  focuses on helping you lose weight to improve your health  Even a small decrease in BMI can reduce the risk for many health problems  Your healthcare provider will help you set a weight-loss goal   · Lifestyle changes  are the first step in treating obesity  These include making healthy food choices and getting regular physical activity  Your healthcare provider may suggest a weight-loss program that involves coaching, education, and therapy  · Medicine  may help you lose weight when it is used with a healthy diet and physical activity  · Surgery  can help you lose weight if you are very obese and have other health problems  There are several types of weight-loss surgery  Ask your healthcare provider for more information    Be successful losing weight:   · Set small, realistic goals  An example of a small goal is to walk for 20 minutes 5 days a week  Anther goal is to lose 5% of your body weight  · Tell friends, family members, and coworkers about your goals  and ask for their support  Ask a friend to lose weight with you, or join a weight-loss support group  · Identify foods or triggers that may cause you to overeat , and find ways to avoid them  Remove tempting high-calorie foods from your home and workplace  Place a bowl of fresh fruit on your kitchen counter  If stress causes you to eat, then find other ways to cope with stress  · Keep a diary to track what you eat and drink  Also write down how many minutes of physical activity you do each day  Weigh yourself once a week and record it in your diary  Eating changes: You will need to eat 500 to 1,000 fewer calories each day than you currently eat to lose 1 to 2 pounds a week  The following changes will help you cut calories:  · Eat smaller portions  Use small plates, no larger than 9 inches in diameter  Fill your plate half full of fruits and vegetables  Measure your food using measuring cups until you know what a serving size looks like  · Eat 3 meals and 1 or 2 snacks each day  Plan your meals in advance  Julisa Herita and eat at home most of the time  Eat slowly  · Eat fruits and vegetables at every meal   They are low in calories and high in fiber, which makes you feel full  Do not add butter, margarine, or cream sauce to vegetables  Use herbs to season steamed vegetables  · Eat less fat and fewer fried foods  Eat more baked or grilled chicken and fish  These protein sources are lower in calories and fat than red meat  Limit fast food  Dress your salads with olive oil and vinegar instead of bottled dressing  · Limit the amount of sugar you eat  Do not drink sugary beverages  Limit alcohol  Activity changes:  Physical activity is good for your body in many ways   It helps you burn calories and build strong muscles  It decreases stress and depression, and improves your mood  It can also help you sleep better  Talk to your healthcare provider before you begin an exercise program   · Exercise for at least 30 minutes 5 days a week  Start slowly  Set aside time each day for physical activity that you enjoy and that is convenient for you  It is best to do both weight training and an activity that increases your heart rate, such as walking, bicycling, or swimming  · Find ways to be more active  Do yard work and housecleaning  Walk up the stairs instead of using elevators  Spend your leisure time going to events that require walking, such as outdoor festivals or fairs  This extra physical activity can help you lose weight and keep it off  Follow up with your healthcare provider as directed: You may need to meet with a dietitian  Write down your questions so you remember to ask them during your visits  © 2017 [employer] Information is for End User's use only and may not be sold, redistributed or otherwise used for commercial purposes  All illustrations and images included in CareNotes® are the copyrighted property of [employer]  or [employer]  The above information is an  only  It is not intended as medical advice for individual conditions or treatments  Talk to your doctor, nurse or pharmacist before following any medical regimen to see if it is safe and effective for you

## 2019-03-08 NOTE — PROGRESS NOTES
Assessment and Plan:    Problem List Items Addressed This Visit        Cardiovascular and Mediastinum    Paroxysmal atrial fibrillation (HCC) - Primary (Chronic)    Myocardiopathy ([address] )       Other    Mixed hyperlipidemia      Other Visit Diagnoses     Health care maintenance        Screening mammogram, encounter for        Relevant Orders    Mammo screening bilateral w cad        Health Maintenance Due   Topic Date Due    Medicare Annual Wellness Visit (AWV)  1945    BMI: Followup Plan  10/18/1962         HPI:  Cierra Christopher is a 75 y o  female here for her Subsequent Wellness Visit  Patient Active Problem List   Diagnosis    Essential hypertension    Mixed hyperlipidemia    Obesity (BMI 30-39  9)    H/O TIA (transient ischemic attack) and stroke    Aphasia, mixed    Migraine with aura and without status migrainosus, not intractable    URI, acute    Viral exanthem    Myocardiopathy (HCC)    Shortness of breath on exertion    Anticoagulant long-term use    Nonrheumatic mitral valve regurgitation    Nonrheumatic aortic valve insufficiency    Nonrheumatic tricuspid valve regurgitation    Sinus bradycardia    Elevated troponin    Paroxysmal atrial fibrillation (HCC)     Past Medical History:   Diagnosis Date    Atrial fibrillation ([address] )     Headache     Hyperlipidemia     Hypertension     Lyme disease     Shortness of breath     TIA (transient ischemic attack)     Transient cerebral ischemia     Last assessed - 1/23/18    Vertigo      Past Surgical History:   Procedure Laterality Date    CARDIAC PACEMAKER PLACEMENT      COLONOSCOPY      HI SIGMOIDOSCOPY FLX DX W/COLLJ SPEC BR/WA IF PFRMD N/A 11/28/2017    Procedure: SIGMOIDOSCOPY FLEXIBLE;  Surgeon: Cassie Mcghee MD;  Location: [facility];   Service: Gastroenterology    TONSILLECTOMY       Family History   Problem Relation Age of Onset    Lung cancer Mother     Dementia Father     Alzheimer's disease Father     Other Father         Cardiac Disorder      Social History     Tobacco Use   Smoking Status Former Smoker    Types: Cigarettes    Last attempt to quit:     Years since quittin 2   Smokeless Tobacco Never Used     Social History     Substance and Sexual Activity   Alcohol Use Yes    Alcohol/week: 4 2 oz    Types: 7 Glasses of wine per week    Comment: occ      Social History     Substance and Sexual Activity   Drug Use No       Current Outpatient Medications   Medication Sig Dispense Refill    apixaban (ELIQUIS) 5 mg Take 1 tablet (5 mg total) by mouth 2 (two) times a day 180 tablet 3    lisinopril (ZESTRIL) 2 5 mg tablet Take 1 tablet (2 5 mg total) by mouth daily 90 tablet 3    lovastatin (MEVACOR) 10 MG tablet Take 1 tablet (10 mg total) by mouth daily at bedtime 90 tablet 3    metoprolol succinate (TOPROL-XL) 25 mg 24 hr tablet Take 3 tablets (75 mg total) by mouth 2 (two) times a day 270 tablet 3    sotalol (BETAPACE) 80 mg tablet Take 1 5 tablets (120 mg total) by mouth every 12 (twelve) hours 270 tablet 3     No current facility-administered medications for this visit  No Known Allergies  There is no immunization history for the selected administration types on file for this patient  Patient Care Team:  Shabnam Sánchez DO as PCP - General (Family Medicine)  MD Lizandro Nesbitt, RN as Lead OP Care Mgr (Care Coordination)    Medicare Screening Tests and Risk Assessments:  Jamal Jimenes is here for her Subsequent Wellness visit  Last Medicare Wellness visit information reviewed, patient interviewed and updates made to the record today  Health Risk Assessment:  Patient rates overall health as good  Patient feels that their physical health rating is Much better  Eyesight was rated as Same  Hearing was rated as Same  Patient feels that their emotional and mental health rating is Same  Pain experienced by patient in the last 7 days has been None   Patient states that she has experienced weight loss or gain in last 6 months  (Additional comments: Due to hospital visits)    Emotional/Mental Health:  Patient has been feeling nervous/anxious  PHQ-9 Depression Screening:    Frequency of the following problems over the past two weeks:      1  Little interest or pleasure in doing things: 0 - not at all      2  Feeling down, depressed, or hopeless: 0 - not at all  PHQ-2 Score: 0          Broken Bones/Falls: Fall Risk Assessment:    In the past year, patient has experienced: No history of falling in past year          Bladder/Bowel:  Patient has leaked urine accidently in the last six months  Patient reports no loss of bowel control  Immunizations:  Patient has not had a flu vaccination within the last year  Patient has not received a pneumonia shot  Patient has not received a shingles shot  Patient has not received tetanus/diphtheria shot  Home Safety:  Patient does not have trouble with stairs inside or outside of their home  Patient currently reports that there are no safety hazards present in home, working smoke alarms, working carbon monoxide detectors  Preventative Screenings:   Breast cancer screening performed, colon cancer screen completed, cholesterol screen completed, glaucoma eye exam completed,     Nutrition:  Current diet: Regular and Limited junk food with servings of the following:    Medications:  Patient is currently taking over-the-counter supplements  List of OTC medications includes: benedryl  Patient is able to manage medications  Lifestyle Choices:  Patient reports no tobacco use  Patient has smoked or used tobacco in the past   Patient has stopped her tobacco use  Tobacco use quit date: 40 years ago - 1977  Patient reports alcohol use  Alcohol use per week: 5  Patient does not drive a vehicle  Patient wears seat belt  Current level of exercise of physical activity described by patient as: light to moderate          Activities of Daily Living:  Can get out of bed by his or her self, able to dress self, able to make own meals, able to do own shopping, able to bathe self, can do own laundry/housekeeping, can manage own money, pay bills and track expenses    Previous Hospitalizations:  Hospitalization or ED visit in past 12 months  Number of hospitalizations within the last year: 1-2        Advanced Directives:  Patient has decided on a power of   Patient has spoken to designated power of   Patient has completed advanced directive  Preventative Screening/Counseling:      Cardiovascular:      General: Screening Current      Counseling: Healthy Diet          Diabetes:      General: Screening Current      Counseling: Healthy Diet          Colorectal Cancer:      General: Screening Current          Cervical Cancer:      General: Screening Not Indicated and Risks and Benefits Discussed          Osteoporosis:      General: Risks and Benefits Discussed and Patient Declines          AAA:      General: Screening Not Indicated          Glaucoma:      General: Screening Current          HIV:      General: Screening Not Indicated and Risks and Benefits Discussed          Hepatitis C:      General: Screening Not Indicated        Advanced Directives:   Patient has living will for healthcare, has durable POA for healthcare, patient has an advanced directive       Immunizations:      Influenza: Risks & Benefits Discussed and Patient Declines      Pneumococcal: Risks & Benefits Discussed and Patient Declines      Shingrix: Risks & Benefits Discussed and Patient Declines      TDAP: Risks & Benefits Discussed and Patient Declines

## 2019-03-12 DIAGNOSIS — I10 ESSENTIAL HYPERTENSION: ICD-10-CM

## 2019-03-25 ENCOUNTER — HOSPITAL ENCOUNTER (OUTPATIENT)
Dept: MAMMOGRAPHY | Facility: CLINIC | Age: 74
Discharge: HOME | End: 2019-03-25
Payer: MEDICARE

## 2019-03-25 DIAGNOSIS — Z12.31 SCREENING MAMMOGRAM, ENCOUNTER FOR: ICD-10-CM

## 2019-03-25 PROCEDURE — 77067 SCR MAMMO BI INCL CAD: CPT

## 2019-03-25 PROCEDURE — 77063 BREAST TOMOSYNTHESIS BI: CPT

## 2019-04-02 ENCOUNTER — PATIENT OUTREACH (OUTPATIENT)
Dept: FAMILY MEDICINE CLINIC | Facility: CLINIC | Age: 74
End: 2019-04-02

## 2019-04-19 ENCOUNTER — IN-CLINIC DEVICE VISIT (OUTPATIENT)
Dept: CARDIOLOGY CLINIC | Facility: CLINIC | Age: 74
End: 2019-04-19
Payer: MEDICARE

## 2019-04-19 DIAGNOSIS — I49.5 SSS (SICK SINUS SYNDROME) (HCC): ICD-10-CM

## 2019-04-19 DIAGNOSIS — I48.0 PAROXYSMAL ATRIAL FIBRILLATION (HCC): Primary | ICD-10-CM

## 2019-04-19 DIAGNOSIS — Z95.0 PRESENCE OF PERMANENT CARDIAC PACEMAKER: ICD-10-CM

## 2019-04-19 PROCEDURE — 93280 PM DEVICE PROGR EVAL DUAL: CPT | Performed by: INTERNAL MEDICINE

## 2019-06-10 DIAGNOSIS — I48.0 PAROXYSMAL ATRIAL FIBRILLATION (HCC): Primary | ICD-10-CM

## 2019-06-14 RX ORDER — METOPROLOL SUCCINATE 50 MG/1
75 TABLET, EXTENDED RELEASE ORAL 2 TIMES DAILY
Qty: 270 TABLET | Refills: 3 | Status: SHIPPED | OUTPATIENT
Start: 2019-06-14 | End: 2020-04-02

## 2019-07-05 ENCOUNTER — PATIENT OUTREACH (OUTPATIENT)
Dept: FAMILY MEDICINE CLINIC | Facility: CLINIC | Age: 74
End: 2019-07-05

## 2019-07-29 ENCOUNTER — TELEPHONE (OUTPATIENT)
Dept: FAMILY MEDICINE CLINIC | Facility: CLINIC | Age: 74
End: 2019-07-29

## 2019-08-04 DIAGNOSIS — I10 ESSENTIAL HYPERTENSION: Primary | ICD-10-CM

## 2019-08-04 DIAGNOSIS — E78.2 MIXED HYPERLIPIDEMIA: ICD-10-CM

## 2019-08-05 ENCOUNTER — PATIENT OUTREACH (OUTPATIENT)
Dept: FAMILY MEDICINE CLINIC | Facility: CLINIC | Age: 74
End: 2019-08-05

## 2019-08-05 NOTE — PROGRESS NOTES
Aspen Samuels is in agreement to closure from outpatient care management  She is currently in stable condition  Continues to have contact information to outpatient care management if need be  Will close at this time

## 2019-08-07 ENCOUNTER — OFFICE VISIT (OUTPATIENT)
Dept: CARDIOLOGY CLINIC | Facility: CLINIC | Age: 74
End: 2019-08-07
Payer: MEDICARE

## 2019-08-07 VITALS
DIASTOLIC BLOOD PRESSURE: 90 MMHG | OXYGEN SATURATION: 90 % | BODY MASS INDEX: 35.99 KG/M2 | HEART RATE: 92 BPM | SYSTOLIC BLOOD PRESSURE: 134 MMHG | HEIGHT: 65 IN | WEIGHT: 216 LBS

## 2019-08-07 DIAGNOSIS — E66.9 OBESITY (BMI 30-39.9): ICD-10-CM

## 2019-08-07 DIAGNOSIS — I34.0 NONRHEUMATIC MITRAL VALVE REGURGITATION: ICD-10-CM

## 2019-08-07 DIAGNOSIS — Z86.73 H/O TIA (TRANSIENT ISCHEMIC ATTACK) AND STROKE: ICD-10-CM

## 2019-08-07 DIAGNOSIS — I36.1 NONRHEUMATIC TRICUSPID VALVE REGURGITATION: ICD-10-CM

## 2019-08-07 DIAGNOSIS — I42.9 CARDIOMYOPATHY, UNSPECIFIED TYPE (HCC): ICD-10-CM

## 2019-08-07 DIAGNOSIS — I49.5 SSS (SICK SINUS SYNDROME) (HCC): ICD-10-CM

## 2019-08-07 DIAGNOSIS — Z79.01 ANTICOAGULANT LONG-TERM USE: ICD-10-CM

## 2019-08-07 DIAGNOSIS — E78.2 MIXED HYPERLIPIDEMIA: ICD-10-CM

## 2019-08-07 DIAGNOSIS — Z95.0 PRESENCE OF PERMANENT CARDIAC PACEMAKER: ICD-10-CM

## 2019-08-07 DIAGNOSIS — I48.19 PERSISTENT ATRIAL FIBRILLATION (HCC): Primary | ICD-10-CM

## 2019-08-07 DIAGNOSIS — I35.1 NONRHEUMATIC AORTIC VALVE INSUFFICIENCY: ICD-10-CM

## 2019-08-07 DIAGNOSIS — I10 ESSENTIAL HYPERTENSION: ICD-10-CM

## 2019-08-07 PROCEDURE — 99214 OFFICE O/P EST MOD 30 MIN: CPT | Performed by: INTERNAL MEDICINE

## 2019-08-07 NOTE — PROGRESS NOTES
CARDIOLOGY OFFICE VISIT  [facility]  [address], [facility] Rohan Obando  Tel: [phone]      NAME: Ollie Henderson  AGE: 75 y o  SEX: female  : 1945   MRN: [id number]      Chief Complaint:  Chief Complaint   Patient presents with    Follow-up     6 month         History of Present Illness:   Patient comes for follow up  States she is feeling great and denies chest pain,  Shortness of breath, palpitations, lightheadedness, syncope, swelling feet, orthopnea, PND, claudication  States she is feeling "so much better  She has so much energy to do her daily activity including shopping  States she feels like a different person"  Ollie Henderson is a 75 y o  female who was admitted electively for antiarrhythmic medication initiation at [facility] in Dec 2018   Patient was referred to and seen in the office by Dr Meek Sheridan and recommended to initiate therapy with Sotalol 120mg PO BID   She had been on chronic anticoagulation therapy with eliquis without missed dose      Patient underwent first 4 doses of AAD without complications    She presented in atrial fibrillation and remained throughout the hospital stay until she underwent DCCV on 18   Serial EKGs were performed 2 hours after each dose of AAD   There were no significant changes in QT/QTc with intiating doses   There were no significant occurrence of ventricular ectopy on telemetry   Electrolytes and renal function were monitored throughout her stay      After her cardioversion on 18 patient was found to be sinus bradycardia in the mid 40s for which she was asmyptomatic  On admission her toprol XL dose was reduced to 25mg PO QD but after found to be markedly bradycardic her toprol XL was discontinued entirely  Her Sotalol was decreased to 80mg PO BID due to this bradycardia with her 5th dose on 18 being held   Therefore, a pacemaker was recommended at that time      States she is feeling great and denies chest pain / pressure, SOB (at baseline), palpitations, lightheadedness, syncope, swelling feet, orthopnea, PND, claudication  CMP -  Even though the patient was feeling well, her EF by echo had gone down to 35-40% from prior EF 40-45%  Hence she was referred to EP for rhythm control  Prior normal stress test  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  After rhythm control her EF has recovered back to 60% (Feb 2019)     PAF - Patient was diagnosed with atrial fibrillation on an EKG done at her PCP office  Patient had no symptoms from it  She was cardioverted and was in sinus rhythm for a while but then went back into Afib  Hence she was referred to EP for rhythm management due to worsening CMP     HLP -  Has had hyperlipidemia for many years  Tenny Major statin regularly along with diet control   Denies myalgia   PCP closely monitoring the blood work      MELISSA GALVEZ TR - stable  Asymptomatic     Obesity - Trying to lose weight      H/O TIA    Past Medical History:  Past Medical History:   Diagnosis Date    Atrial fibrillation ([address] )     Headache     Hyperlipidemia     Hypertension     Lyme disease     Shortness of breath     TIA (transient ischemic attack)     Transient cerebral ischemia     Last assessed - 1/23/18    Vertigo          Past Surgical History:  Past Surgical History:   Procedure Laterality Date    CARDIAC PACEMAKER PLACEMENT      COLONOSCOPY      ID SIGMOIDOSCOPY FLX DX W/COLLJ SPEC BR/WA IF PFRMD N/A 11/28/2017    Procedure: Li Heading;  Surgeon: Michaela Gore MD;  Location: [facility];   Service: Gastroenterology    TONSILLECTOMY           Family History:  Family History   Problem Relation Age of Onset    Lung cancer Mother     Dementia Father     Alzheimer's disease Father     Other Father         Cardiac Disorder          Social History:  Social History     Socioeconomic History    Marital status: /Civil Union     Spouse name: None    Number of children: None    Years of education: None    Highest education level: None   Occupational History    Occupation: Retired   Social Needs    Financial resource strain: None    Food insecurity:     Worry: None     Inability: None    Transportation needs:     Medical: None     Non-medical: None   Tobacco Use    Smoking status: Former Smoker     Types: Cigarettes     Last attempt to quit:      Years since quittin 6    Smokeless tobacco: Never Used   Substance and Sexual Activity    Alcohol use: Yes     Alcohol/week: 7 0 standard drinks     Types: 7 Glasses of wine per week     Comment: occ    Drug use: No    Sexual activity: Never     Partners: Male     Birth control/protection: None   Lifestyle    Physical activity:     Days per week: None     Minutes per session: None    Stress: None   Relationships    Social connections:     Talks on phone: None     Gets together: None     Attends Church service: None     Active member of club or organization: None     Attends meetings of clubs or organizations: None     Relationship status: None    Intimate partner violence:     Fear of current or ex partner: None     Emotionally abused: None     Physically abused: None     Forced sexual activity: None   Other Topics Concern    None   Social History Narrative    Always uses seat belt         Active Problems:  Patient Active Problem List   Diagnosis    Essential hypertension    Mixed hyperlipidemia    Obesity (BMI 30-39  9)    H/O TIA (transient ischemic attack) and stroke    Aphasia, mixed    Migraine with aura and without status migrainosus, not intractable    URI, acute    Viral exanthem    Myocardiopathy (HCC)    Shortness of breath on exertion    Anticoagulant long-term use    Nonrheumatic mitral valve regurgitation    Nonrheumatic aortic valve insufficiency    Nonrheumatic tricuspid valve regurgitation    Sinus bradycardia    Elevated troponin    Paroxysmal atrial fibrillation (HCC)         The following portions of the patient's history were reviewed and updated as appropriate: past medical history, past surgical history, past family history,  past social history, current medications, allergies and problem list       Review of Systems:  Constitutional: Denies fever, chills  Eyes: Denies eye redness, eye discharge  ENT: Denies hearing loss, tinnitus, sneezing, nasal discharge, sore throat   Respiratory: Denies cough, expectoration, hemoptysis, shortness of breath  Cardiovascular: Denies chest pain, palpitations, orthopnea, PND, lower extremity swelling  Gastrointestinal: Denies abdominal pain, nausea, vomiting, hematemesis, diarrhea, bloody stools  Genito-Urinary: Denies dysuria, incontinence  Musculoskeletal: Denies back pain  Neurologic: Denies syncope, headache, focal weakness, sensory changes, seizures  Endocrine: Denies polyuria, polydipsia, temperature intolerance  Allergy and Immunology: Denies hives, insect bite sensitivity  Hematological and Lymphatic: Denies bleeding problems, swollen glands   Psychological: Denies depression, suicidal ideation, anxiety, panic  Dermatological: Denies pruritus, rash      Vitals:  Vitals:    08/07/19 0830   BP: 134/90   Pulse: 92   SpO2: 90%       Body mass index is 35 94 kg/m²  Weight (last 2 days)     Date/Time   Weight    08/07/19 0830   98 (216)                Physical Examination:  General: Obese  Patient is not in acute distress  Awake, alert, oriented in time, place and person  Head: Normocephalic  Atraumatic  Eyes: Nonicteric  ENT: Normal external ear canals  Nares normal, no drainage  Lips and oral mucosa normal  Neck: Supple  JVP not raised  Trachea central  No thyromegaly  Lungs: Bilateral bronchovascular breath sounds with no crackles or rhonchi  Chest wall: No tenderness  Cardiovascular: RRR  S1 and S2 normal  No rub or gallop  Grade 3/6 PSMs at apex and LLSB  Gastrointestinal: Abdomen soft, nontender   No guarding or rigidity  Liver and spleen not palpable  Bowel sounds present  Neurologic: Patient is awake, alert, oriented in time, place and person  Responding to command  Moving all extremities  Integumentary:  No skin rash  Lymphatic: No cervical lymphadenopathy  Back: Symmetric  No CVA tenderness  Extremities: No clubbing, cyanosis or edema      Laboratory Results:  CBC with diff:   Lab Results   Component Value Date    WBC 8 22 2019    RBC 4 55 2019    HGB 15 6 (H) 2019    HCT 45 6 2019     (H) 2019    MCH 34 3 2019    RDW 13 0 2019     2019       CMP:  Lab Results   Component Value Date    CREATININE 1 12 2019    BUN 18 2019    K 4 1 2019     2019    CO2 29 2019    ALKPHOS 101 2019    ALT 20 2019    AST 24 2019     Lipid Profile:   No results found for: CHOL  Lab Results   Component Value Date    HDL 63 (H) 2018    HDL 64 (H) 2017    HDL 60 2016     Lab Results   Component Value Date    LDLCALC 76 2018    LDLCALC 110 (H) 2017    LDLCALC 93 2016     Lab Results   Component Value Date    TRIG 85 2018    TRIG 75 2017    TRIG 80 2016       Cardiac testing:   Results for orders placed during the hospital encounter of 18   Echo complete with contrast if indicated    Narrative [place] [address] [address] [place] [address]  [phone]    Transthoracic Echocardiogram  2D, M-mode, and Color Doppler    Study date:  2018    Patient: Jose A Ferreira  MR number: [id number]  Account number: [de-identified]  : 1945  Age: 67 years  Gender: Female  Status: Outpatient  Location: [facility]  Height: 63 in  Weight: 219 lb  BP: 138/ 82 mmHg    Indications: Atrial Fibrillation      Diagnoses: I48 0 - Atrial fibrillation    Sonographer:  Yoon RCS  Interpreting Physician:  Minda Reeves MD  Primary Physician:  Iraida Barger DO  Referring Physician:  Desire Lozada MD  Group:  [facility]    SUMMARY    LEFT VENTRICLE:  Systolic function was moderately reduced  Ejection fraction was estimated in the range of 40 % to 45 %, likely underestimated due to rapid atrial fibrillation  Although no diagnostic regional wall motion abnormality was identified, this possibility cannot be completely excluded on the basis of this study  Wall thickness was mildly increased  There was mild concentric hypertrophy  RIGHT VENTRICLE:  The size was normal   Systolic function was mildly reduced  LEFT ATRIUM:  The atrium was mildly dilated  MITRAL VALVE:  There was mild regurgitation  AORTIC VALVE:  There was mild regurgitation  TRICUSPID VALVE:  There was mild to moderate regurgitation  Pulmonary artery systolic pressure was within the normal range  PULMONIC VALVE:  There was trace regurgitation  HISTORY: PRIOR HISTORY: Hyperlipidemia  Atrial fibrillation  Risk factors: hypertension and morbid obesity  Cerebrovascular disease  PROCEDURE: The study was performed in the [address] [place] [address]  This was a routine study  The transthoracic approach was used  The study included complete 2D imaging, M-mode, and color Doppler  The heart rate was 127 bpm, at the  start of the study  Images were obtained from the parasternal, apical, subcostal, and suprasternal notch acoustic windows  Image quality was adequate  LEFT VENTRICLE: Size was normal  Systolic function was moderately reduced  Ejection fraction was estimated in the range of 40 % to 45 %, likely underestimated due to rapid atrial fibrillation  Although no diagnostic regional wall motion  abnormality was identified, this possibility cannot be completely excluded on the basis of this study  Wall thickness was mildly increased  There was mild concentric hypertrophy  No evidence of apical thrombus      RIGHT VENTRICLE: The size was normal  Systolic function was mildly reduced  Wall thickness was normal     LEFT ATRIUM: The atrium was mildly dilated  RIGHT ATRIUM: Size was normal     MITRAL VALVE: There was annular calcification  There was normal leaflet separation  DOPPLER: The transmitral velocity was within the normal range  There was no evidence for stenosis  There was mild regurgitation  AORTIC VALVE: The valve was trileaflet  Leaflets exhibited mildly increased thickness and normal cuspal separation  DOPPLER: Transaortic velocity was within the normal range  There was no evidence for stenosis  There was mild  regurgitation  TRICUSPID VALVE: The valve structure was normal  There was normal leaflet separation  DOPPLER: The transtricuspid velocity was within the normal range  There was no evidence for stenosis  There was mild to moderate regurgitation  Pulmonary  artery systolic pressure was within the normal range  PULMONIC VALVE: Leaflets exhibited normal thickness, no calcification, and normal cuspal separation  DOPPLER: The transpulmonic velocity was within the normal range  There was trace regurgitation  PERICARDIUM: There was no pericardial effusion  The pericardium was normal in appearance  AORTA: The root exhibited normal size  SYSTEMIC VEINS: IVC: The inferior vena cava was not well visualized  SYSTEM MEASUREMENT TABLES    Apical four chamber  4 chamber Left Atrium Volume Index; Planimetry; End Systole; Apical four chamber;: 21 26 cm2  Left Ventricular Diastolic Area; Method of Disks, Single Plane; End Diastole; Apical four chamber;: 23 86 cm2  Left Ventricular Ejection Fraction; Method of Disks, Single Plane; Apical four chamber;: 49 7 %  Left Ventricular systolic Area; Method of Disks, Single Plane; End Systole; Apical four chamber;: 15 54 cm2  Right Atrium Systolic Area; Planimetry; End Systole; Apical four chamber;: 15 89 cm2  Right Ventricular Internal Diastolic Dimension;  End Diastole; Apical four chamber;: 25 6 mm  TAPSE: 14 4 mm    Unspecified Scan Mode  AR Vmax; Regurgitant Flow; Diastole;: 390 6 cm/s  Aortic Root Diameter; End Systole;: 33 mm  Gradient Pressure, Peak; Simplified Bernoulli; Antegrade Flow; Systole;: 4 9 mm[Hg]  Gradient pressure, average; Simplified Bernoulli; Antegrade Flow; Systole;: 2 3 mm[Hg]  Left Atrium to Aortic Root Ratio;: 1 33  Left atrial diameter; End Diastole;: 44 mm  Pressure Half Time;: 0 46 s  Cardiac Output; Teichholz; Systole;: 2 73 L/min  Heart rate; Teichholz;: 123 /min  Interventricular Septum Diastolic Thickness; Teichholz; End Diastole;: 12 7 mm  Left Ventricle Internal End Diastolic Dimension; Teichholz;: 42 9 mm  Left Ventricle Internal Systolic Dimension; Teichholz; End Systole;: 37 6 mm  Left Ventricle Mass; Mass AVCube with Teichholz; End Diastole;: 167 g  Left Ventricle Posterior Wall Diastolic Thickness; Teichholz; End Diastole;: 9 7 mm  Left Ventricular Ejection Fraction; Teichholz;: 26 9 %  Left Ventricular End Diastolic Volume; Teichholz;: 82 6 ml  Left Ventricular End Systolic Volume; Teichholz;: 60 4 ml  Left Ventricular Fractional Shortening;: 12 4 %  Stroke volume;  Wileen Joan;: 22 2 ml  Maximum Tricuspid valve regurgitation pressure gradient; Regurgitant Flow; Systole;: 28 1 mm[Hg]    [facility] Accredited Echocardiography Laboratory    Prepared and electronically signed by    Amol Rivera MD  Signed 10-Feb-2018 13:06:38         Medications:    Current Outpatient Medications:     apixaban (ELIQUIS) 5 mg, Take 1 tablet (5 mg total) by mouth 2 (two) times a day, Disp: 180 tablet, Rfl: 3    lisinopril (ZESTRIL) 2 5 mg tablet, Take 1 tablet (2 5 mg total) by mouth daily, Disp: 90 tablet, Rfl: 3    lovastatin (MEVACOR) 10 MG tablet, Take 1 tablet (10 mg total) by mouth daily at bedtime, Disp: 90 tablet, Rfl: 3    metoprolol succinate (TOPROL-XL) 50 mg 24 hr tablet, Take 1 5 tablets (75 mg total) by mouth 2 (two) times a day, Disp: 270 tablet, Rfl: 3    sotalol (BETAPACE) 80 mg tablet, Take 1 5 tablets (120 mg total) by mouth every 12 (twelve) hours, Disp: 270 tablet, Rfl: 3    metoprolol tartrate (LOPRESSOR) 25 mg tablet, TAKE 1 TABLET BY MOUTH  TWICE A DAY, Disp: 180 tablet, Rfl: 1      Allergies:  No Known Allergies      Assessment and Plan:  1  Persistent atrial fibrillation  Maintained on sotalol and metoprolol  On Eliquis for anticoagulation      2  H/O tachycardia mediated cardiomyopathy ([address] )  EF has improved back to her 60% per echo February 2019  In October 2018 her EF was 35-40%     3  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)  Regular home [place] with [place] [facility]     4  Essential hypertension   BP normal       5  Obesity (BMI 30-39 9)   counseled to try to lose weight     6  H/O TIA (transient ischemic attack) and stroke   currently on Eliquis, statin     7  Mixed hyperlipidemia   continue statin and diet control  Her PCP closely monitors her blood work     8  Nonrheumatic mitral valve regurgitation, Nonrheumatic aortic valve insufficiency, Nonrheumatic tricuspid valve regurgitation   stable  Follow-up with serial echocardiograms    Recommend aggressive risk factor modification and therapeutic lifestyle changes  Low-salt, low-calorie, low-fat, low-cholesterol diet with regular exercise and to optimize weight  I will defer the ordering and monitoring of necessity lab studies to you, but I am available and happy to review and manage any of the data at your request in the future  Discussed concepts of atherosclerosis, including signs and symptoms of cardiac disease  Previous studies were reviewed  Safety measures were reviewed  Questions were entertained and answered  Patient was advised to report any problems requiring medical attention  Follow-up with PCP and appropriate specialist and lab work as discussed      Return for follow up visit as scheduled or earlier, if needed  Thank you for allowing me to participate in the care and evaluation of your patient  Should you have any questions, please feel free to contact me        Gayatri Colon MD  8/7/2019,8:51 AM

## 2019-08-08 ENCOUNTER — REMOTE DEVICE CLINIC VISIT (OUTPATIENT)
Dept: CARDIOLOGY CLINIC | Facility: CLINIC | Age: 74
End: 2019-08-08
Payer: MEDICARE

## 2019-08-08 DIAGNOSIS — Z95.0 PRESENCE OF PERMANENT CARDIAC PACEMAKER: Primary | ICD-10-CM

## 2019-08-08 PROCEDURE — 93294 REM INTERROG EVL PM/LDLS PM: CPT | Performed by: INTERNAL MEDICINE

## 2019-08-08 PROCEDURE — 93296 REM INTERROG EVL PM/IDS: CPT | Performed by: INTERNAL MEDICINE

## 2019-08-08 NOTE — PROGRESS NOTES
Results for orders placed or performed in visit on 08/08/19   Cardiac EP device report    Narrative    MDT DUAL CHAMBER PM (NS HIS)  CARELINK TRANSMISSION: BATTERY VOLTAGE ADEQUATE (13 1 YRS)  AP - 89 2%  - 0 1%  ALL AVAILABLE LEAD PARAMETERS WITHIN NORMAL LIMITS  4 NEW AHR EPISODES, 3 MOST RECENT EPISODES ALL SAME DAY/CONSECUTIVE W/TOTAL DURATION AT ~58 MINS  AVAIL  EGRMS SHOW PAF W/RVR, V RATES 105-146 BPM  EF - 55% (2/2019 NST)  HX: PAF & PT TAKES ELIQUIS, SOTALOL, METOPROLOL SUCC  PACEMAKER FUNCTIONING APPROPRIATELY      EB

## 2019-08-28 ENCOUNTER — TELEPHONE (OUTPATIENT)
Dept: FAMILY MEDICINE CLINIC | Facility: CLINIC | Age: 74
End: 2019-08-28

## 2019-08-28 ENCOUNTER — TELEPHONE (OUTPATIENT)
Dept: CARDIOLOGY CLINIC | Facility: CLINIC | Age: 74
End: 2019-08-28

## 2019-08-28 NOTE — TELEPHONE ENCOUNTER
Pt called and stated she has been having left shoulder pain mostly top of her arm   She denies s o b, chest pain  She spoke with her pcp and he suggested she reach out to Dr Ryan Contreras, pt bp for today and yesterday was 120/90   Pt would like a call back [phone]

## 2019-08-28 NOTE — TELEPHONE ENCOUNTER
T/c from pt stating she tried to reach us yesterday but we had phone issues? Maybe she called during meeting? However she called again today to state that she has L shoulder and L arm pain and her /90 which is close to her normal she said  She has a pacemaker  I told her I would check with you what to do and asked if she had a cardiologist  She does, Dr Rl Cramer, and I encouraged her to call their office as well  Pt does not have Chest pain, h/a or visual changes

## 2019-08-28 NOTE — TELEPHONE ENCOUNTER
Have her speak to cardiology   She needs to let them know this and any other symptoms such as nausea, clammy feeling, dizziness, SOB

## 2019-08-28 NOTE — TELEPHONE ENCOUNTER
S/w pt, she stated she is experiencing back left shoulder pain since yesterday night  Pt denies any other symptoms and stated BP was about 120/90, which hasn't changed much between days of pain

## 2019-08-28 NOTE — TELEPHONE ENCOUNTER
Pt s cardiology called back and stated that he doesn't feel that it is a cardiac issue and should see her PCP- appt scheduled

## 2019-08-30 ENCOUNTER — OFFICE VISIT (OUTPATIENT)
Dept: FAMILY MEDICINE CLINIC | Facility: CLINIC | Age: 74
End: 2019-08-30
Payer: MEDICARE

## 2019-08-30 VITALS
WEIGHT: 217 LBS | BODY MASS INDEX: 36.15 KG/M2 | OXYGEN SATURATION: 97 % | DIASTOLIC BLOOD PRESSURE: 76 MMHG | TEMPERATURE: 96.7 F | HEART RATE: 88 BPM | HEIGHT: 65 IN | SYSTOLIC BLOOD PRESSURE: 124 MMHG

## 2019-08-30 DIAGNOSIS — M75.42 IMPINGEMENT SYNDROME OF LEFT SHOULDER: Primary | ICD-10-CM

## 2019-08-30 PROCEDURE — 99213 OFFICE O/P EST LOW 20 MIN: CPT | Performed by: FAMILY MEDICINE

## 2019-08-30 PROCEDURE — 1124F ACP DISCUSS-NO DSCNMKR DOCD: CPT | Performed by: FAMILY MEDICINE

## 2019-08-30 NOTE — PROGRESS NOTES
Assessment/Plan:      Intermittent ice, Tylenol 3 times daily  Subjective:      Patient ID: Enzo Avila is a 75 y o  female  Patient comes in today complaining of left shoulder pain specifically over the left upper shoulder  She states worse when she tries to lift her arm above shoulder level  She denies any trauma to the area  The following portions of the patient's history were reviewed and updated as appropriate:   She has a past medical history of Atrial fibrillation ([facility] [address] ), Headache, Hyperlipidemia, Hypertension, Lyme disease, Shortness of breath, TIA (transient ischemic attack), Transient cerebral ischemia, and Vertigo  ,  does not have any pertinent problems on file  ,   has a past surgical history that includes Tonsillectomy; Colonoscopy; pr sigmoidoscopy flx dx w/collj spec br/wa if pfrmd (N/A, 11/28/2017); and Cardiac pacemaker placement  ,  family history includes Alzheimer's disease in her father; Dementia in her father; Lung cancer in her mother; Other in her father  ,   reports that she quit smoking about 42 years ago  Her smoking use included cigarettes  She has never used smokeless tobacco  She reports that she drinks about 7 0 standard drinks of alcohol per week  She reports that she does not use drugs  ,  has No Known Allergies     Current Outpatient Medications   Medication Sig Dispense Refill    apixaban (ELIQUIS) 5 mg Take 1 tablet (5 mg total) by mouth 2 (two) times a day 180 tablet 3    lisinopril (ZESTRIL) 2 5 mg tablet Take 1 tablet (2 5 mg total) by mouth daily 90 tablet 3    lovastatin (MEVACOR) 10 MG tablet Take 1 tablet (10 mg total) by mouth daily at bedtime 90 tablet 3    metoprolol succinate (TOPROL-XL) 50 mg 24 hr tablet Take 1 5 tablets (75 mg total) by mouth 2 (two) times a day 270 tablet 3    sotalol (BETAPACE) 80 mg tablet Take 1 5 tablets (120 mg total) by mouth every 12 (twelve) hours 270 tablet 3     No current facility-administered medications for this visit  Review of Systems   Constitutional: Negative for chills, fatigue and fever  HENT: Negative for congestion, ear pain, hearing loss, postnasal drip, rhinorrhea and sore throat  Eyes: Negative for pain and visual disturbance  Respiratory: Negative for chest tightness, shortness of breath and wheezing  Cardiovascular: Negative for chest pain and leg swelling  Gastrointestinal: Negative for abdominal distention, abdominal pain, constipation, diarrhea and vomiting  Endocrine: Negative for cold intolerance and heat intolerance  Genitourinary: Negative for difficulty urinating, frequency and urgency  Musculoskeletal: Positive for arthralgias (left shoulder)  Negative for gait problem  Skin: Negative for color change  Neurological: Negative for dizziness, tremors, syncope, numbness and headaches  Hematological: Negative for adenopathy  Psychiatric/Behavioral: Negative for agitation, confusion and sleep disturbance  The patient is not nervous/anxious  Objective:  Vitals:    08/30/19 1051   BP: 124/76   Pulse: 88   Temp: (!) 96 7 °F (35 9 °C)   SpO2: 97%   Weight: 98 4 kg (217 lb)   Height: 5' 5" (1 651 m)     Body mass index is 36 11 kg/m²  Physical Exam   Constitutional: She is oriented to person, place, and time  She appears well-developed and well-nourished  HENT:   Head: Normocephalic  Mouth/Throat: Oropharynx is clear and moist    Eyes: Conjunctivae are normal  No scleral icterus  Neck: Normal range of motion  No thyromegaly present  Cardiovascular: Normal rate, regular rhythm and normal heart sounds  No murmur heard  Pulmonary/Chest: Effort normal and breath sounds normal  No respiratory distress  She has no wheezes  Abdominal: Soft  Bowel sounds are normal  She exhibits no distension  There is no tenderness  Musculoskeletal: Normal range of motion  She exhibits tenderness (left AC joint)  She exhibits no edema     Lymphadenopathy:     She has no cervical adenopathy  Neurological: She is alert and oriented to person, place, and time  No cranial nerve deficit  Skin: Skin is warm and dry  No rash noted  No pallor  Psychiatric: She has a normal mood and affect  Her behavior is normal    Nursing note and vitals reviewed

## 2019-09-19 ENCOUNTER — APPOINTMENT (OUTPATIENT)
Dept: LAB | Facility: CLINIC | Age: 74
End: 2019-09-19
Payer: MEDICARE

## 2019-09-19 DIAGNOSIS — I10 ESSENTIAL HYPERTENSION: ICD-10-CM

## 2019-09-19 DIAGNOSIS — E78.2 MIXED HYPERLIPIDEMIA: ICD-10-CM

## 2019-09-19 LAB
ALBUMIN SERPL BCP-MCNC: 4 G/DL (ref 3.5–5)
ALP SERPL-CCNC: 80 U/L (ref 46–116)
ALT SERPL W P-5'-P-CCNC: 22 U/L (ref 12–78)
ANION GAP SERPL CALCULATED.3IONS-SCNC: 6 MMOL/L (ref 4–13)
AST SERPL W P-5'-P-CCNC: 18 U/L (ref 5–45)
BILIRUB SERPL-MCNC: 1.2 MG/DL (ref 0.2–1)
BUN SERPL-MCNC: 14 MG/DL (ref 5–25)
CALCIUM SERPL-MCNC: 8.9 MG/DL (ref 8.3–10.1)
CHLORIDE SERPL-SCNC: 109 MMOL/L (ref 100–108)
CHOLEST SERPL-MCNC: 175 MG/DL (ref 50–200)
CO2 SERPL-SCNC: 27 MMOL/L (ref 21–32)
CREAT SERPL-MCNC: 1.01 MG/DL (ref 0.6–1.3)
ERYTHROCYTE [DISTWIDTH] IN BLOOD BY AUTOMATED COUNT: 13.3 % (ref 11.6–15.1)
GFR SERPL CREATININE-BSD FRML MDRD: 55 ML/MIN/1.73SQ M
GLUCOSE P FAST SERPL-MCNC: 100 MG/DL (ref 65–99)
HCT VFR BLD AUTO: 46.1 % (ref 34.8–46.1)
HDLC SERPL-MCNC: 54 MG/DL (ref 40–60)
HGB BLD-MCNC: 15.5 G/DL (ref 11.5–15.4)
LDLC SERPL CALC-MCNC: 103 MG/DL (ref 0–100)
MCH RBC QN AUTO: 34.8 PG (ref 26.8–34.3)
MCHC RBC AUTO-ENTMCNC: 33.6 G/DL (ref 31.4–37.4)
MCV RBC AUTO: 104 FL (ref 82–98)
NONHDLC SERPL-MCNC: 121 MG/DL
PLATELET # BLD AUTO: 169 THOUSANDS/UL (ref 149–390)
PMV BLD AUTO: 12 FL (ref 8.9–12.7)
POTASSIUM SERPL-SCNC: 4.5 MMOL/L (ref 3.5–5.3)
PROT SERPL-MCNC: 7.2 G/DL (ref 6.4–8.2)
RBC # BLD AUTO: 4.45 MILLION/UL (ref 3.81–5.12)
SODIUM SERPL-SCNC: 142 MMOL/L (ref 136–145)
TRIGL SERPL-MCNC: 91 MG/DL
TSH SERPL DL<=0.05 MIU/L-ACNC: 1.65 UIU/ML (ref 0.36–3.74)
WBC # BLD AUTO: 5.42 THOUSAND/UL (ref 4.31–10.16)

## 2019-09-19 PROCEDURE — 80053 COMPREHEN METABOLIC PANEL: CPT

## 2019-09-19 PROCEDURE — 84443 ASSAY THYROID STIM HORMONE: CPT

## 2019-09-19 PROCEDURE — 36415 COLL VENOUS BLD VENIPUNCTURE: CPT

## 2019-09-19 PROCEDURE — 85027 COMPLETE CBC AUTOMATED: CPT

## 2019-09-19 PROCEDURE — 80061 LIPID PANEL: CPT

## 2019-09-23 ENCOUNTER — OFFICE VISIT (OUTPATIENT)
Dept: FAMILY MEDICINE CLINIC | Facility: CLINIC | Age: 74
End: 2019-09-23
Payer: MEDICARE

## 2019-09-23 VITALS
OXYGEN SATURATION: 98 % | TEMPERATURE: 98.2 F | SYSTOLIC BLOOD PRESSURE: 122 MMHG | HEART RATE: 86 BPM | BODY MASS INDEX: 35.99 KG/M2 | HEIGHT: 65 IN | WEIGHT: 216 LBS | DIASTOLIC BLOOD PRESSURE: 88 MMHG

## 2019-09-23 DIAGNOSIS — E78.2 MIXED HYPERLIPIDEMIA: ICD-10-CM

## 2019-09-23 DIAGNOSIS — I10 ESSENTIAL HYPERTENSION: ICD-10-CM

## 2019-09-23 DIAGNOSIS — I48.0 PAROXYSMAL ATRIAL FIBRILLATION (HCC): Primary | Chronic | ICD-10-CM

## 2019-09-23 PROBLEM — R79.89 ELEVATED TROPONIN: Status: RESOLVED | Noted: 2019-02-09 | Resolved: 2019-09-23

## 2019-09-23 PROBLEM — R06.02 SHORTNESS OF BREATH ON EXERTION: Status: RESOLVED | Noted: 2018-05-29 | Resolved: 2019-09-23

## 2019-09-23 PROBLEM — J06.9 URI, ACUTE: Status: RESOLVED | Noted: 2018-04-09 | Resolved: 2019-09-23

## 2019-09-23 PROBLEM — R77.8 ELEVATED TROPONIN: Status: RESOLVED | Noted: 2019-02-09 | Resolved: 2019-09-23

## 2019-09-23 PROCEDURE — 99214 OFFICE O/P EST MOD 30 MIN: CPT | Performed by: FAMILY MEDICINE

## 2019-09-23 NOTE — PROGRESS NOTES
Assessment/Plan:       Diagnoses and all orders for this visit:    Paroxysmal atrial fibrillation [facility])    Essential hypertension  -     Comprehensive metabolic panel; Future  -     CBC; Future    Mixed hyperlipidemia  -     Lipid panel; Future        Paroxysmal atrial fibrillation (HCC)  Well controlled, continue metoprolol, sotalol as per her cardiologist   Continue her Eliquis  She is doing well with her pacemaker  Essential hypertension  Well controlled with her lisinopril and beta-blockers  Mixed hyperlipidemia  Continue lovastatin, check CMP, lipid profile in 6 months  Follow-up in 6 months, with her blood work done prior  Subjective:      Patient ID: Shauna Hall is a 75 y o  female  Patient comes in today for checkup on her hypertension, hyperlipidemia, paroxysmal atrial fibrillation  She is doing well since she had her pacemaker placed  She continues to take the Toprol and sotalol as per Cardiology  She is doing well with this  She continues to take her Eliquis  She is not having any bleeding or bruising  She continues take her lovastatin, she did her blood work done in this reviewed with her today  She continues her lisinopril for her hypertension, she is doing well, no compliance issues or side effects  The following portions of the patient's history were reviewed and updated as appropriate:   She has a past medical history of Atrial fibrillation ([address] ), Headache, Hyperlipidemia, Hypertension, Lyme disease, Shortness of breath, TIA (transient ischemic attack), Transient cerebral ischemia, and Vertigo  ,  does not have any pertinent problems on file  ,   has a past surgical history that includes Tonsillectomy; Colonoscopy; pr sigmoidoscopy flx dx w/collj spec br/wa if pfrmd (N/A, 11/28/2017); and Cardiac pacemaker placement  ,  family history includes Alzheimer's disease in her father; Dementia in her father; Lung cancer in her mother; Other in her father  ,   reports that she quit smoking about 42 years ago  Her smoking use included cigarettes  She has never used smokeless tobacco  She reports that she drinks about 7 0 standard drinks of alcohol per week  She reports that she does not use drugs  ,  has No Known Allergies     Current Outpatient Medications   Medication Sig Dispense Refill    apixaban (ELIQUIS) 5 mg Take 1 tablet (5 mg total) by mouth 2 (two) times a day 180 tablet 3    lisinopril (ZESTRIL) 2 5 mg tablet Take 1 tablet (2 5 mg total) by mouth daily 90 tablet 3    lovastatin (MEVACOR) 10 MG tablet Take 1 tablet (10 mg total) by mouth daily at bedtime 90 tablet 3    metoprolol succinate (TOPROL-XL) 50 mg 24 hr tablet Take 1 5 tablets (75 mg total) by mouth 2 (two) times a day 270 tablet 3    sotalol (BETAPACE) 80 mg tablet Take 1 5 tablets (120 mg total) by mouth every 12 (twelve) hours 270 tablet 3     No current facility-administered medications for this visit  Review of Systems   Constitutional: Negative for chills, fatigue and fever  HENT: Negative for congestion, ear pain, hearing loss, postnasal drip, rhinorrhea and sore throat  Eyes: Negative for pain and visual disturbance  Respiratory: Negative for chest tightness, shortness of breath and wheezing  Cardiovascular: Negative for chest pain and leg swelling  Gastrointestinal: Negative for abdominal distention, abdominal pain, constipation, diarrhea and vomiting  Endocrine: Negative for cold intolerance and heat intolerance  Genitourinary: Negative for difficulty urinating, frequency and urgency  Musculoskeletal: Negative for arthralgias and gait problem  Skin: Negative for color change  Neurological: Negative for dizziness, tremors, syncope, numbness and headaches  Hematological: Negative for adenopathy  Psychiatric/Behavioral: Negative for agitation, confusion and sleep disturbance  The patient is not nervous/anxious            Objective:  Vitals:    09/23/19 1543   BP: 122/88   Pulse: 86 Temp: 98 2 °F (36 8 °C)   TempSrc: Tympanic   SpO2: 98%   Weight: 98 kg (216 lb)   Height: 5' 5" (1 651 m)     Body mass index is 35 94 kg/m²  Physical Exam   Constitutional: She is oriented to person, place, and time  She appears well-developed and well-nourished  HENT:   Head: Normocephalic  Mouth/Throat: Oropharynx is clear and moist    Eyes: Conjunctivae are normal  No scleral icterus  Neck: Normal range of motion  No thyromegaly present  Cardiovascular: Normal rate, regular rhythm and normal heart sounds  No murmur heard  Pulmonary/Chest: Effort normal and breath sounds normal  No respiratory distress  She has no wheezes  Abdominal: Soft  Bowel sounds are normal  She exhibits no distension  There is no tenderness  Musculoskeletal: Normal range of motion  She exhibits no edema or tenderness  Lymphadenopathy:     She has no cervical adenopathy  Neurological: She is alert and oriented to person, place, and time  No cranial nerve deficit  Skin: Skin is warm and dry  No rash noted  No pallor  Psychiatric: She has a normal mood and affect  Her behavior is normal    Nursing note and vitals reviewed

## 2019-09-23 NOTE — ASSESSMENT & PLAN NOTE
Well controlled, continue metoprolol, sotalol as per her cardiologist   Continue her Eliquis  She is doing well with her pacemaker

## 2019-09-23 NOTE — PATIENT INSTRUCTIONS
Hypertension   AMBULATORY CARE:   Hypertension  is high blood pressure (BP)  Your BP is the force of your blood moving against the walls of your arteries  Normal BP is less than 120/80  Prehypertension is between 120/80 and 139/89  Hypertension is 140/90 or higher  Hypertension causes your BP to get so high that your heart has to work much harder than normal  This can damage your heart  You can control hypertension with a healthy lifestyle or medicines  A controlled blood pressure helps protect your organs, such as your heart, lungs, brain, and kidneys  Common symptoms include the following:   · Headache     · Blurred vision     · Chest pain     · Dizziness or weakness     · Trouble breathing    · Nosebleeds  Call 911 for any of the following:   · You have discomfort in your chest that feels like squeezing, pressure, fullness, or pain  · You become confused or have difficulty speaking  · You suddenly feel lightheaded or have trouble breathing  · You have pain or discomfort in your back, neck, jaw, stomach, or arm  Seek care immediately if:   · You have a severe headache or vision loss  · You have weakness in an arm or leg  Contact your healthcare provider if:   · You feel faint, dizzy, confused, or drowsy  · You have been taking your BP medicine and your BP is still higher than your healthcare provider says it should be  · You have questions or concerns about your condition or care  Treatment for hypertension  may include medicine to lower your BP and lower your cholesterol level  A low cholesterol level helps prevent heart disease and makes it easier to control your blood pressure  You may also need to make lifestyle changes  Take your medicine exactly as directed  Manage hypertension:  Talk with your healthcare provider about these and other ways to manage hypertension:  · Check your BP at home  Sit and rest for 5 minutes before you take your BP   Extend your arm and support it on a flat surface  Your arm should be at the same level as your heart  Follow the directions that came with your BP monitor  If possible, take at least 2 BP readings each time  Take your BP at least twice a day at the same times each day, such as morning and evening  Keep a record of your BP readings and bring it to your follow-up visits  Ask your healthcare provider what your BP should be  · Limit sodium (salt) as directed  Too much sodium can affect your fluid balance  Check labels to find low-sodium or no-salt-added foods  Some low-sodium foods use potassium salts for flavor  Too much potassium can also cause health problems  Your healthcare provider will tell you how much sodium and potassium are safe for you to have in a day  He or she may recommend that you limit sodium to 2,300 mg a day  · Follow the meal plan recommended by your healthcare provider  A dietitian or your provider can give you more information on low-sodium plans or the DASH (Dietary Approaches to Stop Hypertension) eating plan  The DASH plan is low in sodium, unhealthy fats, and total fat  It is high in potassium, calcium, and fiber  · Exercise to maintain a healthy weight  Exercise at least 30 minutes per day, on most days of the week  This will help decrease your blood pressure  Ask your healthcare provider about the best exercise plan for you  · Decrease stress  This may help lower your BP  Learn ways to relax, such as deep breathing or listening to music  · Limit alcohol  Women should limit alcohol to 1 drink a day  Men should limit alcohol to 2 drinks a day  A drink of alcohol is 12 ounces of beer, 5 ounces of wine, or 1½ ounces of liquor  · Do not smoke  Nicotine and other chemicals in cigarettes and cigars can increase your BP and also cause lung damage  Ask your healthcare provider for information if you currently smoke and need help to quit  E-cigarettes or smokeless tobacco still contain nicotine  Talk to your healthcare provider before you use these products  · Manage any other health conditions you have  Health conditions such as diabetes can increase your risk for hypertension  Follow your healthcare provider's instructions and take all your medicines as directed  Follow up with your healthcare provider as directed: You will need to return to have your BP checked and to have other lab tests done  Write down your questions so you remember to ask them during your visits  © 2017 [address] [employer] Information is for End User's use only and may not be sold, redistributed or otherwise used for commercial purposes  All illustrations and images included in CareNotes® are the copyrighted property of A D A M , Inc  or [employer]  The above information is an  only  It is not intended as medical advice for individual conditions or treatments  Talk to your doctor, nurse or pharmacist before following any medical regimen to see if it is safe and effective for you

## 2019-10-03 ENCOUNTER — OFFICE VISIT (OUTPATIENT)
Dept: CARDIOLOGY CLINIC | Facility: CLINIC | Age: 74
End: 2019-10-03
Payer: MEDICARE

## 2019-10-03 VITALS
WEIGHT: 215 LBS | DIASTOLIC BLOOD PRESSURE: 74 MMHG | OXYGEN SATURATION: 99 % | SYSTOLIC BLOOD PRESSURE: 118 MMHG | HEIGHT: 65 IN | BODY MASS INDEX: 35.82 KG/M2 | HEART RATE: 96 BPM

## 2019-10-03 DIAGNOSIS — I10 ESSENTIAL HYPERTENSION: ICD-10-CM

## 2019-10-03 DIAGNOSIS — R00.1 SINUS BRADYCARDIA: ICD-10-CM

## 2019-10-03 DIAGNOSIS — I48.0 PAROXYSMAL ATRIAL FIBRILLATION (HCC): Primary | Chronic | ICD-10-CM

## 2019-10-03 DIAGNOSIS — E78.2 MIXED HYPERLIPIDEMIA: ICD-10-CM

## 2019-10-03 DIAGNOSIS — Z86.73 H/O TIA (TRANSIENT ISCHEMIC ATTACK) AND STROKE: ICD-10-CM

## 2019-10-03 DIAGNOSIS — E66.9 OBESITY (BMI 30-39.9): ICD-10-CM

## 2019-10-03 DIAGNOSIS — Z79.01 ANTICOAGULANT LONG-TERM USE: ICD-10-CM

## 2019-10-03 PROCEDURE — 99214 OFFICE O/P EST MOD 30 MIN: CPT | Performed by: INTERNAL MEDICINE

## 2019-10-03 PROCEDURE — 93000 ELECTROCARDIOGRAM COMPLETE: CPT | Performed by: INTERNAL MEDICINE

## 2019-10-03 NOTE — LETTER
October 3, 2019     Denilson Elam, Doctor [phone]    Patient: Jose Leonard   YOB: 1945   Date of Visit: 10/3/2019       Dear Dr Rodo Wilks: Thank you for referring Padmini Zambrano to me for evaluation  Below are my notes for this consultation  If you have questions, please do not hesitate to call me  I look forward to following your patient along with you  Sincerely,        Albino Mayes MD        CC: MD Albino Paula MD  10/3/2019 11:58 PM  Sign at close encounter    SN                                             Cardiology Follow Up    Jose Leonard  1945  [id number]  [facility] [address]  [phone]    1  Paroxysmal atrial fibrillation (HCC)  POCT ECG    Diagnostic Sleep Study   2  Essential hypertension     3  Sinus bradycardia     4  Obesity (BMI 30-39 9)     5  Mixed hyperlipidemia     6  H/O TIA (transient ischemic attack) and stroke     7   Anticoagulant long-term use         Interval History:     The patient is here for follow-up of her atrial fibrillation  She is currently in atrial paced and ventricular sensed rhythm and feels well    The patient is a very pleasant 75year old lady referred to me by Dr Shivam Alexander for management of A fi + RVR + heart failure      She does have significant medical illnesses in the form of  PAF  Hypertension  Hyperlipidemia  CMP - suspected tachy mediated        It has been present for quite some time now  Initially to present as episodes of palpitation  This has progressively increased in frequency and duration     The patient is not complaining of anginal like chest pain or chest pressure  There is no worsening orthopnea, paroxysmal nocturnal dyspnea  There is some  leg swelling     Patient does get palpitation at times   There is no history of presyncope or syncope  There is rare history of transient  lightheadedness  There is exertional intolerance        There is history of snoring at night  There is occasional history of morning fatigability  There is occasional history of daytime sleepiness         Patient Active Problem List   Diagnosis    Essential hypertension    Mixed hyperlipidemia    Obesity (BMI 30-39  9)    H/O TIA (transient ischemic attack) and stroke    Aphasia, mixed    Migraine with aura and without status migrainosus, not intractable    Viral exanthem    Myocardiopathy ([address] )    Anticoagulant long-term use    Nonrheumatic mitral valve regurgitation    Nonrheumatic aortic valve insufficiency    Nonrheumatic tricuspid valve regurgitation    Sinus bradycardia    Paroxysmal atrial fibrillation (HCC)     Past Medical History:   Diagnosis Date    Atrial fibrillation (HCC)     Headache     Hyperlipidemia     Hypertension     Lyme disease     Shortness of breath     TIA (transient ischemic attack)     Transient cerebral ischemia     Last assessed - 18    Vertigo      Social History     Socioeconomic History    Marital status: /Civil Union     Spouse name: Not on file    Number of children: Not on file    Years of education: Not on file    Highest education level: Not on file   Occupational History    Occupation: Retired   Social Needs    Financial resource strain: Not on file    Food insecurity:     Worry: Not on file     Inability: Not on file   [employer] needs:     Medical: Not on file     Non-medical: Not on file   Tobacco Use    Smoking status: Former Smoker     Types: Cigarettes     Last attempt to quit:      Years since quittin 7    Smokeless tobacco: Never Used   Substance and Sexual Activity    Alcohol use:  Yes     Alcohol/week: 7 0 standard drinks     Types: 7 Glasses of wine per week     Comment: occ    Drug use: No    Sexual activity: Never     Partners: Male     Birth control/protection: None   Lifestyle    Physical activity:     Days per week: Not on file     Minutes per session: Not on file    Stress: Not on file   Relationships    Social connections:     Talks on phone: Not on file     Gets together: Not on file     Attends Zoroastrianism service: Not on file     Active member of club or organization: Not on file     Attends meetings of clubs or organizations: Not on file     Relationship status: Not on file    Intimate partner violence:     Fear of current or ex partner: Not on file     Emotionally abused: Not on file     Physically abused: Not on file     Forced sexual activity: Not on file   Other Topics Concern    Not on file   Social History Narrative    Always uses seat belt      Family History   Problem Relation Age of Onset    Lung cancer Mother     Dementia Father     Alzheimer's disease Father     Other Father         Cardiac Disorder      Past Surgical History:   Procedure Laterality Date    CARDIAC PACEMAKER PLACEMENT      COLONOSCOPY      IA SIGMOIDOSCOPY FLX DX W/COLLJ SPEC BR/WA IF PFRMD N/A 11/28/2017    Procedure: Matt Reddy;  Surgeon: Kin Argueta MD;  Location: [facility]; Service: Gastroenterology    TONSILLECTOMY         Current Outpatient Medications:     apixaban (ELIQUIS) 5 mg, Take 1 tablet (5 mg total) by mouth 2 (two) times a day, Disp: 180 tablet, Rfl: 3    lisinopril (ZESTRIL) 2 5 mg tablet, Take 1 tablet (2 5 mg total) by mouth daily, Disp: 90 tablet, Rfl: 3    lovastatin (MEVACOR) 10 MG tablet, Take 1 tablet (10 mg total) by mouth daily at bedtime, Disp: 90 tablet, Rfl: 3    metoprolol succinate (TOPROL-XL) 50 mg 24 hr tablet, Take 1 5 tablets (75 mg total) by mouth 2 (two) times a day, Disp: 270 tablet, Rfl: 3    sotalol (BETAPACE) 80 mg tablet, Take 1 5 tablets (120 mg total) by mouth every 12 (twelve) hours, Disp: 270 tablet, Rfl: 3  No Known Allergies    Labs:  Imaging: No results found      Echo , Oct 22 2018  LEFT VENTRICLE:  Systolic function was moderately reduced  Ejection fraction was estimated in the range of 35 % to 40 %  This study was inadequate for the evaluation of regional wall motion  Wall thickness was mildly increased  There was mild concentric hypertrophy      RIGHT VENTRICLE:  The size was normal   Systolic function was mildly reduced      LEFT ATRIUM:  The atrium was mildly dilated      RIGHT ATRIUM:  The atrium was dilated      MITRAL VALVE:  There was mild regurgitation      AORTIC VALVE:  There was mild regurgitation      TRICUSPID VALVE:  There was mild to moderate regurgitation  Pulmonary artery systolic pressure was at the upper limits of normal      PERICARDIUM:  A small pericardial effusion was identified  There was no evidence of hemodynamic compromise                  Nuclear stress  Feb 2018  SUMMARY:  -  Stress results: There was no chest pain during stress  -  ECG conclusions: The stress ECG was negative for ischemia and normal   -  Perfusion imaging: There were no perfusion defects  A large sized perfusion defect seen in most part of the anterior and anterolateral wall in the rest and supine stress images was seen to be significantly improved in the prone stress images indicating that it was likely a breast  attenuation artifact  -  Gated SPECT: The calculated left ventricular ejection fraction was 50 %  Left ventricular ejection fraction was within normal limits by visual estimate  There was no diagnostic evidence for left ventricular regional abnormality      IMPRESSIONS: Normal study after pharmacologic stress  Myocardial perfusion imaging was normal at rest and with stress  Left ventricular systolic function was normal             Review of Systems:  Review of Systems   All other systems reviewed and are negative  As described in my history of present illness        Physical Exam:  Physical Exam   Constitutional: She is oriented to person, place, and time  She appears well-developed and well-nourished  No distress     Not in any distress at the current time   HENT:   Head: Normocephalic and atraumatic  Right Ear: External ear normal    Left Ear: External ear normal    Nose: Nose normal    Mouth/Throat: Uvula is midline and mucous membranes are normal    Posterior pharynx is crowded   Eyes: Pupils are equal, round, and reactive to light  Conjunctivae, EOM and lids are normal  No scleral icterus  No pallor  No cyanosis  No icterus   Neck: Trachea normal and normal range of motion  No JVD present  Carotid bruit is not present  No thyromegaly present  No jugular lymphadenopathy  Short thick neck   Cardiovascular: Normal rate, regular rhythm, S1 normal, S2 normal, normal heart sounds, intact distal pulses and normal pulses  PMI is not displaced  Exam reveals no gallop, no S3, no S4 and no friction rub  No murmur heard  Pulmonary/Chest: Effort normal and breath sounds normal  No accessory muscle usage  No respiratory distress  She has no decreased breath sounds  She has no wheezes  She has no rhonchi  She has no rales  She exhibits no tenderness  Abdominal: Soft  Normal appearance and bowel sounds are normal  She exhibits no distension and no mass  There is no splenomegaly or hepatomegaly  There is no tenderness  Musculoskeletal: Normal range of motion  She exhibits edema  She exhibits no tenderness or deformity  Lymphadenopathy:     She has no cervical adenopathy  Neurological: She is alert and oriented to person, place, and time  Facial symmetry is retained  Extraocular movements are retained  Head neck tongue and palate movement are retained and symmetric   Skin: Skin is intact  No abrasion, no lesion and no rash noted  No erythema  Nails show no clubbing  Psychiatric: She has a normal mood and affect  Her speech is normal and behavior is normal  Thought content normal    Vitals reviewed  Discussion/Summary:       1  Persistent atrial fibrillation    Currently in atrial paced and ventricular sensed rhythm  We had a very detailed discussion as far as management of atrial fibrillation   my detailed recommendation for this patient are as follows         1 - natural history of disease   atrial fibrillation is a disease of age   prevalence is 1% in the 4th decade , 2-3% by age 72 and 12-13% by age 80   since it increases with age , definitive therapy is indicated         2 - sleep apnea   untreated sleep apnea is the common cause of failure of medication as well as ablation   success rate of paroxysmal atrial fibrillation ablation falls from 80% in the 1st year, to 15% in the 1st year, for untreated severe sleep apnea   the patient has a history of occasional - snoring, morning fatigue at times and daytime sleepiness at times  physical examination for short thick neck and crowded posterior pharynx is -positive  patient is recommended to follow up with Pulmonary and Sleep Medicine - for REYNOLD        3 - thyroid function   hyperthyroidism is a common precipitator of atrial fibrillation   Check TSH - 2 05 in Jan 2018 and normal        4 -Aggressive management of  hypertension - controlled now  diabetes mellitus - check HbA1c  heart failure - currently systolic failure        5 - anticoagulation   patient's chads Vasc score is -5  congestive heart failure   hypertension   age   prior TIA   gender    patient is on eliquis     6 - rate control medication   beta-blocker - toprol XL  calcium channel blocker to be avoided as low EF        7 - rhythm control medication        class 3 agent - Sotalol and dofetilide   both will need hospital admission to monitor first 5 doses over the initial 2 and half days     sotalol has intrinsic beta-blocker properties   dofetilide may need an additional beta-blocker along with it for rate control  Keep potassium between 4 and 4 5   keep magnesium between 2 and 2 5   follow QTC   Follow creatinine   Patient is currently on sotalol         8 - ablation   this is the most effective method to [employer] maintain sinus rhythm      paroxysmal atrial fibrillation - 70-80% chance in the 1st year  and falls to 50% by 5 years   persistent atrial fibrillation - 50-60% chance in the 1st year and falls to 30% or less by 5 years      we use a combination of cryo and radiofrequency ablation      there is a 2-5% chance of serious complication which include - bleeding around access site, heart attack , stroke , bleeding around the heart requiring drainage , perforation requiring open heart surgery , phrenic nerve paralysis causing diaphragmatic paralysis, atrial esophageal fistula   we do deployed multiple methods to prevent such complication :  - heparin anticoagulation with ACT between 350 and 400s to prevent stroke   - coronary angiography if we are going to ablate close to any major blood vessel   -access to the pericardial drain if pericardial effusion were to happen   - pressure sensing catheters to reduce excessive pressure and chances of perforation   - esophageal temperature monitoring to reduce chances of injury               Summary of my recommendations:  Patient is currently in sinus rhythm, on sotalol, is atrially paced and ventricular sensed  -If medication failed proceed with ablation  CRYO, NAVX, hold eliquis for day before and day of ablation              2  Cardiomyopathy , NYHA II, LVEF =35%  Suspected tachy mediated  Increasing beta blockers for better rate control  On ACEI  Follow-up echocardiogram to look for improvement in EF           3  Hypertension  On lisinopril and metoprolol   Blood pressure is 118/74     4  Hyperlipidemia  On statin        5  Suspected REYNOLD  Need to be evaluated for CPAP       6  Obesity  BMI is 35 7  Patient advised on dietary him restrictions to lose weight      7  Long-term anticoagulation  Chads Vasc score is 5  To continue with apixaban      8   Sinus bradycardia, sick sinus syndrome  Patient is post pacemaker  All device parameters a stable            Summary of my recommendation  Patient remains in sinus rhythm, atrially paced and ventricular sensed  Continue with antiarrhythmic therapy  Limited echocardiogram to assess EF  Evaluate for sleep apnea

## 2019-10-04 NOTE — PROGRESS NOTES
Donna Mae                                              Cardiology Follow Up    Marcin Mireles  1945  [id number]  [facility]  [address]  [phone]    1  Paroxysmal atrial fibrillation (HCC)  POCT ECG    Diagnostic Sleep Study   2  Essential hypertension     3  Sinus bradycardia     4  Obesity (BMI 30-39 9)     5  Mixed hyperlipidemia     6  H/O TIA (transient ischemic attack) and stroke     7  Anticoagulant long-term use         Interval History:     The patient is here for follow-up of her atrial fibrillation  She is currently in atrial paced and ventricular sensed rhythm and feels well    The patient is a very pleasant 75year old lady referred to me by Dr Afua Fernandez for management of A fi + RVR + heart failure      She does have significant medical illnesses in the form of  PAF  Hypertension  Hyperlipidemia  CMP - suspected tachy mediated        It has been present for quite some time now  Initially to present as episodes of palpitation  This has progressively increased in frequency and duration     The patient is not complaining of anginal like chest pain or chest pressure  There is no worsening orthopnea, paroxysmal nocturnal dyspnea  There is some  leg swelling     Patient does get palpitation at times   There is no history of presyncope or syncope  There is rare history of transient  lightheadedness  There is exertional intolerance        There is history of snoring at night  There is occasional history of morning fatigability  There is occasional history of daytime sleepiness         Patient Active Problem List   Diagnosis    Essential hypertension    Mixed hyperlipidemia    Obesity (BMI 30-39  9)    H/O TIA (transient ischemic attack) and stroke    Aphasia, mixed    Migraine with aura and without status migrainosus, not intractable    Viral exanthem    Myocardiopathy ([address] )    Anticoagulant long-term use    Nonrheumatic mitral valve regurgitation    Nonrheumatic aortic valve insufficiency    Nonrheumatic tricuspid valve regurgitation    Sinus bradycardia    Paroxysmal atrial fibrillation (HCC)     Past Medical History:   Diagnosis Date    Atrial fibrillation (HCC)     Headache     Hyperlipidemia     Hypertension     Lyme disease     Shortness of breath     TIA (transient ischemic attack)     Transient cerebral ischemia     Last assessed - 18    Vertigo      Social History     Socioeconomic History    Marital status: /Civil Union     Spouse name: Not on file    Number of children: Not on file    Years of education: Not on file    Highest education level: Not on file   Occupational History    Occupation: Retired   Social Needs    Financial resource strain: Not on file    Food insecurity:     Worry: Not on file     Inability: Not on file   [employer] needs:     Medical: Not on file     Non-medical: Not on file   Tobacco Use    Smoking status: Former Smoker     Types: Cigarettes     Last attempt to quit:      Years since quittin 7    Smokeless tobacco: Never Used   Substance and Sexual Activity    Alcohol use:  Yes     Alcohol/week: 7 0 standard drinks     Types: 7 Glasses of wine per week     Comment: occ    Drug use: No    Sexual activity: Never     Partners: Male     Birth control/protection: None   Lifestyle    Physical activity:     Days per week: Not on file     Minutes per session: Not on file    Stress: Not on file   Relationships    Social connections:     Talks on phone: Not on file     Gets together: Not on file     Attends [employer] service: Not on file     Active member of club or organization: Not on file     Attends meetings of clubs or organizations: Not on file     Relationship status: Not on file    Intimate partner violence:     Fear of current or ex partner: Not on file     Emotionally abused: Not on file     Physically abused: Not on file     Forced sexual activity: Not on file   Other Topics Concern    Not on file   Social History Narrative    Always uses seat belt      Family History   Problem Relation Age of Onset    Lung cancer Mother     Dementia Father     Alzheimer's disease Father     Other Father         Cardiac Disorder      Past Surgical History:   Procedure Laterality Date    CARDIAC PACEMAKER PLACEMENT      COLONOSCOPY      MN SIGMOIDOSCOPY FLX DX W/COLLJ SPEC BR/WA IF PFRMD N/A 11/28/2017    Procedure: Jace Jo;  Surgeon: Lindy Fitch MD;  Location: [facility]; Service: Gastroenterology    TONSILLECTOMY         Current Outpatient Medications:     apixaban (ELIQUIS) 5 mg, Take 1 tablet (5 mg total) by mouth 2 (two) times a day, Disp: 180 tablet, Rfl: 3    lisinopril (ZESTRIL) 2 5 mg tablet, Take 1 tablet (2 5 mg total) by mouth daily, Disp: 90 tablet, Rfl: 3    lovastatin (MEVACOR) 10 MG tablet, Take 1 tablet (10 mg total) by mouth daily at bedtime, Disp: 90 tablet, Rfl: 3    metoprolol succinate (TOPROL-XL) 50 mg 24 hr tablet, Take 1 5 tablets (75 mg total) by mouth 2 (two) times a day, Disp: 270 tablet, Rfl: 3    sotalol (BETAPACE) 80 mg tablet, Take 1 5 tablets (120 mg total) by mouth every 12 (twelve) hours, Disp: 270 tablet, Rfl: 3  No Known Allergies    Labs:  Imaging: No results found  Echo , Oct 22 2018  LEFT VENTRICLE:  Systolic function was moderately reduced  Ejection fraction was estimated in the range of 35 % to 40 %  This study was inadequate for the evaluation of regional wall motion  Wall thickness was mildly increased  There was mild concentric hypertrophy      RIGHT VENTRICLE:  The size was normal   Systolic function was mildly reduced      LEFT ATRIUM:  The atrium was mildly dilated      RIGHT ATRIUM:  The atrium was dilated      MITRAL VALVE:  There was mild regurgitation      AORTIC VALVE:  There was mild regurgitation      TRICUSPID VALVE:  There was mild to moderate regurgitation    Pulmonary artery systolic pressure was at the upper limits of normal      PERICARDIUM:  A small pericardial effusion was identified  There was no evidence of hemodynamic compromise                  Nuclear stress  Feb 2018  SUMMARY:  -  Stress results: There was no chest pain during stress  -  ECG conclusions: The stress ECG was negative for ischemia and normal   -  Perfusion imaging: There were no perfusion defects  A large sized perfusion defect seen in most part of the anterior and anterolateral wall in the rest and supine stress images was seen to be significantly improved in the prone stress images indicating that it was likely a breast  attenuation artifact  -  Gated SPECT: The calculated left ventricular ejection fraction was 50 %  Left ventricular ejection fraction was within normal limits by visual estimate  There was no diagnostic evidence for left ventricular regional abnormality      IMPRESSIONS: Normal study after pharmacologic stress  Myocardial perfusion imaging was normal at rest and with stress  Left ventricular systolic function was normal             Review of Systems:  Review of Systems   All other systems reviewed and are negative  As described in my history of present illness        Physical Exam:  Physical Exam   Constitutional: She is oriented to person, place, and time  She appears well-developed and well-nourished  No distress  Not in any distress at the current time   HENT:   Head: Normocephalic and atraumatic  Right Ear: External ear normal    Left Ear: External ear normal    Nose: Nose normal    Mouth/Throat: Uvula is midline and mucous membranes are normal    Posterior pharynx is crowded   Eyes: Pupils are equal, round, and reactive to light  Conjunctivae, EOM and lids are normal  No scleral icterus  No pallor  No cyanosis  No icterus   Neck: Trachea normal and normal range of motion  No JVD present  Carotid bruit is not present  No thyromegaly present     No jugular lymphadenopathy  Short thick neck   Cardiovascular: Normal rate, regular rhythm, S1 normal, S2 normal, normal heart sounds, intact distal pulses and normal pulses  PMI is not displaced  Exam reveals no gallop, no S3, no S4 and no friction rub  No murmur heard  Pulmonary/Chest: Effort normal and breath sounds normal  No accessory muscle usage  No respiratory distress  She has no decreased breath sounds  She has no wheezes  She has no rhonchi  She has no rales  She exhibits no tenderness  Abdominal: Soft  Normal appearance and bowel sounds are normal  She exhibits no distension and no mass  There is no splenomegaly or hepatomegaly  There is no tenderness  Musculoskeletal: Normal range of motion  She exhibits edema  She exhibits no tenderness or deformity  Lymphadenopathy:     She has no cervical adenopathy  Neurological: She is alert and oriented to person, place, and time  Facial symmetry is retained  Extraocular movements are retained  Head neck tongue and palate movement are retained and symmetric   Skin: Skin is intact  No abrasion, no lesion and no rash noted  No erythema  Nails show no clubbing  Psychiatric: She has a normal mood and affect  Her speech is normal and behavior is normal  Thought content normal    Vitals reviewed  Discussion/Summary:       1  Persistent atrial fibrillation    Currently in atrial paced and ventricular sensed rhythm  We had a very detailed discussion as far as management of atrial fibrillation   my detailed recommendation for this patient are as follows         1 - natural history of disease   atrial fibrillation is a disease of age   prevalence is 1% in the 4th decade , 2-3% by age 72 and 12-13% by age 80   since it increases with age , definitive therapy is indicated         2 - sleep apnea   untreated sleep apnea is the common cause of failure of medication as well as ablation   success rate of paroxysmal atrial fibrillation ablation falls from 80% in the 1st year, to 15% in the 1st year, for untreated severe sleep apnea   the patient has a history of occasional - snoring, morning fatigue at times and daytime sleepiness at times  physical examination for short thick neck and crowded posterior pharynx is -positive  patient is recommended to follow up with Pulmonary and Sleep Medicine - for REYNOLD        3 - thyroid function   hyperthyroidism is a common precipitator of atrial fibrillation   Check TSH - 2 05 in Jan 2018 and normal        4 -Aggressive management of  hypertension - controlled now  diabetes mellitus - check HbA1c  heart failure - currently systolic failure        5 - anticoagulation   patient's chads Vasc score is -5  congestive heart failure   hypertension   age   prior TIA   gender    patient is on eliquis     6 - rate control medication   beta-blocker - toprol XL  calcium channel blocker to be avoided as low EF        7 - rhythm control medication        class 3 agent - Sotalol and dofetilide   both will need hospital admission to monitor first 5 doses over the initial 2 and half days     sotalol has intrinsic beta-blocker properties   dofetilide may need an additional beta-blocker along with it for rate control  Keep potassium between 4 and 4 5   keep magnesium between 2 and 2 5   follow QTC   Follow creatinine   Patient is currently on sotalol         8 - ablation   this is the most effective method to achieve and maintain sinus rhythm      paroxysmal atrial fibrillation - 70-80% chance in the 1st year  and falls to 50% by 5 years   persistent atrial fibrillation - 50-60% chance in the 1st year and falls to 30% or less by 5 years      we use a combination of cryo and radiofrequency ablation      there is a 2-5% chance of serious complication which include - bleeding around access site, heart attack , stroke , bleeding around the heart requiring drainage , perforation requiring open heart surgery , phrenic nerve paralysis causing diaphragmatic paralysis, atrial esophageal fistula   we do deployed multiple methods to prevent such complication :  - heparin anticoagulation with ACT between 350 and 400s to prevent stroke   - coronary angiography if we are going to ablate close to any major blood vessel   -access to the pericardial drain if pericardial effusion were to happen   - pressure sensing catheters to reduce excessive pressure and chances of perforation   - esophageal temperature monitoring to reduce chances of injury               Summary of my recommendations:  Patient is currently in sinus rhythm, on sotalol, is atrially paced and ventricular sensed  -If medication failed proceed with ablation  CRYO, NAVX, hold eliquis for day before and day of ablation              2  Cardiomyopathy , NYHA II, LVEF =35%  Suspected tachy mediated  Increasing beta blockers for better rate control  On ACEI  Follow-up echocardiogram to look for improvement in EF           3  Hypertension  On lisinopril and metoprolol   Blood pressure is 118/74     4  Hyperlipidemia  On statin        5  Suspected REYNOLD  Need to be evaluated for CPAP       6  Obesity  BMI is 35 7  Patient advised on dietary him restrictions to lose weight      7  Long-term anticoagulation  Chads Vasc score is 5  To continue with apixaban      8   Sinus bradycardia, sick sinus syndrome  Patient is post pacemaker  All device parameters a stable            Summary of my recommendation  Patient remains in sinus rhythm, atrially paced and ventricular sensed  Continue with antiarrhythmic therapy  Limited echocardiogram to assess EF  Evaluate for sleep apnea

## 2019-11-07 ENCOUNTER — REMOTE DEVICE CLINIC VISIT (OUTPATIENT)
Dept: CARDIOLOGY CLINIC | Facility: CLINIC | Age: 74
End: 2019-11-07
Payer: MEDICARE

## 2019-11-07 DIAGNOSIS — Z95.0 PRESENCE OF CARDIAC PACEMAKER: Primary | ICD-10-CM

## 2019-11-07 PROCEDURE — 93296 REM INTERROG EVL PM/IDS: CPT | Performed by: INTERNAL MEDICINE

## 2019-11-07 PROCEDURE — 93294 REM INTERROG EVL PM/LDLS PM: CPT | Performed by: INTERNAL MEDICINE

## 2019-11-07 NOTE — PROGRESS NOTES
Results for orders placed or performed in visit on 11/07/19   Cardiac EP device report    Narrative    MDT DUAL CHAMBER PM (NS HIS)  CARELINK TRANSMISSION: BATTERY VOLTAGE ADEQUATE (12 7 YRS)  AP 97 1%  < 0 1%  ALL AVAILABLE LEAD PARAMETERS WITHIN NORMAL LIMITS  NO NEW SIGNIFICANT HIGH RATE EPISODES  NORMAL DEVICE FUNCTION     EB

## 2019-12-20 DIAGNOSIS — I48.19 PERSISTENT ATRIAL FIBRILLATION (HCC): ICD-10-CM

## 2019-12-20 DIAGNOSIS — I10 ESSENTIAL HYPERTENSION: ICD-10-CM

## 2019-12-20 RX ORDER — LISINOPRIL 2.5 MG/1
TABLET ORAL
Qty: 90 TABLET | Refills: 3 | Status: SHIPPED | OUTPATIENT
Start: 2019-12-20 | End: 2020-11-25

## 2019-12-20 RX ORDER — SOTALOL HYDROCHLORIDE 80 MG/1
TABLET ORAL
Qty: 270 TABLET | Refills: 3 | Status: SHIPPED | OUTPATIENT
Start: 2019-12-20 | End: 2020-11-25

## 2020-02-06 ENCOUNTER — REMOTE DEVICE CLINIC VISIT (OUTPATIENT)
Dept: CARDIOLOGY CLINIC | Facility: CLINIC | Age: 75
End: 2020-02-06
Payer: MEDICARE

## 2020-02-06 DIAGNOSIS — Z95.0 PRESENCE OF PERMANENT CARDIAC PACEMAKER: Primary | ICD-10-CM

## 2020-02-06 PROCEDURE — 93296 REM INTERROG EVL PM/IDS: CPT | Performed by: INTERNAL MEDICINE

## 2020-02-06 PROCEDURE — 93294 REM INTERROG EVL PM/LDLS PM: CPT | Performed by: INTERNAL MEDICINE

## 2020-02-06 NOTE — PROGRESS NOTES
Results for orders placed or performed in visit on 02/06/20   Cardiac EP device report    Narrative    MDT DUAL CHAMBER PM  - ACTIVE SYSTEM IS MRI CONDITIONAL  CARELINK TRANSMISSION: BATTERY VOLTAGE ADEQUATE (12 5 YRS)  AP 99%  <0 1%  ALL AVAILABLE LEAD PARAMETERS WITHIN NORMAL LIMITS  NO SIGNIFICANT HIGH RATE EPISODES  NORMAL DEVICE FUNCTION     EB

## 2020-03-02 ENCOUNTER — IMMUNIZATIONS (OUTPATIENT)
Dept: FAMILY MEDICINE CLINIC | Facility: CLINIC | Age: 75
End: 2020-03-02
Payer: MEDICARE

## 2020-03-02 DIAGNOSIS — Z23 ENCOUNTER FOR IMMUNIZATION: ICD-10-CM

## 2020-03-02 PROCEDURE — 90662 IIV NO PRSV INCREASED AG IM: CPT | Performed by: PHYSICIAN ASSISTANT

## 2020-03-02 PROCEDURE — G0008 ADMIN INFLUENZA VIRUS VAC: HCPCS | Performed by: PHYSICIAN ASSISTANT

## 2020-03-07 NOTE — ANESTHESIA PREPROCEDURE EVALUATION
Review of Systems/Medical History          Cardiovascular  Hyperlipidemia, Hypertension ,    Pulmonary  Shortness of breath,        GI/Hepatic            Endo/Other     GYN       Hematology   Musculoskeletal       Neurology    TIA, Headaches,    Psychology           Physical Exam    Airway    Mallampati score: II  TM Distance: >3 FB  Neck ROM: full     Dental   No notable dental hx     Cardiovascular  Rhythm: irregular, Rate: abnormal, Cardiovascular exam normal    Pulmonary  Pulmonary exam normal Breath sounds clear to auscultation,     Other Findings        Anesthesia Plan  ASA Score- 2     Anesthesia Type- IV sedation with anesthesia with ASA Monitors  Additional Monitors:   Airway Plan:         Plan Factors-    Induction- intravenous  Postoperative Plan- Plan for postoperative opioid use  Informed Consent- Anesthetic plan and risks discussed with patient and spouse  I personally reviewed this patient with the CRNA  Discussed and agreed on the Anesthesia Plan with the CRNA  Carolyn Anna
No

## 2020-04-02 DIAGNOSIS — I48.19 PERSISTENT ATRIAL FIBRILLATION (HCC): ICD-10-CM

## 2020-04-02 DIAGNOSIS — I10 ESSENTIAL HYPERTENSION: ICD-10-CM

## 2020-04-02 DIAGNOSIS — E78.2 MIXED HYPERLIPIDEMIA: ICD-10-CM

## 2020-04-02 RX ORDER — LOVASTATIN 10 MG/1
TABLET ORAL
Qty: 90 TABLET | Refills: 3 | Status: SHIPPED | OUTPATIENT
Start: 2020-04-02 | End: 2021-01-05 | Stop reason: SDUPTHER

## 2020-04-02 RX ORDER — METOPROLOL SUCCINATE 50 MG/1
TABLET, EXTENDED RELEASE ORAL
Qty: 270 TABLET | Refills: 3 | Status: SHIPPED | OUTPATIENT
Start: 2020-04-02 | End: 2021-01-05 | Stop reason: SDUPTHER

## 2020-04-02 RX ORDER — APIXABAN 5 MG/1
TABLET, FILM COATED ORAL
Qty: 180 TABLET | Refills: 3 | Status: SHIPPED | OUTPATIENT
Start: 2020-04-02 | End: 2021-01-05 | Stop reason: SDUPTHER

## 2020-05-07 ENCOUNTER — REMOTE DEVICE CLINIC VISIT (OUTPATIENT)
Dept: CARDIOLOGY CLINIC | Facility: CLINIC | Age: 75
End: 2020-05-07
Payer: MEDICARE

## 2020-05-07 DIAGNOSIS — Z95.0 PRESENCE OF PERMANENT CARDIAC PACEMAKER: Primary | ICD-10-CM

## 2020-05-07 PROCEDURE — 93296 REM INTERROG EVL PM/IDS: CPT | Performed by: INTERNAL MEDICINE

## 2020-05-07 PROCEDURE — 93294 REM INTERROG EVL PM/LDLS PM: CPT | Performed by: INTERNAL MEDICINE

## 2020-07-09 ENCOUNTER — OFFICE VISIT (OUTPATIENT)
Dept: CARDIOLOGY CLINIC | Facility: CLINIC | Age: 75
End: 2020-07-09
Payer: MEDICARE

## 2020-07-09 VITALS
HEIGHT: 65 IN | DIASTOLIC BLOOD PRESSURE: 78 MMHG | OXYGEN SATURATION: 98 % | BODY MASS INDEX: 37.15 KG/M2 | SYSTOLIC BLOOD PRESSURE: 118 MMHG | HEART RATE: 82 BPM | TEMPERATURE: 98 F | WEIGHT: 223 LBS

## 2020-07-09 DIAGNOSIS — I34.0 NONRHEUMATIC MITRAL VALVE REGURGITATION: ICD-10-CM

## 2020-07-09 DIAGNOSIS — I35.1 NONRHEUMATIC AORTIC VALVE INSUFFICIENCY: ICD-10-CM

## 2020-07-09 DIAGNOSIS — I48.19 OTHER PERSISTENT ATRIAL FIBRILLATION (HCC): ICD-10-CM

## 2020-07-09 DIAGNOSIS — E78.2 MIXED HYPERLIPIDEMIA: ICD-10-CM

## 2020-07-09 DIAGNOSIS — I49.5 SSS (SICK SINUS SYNDROME) (HCC): ICD-10-CM

## 2020-07-09 DIAGNOSIS — I36.1 NONRHEUMATIC TRICUSPID VALVE REGURGITATION: ICD-10-CM

## 2020-07-09 DIAGNOSIS — Z86.73 H/O TIA (TRANSIENT ISCHEMIC ATTACK) AND STROKE: ICD-10-CM

## 2020-07-09 DIAGNOSIS — Z95.0 PRESENCE OF CARDIAC PACEMAKER: ICD-10-CM

## 2020-07-09 DIAGNOSIS — I10 ESSENTIAL HYPERTENSION: ICD-10-CM

## 2020-07-09 DIAGNOSIS — I42.9 CARDIOMYOPATHY, UNSPECIFIED TYPE (HCC): Primary | ICD-10-CM

## 2020-07-09 DIAGNOSIS — Z79.01 ANTICOAGULANT LONG-TERM USE: ICD-10-CM

## 2020-07-09 PROCEDURE — 99214 OFFICE O/P EST MOD 30 MIN: CPT | Performed by: INTERNAL MEDICINE

## 2020-07-09 PROCEDURE — 1160F RVW MEDS BY RX/DR IN RCRD: CPT | Performed by: INTERNAL MEDICINE

## 2020-07-09 PROCEDURE — 3074F SYST BP LT 130 MM HG: CPT | Performed by: INTERNAL MEDICINE

## 2020-07-09 PROCEDURE — 3008F BODY MASS INDEX DOCD: CPT | Performed by: INTERNAL MEDICINE

## 2020-07-09 PROCEDURE — 3078F DIAST BP <80 MM HG: CPT | Performed by: INTERNAL MEDICINE

## 2020-07-09 PROCEDURE — 1036F TOBACCO NON-USER: CPT | Performed by: INTERNAL MEDICINE

## 2020-07-09 NOTE — PROGRESS NOTES
CARDIOLOGY OFFICE VISIT  [facility]  [address], [address], [address]  Tel: [phone]      NAME: Gary Silverman  AGE: 75 y o  SEX: female  : 1945   MRN: [id number]      Chief Complaint:  Chief Complaint   Patient presents with    Follow-up         History of Present Illness:   Laurie Chirinos comes for follow-up  States she is feeling well  Laurie Chirinos denies chest pain, shortness of breath, palpitation, lightheadedness, syncope, swelling feet, orthopnea, PND, claudication  States she is feeling "so much better  She has so much energy to do her daily activity including shopping  States she feels like a different person"  aGry Silverman is a 75 y o  female who was admitted electively for antiarrhythmic medication initiation at [facility] in Dec 2018   Patient was referred to and seen in the office by Dr Ramana Singh and recommended to initiate therapy with Sotalol 120mg PO BID   She had been on chronic anticoagulation therapy with eliquis without missed dose      Patient underwent first 4 doses of AAD without complications    She presented in atrial fibrillation and remained throughout the hospital stay until she underwent DCCV on 18   Serial EKGs were performed 2 hours after each dose of AAD   There were no significant changes in QT/QTc with intiating doses   There were no significant occurrence of ventricular ectopy on telemetry   Electrolytes and renal function were monitored throughout her stay      After her cardioversion on 18 patient was found to be sinus bradycardia in the mid 40s for which she was asmyptomatic  On admission her toprol XL dose was reduced to 25mg PO QD but after found to be markedly bradycardic her toprol XL was discontinued entirely  Her Sotalol was decreased to 80mg PO BID due to this bradycardia with her 5th dose on 18 being held   Therefore, a pacemaker was recommended at that time      States she is feeling great and denies chest pain / pressure, SOB (at baseline), palpitations, lightheadedness, syncope, swelling feet, orthopnea, PND, claudication  CMP -  Even though the patient was feeling well, her EF by echo had gone down to 35-40% from prior EF 40-45%  Hence she was referred to EP for rhythm control  Prior normal stress test  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  After rhythm control her EF recovered back to 60% (Feb 2019)     PAF - Patient was diagnosed with atrial fibrillation on an EKG done at her PCP office  Patient had no symptoms from it  She was cardioverted and was in sinus rhythm for a while but then went back into Afib  Then she was started on rhythm control (Sotalol) for her A fib     HLP -  Has had hyperlipidemia for many years  Lorelie Juan Diego statin regularly along with diet control   Denies myalgia   PCP closely monitoring the blood work      MELISSA GALVEZ TR - stable  Asymptomatic     Obesity - Trying to lose weight, she states     H/O TIA    Past Medical History:  Past Medical History:   Diagnosis Date    Atrial fibrillation ([address] )     Headache     Hyperlipidemia     Hypertension     Lyme disease     Shortness of breath     TIA (transient ischemic attack)     Transient cerebral ischemia     Last assessed - 1/23/18    Vertigo          Past Surgical History:  Past Surgical History:   Procedure Laterality Date    CARDIAC PACEMAKER PLACEMENT      COLONOSCOPY      MI SIGMOIDOSCOPY FLX DX W/COLLJ SPEC BR/WA IF PFRMD N/A 11/28/2017    Procedure: Rowdy Knowles;  Surgeon: Vanessa Martínez MD;  Location: [facility];   Service: Gastroenterology    TONSILLECTOMY           Family History:  Family History   Problem Relation Age of Onset    Lung cancer Mother     Dementia Father     Alzheimer's disease Father     Other Father         Cardiac Disorder          Social History:  Social History     Socioeconomic History    Marital status: /Civil Union     Spouse name: None    Number of children: None    Years of education: None    Highest education level: None   Occupational History    Occupation: Retired   Social Needs    Financial resource strain: None    Food insecurity:     Worry: None     Inability: None    Transportation needs:     Medical: None     Non-medical: None   Tobacco Use    Smoking status: Former Smoker     Types: Cigarettes     Last attempt to quit: 1977     Years since quittin 5    Smokeless tobacco: Never Used   Substance and Sexual Activity    Alcohol use: Yes     Alcohol/week: 7 0 standard drinks     Types: 7 Glasses of wine per week     Comment: occ    Drug use: No    Sexual activity: Never     Partners: Male     Birth control/protection: None   Lifestyle    Physical activity:     Days per week: None     Minutes per session: None    Stress: None   Relationships    Social connections:     Talks on phone: None     Gets together: None     Attends Confucianism service: None     Active member of club or organization: None     Attends meetings of clubs or organizations: None     Relationship status: None    Intimate partner violence:     Fear of current or ex partner: None     Emotionally abused: None     Physically abused: None     Forced sexual activity: None   Other Topics Concern    None   Social History Narrative    Always uses seat belt         Active Problems:  Patient Active Problem List   Diagnosis    Essential hypertension    Mixed hyperlipidemia    Obesity (BMI 30-39  9)    H/O TIA (transient ischemic attack) and stroke    Aphasia, mixed    Migraine with aura and without status migrainosus, not intractable    Viral exanthem    Myocardiopathy ([address] )    Anticoagulant long-term use    Nonrheumatic mitral valve regurgitation    Nonrheumatic aortic valve insufficiency    Nonrheumatic tricuspid valve regurgitation    Sinus bradycardia    Paroxysmal atrial fibrillation (HCC)         The following portions of the patient's history were reviewed and updated as appropriate: past medical history, past surgical history, past family history,  past social history, current medications, allergies and problem list       Review of Systems:  Constitutional: Denies fever, chills, fatigue  Eyes: Denies eye redness, eye discharge, double vision  ENT: Denies hearing loss, tinnitus, sneezing, nasal discharge, sore throat   Respiratory: Denies cough, expectoration, hemoptysis, shortness of breath  Cardiovascular: Denies chest pain, palpitations, orthopnea, PND  Gastrointestinal: Denies abdominal pain, nausea, vomiting, hematemesis, diarrhea, bloody stools  Genito-Urinary: Denies dysuria, incontinence  Musculoskeletal: Denies back pain  Neurologic: Denies syncope, headache, focal weakness, sensory changes, seizures  Endocrine: Denies polyuria, polydipsia, temperature intolerance  Allergy and Immunology: Denies hives, insect bite sensitivity  Hematological and Lymphatic: Denies bleeding problems, swollen glands   Psychological: Denies depression, suicidal ideation, anxiety, panic  Dermatological: Denies pruritus, rash, skin lesion changes      Vitals:  Vitals:    07/09/20 1027   BP: 118/78   Pulse: 82   Temp: 98 °F (36 7 °C)   SpO2: 98%       Body mass index is 37 11 kg/m²  Weight (last 2 days)     Date/Time   Weight    07/09/20 1027   101 (223)              Physical Examination:  General: Patient is not in acute distress  Awake, alert, oriented in time, place and person  Responding to commands  Head: Normocephalic  Atraumatic  Eyes: Both pupils normal sized, round and reactive to light  Nonicteric  ENT: Normal external ear canals  Nares normal, no drainage  Lips and oral mucosa normal  Neck: Supple  JVP not raised  Trachea central  No thyromegaly  Lungs: Bilateral bronchovascular breath sounds with no crackles or rhonchi  Chest wall: No tenderness  Cardiovascular: RRR  S1 and S2 normal  Grade 3/6 PSMs at apex and LLSB  Gastrointestinal: Abdomen soft, nontender   No guarding or rigidity  Bowel sounds present  Neurologic: Patient is awake, alert, oriented in time, place and person  Responding to command  Moving all extremities  Integumentary:  No skin rash  Lymphatic: No cervical lymphadenopathy  Back: Symmetric  No CVA tenderness  Extremities: No clubbing, cyanosis or edema      Laboratory Results:  CBC with diff:   Lab Results   Component Value Date    WBC 5 42 2019    RBC 4 45 2019    HGB 15 5 (H) 2019    HCT 46 1 2019     (H) 2019    MCH 34 8 (H) 2019    RDW 13 3 2019     2019       CMP:  Lab Results   Component Value Date    CREATININE 1 01 2019    BUN 14 2019    K 4 5 2019     (H) 2019    CO2 27 2019    ALKPHOS 80 2019    ALT 22 2019    AST 18 2019     Lipid Profile:   No results found for: CHOL  Lab Results   Component Value Date    HDL 54 2019    HDL 63 (H) 2018    HDL 64 (H) 2017     Lab Results   Component Value Date    LDLCALC 103 (H) 2019    LDLCALC 76 2018    LDLCALC 110 (H) 2017     Lab Results   Component Value Date    TRIG 91 2019    TRIG 85 2018    TRIG 75 2017       Cardiac testing:   Results for orders placed during the hospital encounter of 18   Echo complete with contrast if indicated    Narrative [place] [address], [address] [facility]  [phone]    Transthoracic Echocardiogram  2D, M-mode, and Color Doppler    Study date:  2018    Patient: Rica Busby  MR number: [id number]  Account number: [de-identified]  : 1945  Age: 67 years  Gender: Female  Status: Outpatient  [facility]: [facility]  Height: 63 in  Weight: 219 lb  BP: 138/ 82 mmHg    Indications: Atrial Fibrillation      Diagnoses: I48 0 - Atrial fibrillation    Sonographer:  Linda Arvizu, RCS  Interpreting Physician:  Sharonda Nelson MD  Primary Physician:  Shana King Alonso Genao DO  Referring Physician:  Dayton King MD  Group:  [facility]    SUMMARY    LEFT VENTRICLE:  Systolic function was moderately reduced  Ejection fraction was estimated in the range of 40 % to 45 %, likely underestimated due to rapid atrial fibrillation  Although no diagnostic regional wall motion abnormality was identified, this possibility cannot be completely excluded on the basis of this study  Wall thickness was mildly increased  There was mild concentric hypertrophy  RIGHT VENTRICLE:  The size was normal   Systolic function was mildly reduced  LEFT ATRIUM:  The atrium was mildly dilated  MITRAL VALVE:  There was mild regurgitation  AORTIC VALVE:  There was mild regurgitation  TRICUSPID VALVE:  There was mild to moderate regurgitation  Pulmonary artery systolic pressure was within the normal range  PULMONIC VALVE:  There was trace regurgitation  HISTORY: PRIOR HISTORY: Hyperlipidemia  Atrial fibrillation  Risk factors: hypertension and morbid obesity  Cerebrovascular disease  PROCEDURE: The study was performed in the [address] [place] [address]  This was a routine study  The transthoracic approach was used  The study included complete 2D imaging, M-mode, and color Doppler  The heart rate was 127 bpm, at the  start of the study  Images were obtained from the parasternal, apical, subcostal, and suprasternal notch acoustic windows  Image quality was adequate  LEFT VENTRICLE: Size was normal  Systolic function was moderately reduced  Ejection fraction was estimated in the range of 40 % to 45 %, likely underestimated due to rapid atrial fibrillation  Although no diagnostic regional wall motion  abnormality was identified, this possibility cannot be completely excluded on the basis of this study  Wall thickness was mildly increased  There was mild concentric hypertrophy  No evidence of apical thrombus      RIGHT VENTRICLE: The size was normal  Systolic function was mildly reduced  Wall thickness was normal     LEFT ATRIUM: The atrium was mildly dilated  RIGHT ATRIUM: Size was normal     MITRAL VALVE: There was annular calcification  There was normal leaflet separation  DOPPLER: The transmitral velocity was within the normal range  There was no evidence for stenosis  There was mild regurgitation  AORTIC VALVE: The valve was trileaflet  Leaflets exhibited mildly increased thickness and normal cuspal separation  DOPPLER: Transaortic velocity was within the normal range  There was no evidence for stenosis  There was mild  regurgitation  TRICUSPID VALVE: The valve structure was normal  There was normal leaflet separation  DOPPLER: The transtricuspid velocity was within the normal range  There was no evidence for stenosis  There was mild to moderate regurgitation  Pulmonary  artery systolic pressure was within the normal range  PULMONIC VALVE: Leaflets exhibited normal thickness, no calcification, and normal cuspal separation  DOPPLER: The transpulmonic velocity was within the normal range  There was trace regurgitation  PERICARDIUM: There was no pericardial effusion  The pericardium was normal in appearance  AORTA: The root exhibited normal size  SYSTEMIC VEINS: IVC: The inferior vena cava was not well visualized  SYSTEM MEASUREMENT TABLES    Apical four chamber  4 chamber Left Atrium Volume Index; Planimetry; End Systole; Apical four chamber;: 21 26 cm2  Left Ventricular Diastolic Area; Method of Disks, Single Plane; End Diastole; Apical four chamber;: 23 86 cm2  Left Ventricular Ejection Fraction; Method of Disks, Single Plane; Apical four chamber;: 49 7 %  Left Ventricular systolic Area; Method of Disks, Single Plane; End Systole; Apical four chamber;: 15 54 cm2  Right Atrium Systolic Area; Planimetry; End Systole; Apical four chamber;: 15 89 cm2  Right Ventricular Internal Diastolic Dimension; End Diastole;  Apical four chamber;: 25 6 mm  TAPSE: 14 4 mm    Unspecified Scan Mode  AR Vmax; Regurgitant Flow; Diastole;: 390 6 cm/s  Aortic Root Diameter; End Systole;: 33 mm  Gradient Pressure, Peak; Simplified Bernoulli; Antegrade Flow; Systole;: 4 9 mm[Hg]  Gradient pressure, average; Simplified Bernoulli; Antegrade Flow; Systole;: 2 3 mm[Hg]  Left Atrium to Aortic Root Ratio;: 1 33  Left atrial diameter; End Diastole;: 44 mm  Pressure Half Time;: 0 46 s  Cardiac Output; Teichholz; Systole;: 2 73 L/min  Heart rate; Teichholz;: 123 /min  Interventricular Septum Diastolic Thickness; Teichholz; End Diastole;: 12 7 mm  Left Ventricle Internal End Diastolic Dimension; Teichholz;: 42 9 mm  Left Ventricle Internal Systolic Dimension; Teichholz; End Systole;: 37 6 mm  Left Ventricle Mass; Mass AVCube with Teichholz; End Diastole;: 167 g  Left Ventricle Posterior Wall Diastolic Thickness; Teichholz; End Diastole;: 9 7 mm  Left Ventricular Ejection Fraction; Teichholz;: 26 9 %  Left Ventricular End Diastolic Volume; Teichholz;: 82 6 ml  Left Ventricular End Systolic Volume; Teichholz;: 60 4 ml  Left Ventricular Fractional Shortening;: 12 4 %  Stroke volume;  Rana Mayda;: 22 2 ml  Maximum Tricuspid valve regurgitation pressure gradient; Regurgitant Flow; Systole;: 28 1 mm[Hg]    [facility]    Prepared and electronically signed by    Keisha Braraza MD  Signed 10-Feb-2018 13:06:38         Medications:    Current Outpatient Medications:     ELIQUIS 5 MG, TAKE 1 TABLET BY MOUTH TWO  TIMES DAILY, Disp: 180 tablet, Rfl: 3    lisinopril (ZESTRIL) 2 5 mg tablet, TAKE 1 TABLET BY MOUTH  DAILY, Disp: 90 tablet, Rfl: 3    lovastatin (MEVACOR) 10 MG tablet, TAKE 1 TABLET BY MOUTH  DAILY AT BEDTIME, Disp: 90 tablet, Rfl: 3    metoprolol succinate (TOPROL-XL) 50 mg 24 hr tablet, TAKE 1 AND 1/2 TABLETS BY  MOUTH TWO TIMES DAILY, Disp: 270 tablet, Rfl: 3    sotalol (BETAPACE) 80 mg tablet, TAKE 1 AND 1/2 TABLETS BY  MOUTH EVERY 12 HOURS, Disp: 270 tablet, Rfl: 3      Allergies:  No Known Allergies      Assessment and Plan:  1  Persistent atrial fibrillation  Maintained on sotalol and metoprolol  On Eliquis for anticoagulation      2  H/O tachycardia mediated cardiomyopathy ([address] )  EF has improved back to her 60% per echo February 2019  In October 2018 her EF was 35-40%  Follow-up echo script given     3  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)  Regular home monitoring with yearly monitoring at [place]   currently in atrial paced and ventricular sensed rhythm     4  Essential hypertension   BP normal   Continue current therapy     5  Obesity (BMI 30-39 9)   counseled to try to lose weight     6  H/O TIA (transient ischemic attack) and stroke   currently on Eliquis, statin     7  Mixed hyperlipidemia   continue statin and diet control  Her PCP closely monitors her blood work     8  Nonrheumatic mitral valve regurgitation, Nonrheumatic aortic valve insufficiency, Nonrheumatic tricuspid valve regurgitation   stable  Follow-up with serial echocardiograms    Recommend aggressive risk factor modification and therapeutic lifestyle changes  Low-salt, low-calorie, low-fat, low-cholesterol diet with regular exercise and to optimize weight  I will defer the ordering and monitoring of necessity lab studies to you, but I am available and happy to review and manage any of the data at your request in the future  Discussed concepts of atherosclerosis, including signs and symptoms of cardiac disease  Previous studies were reviewed  Safety measures were reviewed  Questions were entertained and answered  Patient was advised to report any problems requiring medical attention  Follow-up with PCP and appropriate specialist and lab work as discussed  Return for follow up visit as scheduled or earlier, if needed  Thank you for allowing me to participate in the care and evaluation of your patient  Should you have any questions, please feel free to contact me        Michelle Lei MD  7/9/2020,11:02 AM

## 2020-07-30 ENCOUNTER — HOSPITAL ENCOUNTER (OUTPATIENT)
Dept: NON INVASIVE DIAGNOSTICS | Facility: CLINIC | Age: 75
Discharge: HOME | End: 2020-07-30
Payer: MEDICARE

## 2020-07-30 DIAGNOSIS — I42.9 CARDIOMYOPATHY, UNSPECIFIED TYPE (HCC): ICD-10-CM

## 2020-07-30 PROCEDURE — 93306 TTE W/DOPPLER COMPLETE: CPT

## 2020-07-30 PROCEDURE — 93306 TTE W/DOPPLER COMPLETE: CPT | Performed by: INTERNAL MEDICINE

## 2020-08-07 ENCOUNTER — IN-CLINIC DEVICE VISIT (OUTPATIENT)
Dept: CARDIOLOGY CLINIC | Facility: CLINIC | Age: 75
End: 2020-08-07
Payer: MEDICARE

## 2020-08-07 DIAGNOSIS — Z95.0 PRESENCE OF PERMANENT CARDIAC PACEMAKER: Primary | ICD-10-CM

## 2020-08-07 PROCEDURE — 93280 PM DEVICE PROGR EVAL DUAL: CPT | Performed by: INTERNAL MEDICINE

## 2020-08-07 NOTE — PROGRESS NOTES
MDT DUAL CHAMBER PM  - ACTIVE SYSTEM IS MRI CONDITIONAL   DEVICE INTERROGATED IN THE [place] OFFICE:  BATTERY VOLTAGE ADEQUATE (11 8 YR)   AP 96 5%  <0 1%    ALL LEAD PARAMETERS WITHIN NORMAL LIMITS   1 EPISODE OF PAT ON 7/28/20 (8 @ 158 BPM)   NO PROGRAMMING CHANGES MADE TO DEVICE PARAMETERS   NORMAL DEVICE FUNCTION   RG

## 2020-09-17 ENCOUNTER — APPOINTMENT (OUTPATIENT)
Dept: LAB | Facility: CLINIC | Age: 75
End: 2020-09-17
Payer: MEDICARE

## 2020-09-17 ENCOUNTER — TELEPHONE (OUTPATIENT)
Dept: GASTROENTEROLOGY | Facility: CLINIC | Age: 75
End: 2020-09-17

## 2020-09-17 DIAGNOSIS — E78.2 MIXED HYPERLIPIDEMIA: ICD-10-CM

## 2020-09-17 DIAGNOSIS — I10 ESSENTIAL HYPERTENSION: ICD-10-CM

## 2020-09-17 LAB
ALBUMIN SERPL BCP-MCNC: 3.9 G/DL (ref 3.5–5)
ALP SERPL-CCNC: 83 U/L (ref 46–116)
ALT SERPL W P-5'-P-CCNC: 28 U/L (ref 12–78)
ANION GAP SERPL CALCULATED.3IONS-SCNC: 6 MMOL/L (ref 4–13)
AST SERPL W P-5'-P-CCNC: 27 U/L (ref 5–45)
BILIRUB SERPL-MCNC: 0.93 MG/DL (ref 0.2–1)
BUN SERPL-MCNC: 18 MG/DL (ref 5–25)
CALCIUM SERPL-MCNC: 9.7 MG/DL (ref 8.3–10.1)
CHLORIDE SERPL-SCNC: 109 MMOL/L (ref 100–108)
CHOLEST SERPL-MCNC: 163 MG/DL (ref 50–200)
CO2 SERPL-SCNC: 26 MMOL/L (ref 21–32)
CREAT SERPL-MCNC: 1.05 MG/DL (ref 0.6–1.3)
ERYTHROCYTE [DISTWIDTH] IN BLOOD BY AUTOMATED COUNT: 13.8 % (ref 11.6–15.1)
GFR SERPL CREATININE-BSD FRML MDRD: 52 ML/MIN/1.73SQ M
GLUCOSE SERPL-MCNC: 91 MG/DL (ref 65–140)
HCT VFR BLD AUTO: 45 % (ref 34.8–46.1)
HDLC SERPL-MCNC: 59 MG/DL
HGB BLD-MCNC: 15.3 G/DL (ref 11.5–15.4)
LDLC SERPL CALC-MCNC: 80 MG/DL (ref 0–100)
MCH RBC QN AUTO: 36.3 PG (ref 26.8–34.3)
MCHC RBC AUTO-ENTMCNC: 34 G/DL (ref 31.4–37.4)
MCV RBC AUTO: 107 FL (ref 82–98)
NONHDLC SERPL-MCNC: 104 MG/DL
PLATELET # BLD AUTO: 179 THOUSANDS/UL (ref 149–390)
PMV BLD AUTO: 11.8 FL (ref 8.9–12.7)
POTASSIUM SERPL-SCNC: 4.3 MMOL/L (ref 3.5–5.3)
PROT SERPL-MCNC: 7.3 G/DL (ref 6.4–8.2)
RBC # BLD AUTO: 4.22 MILLION/UL (ref 3.81–5.12)
SODIUM SERPL-SCNC: 141 MMOL/L (ref 136–145)
TRIGL SERPL-MCNC: 119 MG/DL
WBC # BLD AUTO: 6.56 THOUSAND/UL (ref 4.31–10.16)

## 2020-09-17 PROCEDURE — 80053 COMPREHEN METABOLIC PANEL: CPT

## 2020-09-17 PROCEDURE — 36415 COLL VENOUS BLD VENIPUNCTURE: CPT

## 2020-09-17 PROCEDURE — 85027 COMPLETE CBC AUTOMATED: CPT

## 2020-09-17 PROCEDURE — 80061 LIPID PANEL: CPT

## 2020-09-21 ENCOUNTER — OFFICE VISIT (OUTPATIENT)
Dept: FAMILY MEDICINE CLINIC | Facility: CLINIC | Age: 75
End: 2020-09-21
Payer: MEDICARE

## 2020-09-21 VITALS
DIASTOLIC BLOOD PRESSURE: 70 MMHG | TEMPERATURE: 98.2 F | HEIGHT: 65 IN | BODY MASS INDEX: 36.82 KG/M2 | HEART RATE: 75 BPM | SYSTOLIC BLOOD PRESSURE: 124 MMHG | WEIGHT: 221 LBS | OXYGEN SATURATION: 100 %

## 2020-09-21 DIAGNOSIS — Z12.31 SCREENING MAMMOGRAM, ENCOUNTER FOR: ICD-10-CM

## 2020-09-21 DIAGNOSIS — I10 ESSENTIAL HYPERTENSION: ICD-10-CM

## 2020-09-21 DIAGNOSIS — I48.0 PAROXYSMAL ATRIAL FIBRILLATION (HCC): Chronic | ICD-10-CM

## 2020-09-21 DIAGNOSIS — E28.39 MENOPAUSE OVARIAN FAILURE: ICD-10-CM

## 2020-09-21 DIAGNOSIS — E78.2 MIXED HYPERLIPIDEMIA: ICD-10-CM

## 2020-09-21 DIAGNOSIS — Z00.00 HEALTH CARE MAINTENANCE: Primary | ICD-10-CM

## 2020-09-21 PROCEDURE — G0438 PPPS, INITIAL VISIT: HCPCS | Performed by: FAMILY MEDICINE

## 2020-09-21 PROCEDURE — 99214 OFFICE O/P EST MOD 30 MIN: CPT | Performed by: FAMILY MEDICINE

## 2020-09-21 NOTE — PROGRESS NOTES
Assessment/Plan:         Problem List Items Addressed This Visit        Cardiovascular and Mediastinum    Paroxysmal atrial fibrillation (HCC) (Chronic)       Rate controlled, continue her sotalol and metoprolol  Continue Eliquis  She is up-to-date with cardiologist          Essential hypertension       Controlled, continue current medications  Other    Mixed hyperlipidemia       Controlled, continue lovastatin  Other Visit Diagnoses     Health care maintenance    -  Primary    Screening mammogram, encounter for        Relevant Orders    Mammo screening bilateral w cad    Menopause ovarian failure        Relevant Orders    DXA bone density spine hip and pelvis            Subjective:      Patient ID: Stephanie Ball is a 75 y o  female  Patient comes in today for checkup on her hypertension, hyperlipidemia, paroxysmal atrial fibrillation  She is up-to-date with cardiologist, she continues take her sotalol and metoprolol in addition to her Eliquis  She continues take her lovastatin, did her blood work done, and this reviewed with her today  Her blood pressure is well controlled  The following portions of the patient's history were reviewed and updated as appropriate:   She has a past medical history of Atrial fibrillation ([facility] [address] ), Headache, Hyperlipidemia, Hypertension, Lyme disease, Shortness of breath, TIA (transient ischemic attack), Transient cerebral ischemia, and Vertigo  ,  does not have any pertinent problems on file  ,   has a past surgical history that includes Tonsillectomy; Colonoscopy; pr sigmoidoscopy flx dx w/collj spec br/wa if pfrmd (N/A, 11/28/2017); and Cardiac pacemaker placement  ,  family history includes Alzheimer's disease in her father; Dementia in her father; Lung cancer in her mother; Other in her father  ,   reports that she quit smoking about 43 years ago  Her smoking use included cigarettes   She has never used smokeless tobacco  She reports current alcohol use of about 7 0 standard drinks of alcohol per week  She reports that she does not use drugs  ,  has No Known Allergies     Current Outpatient Medications   Medication Sig Dispense Refill    ELIQUIS 5 MG TAKE 1 TABLET BY MOUTH TWO  TIMES DAILY 180 tablet 3    lisinopril (ZESTRIL) 2 5 mg tablet TAKE 1 TABLET BY MOUTH  DAILY 90 tablet 3    lovastatin (MEVACOR) 10 MG tablet TAKE 1 TABLET BY MOUTH  DAILY AT BEDTIME 90 tablet 3    metoprolol succinate (TOPROL-XL) 50 mg 24 hr tablet TAKE 1 AND 1/2 TABLETS BY  MOUTH TWO TIMES DAILY 270 tablet 3    sotalol (BETAPACE) 80 mg tablet TAKE 1 AND 1/2 TABLETS BY  MOUTH EVERY 12 HOURS 270 tablet 3     No current facility-administered medications for this visit  Review of Systems   Constitutional: Negative for chills, fatigue and fever  HENT: Negative for congestion, ear pain, hearing loss, postnasal drip, rhinorrhea and sore throat  Eyes: Negative for pain and visual disturbance  Respiratory: Negative for chest tightness, shortness of breath and wheezing  Cardiovascular: Negative for chest pain and leg swelling  Gastrointestinal: Negative for abdominal distention, abdominal pain, constipation, diarrhea and vomiting  Endocrine: Negative for cold intolerance and heat intolerance  Genitourinary: Negative for difficulty urinating, frequency and urgency  Musculoskeletal: Negative for arthralgias and gait problem  Skin: Negative for color change  Neurological: Negative for dizziness, tremors, syncope, numbness and headaches  Hematological: Negative for adenopathy  Psychiatric/Behavioral: Negative for agitation, confusion and sleep disturbance  The patient is not nervous/anxious  Objective:  Vitals:    09/21/20 1308   BP: 124/70   Pulse: 75   Temp: 98 2 °F (36 8 °C)   SpO2: 100%   Weight: 100 kg (221 lb)   Height: 5' 5" (1 651 m)     Body mass index is 36 78 kg/m²  Physical Exam  Vitals signs and nursing note reviewed     Constitutional: Appearance: She is well-developed  HENT:      Head: Normocephalic  Eyes:      General: No scleral icterus  Conjunctiva/sclera: Conjunctivae normal    Neck:      Musculoskeletal: Normal range of motion  Thyroid: No thyromegaly  Cardiovascular:      Rate and Rhythm: Normal rate and regular rhythm  Heart sounds: Normal heart sounds  No murmur  Pulmonary:      Effort: Pulmonary effort is normal  No respiratory distress  Breath sounds: Normal breath sounds  No wheezing  Abdominal:      General: Bowel sounds are normal  There is no distension  Palpations: Abdomen is soft  Tenderness: There is no abdominal tenderness  Musculoskeletal: Normal range of motion  General: No tenderness  Lymphadenopathy:      Cervical: No cervical adenopathy  Skin:     General: Skin is warm and dry  Coloration: Skin is not pale  Findings: No rash  Neurological:      Mental Status: She is alert and oriented to person, place, and time  Cranial Nerves: No cranial nerve deficit     Psychiatric:         Behavior: Behavior normal

## 2020-09-21 NOTE — PROGRESS NOTES
Assessment and Plan:     Problem List Items Addressed This Visit     None      Visit Diagnoses     Health care maintenance    -  Primary    Screening mammogram, encounter for        Relevant Orders    Mammo screening bilateral w cad    Menopause ovarian failure        Relevant Orders    DXA bone density spine hip and pelvis        BMI Counseling: Body mass index is 36 78 kg/m²  The BMI is above normal  Nutrition recommendations include decreasing portion sizes and encouraging healthy choices of fruits and vegetables  Exercise recommendations include moderate physical activity 150 minutes/week  No pharmacotherapy was ordered  Preventive health issues were discussed with patient, and age appropriate screening tests were ordered as noted in patient's After Visit Summary  Personalized health advice and appropriate referrals for health education or preventive services given if needed, as noted in patient's After Visit Summary  History of Present Illness:     Patient presents for Welcome to Medicare visit  Patient Care Team:  Marisol Sanchez DO as PCP - General (Family Medicine)  MD Jennifer Majano MD as Endoscopist     Review of Systems:     Review of Systems   Problem List:     Patient Active Problem List   Diagnosis    Essential hypertension    Mixed hyperlipidemia    Obesity (BMI 30-39  9)    H/O TIA (transient ischemic attack) and stroke    Aphasia, mixed    Migraine with aura and without status migrainosus, not intractable    Viral exanthem    Myocardiopathy ([address] )    Anticoagulant long-term use    Nonrheumatic mitral valve regurgitation    Nonrheumatic aortic valve insufficiency    Nonrheumatic tricuspid valve regurgitation    Sinus bradycardia    Paroxysmal atrial fibrillation [facility])      Past Medical and Surgical History:     Past Medical History:   Diagnosis Date    Atrial fibrillation (HCC)     Headache     Hyperlipidemia     Hypertension     Lyme disease     Shortness of breath     TIA (transient ischemic attack)     Transient cerebral ischemia     Last assessed - 18    Vertigo      Past Surgical History:   Procedure Laterality Date    CARDIAC PACEMAKER PLACEMENT      COLONOSCOPY      VT SIGMOIDOSCOPY FLX DX W/COLLJ SPEC BR/WA IF PFRMD N/A 2017    Procedure: Swati Damon;  Surgeon: Jazzy Leo MD;  Location: [facility]; Service: Gastroenterology    TONSILLECTOMY        Family History:     Family History   Problem Relation Age of Onset    Lung cancer Mother    Kayley Venegas Dementia Father     Alzheimer's disease Father     Other Father         Cardiac Disorder       Social History:        Social History     Socioeconomic History    Marital status: /Civil Union     Spouse name: None    Number of children: None    Years of education: None    Highest education level: None   Occupational History    Occupation: Retired   Social Needs    Financial resource strain: None    Food insecurity     Worry: None     Inability: None    Transportation needs     Medical: None     Non-medical: None   Tobacco Use    Smoking status: Former Smoker     Types: Cigarettes     Last attempt to quit: 1977     Years since quittin 7    Smokeless tobacco: Never Used   Substance and Sexual Activity    Alcohol use:  Yes     Alcohol/week: 7 0 standard drinks     Types: 7 Glasses of wine per week     Comment: occ    Drug use: No    Sexual activity: Never     Partners: Male     Birth control/protection: None   Lifestyle    Physical activity     Days per week: None     Minutes per session: None    Stress: None   Relationships    Social connections     Talks on phone: None     Gets together: None     Attends Hinduism service: None     Active member of club or organization: None     Attends meetings of clubs or organizations: None     Relationship status: None    Intimate partner violence     Fear of current or ex partner: None     Emotionally abused: None     Physically abused: None     Forced sexual activity: None   Other Topics Concern    None   Social History Narrative    Always uses seat belt      Medications and Allergies:     Current Outpatient Medications   Medication Sig Dispense Refill    ELIQUIS 5 MG TAKE 1 TABLET BY MOUTH TWO  TIMES DAILY 180 tablet 3    lisinopril (ZESTRIL) 2 5 mg tablet TAKE 1 TABLET BY MOUTH  DAILY 90 tablet 3    lovastatin (MEVACOR) 10 MG tablet TAKE 1 TABLET BY MOUTH  DAILY AT BEDTIME 90 tablet 3    metoprolol succinate (TOPROL-XL) 50 mg 24 hr tablet TAKE 1 AND 1/2 TABLETS BY  MOUTH TWO TIMES DAILY 270 tablet 3    sotalol (BETAPACE) 80 mg tablet TAKE 1 AND 1/2 TABLETS BY  MOUTH EVERY 12 HOURS 270 tablet 3     No current facility-administered medications for this visit  No Known Allergies   Immunizations:     Immunization History   Administered Date(s) Administered    Influenza, high dose seasonal 0 7 mL 03/02/2020      Health Maintenance: There are no preventive care reminders to display for this patient  Topic Date Due    Pneumococcal Vaccine: 65+ Years (1 of 1 - PPSV23) 10/18/2009      Medicare Screening Tests and Risk Assessments:     Adair Cobian is here for her Subsequent Wellness visit  Last Medicare Wellness visit information reviewed, patient interviewed and updates made to the record today  Health Risk Assessment:   Patient rates overall health as very good  Patient feels that their physical health rating is same  Eyesight was rated as same  Hearing was rated as same  Patient feels that their emotional and mental health rating is same  Pain experienced in the last 7 days has been none  Patient states that she has experienced no weight loss or gain in last 6 months  Depression Screening:   PHQ-2 Score: 0      Fall Risk Screening:    In the past year, patient has experienced: no history of falling in past year      Urinary Incontinence Screening:   Patient has not leaked urine accidently in the last six months  Home Safety:  Patient does not have trouble with stairs inside or outside of their home  Patient has working smoke alarms and has working carbon monoxide detector  Home safety hazards include: none  Nutrition:   Current diet is Regular and Limited junk food  Medications:   Patient is not currently taking any over-the-counter supplements  Patient is able to manage medications  Activities of Daily Living (ADLs)/Instrumental Activities of Daily Living (IADLs):   Walk and transfer into and out of bed and chair?: Yes  Dress and groom yourself?: Yes    Bathe or shower yourself?: Yes    Feed yourself? Yes  Do your laundry/housekeeping?: Yes  Manage your money, pay your bills and track your expenses?: Yes  Make your own meals?: Yes    Do your own shopping?: Yes    Previous Hospitalizations:   Any hospitalizations or ED visits within the last 12 months?: Yes    How many hospitalizations have you had in the last year?: 1-2    Advance Care Planning:   Living will: Yes    Durable POA for healthcare:  Yes    Advanced directive: Yes    Five wishes given: Yes      Cognitive Screening:   Provider or family/friend/caregiver concerned regarding cognition?: No    PREVENTIVE SCREENINGS      Cardiovascular Screening:    General: Screening Not Indicated and History Lipid Disorder      Diabetes Screening:     General: Screening Current      Colorectal Cancer Screening:     General: Screening Current      Breast Cancer Screening:     General: Screening Current    Due for: Mammogram        Cervical Cancer Screening:    General: Screening Not Indicated      Osteoporosis Screening:      Due for: DXA Axial      Abdominal Aortic Aneurysm (AAA) Screening:        General: Screening Not Indicated      Lung Cancer Screening:     General: Screening Not Indicated      Hepatitis C Screening:    General: Screening Not Indicated    No exam data present     Physical Exam:     /70   Pulse 75   Temp 98 2 °F (36 8 °C) Ht 5' 5" (1 651 m)   Wt 100 kg (221 lb)   SpO2 100%   BMI 36 78 kg/m²     Physical Exam     Marisol Sanchez DO

## 2020-09-21 NOTE — ASSESSMENT & PLAN NOTE
Rate controlled, continue her sotalol and metoprolol  Continue Eliquis    She is up-to-date with cardiologist

## 2020-09-21 NOTE — PATIENT INSTRUCTIONS
Medicare Preventive Visit Patient Instructions  Thank you for completing your Welcome to Medicare Visit or Medicare Annual Wellness Visit today  Your next wellness visit will be due in one year (9/21/2021)  The screening/preventive services that you may require over the next 5-10 years are detailed below  Some tests may not apply to you based off risk factors and/or age  Screening tests ordered at today's visit but not completed yet may show as past due  Also, please note that scanned in results may not display below  Preventive Screenings:  Service Recommendations Previous Testing/Comments   Colorectal Cancer Screening  * Colonoscopy    * Fecal Occult Blood Test (FOBT)/Fecal Immunochemical Test (FIT)  * Fecal DNA/Cologuard Test  * Flexible Sigmoidoscopy Age: 54-65 years old   Colonoscopy: every 10 years (may be performed more frequently if at higher risk)  OR  FOBT/FIT: every 1 year  OR  Cologuard: every 3 years  OR  Sigmoidoscopy: every 5 years  Screening may be recommended earlier than age 48 if at higher risk for colorectal cancer  Also, an individualized decision between you and your healthcare provider will decide whether screening between the ages of 74-80 would be appropriate  Colonoscopy: 11/28/2017  FOBT/FIT: Not on file  Cologuard: Not on file  Sigmoidoscopy: 11/28/2017    Screening Current     Breast Cancer Screening Age: 36 years old  Frequency: every 1-2 years  Not required if history of left and right mastectomy Mammogram: 03/25/2019    Screening Current   Cervical Cancer Screening Between the ages of 21-29, pap smear recommended once every 3 years  Between the ages of 33-67, can perform pap smear with HPV co-testing every 5 years     Recommendations may differ for women with a history of total hysterectomy, cervical cancer, or abnormal pap smears in past  Pap Smear: Not on file    Screening Not Indicated   Hepatitis C Screening Once for adults born between 1945 and 1965  More frequently in patients at high risk for Hepatitis C Hep C Antibody: Not on file       Diabetes Screening 1-2 times per year if you're at risk for diabetes or have pre-diabetes Fasting glucose: 100 mg/dL   A1C: No results in last 5 years    Screening Current   Cholesterol Screening Once every 5 years if you don't have a lipid disorder  May order more often based on risk factors  Lipid panel: 09/17/2020    Screening Not Indicated  History Lipid Disorder     Other Preventive Screenings Covered by Medicare:  1  Abdominal Aortic Aneurysm (AAA) Screening: covered once if your at risk  You're considered to be at risk if you have a family history of AAA  2  Lung Cancer Screening: covers low dose CT scan once per year if you meet all of the following conditions: (1) Age 50-69; (2) No signs or symptoms of lung cancer; (3) Current smoker or have quit smoking within the last 15 years; (4) You have a tobacco smoking history of at least 30 pack years (packs per day multiplied by number of years you smoked); (5) You get a written order from a healthcare provider  3  Glaucoma Screening: covered annually if you're considered high risk: (1) You have diabetes OR (2) Family history of glaucoma OR (3)  aged 48 and older OR (3)  American aged 72 and older  3  Osteoporosis Screening: covered every 2 years if you meet one of the following conditions: (1) You're estrogen deficient and at risk for osteoporosis based off medical history and other findings; (2) Have a vertebral abnormality; (3) On glucocorticoid therapy for more than 3 months; (4) Have primary hyperparathyroidism; (5) On osteoporosis medications and need to assess response to drug therapy  · Last bone density test (DXA Scan): Not on file  5  HIV Screening: covered annually if you're between the age of 12-76  Also covered annually if you are younger than 13 and older than 72 with risk factors for HIV infection   For pregnant patients, it is covered up to 3 times per pregnancy  Immunizations:  Immunization Recommendations   Influenza Vaccine Annual influenza vaccination during flu season is recommended for all persons aged >= 6 months who do not have contraindications   Pneumococcal Vaccine (Prevnar and Pneumovax)  * Prevnar = PCV13  * Pneumovax = PPSV23   Adults 25-60 years old: 1-3 doses may be recommended based on certain risk factors  Adults 72 years old: Prevnar (PCV13) vaccine recommended followed by Pneumovax (PPSV23) vaccine  If already received PPSV23 since turning 65, then PCV13 recommended at least one year after PPSV23 dose  Hepatitis B Vaccine 3 dose series if at intermediate or high risk (ex: diabetes, end stage renal disease, liver disease)   Tetanus (Td) Vaccine - COST NOT COVERED BY MEDICARE PART B Following completion of primary series, a booster dose should be given every 10 years to maintain immunity against tetanus  Td may also be given as tetanus wound prophylaxis  Tdap Vaccine - COST NOT COVERED BY MEDICARE PART B Recommended at least once for all adults  For pregnant patients, recommended with each pregnancy  Shingles Vaccine (Shingrix) - COST NOT COVERED BY MEDICARE PART B  2 shot series recommended in those aged 48 and above     Health Maintenance Due:  There are no preventive care reminders to display for this patient  Immunizations Due:      Topic Date Due    Pneumococcal Vaccine: 65+ Years (1 of 1 - PPSV23) 10/18/2009     Advance Directives   What are advance directives? Advance directives are legal documents that state your wishes and plans for medical care  These plans are made ahead of time in case you lose your ability to make decisions for yourself  Advance directives can apply to any medical decision, such as the treatments you want, and if you want to donate organs  What are the types of advance directives? There are many types of advance directives, and each state has rules about how to use them   You may choose a combination of any of the following:  · Living will: This is a written record of the treatment you want  You can also choose which treatments you do not want, which to limit, and which to stop at a certain time  This includes surgery, medicine, IV fluid, and tube feedings  · Durable power of  for healthcare [facility]): This is a written record that states who you want to make healthcare choices for you when you are unable to make them for yourself  This person, called a proxy, is usually a family member or a friend  You may choose more than 1 proxy  · Do not resuscitate (DNR) order:  A DNR order is used in case your heart stops beating or you stop breathing  It is a request not to have certain forms of treatment, such as CPR  A DNR order may be included in other types of advance directives  · Medical directive: This covers the care that you want if you are in a coma, near death, or unable to make decisions for yourself  You can list the treatments you want for each condition  Treatment may include pain medicine, surgery, blood transfusions, dialysis, IV or tube feedings, and a ventilator (breathing machine)  · Values history: This document has questions about your views, beliefs, and how you feel and think about life  This information can help others choose the care that you would choose  Why are advance directives important? An advance directive helps you control your care  Although spoken wishes may be used, it is better to have your wishes written down  Spoken wishes can be misunderstood, or not followed  Treatments may be given even if you do not want them  An advance directive may make it easier for your family to make difficult choices about your care  Urinary Incontinence   Urinary incontinence (UI)  is when you lose control of your bladder  UI develops because your bladder cannot store or empty urine properly   The 3 most common types of UI are stress incontinence, urge incontinence, or both   Medicines:   · May be given to help strengthen your bladder control  Report any side effects of medication to your healthcare provider  Do pelvic muscle exercises often:  Your pelvic muscles help you stop urinating  Squeeze these muscles tight for 5 seconds, then relax for 5 seconds  Gradually work up to squeezing for 10 seconds  Do 3 sets of 15 repetitions a day, or as directed  This will help strengthen your pelvic muscles and improve bladder control  Train your bladder:  Go to the bathroom at set times, such as every 2 hours, even if you do not feel the urge to go  You can also try to hold your urine when you feel the urge to go  For example, hold your urine for 5 minutes when you feel the urge to go  As that becomes easier, hold your urine for 10 minutes  Self-care:   · Keep a UI record  Write down how often you leak urine and how much you leak  Make a note of what you were doing when you leaked urine  · Drink liquids as directed  You may need to limit the amount of liquid you drink to help control your urine leakage  Do not drink any liquid right before you go to bed  Limit or do not have drinks that contain caffeine or alcohol  · Prevent constipation  Eat a variety of high-fiber foods  Good examples are high-fiber cereals, beans, vegetables, and whole-grain breads  Walking is the best way to trigger your intestines to have a bowel movement  · Exercise regularly and maintain a healthy weight  Weight loss and exercise will decrease pressure on your bladder and help you control your leakage  · Use a catheter as directed  to help empty your bladder  A catheter is a tiny, plastic tube that is put into your bladder to drain your urine  · Go to behavior therapy as directed  Behavior therapy may be used to help you learn to control your urge to urinate      Weight Management   Why it is important to manage your weight:  Being overweight increases your risk of health conditions such as heart disease, high blood pressure, type 2 diabetes, and certain types of cancer  It can also increase your risk for osteoarthritis, sleep apnea, and other respiratory problems  Aim for a slow, steady weight loss  Even a small amount of weight loss can lower your risk of health problems  How to lose weight safely:  A safe and healthy way to lose weight is to eat fewer calories and get regular exercise  You can lose up about 1 pound a week by decreasing the number of calories you eat by 500 calories each day  Healthy meal plan for weight management:  A healthy meal plan includes a variety of foods, contains fewer calories, and helps you stay healthy  A healthy meal plan includes the following:  · Eat whole-grain foods more often  A healthy meal plan should contain fiber  Fiber is the part of grains, fruits, and vegetables that is not broken down by your body  Whole-grain foods are healthy and provide extra fiber in your diet  Some examples of whole-grain foods are whole-wheat breads and pastas, oatmeal, brown rice, and bulgur  · Eat a variety of vegetables every day  Include dark, leafy greens such as spinach, kale, cecilia greens, and mustard greens  Eat yellow and orange vegetables such as carrots, sweet potatoes, and winter squash  · Eat a variety of fruits every day  Choose fresh or canned fruit (canned in its own juice or light syrup) instead of juice  Fruit juice has very little or no fiber  · Eat low-fat dairy foods  Drink fat-free (skim) milk or 1% milk  Eat fat-free yogurt and low-fat cottage cheese  Try low-fat cheeses such as mozzarella and other reduced-fat cheeses  · Choose meat and other protein foods that are low in fat  Choose beans or other legumes such as split peas or lentils  Choose fish, skinless poultry (chicken or turkey), or lean cuts of red meat (beef or pork)  Before you cook meat or poultry, cut off any visible fat  · Use less fat and oil  Try baking foods instead of frying them  Add less fat, such as margarine, sour cream, regular salad dressing and mayonnaise to foods  Eat fewer high-fat foods  Some examples of high-fat foods include french fries, doughnuts, ice cream, and cakes  · Eat fewer sweets  Limit foods and drinks that are high in sugar  This includes candy, cookies, regular soda, and sweetened drinks  Exercise:  Exercise at least 30 minutes per day on most days of the week  Some examples of exercise include walking, biking, dancing, and swimming  You can also fit in more physical activity by taking the stairs instead of the elevator or parking farther away from stores  Ask your healthcare provider about the best exercise plan for you  © Copyright [employer] 2018 Information is for End User's use only and may not be sold, redistributed or otherwise used for commercial purposes   All illustrations and images included in CareNotes® are the copyrighted property of [employer]  or [address] [place] [address]

## 2020-09-30 ENCOUNTER — IMMUNIZATIONS (OUTPATIENT)
Dept: FAMILY MEDICINE CLINIC | Facility: CLINIC | Age: 75
End: 2020-09-30
Payer: MEDICARE

## 2020-09-30 DIAGNOSIS — Z23 ENCOUNTER FOR IMMUNIZATION: ICD-10-CM

## 2020-09-30 PROCEDURE — 90662 IIV NO PRSV INCREASED AG IM: CPT

## 2020-09-30 PROCEDURE — G0008 ADMIN INFLUENZA VIRUS VAC: HCPCS

## 2020-10-01 ENCOUNTER — TELEPHONE (OUTPATIENT)
Dept: GASTROENTEROLOGY | Facility: CLINIC | Age: 75
End: 2020-10-01

## 2020-10-21 ENCOUNTER — TELEPHONE (OUTPATIENT)
Dept: GASTROENTEROLOGY | Facility: CLINIC | Age: 75
End: 2020-10-21

## 2020-10-21 ENCOUNTER — OFFICE VISIT (OUTPATIENT)
Dept: GASTROENTEROLOGY | Facility: CLINIC | Age: 75
End: 2020-10-21
Payer: MEDICARE

## 2020-10-21 VITALS
DIASTOLIC BLOOD PRESSURE: 70 MMHG | SYSTOLIC BLOOD PRESSURE: 110 MMHG | WEIGHT: 221 LBS | TEMPERATURE: 96.4 F | HEIGHT: 65 IN | HEART RATE: 88 BPM | BODY MASS INDEX: 36.82 KG/M2 | RESPIRATION RATE: 18 BRPM

## 2020-10-21 DIAGNOSIS — Z86.010 HISTORY OF COLON POLYPS: Primary | ICD-10-CM

## 2020-10-21 PROCEDURE — 99213 OFFICE O/P EST LOW 20 MIN: CPT | Performed by: PHYSICIAN ASSISTANT

## 2020-11-11 ENCOUNTER — REMOTE DEVICE CLINIC VISIT (OUTPATIENT)
Dept: CARDIOLOGY CLINIC | Facility: CLINIC | Age: 75
End: 2020-11-11
Payer: MEDICARE

## 2020-11-11 DIAGNOSIS — Z95.0 CARDIAC PACEMAKER IN SITU: Primary | ICD-10-CM

## 2020-11-11 PROCEDURE — 93296 REM INTERROG EVL PM/IDS: CPT | Performed by: INTERNAL MEDICINE

## 2020-11-11 PROCEDURE — 93294 REM INTERROG EVL PM/LDLS PM: CPT | Performed by: INTERNAL MEDICINE

## 2020-11-23 ENCOUNTER — HOSPITAL ENCOUNTER (OUTPATIENT)
Dept: MAMMOGRAPHY | Facility: CLINIC | Age: 75
Discharge: HOME | End: 2020-11-23
Payer: MEDICARE

## 2020-11-23 VITALS — BODY MASS INDEX: 36.82 KG/M2 | HEIGHT: 65 IN | WEIGHT: 221 LBS

## 2020-11-23 DIAGNOSIS — E28.39 MENOPAUSE OVARIAN FAILURE: ICD-10-CM

## 2020-11-23 DIAGNOSIS — Z12.31 SCREENING MAMMOGRAM, ENCOUNTER FOR: ICD-10-CM

## 2020-11-23 PROCEDURE — 77067 SCR MAMMO BI INCL CAD: CPT

## 2020-11-23 PROCEDURE — 77080 DXA BONE DENSITY AXIAL: CPT

## 2020-11-23 PROCEDURE — 77063 BREAST TOMOSYNTHESIS BI: CPT

## 2020-11-24 ENCOUNTER — OFFICE VISIT (OUTPATIENT)
Dept: CARDIOLOGY CLINIC | Facility: CLINIC | Age: 75
End: 2020-11-24
Payer: MEDICARE

## 2020-11-24 VITALS
WEIGHT: 217 LBS | SYSTOLIC BLOOD PRESSURE: 132 MMHG | DIASTOLIC BLOOD PRESSURE: 86 MMHG | HEIGHT: 65 IN | HEART RATE: 78 BPM | BODY MASS INDEX: 36.15 KG/M2 | OXYGEN SATURATION: 99 %

## 2020-11-24 DIAGNOSIS — E78.2 MIXED HYPERLIPIDEMIA: ICD-10-CM

## 2020-11-24 DIAGNOSIS — I48.19 PERSISTENT ATRIAL FIBRILLATION (HCC): ICD-10-CM

## 2020-11-24 DIAGNOSIS — I10 ESSENTIAL HYPERTENSION: ICD-10-CM

## 2020-11-24 DIAGNOSIS — R00.1 SINUS BRADYCARDIA: ICD-10-CM

## 2020-11-24 DIAGNOSIS — Z79.01 ANTICOAGULANT LONG-TERM USE: ICD-10-CM

## 2020-11-24 DIAGNOSIS — Z86.73 H/O TIA (TRANSIENT ISCHEMIC ATTACK) AND STROKE: ICD-10-CM

## 2020-11-24 DIAGNOSIS — I48.0 PAROXYSMAL ATRIAL FIBRILLATION (HCC): Primary | Chronic | ICD-10-CM

## 2020-11-24 DIAGNOSIS — E66.9 OBESITY (BMI 30-39.9): ICD-10-CM

## 2020-11-24 PROCEDURE — 93000 ELECTROCARDIOGRAM COMPLETE: CPT | Performed by: INTERNAL MEDICINE

## 2020-11-24 PROCEDURE — 99213 OFFICE O/P EST LOW 20 MIN: CPT | Performed by: INTERNAL MEDICINE

## 2020-11-25 RX ORDER — LISINOPRIL 2.5 MG/1
TABLET ORAL
Qty: 90 TABLET | Refills: 3 | Status: SHIPPED | OUTPATIENT
Start: 2020-11-25 | End: 2021-10-10

## 2020-11-25 RX ORDER — SOTALOL HYDROCHLORIDE 80 MG/1
TABLET ORAL
Qty: 270 TABLET | Refills: 3 | Status: SHIPPED | OUTPATIENT
Start: 2020-11-25 | End: 2021-10-10

## 2021-01-05 DIAGNOSIS — I10 ESSENTIAL HYPERTENSION: ICD-10-CM

## 2021-01-05 DIAGNOSIS — E78.2 MIXED HYPERLIPIDEMIA: ICD-10-CM

## 2021-01-05 DIAGNOSIS — I48.19 PERSISTENT ATRIAL FIBRILLATION (HCC): ICD-10-CM

## 2021-01-05 RX ORDER — METOPROLOL SUCCINATE 50 MG/1
75 TABLET, EXTENDED RELEASE ORAL 2 TIMES DAILY
Qty: 270 TABLET | Refills: 2 | Status: SHIPPED | OUTPATIENT
Start: 2021-01-05 | End: 2021-10-12

## 2021-01-05 RX ORDER — LOVASTATIN 10 MG/1
10 TABLET ORAL
Qty: 90 TABLET | Refills: 2 | Status: SHIPPED | OUTPATIENT
Start: 2021-01-05 | End: 2021-10-12

## 2021-02-10 ENCOUNTER — REMOTE DEVICE CLINIC VISIT (OUTPATIENT)
Dept: CARDIOLOGY CLINIC | Facility: CLINIC | Age: 76
End: 2021-02-10
Payer: MEDICARE

## 2021-02-10 DIAGNOSIS — Z95.0 PRESENCE OF CARDIAC PACEMAKER: Primary | ICD-10-CM

## 2021-02-10 PROCEDURE — 93296 REM INTERROG EVL PM/IDS: CPT | Performed by: INTERNAL MEDICINE

## 2021-02-10 PROCEDURE — 93294 REM INTERROG EVL PM/LDLS PM: CPT | Performed by: INTERNAL MEDICINE

## 2021-02-10 NOTE — PROGRESS NOTES
Results for orders placed or performed in visit on 02/10/21   Cardiac EP device report    Narrative    MDT DUAL CHAMBER PM  - ACTIVE SYSTEM IS MRI CONDITIONAL  CARELINK TRANSMISSION: BATTERY VOLTAGE ADEQUATE (11 4 YRS)  AP: 98 5%  : <0 1% (MVP-ON)  ALL AVAILABLE LEAD PARAMETERS WITHIN NORMAL LIMITS  1 AT/AF EPISODE W/ EGRM SHOWING PAT, DURATION 43 SECS  PT TAKES ELIQUIS, METOPROLOL SUCC, SOTALOL  EF: 60% (ECHO 7/30/20)  PACEMAKER FUNCTIONING APPROPRIATELY    [address] [place] [address]

## 2021-02-13 ENCOUNTER — IMMUNIZATIONS (OUTPATIENT)
Dept: FAMILY MEDICINE CLINIC | Facility: HOSPITAL | Age: 76
End: 2021-02-13

## 2021-02-13 DIAGNOSIS — Z23 ENCOUNTER FOR IMMUNIZATION: Primary | ICD-10-CM

## 2021-02-13 PROCEDURE — 91301 SARS-COV-2 / COVID-19 MRNA VACCINE (MODERNA) 100 MCG: CPT

## 2021-02-13 PROCEDURE — 0011A SARS-COV-2 / COVID-19 MRNA VACCINE (MODERNA) 100 MCG: CPT

## 2021-03-12 ENCOUNTER — IMMUNIZATIONS (OUTPATIENT)
Dept: FAMILY MEDICINE CLINIC | Facility: HOSPITAL | Age: 76
End: 2021-03-12

## 2021-03-12 DIAGNOSIS — Z23 ENCOUNTER FOR IMMUNIZATION: Primary | ICD-10-CM

## 2021-03-12 PROCEDURE — 91301 SARS-COV-2 / COVID-19 MRNA VACCINE (MODERNA) 100 MCG: CPT

## 2021-03-12 PROCEDURE — 0012A SARS-COV-2 / COVID-19 MRNA VACCINE (MODERNA) 100 MCG: CPT

## 2021-03-22 ENCOUNTER — OFFICE VISIT (OUTPATIENT)
Dept: FAMILY MEDICINE CLINIC | Facility: CLINIC | Age: 76
End: 2021-03-22
Payer: MEDICARE

## 2021-03-22 VITALS
RESPIRATION RATE: 16 BRPM | BODY MASS INDEX: 35.32 KG/M2 | TEMPERATURE: 98 F | HEIGHT: 65 IN | HEART RATE: 90 BPM | SYSTOLIC BLOOD PRESSURE: 130 MMHG | WEIGHT: 212 LBS | OXYGEN SATURATION: 99 % | DIASTOLIC BLOOD PRESSURE: 80 MMHG

## 2021-03-22 DIAGNOSIS — J44.9 CHRONIC OBSTRUCTIVE PULMONARY DISEASE, UNSPECIFIED COPD TYPE (HCC): ICD-10-CM

## 2021-03-22 DIAGNOSIS — E66.01 OBESITY, MORBID (HCC): ICD-10-CM

## 2021-03-22 DIAGNOSIS — E78.2 MIXED HYPERLIPIDEMIA: ICD-10-CM

## 2021-03-22 DIAGNOSIS — G47.09 OTHER INSOMNIA: ICD-10-CM

## 2021-03-22 DIAGNOSIS — I10 ESSENTIAL HYPERTENSION: ICD-10-CM

## 2021-03-22 DIAGNOSIS — I48.0 PAROXYSMAL ATRIAL FIBRILLATION (HCC): Primary | Chronic | ICD-10-CM

## 2021-03-22 DIAGNOSIS — I42.9 CARDIOMYOPATHY, UNSPECIFIED TYPE (HCC): ICD-10-CM

## 2021-03-22 PROBLEM — B09 VIRAL EXANTHEM: Status: RESOLVED | Noted: 2018-04-09 | Resolved: 2021-03-22

## 2021-03-22 PROCEDURE — 99214 OFFICE O/P EST MOD 30 MIN: CPT | Performed by: FAMILY MEDICINE

## 2021-03-22 NOTE — ASSESSMENT & PLAN NOTE
Rate controlled, continue her sotalol, metoprolol, Eliquis  Follow-up with her cardiologist as scheduled

## 2021-03-22 NOTE — PROGRESS NOTES
Assessment/Plan:         Problem List Items Addressed This Visit        Respiratory    Chronic obstructive pulmonary disease ([facility] )       Stable, she is no longer requiring any inhaled medications  Continue to monitor  Cardiovascular and Mediastinum    Paroxysmal atrial fibrillation (HCC) - Primary (Chronic)       Rate controlled, continue her sotalol, metoprolol, Eliquis  Follow-up with her cardiologist as scheduled  Essential hypertension       Well controlled, continue her metoprolol and lisinopril  Relevant Orders    CBC    Myocardiopathy ([facility] )       Continue her beta blocker, ACE-inhibitor  She is doing well, continue to monitor  Other    Mixed hyperlipidemia       Continue lovastatin, check CMP, lipid profile in 6 months  Relevant Orders    Comprehensive metabolic panel    Lipid panel    Other insomnia       She does not desire to take any medications at this time, she is to let us know if she does  Obesity, morbid ([place] [address] )            Subjective:      Patient ID: Rogers Fernandes is a 67 y o  female  Patient comes in today for checkup on her COPD, paroxysmal atrial fibrillation, hypertension, cardiomyopathy, hyperlipidemia  She does complain of difficulty staying asleep throughout the night  She is taking her medication as prescribed, no compliance issues or side effects  She is up-to-date with her cardiologist       The following portions of the patient's history were reviewed and updated as appropriate:   Past Medical History:  She has a past medical history of Atrial fibrillation ([facility] ), Headache, Hyperlipidemia, Hypertension, Lyme disease, Shortness of breath, TIA (transient ischemic attack), Transient cerebral ischemia, and Vertigo  ,  _______________________________________________________________________  Medical Problems:  does not have any pertinent problems on file ,  _______________________________________________________________________  Past Surgical History:   has a past surgical history that includes Tonsillectomy; Colonoscopy; pr sigmoidoscopy flx dx w/collj spec br/wa if pfrmd (N/A, 11/28/2017); and Cardiac pacemaker placement (12/2019)  ,  _______________________________________________________________________  Family History:  family history includes Alzheimer's disease in her father; Dementia in her father; Lung cancer in her mother; Other in her father ,  _______________________________________________________________________  Social History:   reports that she quit smoking about 44 years ago  Her smoking use included cigarettes  She has never used smokeless tobacco  She reports current alcohol use of about 7 0 standard drinks of alcohol per week  She reports that she does not use drugs  ,  _______________________________________________________________________  Allergies:  has No Known Allergies     _______________________________________________________________________  Current Outpatient Medications   Medication Sig Dispense Refill    apixaban (Eliquis) 5 mg Take 1 tablet (5 mg total) by mouth 2 (two) times a day 180 tablet 2    lisinopril (ZESTRIL) 2 5 mg tablet TAKE 1 TABLET BY MOUTH  DAILY 90 tablet 3    lovastatin (MEVACOR) 10 MG tablet Take 1 tablet (10 mg total) by mouth daily at bedtime 90 tablet 2    metoprolol succinate (TOPROL-XL) 50 mg 24 hr tablet Take 1 5 tablets (75 mg total) by mouth 2 (two) times a day 270 tablet 2    sotalol (BETAPACE) 80 mg tablet TAKE 1 AND 1/2 TABLETS BY  MOUTH EVERY 12 HOURS 270 tablet 3     No current facility-administered medications for this visit       _______________________________________________________________________  Review of Systems   Constitutional: Negative for chills, fatigue and fever  HENT: Negative for congestion, ear pain, hearing loss, postnasal drip, rhinorrhea and sore throat  Eyes: Negative for pain and visual disturbance     Respiratory: Negative for chest tightness, shortness of breath and wheezing  Cardiovascular: Negative for chest pain and leg swelling  Gastrointestinal: Negative for abdominal distention, abdominal pain, constipation, diarrhea and vomiting  Endocrine: Negative for cold intolerance and heat intolerance  Genitourinary: Negative for difficulty urinating, frequency and urgency  Musculoskeletal: Negative for arthralgias and gait problem  Skin: Negative for color change  Neurological: Negative for dizziness, tremors, syncope, numbness and headaches  Hematological: Negative for adenopathy  Psychiatric/Behavioral: Positive for sleep disturbance  Negative for agitation and confusion  The patient is not nervous/anxious  Objective:  Vitals:    03/22/21 1100   BP: 130/80   Pulse: 90   Resp: 16   Temp: 98 °F (36 7 °C)   TempSrc: Tympanic   SpO2: 99%   Weight: 96 2 kg (212 lb)   Height: 5' 5" (1 651 m)     Body mass index is 35 28 kg/m²  Physical Exam  Vitals signs and nursing note reviewed  Constitutional:       Appearance: She is well-developed  HENT:      Head: Normocephalic  Eyes:      General: No scleral icterus  Conjunctiva/sclera: Conjunctivae normal    Neck:      Musculoskeletal: Normal range of motion  Thyroid: No thyromegaly  Cardiovascular:      Rate and Rhythm: Normal rate and regular rhythm  Heart sounds: Normal heart sounds  No murmur  Pulmonary:      Effort: Pulmonary effort is normal  No respiratory distress  Breath sounds: Normal breath sounds  No wheezing  Abdominal:      General: Bowel sounds are normal  There is no distension  Palpations: Abdomen is soft  Tenderness: There is no abdominal tenderness  Musculoskeletal: Normal range of motion  General: No tenderness  Lymphadenopathy:      Cervical: No cervical adenopathy  Skin:     General: Skin is warm and dry  Coloration: Skin is not pale  Findings: No rash     Neurological:      Mental Status: She is alert and oriented to person, place, and time  Cranial Nerves: No cranial nerve deficit     Psychiatric:         Behavior: Behavior normal

## 2021-03-31 ENCOUNTER — OFFICE VISIT (OUTPATIENT)
Dept: CARDIOLOGY CLINIC | Facility: CLINIC | Age: 76
End: 2021-03-31
Payer: MEDICARE

## 2021-03-31 VITALS
RESPIRATION RATE: 18 BRPM | SYSTOLIC BLOOD PRESSURE: 122 MMHG | HEART RATE: 74 BPM | OXYGEN SATURATION: 99 % | WEIGHT: 210 LBS | BODY MASS INDEX: 34.99 KG/M2 | HEIGHT: 65 IN | DIASTOLIC BLOOD PRESSURE: 70 MMHG

## 2021-03-31 DIAGNOSIS — I42.9 CARDIOMYOPATHY, UNSPECIFIED TYPE (HCC): Primary | ICD-10-CM

## 2021-03-31 DIAGNOSIS — I36.1 NONRHEUMATIC TRICUSPID VALVE REGURGITATION: ICD-10-CM

## 2021-03-31 DIAGNOSIS — E78.2 MIXED HYPERLIPIDEMIA: ICD-10-CM

## 2021-03-31 DIAGNOSIS — I48.0 PAROXYSMAL ATRIAL FIBRILLATION (HCC): ICD-10-CM

## 2021-03-31 DIAGNOSIS — I10 ESSENTIAL HYPERTENSION: ICD-10-CM

## 2021-03-31 DIAGNOSIS — Z79.01 ANTICOAGULANT LONG-TERM USE: ICD-10-CM

## 2021-03-31 DIAGNOSIS — E66.9 OBESITY (BMI 30-39.9): ICD-10-CM

## 2021-03-31 DIAGNOSIS — I35.1 NONRHEUMATIC AORTIC VALVE INSUFFICIENCY: ICD-10-CM

## 2021-03-31 DIAGNOSIS — I34.0 NONRHEUMATIC MITRAL VALVE REGURGITATION: ICD-10-CM

## 2021-03-31 PROCEDURE — 99214 OFFICE O/P EST MOD 30 MIN: CPT | Performed by: INTERNAL MEDICINE

## 2021-03-31 NOTE — PROGRESS NOTES
CARDIOLOGY OFFICE VISIT  [facility]  [address], [address] [place] [address], [place], [facility] Rohan Obando  Tel: [phone]      NAME: Oli Garsia  AGE: 67 y o  SEX: female  : 1945   MRN: [id number]      Chief Complaint:  Chief Complaint   Patient presents with    Follow-up     6 months     Leg Swelling     a little on the feet          History of Present Illness:   Patient comes for follow-up  States she is doing great  Patient denies palpitations, shortness of breath, chest pain, syncope, lightheadedness, swelling feet, orthopnea, PND, claudication  Oli Garsia is a 75 y o  female who was admitted electively for antiarrhythmic medication initiation at [facility] in Dec 2018   Patient was referred to and seen in the office by Dr Rakesh Barron and recommended to initiate therapy with Sotalol 120mg PO BID   She had been on chronic anticoagulation therapy with eliquis without missed dose      Patient underwent first 4 doses of AAD without complications    She presented in atrial fibrillation and remained throughout the hospital stay until she underwent DCCV on 18   Serial EKGs were performed 2 hours after each dose of AAD   There were no significant changes in QT/QTc with intiating doses   There were no significant occurrence of ventricular ectopy on telemetry   Electrolytes and renal function were monitored throughout her stay      After her cardioversion on 18 patient was found to be sinus bradycardia in the mid 40s for which she was asmyptomatic  On admission her toprol XL dose was reduced to 25mg PO QD but after found to be markedly bradycardic  Her toprol XL was discontinued entirely  Her Sotalol was decreased to 80mg PO BID due to this bradycardia with her 5th dose on 18 being held   Therefore, a pacemaker was recommended at that time      CMP -  Even though the patient was feeling well, her EF by echo had gone down to 35-40% from prior EF 40-45%  Hence she was referred to EP for rhythm control  Prior normal stress test  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  After rhythm control her EF recovered back to 60% (Feb 2019, July 2020)     PAF - Patient was diagnosed with atrial fibrillation on an EKG done at her PCP office  Patient had no symptoms from it  She was cardioverted and was in sinus rhythm for a while but then went back into Afib  Then she was started on rhythm control (Sotalol) for her A fib  Currently she is in sinus rhythm     HLP -  Has had hyperlipidemia for many years   Taking statin regularly along with diet control   Denies myalgia   PCP closely monitoring the blood work      Mod MR, Mild AR, Mod to severe TR -  Echo findings discussed with the patient     Obesity - Trying to lose weight, she states     H/O TIA    Past Medical History:  Past Medical History:   Diagnosis Date    Atrial fibrillation ([address] )     Headache     Hyperlipidemia     Hypertension     Lyme disease     Shortness of breath     TIA (transient ischemic attack)     Transient cerebral ischemia     Last assessed - 1/23/18    Vertigo          Past Surgical History:  Past Surgical History:   Procedure Laterality Date    CARDIAC PACEMAKER PLACEMENT  12/2019    COLONOSCOPY      [place] SIGMOIDOSCOPY FLX DX W/COLLJ SPEC BR/WA IF PFRMD N/A 11/28/2017    Procedure: Ramon Sanchez;  Surgeon: Neville Noel MD;  Location: [facility];   Service: Gastroenterology    TONSILLECTOMY           Family History:  Family History   Problem Relation Age of Onset    Lung cancer Mother     Dementia Father     Alzheimer's disease Father     Other Father         Cardiac Disorder          Social History:  Social History     Socioeconomic History    Marital status: /Civil Union     Spouse name: None    Number of children: None    Years of education: None    Highest education level: None   Occupational History    Occupation: Retired   Social Needs    Financial resource strain: None    Food insecurity     Worry: None     Inability: None    Transportation needs     Medical: None     Non-medical: None   Tobacco Use    Smoking status: Former Smoker     Types: Cigarettes     Quit date:      Years since quittin 2    Smokeless tobacco: Never Used   Substance and Sexual Activity    Alcohol use: Yes     Alcohol/week: 7 0 standard drinks     Types: 7 Glasses of wine per week     Comment: occ    Drug use: No    Sexual activity: Never     Partners: Male     Birth control/protection: None   Lifestyle    Physical activity     Days per week: None     Minutes per session: None    Stress: None   Relationships    Social connections     Talks on phone: None     Gets together: None     Attends Ep[employer] service: None     Active member of club or organization: None     Attends meetings of clubs or organizations: None     Relationship status: None    Intimate partner violence     Fear of current or ex partner: None     Emotionally abused: None     Physically abused: None     Forced sexual activity: None   Other Topics Concern    None   Social History Narrative    Always uses seat belt         Active Problems:  Patient Active Problem List   Diagnosis    Essential hypertension    Mixed hyperlipidemia    Obesity (BMI 30-39  9)    H/O TIA (transient ischemic attack) and stroke    Aphasia, mixed    Migraine with aura and without status migrainosus, not intractable    Myocardiopathy ([address] )    Anticoagulant long-term use    Nonrheumatic mitral valve regurgitation    Nonrheumatic aortic valve insufficiency    Nonrheumatic tricuspid valve regurgitation    Sinus bradycardia    Paroxysmal atrial fibrillation (HCC)    Other insomnia    Chronic obstructive pulmonary disease (HCC)    Obesity, morbid (HCC)         The following portions of the patient's history were reviewed and updated as appropriate: past medical history, past surgical history, past family history,  past social history, current medications, allergies and problem list       Review of Systems:  11 system review of systems was essentially negative other than what is mentioned in the HPI above      Vitals:  Vitals:    03/31/21 1521   BP: 122/70   Pulse: 74   Resp: 18   SpO2: 99%       Body mass index is 34 95 kg/m²  Weight (last 2 days)     Date/Time   Weight    03/31/21 1521   95 3 (210)              Physical Examination:  General: Patient is not in acute distress  Awake, alert, oriented in time, place and person  Responding to commands  Head: Normocephalic  Atraumatic  Eyes: Both pupils normal sized, round and reactive to light  Nonicteric  ENT: Normal external ear canals  Neck: Supple  JVP not raised  Trachea central  No thyromegaly  Lungs: Bilateral bronchovascular breath sounds with no crackles or rhonchi  Chest wall: No tenderness  Cardiovascular: RRR  S1 and S2 normal  Grade 3/6 PSMs at apex and LLSB  Gastrointestinal: Abdomen soft, nontender  No guarding or rigidity  Liver and spleen not palpable  Bowel sounds present  Neurologic: Patient is awake, alert, oriented in time, place and person  Responding to command  Moving all extremities  Integumentary:  No skin rash  Lymphatic: No cervical lymphadenopathy  Back: Symmetric   No CVA tenderness  Extremities: No clubbing, cyanosis or edema      Laboratory Results:  CBC with diff:   Lab Results   Component Value Date    WBC 6 56 09/17/2020    RBC 4 22 09/17/2020    HGB 15 3 09/17/2020    HCT 45 0 09/17/2020     (H) 09/17/2020    MCH 36 3 (H) 09/17/2020    RDW 13 8 09/17/2020     09/17/2020       CMP:  Lab Results   Component Value Date    CREATININE 1 05 09/17/2020    BUN 18 09/17/2020    K 4 3 09/17/2020     (H) 09/17/2020    CO2 26 09/17/2020    ALKPHOS 83 09/17/2020    ALT 28 09/17/2020    AST 27 09/17/2020     Lipid Profile:   No results found for: CHOL  Lab Results   Component Value Date    HDL 59 09/17/2020    HDL 54 09/19/2019    HDL 63 (H) 2018     Lab Results   Component Value Date    LDLCALC 80 2020    LDLCALC 103 (H) 2019    LDLCALC 76 2018     Lab Results   Component Value Date    TRIG 119 2020    TRIG 91 2019    TRIG 85 2018       Cardiac testing:   Results for orders placed during the hospital encounter of 18   Echo complete with contrast if indicated    Narrative [facility], [address] [facility]  [phone]    Transthoracic Echocardiogram  2D, M-mode, and Color Doppler    Study date:  2018    Patient: Shorty Vanegas  MR number: [id number]  Account number: [de-identified]  : 1945  Age: 67 years  Gender: Female  Status: Outpatient  Location: [facility]  Height: 63 in  Weight: 219 lb  BP: 138/ 82 mmHg    Indications: Atrial Fibrillation  Diagnoses: I48 0 - Atrial fibrillation    Sonographer:  Maynard RCS  Interpreting Physician:  Lisa Aguila MD  Primary Physician:  Joselin Gonzalez DO  Referring Physician:  Lisa Aguila MD  Group:  [facility]    SUMMARY    LEFT VENTRICLE:  Systolic function was moderately reduced  Ejection fraction was estimated in the range of 40 % to 45 %, likely underestimated due to rapid atrial fibrillation  Although no diagnostic regional wall motion abnormality was identified, this possibility cannot be completely excluded on the basis of this study  Wall thickness was mildly increased  There was mild concentric hypertrophy  RIGHT VENTRICLE:  The size was normal   Systolic function was mildly reduced  LEFT ATRIUM:  The atrium was mildly dilated  MITRAL VALVE:  There was mild regurgitation  AORTIC VALVE:  There was mild regurgitation  TRICUSPID VALVE:  There was mild to moderate regurgitation  Pulmonary artery systolic pressure was within the normal range  PULMONIC VALVE:  There was trace regurgitation      HISTORY: PRIOR HISTORY: Hyperlipidemia  Atrial fibrillation  Risk factors: hypertension and morbid obesity  Cerebrovascular disease  PROCEDURE: The study was performed in the [address] [place] [address]  This was a routine study  The transthoracic approach was used  The study included complete 2D imaging, M-mode, and color Doppler  The heart rate was 127 bpm, at the  start of the study  Images were obtained from the parasternal, apical, subcostal, and suprasternal notch acoustic windows  Image quality was adequate  LEFT VENTRICLE: Size was normal  Systolic function was moderately reduced  Ejection fraction was estimated in the range of 40 % to 45 %, likely underestimated due to rapid atrial fibrillation  Although no diagnostic regional wall motion  abnormality was identified, this possibility cannot be completely excluded on the basis of this study  Wall thickness was mildly increased  There was mild concentric hypertrophy  No evidence of apical thrombus  RIGHT VENTRICLE: The size was normal  Systolic function was mildly reduced  Wall thickness was normal     LEFT ATRIUM: The atrium was mildly dilated  RIGHT ATRIUM: Size was normal     MITRAL VALVE: There was annular calcification  There was normal leaflet separation  DOPPLER: The transmitral velocity was within the normal range  There was no evidence for stenosis  There was mild regurgitation  AORTIC VALVE: The valve was trileaflet  Leaflets exhibited mildly increased thickness and normal cuspal separation  DOPPLER: Transaortic velocity was within the normal range  There was no evidence for stenosis  There was mild  regurgitation  TRICUSPID VALVE: The valve structure was normal  There was normal leaflet separation  DOPPLER: The transtricuspid velocity was within the normal range  There was no evidence for stenosis  There was mild to moderate regurgitation  Pulmonary  artery systolic pressure was within the normal range      PULMONIC VALVE: Leaflets exhibited normal thickness, no calcification, and normal cuspal separation  DOPPLER: The transpulmonic velocity was within the normal range  There was trace regurgitation  PERICARDIUM: There was no pericardial effusion  The pericardium was normal in appearance  AORTA: The root exhibited normal size  SYSTEMIC VEINS: IVC: The inferior vena cava was not well visualized  SYSTEM MEASUREMENT TABLES    Apical four chamber  4 chamber Left Atrium Volume Index; Planimetry; End Systole; Apical four chamber;: 21 26 cm2  Left Ventricular Diastolic Area; Method of Disks, Single Plane; End Diastole; Apical four chamber;: 23 86 cm2  Left Ventricular Ejection Fraction; Method of Disks, Single Plane; Apical four chamber;: 49 7 %  Left Ventricular systolic Area; Method of Disks, Single Plane; End Systole; Apical four chamber;: 15 54 cm2  Right Atrium Systolic Area; Planimetry; End Systole; Apical four chamber;: 15 89 cm2  Right Ventricular Internal Diastolic Dimension; End Diastole; Apical four chamber;: 25 6 mm  TAPSE: 14 4 mm    Unspecified Scan Mode  AR Vmax; Regurgitant Flow; Diastole;: 390 6 cm/s  Aortic Root Diameter; End Systole;: 33 mm  Gradient Pressure, Peak; Simplified Bernoulli; Antegrade Flow; Systole;: 4 9 mm[Hg]  Gradient pressure, average; Simplified Bernoulli; Antegrade Flow; Systole;: 2 3 mm[Hg]  Left Atrium to Aortic Root Ratio;: 1 33  Left atrial diameter; End Diastole;: 44 mm  Pressure Half Time;: 0 46 s  Cardiac Output; Teichholz; Systole;: 2 73 L/min  Heart rate; Teichholz;: 123 /min  Interventricular Septum Diastolic Thickness; Teichholz; End Diastole;: 12 7 mm  Left Ventricle Internal End Diastolic Dimension; Teichholz;: 42 9 mm  Left Ventricle Internal Systolic Dimension; Teichholz; End Systole;: 37 6 mm  Left Ventricle Mass; Mass AVCube with Teichholz; End Diastole;: 167 g  Left Ventricle Posterior Wall Diastolic Thickness; Teichholz; End Diastole;: 9 7 mm  Left Ventricular Ejection Fraction;  Teichholz;: 26 9 %  Left Ventricular End Diastolic Volume; Teichholz;: 82 6 ml  Left Ventricular End Systolic Volume; Teichholz;: 60 4 ml  Left Ventricular Fractional Shortening;: 12 4 %  Stroke volume; Opal Camilo;: 22 2 ml  Maximum Tricuspid valve regurgitation pressure gradient; Regurgitant Flow; Systole;: 28 1 mm[Hg]    [facility] Accredited Echocardiography Laboratory    Prepared and electronically signed by    Manny Valera MD  Signed 10-Feb-2018 13:06:38         Medications:    Current Outpatient Medications:     apixaban (Eliquis) 5 mg, Take 1 tablet (5 mg total) by mouth 2 (two) times a day, Disp: 180 tablet, Rfl: 2    lisinopril (ZESTRIL) 2 5 mg tablet, TAKE 1 TABLET BY MOUTH  DAILY, Disp: 90 tablet, Rfl: 3    lovastatin (MEVACOR) 10 MG tablet, Take 1 tablet (10 mg total) by mouth daily at bedtime, Disp: 90 tablet, Rfl: 2    metoprolol succinate (TOPROL-XL) 50 mg 24 hr tablet, Take 1 5 tablets (75 mg total) by mouth 2 (two) times a day, Disp: 270 tablet, Rfl: 2    sotalol (BETAPACE) 80 mg tablet, TAKE 1 AND 1/2 TABLETS BY  MOUTH EVERY 12 HOURS, Disp: 270 tablet, Rfl: 3      Allergies:  No Known Allergies      Assessment and Plan:  1  Atrial fibrillation  SR maintained on sotalol and metoprolol  On Eliquis for anticoagulation      2  H/O tachycardia mediated cardiomyopathy ([address] )  EF has improved back to her 60% per echo February 2019 and July 2020  In October 2018 her EF was 35-40%         3  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)  Regular home monitoring with yearly monitoring at [place]   currently in atrial paced and ventricular sensed rhythm     4  Essential hypertension   BP normal   Continue current therapy     5  Obesity (BMI 30-39 9)   counseled to try to lose weight     6  H/O TIA (transient ischemic attack) and stroke   currently on Eliquis, statin     7  Mixed hyperlipidemia   continue statin and diet control  Her PCP closely monitors her blood work     8  Nonrheumatic mitral valve regurgitation, Nonrheumatic aortic valve insufficiency, Nonrheumatic tricuspid valve regurgitation   Follow-up with serial echocardiograms    Recommend aggressive risk factor modification and therapeutic lifestyle changes  Low-salt, low-calorie, low-fat, low-cholesterol diet with regular exercise and to optimize weight  I will defer the ordering and monitoring of necessity lab studies to you, but I am available and happy to review and manage any of the data at your request in the future  Discussed concepts of atherosclerosis, including signs and symptoms of cardiac disease  Previous studies were reviewed  Safety measures were reviewed  Questions were entertained and answered  Patient was advised to report any problems requiring medical attention  Follow-up with PCP and appropriate specialist and lab work as discussed  Return for follow up visit as scheduled or earlier, if needed  Thank you for allowing me to participate in the care and evaluation of your patient  Should you have any questions, please feel free to contact me        Chet Olson MD  8/83/3646,8:67 PM

## 2021-05-12 ENCOUNTER — REMOTE DEVICE CLINIC VISIT (OUTPATIENT)
Dept: CARDIOLOGY CLINIC | Facility: CLINIC | Age: 76
End: 2021-05-12
Payer: MEDICARE

## 2021-05-12 DIAGNOSIS — Z95.0 PRESENCE OF PERMANENT CARDIAC PACEMAKER: Primary | ICD-10-CM

## 2021-05-12 PROCEDURE — 93296 REM INTERROG EVL PM/IDS: CPT | Performed by: INTERNAL MEDICINE

## 2021-05-12 PROCEDURE — 93294 REM INTERROG EVL PM/LDLS PM: CPT | Performed by: INTERNAL MEDICINE

## 2021-05-12 NOTE — PROGRESS NOTES
MDT DUAL CHAMBER PM  - ACTIVE SYSTEM IS MRI CONDITIONAL   CARELINK TRANSMISSION:  BATTERY VOLTAGE ADEQUATE (11 0 YR)   AP 94 7%  <0 1%    ALL LEAD PARAMETERS WITHIN NORMAL LIMITS   NO HIGH RATE EPISODES   NORMAL DEVICE FUNCTION  Cecily Carlson

## 2021-06-07 ENCOUNTER — TELEPHONE (OUTPATIENT)
Dept: FAMILY MEDICINE CLINIC | Facility: CLINIC | Age: 76
End: 2021-06-07

## 2021-06-07 DIAGNOSIS — G47.09 OTHER INSOMNIA: Primary | ICD-10-CM

## 2021-06-07 RX ORDER — HYDROXYZINE HYDROCHLORIDE 25 MG/1
25 TABLET, FILM COATED ORAL
Qty: 30 TABLET | Refills: 0 | Status: SHIPPED | OUTPATIENT
Start: 2021-06-07

## 2021-06-07 NOTE — TELEPHONE ENCOUNTER
Patient called stating that it was discussed at her LOV about trying a medication to help her sleep  States she did not want to start anything at the time but feels she would like to take something now  Please advise and send medication to [address], if appropriate

## 2021-06-17 ENCOUNTER — TELEPHONE (OUTPATIENT)
Dept: GASTROENTEROLOGY | Facility: CLINIC | Age: 76
End: 2021-06-17

## 2021-07-19 ENCOUNTER — TELEPHONE (OUTPATIENT)
Dept: GASTROENTEROLOGY | Facility: HOSPITAL | Age: 76
End: 2021-07-19

## 2021-07-20 ENCOUNTER — ANESTHESIA EVENT (OUTPATIENT)
Dept: GASTROENTEROLOGY | Facility: HOSPITAL | Age: 76
End: 2021-07-20

## 2021-07-20 ENCOUNTER — ANESTHESIA (OUTPATIENT)
Dept: GASTROENTEROLOGY | Facility: HOSPITAL | Age: 76
End: 2021-07-20

## 2021-07-20 ENCOUNTER — HOSPITAL ENCOUNTER (OUTPATIENT)
Dept: GASTROENTEROLOGY | Facility: HOSPITAL | Age: 76
Setting detail: OUTPATIENT SURGERY
Discharge: HOME | End: 2021-07-20
Attending: INTERNAL MEDICINE
Payer: MEDICARE

## 2021-07-20 VITALS
HEART RATE: 62 BPM | SYSTOLIC BLOOD PRESSURE: 113 MMHG | RESPIRATION RATE: 16 BRPM | TEMPERATURE: 97.7 F | HEIGHT: 65 IN | OXYGEN SATURATION: 95 % | BODY MASS INDEX: 33.57 KG/M2 | DIASTOLIC BLOOD PRESSURE: 68 MMHG | WEIGHT: 201.5 LBS

## 2021-07-20 DIAGNOSIS — Z86.010 HISTORY OF COLON POLYPS: ICD-10-CM

## 2021-07-20 PROCEDURE — G0121 COLON CA SCRN NOT HI RSK IND: HCPCS | Performed by: INTERNAL MEDICINE

## 2021-07-20 RX ORDER — LIDOCAINE HYDROCHLORIDE 20 MG/ML
INJECTION, SOLUTION EPIDURAL; INFILTRATION; INTRACAUDAL; PERINEURAL AS NEEDED
Status: DISCONTINUED | OUTPATIENT
Start: 2021-07-20 | End: 2021-07-20

## 2021-07-20 RX ORDER — SODIUM CHLORIDE, SODIUM LACTATE, POTASSIUM CHLORIDE, CALCIUM CHLORIDE 600; 310; 30; 20 MG/100ML; MG/100ML; MG/100ML; MG/100ML
125 INJECTION, SOLUTION INTRAVENOUS CONTINUOUS
Status: DISCONTINUED | OUTPATIENT
Start: 2021-07-20 | End: 2021-07-24 | Stop reason: HOSPADM

## 2021-07-20 RX ORDER — PROPOFOL 10 MG/ML
INJECTION, EMULSION INTRAVENOUS AS NEEDED
Status: DISCONTINUED | OUTPATIENT
Start: 2021-07-20 | End: 2021-07-20

## 2021-07-20 RX ADMIN — SODIUM CHLORIDE, SODIUM LACTATE, POTASSIUM CHLORIDE, AND CALCIUM CHLORIDE 125 ML/HR: .6; .31; .03; .02 INJECTION, SOLUTION INTRAVENOUS at 08:32

## 2021-07-20 RX ADMIN — PROPOFOL 50 MG: 10 INJECTION, EMULSION INTRAVENOUS at 09:51

## 2021-07-20 RX ADMIN — PROPOFOL 100 MG: 10 INJECTION, EMULSION INTRAVENOUS at 09:46

## 2021-07-20 RX ADMIN — LIDOCAINE HYDROCHLORIDE 5 ML: 20 INJECTION, SOLUTION EPIDURAL; INFILTRATION; INTRACAUDAL; PERINEURAL at 09:45

## 2021-07-20 NOTE — H&P
History and Physical -  [facility]  Broderick Tejeda 67 y o  female MRN: [id number]                  HPI: Broderick Tejeda is a 67y o  year old female who presents for colonoscopy for history of colon polyps  Last colonoscopy 6 years ago      REVIEW OF SYSTEMS: Per the HPI, and otherwise unremarkable  Historical Information   Past Medical History:   Diagnosis Date    Atrial fibrillation ([address] )     Headache     Hyperlipidemia     Hypertension     Lyme disease     Shortness of breath     TIA (transient ischemic attack)     Transient cerebral ischemia     Last assessed - 18    Vertigo      Past Surgical History:   Procedure Laterality Date    CARDIAC PACEMAKER PLACEMENT  2019    COLONOSCOPY      [place] SIGMOIDOSCOPY FLX DX W/COLLJ SPEC BR/WA IF PFRMD N/A 2017    Procedure: Adina Cohn;  Surgeon: Anna Marie Acosta MD;  Location: [facility]; Service: Gastroenterology    TONSILLECTOMY       Social History   Social History     Substance and Sexual Activity   Alcohol Use Yes    Alcohol/week: 7 0 standard drinks    Types: 7 Glasses of wine per week    Comment: occ     Social History     Substance and Sexual Activity   Drug Use No     Social History     Tobacco Use   Smoking Status Former Smoker    Types: Cigarettes    Quit date: 0    Years since quittin 5   Smokeless Tobacco Never Used     Family History   Problem Relation Age of Onset    Lung cancer Mother     Dementia Father     Alzheimer's disease Father     Other Father         Cardiac Disorder        Meds/Allergies     (Not in a hospital admission)      No Known Allergies    Objective     Blood pressure 119/83, pulse 76, temperature 97 6 °F (36 4 °C), temperature source Tympanic, resp  rate 19, height 5' 5" (1 651 m), weight 91 4 kg (201 lb 8 oz), SpO2 96 %        PHYSICAL EXAM    Gen: NAD  CV: RRR  CHEST: Clear  ABD: soft, NT/ND  EXT: no edema  Neuro: AAO      ASSESSMENT/PLAN:  This is a 67 y o  year old female here for history of colon polyps    PLAN:   Procedure:  Colonoscopy

## 2021-07-20 NOTE — ANESTHESIA POSTPROCEDURE EVALUATION
Post-Op Assessment Note    CV Status:  Stable  Pain Score: 0    Pain management: adequate     Mental Status:  Sleepy and arousable   Hydration Status:  Stable   PONV Controlled:  None   Airway Patency:  Patent       Staff: CRNA         No complications documented      BP   110/67   Temp      Pulse  80   Resp   20   SpO2   100

## 2021-07-20 NOTE — ANESTHESIA PREPROCEDURE EVALUATION
Procedure:  COLONOSCOPY    Relevant Problems   CARDIO   (+) Essential hypertension   (+) Mixed hyperlipidemia   (+) Nonrheumatic aortic valve insufficiency   (+) Nonrheumatic mitral valve regurgitation   (+) Paroxysmal atrial fibrillation (HCC)   (+) Sinus bradycardia      NEURO/PSYCH   (+) H/O TIA (transient ischemic attack) and stroke      PULMONARY   (+) Chronic obstructive pulmonary disease (HCC)      Other   (+) Myocardiopathy (HCC)   (+) Obesity (BMI 30-39  9)      Hypertension    Hyperlipidemia    TIA (transient ischemic attack)    Lyme disease    Headache    Vertigo    Transient cerebral ischemia Last assessed - 1/23/18   Shortness of breath    Atrial fibrillation (HCC)    Colon polyp      LEFT VENTRICLE:  Ejection fraction was estimated to be 60 %  There were no regional wall motion abnormalities  Concentric hypertrophy was present      RIGHT VENTRICLE:  Estimated peak pressure was at least 50 mmHg  Pacer lead noted      LEFT ATRIUM:  The atrium was mildly to moderately dilated      MITRAL VALVE:  There was moderate regurgitation      AORTIC VALVE:  There was mild regurgitation      TRICUSPID VALVE:  There was moderate to severe regurgitation      PULMONIC VALVE:  There was trace regurgitation  Physical Exam    Airway    Mallampati score: II  TM Distance: <3 FB  Neck ROM: full     Dental       Cardiovascular  Cardiovascular exam normal    Pulmonary  Pulmonary exam normal     Other Findings        Anesthesia Plan  ASA Score- 3     Anesthesia Type- IV sedation with anesthesia with ASA Monitors  Additional Monitors:   Airway Plan:           Plan Factors-Exercise tolerance (METS): >4 METS  Chart reviewed  EKG reviewed  Imaging results reviewed  Existing labs reviewed  Patient summary reviewed  Induction- intravenous  Postoperative Plan-     Informed Consent- Anesthetic plan and risks discussed with patient  I personally reviewed this patient with the CRNA   Discussed and agreed on the Anesthesia Plan with the CRNA  Madie Robert

## 2021-08-02 ENCOUNTER — OFFICE VISIT (OUTPATIENT)
Dept: FAMILY MEDICINE CLINIC | Facility: CLINIC | Age: 76
End: 2021-08-02
Payer: MEDICARE

## 2021-08-02 VITALS
HEIGHT: 65 IN | DIASTOLIC BLOOD PRESSURE: 84 MMHG | SYSTOLIC BLOOD PRESSURE: 122 MMHG | BODY MASS INDEX: 34.66 KG/M2 | HEART RATE: 88 BPM | WEIGHT: 208 LBS | OXYGEN SATURATION: 98 %

## 2021-08-02 DIAGNOSIS — Z79.01 ANTICOAGULANT LONG-TERM USE: ICD-10-CM

## 2021-08-02 DIAGNOSIS — I42.8 OTHER CARDIOMYOPATHY (HCC): ICD-10-CM

## 2021-08-02 DIAGNOSIS — I48.0 PAROXYSMAL ATRIAL FIBRILLATION (HCC): Chronic | ICD-10-CM

## 2021-08-02 DIAGNOSIS — J44.9 CHRONIC OBSTRUCTIVE PULMONARY DISEASE, UNSPECIFIED COPD TYPE (HCC): ICD-10-CM

## 2021-08-02 DIAGNOSIS — Z01.818 PRE-OP EXAMINATION: Primary | ICD-10-CM

## 2021-08-02 DIAGNOSIS — I10 ESSENTIAL HYPERTENSION: ICD-10-CM

## 2021-08-02 PROBLEM — R47.01 APHASIA, MIXED: Status: RESOLVED | Noted: 2017-07-28 | Resolved: 2021-08-02

## 2021-08-02 PROCEDURE — 99213 OFFICE O/P EST LOW 20 MIN: CPT | Performed by: STUDENT IN AN ORGANIZED HEALTH CARE EDUCATION/TRAINING PROGRAM

## 2021-08-02 PROCEDURE — 1123F ACP DISCUSS/DSCN MKR DOCD: CPT | Performed by: STUDENT IN AN ORGANIZED HEALTH CARE EDUCATION/TRAINING PROGRAM

## 2021-08-02 RX ORDER — OFLOXACIN 3 MG/ML
SOLUTION/ DROPS OPHTHALMIC
COMMUNITY
Start: 2021-07-22 | End: 2022-06-07 | Stop reason: ALTCHOICE

## 2021-08-02 RX ORDER — PREDNISOLONE ACETATE 10 MG/ML
SUSPENSION/ DROPS OPHTHALMIC
COMMUNITY
Start: 2021-07-24 | End: 2022-06-07 | Stop reason: ALTCHOICE

## 2021-08-02 NOTE — PROGRESS NOTES
PRE-OPERATIVE EVALUATION  [address]     NAME: Madeline Good  AGE: 67 y o  SEX: female  : 1945     DATE: 2021    Internal Medicine Pre-Operative Evaluation      Chief Complaint: Pre-operative Evaluation     Surgery: b/l cataract surgery  Anticipated Date of Surgery: 8/10/21 and 21  Referring Provider: No ref  provider found       History of Present Illness:     Daphne Gutierrez is a 67 y o  female who presents to the office today for a preoperative consultation at the request of surgeon Dr Uziel Marley who plans on performing b/l cataract surgery on 8/10 and   Planned anesthesia is MAC  Patient has a bleeding risk of: no recent abnormal bleeding, no remote history of abnormal bleeding and no use of Ca-channel blockers  Patient does not have objections to receiving blood products if needed  Current anti-platelet/anti-coagulation medications that the patient is prescribed includes: Apixaban (Eliquis)     Assessment of Chronic Conditions:   - COPD: well controlled  - Hypertension: well controlled  - Afib and myocardiomyopathy well controlled     Assessment of Cardiac Risk:  · Denies unstable or severe angina or MI in the last 6 weeks or history of stent placement in the last year   · Denies decompensated heart failure (e g  New onset heart failure, NYHA functional class IV heart failure, or worsening existing heart failure)  · Denies significant arrhythmias such as high grade AV block, symptomatic ventricular arrhythmia, newly recognized ventricular tachycardia, supraventricular tachycardia with resting heart rate >100, or symptomatic bradycardia  · Denies severe heart valve disease including aortic stenosis or symptomatic mitral stenosis     Exercise Capacity:  · Able to walk 4 blocks without symptoms?: Yes  · Able to walk 2 flights without symptoms?: Yes    Prior Anesthesia Reactions: No     Personal history of venous thromboembolic disease? No    History of steroid use for >2 weeks within last year? No    STOP-BANG Sleep Apnea Screening Questionnaire:         Review of Systems:     Review of Systems   Constitutional: Negative for chills, fatigue and fever  HENT: Negative for rhinorrhea and sore throat  Eyes: Negative for visual disturbance  Respiratory: Negative for cough and shortness of breath  Cardiovascular: Negative for chest pain and palpitations  Gastrointestinal: Negative for abdominal pain, constipation, diarrhea, nausea and vomiting  Genitourinary: Negative for difficulty urinating, dysuria and frequency  Musculoskeletal: Negative for arthralgias and myalgias  Skin: Negative for color change and rash  Neurological: Negative for weakness and headaches  Current Problem List:     Patient Active Problem List   Diagnosis    Essential hypertension    Mixed hyperlipidemia    Obesity (BMI 30-39  9)    H/O TIA (transient ischemic attack) and stroke    Migraine with aura and without status migrainosus, not intractable    Myocardiopathy (HCC)    Anticoagulant long-term use    Nonrheumatic mitral valve regurgitation    Nonrheumatic aortic valve insufficiency    Nonrheumatic tricuspid valve regurgitation    Sinus bradycardia    Paroxysmal atrial fibrillation (HCC)    Other insomnia    Chronic obstructive pulmonary disease (HCC)    Obesity, morbid (HCC)       Allergies:     No Known Allergies    Physical Exam:       Current Outpatient Medications:     apixaban (Eliquis) 5 mg, Take 1 tablet (5 mg total) by mouth 2 (two) times a day, Disp: 180 tablet, Rfl: 2    hydrOXYzine HCL (ATARAX) 25 mg tablet, Take 1 tablet (25 mg total) by mouth daily at bedtime, Disp: 30 tablet, Rfl: 0    lisinopril (ZESTRIL) 2 5 mg tablet, TAKE 1 TABLET BY MOUTH  DAILY, Disp: 90 tablet, Rfl: 3    lovastatin (MEVACOR) 10 MG tablet, Take 1 tablet (10 mg total) by mouth daily at bedtime, Disp: 90 tablet, Rfl: 2    metoprolol succinate (TOPROL-XL) 50 mg 24 hr tablet, Take 1 5 tablets (75 mg total) by mouth 2 (two) times a day, Disp: 270 tablet, Rfl: 2    ofloxacin (OCUFLOX) 0 3 % ophthalmic solution, PLEASE SEE ATTACHED FOR DETAILED DIRECTIONS, Disp: , Rfl:     prednisoLONE acetate (PRED FORTE) 1 % ophthalmic suspension, USE 1 DROP IN RIGHT EYE 4 TIMES DAILY *USE AFTER SURGERY, Disp: , Rfl:     sotalol (BETAPACE) 80 mg tablet, TAKE 1 AND 1/2 TABLETS BY  MOUTH EVERY 12 HOURS, Disp: 270 tablet, Rfl: 3    Past Medical History:       Past Medical History:   Diagnosis Date    Aphasia, mixed 2017    Atrial fibrillation (HCC)     Colon polyp     Headache     Hyperlipidemia     Hypertension     Lyme disease     Shortness of breath     TIA (transient ischemic attack)     Transient cerebral ischemia     Last assessed - 18    Vertigo         Past Surgical History:   Procedure Laterality Date    CARDIAC PACEMAKER PLACEMENT  2019    COLONOSCOPY      SC SIGMOIDOSCOPY FLX DX W/COLLJ SPEC BR/WA IF PFRMD N/A 2017    Procedure: Danial Wong;  Surgeon: Roopa Jordan MD;  Location: [facility]; Service: Gastroenterology    TONSILLECTOMY          Family History   Problem Relation Age of Onset    Lung cancer Mother    Mollie Sizer Dementia Father     Alzheimer's disease Father     Other Father         Cardiac Disorder         Social History     Socioeconomic History    Marital status: /Civil Union     Spouse name: Not on file    Number of children: Not on file    Years of education: Not on file    Highest education level: Not on file   Occupational History    Occupation: Retired   Tobacco Use    Smoking status: Former Smoker     Types: Cigarettes     Quit date:      Years since quittin 6    Smokeless tobacco: Never Used   Vaping Use    Vaping Use: Never used   Substance and Sexual Activity    Alcohol use:  Yes     Alcohol/week: 7 0 standard drinks     Types: 7 Glasses of wine per week     Comment: occ  Drug use: No    Sexual activity: Never     Partners: Male     Birth control/protection: None   Other Topics Concern    Not on file   Social History Narrative    Always uses seat belt     Social Determinants of Health     Financial Resource Strain:     Difficulty of Paying Living Expenses:    Food Insecurity:     Worried About Running Out of Food in the Last Year:     [address] in the Last Year:    Transportation Needs:     Lack of Transportation (Medical):  Lack of Transportation (Non-Medical):    Physical Activity:     Days of Exercise per Week:     Minutes of Exercise per Session:    Stress:     Feeling of Stress :    Social Connections:     Frequency of Communication with Friends and Family:     Frequency of Social Gatherings with Friends and Family:     Attends Rastafari Services:     Active Member of Clubs or Organizations:     Attends Club or Organization Meetings:     Marital Status:    Intimate Partner Violence:     Fear of Current or Ex-Partner:     Emotionally Abused:     Physically Abused:     Sexually Abused:         Physical Exam:     Physical Exam  Constitutional:       General: She is not in acute distress  Appearance: She is not ill-appearing  HENT:      Head: Normocephalic and atraumatic  Right Ear: External ear normal       Left Ear: External ear normal       Nose: Nose normal  No congestion or rhinorrhea  Mouth/Throat:      Mouth: Mucous membranes are moist       Pharynx: Oropharynx is clear  No oropharyngeal exudate or posterior oropharyngeal erythema  Eyes:      Extraocular Movements: Extraocular movements intact  Conjunctiva/sclera: Conjunctivae normal       Pupils: Pupils are equal, round, and reactive to light  Cardiovascular:      Rate and Rhythm: Normal rate and regular rhythm  Pulses: Normal pulses  Heart sounds: No murmur heard  Pulmonary:      Effort: Pulmonary effort is normal  No respiratory distress        Breath sounds: Normal breath sounds  No wheezing  Chest:      Chest wall: No tenderness  Abdominal:      General: Bowel sounds are normal       Palpations: Abdomen is soft  Tenderness: There is no abdominal tenderness  Musculoskeletal:         General: Normal range of motion  Cervical back: Normal range of motion  Skin:     General: Skin is warm and dry  Capillary Refill: Capillary refill takes less than 2 seconds  Findings: No rash  Neurological:      General: No focal deficit present  Mental Status: She is alert  Mental status is at baseline  Data:       Assessment & Recommendations:     1  Pre-op examination     2  Chronic obstructive pulmonary disease, unspecified COPD type ([facility] )     3  Paroxysmal atrial fibrillation ([facility] )     4  Anticoagulant long-term use     5  Essential hypertension     6  Other cardiomyopathy ([facility] )         Pre-Op Evaluation Assessment  67 y o  female with planned surgery: cataract  Known risk factors for perioperative complications: None  Cardiac Risk Estimation: per the Revised Cardiac Risk Index (Circ  [id number], 1999), the patient's risk factors for cardiac complications include npne, putting her in: RCI RISK CLASS I (0 risk factors, risk of major cardiac compl  appr  0 5%)  Current medications which may produce withdrawal symptoms if withheld perioperatively: none  Pre-Op Evaluation Plan  1  Further preoperative workup as follows:   - None; no further preoperative work-up is required    2  Change in medication regimen before surgery:   - Patient should continue antihypertensive medications up through and including the day of surgery  - Patient should continue beta-blocker medication up through and including the day of surgery  - Patient should continue his statin medication up through and including the day of surgery  - patient will hold eliquis the day before, and morning off the procedure  Should restart the night of    3  Prophylaxis for cardiac events with perioperative beta-blockers: not indicated  4  Patient requires further consultation with: None    Clearance  Patient is MEDICALLY OPTIMIZED for surgery without any additional cardiac testing       MD Sherrell Ellington [facility] [place] [id number]  Phone#  [phone]  Fax#  [phone]

## 2021-08-02 NOTE — ASSESSMENT & PLAN NOTE
Follows closely with Cardiology    Continue lisinopril, statin, beta-blocker and cardiology follow-up

## 2021-08-02 NOTE — ASSESSMENT & PLAN NOTE
"Subjective     Chief Complaint   Patient presents with   • Gynecologic Exam     c/o std testing       Xochilt Whittington is a 60 y.o.  whose LMP is No LMP recorded. Patient is postmenopausal. presents for std testing  Admits to unprotected IC  Denies sx's but has occl white d/c  No other c/o  Wants serum and vaginal testing    Pt of Dr Godinez  New to me with this concern  We are both masked d/t pandemic      No Additional Complaints Reported    The following portions of the patient's history were reviewed and updated as appropriate:vital signs, allergies, current medications, past family history, past medical history, past social history, past surgical history and problem list      Review of Systems   Genitourinary:positive for vaginal discharge and std screen     Objective      /74   Ht 177.8 cm (70\")   Wt 69.4 kg (153 lb)   Breastfeeding No   BMI 21.95 kg/m²     Physical Exam    General:   alert, comfortable and no distress   Heart: Not performed today   Lungs: Not performed today.   Breast: Not performed today   Neck: na   Abdomen: {Not performed today   CVA: Not performed today   Pelvis: External genitalia: normal general appearance  Vaginal: normal mucosa without prolapse or lesions  Cervix: normal appearance   Extremities: Not performed today   Neurologic: negative   Psychiatric: Normal affect, judgement, and mood       Lab Review   Labs: No data reviewed     Imaging   No data reviewed    Assessment/Plan     ASSESSMENT  1. Screen for STD (sexually transmitted disease)        PLAN  1.   Orders Placed This Encounter   Procedures   • NuSwab VG+ - Swab, Vagina   • RPR, Rfx Qn RPR / Confirm TP   • Hepatitis B Surface Antigen   • Hepatitis C Antibody   • HIV-1 / O / 2 Ag / Antibody 4th Generation       2. Std testing done today. Recommend rpt serum std testing in 6 months at her annual exam. Call for any problems or questions    Follow up: 6 month(s)    DHAVAL Huang  2020           " Well controlled on lisinopril and metoprolol

## 2021-08-02 NOTE — ASSESSMENT & PLAN NOTE
Very well controlled on sotalol and metoprolol  Status post pacemaker as well  Is also on Eliquis    Recommend hold Eliquis the day before and morning of and to restart that night after the procedure

## 2021-08-13 ENCOUNTER — IN-CLINIC DEVICE VISIT (OUTPATIENT)
Dept: CARDIOLOGY CLINIC | Facility: CLINIC | Age: 76
End: 2021-08-13
Payer: MEDICARE

## 2021-08-13 DIAGNOSIS — Z95.0 PRESENCE OF PERMANENT CARDIAC PACEMAKER: Primary | ICD-10-CM

## 2021-08-13 PROCEDURE — 93280 PM DEVICE PROGR EVAL DUAL: CPT | Performed by: INTERNAL MEDICINE

## 2021-08-13 NOTE — PROGRESS NOTES
MDT DUAL CHAMBER PM  - ACTIVE SYSTEM IS MRI CONDITIONAL   DEVICE INTERROGATED IN THE [place] OFFICE:  BATTERY VOLTAGE ADEQUATE )10 8 YR)   AP 95 5%  <0 1%   ALL LEAD PARAMETERS WITHIN NORMAL LIMITS   2 FAST A&V EPISODES SHOWING PAT ~ 150 BPM, WITH LONGEST EPISODE 28 SEC  [place] [place] PROGRAMMING CHANGES MADE TO DEVICE PARAMETERS   NORMAL DEVICE FUNCTION  Cecily Carlson

## 2021-10-09 DIAGNOSIS — I48.19 PERSISTENT ATRIAL FIBRILLATION (HCC): ICD-10-CM

## 2021-10-09 DIAGNOSIS — I10 ESSENTIAL HYPERTENSION: ICD-10-CM

## 2021-10-10 RX ORDER — LISINOPRIL 2.5 MG/1
TABLET ORAL
Qty: 90 TABLET | Refills: 3 | Status: SHIPPED | OUTPATIENT
Start: 2021-10-10 | End: 2022-07-12 | Stop reason: SDUPTHER

## 2021-10-10 RX ORDER — SOTALOL HYDROCHLORIDE 80 MG/1
TABLET ORAL
Qty: 270 TABLET | Refills: 3 | Status: SHIPPED | OUTPATIENT
Start: 2021-10-10 | End: 2022-07-12 | Stop reason: SDUPTHER

## 2021-10-11 DIAGNOSIS — I48.19 PERSISTENT ATRIAL FIBRILLATION (HCC): ICD-10-CM

## 2021-10-11 DIAGNOSIS — E78.2 MIXED HYPERLIPIDEMIA: ICD-10-CM

## 2021-10-11 DIAGNOSIS — I10 ESSENTIAL HYPERTENSION: ICD-10-CM

## 2021-10-12 RX ORDER — APIXABAN 5 MG/1
TABLET, FILM COATED ORAL
Qty: 180 TABLET | Refills: 3 | Status: SHIPPED | OUTPATIENT
Start: 2021-10-12 | End: 2022-07-12 | Stop reason: SDUPTHER

## 2021-10-12 RX ORDER — METOPROLOL SUCCINATE 50 MG/1
TABLET, EXTENDED RELEASE ORAL
Qty: 270 TABLET | Refills: 3 | Status: SHIPPED | OUTPATIENT
Start: 2021-10-12 | End: 2022-07-12 | Stop reason: SDUPTHER

## 2021-10-12 RX ORDER — LOVASTATIN 10 MG/1
TABLET ORAL
Qty: 90 TABLET | Refills: 3 | Status: SHIPPED | OUTPATIENT
Start: 2021-10-12 | End: 2022-07-12 | Stop reason: SDUPTHER

## 2021-10-26 NOTE — ASSESSMENT & PLAN NOTE
Admit to step-down  Serial troponin and EKG  ASA daily  No heparin drip aspiration is on Eliquis and took  her dose in the evening  Consult Cardiology in the morning  Echo in a m 
Await echocardiogram   Cardiology evaluation  Not a NSTEMI  Monitor    For stress tomorrow
Await echocardiogram   Cardiology evaluation  Not a NSTEMI  Monitor  Status post stress test   Now patient's heart rate did uncontrolled    Patient did not have her sotalol and beta-blocker prior to stress test
Continue home medication
Continue statin
Encourage weight
Encourage weight
Status post pacemaker,   post electrical conversion,   -continue home medication
Status post pacemaker,   post electrical conversion,   -continue home medication
Status post pacemaker,   post electrical conversion,   -patient is now AFib with RVR status post stress test   See above for the plan
Very mild elevation in troponin  This is a very unlikely acute coronary syndrome based on symptoms    Patient for stress test per Cardiology tomorrow
Very mild elevation in troponin  This is a very unlikely acute coronary syndrome based on symptoms  Patient has stress test today which was otherwise unremarkable  However patient sotalol and beta-blocker were held this morning and now she is in AFib with RVR  I did give her 10 mg of Lopressor as well as 10 mg of Cardizem  She is still going into the 130s to 140s  I did discuss with Dr Lis Tirado  I will check her blood pressure after an hour  For blood pressure is okay I will give her another dose of Cardizem and see whether she breaks and her heart rate remained stable then I can discharge her  I would increase her Toprol-XL to 75 b i d  She is on 50 b i d  right now    Otherwise if we cannot maintain the patient's heart rate she will need to stay 1 more day
PAST SURGICAL HISTORY:  S/P tonsillectomy

## 2021-11-16 ENCOUNTER — REMOTE DEVICE CLINIC VISIT (OUTPATIENT)
Dept: CARDIOLOGY CLINIC | Facility: CLINIC | Age: 76
End: 2021-11-16
Payer: MEDICARE

## 2021-11-16 DIAGNOSIS — Z95.0 PRESENCE OF PERMANENT CARDIAC PACEMAKER: Primary | ICD-10-CM

## 2021-11-16 PROCEDURE — 93296 REM INTERROG EVL PM/IDS: CPT | Performed by: INTERNAL MEDICINE

## 2021-11-16 PROCEDURE — 93294 REM INTERROG EVL PM/LDLS PM: CPT | Performed by: INTERNAL MEDICINE

## 2021-11-17 ENCOUNTER — IMMUNIZATIONS (OUTPATIENT)
Dept: FAMILY MEDICINE CLINIC | Facility: HOSPITAL | Age: 76
End: 2021-11-17

## 2021-11-17 DIAGNOSIS — Z23 ENCOUNTER FOR IMMUNIZATION: Primary | ICD-10-CM

## 2021-11-17 PROCEDURE — 91306 COVID-19 MODERNA VACC 0.25 ML BOOSTER: CPT

## 2021-11-17 PROCEDURE — 0064A COVID-19 MODERNA VACC 0.25 ML BOOSTER: CPT

## 2021-11-22 ENCOUNTER — APPOINTMENT (OUTPATIENT)
Dept: LAB | Facility: CLINIC | Age: 76
End: 2021-11-22
Payer: MEDICARE

## 2021-11-22 DIAGNOSIS — E78.2 MIXED HYPERLIPIDEMIA: ICD-10-CM

## 2021-11-22 DIAGNOSIS — I10 ESSENTIAL HYPERTENSION: ICD-10-CM

## 2021-11-22 LAB
ALBUMIN SERPL BCP-MCNC: 3.5 G/DL (ref 3.5–5)
ALP SERPL-CCNC: 87 U/L (ref 46–116)
ALT SERPL W P-5'-P-CCNC: 19 U/L (ref 12–78)
ANION GAP SERPL CALCULATED.3IONS-SCNC: 5 MMOL/L (ref 4–13)
AST SERPL W P-5'-P-CCNC: 19 U/L (ref 5–45)
BILIRUB SERPL-MCNC: 1.27 MG/DL (ref 0.2–1)
BUN SERPL-MCNC: 16 MG/DL (ref 5–25)
CALCIUM SERPL-MCNC: 9 MG/DL (ref 8.3–10.1)
CHLORIDE SERPL-SCNC: 108 MMOL/L (ref 100–108)
CHOLEST SERPL-MCNC: 147 MG/DL
CO2 SERPL-SCNC: 27 MMOL/L (ref 21–32)
CREAT SERPL-MCNC: 0.98 MG/DL (ref 0.6–1.3)
ERYTHROCYTE [DISTWIDTH] IN BLOOD BY AUTOMATED COUNT: 13.8 % (ref 11.6–15.1)
GFR SERPL CREATININE-BSD FRML MDRD: 56 ML/MIN/1.73SQ M
GLUCOSE P FAST SERPL-MCNC: 96 MG/DL (ref 65–99)
HCT VFR BLD AUTO: 45.9 % (ref 34.8–46.1)
HDLC SERPL-MCNC: 55 MG/DL
HGB BLD-MCNC: 14.8 G/DL (ref 11.5–15.4)
LDLC SERPL CALC-MCNC: 74 MG/DL (ref 0–100)
MCH RBC QN AUTO: 32.5 PG (ref 26.8–34.3)
MCHC RBC AUTO-ENTMCNC: 32.2 G/DL (ref 31.4–37.4)
MCV RBC AUTO: 101 FL (ref 82–98)
NONHDLC SERPL-MCNC: 92 MG/DL
PLATELET # BLD AUTO: 191 THOUSANDS/UL (ref 149–390)
PMV BLD AUTO: 12 FL (ref 8.9–12.7)
POTASSIUM SERPL-SCNC: 4.4 MMOL/L (ref 3.5–5.3)
PROT SERPL-MCNC: 7.4 G/DL (ref 6.4–8.2)
RBC # BLD AUTO: 4.55 MILLION/UL (ref 3.81–5.12)
SODIUM SERPL-SCNC: 140 MMOL/L (ref 136–145)
TRIGL SERPL-MCNC: 90 MG/DL
WBC # BLD AUTO: 6.93 THOUSAND/UL (ref 4.31–10.16)

## 2021-11-22 PROCEDURE — 80053 COMPREHEN METABOLIC PANEL: CPT

## 2021-11-22 PROCEDURE — 85027 COMPLETE CBC AUTOMATED: CPT

## 2021-11-22 PROCEDURE — 80061 LIPID PANEL: CPT

## 2021-11-22 PROCEDURE — 36415 COLL VENOUS BLD VENIPUNCTURE: CPT

## 2021-11-29 ENCOUNTER — OFFICE VISIT (OUTPATIENT)
Dept: FAMILY MEDICINE CLINIC | Facility: CLINIC | Age: 76
End: 2021-11-29
Payer: MEDICARE

## 2021-11-29 VITALS
WEIGHT: 204 LBS | DIASTOLIC BLOOD PRESSURE: 84 MMHG | HEIGHT: 65 IN | OXYGEN SATURATION: 98 % | SYSTOLIC BLOOD PRESSURE: 120 MMHG | BODY MASS INDEX: 33.99 KG/M2 | HEART RATE: 86 BPM

## 2021-11-29 DIAGNOSIS — E78.2 MIXED HYPERLIPIDEMIA: ICD-10-CM

## 2021-11-29 DIAGNOSIS — I10 ESSENTIAL HYPERTENSION: ICD-10-CM

## 2021-11-29 DIAGNOSIS — N18.31 STAGE 3A CHRONIC KIDNEY DISEASE (HCC): ICD-10-CM

## 2021-11-29 DIAGNOSIS — I48.0 PAROXYSMAL ATRIAL FIBRILLATION (HCC): Chronic | ICD-10-CM

## 2021-11-29 DIAGNOSIS — Z00.00 HEALTH CARE MAINTENANCE: Primary | ICD-10-CM

## 2021-11-29 PROCEDURE — G0439 PPPS, SUBSEQ VISIT: HCPCS | Performed by: FAMILY MEDICINE

## 2021-11-29 PROCEDURE — 99214 OFFICE O/P EST MOD 30 MIN: CPT | Performed by: FAMILY MEDICINE

## 2021-12-06 ENCOUNTER — OFFICE VISIT (OUTPATIENT)
Dept: CARDIOLOGY CLINIC | Facility: CLINIC | Age: 76
End: 2021-12-06
Payer: MEDICARE

## 2021-12-06 VITALS
SYSTOLIC BLOOD PRESSURE: 126 MMHG | BODY MASS INDEX: 33.66 KG/M2 | RESPIRATION RATE: 16 BRPM | HEART RATE: 78 BPM | WEIGHT: 202 LBS | DIASTOLIC BLOOD PRESSURE: 80 MMHG | HEIGHT: 65 IN | OXYGEN SATURATION: 98 %

## 2021-12-06 DIAGNOSIS — E66.9 OBESITY (BMI 30-39.9): ICD-10-CM

## 2021-12-06 DIAGNOSIS — I35.1 NONRHEUMATIC AORTIC VALVE INSUFFICIENCY: ICD-10-CM

## 2021-12-06 DIAGNOSIS — E78.2 MIXED HYPERLIPIDEMIA: ICD-10-CM

## 2021-12-06 DIAGNOSIS — I49.5 SSS (SICK SINUS SYNDROME) (HCC): ICD-10-CM

## 2021-12-06 DIAGNOSIS — Z95.0 PRESENCE OF PERMANENT CARDIAC PACEMAKER: ICD-10-CM

## 2021-12-06 DIAGNOSIS — I34.0 NONRHEUMATIC MITRAL VALVE REGURGITATION: ICD-10-CM

## 2021-12-06 DIAGNOSIS — I48.0 PAROXYSMAL ATRIAL FIBRILLATION (HCC): ICD-10-CM

## 2021-12-06 DIAGNOSIS — Z79.01 ANTICOAGULANT LONG-TERM USE: ICD-10-CM

## 2021-12-06 DIAGNOSIS — I42.9 CARDIOMYOPATHY, UNSPECIFIED TYPE (HCC): Primary | ICD-10-CM

## 2021-12-06 DIAGNOSIS — I36.1 NONRHEUMATIC TRICUSPID VALVE REGURGITATION: ICD-10-CM

## 2021-12-06 DIAGNOSIS — I10 ESSENTIAL HYPERTENSION: ICD-10-CM

## 2021-12-06 DIAGNOSIS — Z86.73 H/O TIA (TRANSIENT ISCHEMIC ATTACK) AND STROKE: ICD-10-CM

## 2021-12-06 PROCEDURE — 99214 OFFICE O/P EST MOD 30 MIN: CPT | Performed by: INTERNAL MEDICINE

## 2021-12-08 ENCOUNTER — TELEPHONE (OUTPATIENT)
Dept: FAMILY MEDICINE CLINIC | Facility: CLINIC | Age: 76
End: 2021-12-08

## 2021-12-08 DIAGNOSIS — B34.9 VIRAL INFECTION, UNSPECIFIED: Primary | ICD-10-CM

## 2021-12-09 PROCEDURE — U0003 INFECTIOUS AGENT DETECTION BY NUCLEIC ACID (DNA OR RNA); SEVERE ACUTE RESPIRATORY SYNDROME CORONAVIRUS 2 (SARS-COV-2) (CORONAVIRUS DISEASE [COVID-19]), AMPLIFIED PROBE TECHNIQUE, MAKING USE OF HIGH THROUGHPUT TECHNOLOGIES AS DESCRIBED BY CMS-2020-01-R: HCPCS | Performed by: FAMILY MEDICINE

## 2021-12-09 PROCEDURE — U0005 INFEC AGEN DETEC AMPLI PROBE: HCPCS | Performed by: FAMILY MEDICINE

## 2021-12-10 LAB — SARS-COV-2 RNA RESP QL NAA+PROBE: NEGATIVE

## 2021-12-13 ENCOUNTER — OFFICE VISIT (OUTPATIENT)
Dept: FAMILY MEDICINE CLINIC | Facility: CLINIC | Age: 76
End: 2021-12-13
Payer: MEDICARE

## 2021-12-13 VITALS
TEMPERATURE: 97.8 F | HEIGHT: 65 IN | BODY MASS INDEX: 33.61 KG/M2 | DIASTOLIC BLOOD PRESSURE: 82 MMHG | OXYGEN SATURATION: 100 % | SYSTOLIC BLOOD PRESSURE: 128 MMHG | HEART RATE: 152 BPM

## 2021-12-13 DIAGNOSIS — J32.9 RHINOSINUSITIS: Primary | ICD-10-CM

## 2021-12-13 DIAGNOSIS — J31.0 RHINOSINUSITIS: Primary | ICD-10-CM

## 2021-12-13 PROCEDURE — 99213 OFFICE O/P EST LOW 20 MIN: CPT | Performed by: FAMILY MEDICINE

## 2021-12-13 RX ORDER — LEVOFLOXACIN 500 MG/1
500 TABLET, FILM COATED ORAL EVERY 24 HOURS
Qty: 10 TABLET | Refills: 0 | Status: SHIPPED | OUTPATIENT
Start: 2021-12-13 | End: 2021-12-23

## 2021-12-13 RX ORDER — DEXTROMETHORPHAN HYDROBROMIDE AND PROMETHAZINE HYDROCHLORIDE 15; 6.25 MG/5ML; MG/5ML
5 SOLUTION ORAL 4 TIMES DAILY PRN
Qty: 180 ML | Refills: 0 | Status: SHIPPED | OUTPATIENT
Start: 2021-12-13 | End: 2022-06-07 | Stop reason: ALTCHOICE

## 2022-01-18 ENCOUNTER — OFFICE VISIT (OUTPATIENT)
Dept: CARDIOLOGY CLINIC | Facility: CLINIC | Age: 77
End: 2022-01-18
Payer: MEDICARE

## 2022-01-18 VITALS
HEART RATE: 74 BPM | BODY MASS INDEX: 34.16 KG/M2 | OXYGEN SATURATION: 97 % | SYSTOLIC BLOOD PRESSURE: 112 MMHG | DIASTOLIC BLOOD PRESSURE: 75 MMHG | WEIGHT: 205 LBS | HEIGHT: 65 IN

## 2022-01-18 DIAGNOSIS — Z79.01 ANTICOAGULANT LONG-TERM USE: ICD-10-CM

## 2022-01-18 DIAGNOSIS — I48.0 PAROXYSMAL ATRIAL FIBRILLATION (HCC): Chronic | ICD-10-CM

## 2022-01-18 DIAGNOSIS — N18.31 STAGE 3A CHRONIC KIDNEY DISEASE (HCC): ICD-10-CM

## 2022-01-18 DIAGNOSIS — I10 ESSENTIAL HYPERTENSION: ICD-10-CM

## 2022-01-18 DIAGNOSIS — E78.2 MIXED HYPERLIPIDEMIA: ICD-10-CM

## 2022-01-18 DIAGNOSIS — R00.1 SINUS BRADYCARDIA: ICD-10-CM

## 2022-01-18 DIAGNOSIS — I42.9 CARDIOMYOPATHY, UNSPECIFIED TYPE (HCC): ICD-10-CM

## 2022-01-18 DIAGNOSIS — J44.9 CHRONIC OBSTRUCTIVE PULMONARY DISEASE, UNSPECIFIED COPD TYPE (HCC): ICD-10-CM

## 2022-01-18 DIAGNOSIS — Z86.73 H/O TIA (TRANSIENT ISCHEMIC ATTACK) AND STROKE: ICD-10-CM

## 2022-01-18 DIAGNOSIS — E66.9 OBESITY (BMI 30-39.9): ICD-10-CM

## 2022-01-18 PROCEDURE — 93000 ELECTROCARDIOGRAM COMPLETE: CPT | Performed by: INTERNAL MEDICINE

## 2022-01-18 PROCEDURE — 99214 OFFICE O/P EST MOD 30 MIN: CPT | Performed by: INTERNAL MEDICINE

## 2022-01-18 NOTE — PROGRESS NOTES
Cardiology Follow Up    Rosio Joseph  1945  [id number]  [facility] [place]  [address]  [phone]    1  Paroxysmal atrial fibrillation (HCC)     2  Cardiomyopathy, unspecified type ([facility] [address] )  POCT ECG   3  Chronic obstructive pulmonary disease, unspecified COPD type ([facility] )     4  Essential hypertension     5  Sinus bradycardia     6  Stage 3a chronic kidney disease ([facility] )     7  Mixed hyperlipidemia     8  Obesity (BMI 30-39 9)     9  H/O TIA (transient ischemic attack) and stroke     10  Anticoagulant long-term use     11   BMI 33 0-33 9,adult         Interval History:   The patient feels very well  She is currently in atrially paced and ventricular sensed rhythm  The patient is here for follow-up of her atrial fibrillation     The patient is not complaining of anginal like chest pain or chest pressure  There is no worsening orthopnea, paroxysmal nocturnal dyspnea  There is some  leg swelling     No significant palpitation  There is no history of presyncope or syncope  There is rare history of transient  lightheadedness  There has been some improvement in exertional intolerance      Past medical history  The patient is a very pleasant 75year old lady referred to me by Dr Gabriel Fields for management of A fi + RVR + heart failure      She does have significant medical illnesses in the form of  PAF  Hypertension  Hyperlipidemia  CMP - suspected tachy mediated      As for the atrial fibrillation  It has been present for quite some time now  Initially to present as episodes of palpitation  This has progressively increased in frequency and duration         There is history of snoring at night  There is occasional history of morning fatigability  There is occasional history of daytime sleepiness    /75 (BP Location: Left arm, Patient Position: Sitting, Cuff Size: Large)   Pulse 74   Ht 5' 5" (1 651 m) Wt 93 kg (205 lb)   SpO2 97%   BMI 34 11 kg/m²       Patient Active Problem List   Diagnosis    Essential hypertension    Mixed hyperlipidemia    Obesity (BMI 30-39  9)    H/O TIA (transient ischemic attack) and stroke    Migraine with aura and without status migrainosus, not intractable    Myocardiopathy (HCC)    Anticoagulant long-term use    Nonrheumatic mitral valve regurgitation    Nonrheumatic aortic valve insufficiency    Nonrheumatic tricuspid valve regurgitation    Sinus bradycardia    Paroxysmal atrial fibrillation (HCC)    Other insomnia    Chronic obstructive pulmonary disease (HCC)    BMI 33 0-33 9,adult    Stage 3a chronic kidney disease (HCC)     Past Medical History:   Diagnosis Date    Aphasia, mixed 2017    Atrial fibrillation (HCC)     Colon polyp     Headache     Hyperlipidemia     Hypertension     Lyme disease     Shortness of breath     TIA (transient ischemic attack)     Transient cerebral ischemia     Last assessed - 18    Vertigo      Social History     Socioeconomic History    Marital status: /Civil Union     Spouse name: Not on file    Number of children: Not on file    Years of education: Not on file    Highest education level: Not on file   Occupational History    Occupation: Retired   Tobacco Use    Smoking status: Former Smoker     Types: Cigarettes     Quit date:      Years since quittin 0    Smokeless tobacco: Never Used   Vaping Use    Vaping Use: Never used   Substance and Sexual Activity    Alcohol use:  Yes     Alcohol/week: 7 0 standard drinks     Types: 7 Glasses of wine per week     Comment: occ    Drug use: No    Sexual activity: Never     Partners: Male     Birth control/protection: None   Other Topics Concern    Not on file   Social History Narrative    Always uses seat belt     Social Determinants of Health     Financial Resource Strain: Not on file   Food Insecurity: Not on file   Transportation Needs: Not on file   Physical Activity: Not on file   Stress: Not on file   Social Connections: Not on file   Intimate Partner Violence: Not on file   Housing Stability: Not on file      Family History   Problem Relation Age of Onset    Lung cancer Mother     Dementia Father     Alzheimer's disease Father     Other Father         Cardiac Disorder      Past Surgical History:   Procedure Laterality Date    CARDIAC PACEMAKER PLACEMENT  12/2019    COLONOSCOPY      MN SIGMOIDOSCOPY FLX DX W/COLLJ SPEC BR/WA IF PFRMD N/A 11/28/2017    Procedure: Ann Smith;  Surgeon: Reynaldo Manuel MD;  Location: [facility];   Service: Gastroenterology    TONSILLECTOMY         Current Outpatient Medications:     Eliquis 5 MG, TAKE 1 TABLET BY MOUTH  TWICE DAILY, Disp: 180 tablet, Rfl: 3    hydrOXYzine HCL (ATARAX) 25 mg tablet, Take 1 tablet (25 mg total) by mouth daily at bedtime, Disp: 30 tablet, Rfl: 0    lisinopril (ZESTRIL) 2 5 mg tablet, TAKE 1 TABLET BY MOUTH  DAILY, Disp: 90 tablet, Rfl: 3    lovastatin (MEVACOR) 10 MG tablet, TAKE 1 TABLET BY MOUTH  DAILY AT BEDTIME, Disp: 90 tablet, Rfl: 3    metoprolol succinate (TOPROL-XL) 50 mg 24 hr tablet, TAKE 1 AND 1/2 TABLETS BY  MOUTH TWICE DAILY, Disp: 270 tablet, Rfl: 3    ofloxacin (OCUFLOX) 0 3 % ophthalmic solution, PLEASE SEE ATTACHED FOR DETAILED DIRECTIONS (Patient not taking: Reported on 1/18/2022), Disp: , Rfl:     prednisoLONE acetate (PRED FORTE) 1 % ophthalmic suspension, USE 1 DROP IN RIGHT EYE 4 TIMES DAILY *USE AFTER SURGERY (Patient not taking: Reported on 1/18/2022), Disp: , Rfl:     Promethazine-DM (PHENERGAN-DM) 6 25-15 mg/5 mL oral syrup, Take 5 mL by mouth 4 (four) times a day as needed for cough (Patient not taking: Reported on 1/18/2022 ), Disp: 180 mL, Rfl: 0    sotalol (BETAPACE) 80 mg tablet, TAKE 1 AND 1/2 TABLETS BY  MOUTH EVERY 12 HOURS (Patient not taking: Reported on 1/18/2022), Disp: 270 tablet, Rfl: 3  No Known Allergies    Labs:  Lab Results   Component Value Date    K 4 4 11/22/2021     11/22/2021    CO2 27 11/22/2021    BUN 16 11/22/2021    CREATININE 0 98 11/22/2021    CALCIUM 9 0 11/22/2021     Lab Results   Component Value Date    TROPONINI 0 05 (H) 02/10/2019     Lab Results   Component Value Date    WBC 6 93 11/22/2021    HGB 14 8 11/22/2021    HCT 45 9 11/22/2021     (H) 11/22/2021     11/22/2021     Lab Results   Component Value Date    TRIG 90 11/22/2021    HDL 55 11/22/2021     Imaging:     DEVICE CHECK  11-    MDT DUAL CHAMBER PM  - ACTIVE SYSTEM IS MRI CONDITIONAL   CARELINK TRANSMISSION: BATTERY VOLTAGE ADEQUATE (10 6 YRS)  AP 90 7%  0 14% (AAIR-DDDR 60)  ALL AVAILABLE LEAD PARAMETERS WITHIN NORMAL LIMITS  3 DEVICE CLASSIFIED VT-MONITOR EPISODES W/ALL AVAIL  EGRMS FOR PAT, RVR, AVG RATE 154-162 BPM  EF 60% (7/2020)  79 AT/AF EPISODES FOR PAF, MAX EPISODE DURATION 03:42:38 HRS  AT/AF BURDEN 1 6%  HX: PAF & PT ON ELIQUIS, SOTATOL, METOPROLOL SUCC  NORMAL DEVICE FUNCTION   ES           ECHO  July 2020  SUMMARY     LEFT VENTRICLE:  Ejection fraction was estimated to be 60 %  There were no regional wall motion abnormalities  Concentric hypertrophy was present      RIGHT VENTRICLE:  Estimated peak pressure was at least 50 mmHg  Pacer lead noted      LEFT ATRIUM:  The atrium was mildly to moderately dilated      MITRAL VALVE:  There was moderate regurgitation      AORTIC VALVE:  There was mild regurgitation      TRICUSPID VALVE:  There was moderate to severe regurgitation      PULMONIC VALVE:  There was trace regurgitation                Echo , Oct 22 2018  LEFT VENTRICLE:  Systolic function was moderately reduced  Ejection fraction was estimated in the range of 35 % to 40 %  This study was inadequate for the evaluation of regional wall motion  Wall thickness was mildly increased    There was mild concentric hypertrophy      RIGHT VENTRICLE:  The size was normal   Systolic function was mildly reduced      LEFT ATRIUM:  The atrium was mildly dilated      RIGHT ATRIUM:  The atrium was dilated      MITRAL VALVE:  There was mild regurgitation      AORTIC VALVE:  There was mild regurgitation      TRICUSPID VALVE:  There was mild to moderate regurgitation  Pulmonary artery systolic pressure was at the upper limits of normal      PERICARDIUM:  A small pericardial effusion was identified  There was no evidence of hemodynamic compromise                  Nuclear stress  Feb 2018  SUMMARY:  -  Stress results: There was no chest pain during stress  -  ECG conclusions: The stress ECG was negative for ischemia and normal   -  Perfusion imaging: There were no perfusion defects  A large sized perfusion defect seen in most part of the anterior and anterolateral wall in the rest and supine stress images was seen to be significantly improved in the prone stress images indicating that it was likely a breast  attenuation artifact  -  Gated SPECT: The calculated left ventricular ejection fraction was 50 %  Left ventricular ejection fraction was within normal limits by visual estimate  There was no diagnostic evidence for left ventricular regional abnormality      IMPRESSIONS: Normal study after pharmacologic stress  Myocardial perfusion imaging was normal at rest and with stress  Left ventricular systolic function was normal             Review of Systems:  Review of Systems   All other systems reviewed and are negative  As described in my history of present illness    Physical Exam:  Physical Exam  Vitals reviewed  Constitutional:       Appearance: Normal appearance  She is well-developed  She is obese  She is not ill-appearing  Comments: Not in any distress at the current time   HENT:      Head: Normocephalic and atraumatic  Right Ear: External ear normal       Left Ear: External ear normal       Nose: Nose normal       Mouth/Throat:      Pharynx: Uvula midline     Eyes:      General: Lids are normal  No scleral icterus  Extraocular Movements: Extraocular movements intact  Conjunctiva/sclera: Conjunctivae normal       Pupils: Pupils are equal, round, and reactive to light  Comments: No pallor  No cyanosis  No icterus   Neck:      Thyroid: No thyromegaly  Vascular: No carotid bruit or JVD  Trachea: Trachea normal       Comments: No jugular lymphadenopathy  Short thick neck  Cardiovascular:      Rate and Rhythm: Normal rate and regular rhythm  Chest Wall: PMI is not displaced  Pulses: Normal pulses  Heart sounds: Normal heart sounds, S1 normal and S2 normal  No murmur heard  No friction rub  No gallop  No S3 or S4 sounds  Pulmonary:      Effort: Pulmonary effort is normal  No accessory muscle usage or respiratory distress  Breath sounds: Normal breath sounds  No decreased breath sounds, wheezing, rhonchi or rales  Chest:      Chest wall: No tenderness  Abdominal:      General: Bowel sounds are normal  There is no distension  Palpations: Abdomen is soft  There is no hepatomegaly, splenomegaly or mass  Tenderness: There is no abdominal tenderness  Comments: Central obesity present   Musculoskeletal:         General: Swelling present  No tenderness or deformity  Normal range of motion  Cervical back: Normal range of motion  Right lower leg: No edema  Left lower leg: Edema present  Lymphadenopathy:      Cervical: No cervical adenopathy  Skin:     Findings: No abrasion, erythema, lesion or rash  Nails: There is no clubbing  Neurological:      Mental Status: She is alert and oriented to person, place, and time  Comments: Facial symmetry is retained  Extraocular movements are retained  Head neck tongue and palate movement are retained and symmetric   Psychiatric:         Speech: Speech normal          Behavior: Behavior normal          Thought Content: Thought content normal          Discussion/Summary:  1   Persistent atrial fibrillation    Currently in atrial paced and ventricular sensed rhythm  Currently the patient is in rhythm  We had a very detailed discussion as far as management of atrial fibrillation   my detailed recommendation for this patient are as follows         1 - natural history of disease   atrial fibrillation is a disease of age   prevalence is 1% in the 4th decade , 2-3% by age 72 and 12-13% by age 80   since it increases with age , definitive therapy is indicated         2 - sleep apnea   untreated sleep apnea is the common cause of failure of medication as well as ablation   success rate of paroxysmal atrial fibrillation ablation falls from 80% in the 1st year, to 15% in the 1st year, for untreated severe sleep apnea   the patient has a history of occasional - snoring, morning fatigue at times and daytime sleepiness at times  physical examination for short thick neck and crowded posterior pharynx is -positive  patient is recommended to follow up with Pulmonary and Sleep Medicine - for REYNOLD        3 - thyroid function   hyperthyroidism is a common precipitator of atrial fibrillation   Check TSH - 2 05 in Jan 2018 and normal        4 -Aggressive management of  hypertension - controlled now  diabetes mellitus - check HbA1c  heart failure - currently EF has improved        5 - anticoagulation   patient's chads Vasc score is -5  congestive heart failure   hypertension   age   prior TIA   gender    patient is on eliquis     6 - rate control medication   beta-blocker - toprol XL  calcium channel blocker to be avoided as low EF        7 - rhythm control medication        class 3 agent - Sotalol   Keep potassium between 4 and 4 5   keep magnesium between 2 and 2 5   follow QTC   Follow creatinine   Patient is currently on sotalol         8 - ablation   this is the most effective method to achieve and maintain sinus rhythm      paroxysmal atrial fibrillation - 70-80% chance in the 1st year  and falls to 50% by 5 years persistent atrial fibrillation - 50-60% chance in the 1st year and falls to 30% or less by 5 years      we use a combination of cryo and radiofrequency ablation      there is a 2-5% chance of serious complication which include - bleeding around access site, heart attack , stroke , bleeding around the heart requiring drainage , perforation requiring open heart surgery , phrenic nerve paralysis causing diaphragmatic paralysis, atrial esophageal fistula   we do deployed multiple methods to prevent such complication :  - heparin anticoagulation with ACT between 350 and 400s to prevent stroke   - coronary angiography if we are going to ablate close to any major blood vessel   -access to the pericardial drain if pericardial effusion were to happen   - pressure sensing catheters to reduce excessive pressure and chances of perforation   - esophageal temperature monitoring to reduce chances of injury               Summary of my recommendations:  Patient is currently in sinus rhythm, on sotalol, is atrially paced and ventricular sensed  -If medication failed proceed with ablation  CRYO, NAVX, hold eliquis for day before and day of ablation              2  Cardiomyopathy , NYHA II, LVEF =35%  Improved currently to 60%  Suspected tachy mediated  Increasing beta blockers for better rate control  On ACEI  EF has improved to 60%           3  Hypertension  On lisinopril and metoprolol   Blood pressure is 132/86     4  Hyperlipidemia  On statin-lovastatin   No myositis or myalgia     5  Suspected REYNOLD  Need to be evaluated for CPAP         6  Obesity  BMI is 36 point  Patient advised on dietary him restrictions to lose weight        7  Long-term anticoagulation  Chads Vasc score is 5  To continue with apixaban        8   Sinus bradycardia, sick sinus syndrome  Patient is post pacemaker  All device parameters a stable                 Summary of my recommendation  Patient remains in sinus rhythm, atrially paced and ventricular sensed  Continue with antiarrhythmic therapy  Evaluate and treat for sleep apnea  Check hemoglobin A1c and TSH    If patient goes back into atrial fibrillation will need ablation

## 2022-01-18 NOTE — LETTER
January 18, 2022     Shabnam Sánchez DO  [address]  [address] [place], [place] [address]    Patient: Vinh Heath   YOB: 1945   Date of Visit: 1/18/2022       Dear Dr Mora He: Thank you for referring Rosalba Farris to me for evaluation  Below are my notes for this consultation  If you have questions, please do not hesitate to call me  I look forward to following your patient along with you  Sincerely,        Abhay Rangel MD        CC: Madeline Murray MD  1/18/2022  3:56 PM  Sign when Signing Visit                                             Cardiology Follow Up    Vinh Heath  1945  [id number]  [facility] [address]  [phone]  [phone]    0  Paroxysmal atrial fibrillation (HCC)     2  Cardiomyopathy, unspecified type ([facility] [address] )  POCT ECG   3  Chronic obstructive pulmonary disease, unspecified COPD type ([facility] [address] )     4  Essential hypertension     5  Sinus bradycardia     6  Stage 3a chronic kidney disease ([facility] [address] )     7  Mixed hyperlipidemia     8  Obesity (BMI 30-39 9)     9  H/O TIA (transient ischemic attack) and stroke     10  Anticoagulant long-term use     11   BMI 33 0-33 9,adult         Interval History:   The patient feels very well  She is currently in atrially paced and ventricular sensed rhythm  The patient is here for follow-up of her atrial fibrillation     The patient is not complaining of anginal like chest pain or chest pressure  There is no worsening orthopnea, paroxysmal nocturnal dyspnea  There is some  leg swelling     No significant palpitation  There is no history of presyncope or syncope  There is rare history of transient  lightheadedness  There has been some improvement in exertional intolerance      Past medical history  The patient is a very pleasant 75year old lady referred to me by Dr Arjun Joseph for management of A fi + RVR + heart failure      She does have significant medical illnesses in the form of  PAF  Hypertension  Hyperlipidemia  CMP - suspected tachy mediated      As for the atrial fibrillation  It has been present for quite some time now  Initially to present as episodes of palpitation  This has progressively increased in frequency and duration         There is history of snoring at night  There is occasional history of morning fatigability  There is occasional history of daytime sleepiness    /75 (BP Location: Left arm, Patient Position: Sitting, Cuff Size: Large)   Pulse 74   Ht 5' 5" (1 651 m)   Wt 93 kg (205 lb)   SpO2 97%   BMI 34 11 kg/m²       Patient Active Problem List   Diagnosis    Essential hypertension    Mixed hyperlipidemia    Obesity (BMI 30-39  9)    H/O TIA (transient ischemic attack) and stroke    Migraine with aura and without status migrainosus, not intractable    Myocardiopathy (HCC)    Anticoagulant long-term use    Nonrheumatic mitral valve regurgitation    Nonrheumatic aortic valve insufficiency    Nonrheumatic tricuspid valve regurgitation    Sinus bradycardia    Paroxysmal atrial fibrillation (HCC)    Other insomnia    Chronic obstructive pulmonary disease (HCC)    BMI 33 0-33 9,adult    Stage 3a chronic kidney disease (HCC)     Past Medical History:   Diagnosis Date    Aphasia, mixed 7/28/2017    Atrial fibrillation (HCC)     Colon polyp     Headache     Hyperlipidemia     Hypertension     Lyme disease     Shortness of breath     TIA (transient ischemic attack)     Transient cerebral ischemia     Last assessed - 1/23/18    Vertigo      Social History     Socioeconomic History    Marital status: /Civil Union     Spouse name: Not on file    Number of children: Not on file    Years of education: Not on file    Highest education level: Not on file   Occupational History    Occupation: Retired   Tobacco Use    Smoking status: Former Smoker     Types: Cigarettes     Quit date: 1977     Years since quittin 0    Smokeless tobacco: Never Used   Vaping Use    Vaping Use: Never used   Substance and Sexual Activity    Alcohol use: Yes     Alcohol/week: 7 0 standard drinks     Types: 7 Glasses of wine per week     Comment: occ    Drug use: No    Sexual activity: Never     Partners: Male     Birth control/protection: None   Other Topics Concern    Not on file   Social History Narrative    Always uses seat belt     Social Determinants of Health     Financial Resource Strain: Not on file   Food Insecurity: Not on file   Transportation Needs: Not on file   Physical Activity: Not on file   Stress: Not on file   Social Connections: Not on file   Intimate Partner Violence: Not on file   Housing Stability: Not on file      Family History   Problem Relation Age of Onset    Lung cancer Mother     Dementia Father     Alzheimer's disease Father     Other Father         Cardiac Disorder      Past Surgical History:   Procedure Laterality Date    CARDIAC PACEMAKER PLACEMENT  2019    COLONOSCOPY      [place] SIGMOIDOSCOPY FLX DX W/COLLJ SPEC BR/WA IF PFRMD N/A 2017    Procedure: Freddie Mcfadden;  Surgeon: Sujatha Bell MD;  Location: [facility];   Service: Gastroenterology    TONSILLECTOMY         Current Outpatient Medications:     Eliquis 5 MG, TAKE 1 TABLET BY MOUTH  TWICE DAILY, Disp: 180 tablet, Rfl: 3    hydrOXYzine HCL (ATARAX) 25 mg tablet, Take 1 tablet (25 mg total) by mouth daily at bedtime, Disp: 30 tablet, Rfl: 0    lisinopril (ZESTRIL) 2 5 mg tablet, TAKE 1 TABLET BY MOUTH  DAILY, Disp: 90 tablet, Rfl: 3    lovastatin (MEVACOR) 10 MG tablet, TAKE 1 TABLET BY MOUTH  DAILY AT BEDTIME, Disp: 90 tablet, Rfl: 3    metoprolol succinate (TOPROL-XL) 50 mg 24 hr tablet, TAKE 1 AND 1/2 TABLETS BY  MOUTH TWICE DAILY, Disp: 270 tablet, Rfl: 3    ofloxacin (OCUFLOX) 0 3 % ophthalmic solution, PLEASE SEE ATTACHED FOR DETAILED DIRECTIONS (Patient not taking: Reported on 2022), Disp: , Rfl:   prednisoLONE acetate (PRED FORTE) 1 % ophthalmic suspension, USE 1 DROP IN RIGHT EYE 4 TIMES DAILY *USE AFTER SURGERY (Patient not taking: Reported on 1/18/2022), Disp: , Rfl:     Promethazine-DM (PHENERGAN-DM) 6 25-15 mg/5 mL oral syrup, Take 5 mL by mouth 4 (four) times a day as needed for cough (Patient not taking: Reported on 1/18/2022 ), Disp: 180 mL, Rfl: 0    sotalol (BETAPACE) 80 mg tablet, TAKE 1 AND 1/2 TABLETS BY  MOUTH EVERY 12 HOURS (Patient not taking: Reported on 1/18/2022), Disp: 270 tablet, Rfl: 3  No Known Allergies    Labs:  Lab Results   Component Value Date    K 4 4 11/22/2021     11/22/2021    CO2 27 11/22/2021    BUN 16 11/22/2021    CREATININE 0 98 11/22/2021    CALCIUM 9 0 11/22/2021     Lab Results   Component Value Date    TROPONINI 0 05 (H) 02/10/2019     Lab Results   Component Value Date    WBC 6 93 11/22/2021    HGB 14 8 11/22/2021    HCT 45 9 11/22/2021     (H) 11/22/2021     11/22/2021     Lab Results   Component Value Date    TRIG 90 11/22/2021    HDL 55 11/22/2021     Imaging:     DEVICE CHECK  11-    MDT DUAL CHAMBER PM  - ACTIVE SYSTEM IS MRI CONDITIONAL   CARELINK TRANSMISSION: BATTERY VOLTAGE ADEQUATE (10 6 YRS)  AP 90 7%  0 14% (AAIR-DDDR 60)  ALL AVAILABLE LEAD PARAMETERS WITHIN NORMAL LIMITS  3 DEVICE CLASSIFIED VT-MONITOR EPISODES W/ALL AVAIL  EGRMS FOR PAT, RVR, AVG RATE 154-162 BPM  EF 60% (7/2020)  79 AT/AF EPISODES FOR PAF, MAX EPISODE DURATION 03:42:38 HRS  AT/AF BURDEN 1 6%  HX: PAF & PT ON ELIQUIS, SOTATOL, METOPROLOL SUCC  NORMAL DEVICE FUNCTION   ES           ECHO  July 2020  SUMMARY     LEFT VENTRICLE:  Ejection fraction was estimated to be 60 %  There were no regional wall motion abnormalities  Concentric hypertrophy was present      RIGHT VENTRICLE:  Estimated peak pressure was at least 50 mmHg   Pacer lead noted      LEFT ATRIUM:  The atrium was mildly to moderately dilated      MITRAL VALVE:  There was moderate regurgitation      AORTIC VALVE:  There was mild regurgitation      TRICUSPID VALVE:  There was moderate to severe regurgitation      PULMONIC VALVE:  There was trace regurgitation                Echo , Oct 22 2018  LEFT VENTRICLE:  Systolic function was moderately reduced  Ejection fraction was estimated in the range of 35 % to 40 %  This study was inadequate for the evaluation of regional wall motion  Wall thickness was mildly increased  There was mild concentric hypertrophy      RIGHT VENTRICLE:  The size was normal   Systolic function was mildly reduced      LEFT ATRIUM:  The atrium was mildly dilated      RIGHT ATRIUM:  The atrium was dilated      MITRAL VALVE:  There was mild regurgitation      AORTIC VALVE:  There was mild regurgitation      TRICUSPID VALVE:  There was mild to moderate regurgitation  Pulmonary artery systolic pressure was at the upper limits of normal      PERICARDIUM:  A small pericardial effusion was identified  There was no evidence of hemodynamic compromise                  Nuclear stress  Feb 2018  SUMMARY:  -  Stress results: There was no chest pain during stress  -  ECG conclusions: The stress ECG was negative for ischemia and normal   -  Perfusion imaging: There were no perfusion defects  A large sized perfusion defect seen in most part of the anterior and anterolateral wall in the rest and supine stress images was seen to be significantly improved in the prone stress images indicating that it was likely a breast  attenuation artifact  -  Gated SPECT: The calculated left ventricular ejection fraction was 50 %  Left ventricular ejection fraction was within normal limits by visual estimate  There was no diagnostic evidence for left ventricular regional abnormality      IMPRESSIONS: Normal study after pharmacologic stress  Myocardial perfusion imaging was normal at rest and with stress   Left ventricular systolic function was normal             Review of Systems:  Review of Systems All other systems reviewed and are negative  As described in my history of present illness    Physical Exam:  Physical Exam  Vitals reviewed  Constitutional:       Appearance: Normal appearance  She is well-developed  She is obese  She is not ill-appearing  Comments: Not in any distress at the current time   HENT:      Head: Normocephalic and atraumatic  Right Ear: External ear normal       Left Ear: External ear normal       Nose: Nose normal       Mouth/Throat:      Pharynx: Uvula midline  Eyes:      General: Lids are normal  No scleral icterus  Extraocular Movements: Extraocular movements intact  Conjunctiva/sclera: Conjunctivae normal       Pupils: Pupils are equal, round, and reactive to light  Comments: No pallor  No cyanosis  No icterus   Neck:      Thyroid: No thyromegaly  Vascular: No carotid bruit or JVD  Trachea: Trachea normal       Comments: No jugular lymphadenopathy  Short thick neck  Cardiovascular:      Rate and Rhythm: Normal rate and regular rhythm  Chest Wall: PMI is not displaced  Pulses: Normal pulses  Heart sounds: Normal heart sounds, S1 normal and S2 normal  No murmur heard  No friction rub  No gallop  No S3 or S4 sounds  Pulmonary:      Effort: Pulmonary effort is normal  No accessory muscle usage or respiratory distress  Breath sounds: Normal breath sounds  No decreased breath sounds, wheezing, rhonchi or rales  Chest:      Chest wall: No tenderness  Abdominal:      General: Bowel sounds are normal  There is no distension  Palpations: Abdomen is soft  There is no hepatomegaly, splenomegaly or mass  Tenderness: There is no abdominal tenderness  Comments: Central obesity present   Musculoskeletal:         General: Swelling present  No tenderness or deformity  Normal range of motion  Cervical back: Normal range of motion  Right lower leg: No edema  Left lower leg: Edema present  Lymphadenopathy:      Cervical: No cervical adenopathy  Skin:     Findings: No abrasion, erythema, lesion or rash  Nails: There is no clubbing  Neurological:      Mental Status: She is alert and oriented to person, place, and time  Comments: Facial symmetry is retained  Extraocular movements are retained  Head neck tongue and palate movement are retained and symmetric   Psychiatric:         Speech: Speech normal          Behavior: Behavior normal          Thought Content: Thought content normal          Discussion/Summary:  1  Persistent atrial fibrillation    Currently in atrial paced and ventricular sensed rhythm  Currently the patient is in rhythm  We had a very detailed discussion as far as management of atrial fibrillation   my detailed recommendation for this patient are as follows         1 - natural history of disease   atrial fibrillation is a disease of age   prevalence is 1% in the 4th decade , 2-3% by age 72 and 12-13% by age 80   since it increases with age , definitive therapy is indicated         2 - sleep apnea   untreated sleep apnea is the common cause of failure of medication as well as ablation   success rate of paroxysmal atrial fibrillation ablation falls from 80% in the 1st year, to 15% in the 1st year, for untreated severe sleep apnea   the patient has a history of occasional - snoring, morning fatigue at times and daytime sleepiness at times  physical examination for short thick neck and crowded posterior pharynx is -positive  patient is recommended to follow up with Pulmonary and Sleep Medicine - for REYNOLD        3 - thyroid function   hyperthyroidism is a common precipitator of atrial fibrillation   Check TSH - 2 05 in Jan 2018 and normal        4 -Aggressive management of  hypertension - controlled now  diabetes mellitus - check HbA1c  heart failure - currently EF has improved        5 - anticoagulation   patient's chads Vasc score is -5  congestive heart failure hypertension   age   prior TIA   gender    patient is on eliquis     6 - rate control medication   beta-blocker - toprol XL  calcium channel blocker to be avoided as low EF        7 - rhythm control medication        class 3 agent - Sotalol   Keep potassium between 4 and 4 5   keep magnesium between 2 and 2 5   follow QTC   Follow creatinine   Patient is currently on sotalol         8 - ablation   this is the most effective method to achieve and maintain sinus rhythm      paroxysmal atrial fibrillation - 70-80% chance in the 1st year  and falls to 50% by 5 years   persistent atrial fibrillation - 50-60% chance in the 1st year and falls to 30% or less by 5 years      we use a combination of cryo and radiofrequency ablation      there is a 2-5% chance of serious complication which include - bleeding around access site, heart attack , stroke , bleeding around the heart requiring drainage , perforation requiring open heart surgery , phrenic nerve paralysis causing diaphragmatic paralysis, atrial esophageal fistula   we do deployed multiple methods to prevent such complication :  - heparin anticoagulation with ACT between 350 and 400s to prevent stroke   - coronary angiography if we are going to ablate close to any major blood vessel   -access to the pericardial drain if pericardial effusion were to happen   - pressure sensing catheters to reduce excessive pressure and chances of perforation   - esophageal temperature monitoring to reduce chances of injury               Summary of my recommendations:  Patient is currently in sinus rhythm, on sotalol, is atrially paced and ventricular sensed  -If medication failed proceed with ablation  CRYO, NAVX, hold eliquis for day before and day of ablation              2  Cardiomyopathy , NYHA II, LVEF =35%  Improved currently to 60%  Suspected tachy mediated  Increasing beta blockers for better rate control  On ACEI  EF has improved to 60%           3   Hypertension  On lisinopril and metoprolol   Blood pressure is 132/86     4  Hyperlipidemia  On statin-lovastatin   No myositis or myalgia     5  Suspected REYNOLD  Need to be evaluated for CPAP         6  Obesity  BMI is 36 point  Patient advised on dietary him restrictions to lose weight        7  Long-term anticoagulation  Chads Vasc score is 5  To continue with apixaban        8   Sinus bradycardia, sick sinus syndrome  Patient is post pacemaker  All device parameters a stable                 Summary of my recommendation  Patient remains in sinus rhythm, atrially paced and ventricular sensed  Continue with antiarrhythmic therapy  Evaluate and treat for sleep apnea  Check hemoglobin A1c and TSH    If patient goes back into atrial fibrillation will need ablation

## 2022-01-19 NOTE — TELEPHONE ENCOUNTER
Discharge instructions reviewed with the patient. Patient verbalized understanding and verified by teach back. All questions answered. IV discontinued, no redness, swelling or pain noted. Patient awaiting family for transportation home with an ETA of 30 minutes. Patient discharged off the unit via wheelchair. Patient armband removed and shredded. Perfect, thank you so much Trisch!!

## 2022-02-16 ENCOUNTER — REMOTE DEVICE CLINIC VISIT (OUTPATIENT)
Dept: CARDIOLOGY CLINIC | Facility: CLINIC | Age: 77
End: 2022-02-16
Payer: MEDICARE

## 2022-02-16 DIAGNOSIS — Z95.0 PRESENCE OF CARDIAC PACEMAKER: Primary | ICD-10-CM

## 2022-02-16 PROCEDURE — 93296 REM INTERROG EVL PM/IDS: CPT | Performed by: INTERNAL MEDICINE

## 2022-02-16 PROCEDURE — 93294 REM INTERROG EVL PM/LDLS PM: CPT | Performed by: INTERNAL MEDICINE

## 2022-02-16 NOTE — PROGRESS NOTES
Results for orders placed or performed in visit on 02/16/22   Cardiac EP device report    Narrative    MDT DUAL CHAMBER PM  - ACTIVE SYSTEM IS MRI CONDITIONAL  CARELINK TRANSMISSION: BATTERY VOLTAGE ADEQUATE (10 2 YRS)  AP: 91 2%  : 0 3% (MVP-ON)  ALL AVAILABLE LEAD PARAMETERS WITHIN NORMAL LIMITS  8 VT EPISODES W/ AVAIL EGMS SHOWING AF/RVR W/ -171 BPM  75 FAST A & V EPISODES W/ AVAIL EGMS SHOWING RVR W/ -176 BPM, MAX DURATION 3 MINS 57 SECS  479 AT/AF EPISODES W/ AVAIL EGMS SHOWING AF, MAX DURATION 1 HR 50 MINS  AF BURDEN: 5 8%  PT TAKES ELIQUIS, METOPROLOL SUCC, SOTALOL  EF: 60% (ECHO 7/30/20)  PACEMAKER FUNCTIONING APPROPRIATELY    [address] [place] [address] Street

## 2022-06-01 ENCOUNTER — OFFICE VISIT (OUTPATIENT)
Dept: FAMILY MEDICINE CLINIC | Facility: CLINIC | Age: 77
End: 2022-06-01
Payer: MEDICARE

## 2022-06-01 VITALS
WEIGHT: 202 LBS | TEMPERATURE: 97.5 F | OXYGEN SATURATION: 95 % | DIASTOLIC BLOOD PRESSURE: 70 MMHG | HEIGHT: 65 IN | BODY MASS INDEX: 33.66 KG/M2 | SYSTOLIC BLOOD PRESSURE: 118 MMHG | HEART RATE: 89 BPM

## 2022-06-01 DIAGNOSIS — J06.9 URI, ACUTE: ICD-10-CM

## 2022-06-01 DIAGNOSIS — R09.81 CONGESTION OF NASAL SINUS: Primary | ICD-10-CM

## 2022-06-01 LAB
SARS-COV-2 AG UPPER RESP QL IA: NEGATIVE
VALID CONTROL: NORMAL

## 2022-06-01 PROCEDURE — 99213 OFFICE O/P EST LOW 20 MIN: CPT | Performed by: FAMILY MEDICINE

## 2022-06-01 PROCEDURE — U0005 INFEC AGEN DETEC AMPLI PROBE: HCPCS | Performed by: FAMILY MEDICINE

## 2022-06-01 PROCEDURE — 87811 SARS-COV-2 COVID19 W/OPTIC: CPT | Performed by: FAMILY MEDICINE

## 2022-06-01 PROCEDURE — U0003 INFECTIOUS AGENT DETECTION BY NUCLEIC ACID (DNA OR RNA); SEVERE ACUTE RESPIRATORY SYNDROME CORONAVIRUS 2 (SARS-COV-2) (CORONAVIRUS DISEASE [COVID-19]), AMPLIFIED PROBE TECHNIQUE, MAKING USE OF HIGH THROUGHPUT TECHNOLOGIES AS DESCRIBED BY CMS-2020-01-R: HCPCS | Performed by: FAMILY MEDICINE

## 2022-06-01 RX ORDER — AZITHROMYCIN 250 MG/1
TABLET, FILM COATED ORAL
Qty: 6 TABLET | Refills: 0 | Status: SHIPPED | OUTPATIENT
Start: 2022-06-01 | End: 2022-06-05

## 2022-06-01 NOTE — PROGRESS NOTES
Assessment/Plan:         Problem List Items Addressed This Visit        Respiratory    URI, acute    Relevant Medications    azithromycin (ZITHROMAX) 250 mg tablet    Other Relevant Orders    COVID Only- Office Collect      Other Visit Diagnoses     Congestion of nasal sinus    -  Primary    Relevant Orders    POCT Rapid Covid Ag (Completed)            Subjective:      Patient ID: Al Garcia is a 67 y o  female  Patient comes in with a four-day history of sinus congestion, sore throat and cough  The following portions of the patient's history were reviewed and updated as appropriate:   Past Medical History:  She has a past medical history of Aphasia, mixed (7/28/2017), Atrial fibrillation ([facility] [address] ), Colon polyp, Headache, Hyperlipidemia, Hypertension, Lyme disease, Shortness of breath, TIA (transient ischemic attack), Transient cerebral ischemia, and Vertigo  ,  _______________________________________________________________________  Medical Problems:  does not have any pertinent problems on file ,  _______________________________________________________________________  Past Surgical History:   has a past surgical history that includes Tonsillectomy; Colonoscopy; pr sigmoidoscopy flx dx w/collj spec br/wa if pfrmd (N/A, 11/28/2017); and Cardiac pacemaker placement (12/2019)  ,  _______________________________________________________________________  Family History:  family history includes Alzheimer's disease in her father; Dementia in her father; Lung cancer in her mother; Other in her father ,  _______________________________________________________________________  Social History:   reports that she quit smoking about 45 years ago  Her smoking use included cigarettes  She has never used smokeless tobacco  She reports current alcohol use of about 7 0 standard drinks of alcohol per week   She reports that she does not use drugs ,  _______________________________________________________________________  Allergies:  has No Known Allergies     _______________________________________________________________________  Current Outpatient Medications   Medication Sig Dispense Refill    azithromycin (ZITHROMAX) 250 mg tablet Take 2 tablets today then 1 tablet daily x 4 days 6 tablet 0    Eliquis 5 MG TAKE 1 TABLET BY MOUTH  TWICE DAILY 180 tablet 3    lisinopril (ZESTRIL) 2 5 mg tablet TAKE 1 TABLET BY MOUTH  DAILY 90 tablet 3    lovastatin (MEVACOR) 10 MG tablet TAKE 1 TABLET BY MOUTH  DAILY AT BEDTIME 90 tablet 3    metoprolol succinate (TOPROL-XL) 50 mg 24 hr tablet TAKE 1 AND 1/2 TABLETS BY  MOUTH TWICE DAILY 270 tablet 3    sotalol (BETAPACE) 80 mg tablet TAKE 1 AND 1/2 TABLETS BY  MOUTH EVERY 12 HOURS 270 tablet 3    hydrOXYzine HCL (ATARAX) 25 mg tablet Take 1 tablet (25 mg total) by mouth daily at bedtime (Patient not taking: Reported on 6/1/2022) 30 tablet 0    ofloxacin (OCUFLOX) 0 3 % ophthalmic solution PLEASE SEE ATTACHED FOR DETAILED DIRECTIONS (Patient not taking: No sig reported)      prednisoLONE acetate (PRED FORTE) 1 % ophthalmic suspension USE 1 DROP IN RIGHT EYE 4 TIMES DAILY *USE AFTER SURGERY (Patient not taking: No sig reported)      Promethazine-DM (PHENERGAN-DM) 6 25-15 mg/5 mL oral syrup Take 5 mL by mouth 4 (four) times a day as needed for cough (Patient not taking: No sig reported) 180 mL 0     No current facility-administered medications for this visit      _______________________________________________________________________  Review of Systems   Constitutional: Negative  HENT: Positive for congestion and sore throat  Respiratory: Positive for cough  Gastrointestinal: Negative            Objective:  Vitals:    06/01/22 0933   BP: 118/70   BP Location: Left arm   Patient Position: Sitting   Cuff Size: Standard   Pulse: 89   Temp: 97 5 °F (36 4 °C)   SpO2: 95%   Weight: 91 6 kg (202 lb)   Height: 5' 5" (1 651 m)     Body mass index is 33 61 kg/m²  Physical Exam  Constitutional:       Appearance: She is well-developed  HENT:      Head: Normocephalic and atraumatic  Right Ear: Tympanic membrane, ear canal and external ear normal       Left Ear: Tympanic membrane, ear canal and external ear normal       Nose: Congestion present  Mouth/Throat:      Mouth: Mucous membranes are moist       Pharynx: Oropharynx is clear  Eyes:      Pupils: Pupils are equal, round, and reactive to light  Cardiovascular:      Rate and Rhythm: Normal rate and regular rhythm  Heart sounds: Normal heart sounds  Pulmonary:      Effort: Pulmonary effort is normal       Breath sounds: Normal breath sounds  Abdominal:      Palpations: Abdomen is soft  Musculoskeletal:         General: Normal range of motion  Cervical back: Neck supple  Lymphadenopathy:      Cervical: No cervical adenopathy  Skin:     General: Skin is warm and dry  Neurological:      Mental Status: She is alert     Psychiatric:         Mood and Affect: Mood normal          Behavior: Behavior normal

## 2022-06-02 LAB — SARS-COV-2 RNA RESP QL NAA+PROBE: NEGATIVE

## 2022-06-07 ENCOUNTER — OFFICE VISIT (OUTPATIENT)
Dept: CARDIOLOGY CLINIC | Facility: CLINIC | Age: 77
End: 2022-06-07
Payer: MEDICARE

## 2022-06-07 VITALS
DIASTOLIC BLOOD PRESSURE: 82 MMHG | HEART RATE: 84 BPM | OXYGEN SATURATION: 98 % | HEIGHT: 65 IN | WEIGHT: 202 LBS | BODY MASS INDEX: 33.66 KG/M2 | RESPIRATION RATE: 16 BRPM | SYSTOLIC BLOOD PRESSURE: 128 MMHG

## 2022-06-07 DIAGNOSIS — I49.5 SSS (SICK SINUS SYNDROME) (HCC): ICD-10-CM

## 2022-06-07 DIAGNOSIS — E66.9 OBESITY (BMI 30-39.9): ICD-10-CM

## 2022-06-07 DIAGNOSIS — Z79.01 ANTICOAGULANT LONG-TERM USE: ICD-10-CM

## 2022-06-07 DIAGNOSIS — I34.0 NONRHEUMATIC MITRAL VALVE REGURGITATION: ICD-10-CM

## 2022-06-07 DIAGNOSIS — Z86.73 H/O TIA (TRANSIENT ISCHEMIC ATTACK) AND STROKE: ICD-10-CM

## 2022-06-07 DIAGNOSIS — I36.1 NONRHEUMATIC TRICUSPID VALVE REGURGITATION: ICD-10-CM

## 2022-06-07 DIAGNOSIS — I48.0 PAROXYSMAL ATRIAL FIBRILLATION (HCC): Primary | ICD-10-CM

## 2022-06-07 DIAGNOSIS — I42.9 CARDIOMYOPATHY, UNSPECIFIED TYPE (HCC): ICD-10-CM

## 2022-06-07 DIAGNOSIS — Z95.0 PRESENCE OF CARDIAC PACEMAKER: ICD-10-CM

## 2022-06-07 DIAGNOSIS — E78.2 MIXED HYPERLIPIDEMIA: ICD-10-CM

## 2022-06-07 DIAGNOSIS — I35.1 NONRHEUMATIC AORTIC VALVE INSUFFICIENCY: ICD-10-CM

## 2022-06-07 PROCEDURE — 99214 OFFICE O/P EST MOD 30 MIN: CPT | Performed by: INTERNAL MEDICINE

## 2022-06-07 NOTE — PROGRESS NOTES
CARDIOLOGY OFFICE VISIT  [facility]  [address], [address] [facility], [facility] Rohan Obando  Tel: [phone]      NAME: Samia Hankins  AGE: 67 y o  SEX: female  : 1945   MRN: [id number]      Chief Complaint:  Chief Complaint   Patient presents with    Follow-up         History of Present Illness:   Patient comes for follow up  States she is doing well from cardiac stand point and denies chest pain / pressure, SOB, palpitations, lightheadedness, syncope, swelling feet, orthopnea, PND, claudication  H/o tachycardia mediated CMP -  Even though the patient was feeling well, her EF by echo had gone down to 35-40% from prior EF 40-45%  Hence she was referred to EP for rhythm control  Prior normal stress test  Was likely tachycardia mediated CMP  After rhythm control her EF recovered back to 60% (2019, 2020)     PAF - Patient was diagnosed with atrial fibrillation on an EKG done at her PCP office  Patient had no symptoms from it  She was cardioverted and was in sinus rhythm for a while but then went back into Afib  Then she was started on rhythm control (Sotalol) for her A fib  Currently she is in sinus rhythm  She is on Eliquis for anticoagulation  Denies bleeding from any site    SSS s/p PPM    HLP -  Has had hyperlipidemia for many years  [place] Clear statin regularly along with diet control   Denies myalgia   PCP closely monitoring the blood work      Mod MR, Mild AR, Mod to severe TR -   asymptomatic   Being followed up with serial echocardiograms at recommended intervals     Obesity - Trying to lose weight, she states     H/O TIA    Past Medical History:  Past Medical History:   Diagnosis Date    Aphasia, mixed 2017    Atrial fibrillation (HCC)     Colon polyp     Headache     Hyperlipidemia     Hypertension     Lyme disease     Shortness of breath     TIA (transient ischemic attack)     Transient cerebral ischemia Last assessed - 18    Vertigo          Past Surgical History:  Past Surgical History:   Procedure Laterality Date    CARDIAC PACEMAKER PLACEMENT  2019    COLONOSCOPY      DE SIGMOIDOSCOPY FLX DX W/COLLJ SPEC BR/WA IF PFRMD N/A 2017    Procedure: Ira Aranda;  Surgeon: Lefty Jeronimo MD;  Location: [facility]; Service: Gastroenterology    TONSILLECTOMY           Family History:  Family History   Problem Relation Age of Onset    Lung cancer Mother     Dementia Father     Alzheimer's disease Father     Other Father         Cardiac Disorder          Social History:  Social History     Socioeconomic History    Marital status: /Civil Union     Spouse name: None    Number of children: None    Years of education: None    Highest education level: None   Occupational History    Occupation: Retired   Tobacco Use    Smoking status: Former Smoker     Types: Cigarettes     Quit date:      Years since quittin 4    Smokeless tobacco: Never Used   Vaping Use    Vaping Use: Never used   Substance and Sexual Activity    Alcohol use: Yes     Alcohol/week: 7 0 standard drinks     Types: 7 Glasses of wine per week     Comment: occ    Drug use: No    Sexual activity: Never     Partners: Male     Birth control/protection: None   Other Topics Concern    None   Social History Narrative    Always uses seat belt     Social Determinants of Health     Financial Resource Strain: Not on file   Food Insecurity: Not on file   Transportation Needs: Not on file   Physical Activity: Not on file   Stress: Not on file   Social Connections: Not on file   Intimate Partner Violence: Not on file   Housing Stability: Not on file         Active Problems:  Patient Active Problem List   Diagnosis    Essential hypertension    Mixed hyperlipidemia    Obesity (BMI 30-39  9)    H/O TIA (transient ischemic attack) and stroke    Migraine with aura and without status migrainosus, not intractable    URI, acute    Myocardiopathy ([facility] [address] )    Anticoagulant long-term use    Nonrheumatic mitral valve regurgitation    Nonrheumatic aortic valve insufficiency    Nonrheumatic tricuspid valve regurgitation    Sinus bradycardia    Paroxysmal atrial fibrillation (HCC)    Other insomnia    Chronic obstructive pulmonary disease (HCC)    BMI 33 0-33 9,adult    Stage 3a chronic kidney disease (HCC)         The following portions of the patient's history were reviewed and updated as appropriate: past medical history, past surgical history, past family history,  past social history, current medications, allergies and problem list       Review of Systems:  Constitutional: Denies fever, chills  Eyes: Denies eye redness, eye discharge  ENT: Denies hearing loss, sneezing, nasal discharge, sore throat   Respiratory: Denies cough, expectoration, shortness of breath  Cardiovascular: Denies chest pain, palpitations, lower extremity swelling  Gastrointestinal: Denies abdominal pain, nausea, vomiting, diarrhea  Genito-Urinary: Denies dysuria, incontinence  Musculoskeletal: Denies back pain, joint pain, muscle pain  Neurologic: Denies lightheadedness, syncope, headache, seizures  Endocrine: Denies polydipsia, temperature intolerance  Allergy and Immunology: Denies hives, insect bite sensitivity  Hematological and Lymphatic: Denies bleeding problems, swollen glands   Psychological: Denies depression, suicidal ideation, anxiety, panic  Dermatological: Denies pruritus, rash, skin lesion changes      Vitals:  Vitals:    06/07/22 1134   BP: 128/82   Pulse: 84   Resp: 16   SpO2: 98%       Body mass index is 33 61 kg/m²  Weight (last 2 days)     Date/Time Weight    06/07/22 1134 91 6 (202)            Physical Examination:  General: Patient is not in acute distress  Awake, alert, oriented in time, place and person  Responding to commands  Head: Normocephalic  Atraumatic  Eyes: Both pupils normal sized, round and reactive to light  Nonicteric  ENT: Normal external ear canals  Neck: Supple  JVP not raised  Trachea central  No thyromegaly  Lungs: Bilateral bronchovascular breath sounds with no crackles or rhonchi  Chest wall: No tenderness  Cardiovascular: RRR  S1 and S2 normal  No murmur, rub or gallop  Gastrointestinal: Abdomen soft, nontender  No guarding or rigidity  Liver and spleen not palpable  Bowel sounds present  Neurologic: Patient is awake, alert, oriented in time, place and person  Responding to commands  Moving all extremities  Integumentary:  No skin rash  Lymphatic: No cervical lymphadenopathy  Back: Symmetric   No CVA tenderness  Extremities: No clubbing, cyanosis or edema      Laboratory Results:  CBC with diff:   Lab Results   Component Value Date    WBC 6 93 11/22/2021    RBC 4 55 11/22/2021    HGB 14 8 11/22/2021    HCT 45 9 11/22/2021     (H) 11/22/2021    MCH 32 5 11/22/2021    RDW 13 8 11/22/2021     11/22/2021       CMP:  Lab Results   Component Value Date    CREATININE 0 98 11/22/2021    BUN 16 11/22/2021    K 4 4 11/22/2021     11/22/2021    CO2 27 11/22/2021    ALKPHOS 87 11/22/2021    ALT 19 11/22/2021    AST 19 11/22/2021         Lab Results   Component Value Date    TROPONINI 0 05 (H) 02/10/2019    TROPONINI 0 06 (H) 02/09/2019    TROPONINI 0 06 (H) 02/09/2019       Lipid Profile:   No results found for: CHOL  Lab Results   Component Value Date    HDL 55 11/22/2021    HDL 59 09/17/2020    HDL 54 09/19/2019     Lab Results   Component Value Date    LDLCALC 74 11/22/2021    LDLCALC 80 09/17/2020    LDLCALC 103 (H) 09/19/2019     Lab Results   Component Value Date    TRIG 90 11/22/2021    TRIG 119 09/17/2020    TRIG 91 09/19/2019       Cardiac testing:   Results for orders placed during the hospital encounter of 07/30/20    Echo complete with contrast if indicated    Narrative  [facility] [address] [facility]  [phone]    Transthoracic Echocardiogram  2D, M-mode, and Color Doppler    Study date:  2020    Patient: Lluvia Chou  MR number: [id number]  Account number: [de-identified]  : 1945  Age: 75 years  Gender: Female  Status: Outpatient  Location: [facility]  Height: 65 in  Weight: 223 lb  BP: 118/ 78 mmHg    Indications: Cardiomyopathy  Diagnoses: I42 9 - Cardiomyopathy, unspecified    Sonographer:  Herrera RCS  Primary Physician:  Sd Santana DO  Referring Physician:  Myra Sanches MD  Group:  [facility]  Interpreting Physician:  Nikki Rutherford MD    SUMMARY    LEFT VENTRICLE:  Ejection fraction was estimated to be 60 %  There were no regional wall motion abnormalities  Concentric hypertrophy was present  RIGHT VENTRICLE:  Estimated peak pressure was at least 50 mmHg  Pacer lead noted  LEFT ATRIUM:  The atrium was mildly to moderately dilated  MITRAL VALVE:  There was moderate regurgitation  AORTIC VALVE:  There was mild regurgitation  TRICUSPID VALVE:  There was moderate to severe regurgitation  PULMONIC VALVE:  There was trace regurgitation  HISTORY: PRIOR HISTORY: TIA  Paroxysmal atrial fibrillation  Bradycardia  Hypertension  Aortic insufficiency  Tricuspid regurgitation  Mitral regurgitation  Pacemaker  Cardiomyopathy  PROCEDURE: The study was performed in the [address] [place] [address]  This was a routine study  The transthoracic approach was used  The study included complete 2D imaging, M-mode, and color Doppler  The heart rate was 85 bpm, at the  start of the study  Images were obtained from the parasternal, apical, subcostal, and suprasternal notch acoustic windows  Image quality was adequate  LEFT VENTRICLE: Size was normal  Ejection fraction was estimated to be 60 %  There were no regional wall motion abnormalities  Concentric hypertrophy was present      RIGHT VENTRICLE: The size was normal  Systolic function was normal  Wall thickness was normal  DOPPLER: Estimated peak pressure was at least 50 mmHg  Pacer lead noted  LEFT ATRIUM: The atrium was mildly to moderately dilated  RIGHT ATRIUM: Size was normal     MITRAL VALVE: There was annular calcification  DOPPLER: There was moderate regurgitation  AORTIC VALVE: The valve was trileaflet  Leaflets exhibited mildly increased thickness and normal cuspal separation  DOPPLER: Transaortic velocity was within the normal range  There was no evidence for stenosis  There was mild  regurgitation  TRICUSPID VALVE: The valve structure was normal  There was normal leaflet separation  DOPPLER: The transtricuspid velocity was within the normal range  There was no evidence for stenosis  There was moderate to severe regurgitation  PULMONIC VALVE: Leaflets exhibited normal thickness, no calcification, and normal cuspal separation  DOPPLER: The transpulmonic velocity was within the normal range  There was trace regurgitation  PERICARDIUM: There was no pericardial effusion  The pericardium was normal in appearance  AORTA: The root exhibited normal size  SYSTEM MEASUREMENT TABLES    2D  %FS: 31 49 %  Ao Diam: 3 15 cm  EDV(Teich): 97 57 ml  EF(Teich): 59 45 %  ESV(Teich): 39 57 ml  IVSd: 1 1 cm  LA Area: 22 84 cm2  LA Diam: 4 2 cm  LVEDV MOD A4C: 82 51 ml  LVEF MOD A4C: 73 31 %  LVESV MOD A4C: 22 02 ml  LVIDd: 4 6 cm  LVIDs: 3 15 cm  LVLd A4C: 7 1 cm  LVLs A4C: 5 81 cm  LVPWd: 1 09 cm  RA Area: 12 88 cm2  RVIDd: 2 71 cm  RWT: 0 47  SV MOD A4C: 60 48 ml  SV(Teich): 58 ml    CW  AR Dec [place]: 2 59 m/s2  AR Dec Time: 1703 32 ms  AR PHT: 493 96 ms  AR Vmax: 4 39 m/s  AR maxP 28 mmHg  AV Env  Ti: 384 4 ms  AV MaxP 3 mmHg  AV VTI: 18 65 cm  AV Vmax: 1 04 m/s  AV Vmean: 0 49 m/s  AV meanP 5 mmHg  MR VTI: 246 71 cm  MR Vmax: 5 77 m/s  MR Vmean: 4 77 m/s  MR maxP 39 mmHg  MR meanP 1 mmHg  TR MaxP 67 mmHg  TR Vmax: 3 45 m/s    MM  TAPSE: 2 04 cm    PW  E': 0 05 m/s  E/E': 20 58  LVOT Vmax: 0 66 m/s  LVOT maxP 73 mmHg  MV A Gal: 0 46 m/s  MV Dec [place]: 5 79 m/s2  MV DecT: 192 32 ms  MV E Gal: 1 11 m/s  MV E/A Ratio: 2 4  MV PHT: 55 77 ms  MVA By PHT: 3 94 cm2    [facility]    Prepared and electronically signed by    Cole Cruz MD  Signed 2020 16:01:20      Medications:    Current Outpatient Medications:     Eliquis 5 MG, TAKE 1 TABLET BY MOUTH  TWICE DAILY, Disp: 180 tablet, Rfl: 3    hydrOXYzine HCL (ATARAX) 25 mg tablet, Take 1 tablet (25 mg total) by mouth daily at bedtime, Disp: 30 tablet, Rfl: 0    lisinopril (ZESTRIL) 2 5 mg tablet, TAKE 1 TABLET BY MOUTH  DAILY, Disp: 90 tablet, Rfl: 3    lovastatin (MEVACOR) 10 MG tablet, TAKE 1 TABLET BY MOUTH  DAILY AT BEDTIME, Disp: 90 tablet, Rfl: 3    metoprolol succinate (TOPROL-XL) 50 mg 24 hr tablet, TAKE 1 AND 1/2 TABLETS BY  MOUTH TWICE DAILY, Disp: 270 tablet, Rfl: 3    sotalol (BETAPACE) 80 mg tablet, TAKE 1 AND 1/2 TABLETS BY  MOUTH EVERY 12 HOURS, Disp: 270 tablet, Rfl: 3    ofloxacin (OCUFLOX) 0 3 % ophthalmic solution, PLEASE SEE ATTACHED FOR DETAILED DIRECTIONS (Patient not taking: Reported on 2022), Disp: , Rfl:     prednisoLONE acetate (PRED FORTE) 1 % ophthalmic suspension, USE 1 DROP IN RIGHT EYE 4 TIMES DAILY *USE AFTER SURGERY (Patient not taking: Reported on 2022), Disp: , Rfl:     Promethazine-DM (PHENERGAN-DM) 6 25-15 mg/5 mL oral syrup, Take 5 mL by mouth 4 (four) times a day as needed for cough (Patient not taking: Reported on 2022), Disp: 180 mL, Rfl: 0      Allergies:  No Known Allergies      Assessment and Plan:  1  Atrial fibrillation  SR maintained on sotalol and metoprolol  On Eliquis for anticoagulation  Denies bleeding from any site      2  H/O tachycardia mediated cardiomyopathy ([address] )  EF has improved back to her 60% per echo 2019 and 2020  In 2018 her EF was 35-40%    Continue lisinopril and beta-blocker     3  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)  Regular home monitoring with yearly monitoring at [address]  4  Essential hypertension   BP normal   Continue current therapy     5  Obesity (BMI 30-39  9)   counseled to try to lose weight     6  H/O TIA (transient ischemic attack) and stroke   currently on Eliquis, statin     7  Mixed hyperlipidemia   continue statin and diet control  Bobby Love PCP closely monitors her blood work     8  Nonrheumatic mitral valve regurgitation, Nonrheumatic aortic valve insufficiency, Nonrheumatic tricuspid valve regurgitation   Follow-up with serial echocardiograms    Recommend aggressive risk factor modification and therapeutic lifestyle changes  Low-salt, low-calorie, low-fat, low-cholesterol diet with regular exercise and to optimize weight  I will defer the ordering and monitoring of necessity lab studies to you, but I am available and happy to review and manage any of the data at your request in the future  Discussed concepts of atherosclerosis, including signs and symptoms of cardiac disease  Previous studies were reviewed  Safety measures were reviewed  Questions were entertained and answered  Patient was advised to report any problems requiring medical attention  Follow-up with PCP and appropriate specialist and lab work as discussed  Return for follow up visit as scheduled or earlier, if needed  Thank you for allowing me to participate in the care and evaluation of your patient  Should you have any questions, please feel free to contact me        Euna Seip, MD  1/2/6686,59:93 AM

## 2022-07-12 DIAGNOSIS — I48.19 PERSISTENT ATRIAL FIBRILLATION (HCC): ICD-10-CM

## 2022-07-12 DIAGNOSIS — E78.2 MIXED HYPERLIPIDEMIA: ICD-10-CM

## 2022-07-12 DIAGNOSIS — I10 ESSENTIAL HYPERTENSION: ICD-10-CM

## 2022-07-12 RX ORDER — LISINOPRIL 2.5 MG/1
2.5 TABLET ORAL DAILY
Qty: 90 TABLET | Refills: 3 | Status: SHIPPED | OUTPATIENT
Start: 2022-07-12

## 2022-07-12 RX ORDER — METOPROLOL SUCCINATE 50 MG/1
75 TABLET, EXTENDED RELEASE ORAL 2 TIMES DAILY
Qty: 270 TABLET | Refills: 3 | Status: SHIPPED | OUTPATIENT
Start: 2022-07-12

## 2022-07-12 RX ORDER — SOTALOL HYDROCHLORIDE 80 MG/1
120 TABLET ORAL EVERY 12 HOURS
Qty: 270 TABLET | Refills: 3 | Status: SHIPPED | OUTPATIENT
Start: 2022-07-12

## 2022-07-12 RX ORDER — LOVASTATIN 10 MG/1
10 TABLET ORAL
Qty: 90 TABLET | Refills: 3 | Status: SHIPPED | OUTPATIENT
Start: 2022-07-12

## 2022-09-08 ENCOUNTER — IN-CLINIC DEVICE VISIT (OUTPATIENT)
Dept: CARDIOLOGY CLINIC | Facility: CLINIC | Age: 77
End: 2022-09-08
Payer: MEDICARE

## 2022-09-08 DIAGNOSIS — Z95.0 PRESENCE OF PERMANENT CARDIAC PACEMAKER: Primary | ICD-10-CM

## 2022-09-08 PROCEDURE — 93280 PM DEVICE PROGR EVAL DUAL: CPT | Performed by: INTERNAL MEDICINE

## 2022-09-08 NOTE — PROGRESS NOTES
MDT DUAL CHAMBER PM  - ACTIVE SYSTEM IS MRI CONDITIONAL   DEVICE INTERROGATED IN THE [place] OFFICE  BATTERY VOLTAGE ADEQUATE  (9 5 YRS) AP 93 5%  0 1% ALL LEAD PARAMETERS WITHIN NORMAL LIMITS   NO NEW SIGNIFICANT HIGH RATE EPISODES   NO PROGRAMMING CHANGES MADE TO DEVICE PARAMETERS   NORMAL DEVICE FUNCTION   AM/RG

## 2022-10-11 PROBLEM — J06.9 URI, ACUTE: Status: RESOLVED | Noted: 2018-04-09 | Resolved: 2022-10-11

## 2022-10-24 NOTE — PROCEDURES
Chief Complaint  abscess of left back      Current Meds   1  Lovastatin 10 MG Oral Tablet; TAKE 1 TABLET BY MOUTH AT  BEDTIME; Therapy: 32CEU4752 to ([id number])  Requested for: 77FAV0058; Last   Rx:[id number] Ordered   2  Metoprolol Tartrate 25 MG Oral Tablet; take 1 tablet by mouth once daily; Therapy: 28PAP9188 to ([id number])  Requested for: 15GBC3533; Last   Rx:[id number] Ordered   3  Rizatriptan Benzoate 10 MG Oral Tablet; TAKE 1 TABLET AT ONSET OF HEADACHE  MAY   REPEAT EVERY 2 HOURS AS NEEDED  MAXIMUM 3 TABLETS IN 24 HOURS; Therapy: 35FVJ6366 to (Last Rx:[id number]) Ordered    Allergies  1  No Known Drug Allergies    Vitals  Signs   Heart Rate: 62  Systolic: 216  Diastolic: 90  Height: 5 ft 6 in  Weight: 220 lb   BMI Calculated: 35 51  BSA Calculated: 2 08  O2 Saturation: 99    Procedure    Procedure: incision and drainage of an abscess  Risks and infection risk were discussed with the patient  Verbal consent was obtained prior to the procedure  The site was prepped with Betadine  Anesthesia: The area was anesthetized with 0 5 ml of lidocaine 2% with epinephrine  Procedure Note:   The area was incised with a #11 blade  A moderate foul smelling-- and-- cheesy  amount of fluid was drained  Post-Procedure:   Patient Status:  the patient tolerated the procedure well  Complications: there were no complications  Wound care instructions given to the patient include applying a non-adhesive dressing 2 times a day for 5 day(s)  Wound care instructions were given to the patient  Follow-up in the office as needed  Assessment  1   Abscess of back (682 2) (L02 212)    Future Appointments    Date/Time Provider Specialty Site   01/19/2018 09:00 AM Darylene Majestic, [address]   10/02/2017 01:00 PM Sami Dowell MD [facility] [address] [place] [address]   Electronically signed by : Anaya Dickerson DO; Sep 12 2017 11:47AM EST (Author)     ROXIE LAZAR    [facility] [address]    Allergies    sulfa drugs (Unknown)  Tylenol (Unknown)    Intolerances        PAST MEDICAL & SURGICAL HISTORY:  History of COPD      Insomnia      Mood disorder      Dementia      Spasm of muscle      Constipation      Hypothyroidism      GERD (gastroesophageal reflux disease)      HTN (hypertension)      CHF, chronic          FAMILY HISTORY:      Home Medications:  acetaminophen 325 mg oral tablet: 2 tab(s) orally every 6 hours, As Needed for pain  (20 Oct 2022 16:34)  baclofen 10 mg oral tablet: 0.5 tab(s) orally 2 times a day (20 Oct 2022 16:34)  Cymbalta 60 mg oral delayed release capsule: 1 cap(s) orally once a day (20 Oct 2022 16:34)  donepezil 10 mg oral tablet: 1 tab(s) orally once a day (at bedtime) (20 Oct 2022 16:34)  Eucerin topical cream: Apply topically to affected area once a day (at bedtime) (20 Oct 2022 16:34)  furosemide 40 mg oral tablet: 0.5 tab(s) orally once a day (20 Oct 2022 16:34)  ipratropium-albuterol 0.5 mg-2.5 mg/3 mL inhalation solution: 3 milliliter(s) inhaled 4 times a day (20 Oct 2022 16:34)  levothyroxine 150 mcg (0.15 mg) oral capsule: 1 cap(s) orally once a day (20 Oct 2022 16:34)  Linzess 290 mcg oral capsule: 1 cap(s) orally once a day (20 Oct 2022 16:34)  melatonin 3 mg oral tablet: 1 tab(s) orally once a day (at bedtime) (20 Oct 2022 16:34)  Metoprolol Tartrate 25 mg oral tablet: 1.5 tab(s) orally 2 times a day (20 Oct 2022 16:34)  Namenda 10 mg oral tablet: 2 tab(s) orally once a day (at bedtime) (20 Oct 2022 16:34)  omeprazole 40 mg oral delayed release capsule: 1 cap(s) orally once a day (20 Oct 2022 16:34)  ondansetron 4 mg oral tablet: 1 tab(s) orally once a day (20 Oct 2022 16:34)  ProAir HFA 90 mcg/inh inhalation aerosol: 2 puff(s) inhaled every 6 hours, As Needed (20 Oct 2022 16:34)  Senna 8.6 mg oral tablet: 2 tab(s) orally once a day (at bedtime) (20 Oct 2022 16:34)  traZODone 100 mg oral tablet: 1 tab(s) orally once a day (at bedtime) (20 Oct 2022 16:34)  zinc oxide topical ointment: Apply topically to affected area once a day and as needed  (20 Oct 2022 16:34)  Zocor 10 mg oral tablet: 1 tab(s) orally once a day (at bedtime) (20 Oct 2022 16:34)  ZyrTEC 10 mg oral tablet: 1 tab(s) orally once a day (20 Oct 2022 16:34)      MEDICATIONS  (STANDING):  albuterol/ipratropium for Nebulization 3 milliLiter(s) Nebulizer every 6 hours  baclofen 5 milliGRAM(s) Oral every 12 hours  buDESOnide    Inhalation Suspension 0.5 milliGRAM(s) Inhalation two times a day  dextrose 5% + sodium chloride 0.45%. 1000 milliLiter(s) (30 mL/Hr) IV Continuous <Continuous>  donepezil 10 milliGRAM(s) Oral at bedtime  DULoxetine 60 milliGRAM(s) Oral daily  enoxaparin Injectable 40 milliGRAM(s) SubCutaneous every 24 hours  guaiFENesin  milliGRAM(s) Oral every 12 hours  levothyroxine Injectable 75 MICROGram(s) IV Push at bedtime  memantine 10 milliGRAM(s) Oral two times a day  methylPREDNISolone sodium succinate Injectable 40 milliGRAM(s) IV Push daily  metoprolol tartrate Injectable 2.5 milliGRAM(s) IV Push every 12 hours  pantoprazole  Injectable 40 milliGRAM(s) IV Push every 12 hours  simvastatin 10 milliGRAM(s) Oral at bedtime  traZODone 100 milliGRAM(s) Oral at bedtime    MEDICATIONS  (PRN):  aluminum hydroxide/magnesium hydroxide/simethicone Suspension 30 milliLiter(s) Oral every 4 hours PRN Dyspepsia  ibuprofen  Tablet. 400 milliGRAM(s) Oral four times a day PRN Temp greater or equal to 38C (100.4F), Mild Pain (1 - 3)  loperamide 2 milliGRAM(s) Oral four times a day PRN Diarrhea  melatonin 3 milliGRAM(s) Oral at bedtime PRN Insomnia  ondansetron Injectable 4 milliGRAM(s) IV Push every 8 hours PRN Nausea and/or Vomiting  traMADol 50 milliGRAM(s) Oral three times a day PRN Moderate Pain (4 - 6)      Diet, NPO (10-22-22 @ 12:09) [Active]          Vital Signs Last 24 Hrs  T(C): 36.7 (24 Oct 2022 04:50), Max: 36.7 (24 Oct 2022 04:50)  T(F): 98.1 (24 Oct 2022 04:50), Max: 98.1 (24 Oct 2022 04:50)  HR: 56 (24 Oct 2022 07:45) (56 - 89)  BP: 152/81 (24 Oct 2022 04:50) (132/85 - 152/81)  BP(mean): --  RR: 18 (24 Oct 2022 04:50) (18 - 18)  SpO2: 99% (24 Oct 2022 07:45) (96% - 100%)    Parameters below as of 24 Oct 2022 07:45  Patient On (Oxygen Delivery Method): nasal cannula          10-23-22 @ 07:01  -  10-24-22 @ 07:00  --------------------------------------------------------  IN: 330 mL / OUT: 0 mL / NET: 330 mL              LABS:                        12.2   7.50  )-----------( 196      ( 24 Oct 2022 08:20 )             39.0     10-24    140  |  103  |  8   ----------------------------<  118<H>  3.7   |  31  |  0.39<L>    Ca    9.3      24 Oct 2022 08:20  Phos  2.6     10-24  Mg     1.7     10-24            ABG - ( 22 Oct 2022 12:03 )  pH, Arterial: 7.43  pH, Blood: x     /  pCO2: 45    /  pO2: 135   / HCO3: 30    / Base Excess: 5.6   /  SaO2: 98.3                WBC:  WBC Count: 7.50 K/uL (10-24 @ 08:20)  WBC Count: 4.98 K/uL (10-23 @ 08:15)  WBC Count: 6.89 K/uL (10-22 @ 06:25)  WBC Count: 3.00 K/uL (10-21 @ 04:26)  WBC Count: 4.42 K/uL (10-20 @ 10:30)      MICROBIOLOGY:  RECENT CULTURES:  10-21 Clean Catch Clean Catch (Midstream) XXXX XXXX   <10,000 CFU/mL Normal Urogenital Consuelo    10-20 .Blood Blood-Peripheral XXXX XXXX   No growth to date.    10-20 .Blood Blood-Peripheral XXXX XXXX   No growth to date.                    Sodium:  Sodium, Serum: 140 mmol/L (10-24 @ 08:20)  Sodium, Serum: 146 mmol/L (10-23 @ 08:15)  Sodium, Serum: 146 mmol/L (10-22 @ 06:25)  Sodium, Serum: 145 mmol/L (10-21 @ 04:26)  Sodium, Serum: 143 mmol/L (10-20 @ 10:30)      0.39 mg/dL 10-24 @ 08:20  0.43 mg/dL 10-23 @ 08:15  0.47 mg/dL 10-22 @ 06:25  0.41 mg/dL 10-21 @ 04:26  0.70 mg/dL 10-20 @ 10:30      Hemoglobin:  Hemoglobin: 12.2 g/dL (10-24 @ 08:20)  Hemoglobin: 11.5 g/dL (10-23 @ 08:15)  Hemoglobin: 11.0 g/dL (10-22 @ 06:25)  Hemoglobin: 11.9 g/dL (10-21 @ 04:26)  Hemoglobin: 14.3 g/dL (10-20 @ 10:30)      Platelets: Platelet Count - Automated: 196 K/uL (10-24 @ 08:20)  Platelet Count - Automated: 200 K/uL (10-23 @ 08:15)  Platelet Count - Automated: 205 K/uL (10-22 @ 06:25)  Platelet Count - Automated: 210 K/uL (10-21 @ 04:26)  Platelet Count - Automated: 236 K/uL (10-20 @ 10:30)              RADIOLOGY & ADDITIONAL STUDIES:      MICROBIOLOGY:  RECENT CULTURES:  10-21 Clean Catch Clean Catch (Midstream) XXXX XXXX   <10,000 CFU/mL Normal Urogenital Consuelo    10-20 .Blood Blood-Peripheral XXXX XXXX   No growth to date.    10-20 .Blood Blood-Peripheral XXXX XXXX   No growth to date.

## 2022-11-01 ENCOUNTER — OFFICE VISIT (OUTPATIENT)
Dept: FAMILY MEDICINE CLINIC | Facility: CLINIC | Age: 77
End: 2022-11-01

## 2022-11-01 ENCOUNTER — HOSPITAL ENCOUNTER (INPATIENT)
Facility: HOSPITAL | Age: 77
LOS: 3 days | Discharge: HOME | End: 2022-11-04
Attending: EMERGENCY MEDICINE | Admitting: INTERNAL MEDICINE

## 2022-11-01 ENCOUNTER — APPOINTMENT (EMERGENCY)
Dept: RADIOLOGY | Facility: HOSPITAL | Age: 77
End: 2022-11-01

## 2022-11-01 VITALS
HEART RATE: 148 BPM | OXYGEN SATURATION: 99 % | WEIGHT: 205 LBS | DIASTOLIC BLOOD PRESSURE: 78 MMHG | BODY MASS INDEX: 34.16 KG/M2 | HEIGHT: 65 IN | SYSTOLIC BLOOD PRESSURE: 122 MMHG

## 2022-11-01 DIAGNOSIS — I48.19 PERSISTENT ATRIAL FIBRILLATION (HCC): ICD-10-CM

## 2022-11-01 DIAGNOSIS — I50.9 CHF (CONGESTIVE HEART FAILURE) (HCC): Primary | ICD-10-CM

## 2022-11-01 DIAGNOSIS — R06.02 SOB (SHORTNESS OF BREATH): Primary | ICD-10-CM

## 2022-11-01 DIAGNOSIS — I48.0 PAROXYSMAL ATRIAL FIBRILLATION (HCC): ICD-10-CM

## 2022-11-01 DIAGNOSIS — I48.91 ATRIAL FIBRILLATION WITH RVR (HCC): ICD-10-CM

## 2022-11-01 PROBLEM — I49.5 SSS (SICK SINUS SYNDROME) (HCC): Status: ACTIVE | Noted: 2022-11-01

## 2022-11-01 LAB
2HR DELTA HS TROPONIN: 0 NG/L
ALBUMIN SERPL BCP-MCNC: 3.7 G/DL (ref 3.5–5)
ALP SERPL-CCNC: 94 U/L (ref 46–116)
ALT SERPL W P-5'-P-CCNC: 63 U/L (ref 12–78)
ANION GAP SERPL CALCULATED.3IONS-SCNC: 11 MMOL/L (ref 4–13)
APTT PPP: 34 SECONDS (ref 23–37)
AST SERPL W P-5'-P-CCNC: 61 U/L (ref 5–45)
ATRIAL RATE: 150 BPM
BASOPHILS # BLD AUTO: 0.06 THOUSANDS/ÂΜL (ref 0–0.1)
BASOPHILS NFR BLD AUTO: 1 % (ref 0–1)
BILIRUB DIRECT SERPL-MCNC: 0.15 MG/DL (ref 0–0.2)
BILIRUB SERPL-MCNC: 0.68 MG/DL (ref 0.2–1)
BILIRUB UR QL STRIP: NEGATIVE
BUN SERPL-MCNC: 21 MG/DL (ref 5–25)
CALCIUM SERPL-MCNC: 8.8 MG/DL (ref 8.3–10.1)
CARDIAC TROPONIN I PNL SERPL HS: 8 NG/L
CARDIAC TROPONIN I PNL SERPL HS: 8 NG/L
CHLORIDE SERPL-SCNC: 109 MMOL/L (ref 96–108)
CLARITY UR: CLEAR
CO2 SERPL-SCNC: 22 MMOL/L (ref 21–32)
COLOR UR: COLORLESS
CREAT SERPL-MCNC: 1 MG/DL (ref 0.6–1.3)
EOSINOPHIL # BLD AUTO: 0.09 THOUSAND/ÂΜL (ref 0–0.61)
EOSINOPHIL NFR BLD AUTO: 1 % (ref 0–6)
ERYTHROCYTE [DISTWIDTH] IN BLOOD BY AUTOMATED COUNT: 16.4 % (ref 11.6–15.1)
EST. AVERAGE GLUCOSE BLD GHB EST-MCNC: 120 MG/DL
FLUAV RNA RESP QL NAA+PROBE: NEGATIVE
FLUBV RNA RESP QL NAA+PROBE: NEGATIVE
GFR SERPL CREATININE-BSD FRML MDRD: 54 ML/MIN/1.73SQ M
GLUCOSE SERPL-MCNC: 122 MG/DL (ref 65–140)
GLUCOSE UR STRIP-MCNC: NEGATIVE MG/DL
HBA1C MFR BLD: 5.8 %
HCT VFR BLD AUTO: 37.2 % (ref 34.8–46.1)
HGB BLD-MCNC: 11.9 G/DL (ref 11.5–15.4)
HGB UR QL STRIP.AUTO: NEGATIVE
IMM GRANULOCYTES # BLD AUTO: 0.03 THOUSAND/UL (ref 0–0.2)
IMM GRANULOCYTES NFR BLD AUTO: 0 % (ref 0–2)
INR PPP: 1.46 (ref 0.84–1.19)
KETONES UR STRIP-MCNC: NEGATIVE MG/DL
LEUKOCYTE ESTERASE UR QL STRIP: NEGATIVE
LYMPHOCYTES # BLD AUTO: 1.13 THOUSANDS/ÂΜL (ref 0.6–4.47)
LYMPHOCYTES NFR BLD AUTO: 13 % (ref 14–44)
MAGNESIUM SERPL-MCNC: 2 MG/DL (ref 1.6–2.6)
MCH RBC QN AUTO: 28.1 PG (ref 26.8–34.3)
MCHC RBC AUTO-ENTMCNC: 32 G/DL (ref 31.4–37.4)
MCV RBC AUTO: 88 FL (ref 82–98)
MONOCYTES # BLD AUTO: 0.91 THOUSAND/ÂΜL (ref 0.17–1.22)
MONOCYTES NFR BLD AUTO: 11 % (ref 4–12)
NEUTROPHILS # BLD AUTO: 6.43 THOUSANDS/ÂΜL (ref 1.85–7.62)
NEUTS SEG NFR BLD AUTO: 74 % (ref 43–75)
NITRITE UR QL STRIP: NEGATIVE
NRBC BLD AUTO-RTO: 0 /100 WBCS
NT-PROBNP SERPL-MCNC: 5902 PG/ML
PH UR STRIP.AUTO: 6.5 [PH]
PLATELET # BLD AUTO: 249 THOUSANDS/UL (ref 149–390)
PMV BLD AUTO: 11 FL (ref 8.9–12.7)
POTASSIUM SERPL-SCNC: 4.3 MMOL/L (ref 3.5–5.3)
PROT SERPL-MCNC: 7.4 G/DL (ref 6.4–8.4)
PROT UR STRIP-MCNC: NEGATIVE MG/DL
PROTHROMBIN TIME: 17.4 SECONDS (ref 11.6–14.5)
QRS AXIS: 23 DEGREES
QRSD INTERVAL: 82 MS
QT INTERVAL: 326 MS
QTC INTERVAL: 489 MS
RBC # BLD AUTO: 4.24 MILLION/UL (ref 3.81–5.12)
RSV RNA RESP QL NAA+PROBE: NEGATIVE
SARS-COV-2 RNA RESP QL NAA+PROBE: NEGATIVE
SODIUM SERPL-SCNC: 142 MMOL/L (ref 135–147)
SP GR UR STRIP.AUTO: 1 (ref 1–1.03)
T WAVE AXIS: 74 DEGREES
TSH SERPL DL<=0.05 MIU/L-ACNC: 2.36 UIU/ML (ref 0.45–4.5)
UROBILINOGEN UR STRIP-ACNC: <2 MG/DL
VENTRICULAR RATE: 135 BPM
WBC # BLD AUTO: 8.65 THOUSAND/UL (ref 4.31–10.16)

## 2022-11-01 RX ORDER — PRAVASTATIN SODIUM 20 MG
10 TABLET ORAL
Refills: 3 | Status: DISCONTINUED | OUTPATIENT
Start: 2022-11-01 | End: 2022-11-04 | Stop reason: HOSPADM

## 2022-11-01 RX ORDER — FUROSEMIDE 10 MG/ML
40 INJECTION INTRAMUSCULAR; INTRAVENOUS ONCE
Status: COMPLETED | OUTPATIENT
Start: 2022-11-01 | End: 2022-11-01

## 2022-11-01 RX ORDER — METOPROLOL TARTRATE 5 MG/5ML
5 INJECTION INTRAVENOUS EVERY 6 HOURS PRN
Status: DISCONTINUED | OUTPATIENT
Start: 2022-11-01 | End: 2022-11-04 | Stop reason: HOSPADM

## 2022-11-01 RX ORDER — SOTALOL HYDROCHLORIDE 80 MG/1
120 TABLET ORAL DAILY
Status: DISCONTINUED | OUTPATIENT
Start: 2022-11-02 | End: 2022-11-01

## 2022-11-01 RX ORDER — FUROSEMIDE 10 MG/ML
40 INJECTION INTRAMUSCULAR; INTRAVENOUS DAILY
Status: DISCONTINUED | OUTPATIENT
Start: 2022-11-02 | End: 2022-11-03

## 2022-11-01 RX ORDER — ACETAMINOPHEN 325 MG/1
650 TABLET ORAL EVERY 6 HOURS PRN
Status: DISCONTINUED | OUTPATIENT
Start: 2022-11-01 | End: 2022-11-04 | Stop reason: HOSPADM

## 2022-11-01 RX ORDER — DILTIAZEM HYDROCHLORIDE 5 MG/ML
15 INJECTION INTRAVENOUS ONCE
Status: COMPLETED | OUTPATIENT
Start: 2022-11-01 | End: 2022-11-01

## 2022-11-01 RX ORDER — SOTALOL HYDROCHLORIDE 80 MG/1
120 TABLET ORAL EVERY 12 HOURS
Status: DISCONTINUED | OUTPATIENT
Start: 2022-11-01 | End: 2022-11-01

## 2022-11-01 RX ORDER — SOTALOL HYDROCHLORIDE 80 MG/1
120 TABLET ORAL DAILY
Status: DISCONTINUED | OUTPATIENT
Start: 2022-11-01 | End: 2022-11-03

## 2022-11-01 RX ORDER — FUROSEMIDE 10 MG/ML
40 INJECTION INTRAMUSCULAR; INTRAVENOUS 2 TIMES DAILY
Status: DISCONTINUED | OUTPATIENT
Start: 2022-11-01 | End: 2022-11-01

## 2022-11-01 RX ORDER — ONDANSETRON 2 MG/ML
4 INJECTION INTRAMUSCULAR; INTRAVENOUS EVERY 6 HOURS PRN
Status: DISCONTINUED | OUTPATIENT
Start: 2022-11-01 | End: 2022-11-04 | Stop reason: HOSPADM

## 2022-11-01 RX ADMIN — METOPROLOL SUCCINATE 75 MG: 50 TABLET, EXTENDED RELEASE ORAL at 18:22

## 2022-11-01 RX ADMIN — SOTALOL HYDROCHLORIDE 120 MG: 80 TABLET ORAL at 16:17

## 2022-11-01 RX ADMIN — DILTIAZEM HYDROCHLORIDE 15 MG: 5 INJECTION INTRAVENOUS at 10:01

## 2022-11-01 RX ADMIN — FUROSEMIDE 40 MG: 10 INJECTION, SOLUTION INTRAMUSCULAR; INTRAVENOUS at 13:51

## 2022-11-01 RX ADMIN — APIXABAN 5 MG: 5 TABLET, FILM COATED ORAL at 18:22

## 2022-11-01 RX ADMIN — PRAVASTATIN SODIUM 10 MG: 20 TABLET ORAL at 16:17

## 2022-11-01 NOTE — PROGRESS NOTES
Name: Teresa Herron      :       MRN: [id number]  Encounter Provider: CHRYSTAL Thakkar  Encounter Date: 2022   Encounter department: [place] [address]  SOB (shortness of breath)  Comments:  Patient in rapid a-fib, rate 116-136  In no acute distress  Advised for ED evaluation due to symptoms and in a-fib, Patient agreeable to plan of care  Orders:  -     POCT ECG    2  Paroxysmal atrial fibrillation (HCC)  Assessment & Plan:  Has had increasing SOB and fatigue  Is currently in a-fib, rate 116-136 despite taking metoprolol and sotalol  Referred for further evaluation in ED  Orders:  -     POCT ECG         Subjective      Patient presents the office accompanied with  for evaluation of fatigue shortness of breath  Symptoms began 5 days ago  Patient notes symptoms have been worsening  Notes SOB is worse with exertion  Patient has h/o a-fib and pacemaker  Just seen at cardiology in   As per patient, believes she goes in and out of a-fib, but does not monitor heart rate at home  Patient denies chest pain, palpitations, syncope, weakness  Denies cough, wheezing, or hemoptysis  Review of Systems   Constitutional: Positive for fatigue  Negative for activity change, appetite change and unexpected weight change  HENT: Negative for congestion, postnasal drip, sore throat and trouble swallowing  Eyes: Negative for photophobia and visual disturbance  Respiratory: Positive for shortness of breath  Negative for cough, chest tightness and wheezing  Cardiovascular: Negative for chest pain, palpitations and leg swelling  Gastrointestinal: Negative for abdominal pain, nausea and vomiting  Genitourinary: Negative for decreased urine volume, frequency and urgency  Musculoskeletal: Negative for arthralgias and myalgias  Skin: Negative for color change and rash  Neurological: Positive for dizziness  Negative for syncope, weakness, light-headedness, numbness and headaches  Psychiatric/Behavioral: Negative for confusion  Current Outpatient Medications on File Prior to Visit   Medication Sig   • apixaban (Eliquis) 5 mg Take 1 tablet (5 mg total) by mouth 2 (two) times a day   • lisinopril (ZESTRIL) 2 5 mg tablet Take 1 tablet (2 5 mg total) by mouth daily   • lovastatin (MEVACOR) 10 MG tablet Take 1 tablet (10 mg total) by mouth daily at bedtime   • metoprolol succinate (TOPROL-XL) 50 mg 24 hr tablet Take 1 5 tablets (75 mg total) by mouth 2 (two) times a day   • sotalol (BETAPACE) 80 mg tablet Take 1 5 tablets (120 mg total) by mouth every 12 (twelve) hours   • [DISCONTINUED] hydrOXYzine HCL (ATARAX) 25 mg tablet Take 1 tablet (25 mg total) by mouth daily at bedtime       Objective     /78   Pulse (!) 148   Ht 5' 5" (1 651 m)   Wt 93 kg (205 lb)   SpO2 99%   BMI 34 11 kg/m²     Physical Exam  Vitals reviewed  Constitutional:       General: She is not in acute distress  Appearance: Normal appearance  She is not ill-appearing  HENT:      Head: Normocephalic and atraumatic  Right Ear: Tympanic membrane, ear canal and external ear normal       Left Ear: Tympanic membrane, ear canal and external ear normal       Nose: Nose normal       Mouth/Throat:      Mouth: Mucous membranes are moist       Pharynx: Oropharynx is clear  Eyes:      Conjunctiva/sclera: Conjunctivae normal       Pupils: Pupils are equal, round, and reactive to light  Neck:      Vascular: No carotid bruit  Cardiovascular:      Rate and Rhythm: Tachycardia present  Rhythm irregular  Heart sounds: No murmur heard  Comments: POCT ECG: a-fib, rate 116-136  Pulmonary:      Effort: Pulmonary effort is normal  No respiratory distress  Breath sounds: Normal breath sounds  No wheezing  Abdominal:      General: Bowel sounds are normal       Palpations: Abdomen is soft  Tenderness:  There is no abdominal tenderness  Musculoskeletal:         General: Normal range of motion  Cervical back: Normal range of motion and neck supple  Right lower leg: No edema  Left lower leg: No edema  Skin:     General: Skin is warm and dry  Capillary Refill: Capillary refill takes less than 2 seconds  Neurological:      General: No focal deficit present  Mental Status: She is alert and oriented to person, place, and time     Psychiatric:         Mood and Affect: Mood normal          Behavior: Behavior normal        CHRYSTAL Lyles

## 2022-11-01 NOTE — PLAN OF CARE
Problem: Potential for Falls  Goal: Patient will remain free of falls  Description: INTERVENTIONS:  - Educate patient/family on patient safety including physical limitations  - Instruct patient to call for assistance with activity   - Consult OT/PT to assist with strengthening/mobility   - Keep Call bell within reach  - Keep bed low and locked with side rails adjusted as appropriate  - Keep care items and personal belongings within reach  - Initiate and maintain comfort rounds  - Make Fall Risk Sign visible to staff  - Offer Toileting every *** Hours, in advance of need  - Initiate/Maintain ***alarm  - Obtain necessary fall risk management equipment: ***  - Apply yellow socks and bracelet for high fall risk patients  - Consider moving patient to room near nurses station  Outcome: Progressing     Problem: PAIN - ADULT  Goal: Verbalizes/displays adequate comfort level or baseline comfort level  Description: Interventions:  - Encourage patient to monitor pain and request assistance  - Assess pain using appropriate pain scale  - Administer analgesics based on type and severity of pain and evaluate response  - Implement non-pharmacological measures as appropriate and evaluate response  - Consider cultural and social influences on pain and pain management  - Notify physician/advanced practitioner if interventions unsuccessful or patient reports new pain  Outcome: Progressing     Problem: INFECTION - ADULT  Goal: Absence or prevention of progression during hospitalization  Description: INTERVENTIONS:  - Assess and monitor for signs and symptoms of infection  - Monitor lab/diagnostic results  - Monitor all insertion sites, i e  indwelling lines, tubes, and drains  - Monitor endotracheal if appropriate and nasal secretions for changes in amount and color  - Homeland appropriate cooling/warming therapies per order  - Administer medications as ordered  - Instruct and encourage patient and family to use good hand hygiene technique  - Identify and instruct in appropriate isolation precautions for identified infection/condition  Outcome: Progressing  Goal: Absence of fever/infection during neutropenic period  Description: INTERVENTIONS:  - Monitor WBC    Outcome: Progressing     Problem: SAFETY ADULT  Goal: Patient will remain free of falls  Description: INTERVENTIONS:  - Educate patient/family on patient safety including physical limitations  - Instruct patient to call for assistance with activity   - Consult OT/PT to assist with strengthening/mobility   - Keep Call bell within reach  - Keep bed low and locked with side rails adjusted as appropriate  - Keep care items and personal belongings within reach  - Initiate and maintain comfort rounds  - Make Fall Risk Sign visible to staff  - Offer Toileting every *** Hours, in advance of need  - Initiate/Maintain ***alarm  - Obtain necessary fall risk management equipment: ***  - Apply yellow socks and bracelet for high fall risk patients  - Consider moving patient to room near nurses station  Outcome: Progressing  Goal: Maintain or return to baseline ADL function  Description: INTERVENTIONS:  -  Assess patient's ability to carry out ADLs; assess patient's baseline for ADL function and identify physical deficits which impact ability to perform ADLs (bathing, care of mouth/teeth, toileting, grooming, dressing, etc )  - Assess/evaluate cause of self-care deficits   - Assess range of motion  - Assess patient's mobility; develop plan if impaired  - Assess patient's need for assistive devices and provide as appropriate  - Encourage maximum independence but intervene and supervise when necessary  - Involve family in performance of ADLs  - Assess for home care needs following discharge   - Consider OT consult to assist with ADL evaluation and planning for discharge  - Provide patient education as appropriate  Outcome: Progressing  Goal: Maintains/Returns to pre admission functional level  Description: INTERVENTIONS:  - Perform BMAT or MOVE assessment daily    - Set and communicate daily mobility goal to care team and patient/family/caregiver  - Collaborate with rehabilitation services on mobility goals if consulted  - Perform Range of Motion *** times a day  - Reposition patient every *** hours  - Dangle patient *** times a day  - Stand patient *** times a day  - Ambulate patient *** times a day  - Out of bed to chair *** times a day   - Out of bed for meals *** times a day  - Out of bed for toileting  - Record patient progress and toleration of activity level   Outcome: Progressing     Problem: DISCHARGE PLANNING  Goal: Discharge to home or other facility with appropriate resources  Description: INTERVENTIONS:  - Identify barriers to discharge w/patient and caregiver  - Arrange for needed discharge resources and transportation as appropriate  - Identify discharge learning needs (meds, wound care, etc )  - Arrange for interpretive services to assist at discharge as needed  - Refer to Case Management Department for coordinating discharge planning if the patient needs post-hospital services based on physician/advanced practitioner order or complex needs related to functional status, cognitive ability, or social support system  Outcome: Progressing     Problem: Knowledge Deficit  Goal: Patient/family/caregiver demonstrates understanding of disease process, treatment plan, medications, and discharge instructions  Description: Complete learning assessment and assess knowledge base    Interventions:  - Provide teaching at level of understanding  - Provide teaching via preferred learning methods  Outcome: Progressing     Problem: NEUROSENSORY - ADULT  Goal: Achieves stable or improved neurological status  Description: INTERVENTIONS  - Monitor and report changes in neurological status  - Monitor vital signs such as temperature, blood pressure, glucose, and any other labs ordered   - Initiate measures to prevent increased intracranial pressure  - Monitor for seizure activity and implement precautions if appropriate      Outcome: Progressing  Goal: Remains free of injury related to seizures activity  Description: INTERVENTIONS  - Maintain airway, patient safety  and administer oxygen as ordered  - Monitor patient for seizure activity, document and report duration and description of seizure to physician/advanced practitioner  - If seizure occurs,  ensure patient safety during seizure  - Reorient patient post seizure  - Seizure pads on all 4 side rails  - Instruct patient/family to notify RN of any seizure activity including if an aura is experienced  - Instruct patient/family to call for assistance with activity based on nursing assessment  - Administer anti-seizure medications if ordered    Outcome: Progressing  Goal: Achieves maximal functionality and self care  Description: INTERVENTIONS  - Monitor swallowing and airway patency with patient fatigue and changes in neurological status  - Encourage and assist patient to increase activity and self care     - Encourage visually impaired, hearing impaired and aphasic patients to use assistive/communication devices  Outcome: Progressing     Problem: GASTROINTESTINAL - ADULT  Goal: Minimal or absence of nausea and/or vomiting  Description: INTERVENTIONS:  - Administer IV fluids if ordered to ensure adequate hydration  - Maintain NPO status until nausea and vomiting are resolved  - Nasogastric tube if ordered  - Administer ordered antiemetic medications as needed  - Provide nonpharmacologic comfort measures as appropriate  - Advance diet as tolerated, if ordered  - Consider nutrition services referral to assist patient with adequate nutrition and appropriate food choices  Outcome: Progressing  Goal: Maintains or returns to baseline bowel function  Description: INTERVENTIONS:  - Assess bowel function  - Encourage oral fluids to ensure adequate hydration  - Administer IV fluids if ordered to ensure adequate hydration  - Administer ordered medications as needed  - Encourage mobilization and activity  - Consider nutritional services referral to assist patient with adequate nutrition and appropriate food choices  Outcome: Progressing  Goal: Maintains adequate nutritional intake  Description: INTERVENTIONS:  - Monitor percentage of each meal consumed  - Identify factors contributing to decreased intake, treat as appropriate  - Assist with meals as needed  - Monitor I&O, weight, and lab values if indicated  - Obtain nutrition services referral as needed  Outcome: Progressing  Goal: Establish and maintain optimal ostomy function  Description: INTERVENTIONS:  - Assess bowel function  - Encourage oral fluids to ensure adequate hydration  - Administer IV fluids if ordered to ensure adequate hydration   - Administer ordered medications as needed  - Encourage mobilization and activity  - Nutrition services referral to assist patient with appropriate food choices  - Assess stoma site  - Consider wound care consult   Outcome: Progressing  Goal: Oral mucous membranes remain intact  Description: INTERVENTIONS  - Assess oral mucosa and hygiene practices  - Implement preventative oral hygiene regimen  - Implement oral medicated treatments as ordered  - Initiate Nutrition services referral as needed  Outcome: Progressing     Problem: GENITOURINARY - ADULT  Goal: Maintains or returns to baseline urinary function  Description: INTERVENTIONS:  - Assess urinary function  - Encourage oral fluids to ensure adequate hydration if ordered  - Administer IV fluids as ordered to ensure adequate hydration  - Administer ordered medications as needed  - Offer frequent toileting  - Follow urinary retention protocol if ordered  Outcome: Progressing  Goal: Absence of urinary retention  Description: INTERVENTIONS:  - Assess patient’s ability to void and empty bladder  - Monitor I/O  - Bladder scan as needed  - Discuss with physician/AP medications to alleviate retention as needed  - Discuss catheterization for long term situations as appropriate  Outcome: Progressing  Goal: Urinary catheter remains patent  Description: INTERVENTIONS:  - Assess patency of urinary catheter  - If patient has a chronic hickey, consider changing catheter if non-functioning  - Follow guidelines for intermittent irrigation of non-functioning urinary catheter  Outcome: Progressing     Problem: METABOLIC, FLUID AND ELECTROLYTES - ADULT  Goal: Electrolytes maintained within normal limits  Description: INTERVENTIONS:  - Monitor labs and assess patient for signs and symptoms of electrolyte imbalances  - Administer electrolyte replacement as ordered  - Monitor response to electrolyte replacements, including repeat lab results as appropriate  - Instruct patient on fluid and nutrition as appropriate  Outcome: Progressing  Goal: Fluid balance maintained  Description: INTERVENTIONS:  - Monitor labs   - Monitor I/O and WT  - Instruct patient on fluid and nutrition as appropriate  - Assess for signs & symptoms of volume excess or deficit  Outcome: Progressing  Goal: Glucose maintained within target range  Description: INTERVENTIONS:  - Monitor Blood Glucose as ordered  - Assess for signs and symptoms of hyperglycemia and hypoglycemia  - Administer ordered medications to maintain glucose within target range  - Assess nutritional intake and initiate nutrition service referral as needed  Outcome: Progressing     Problem: HEMATOLOGIC - ADULT  Goal: Maintains hematologic stability  Description: INTERVENTIONS  - Assess for signs and symptoms of bleeding or hemorrhage  - Monitor labs  - Administer supportive blood products/factors as ordered and appropriate  Outcome: Progressing     Problem: MUSCULOSKELETAL - ADULT  Goal: Maintain or return mobility to safest level of function  Description: INTERVENTIONS:  - Assess patient's ability to carry out ADLs; assess patient's baseline for ADL function and identify physical deficits which impact ability to perform ADLs (bathing, care of mouth/teeth, toileting, grooming, dressing, etc )  - Assess/evaluate cause of self-care deficits   - Assess range of motion  - Assess patient's mobility  - Assess patient's need for assistive devices and provide as appropriate  - Encourage maximum independence but intervene and supervise when necessary  - Involve family in performance of ADLs  - Assess for home care needs following discharge   - Consider OT consult to assist with ADL evaluation and planning for discharge  - Provide patient education as appropriate  Outcome: Progressing  Goal: Maintain proper alignment of affected body part  Description: INTERVENTIONS:  - Support, maintain and protect limb and body alignment  - Provide patient/ family with appropriate education  Outcome: Progressing

## 2022-11-01 NOTE — ED PROVIDER NOTES
History  Chief Complaint   Patient presents with   • Atrial Fibrillation     Pt was at the sent here from her PCP , HR was 136 and was told she is in afib, pt does have a pacemaker, pt reports that she went to the doc because she was SOB and tired    • Shortness of Breath     66 yo female with h/o Afib s/p pacer on eliquis who presents to ED c/o 5 days of cough, sob and fatigue with palpitations  Feels like prior episodes of Afib  No syncope  No fever  No chest pain  No hemoptysis  No leg pain or swelling  Prior to Admission Medications   Prescriptions Last Dose Informant Patient Reported? Taking? apixaban (Eliquis) 5 mg   No No   Sig: Take 1 tablet (5 mg total) by mouth 2 (two) times a day   lisinopril (ZESTRIL) 2 5 mg tablet   No No   Sig: Take 1 tablet (2 5 mg total) by mouth daily   lovastatin (MEVACOR) 10 MG tablet   No No   Sig: Take 1 tablet (10 mg total) by mouth daily at bedtime   metoprolol succinate (TOPROL-XL) 50 mg 24 hr tablet   No No   Sig: Take 1 5 tablets (75 mg total) by mouth 2 (two) times a day   sotalol (BETAPACE) 80 mg tablet   No No   Sig: Take 1 5 tablets (120 mg total) by mouth every 12 (twelve) hours      Facility-Administered Medications: None       Past Medical History:   Diagnosis Date   • Aphasia, mixed 7/28/2017   • Atrial fibrillation (HCC)    • Colon polyp    • Headache    • Hyperlipidemia    • Hypertension    • Lyme disease    • Shortness of breath    • TIA (transient ischemic attack)    • Transient cerebral ischemia     Last assessed - 1/23/18   • Vertigo        Past Surgical History:   Procedure Laterality Date   • CARDIAC PACEMAKER PLACEMENT  12/2019   • COLONOSCOPY     • MT SIGMOIDOSCOPY FLX DX W/COLLJ SPEC BR/WA IF PFRMD N/A 11/28/2017    Procedure: Micheline English;  Surgeon: Jihan Whittington MD;  Location: [facility];   Service: Gastroenterology   • TONSILLECTOMY         Family History   Problem Relation Age of Onset   • Lung cancer Mother    • Dementia Father    • Alzheimer's disease Father    • Other Father         Cardiac Disorder      I have reviewed and agree with the history as documented  E-Cigarette/Vaping   • E-Cigarette Use Never User      E-Cigarette/Vaping Substances   • Nicotine No    • THC No    • CBD No    • Flavoring No    • Other No    • Unknown No      Social History     Tobacco Use   • Smoking status: Former Smoker     Types: Cigarettes     Quit date:      Years since quittin 8   • Smokeless tobacco: Never Used   Vaping Use   • Vaping Use: Never used   Substance Use Topics   • Alcohol use: Yes     Alcohol/week: 7 0 standard drinks     Types: 7 Glasses of wine per week     Comment: occ   • Drug use: No       Review of Systems   Constitutional: Positive for fatigue  Respiratory: Positive for cough and shortness of breath  All other systems reviewed and are negative  Physical Exam  Physical Exam  Vitals and nursing note reviewed  Constitutional:       General: She is not in acute distress  Appearance: Normal appearance  She is well-developed  She is not ill-appearing, toxic-appearing or diaphoretic  HENT:      Head: Normocephalic and atraumatic  Eyes:      Conjunctiva/sclera: Conjunctivae normal       Pupils: Pupils are equal, round, and reactive to light  Neck:      Vascular: No JVD  Cardiovascular:      Rate and Rhythm: Tachycardia present  Rhythm irregular  Heart sounds: Normal heart sounds  Pulmonary:      Effort: Pulmonary effort is normal       Breath sounds: Normal breath sounds  No stridor  Abdominal:      General: There is no distension  Palpations: Abdomen is soft  Tenderness: There is no abdominal tenderness  There is no guarding or rebound  Musculoskeletal:         General: No tenderness or deformity  Normal range of motion  Cervical back: Normal range of motion and neck supple  No rigidity or tenderness  Skin:     General: Skin is warm and dry        Capillary Refill: Capillary refill takes less than 2 seconds  Coloration: Skin is not jaundiced or pale  Findings: No bruising, erythema, lesion or rash  Neurological:      General: No focal deficit present  Mental Status: She is alert and oriented to person, place, and time  Cranial Nerves: No cranial nerve deficit  Sensory: No sensory deficit  Motor: No weakness or abnormal muscle tone  Coordination: Coordination normal       Gait: Gait normal          Vital Signs  ED Triage Vitals [11/01/22 0911]   Temperature Pulse Respirations Blood Pressure SpO2   97 6 °F (36 4 °C) (!) 143 18 115/82 97 %      Temp Source Heart Rate Source Patient Position - Orthostatic VS BP Location FiO2 (%)   Oral Monitor Sitting Left arm --      Pain Score       --           Vitals:    11/01/22 0911 11/01/22 0940   BP: 115/82 113/77   Pulse: (!) 143 (!) 143   Patient Position - Orthostatic VS: Sitting Lying         Visual Acuity      ED Medications  Medications   diltiazem (CARDIZEM) injection 15 mg (has no administration in time range)       Diagnostic Studies  Results Reviewed     Procedure Component Value Units Date/Time    CBC and differential [[id number]]     Lab Status: No result Specimen: Blood     Protime-INR [[id number]]     Lab Status: No result Specimen: Blood     APTT [[id number]]     Lab Status: No result Specimen: Blood     Basic metabolic panel [[id number]]     Lab Status: No result Specimen: Blood     Hepatic function panel [[id number]]     Lab Status: No result Specimen: Blood     HS Troponin 0hr (reflex protocol) [[id number]]     Lab Status: No result Specimen: Blood     NT-BNP PRO [[id number]]     Lab Status: No result Specimen: Blood                  XR chest 2 views    (Results Pending)              Procedures  Procedures         ED Course  ED Course as of 11/02/22 1352   Tue Nov 01, 2022   1011 Reexamined  HR improved to the 70's after single dose of cardizem  Still in afib   Remains well appearing, no distress, normal WOB  MDM    Disposition  Final diagnoses:   None     ED Disposition     None      Follow-up Information    None         Patient's Medications   Discharge Prescriptions    No medications on file       No discharge procedures on file      PDMP Review     None          ED Provider  Electronically Signed by           Bridget Carter MD  11/02/22 [phone]

## 2022-11-01 NOTE — H&P
[address] 1945, 65 y o  female MRN: [id number]  Unit/Bed#: FT 02 Encounter: [id number]  Primary Care Provider: Denilson Elam DO   Date and time admitted to hospital: 11/1/2022  9:25 AM    * Acute congestive heart failure [facility])  Assessment & Plan  Presented with worsening SOB worse on minimal exertion  No orthopnea  No JVD elevation  No leg swelling  Elevated proBNP  Crackles on examination  CXR with pulmonary edema  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  No prior echocardiogram in last 6 months  Was doing well  No recent hospitalization since last 4 years  Continue Lasix IV 40 mg daily  Monitor renal function  Check TSH  Echocardiogram  Cardiology consulted  Daily weight, I's and O's, low-salt diet    Persistent atrial fibrillation with rapid ventricular response ([facility] [address] )  Assessment & Plan  H/o persistent AFib in past   Has been seen by EP cardiologist   Was started on sotalol, metoprolol  Initially was in NSR however Pt has intermittent AFib episodes  Cardiologist suspected 2/2 sleep apnea as she does have problems staying asleep, snoring  Was recommended outpatient sleep study which she is yet to do  Presented with AFib RVR HR in 150s on presentation  Controlled after 1 dose of Cardizem  CXR showing fluid overload  Noted crackles on examination  Compliant with medication  Continue home sotalol with renal dose adjustment to 120 mg daily  Continue metoprolol 75 mg b i d  Metoprolol IV p r n  Lasix as above  Eliquis b i d  5 mg    SSS (sick sinus syndrome) ([facility] [address] )  Assessment & Plan  S/p pacemaker placement  Stage 3a chronic kidney disease ([facility] [address] )  Assessment & Plan  Baseline around 1  Currently at baseline  Will continue to monitor renal function  Nonrheumatic mitral valve regurgitation  Assessment & Plan  Does have H/o moderate MR, aortic insufficiency noted on prior echocardiogram   Was doing well  Admitted for CHF exacerbation  Will check echocardiogram to look for valvular anatomy  Obesity (BMI 30-39  9)  Assessment & Plan  Encouraged diet and exercise modification    Essential hypertension  Assessment & Plan  Will hold home lisinopril given BP within acceptable limits  Restart when able  Emergency Contacts: Extended Emergency Contact Information  Primary Emergency Contact: [address] [facility] [address] Phone: [phone]  Relation: Son  Secondary Emergency Contact: Alexey Good  Address: 2018 [address], [address] [facility] [address] [facility] Phone: [phone]  Mobile Phone: [phone]  Relation: Spouse      VTE Prophylaxis: Apixaban (Eliquis)  / sequential compression device and foot pump applied   Code Status:  DNR DNI  POLST: There is no POLST form on file for this patient (pre-hospital)    Discussion with family:  Discussed with son and  at bedside  Anticipated Length of Stay:  Patient will be admitted on an Inpatient basis with an anticipated length of stay of  > 2 midnights  Justification for Hospital Stay:  AFib RVR, CHF exacerbation    Total Time for Visit, including Counseling / Coordination of Care: 30 minutes  Greater than 50% of this total time spent on direct patient counseling and coordination of care  Chief Complaint:   Worsening SOB since few days  History of Present Illness:    Bhargavi Leonardo is a 65 y o  female with PMH of AFib, HTN, HLD, TIA, SSS s/p pacemaker, Lyme disease who presents with worsening SOB and fatigue since last 5 days  Does have H/o AFib which has been under control since last 4 years  Was maintain NSR however back to AFib however rate was controlled outpatient  Has been seen by EP cardiologist outpatient who recommended sleep study for persistent AFib  Denies any other C/o urinary symptoms, fever, chills, N/V  Denies chest pain  No orthopnea    Does complain of some PND like symptoms  Also complaining of dizziness worse on changing poster  No numbness or tingling  No other neurological deficit noted  No vertigo  No visual disturbances  No recent changes in medication  Review of Systems:    Review of Systems   Constitutional: Positive for activity change and fatigue  Negative for appetite change, chills, diaphoresis, fever and unexpected weight change  HENT: Positive for rhinorrhea  Negative for sore throat  Eyes: Negative for visual disturbance  Respiratory: Positive for shortness of breath  Negative for cough and chest tightness  Cardiovascular: Negative for chest pain, palpitations and leg swelling  Gastrointestinal: Negative for abdominal distention, abdominal pain, blood in stool, constipation, diarrhea, nausea and vomiting  Genitourinary: Negative for difficulty urinating, dysuria, frequency, hematuria and urgency  Musculoskeletal: Negative for arthralgias  Neurological: Positive for dizziness and light-headedness  Negative for seizures, syncope, numbness and headaches  Psychiatric/Behavioral: Negative for behavioral problems and sleep disturbance  Past Medical and Surgical History:     Past Medical History:   Diagnosis Date   • Aphasia, mixed 7/28/2017   • Atrial fibrillation (HCC)    • Colon polyp    • Headache    • Hyperlipidemia    • Hypertension    • Lyme disease    • Shortness of breath    • TIA (transient ischemic attack)    • Transient cerebral ischemia     Last assessed - 1/23/18   • Vertigo        Past Surgical History:   Procedure Laterality Date   • CARDIAC PACEMAKER PLACEMENT  12/2019   • COLONOSCOPY     • CT SIGMOIDOSCOPY FLX DX W/COLLJ SPEC BR/WA IF PFRMD N/A 11/28/2017    Procedure: Radha Meredith;  Surgeon: Jose C Mccarthy MD;  Location: [facility]; Service: Gastroenterology   • TONSILLECTOMY         Meds/Allergies:    Prior to Admission medications    Medication Sig Start Date End Date Taking?  Authorizing Provider apixaban (Eliquis) 5 mg Take 1 tablet (5 mg total) by mouth 2 (two) times a day 22   Susie Carpenter MD   lisinopril (ZESTRIL) 2 5 mg tablet Take 1 tablet (2 5 mg total) by mouth daily 22   Susie Carpenter MD   lovastatin (MEVACOR) 10 MG tablet Take 1 tablet (10 mg total) by mouth daily at bedtime 22   Susie Carpenter MD   metoprolol succinate (TOPROL-XL) 50 mg 24 hr tablet Take 1 5 tablets (75 mg total) by mouth 2 (two) times a day 22   Susie Carpenter MD   sotalol (BETAPACE) 80 mg tablet Take 1 5 tablets (120 mg total) by mouth every 12 (twelve) hours 22   Susie Carpenter MD   hydrOXYzine HCL (ATARAX) 25 mg tablet Take 1 tablet (25 mg total) by mouth daily at bedtime 21  Lizbeth Cardona DO     I have reviewed home medications with patient personally      Allergies: No Known Allergies    Social History:     Marital Status: /Civil Union   Occupation:  Retired   Patient Pre-hospital Living Situation:    Patient Pre-hospital Level of Mobility:  Independent  Patient Pre-hospital Diet Restrictions:  None  Substance Use History:   Social History     Substance and Sexual Activity   Alcohol Use Yes   • Alcohol/week: 7 0 standard drinks   • Types: 7 Glasses of wine per week    Comment: occ     Social History     Tobacco Use   Smoking Status Former Smoker   • Types: Cigarettes   • Quit date:    • Years since quittin 8   Smokeless Tobacco Never Used     Social History     Substance and Sexual Activity   Drug Use No       Family History:    Family History   Problem Relation Age of Onset   • Lung cancer Mother    • Dementia Father    • Alzheimer's disease Father    • Other Father         Cardiac Disorder         Physical Exam:     Vitals:   Blood Pressure: 134/78 (22 1502)  Pulse: 87 (22 1502)  Temperature: 97 6 °F (36 4 °C) (22 1502)  Temp Source: Oral (22 1502)  Respirations: 21 (22 1502)  SpO2: 96 % (22 1502)    Physical Exam  Vitals reviewed  Constitutional:       General: She is not in acute distress  Appearance: She is well-developed  HENT:      Head: Normocephalic and atraumatic  Eyes:      General: No scleral icterus  Right eye: No discharge  Left eye: No discharge  Cardiovascular:      Rate and Rhythm: Normal rate and regular rhythm  Pulses: Normal pulses  Heart sounds: Normal heart sounds  Pulmonary:      Effort: Pulmonary effort is normal  No respiratory distress  Breath sounds: Rales present  No wheezing  Chest:      Chest wall: No tenderness  Abdominal:      General: Bowel sounds are normal  There is no distension  Palpations: Abdomen is soft  Tenderness: There is no abdominal tenderness  Musculoskeletal:         General: No swelling or tenderness  Normal range of motion  Cervical back: Normal range of motion  Right lower leg: No edema  Left lower leg: No edema  Skin:     General: Skin is warm  Coloration: Skin is not pale  Neurological:      General: No focal deficit present  Mental Status: She is alert and oriented to person, place, and time  Mental status is at baseline  Cranial Nerves: No cranial nerve deficit  Sensory: No sensory deficit  Motor: No weakness  Psychiatric:         Mood and Affect: Mood normal          Behavior: Behavior normal          Additional Data:     Lab Results: I have personally reviewed pertinent reports        Results from last 7 days   Lab Units 11/01/22  0954   WBC Thousand/uL 8 65   HEMOGLOBIN g/dL 11 9   HEMATOCRIT % 37 2   PLATELETS Thousands/uL 249   NEUTROS PCT % 74   LYMPHS PCT % 13*   MONOS PCT % 11   EOS PCT % 1     Results from last 7 days   Lab Units 11/01/22  0954   SODIUM mmol/L 142   POTASSIUM mmol/L 4 3   CHLORIDE mmol/L 109*   CO2 mmol/L 22   BUN mg/dL 21   CREATININE mg/dL 1 00   ANION GAP mmol/L 11   CALCIUM mg/dL 8 8   ALBUMIN g/dL 3 7   TOTAL BILIRUBIN mg/dL 0 68   ALK PHOS U/L 94   ALT U/L 63   AST U/L 61*   GLUCOSE RANDOM mg/dL 122     Results from last 7 days   Lab Units 11/01/22  0954   INR  1 46*                   Imaging: I have personally reviewed pertinent reports  and I have personally reviewed pertinent films in PACS    XR chest 2 views   Final Result by Murphy Hale MD (11/01 7226)      Mild pulmonary edema with trace effusions  Workstation performed: [id number]             EKG, Pathology, and Other Studies Reviewed on Admission:   · EKG:  AFib with RVR    Allscripts / Epic Records Reviewed: Yes     ** Please Note: This note has been constructed using a voice recognition system   **

## 2022-11-01 NOTE — H&P
[address] 1945, 65 y o  female MRN: [id number]  Unit/Bed#: FT 02 Encounter: [id number]  Primary Care Provider: Erica Armas DO   Date and time admitted to hospital: 11/1/2022  9:25 AM    SSS (sick sinus syndrome) [facility])  Assessment & Plan  S/p pacemaker placement  Stage 3a chronic kidney disease ([facility] )  Assessment & Plan  Baseline around 1  Currently at baseline  Will continue to monitor renal function  Nonrheumatic mitral valve regurgitation  Assessment & Plan  Does have H/o moderate MR, aortic insufficiency noted on prior echocardiogram   Was doing well  Admitted for CHF exacerbation  Will check echocardiogram to look for valvular anatomy  Obesity (BMI 30-39  9)  Assessment & Plan  Encouraged diet and exercise modification    Essential hypertension  Assessment & Plan  Will hold home lisinopril given BP within acceptable limits  Restart when able  Persistent atrial fibrillation with rapid ventricular response (HCC)  Assessment & Plan  H/o persistent AFib in past   Has been seen by EP cardiologist   Was started on sotalol, metoprolol  Initially was in NSR however Pt has intermittent AFib episodes  Cardiologist suspected 2/2 sleep apnea as she does have problems staying asleep, snoring  Was recommended outpatient sleep study which she is yet to do  Presented with AFib RVR HR in 150s on presentation  Controlled after 1 dose of Cardizem  CXR showing fluid overload  Noted crackles on examination  Compliant with medication  Continue home sotalol with renal dose adjustment to 120 mg daily  Continue metoprolol 75 mg b i d  Metoprolol IV p r n  Lasix as above  Eliquis b i d  5 mg    * Acute congestive heart failure ([facility] )  Assessment & Plan  Presented with worsening SOB worse on minimal exertion  No orthopnea  No JVD elevation  No leg swelling  Elevated proBNP  Crackles on examination  CXR with pulmonary edema    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  No prior echocardiogram in last 6 months  Was doing well  No recent hospitalization since last 4 years  Continue Lasix IV 40 mg daily  Monitor renal function  Check TSH  Echocardiogram  Cardiology consulted  Daily weight, I's and O's, low-salt diet    VTE Prophylaxis: Apixaban (Eliquis)  / sequential compression device   Code Status: DNR DNI  POLST: There is no POLST form on file for this patient (pre-hospital)  Discussion with family: Spoke with  and son at bedside  Anticipated Length of Stay:  Patient will be admitted on an Inpatient basis with an anticipated length of stay of  > 2 midnights  Justification for Hospital Stay: She has new onset congestive heart failure  Total Time for Visit, including Counseling / Coordination of Care: 30 minutes  Greater than 50% of this total time spent on direct patient counseling and coordination of care  Chief Complaint:   Shortness of breath, fatigue    History of Present Illness:    Rafaela Wilburn is a 65 y o  female with past medical history of Stage 3a kidney disease, COPD, atrial fibrillation, tricuspid regurgitation, aortic valve insufficiency, mitral valve regurgitation, TIA, stroke, HTN and mixed hyperlipidemia, who presents with a 5 day history of increasing shortness of breath and fatigue  Patient states that she is having shortness of breath on exertion  For the last two nights, she also feels shortness of breath when she goes to sleep  She went to see her PCP for these symptoms and was found to be in rapid a-fib and presented to ED for further evaluation  She also reports ongoing dizziness when she stands up quickly  She was diagnosed with atrial fibrillation four years ago but has not experienced these symptoms before  She denies orthopnea, chest pain, palpitations, syncope, weakness, recent weight gain or loss      Review of Systems:    Review of Systems   Constitutional: Positive for activity change and fatigue  Negative for appetite change, chills, diaphoresis and fever  HENT: Negative for hearing loss, postnasal drip, rhinorrhea, sinus pressure, sneezing and sore throat  Eyes: Negative for pain and redness  Respiratory: Positive for cough and shortness of breath  Negative for chest tightness  Cardiovascular: Negative for chest pain, palpitations and leg swelling  Gastrointestinal: Negative for abdominal distention, abdominal pain and nausea  Endocrine: Negative for polydipsia and polyphagia  Has been consistently cold since her pacemaker procedure  Genitourinary: Negative for difficulty urinating and dysuria  Musculoskeletal: Negative for arthralgias, back pain and myalgias  Skin: Negative for color change and pallor  Neurological: Positive for dizziness  Negative for tremors and numbness  Dizziness when standing up too quickly  Has been persisting for a while  Psychiatric/Behavioral: Negative for agitation and dysphoric mood  Past Medical and Surgical History:     Past Medical History:   Diagnosis Date   • Aphasia, mixed 7/28/2017   • Atrial fibrillation (HCC)    • Colon polyp    • Headache    • Hyperlipidemia    • Hypertension    • Lyme disease    • Shortness of breath    • TIA (transient ischemic attack)    • Transient cerebral ischemia     Last assessed - 1/23/18   • Vertigo        Past Surgical History:   Procedure Laterality Date   • CARDIAC PACEMAKER PLACEMENT  12/2019   • COLONOSCOPY     • PA SIGMOIDOSCOPY FLX DX W/COLLJ SPEC BR/WA IF PFRMD N/A 11/28/2017    Procedure: Guillermo Brink;  Surgeon: Shi Busby MD;  Location: [facility]; Service: Gastroenterology   • TONSILLECTOMY         Meds/Allergies:    Prior to Admission medications    Medication Sig Start Date End Date Taking?  Authorizing Provider   apixaban (Eliquis) 5 mg Take 1 tablet (5 mg total) by mouth 2 (two) times a day 7/12/22   Ty Ham MD   lisinopril (ZESTRIL) 2 5 mg tablet Take 1 tablet (2 5 mg total) by mouth daily 22   Honey Yepez MD   lovastatin (MEVACOR) 10 MG tablet Take 1 tablet (10 mg total) by mouth daily at bedtime 22   Honey Yepez MD   metoprolol succinate (TOPROL-XL) 50 mg 24 hr tablet Take 1 5 tablets (75 mg total) by mouth 2 (two) times a day 22   Honey Yepez MD   sotalol (BETAPACE) 80 mg tablet Take 1 5 tablets (120 mg total) by mouth every 12 (twelve) hours 22   Honey Yepez MD   hydrOXYzine HCL (ATARAX) 25 mg tablet Take 1 tablet (25 mg total) by mouth daily at bedtime 21  Yi Kerr DO     I have reviewed home medications with patient personally  Allergies: No Known Allergies    Social History:     Marital Status: /Civil Union   Occupation: Retired  Patient Pre-hospital Living Situation:   Patient Pre-hospital Level of Mobility: Independent  Patient Pre-hospital Diet Restrictions: None  Substance Use History:   Social History     Substance and Sexual Activity   Alcohol Use Yes   • Alcohol/week: 7 0 standard drinks   • Types: 7 Glasses of wine per week    Comment: occ     Social History     Tobacco Use   Smoking Status Former Smoker   • Types: Cigarettes   • Quit date:    • Years since quittin 8   Smokeless Tobacco Never Used     Social History     Substance and Sexual Activity   Drug Use No       Family History:    Family History   Problem Relation Age of Onset   • Lung cancer Mother    • Dementia Father    • Alzheimer's disease Father    • Other Father         Cardiac Disorder        Physical Exam:     Vitals:   Blood Pressure: 134/78 (22 1502)  Pulse: 87 (22 1502)  Temperature: 97 6 °F (36 4 °C) (22 1502)  Temp Source: Oral (22 1502)  Respirations: 21 (22 1502)  SpO2: 96 % (22 1502)    Physical Exam  Vitals reviewed  Constitutional:       General: She is not in acute distress  Appearance: She is well-developed     HENT:      Head: Normocephalic and atraumatic  Eyes:      Conjunctiva/sclera: Conjunctivae normal    Neck:      Comments: No JVD  Cardiovascular:      Rate and Rhythm: Normal rate and regular rhythm  Heart sounds: Murmur heard  Pulmonary:      Effort: Pulmonary effort is normal  No respiratory distress  Breath sounds: Normal breath sounds  Comments: Bilateral crackles in lung bases  Abdominal:      General: There is no distension  Palpations: Abdomen is soft  Tenderness: There is no abdominal tenderness  Musculoskeletal:         General: Normal range of motion  Cervical back: Neck supple  Right lower leg: No edema  Left lower leg: No edema  Skin:     General: Skin is warm and dry  Capillary Refill: Capillary refill takes less than 2 seconds  Neurological:      General: No focal deficit present  Mental Status: She is alert and oriented to person, place, and time  Psychiatric:         Mood and Affect: Mood normal          Thought Content: Thought content normal          Judgment: Judgment normal              Additional Data:     Lab Results: I have personally reviewed pertinent reports  Results from last 7 days   Lab Units 11/01/22  0954   WBC Thousand/uL 8 65   HEMOGLOBIN g/dL 11 9   HEMATOCRIT % 37 2   PLATELETS Thousands/uL 249   NEUTROS PCT % 74   LYMPHS PCT % 13*   MONOS PCT % 11   EOS PCT % 1     Results from last 7 days   Lab Units 11/01/22  0954   SODIUM mmol/L 142   POTASSIUM mmol/L 4 3   CHLORIDE mmol/L 109*   CO2 mmol/L 22   BUN mg/dL 21   CREATININE mg/dL 1 00   ANION GAP mmol/L 11   CALCIUM mg/dL 8 8   ALBUMIN g/dL 3 7   TOTAL BILIRUBIN mg/dL 0 68   ALK PHOS U/L 94   ALT U/L 63   AST U/L 61*   GLUCOSE RANDOM mg/dL 122     Results from last 7 days   Lab Units 11/01/22  0954   INR  1 46*                   Imaging: I have personally reviewed pertinent reports        XR chest 2 views   Final Result by Victoriano Molina MD (11/01 4054)      Mild pulmonary edema with trace effusions  Workstation performed: [id number]             EKG, Pathology, and Other Studies Reviewed on Admission:   · EKG: Afib with RBR    Allscripts / Epic Records Reviewed: Yes     ** Please Note: This note has been constructed using a voice recognition system   **

## 2022-11-01 NOTE — ASSESSMENT & PLAN NOTE
H/o persistent AFib in past   Has been seen by EP cardiologist   Was started on sotalol, metoprolol  Initially was in NSR however Pt has intermittent AFib episodes  Cardiologist suspected 2/2 sleep apnea as she does have problems staying asleep, snoring  Was recommended outpatient sleep study which she is yet to do  Presented with AFib RVR HR in 150s on presentation  Controlled after 1 dose of Cardizem  CXR showing fluid overload  Noted crackles on examination  Compliant with medication  Continue home sotalol with renal dose adjustment to 120 mg daily  Continue metoprolol 75 mg b i d  Metoprolol IV p r n    Lasix as above  Eliquis b i d  5 mg

## 2022-11-01 NOTE — ASSESSMENT & PLAN NOTE
Does have H/o moderate MR, aortic insufficiency noted on prior echocardiogram   Was doing well  Admitted for CHF exacerbation  Will check echocardiogram to look for valvular anatomy

## 2022-11-01 NOTE — CONSULTS
Consultation - Cardiology   Fouzia Cole 65 y o  female MRN: [id number]  Unit/Bed#: FT 02 Encounter: [id number]  11/01/22  3:36 PM    Assessment/ Plan:  1  Paroxysmal atrial fibrillation  • EKG reveals AFib with RVR and PVCs  • Telemetry shows AFib with RVR and bradycardia  • Continue Sotalol 120 mg daily and metoprolol-XL 75 mg b i d   • Continue Eliquis 5 mg for anticoagulation  • Continue telemetry monitoring    2  Heart failure, unspecified  • Likely tachycardia mediated secondary to atrial fibrillation; however cannot definitively rule out ischemia as a cause; plan for future ischemic evaluation  • Echo ordered  • Continue IV Lasix 40 mg daily and Toprol-XL 75 mg BID    3  History of recovered tachycardia mediated cardiomyopathy   • EF recovered from 35-40% in February of 2019 to 60% in July of 2020 after initiating rhythm control per electrophysiology  • Echo ordered    4  Sick sinus syndrome s/p PPM  • S/p DC Medtronic dual-chamber PPM  • Continue to follow-up device clinic    5  Hypertension  Currently 134/70; Continue to monitor  • Continue Toprol-XL 75 mg b i d  and IV Lasix 40 mg daily    6  Mixed hyperlipidemia  · Continue pravastatin 10 mg    7  Moderate MR, mild AR, moderate to severe TR  · Continue to follow-up as outpatient with serial echocardiograms  · Patient follows with Dr Marleta Duane as outpatient      History of Present Illness   Physician Requesting Consult: Cuate Mota MD    Reason for Consult / Principal Problem:  AFib with RVR and CHF    HPI: Fouzia Cole is a 65y o  year old female with history of paroxysmal atrial fibrillation on on Eliquis, sick sinus syndrome s/p PPM, HTN, HLD, history of recovered tachycardia mediated cardiomyopathy, moderate MR, mild AR, moderate to severe TR who presents with increased shortness of breath and fatigue over the last 5 days  SOB is worsened with exertion  Denies chest pain, palpitations, LE edema, cough or syncope       Consults    EKG: Atrial fibrillation with rapid ventricular response and premature ventricular or aberrantly conducted complexes; nonspecific ST abnormality      Review of Systems   Constitutional: Positive for fatigue  Negative for chills and fever  HENT: Negative for ear pain and sore throat  Eyes: Negative for pain and visual disturbance  Respiratory: Positive for shortness of breath  Negative for cough, chest tightness and wheezing  Cardiovascular: Positive for leg swelling (minimal left sided)  Negative for chest pain and palpitations  Gastrointestinal: Negative for abdominal pain and vomiting  Genitourinary: Negative for dysuria and hematuria  Musculoskeletal: Negative for arthralgias and back pain  Skin: Negative for color change and rash  Neurological: Positive for dizziness  Negative for seizures and syncope  Psychiatric/Behavioral: Negative for agitation  All other systems reviewed and are negative  Historical Information   Past Medical History:   Diagnosis Date   • Aphasia, mixed 7/28/2017   • Atrial fibrillation (HCC)    • Colon polyp    • Headache    • Hyperlipidemia    • Hypertension    • Lyme disease    • Shortness of breath    • TIA (transient ischemic attack)    • Transient cerebral ischemia     Last assessed - 1/23/18   • Vertigo      Past Surgical History:   Procedure Laterality Date   • CARDIAC PACEMAKER PLACEMENT  12/2019   • COLONOSCOPY     • PA SIGMOIDOSCOPY FLX DX W/COLLJ SPEC BR/WA IF PFRMD N/A 11/28/2017    Procedure: Alline Camp;  Surgeon: Jose Martin Jay MD;  Location: [facility];   Service: Gastroenterology   • TONSILLECTOMY       Social History     Substance and Sexual Activity   Alcohol Use Yes   • Alcohol/week: 7 0 standard drinks   • Types: 7 Glasses of wine per week    Comment: occ     Social History     Substance and Sexual Activity   Drug Use No     Social History     Tobacco Use   Smoking Status Former Smoker   • Types: Cigarettes   • Quit date: 1977   • Years since quittin 8   Smokeless Tobacco Never Used       Family History:   Family History   Problem Relation Age of Onset   • Lung cancer Mother    • Dementia Father    • Alzheimer's disease Father    • Other Father         Cardiac Disorder        Meds/Allergies   all current active meds have been reviewed  No Known Allergies    Objective   Vitals: Blood pressure 134/78, pulse 87, temperature 97 6 °F (36 4 °C), temperature source Oral, resp  rate 21, SpO2 96 %  , There is no height or weight on file to calculate BMI ,   Orthostatic Blood Pressures    Flowsheet Row Most Recent Value   Blood Pressure 134/78 filed at 2022 1502   Patient Position - Orthostatic VS Lying filed at 2022 9719          Systolic (13MDG), RCF:425 , Min:101 , TRR:007     Diastolic (22WQT), MQA:92, Min:68, Max:83      No intake or output data in the 24 hours ending 22 1536    Invasive Devices  Report    Peripheral Intravenous Line  Duration           Peripheral IV 22 Right Arm <1 day                    Physical Exam:  Physical Exam  Vitals and nursing note reviewed  Constitutional:       General: She is not in acute distress  Appearance: She is well-developed  She is not toxic-appearing or diaphoretic  HENT:      Head: Normocephalic and atraumatic  Nose: Nose normal    Eyes:      Conjunctiva/sclera: Conjunctivae normal    Cardiovascular:      Rate and Rhythm: Tachycardia present  Rhythm irregularly irregular  Pulses: Normal pulses  Heart sounds: Normal heart sounds  No murmur heard  No gallop  Pulmonary:      Effort: Pulmonary effort is normal  No respiratory distress  Breath sounds: Normal breath sounds  No wheezing or rales  Chest:      Chest wall: No tenderness  Abdominal:      Palpations: Abdomen is soft  Tenderness: There is no abdominal tenderness  Musculoskeletal:      Cervical back: Neck supple  Right lower leg: No edema        Left lower leg: Edema (+1) present  Skin:     General: Skin is warm and dry  Neurological:      Mental Status: She is alert and oriented to person, place, and time     Psychiatric:         Mood and Affect: Mood normal          Behavior: Behavior normal          Judgment: Judgment normal           Lab Results:     Cardiac Profile:   Results from last 7 days   Lab Units 11/01/22  1334 11/01/22  0954   HS TNI 0HR ng/L  --  8   HS TNI 2HR ng/L 8  --    HSTNI D2 ng/L 0  --    NT-PRO BNP pg/mL  --  5,902*       CBC with diff:   Results from last 7 days   Lab Units 11/01/22  0954   WBC Thousand/uL 8 65   HEMOGLOBIN g/dL 11 9   HEMATOCRIT % 37 2   MCV fL 88   PLATELETS Thousands/uL 249   MCH pg 28 1   MCHC g/dL 32 0   RDW % 16 4*   MPV fL 11 0   NRBC AUTO /100 WBCs 0         CMP:   Results from last 7 days   Lab Units 11/01/22  0954   POTASSIUM mmol/L 4 3   CHLORIDE mmol/L 109*   CO2 mmol/L 22   BUN mg/dL 21   CREATININE mg/dL 1 00   CALCIUM mg/dL 8 8   AST U/L 61*   ALT U/L 63   ALK PHOS U/L 94   EGFR ml/min/1 73sq m 54

## 2022-11-01 NOTE — ASSESSMENT & PLAN NOTE
Presented with worsening SOB worse on minimal exertion  No orthopnea  No JVD elevation  No leg swelling  Elevated proBNP  Crackles on examination  CXR with pulmonary edema  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  No prior echocardiogram in last 6 months  Was doing well  No recent hospitalization since last 4 years      Continue Lasix IV 40 mg daily  Monitor renal function  Check TSH  Echocardiogram  Cardiology consulted  Daily weight, I's and O's, low-salt diet

## 2022-11-02 ENCOUNTER — APPOINTMENT (INPATIENT)
Dept: NON INVASIVE DIAGNOSTICS | Facility: HOSPITAL | Age: 77
End: 2022-11-02

## 2022-11-02 ENCOUNTER — EPISODE CHANGES (OUTPATIENT)
Dept: CASE MANAGEMENT | Facility: OTHER | Age: 77
End: 2022-11-02

## 2022-11-02 LAB
ANION GAP SERPL CALCULATED.3IONS-SCNC: 11 MMOL/L (ref 4–13)
AORTIC ROOT: 3.3 CM
APICAL FOUR CHAMBER EJECTION FRACTION: 53 %
ASCENDING AORTA: 3.2 CM
BUN SERPL-MCNC: 23 MG/DL (ref 5–25)
CALCIUM SERPL-MCNC: 8.7 MG/DL (ref 8.3–10.1)
CHLORIDE SERPL-SCNC: 105 MMOL/L (ref 96–108)
CHOLEST SERPL-MCNC: 122 MG/DL
CO2 SERPL-SCNC: 23 MMOL/L (ref 21–32)
CREAT SERPL-MCNC: 1.17 MG/DL (ref 0.6–1.3)
FRACTIONAL SHORTENING: 25 % (ref 28–44)
GFR SERPL CREATININE-BSD FRML MDRD: 44 ML/MIN/1.73SQ M
GLUCOSE SERPL-MCNC: 114 MG/DL (ref 65–140)
HDLC SERPL-MCNC: 45 MG/DL
INTERVENTRICULAR SEPTUM IN DIASTOLE (PARASTERNAL SHORT AXIS VIEW): 1.4 CM
INTERVENTRICULAR SEPTUM: 1.4 CM (ref 0.6–1.1)
LA/AORTA RATIO 2D: 1.3
LAAS-AP2: 25.9 CM2
LAAS-AP4: 26.3 CM2
LDLC SERPL CALC-MCNC: 62 MG/DL (ref 0–100)
LEFT ATRIUM SIZE: 4.3 CM
LEFT INTERNAL DIMENSION IN SYSTOLE: 2.7 CM (ref 2.1–4)
LEFT VENTRICULAR INTERNAL DIMENSION IN DIASTOLE: 3.6 CM (ref 3.5–6)
LEFT VENTRICULAR POSTERIOR WALL IN END DIASTOLE: 1.1 CM
LEFT VENTRICULAR STROKE VOLUME: 28 ML
LVSV (TEICH): 28 ML
NONHDLC SERPL-MCNC: 77 MG/DL
POTASSIUM SERPL-SCNC: 3.8 MMOL/L (ref 3.5–5.3)
SL CV PED ECHO LEFT VENTRICLE DIASTOLIC VOLUME (MOD BIPLANE) 2D: 56 ML
SL CV PED ECHO LEFT VENTRICLE SYSTOLIC VOLUME (MOD BIPLANE) 2D: 28 ML
SODIUM SERPL-SCNC: 139 MMOL/L (ref 135–147)
TR MAX PG: 31 MMHG
TR PEAK VELOCITY: 2.8 M/S
TRICUSPID ANNULAR PLANE SYSTOLIC EXCURSION: 0.9 CM
TRICUSPID VALVE PEAK REGURGITATION VELOCITY: 2.8 M/S
TRIGL SERPL-MCNC: 75 MG/DL

## 2022-11-02 RX ORDER — DILTIAZEM HYDROCHLORIDE 60 MG/1
30 TABLET, FILM COATED ORAL EVERY 6 HOURS SCHEDULED
Status: CANCELLED | OUTPATIENT
Start: 2022-11-02

## 2022-11-02 RX ADMIN — INFLUENZA A VIRUS A/VICTORIA/2570/2019 IVR-215 (H1N1) ANTIGEN (FORMALDEHYDE INACTIVATED), INFLUENZA A VIRUS A/DARWIN/9/2021 SAN-010 (H3N2) ANTIGEN (FORMALDEHYDE INACTIVATED), INFLUENZA B VIRUS B/PHUKET/3073/2013 ANTIGEN (FORMALDEHYDE INACTIVATED), AND INFLUENZA B VIRUS B/MICHIGAN/01/2021 ANTIGEN (FORMALDEHYDE INACTIVATED) 0.7 ML: 60; 60; 60; 60 INJECTION, SUSPENSION INTRAMUSCULAR at 06:02

## 2022-11-02 RX ADMIN — SOTALOL HYDROCHLORIDE 120 MG: 80 TABLET ORAL at 08:23

## 2022-11-02 RX ADMIN — METOPROLOL SUCCINATE 75 MG: 50 TABLET, EXTENDED RELEASE ORAL at 08:23

## 2022-11-02 RX ADMIN — DILTIAZEM HYDROCHLORIDE 30 MG: 30 TABLET, FILM COATED ORAL at 14:33

## 2022-11-02 RX ADMIN — APIXABAN 5 MG: 5 TABLET, FILM COATED ORAL at 08:23

## 2022-11-02 RX ADMIN — PRAVASTATIN SODIUM 10 MG: 20 TABLET ORAL at 16:44

## 2022-11-02 RX ADMIN — FUROSEMIDE 40 MG: 10 INJECTION, SOLUTION INTRAMUSCULAR; INTRAVENOUS at 08:24

## 2022-11-02 RX ADMIN — APIXABAN 5 MG: 5 TABLET, FILM COATED ORAL at 18:19

## 2022-11-02 RX ADMIN — DILTIAZEM HYDROCHLORIDE 30 MG: 30 TABLET, FILM COATED ORAL at 18:19

## 2022-11-02 NOTE — OCCUPATIONAL THERAPY NOTE
Occupational Therapy Evaluation     Patient Name: Al Garcia  KCSJG'B Date: 11/2/2022      Problem List  Principal Problem:    Acute congestive heart failure ([facility] [address] )  Active Problems:    Persistent atrial fibrillation with rapid ventricular response (HCC)    Essential hypertension    Obesity (BMI 30-39  9)    Nonrheumatic mitral valve regurgitation    Stage 3a chronic kidney disease (HCC)    SSS (sick sinus syndrome) ([facility] [address] )    Past Medical History  Past Medical History:   Diagnosis Date    Aphasia, mixed 7/28/2017    Atrial fibrillation (HCC)     Colon polyp     Headache     Hyperlipidemia     Hypertension     Lyme disease     Shortness of breath     TIA (transient ischemic attack)     Transient cerebral ischemia     Last assessed - 1/23/18    Vertigo      Past Surgical History  Past Surgical History:   Procedure Laterality Date    CARDIAC PACEMAKER PLACEMENT  12/2019    COLONOSCOPY      FL SIGMOIDOSCOPY FLX DX W/COLLJ SPEC BR/WA IF PFRMD N/A 11/28/2017    Procedure: Carmelo Aceves;  Surgeon: Malgorzata Rondon MD;  Location: [facility]; Service: Gastroenterology    TONSILLECTOMY           11/02/22 1017   OT Last Visit   OT Visit Date 11/02/22   Note Type   Note type Evaluation   Pain Assessment   Pain Assessment Tool 0-10   Pain Score No Pain   Restrictions/Precautions   Weight Bearing Precautions Per Order No   Other Precautions Telemetry   Home Living   Type of [address] [place] [address] Two level;1/2 bath on main level;Bed/bath upstairs  (1 BEBE)   Bathroom Shower/Tub Walk-in shower  (garden tub)   Bathroom Toilet Standard   Bathroom Equipment Commode   Bathroom Accessibility Accessible   Home Equipment Walker   Additional Comments no AD or DME used at baseline   Prior Function   Level of [place] Independent with ADLs; Independent with functional mobility; Independent with IADLS   Lives With Spouse   Receives Help From Family   IADLs Independent with driving; Independent with meal prep; Independent with medication management   Falls in the last 6 months 0   Vocational Retired   General   Family/Caregiver Present Yes  (Spouse)   Additional General Comments pt received ambulating out of bathroom, NAD, PIV access, tele   Subjective   Subjective Pt reports feeling well and agreeable to therapy evaluation   ADL   Eating Assistance 7  Independent   Grooming Assistance 7  Independent   Grooming Deficit   (at sink level)   [address] [facility] 7  Independent   LB Dressing Deficit Increased time to complete   [address]  7  Independent   Bed Mobility   Additional Comments Pt received OOB at start of session   Transfers   Sit to Stand 6  Modified independent   Stand to Sit 6  Modified independent   Toilet transfer 6  Modified independent   Additional items Standard toilet   Functional Mobility   Functional Mobility 6  Modified independent   Additional items   (without AD)   Activity Tolerance   Activity Tolerance Patient tolerated treatment well   Medical Staff Made Aware Lillian Ferrell RN   Nurse Made Aware Moreno RN   RUE Assessment   RUE Assessment WFL   LUE Assessment   LUE Assessment WFL   Hand Function   Gross Motor Coordination Functional   Fine Motor Coordination Functional   Sensation   Light Touch   (pt reports intermittent numbness in B hands at times, not present upon evaluation)   Vision-Basic Assessment   Current Vision Wears glasses all the time   Visual History Corrective eye surgery  (for cataracts)   Vision - Complex Assessment   Acuity Able to read clock/calendar on wall without difficulty   Psychosocial   Psychosocial (WDL) WDL   Cognition   Overall Cognitive Status WFL   Arousal/Participation Alert; Cooperative   Attention Within functional limits   Orientation Level Oriented X4   Memory Within functional limits   Following Commands Follows all commands and directions without difficulty   Assessment   Limitation Decreased high-level ADLs   Assessment Pt is a 65 y o  female seen for OT evaluation s/p admit to [facility] on 11/1/2022 w/ Acute congestive heart failure ([address] )  Comorbidities affecting pt's functional performance at time of assessment include: HTN, obesity, previous surgery, COPD, CHF and CKD  Personal factors affecting pt at time of IE include:steps to enter environment, difficulty performing IADLS  and health management   Prior to admission, pt was independent with all ADLs and functional mobility without AD  Upon evaluation: Pt is modified independent to independent with ADLs and functional mobility without AD, and reports she feels close to her functional baseline  Pt was noted to have increased HR with basic ADLs and ambulation in room, team aware  She was receptive to energy conservation techniques including use of shower chair prn  Pt with no further acute OT needs indicated at this time  Will complete orders in EMR  From OT standpoint, recommendation at time of d/c would be no further OT needs  Plan   OT Frequency Eval only   Recommendation   OT Discharge Recommendation No rehabilitation needs   Additional Comments  The patient's raw score on the AM-PAC Daily Activity inpatient short form is 24, standardized score is 57 54, greater than 39 4  Patients at this level are likely to benefit from discharge to home  Please refer to the recommendation of the Occupational Therapist for safe discharge planning     AM-PAC Daily Activity Inpatient   Lower Body Dressing 4   Bathing 4   Toileting 4   Upper Body Dressing 4   Grooming 4   Eating 4   Daily Activity Raw Score 24   Daily Activity Standardized Score (Calc for Raw Score >=11) 57 54   AM-[facility] Applied Cognition Inpatient   Following a Speech/Presentation 4   Understanding Ordinary Conversation 4   Taking Medications 4   Remembering Where Things Are Placed or Put Away 4   Remembering List of 4-5 Errands 4   Taking Care of Complicated Tasks 4   Applied Cognition Raw Score 24   Applied Cognition Standardized Score 62 21       TOD Levi/DEQUAN

## 2022-11-02 NOTE — PLAN OF CARE
Pt receiving echocardiogram at this time  Ortho bp's negative    Pt aware of plan for cardioversion tomorrow am

## 2022-11-02 NOTE — ASSESSMENT & PLAN NOTE
H/o persistent AFib in past   Has been seen by EP cardiologist   Was started on sotalol, metoprolol  Initially was in NSR however Pt has intermittent AFib episodes  Cardiologist suspected 2/2 sleep apnea as she does have problems staying asleep, snoring  Was recommended outpatient sleep study which she is yet to do  Presented with AFib RVR HR in 150s on presentation  Controlled after 1 dose of Cardizem  CXR showing fluid overload  Noted crackles on examination  Compliant with medication  Continues to be in AFib RVR with HR fluctuating from 140 down to 60  Continue home sotalol with renal dose adjustment to 120 mg daily  Continue metoprolol 75 mg b i d  NPO from midnight, plan for cardioversion in a m  if remains in AFib  Cardiology consulted recommended to start low-dose Cardizem 30 mg q  6h  Metoprolol IV p r n    Lasix as above  Eliquis b i d  5 mg

## 2022-11-02 NOTE — PLAN OF CARE
Problem: PAIN - ADULT  Goal: Verbalizes/displays adequate comfort level or baseline comfort level  Description: Interventions:  - Encourage patient to monitor pain and request assistance  - Assess pain using appropriate pain scale  - Administer analgesics based on type and severity of pain and evaluate response  - Implement non-pharmacological measures as appropriate and evaluate response  - Consider cultural and social influences on pain and pain management  - Notify physician/advanced practitioner if interventions unsuccessful or patient reports new pain  Outcome: Progressing     Problem: INFECTION - ADULT  Goal: Absence or prevention of progression during hospitalization  Description: INTERVENTIONS:  - Assess and monitor for signs and symptoms of infection  - Monitor lab/diagnostic results  - Monitor all insertion sites, i e  indwelling lines, tubes, and drains  - Monitor endotracheal if appropriate and nasal secretions for changes in amount and color  - [place] appropriate cooling/warming therapies per order  - Administer medications as ordered  - Instruct and encourage patient and family to use good hand hygiene technique  - Identify and instruct in appropriate isolation precautions for identified infection/condition  Outcome: Progressing     Problem: RESPIRATORY - ADULT  Goal: Achieves optimal ventilation and oxygenation  Description: INTERVENTIONS:  - Assess for changes in respiratory status  - Assess for changes in mentation and behavior  - Position to facilitate oxygenation and minimize respiratory effort  - Oxygen administered by appropriate delivery if ordered  - Initiate smoking cessation education as indicated  - Encourage broncho-pulmonary hygiene including cough, deep breathe, Incentive Spirometry  - Assess the need for suctioning and aspirate as needed  - Assess and instruct to report SOB or any respiratory difficulty  - Respiratory Therapy support as indicated  Outcome: Progressing

## 2022-11-02 NOTE — ASSESSMENT & PLAN NOTE
Presented with worsening SOB worse on minimal exertion  No orthopnea  No JVD elevation  No leg swelling  Elevated proBNP  Crackles on examination  CXR with pulmonary edema  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  No prior echocardiogram in last 6 months  Was doing well  No recent hospitalization since last 4 years    Etiology might be 2/2 underlying MR vs tachy induced cardiomyopathy    Continue Lasix IV 40 mg daily  Monitor renal function  Echocardiogram  Cardiology consulted  Daily weight, I's and O's, low-salt diet

## 2022-11-02 NOTE — PROGRESS NOTES
[address]  Progress Note - Rafaela Wilburn [birth year], 65 y o  female MRN: [id number]  Unit/Bed#: -01 Encounter: [id number]  Primary Care Provider: Bill Wills DO   Date and time admitted to hospital: 11/1/2022  9:25 AM    * Acute congestive heart failure [facility])  Assessment & Plan  Presented with worsening SOB worse on minimal exertion  No orthopnea  No JVD elevation  No leg swelling  Elevated proBNP  Crackles on examination  CXR with pulmonary edema  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  No prior echocardiogram in last 6 months  Was doing well  No recent hospitalization since last 4 years  Etiology might be 2/2 underlying MR vs tachy induced cardiomyopathy    Continue Lasix IV 40 mg daily  Monitor renal function  Echocardiogram  Cardiology consulted  Daily weight, I's and O's, low-salt diet    Persistent atrial fibrillation with rapid ventricular response ([facility] [address] )  Assessment & Plan  H/o persistent AFib in past   Has been seen by EP cardiologist   Was started on sotalol, metoprolol  Initially was in NSR however Pt has intermittent AFib episodes  Cardiologist suspected 2/2 sleep apnea as she does have problems staying asleep, snoring  Was recommended outpatient sleep study which she is yet to do  Presented with AFib RVR HR in 150s on presentation  Controlled after 1 dose of Cardizem  CXR showing fluid overload  Noted crackles on examination  Compliant with medication  Continues to be in AFib RVR with HR fluctuating from 140 down to 60  Continue home sotalol with renal dose adjustment to 120 mg daily  Continue metoprolol 75 mg b i d  NPO from midnight, plan for cardioversion in a m  if remains in AFib  Cardiology consulted recommended to start low-dose Cardizem 30 mg q  6h  Metoprolol IV p r n  Lasix as above  Eliquis b i d  5 mg    SSS (sick sinus syndrome) ([facility] [address] )  Assessment & Plan  S/p pacemaker placement      Stage 3a chronic kidney disease [facility])  Assessment & Plan  Baseline around 1  Currently at baseline  Will continue to monitor renal function  Nonrheumatic mitral valve regurgitation  Assessment & Plan  Does have H/o moderate MR, aortic insufficiency noted on prior echocardiogram   Was doing well  Admitted for CHF exacerbation  Will check echocardiogram to look for valvular anatomy  Obesity (BMI 30-39  9)  Assessment & Plan  Encouraged diet and exercise modification    Essential hypertension  Assessment & Plan  Will hold home lisinopril given BP within acceptable limits  Restart when able  Pharmacologic: Apixaban (Eliquis)  Mechanical VTE Prophylaxis in Place: Yes    Discussions with Specialists or Other Care Team Provider: Cardio    Education and Discussions with Family / Patient: PT and  at bedside    Current Length of Stay: 1 day(s)    Current Patient Status: Inpatient     Discharge Plan / Estimated Discharge Date: likely in 24 hrs    Code Status: Level 3 - DNAR and DNI      Subjective:     Patient was seen and examined by me  Communicated clearly  No particular overnight event reported  Hemodynamically stable and afebrile  Patient feels better overall  On room air  Objective:     Vitals:   Temp (24hrs), Av 7 °F (36 5 °C), Min:97 3 °F (36 3 °C), Max:98 8 °F (37 1 °C)    Temp:  [97 3 °F (36 3 °C)-98 8 °F (37 1 °C)] 97 4 °F (36 3 °C)  HR:  [] 84  Resp:  [15-21] 15  BP: ()/(64-90) 117/88  SpO2:  [96 %-98 %] 97 %  Body mass index is 34 3 kg/m²  Input and Output Summary (last 24 hours): Intake/Output Summary (Last 24 hours) at 2022 1449  Last data filed at 2022 0438  Gross per 24 hour   Intake 480 ml   Output 0 ml   Net 480 ml       Physical Exam:     Physical Exam  Vitals reviewed  Constitutional:       General: She is not in acute distress  Appearance: She is well-developed  HENT:      Head: Normocephalic and atraumatic  Eyes:      General: No scleral icterus  Right eye: No discharge  Left eye: No discharge  Cardiovascular:      Rate and Rhythm: Normal rate and regular rhythm  Pulses: Normal pulses  Heart sounds: Normal heart sounds  Pulmonary:      Effort: Pulmonary effort is normal  No respiratory distress  Breath sounds: Rales present  No wheezing  Chest:      Chest wall: No tenderness  Abdominal:      General: Bowel sounds are normal  There is no distension  Palpations: Abdomen is soft  Tenderness: There is no abdominal tenderness  Musculoskeletal:         General: No swelling or tenderness  Normal range of motion  Cervical back: Normal range of motion  Right lower leg: No edema  Left lower leg: No edema  Skin:     General: Skin is warm  Coloration: Skin is not pale  Neurological:      General: No focal deficit present  Mental Status: She is alert and oriented to person, place, and time  Mental status is at baseline  Cranial Nerves: No cranial nerve deficit  Sensory: No sensory deficit  Motor: No weakness  Psychiatric:         Mood and Affect: Mood normal          Behavior: Behavior normal          Additional Data:     Labs:    Results from last 7 days   Lab Units 11/01/22  0954   WBC Thousand/uL 8 65   HEMOGLOBIN g/dL 11 9   HEMATOCRIT % 37 2   PLATELETS Thousands/uL 249   NEUTROS PCT % 74   LYMPHS PCT % 13*   MONOS PCT % 11   EOS PCT % 1     Results from last 7 days   Lab Units 11/02/22  0431 11/01/22  0954   POTASSIUM mmol/L 3 8 4 3   CHLORIDE mmol/L 105 109*   CO2 mmol/L 23 22   BUN mg/dL 23 21   CREATININE mg/dL 1 17 1 00   CALCIUM mg/dL 8 7 8 8   ALK PHOS U/L  --  94   ALT U/L  --  63   AST U/L  --  61*     Results from last 7 days   Lab Units 11/01/22  0954   INR  1 46*       * I Have Reviewed All Lab Data Listed Above  * Additional Pertinent Lab Tests Reviewed:  [address] Admission Reviewed    Imaging:  XR chest 2 views   Final Result by [place] Yen Bernice Rodriguez MD (11/01 1246)      Mild pulmonary edema with trace effusions  Workstation performed: [id number]            Imaging Reports Reviewed Today Include: CXR  Imaging Personally Reviewed by Myself Includes:  Same as above    Recent Cultures (last 7 days):           Last 24 Hours Medication List:   Current Facility-Administered Medications   Medication Dose Route Frequency Provider Last Rate   • acetaminophen  650 mg Oral Q6H PRN Wang Muñiz MD     • apixaban  5 mg Oral BID Wang Muñiz MD     • diltiazem  30 mg Oral Q6H [facility] Agustin Mueller PA-C     • furosemide  40 mg Intravenous Daily Wang Muñiz MD     • metoprolol  5 mg Intravenous Q6H PRN Wang Muñiz MD     • metoprolol succinate  75 mg Oral BID Wang Muñiz MD     • ondansetron  4 mg Intravenous Q6H PRN Wang Muñiz MD     • pravastatin  10 mg Oral Daily With Sarah Krueger MD     • sotalol  120 mg Oral Daily Wang Muñiz MD          Today, Patient Was Seen By: Wang Muñiz    ** Please Note: This note has been constructed using a voice recognition system   **

## 2022-11-02 NOTE — PROGRESS NOTES
Cardiology Progress Note - Patti Oliver 65 y o  female MRN: [id number]    Unit/Bed#: -01 Encounter: [id number]      Assessment/Plan:  1  Paroxysmal atrial fibrillation  · EKG reveals AFib with RVR and PVCs  · Telemetry shows AFib with RVR; HR averaging 120-130 bpm; continue to monitor  · Start Cardizem 30 mg q6h   · Continue Sotalol 120 mg daily and Toprol-XL 75 mg b i d   · Continue Eliquis 5 mg for anticoagulation  · Plan for cardioversion tomorrow; discussed risks and benefits with patient who is agreeable to proceed  Will keep NPO after midnight     2  Heart failure, unspecified  · Likely tachycardia mediated secondary to atrial fibrillation; plan for DCCV tomorrow; cannot definitively rule out ischemia as a cause; plan for future ischemic evaluation  · Euvolemic on exam  · BNP 5,902  · CXR reveals mild pulmonary edema with trace effusions  · Echo pending  · Continue IV Lasix 40 mg daily and Toprol-XL 75 mg BID  · Strict I/O and daily weights; sodium and restriction     3  History of recovered tachycardia mediated cardiomyopathy   · EF recovered from 35-40% in February of 2019 to 60% in July of 2020 after initiating rhythm control per electrophysiology  · Echo ordered     4  Sick sinus syndrome s/p PPM  · S/p DC Medtronic dual-chamber PPM  · Continue to follow-up device clinic     5  Hypertension  · Currently 98/64; continue to monitor  · Continue Toprol-XL 75 mg b i d  and IV Lasix 40 mg daily     6  Mixed hyperlipidemia  § Continue pravastatin 10 mg     7  Moderate MR, mild AR, moderate to severe TR  § Continue to follow-up as outpatient with serial echocardiograms  § Patient follows with Dr Mikayla Dillard as outpatient      Subjective:   Patient seen and examined  No significant events overnight  Patient is in pleasant mood siting in chair with  at her side  Denies new complaints at this time  Objective:     Vitals: Blood pressure 122/69, pulse 65, temperature 98 8 °F (37 1 °C), resp   rate 18, height 5' 5" (1 651 m), weight 93 5 kg (206 lb 2 1 oz), SpO2 96 %  , Body mass index is 34 3 kg/m² ,   Orthostatic Blood Pressures    Flowsheet Row Most Recent Value   Blood Pressure 122/69 filed at 11/02/2022 8720   Patient Position - Orthostatic VS Lying filed at 11/01/2022 1502            Intake/Output Summary (Last 24 hours) at 11/2/2022 0917  Last data filed at 11/2/2022 0438  Gross per 24 hour   Intake 480 ml   Output 0 ml   Net 480 ml         Physical Exam:  Physical Exam  Vitals and nursing note reviewed  Constitutional:       General: She is not in acute distress  Appearance: She is well-developed  She is not toxic-appearing or diaphoretic  HENT:      Head: Normocephalic and atraumatic  Nose: Nose normal    Eyes:      Conjunctiva/sclera: Conjunctivae normal    Cardiovascular:      Rate and Rhythm: Tachycardia present  Rhythm irregularly irregular  Heart sounds: Normal heart sounds  No murmur heard  Pulmonary:      Effort: Pulmonary effort is normal  No respiratory distress  Breath sounds: Normal breath sounds  No wheezing or rales  Chest:      Chest wall: No tenderness  Abdominal:      Palpations: Abdomen is soft  Tenderness: There is no abdominal tenderness  Musculoskeletal:      Cervical back: Neck supple  Right lower leg: No edema  Left lower leg: No edema  Skin:     General: Skin is warm and dry  Neurological:      Mental Status: She is alert and oriented to person, place, and time     Psychiatric:         Mood and Affect: Mood normal          Judgment: Judgment normal               Medications:      Current Facility-Administered Medications:   •  acetaminophen (TYLENOL) tablet 650 mg, 650 mg, Oral, Q6H PRN, Melody Laguerre MD  •  apixaban (ELIQUIS) tablet 5 mg, 5 mg, Oral, BID, Melody Laguerre MD, 5 mg at 11/02/22 9603  •  furosemide (LASIX) injection 40 mg, 40 mg, Intravenous, Daily, Melody Laguerre MD, 40 mg at 11/02/22 0348  •  metoprolol (LOPRESSOR) injection 5 mg, 5 mg, Intravenous, Q6H PRN, Colletta Headings, MD  •  metoprolol succinate (TOPROL-XL) 24 hr tablet 75 mg, 75 mg, Oral, BID, Colletta Headings, MD, 75 mg at 11/02/22 4524  •  ondansetron (ZOFRAN) injection 4 mg, 4 mg, Intravenous, Q6H PRN, Colletta Headings, MD  •  pravastatin (PRAVACHOL) tablet 10 mg, 10 mg, Oral, Daily With Dinner, Colletta Headings, MD, 10 mg at 11/01/22 1617  •  sotalol (BETAPACE) tablet 120 mg, 120 mg, Oral, Daily, Colletta Headings, MD, 120 mg at 11/02/22 0823     Labs & Results:     Results from last 7 days   Lab Units 11/01/22  1334 11/01/22  0954   HS TNI 0HR ng/L  --  8   HS TNI 2HR ng/L 8  --    HSTNI D2 ng/L 0  --    NT-PRO BNP pg/mL  --  1,722*     Results from last 7 days   Lab Units 11/01/22  0954   WBC Thousand/uL 8 65   HEMOGLOBIN g/dL 11 9   HEMATOCRIT % 37 2   PLATELETS Thousands/uL 249     Results from last 7 days   Lab Units 11/02/22  0431   TRIGLYCERIDES mg/dL 75   HDL mg/dL 45*     Results from last 7 days   Lab Units 11/02/22  0431 11/01/22  0954   POTASSIUM mmol/L 3 8 4 3   CHLORIDE mmol/L 105 109*   CO2 mmol/L 23 22   BUN mg/dL 23 21   CREATININE mg/dL 1 17 1 00   CALCIUM mg/dL 8 7 8 8   ALK PHOS U/L  --  94   ALT U/L  --  63   AST U/L  --  61*     Results from last 7 days   Lab Units 11/01/22  0954   INR  1 46*   PTT seconds 34     Results from last 7 days   Lab Units 11/01/22  0954   MAGNESIUM mg/dL 2 0       Vitals: Blood pressure 122/69, pulse 65, temperature 98 8 °F (37 1 °C), resp  rate 18, height 5' 5" (1 651 m), weight 93 5 kg (206 lb 2 1 oz), SpO2 96 %  , Body mass index is 34 3 kg/m² ,   Orthostatic Blood Pressures    Flowsheet Row Most Recent Value   Blood Pressure 122/69 filed at 11/02/2022 7322   Patient Position - Orthostatic VS Lying filed at 11/01/2022 5160          Systolic (84JOO), RJB:564 , Min:101 , JGP:688     Diastolic (54BLL), NSD:36, Min:68, Max:90        Intake/Output Summary (Last 24 hours) at 11/2/2022 9431  Last data filed at 11/2/2022 0438  Gross per 24 hour   Intake 480 ml   Output 0 ml   Net 480 ml       Invasive Devices  Report    Peripheral Intravenous Line  Duration           Peripheral IV 11/01/22 Right Arm <1 day                  EKG:  Atrial fibrillation with RVR and PVCs or aberrantly conducted complexes; nonspecific ST abnormality    Telemetry:  Telemetry Orders (From admission, onward)             48 Hour Telemetry Monitoring  Continuous x 48 hours        References:    Telemetry Guidelines   Question:  Reason for 48 Hour Telemetry  Answer:  Arrhythmias Requiring Medical Therapy (eg  SVT, Vtach/fib, Bradycardia, Uncontrolled A-fib)                 Telemetry Reviewed: Atrial fibrillation   HR averaging 120-130  Indication for Continued Telemetry Use: Arrthymias requiring medical therapy    BP Readings from Last 3 Encounters:   11/02/22 122/69   11/01/22 122/78   06/07/22 128/82      Wt Readings from Last 3 Encounters:   11/02/22 93 5 kg (206 lb 2 1 oz)   11/01/22 93 kg (205 lb)   06/07/22 91 6 kg (202 lb)

## 2022-11-02 NOTE — PHYSICAL THERAPY NOTE
Physical Therapy Evaluation       Patient's Name: Shantal Curtis    Admitting Diagnosis  CHF (congestive heart failure) ([facility] ) [I50 9]  SOB (shortness of breath) [R06 02]  Afib ([facility] ) [I48 91]  Atrial fibrillation with RVR ([facility] ) [I48 91]    Problem List  Patient Active Problem List   Diagnosis    Persistent atrial fibrillation with rapid ventricular response ([facility] )    Essential hypertension    Mixed hyperlipidemia    Obesity (BMI 30-39  9)    H/O TIA (transient ischemic attack) and stroke    Migraine with aura and without status migrainosus, not intractable    Myocardiopathy (HCC)    Anticoagulant long-term use    Nonrheumatic mitral valve regurgitation    Nonrheumatic aortic valve insufficiency    Nonrheumatic tricuspid valve regurgitation    Sinus bradycardia    Paroxysmal atrial fibrillation (HCC)    Other insomnia    Chronic obstructive pulmonary disease (HCC)    BMI 33 0-33 9,adult    Stage 3a chronic kidney disease (HCC)    Acute congestive heart failure (HCC)    SSS (sick sinus syndrome) ([facility] )       Past Medical History  Past Medical History:   Diagnosis Date    Aphasia, mixed 7/28/2017    Atrial fibrillation (HCC)     Colon polyp     Headache     Hyperlipidemia     Hypertension     Lyme disease     Shortness of breath     TIA (transient ischemic attack)     Transient cerebral ischemia     Last assessed - 1/23/18    Vertigo        Past Surgical History  Past Surgical History:   Procedure Laterality Date    CARDIAC PACEMAKER PLACEMENT  12/2019    COLONOSCOPY      OK SIGMOIDOSCOPY FLX DX W/COLLJ SPEC BR/WA IF PFRMD N/A 11/28/2017    Procedure: Shadi Montes;  Surgeon: Genaro Joyner MD;  Location: [facility]; Service: Gastroenterology    TONSILLECTOMY         PT performed at least 2 patient identifiers during session: Name and wristband         11/02/22 1005   PT Last Visit   PT Visit Date 11/02/22   Note Type   Note type Evaluation   Pain Assessment   Pain Assessment Tool 0-10   Pain Score No Pain Restrictions/Precautions   Weight Bearing Precautions Per Order No   Other Precautions Telemetry; Fall Risk   Home Living   Type of [address] Two level;Bed/bath upstairs; Performs ADLs on one level;1/2 bath on main level;Stairs to enter with rails  (1 BEBE)   Bathroom Shower/Tub Walk-in shower  (also has spa tub)   [facility]  (no DME used at baseline)   [facility]  (no AD used at baseline)   Prior Function   Level of [place] Independent with functional mobility; Independent with ADLs   Lives With Spouse   Receives Help From Family   IADLs Independent with meal prep; Independent with medication management; Family/Friend/Other provides transportation   Falls in the last 6 months 0   Vocational Retired   General   Family/Caregiver Present Yes   Cognition   Overall Cognitive Status WFL   Arousal/Participation Alert   Orientation Level Oriented X4   Memory Within functional limits   Following Commands Follows all commands and directions without difficulty   Comments pt agreeable to PT evaluation   RUE Assessment   RUE Assessment   (defer to OT eval for comments)   LUE Assessment   LUE Assessment   (defer to OT eval for comments)   RLE Assessment   RLE Assessment WFL  (grossly 5/5)   LLE Assessment   LLE Assessment WFL  (grossly 5/5)   Coordination   Movements are Fluid and Coordinated 1   Sensation X  (intermittent numbness B/L hands)   Light Touch   RLE Light Touch Grossly intact   LLE Light Touch Grossly intact   Bed Mobility   Additional Comments pt received OOB in bathroom upon arrival, reports ambulating to/from bathroom independently since admission   Transfers   Sit to Stand 6  Modified independent   Stand to Sit 6  Modified independent   Toilet transfer 6  Modified independent   Additional items Standard toilet   Ambulation/Elevation   Gait pattern   (no LOB, step thru pattern, symmetrical arm swing)   Gait Assistance 5  Supervision   Assistive Device None   Distance 15'; distance deferred at this time   Balance   Static Sitting Normal   Dynamic Sitting Good   Static Standing Good   Dynamic Standing Good   Ambulatory Fair +   Endurance Deficit   Endurance Deficit Yes   Endurance Deficit Description HR fluctuating t/o evaluation, 90s-140s bpm   Activity Tolerance   Activity Tolerance Patient tolerated treatment well;Treatment limited secondary to medical complications (Comment)   Medical Staff Made Aware care coordination with LOLA Sands   Nurse Made Aware RN Moreno   Assessment   Prognosis Excellent   Problem List Decreased endurance; Impaired balance;Decreased mobility   Assessment Pt is 65 y o  female seen for moderate-complexity PT evaluation on 11/2/2022 s/p admit to [facility] on 11/1/2022 w/ Acute congestive heart failure ([address] )  PT was consulted to assess pt's functional mobility and d/c needs  Order placed for PT eval and tx  PTA, pt resides with spouse in [facility] with 1 BEBE, ambulates without AD  At time of eval, pt performing transfers mod I, level surface household distance gait trial supervision level, distance deferred at this time  Upon evaluation, pt presenting with impaired functional mobility d/t decreased endurance, impaired balance and decreased mobility  Pertinent PMHx and current co-morbidities affecting pt's physical performance at time of assessment include: acute congestive heart failure, persistent A Fib, HTN, HTN, obesity, nonrheumatic mitral valve regurgitation, stage 3a CKD, SSS, mixed HLD, h/o TIA and stroke, migraine, myocardiopathy, sinus bradycardia, other insomnia, COPD  Personal factors affecting pt at time of eval include: lives in 2 story house and stairs to enter home  The following objective measures performed on IE also reveal limitations: Barthel Index: 75/100, Modified [place]: 1 (no significant disability) and AM-PAC 6-Clicks: 71/20   Pt's clinical presentation is currently evolving seen in pt's presentation of abnormal lab value(s), ongoing medical assessment and on telemetry monitoring  Overall, pt's rehab potential and prognosis to return to PLOF is good as impacted by objective findings, warranting pt to receive further skilled PT interventions to address identified impairments, activity limitation(s), and participation restriction(s)  Pt to benefit from continued PT tx to address deficits as defined above and maximize level of functional independent mobility  From PT/mobility standpoint, recommendation at time of d/c would be home with outpatient rehabilitation pending progress in order to facilitate return to PLOF  Barriers to Discharge None   Goals   Patient Goals to go home   STG Expiration Date 11/12/22   Short Term Goal #1 In 7-10 days: Ambulate > 300 ft  without AD independently w/o LOB and w/ normalized gait pattern 100% of the time, Navigate 13 stairs modified independent with unilateral handrail to facilitate return to previous living environment, Improve Barthel Index score to 90 or greater to facilitate independence and PT provider will perform functional balance assessment to determine fall risk   PT Treatment Day 0   Plan   Treatment/Interventions Elevations; Endurance training;Patient/family training;Gait training;Spoke to nursing;OT   PT Frequency Other (Comment)  (follow up visit only)   Recommendation   PT Discharge Recommendation No rehabilitation needs  (anticipated pending progress)   Equipment Recommended   (none anticipated)   AM-PAC Basic Mobility Inpatient   Turning in Bed Without Bedrails 4   Lying on Back to Sitting on Edge of Flat Bed 4   Moving Bed to Chair 4   Standing Up From Chair 4   Walk in Room 3   Climb 3-5 Stairs 3   Basic Mobility Inpatient Raw Score 22   Basic Mobility Standardized Score 47 4   Highest Level Of Mobility   JH-HLM Goal 7: Walk 25 feet or more   JH-HLM Achieved 7: Walk 25 feet or more   Modified Howard Scale   Modified Howard Scale 1   Barthel Index   Feeding 10   Bathing 5   Grooming Score 5   Dressing Score 10   Bladder Score 10   Bowels Score 10   Toilet Use Score 10   Transfers (Bed/Chair) Score 15   Mobility (Level Surface) Score 0   Stairs Score 0   Barthel Index Score 75       Tan Jaimes, PT, DPT

## 2022-11-02 NOTE — PROGRESS NOTES
[address] [facility]  Progress Note - Oli Garsia [birth year], 65 y o  female MRN: [id number]  Unit/Bed#: -01 Encounter: [id number]  Primary Care Provider: Maikel Carrasco DO   Date and time admitted to hospital: 11/1/2022  9:25 AM    SSS (sick sinus syndrome) [facility])  Assessment & Plan  S/p pacemaker placement  Stage 3a chronic kidney disease ([address] )  Assessment & Plan  Baseline around 1  Currently at baseline  Will continue to monitor renal function  Nonrheumatic mitral valve regurgitation  Assessment & Plan  Does have H/o moderate MR, aortic insufficiency noted on prior echocardiogram   Was doing well  Admitted for CHF exacerbation  Will check echocardiogram to look for valvular anatomy  Obesity (BMI 30-39  9)  Assessment & Plan  Encouraged diet and exercise modification    Essential hypertension  Assessment & Plan  Will hold home lisinopril given BP within acceptable limits  Restart when able  Persistent atrial fibrillation with rapid ventricular response (HCC)  Assessment & Plan  H/o persistent AFib in past   Has been seen by EP cardiologist   Was started on sotalol, metoprolol  Initially was in NSR however Pt has intermittent AFib episodes  Cardiologist suspected 2/2 sleep apnea as she does have problems staying asleep, snoring  Was recommended outpatient sleep study which she is yet to do  Presented with AFib RVR HR in 150s on presentation  Controlled after 1 dose of Cardizem  CXR showing fluid overload  Noted crackles on examination  Compliant with medication  Continues to be in AFib RVR with HR fluctuating from 140 down to 60  Continue home sotalol with renal dose adjustment to 120 mg daily  Continue metoprolol 75 mg b i d  NPO from midnight, plan for cardioversion in a m  if remains in AFib  Cardiology consulted recommended to start low-dose Cardizem 30 mg q  6h  Metoprolol IV p r n    Lasix as above  Eliquis b i d  5 mg    * Acute congestive heart failure [facility])  Assessment & Plan  Presented with worsening SOB worse on minimal exertion  No orthopnea  No JVD elevation  No leg swelling  Elevated proBNP  Crackles on examination  CXR with pulmonary edema  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  No prior echocardiogram in last 6 months  Was doing well  No recent hospitalization since last 4 years  Etiology might be 2/2 underlying MR vs tachy induced cardiomyopathy    Continue Lasix IV 40 mg daily  Monitor renal function  Echocardiogram  Cardiology consulted  Daily weight, I's and O's, low-salt diet          VTE Pharmacologic Prophylaxis:   VTE Score: 4 Moderate Risk (Score 3-4) - Pharmacological DVT Prophylaxis Ordered: Apixaban (Eliquis)  Mechanical VTE Prophylaxis in Place: Yes    Patient Centered Rounds: I have performed bedside rounds with nursing staff today  Discussions with Specialists or Other Care Team Provider: PT/OT, Cardiology    Education and Discussions with Family / Patient: Updated  () at bedside  Current Length of Stay: 1 day(s)    Current Patient Status: Inpatient     Discharge Plan / Estimated Discharge Date: Anticipate discharge tomorrow to home  Code Status: Level 3 - DNAR and DNI      Subjective:   Patient was consistently tachycardic overnight (120s-140s)  She also states that she felt palpitations during the night and felt short of breath when she began to fall asleep  Her shortness of breath on exertion has gotten better  She reports that she is still feeling dizzy when she walks around  Objective:     Vitals:   Temp (24hrs), Av 7 °F (36 5 °C), Min:97 3 °F (36 3 °C), Max:98 8 °F (37 1 °C)    Temp:  [97 3 °F (36 3 °C)-98 8 °F (37 1 °C)] 97 4 °F (36 3 °C)  HR:  [] 84  Resp:  [15-18] 15  BP: ()/(64-90) 117/88  SpO2:  [96 %-98 %] 97 %  Body mass index is 34 28 kg/m²  Input and Output Summary (last 24 hours):        Intake/Output Summary (Last 24 hours) at 11/2/2022 1603  Last data filed at 11/2/2022 0438  Gross per 24 hour   Intake 480 ml   Output 0 ml   Net 480 ml       Physical Exam:     Physical Exam  Vitals and nursing note reviewed  Constitutional:       General: She is not in acute distress  Appearance: She is well-developed  HENT:      Head: Normocephalic and atraumatic  Right Ear: External ear normal       Left Ear: External ear normal       Nose: Nose normal       Mouth/Throat:      Mouth: Mucous membranes are moist       Pharynx: Oropharynx is clear  Eyes:      Extraocular Movements: Extraocular movements intact  Conjunctiva/sclera: Conjunctivae normal    Cardiovascular:      Rate and Rhythm: Normal rate and regular rhythm  Heart sounds: Murmur heard  Comments: Mitral regurgitation murmur  Pulmonary:      Effort: Pulmonary effort is normal  No respiratory distress  Breath sounds: No stridor  No wheezing  Comments: Decreased breath sounds at bases  No crackles  Abdominal:      Palpations: Abdomen is soft  Tenderness: There is no abdominal tenderness  Musculoskeletal:      Cervical back: Neck supple  Comments: Left lower ankle edema (has had it for awhile, not new onset)  Skin:     General: Skin is warm and dry  Capillary Refill: Capillary refill takes less than 2 seconds  Neurological:      General: No focal deficit present  Mental Status: She is alert and oriented to person, place, and time  Psychiatric:         Mood and Affect: Mood normal          Thought Content:  Thought content normal          Judgment: Judgment normal         Additional Data:     Labs:  Results from last 7 days   Lab Units 11/01/22  0954   WBC Thousand/uL 8 65   HEMOGLOBIN g/dL 11 9   HEMATOCRIT % 37 2   PLATELETS Thousands/uL 249   NEUTROS PCT % 74   LYMPHS PCT % 13*   MONOS PCT % 11   EOS PCT % 1     Results from last 7 days   Lab Units 11/02/22  0431 11/01/22  0954   SODIUM mmol/L 139 142   POTASSIUM mmol/L 3 8 4  3   CHLORIDE mmol/L 105 109*   CO2 mmol/L 23 22   BUN mg/dL 23 21   CREATININE mg/dL 1 17 1 00   ANION GAP mmol/L 11 11   CALCIUM mg/dL 8 7 8 8   ALBUMIN g/dL  --  3 7   TOTAL BILIRUBIN mg/dL  --  0 68   ALK PHOS U/L  --  94   ALT U/L  --  63   AST U/L  --  61*   GLUCOSE RANDOM mg/dL 114 122     Results from last 7 days   Lab Units 11/01/22  0954   INR  1 46*         Results from last 7 days   Lab Units 11/01/22  0954   HEMOGLOBIN A1C % 5 8*           Imaging: Reviewed radiology reports from this admission including: chest xray    Recent Cultures (last 7 days):           Lines/Drains:  Invasive Devices  Report    Peripheral Intravenous Line  Duration           Peripheral IV 11/01/22 Right Arm 1 day                Telemetry:   Telemetry Orders (From admission, onward)             48 Hour Telemetry Monitoring  Continuous x 48 hours        References:    Telemetry Guidelines   Question:  Reason for 48 Hour Telemetry  Answer:  Arrhythmias Requiring Medical Therapy (eg  SVT, Vtach/fib, Bradycardia, Uncontrolled A-fib)                    Last 24 Hours Medication List:   Current Facility-Administered Medications   Medication Dose Route Frequency Provider Last Rate   • acetaminophen  650 mg Oral Q6H PRN Colletta Headings, MD     • apixaban  5 mg Oral BID Colletta Headings, MD     • diltiazem  30 mg Oral Q6H [address] Agustin Mueller PA-C     • furosemide  40 mg Intravenous Daily Colletta Headings, MD     • metoprolol  5 mg Intravenous Q6H PRN Colletta Headings, MD     • metoprolol succinate  75 mg Oral BID Colletta Headings, MD     • ondansetron  4 mg Intravenous Q6H PRN Colletta Headings, MD     • pravastatin  10 mg Oral Daily With Dinner Colletta Headings, MD     • sotalol  120 mg Oral Daily Colletta Headings, MD          Today, Patient Was Seen By: Theresa Gibson    ** Please Note: This note has been constructed using a voice recognition system   **

## 2022-11-02 NOTE — PLAN OF CARE
Problem: PHYSICAL THERAPY ADULT  Goal: Performs mobility at highest level of function for planned discharge setting  See evaluation for individualized goals  Description: Treatment/Interventions: Elevations, Endurance training, Patient/family training, Gait training, Spoke to nursing, OT  Equipment Recommended:  (none anticipated)       See flowsheet documentation for full assessment, interventions and recommendations  11/2/2022 1337 by Christian Patel PT  Note: Prognosis: Excellent  Problem List: Decreased endurance, Impaired balance, Decreased mobility  Assessment: Pt is 65 y o  female seen for moderate-complexity PT evaluation on 11/2/2022 s/p admit to [facility] on 11/1/2022 w/ Acute congestive heart failure ([address] )  PT was consulted to assess pt's functional mobility and d/c needs  Order placed for PT eval and tx  PTA, pt resides with spouse in [facility] with 1 BEBE, ambulates without AD  At time of eval, pt performing transfers mod I, level surface household distance gait trial supervision level, distance deferred at this time  Upon evaluation, pt presenting with impaired functional mobility d/t decreased endurance, impaired balance and decreased mobility  Pertinent PMHx and current co-morbidities affecting pt's physical performance at time of assessment include: acute congestive heart failure, persistent A Fib, HTN, HTN, obesity, nonrheumatic mitral valve regurgitation, stage 3a CKD, SSS, mixed HLD, h/o TIA and stroke, migraine, myocardiopathy, sinus bradycardia, other insomnia, COPD  Personal factors affecting pt at time of eval include: lives in 2 story house and stairs to enter home  The following objective measures performed on IE also reveal limitations: Barthel Index: 75/100, Modified [place]: 1 (no significant disability) and AM-PAC 6-Clicks: 79/22   Pt's clinical presentation is currently evolving seen in pt's presentation of abnormal lab value(s), ongoing medical assessment and on telemetry monitoring  Overall, pt's rehab potential and prognosis to return to PLOF is good as impacted by objective findings, warranting pt to receive further skilled PT interventions to address identified impairments, activity limitation(s), and participation restriction(s)  Pt to benefit from continued PT tx to address deficits as defined above and maximize level of functional independent mobility  From PT/mobility standpoint, recommendation at time of d/c would be home with outpatient rehabilitation pending progress in order to facilitate return to PLOF  Barriers to Discharge: None     PT Discharge Recommendation: No rehabilitation needs (anticipated pending progress)    See flowsheet documentation for full assessment  11/2/2022 1336 by Elma Wilder PT  Note: Prognosis: Excellent  Problem List: Decreased endurance, Impaired balance, Decreased mobility  Assessment: Pt is 65 y o  female seen for moderate-complexity PT evaluation on 11/2/2022 s/p admit to [facility] on 11/1/2022 w/ Acute congestive heart failure ([facility] [address] )  PT was consulted to assess pt's functional mobility and d/c needs  Order placed for PT eval and tx  PTA, pt resides with spouse in [facility] with 1 BEBE, ambulates without AD  At time of eval, pt performing transfers mod I, level surface household distance gait trial supervision level, distance deferred at this time  Upon evaluation, pt presenting with impaired functional mobility d/t decreased endurance, impaired balance and decreased mobility  Pertinent PMHx and current co-morbidities affecting pt's physical performance at time of assessment include: acute congestive heart failure, persistent A Fib, HTN, HTN, obesity, nonrheumatic mitral valve regurgitation, stage 3a CKD, SSS, mixed HLD, h/o TIA and stroke, migraine, myocardiopathy, sinus bradycardia, other insomnia, COPD  Personal factors affecting pt at time of eval include: lives in 2 story house and stairs to enter home   The following objective measures performed on IE also reveal limitations: Barthel Index: 75/100, Modified [place]: 1 (no significant disability) and AM-PAC 6-Clicks: 13/35  Pt's clinical presentation is currently evolving seen in pt's presentation of abnormal lab value(s), ongoing medical assessment and on telemetry monitoring  Overall, pt's rehab potential and prognosis to return to PLOF is good as impacted by objective findings, warranting pt to receive further skilled PT interventions to address identified impairments, activity limitation(s), and participation restriction(s)  Pt to benefit from continued PT tx to address deficits as defined above and maximize level of functional independent mobility  From PT/mobility standpoint, recommendation at time of d/c would be home with outpatient rehabilitation pending progress in order to facilitate return to PLOF  Barriers to Discharge: None     PT Discharge Recommendation: No rehabilitation needs (anticipated pending progress)    See flowsheet documentation for full assessment

## 2022-11-03 ENCOUNTER — ANESTHESIA EVENT (INPATIENT)
Dept: NON INVASIVE DIAGNOSTICS | Facility: HOSPITAL | Age: 77
End: 2022-11-03

## 2022-11-03 ENCOUNTER — ANESTHESIA (INPATIENT)
Dept: NON INVASIVE DIAGNOSTICS | Facility: HOSPITAL | Age: 77
End: 2022-11-03

## 2022-11-03 ENCOUNTER — APPOINTMENT (OUTPATIENT)
Dept: NON INVASIVE DIAGNOSTICS | Facility: HOSPITAL | Age: 77
End: 2022-11-03

## 2022-11-03 LAB
ANION GAP SERPL CALCULATED.3IONS-SCNC: 12 MMOL/L (ref 4–13)
ATRIAL RATE: 159 BPM
ATRIAL RATE: 267 BPM
ATRIAL RATE: 63 BPM
BUN SERPL-MCNC: 22 MG/DL (ref 5–25)
CALCIUM SERPL-MCNC: 8.6 MG/DL (ref 8.3–10.1)
CHLORIDE SERPL-SCNC: 105 MMOL/L (ref 96–108)
CO2 SERPL-SCNC: 25 MMOL/L (ref 21–32)
CREAT SERPL-MCNC: 1.07 MG/DL (ref 0.6–1.3)
GFR SERPL CREATININE-BSD FRML MDRD: 49 ML/MIN/1.73SQ M
GLUCOSE SERPL-MCNC: 102 MG/DL (ref 65–140)
P AXIS: 50 DEGREES
POTASSIUM SERPL-SCNC: 3.4 MMOL/L (ref 3.5–5.3)
PR INTERVAL: 180 MS
QRS AXIS: 41 DEGREES
QRS AXIS: 49 DEGREES
QRS AXIS: 93 DEGREES
QRSD INTERVAL: 70 MS
QRSD INTERVAL: 80 MS
QRSD INTERVAL: 82 MS
QT INTERVAL: 252 MS
QT INTERVAL: 406 MS
QT INTERVAL: 434 MS
QTC INTERVAL: 399 MS
QTC INTERVAL: 444 MS
QTC INTERVAL: 507 MS
SODIUM SERPL-SCNC: 142 MMOL/L (ref 135–147)
T WAVE AXIS: -42 DEGREES
T WAVE AXIS: 23 DEGREES
T WAVE AXIS: 54 DEGREES
VENTRICULAR RATE: 151 BPM
VENTRICULAR RATE: 63 BPM
VENTRICULAR RATE: 94 BPM

## 2022-11-03 PROCEDURE — 5A2204Z RESTORATION OF CARDIAC RHYTHM, SINGLE: ICD-10-PCS | Performed by: INTERNAL MEDICINE

## 2022-11-03 RX ORDER — POTASSIUM CHLORIDE 750 MG/1
10 TABLET, EXTENDED RELEASE ORAL DAILY
Status: DISCONTINUED | OUTPATIENT
Start: 2022-11-04 | End: 2022-11-04 | Stop reason: HOSPADM

## 2022-11-03 RX ORDER — FUROSEMIDE 20 MG/1
20 TABLET ORAL DAILY
Status: DISCONTINUED | OUTPATIENT
Start: 2022-11-03 | End: 2022-11-04 | Stop reason: HOSPADM

## 2022-11-03 RX ORDER — PROPOFOL 10 MG/ML
INJECTION, EMULSION INTRAVENOUS AS NEEDED
Status: DISCONTINUED | OUTPATIENT
Start: 2022-11-03 | End: 2022-11-03

## 2022-11-03 RX ORDER — SODIUM CHLORIDE 9 MG/ML
INJECTION, SOLUTION INTRAVENOUS CONTINUOUS PRN
Status: DISCONTINUED | OUTPATIENT
Start: 2022-11-03 | End: 2022-11-03

## 2022-11-03 RX ORDER — SOTALOL HYDROCHLORIDE 80 MG/1
160 TABLET ORAL DAILY
Status: DISCONTINUED | OUTPATIENT
Start: 2022-11-04 | End: 2022-11-04 | Stop reason: HOSPADM

## 2022-11-03 RX ORDER — SOTALOL HYDROCHLORIDE 80 MG/1
120 TABLET ORAL EVERY 12 HOURS SCHEDULED
Status: DISCONTINUED | OUTPATIENT
Start: 2022-11-03 | End: 2022-11-03

## 2022-11-03 RX ORDER — POTASSIUM CHLORIDE 20 MEQ/1
40 TABLET, EXTENDED RELEASE ORAL ONCE
Status: COMPLETED | OUTPATIENT
Start: 2022-11-03 | End: 2022-11-03

## 2022-11-03 RX ORDER — LIDOCAINE HYDROCHLORIDE 20 MG/ML
INJECTION, SOLUTION EPIDURAL; INFILTRATION; INTRACAUDAL; PERINEURAL AS NEEDED
Status: DISCONTINUED | OUTPATIENT
Start: 2022-11-03 | End: 2022-11-03

## 2022-11-03 RX ORDER — SOTALOL HYDROCHLORIDE 80 MG/1
80 TABLET ORAL EVERY 12 HOURS SCHEDULED
Status: DISCONTINUED | OUTPATIENT
Start: 2022-11-03 | End: 2022-11-03

## 2022-11-03 RX ADMIN — PRAVASTATIN SODIUM 10 MG: 20 TABLET ORAL at 18:09

## 2022-11-03 RX ADMIN — APIXABAN 5 MG: 5 TABLET, FILM COATED ORAL at 08:00

## 2022-11-03 RX ADMIN — DILTIAZEM HYDROCHLORIDE 30 MG: 30 TABLET, FILM COATED ORAL at 12:29

## 2022-11-03 RX ADMIN — POTASSIUM CHLORIDE 40 MEQ: 1500 TABLET, EXTENDED RELEASE ORAL at 07:55

## 2022-11-03 RX ADMIN — SODIUM CHLORIDE: 0.9 INJECTION, SOLUTION INTRAVENOUS at 12:38

## 2022-11-03 RX ADMIN — PROPOFOL 80 MG: 10 INJECTION, EMULSION INTRAVENOUS at 13:00

## 2022-11-03 RX ADMIN — LIDOCAINE HYDROCHLORIDE 40 MG: 20 INJECTION, SOLUTION EPIDURAL; INFILTRATION; INTRACAUDAL; PERINEURAL at 13:00

## 2022-11-03 RX ADMIN — APIXABAN 5 MG: 5 TABLET, FILM COATED ORAL at 18:10

## 2022-11-03 NOTE — PROGRESS NOTES
[address] [facility]  Progress Note - Negra Hidden [id number], 65 y o  female MRN: [id number]  Unit/Bed#: -01 Encounter: [id number]  Primary Care Provider: Nadia Jara DO   Date and time admitted to hospital: 11/1/2022  9:25 AM    SSS (sick sinus syndrome) [facility])  Assessment & Plan  S/p pacemaker placement  Stage 3a chronic kidney disease ([address] )  Assessment & Plan  Baseline around 1  Currently at baseline  Will continue to monitor renal function  Nonrheumatic mitral valve regurgitation  Assessment & Plan  Does have H/o moderate MR, aortic insufficiency noted on prior echocardiogram   Was doing well  Admitted for CHF exacerbation  Will check echocardiogram to look for valvular anatomy  Obesity (BMI 30-39  9)  Assessment & Plan  Encouraged diet and exercise modification    Essential hypertension  Assessment & Plan  Will hold home lisinopril given BP within acceptable limits  Restart when able  Persistent atrial fibrillation with rapid ventricular response (HCC)  Assessment & Plan  H/o persistent AFib in past   Has been seen by EP cardiologist   Was started on sotalol, metoprolol  Initially was in NSR however Pt has intermittent AFib episodes  Cardiologist suspected 2/2 sleep apnea as she does have problems staying asleep, snoring  Was recommended outpatient sleep study which she is yet to do  Presented with AFib RVR HR in 150s on presentation  Controlled after 1 dose of Cardizem  CXR showing fluid overload  Noted crackles on examination  Compliant with medication  Continues to be in AFib RVR with HR fluctuating from 140 down to 60  Continue home sotalol with renal dose adjustment to 120 mg daily  Continue metoprolol 75 mg b i d  NPO from midnight, plan for cardioversion in a m  if remains in AFib  Cardiology consulted recommended to start low-dose Cardizem 30 mg q  6h  Metoprolol IV p r n    Lasix as above  Eliquis b i d  5 mg    * Acute congestive heart failure [facility])  Assessment & Plan  Presented with SOB on exertion likely 2/2 CHF exacerbation  Noted crackles on examination and CXR showing pulmonary edema  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  Repeat echocardiogram showing mildly reduced EF likely 2/2 AFib around 45%, moderate MR, mild AR  Currently improved  Respiratory status significantly better  Managed with IV Lasix initially  Cardiology following  Continue Lasix 20 mg p o  Daily, will likely need this on discharge  Daily weight, I's and O's, low-salt diet  Cardiology following, recommended will need future ischemic evaluation        VTE Pharmacologic Prophylaxis:   VTE Score: 4 Moderate Risk (Score 3-4) - Pharmacological DVT Prophylaxis Ordered: Apixaban (Eliquis)  Mechanical VTE Prophylaxis in Place: Yes    Patient Centered Rounds: I have performed bedside rounds with nursing staff today  Discussions with Specialists or Other Care Team Provider: Cardiology, PT/OT    Education and Discussions with Family / Patient: Spoke with patient at bedside  Current Length of Stay: 2 day(s)    Current Patient Status: Inpatient     Discharge Plan / Estimated Discharge Date: Anticipate discharge tomorrow to home  Code Status: Level 3 - DNAR and DNI      Subjective:   Patient was intermittently tachycardic overnight (highest: 110 bpm)  She states she was able to sleep for the first time in a very long time  No longer feeling dizzy or feeling palpitations  She also states that her shortness of breath has improved  She has her cardioversion this afternoon at 1PM      Objective:     Vitals:   Temp (24hrs), Av 9 °F (35 5 °C), Min:83 8 °F (28 8 °C), Max:98 °F (36 7 °C)    Temp:  [83 8 °F (28 8 °C)-98 °F (36 7 °C)] 97 3 °F (36 3 °C)  HR:  [] 60  Resp:  [15-23] 23  BP: ()/(58-88) 100/63  SpO2:  [94 %-99 %] 98 %  Body mass index is 33 49 kg/m²  Input and Output Summary (last 24 hours):        Intake/Output Summary (Last 24 hours) at 11/3/2022 1342  Last data filed at 11/3/2022 1312  Gross per 24 hour   Intake 1740 ml   Output --   Net 1740 ml       Physical Exam:     Physical Exam  Vitals and nursing note reviewed  Constitutional:       General: She is not in acute distress  Appearance: She is well-developed  HENT:      Head: Normocephalic and atraumatic  Right Ear: External ear normal       Left Ear: External ear normal       Nose: Nose normal       Mouth/Throat:      Mouth: Mucous membranes are moist       Pharynx: Oropharynx is clear  Eyes:      Extraocular Movements: Extraocular movements intact  Conjunctiva/sclera: Conjunctivae normal    Cardiovascular:      Rate and Rhythm: Normal rate and regular rhythm  Heart sounds: Murmur heard  Comments: Mitral regurgitation murmur  Pulmonary:      Effort: Pulmonary effort is normal  No respiratory distress  Breath sounds: Normal breath sounds  Comments: Clear to auscultation  No evidence of crackles  Increased breath sounds compared to yesterday  Abdominal:      General: There is no distension  Palpations: Abdomen is soft  Tenderness: There is no abdominal tenderness  Musculoskeletal:         General: Normal range of motion  Cervical back: Neck supple  Skin:     General: Skin is warm and dry  Capillary Refill: Capillary refill takes less than 2 seconds  Neurological:      General: No focal deficit present  Mental Status: She is alert and oriented to person, place, and time  Mental status is at baseline  Psychiatric:         Mood and Affect: Mood normal          Thought Content:  Thought content normal          Judgment: Judgment normal          Additional Data:     Labs:  Results from last 7 days   Lab Units 11/01/22  0954   WBC Thousand/uL 8 65   HEMOGLOBIN g/dL 11 9   HEMATOCRIT % 37 2   PLATELETS Thousands/uL 249   NEUTROS PCT % 74   LYMPHS PCT % 13*   MONOS PCT % 11   EOS PCT % 1     Results from last 7 days Lab Units 11/03/22  0550 11/02/22  0431 11/01/22  0954   SODIUM mmol/L 142   < > 142   POTASSIUM mmol/L 3 4*   < > 4 3   CHLORIDE mmol/L 105   < > 109*   CO2 mmol/L 25   < > 22   BUN mg/dL 22   < > 21   CREATININE mg/dL 1 07   < > 1 00   ANION GAP mmol/L 12   < > 11   CALCIUM mg/dL 8 6   < > 8 8   ALBUMIN g/dL  --   --  3 7   TOTAL BILIRUBIN mg/dL  --   --  0 68   ALK PHOS U/L  --   --  94   ALT U/L  --   --  63   AST U/L  --   --  61*   GLUCOSE RANDOM mg/dL 102   < > 122    < > = values in this interval not displayed       Results from last 7 days   Lab Units 11/01/22  0954   INR  1 46*         Results from last 7 days   Lab Units 11/01/22  0954   HEMOGLOBIN A1C % 5 8*           Imaging: Reviewed radiology reports from this admission including: chest xray    Recent Cultures (last 7 days):           Lines/Drains:  Invasive Devices  Report    Peripheral Intravenous Line  Duration           Peripheral IV 11/01/22 Right Arm 2 days                Telemetry:   Telemetry Orders (From admission, onward)             48 Hour Telemetry Monitoring  Continuous x 48 hours        References:    Telemetry Guidelines   Question:  Reason for 48 Hour Telemetry  Answer:  Arrhythmias Requiring Medical Therapy (eg  SVT, Vtach/fib, Bradycardia, Uncontrolled A-fib)                    Last 24 Hours Medication List:   Current Facility-Administered Medications   Medication Dose Route Frequency Provider Last Rate   • acetaminophen  650 mg Oral Q6H PRN Lita Vincent MD     • apixaban  5 mg Oral BID Lita Vincent MD     • diltiazem  30 mg Oral Q6H [address] Robert Berman PA-C     • furosemide  20 mg Oral Daily Robert Berman PA-C     • metoprolol  5 mg Intravenous Q6H PRN Lita Vincent MD     • metoprolol succinate  75 mg Oral BID Lita Vincent MD     • ondansetron  4 mg Intravenous Q6H PRN Lita Vincent MD     • [START ON 11/4/2022] potassium chloride  10 mEq Oral Daily Robert Berman PA-C     • pravastatin  10 mg Oral Daily With Kalyani Bernal MD     • [START ON 11/4/2022] sotalol  160 mg Oral Daily Malcolm Moy PA-C          Today, Patient Was Seen By: Piotr Perez    ** Please Note: This note has been constructed using a voice recognition system   **

## 2022-11-03 NOTE — DISCHARGE INSTRUCTIONS
Cardioversion   AMBULATORY CARE:   What you need to know about cardioversion:  Cardioversion is a procedure that uses medicine or electrical shocks to correct arrhythmias  An arrhythmias is a heartbeat that is too slow, too fast, or irregular  It may prevent your body from getting the blood and oxygen it needs  Your heart has 4 chambers, called the atria and ventricles  The atria are at the top of your heart, and the ventricles are at the bottom of your heart  Most arrhythmias that need cardioversion start in the atria  How to prepare for cardioversion:  You may need a transesophageal echocardiogram (LUIZA) before your cardioversion  A LUIZA is an ultrasound to check for clots in your heart  You may need to take blood thinner medicine for several weeks before your cardioversion  This will help prevent blood clots  Your healthcare provider will talk to you about how to prepare for cardioversion  He may tell you not to eat or drink anything after midnight on the day of your cardioversion  He will tell you what medicines to take or not take on the day of your cardioversion  Arrange for someone to drive you home and stay with you for 24 hours  This person can call 911 if you have problems after your cardioversion  What will happen during a chemical cardioversion:  You will be placed on a heart monitor  A heart monitor is an EKG that records your heart's electrical activity  Your healthcare provider will inject 1 or more medicines into your IV  The medicines will help change your heartbeat to a normal rhythm  You may feel light headed, dizzy, or nauseous  You may have pain or pressure in your chest, jaw, shoulders, or arms  These symptoms are normal, and usually last for a minute or less  You may need electrical cardioversion if chemical cardioversion does not change your heartbeat to a normal rhythm     What will happen during an electrical cardioversion:  You will be given medicine through your IV to keep you asleep and free from pain  You will be placed on a heart monitor  A heart monitor is an EKG that records your heart's electrical activity  A healthcare provider will also monitor your blood pressure and oxygen levels during the procedure  You may get oxygen through a mask placed over your nose and mouth or through small tubes placed in your nostrils  In external cardioversion  your healthcare provider will place sticky pads on your chest and back  Your heart will be shocked with electricity through the pads  Your heart may be shocked more than once to help it return to its normal rhythm  In internal cardioversion  your healthcare provider will insert a catheter through a vein and into your heart  Your heart will be shocked through the catheter  Your heart may be shocked more than once to help it return to its normal rhythm  What will happen after cardioversion:  Healthcare providers will monitor your heartbeat, blood pressure, and oxygen levels  You may feel drowsy from the medicine you were given during the procedure  Your chest may be red or sore where the pads were placed  This should go away in a few days  You may go home when they say it is okay or you may need to stay in the hospital    Risks of cardioversion:  Your skin may be burned from the electrical shocks  Cardioversion may cause a blood clot to travel to your heart or brain and cause a heart attack or stroke  You may develop a life-threatening arrhythmia or low blood pressure during cardioversion  You may need medicine or more electric shocks to treat this  Even with cardioversion, your heartbeat may not change or may not stay regular  Call 911 for any of the following:    You have any of the following signs of a heart attack:      Squeezing, pressure, or pain in your chest    You may  also have any of the following:     Discomfort or pain in your back, neck, jaw, stomach, or arm    Shortness of breath    Nausea or vomiting    Lightheadedness or a sudden cold sweat    You have any of the following signs of a stroke:      Numbness or drooping on one side of your face     Weakness in an arm or leg    Confusion or difficulty speaking    Dizziness, a severe headache, or vision loss    You feel lightheaded, short of breath, and have chest pain  You cough up blood  You have trouble breathing  Seek care immediately if:   You feel your heart beating fast or fluttering  You feel weak or faint  Your leg or arm is larger than usual, painful, and warm  Contact your healthcare provider if:   Your skin is itchy, swollen, or you have a rash  You have questions or concerns about your condition or care  Medicines: You may need any of the following:  Heart medicines  help control your heart rate and rhythm  Blood thinners  help prevent blood clots  Clots can cause strokes, heart attacks, and death  The following are general safety guidelines to follow while you are taking a blood thinner:    Watch for bleeding and bruising while you take blood thinners  Watch for bleeding from your gums or nose  Watch for blood in your urine and bowel movements  Use a soft washcloth on your skin, and a soft toothbrush to brush your teeth  This can keep your skin and gums from bleeding  If you shave, use an electric shaver  Do not play contact sports  Tell your dentist and other healthcare providers that you take a blood thinner  Wear a bracelet or necklace that says you take this medicine  Do not start or stop any other medicines unless your healthcare provider tells you to  Many medicines cannot be used with blood thinners  Take your blood thinner exactly as prescribed by your healthcare provider  Do not skip does or take less than prescribed  Tell your provider right away if you forget to take your blood thinner, or if you take too much  Warfarin  is a blood thinner that you may need to take   The following are things you should be aware of if you take warfarin:     Foods and medicines can affect the amount of warfarin in your blood  Do not make major changes to your diet while you take warfarin  Warfarin works best when you eat about the same amount of vitamin K every day  Vitamin K is found in green leafy vegetables and certain other foods  Ask for more information about what to eat when you are taking warfarin  You will need to see your healthcare provider for follow-up visits when you are on warfarin  You will need regular blood tests  These tests are used to decide how much medicine you need  Take your medicine as directed  Contact your healthcare provider if you think your medicine is not helping or if you have side effects  Tell him or her if you are allergic to any medicine  Keep a list of the medicines, vitamins, and herbs you take  Include the amounts, and when and why you take them  Bring the list or the pill bottles to follow-up visits  Carry your medicine list with you in case of an emergency  Self-care:   Rest as directed  Do not drive for at least 24 hours  Ask your healthcare provider when you can return to your normal activities  Check your heart rate and blood pressure as directed  Ask your healthcare provider what your heart rate and blood pressure should be  Do not smoke  Nicotine and other chemicals in cigarettes and cigars can cause heart and lung damage  They can also increase your risk for another arrhythmia  Ask your healthcare provider for information if you currently smoke and need help to quit  E-cigarettes or smokeless tobacco still contain nicotine  Talk to your healthcare provider before you use these products  Eat heart healthy foods  These include fruits, vegetables, whole-grain breads, low-fat dairy products, beans, lean meats, and fish  Replace butter and margarine with heart-healthy oils such as olive oil and canola oil  Maintain a healthy weight    Ask your healthcare provider how much you should weigh  Ask him to help you create a weight loss plan if you are overweight  Follow up with your doctor as directed:  Write down your questions so you remember to ask them during your visits  © Copyright [employer] 2022 Information is for End User's use only and may not be sold, redistributed or otherwise used for commercial purposes  All illustrations and images included in CareNotes® are the copyrighted property of A D A M [employer] Inc  or [facility] Bethany Tate   The above information is an  only  It is not intended as medical advice for individual conditions or treatments  Talk to your doctor, nurse or pharmacist before following any medical regimen to see if it is safe and effective for you

## 2022-11-03 NOTE — PLAN OF CARE
Problem: Potential for Falls  Goal: Patient will remain free of falls  Description: INTERVENTIONS:  - Educate patient/family on patient safety including physical limitations  - Instruct patient to call for assistance with activity   - Consult OT/PT to assist with strengthening/mobility   - Keep Call bell within reach  - Keep bed low and locked with side rails adjusted as appropriate  - Keep care items and personal belongings within reach  - Initiate and maintain comfort rounds  - Make Fall Risk Sign visible to staff  - Offer Toileting every  Hours, in advance of need  - Initiate/Maintain alarm  - Obtain necessary fall risk management equipment:   - Apply yellow socks and bracelet for high fall risk patients  - Consider moving patient to room near nurses station  Outcome: Progressing     Problem: PAIN - ADULT  Goal: Verbalizes/displays adequate comfort level or baseline comfort level  Description: Interventions:  - Encourage patient to monitor pain and request assistance  - Assess pain using appropriate pain scale  - Administer analgesics based on type and severity of pain and evaluate response  - Implement non-pharmacological measures as appropriate and evaluate response  - Consider cultural and social influences on pain and pain management  - Notify physician/advanced practitioner if interventions unsuccessful or patient reports new pain  Outcome: Progressing     Problem: INFECTION - ADULT  Goal: Absence or prevention of progression during hospitalization  Description: INTERVENTIONS:  - Assess and monitor for signs and symptoms of infection  - Monitor lab/diagnostic results  - Monitor all insertion sites, i e  indwelling lines, tubes, and drains  - Monitor endotracheal if appropriate and nasal secretions for changes in amount and color  - [place] appropriate cooling/warming therapies per order  - Administer medications as ordered  - Instruct and encourage patient and family to use good hand hygiene technique  - Identify and instruct in appropriate isolation precautions for identified infection/condition  Outcome: Progressing  Goal: Absence of fever/infection during neutropenic period  Description: INTERVENTIONS:  - Monitor WBC    Outcome: Progressing     Problem: SAFETY ADULT  Goal: Patient will remain free of falls  Description: INTERVENTIONS:  - Educate patient/family on patient safety including physical limitations  - Instruct patient to call for assistance with activity   - Consult OT/PT to assist with strengthening/mobility   - Keep Call bell within reach  - Keep bed low and locked with side rails adjusted as appropriate  - Keep care items and personal belongings within reach  - Initiate and maintain comfort rounds  - Make Fall Risk Sign visible to staff  - Offer Toileting every  Hours, in advance of need  - Initiate/Maintain alarm  - Obtain necessary fall risk management equipment:   - Apply yellow socks and bracelet for high fall risk patients  - Consider moving patient to room near nurses station  Outcome: Progressing  Goal: Maintain or return to baseline ADL function  Description: INTERVENTIONS:  -  Assess patient's ability to carry out ADLs; assess patient's baseline for ADL function and identify physical deficits which impact ability to perform ADLs (bathing, care of mouth/teeth, toileting, grooming, dressing, etc )  - Assess/evaluate cause of self-care deficits   - Assess range of motion  - Assess patient's mobility; develop plan if impaired  - Assess patient's need for assistive devices and provide as appropriate  - Encourage maximum independence but intervene and supervise when necessary  - Involve family in performance of ADLs  - Assess for home care needs following discharge   - Consider OT consult to assist with ADL evaluation and planning for discharge  - Provide patient education as appropriate  Outcome: Progressing  Goal: Maintains/Returns to pre admission functional level  Description: INTERVENTIONS:  - Perform BMAT or MOVE assessment daily    - Set and communicate daily mobility goal to care team and patient/family/caregiver  - Collaborate with rehabilitation services on mobility goals if consulted  - Perform Range of Motion  times a day  - Reposition patient every  hours  - Dangle patient  times a day  - Stand patient  times a day  - Ambulate patient  times a day  - Out of bed to chair  times a day   - Out of bed for meals  times a day  - Out of bed for toileting  - Record patient progress and toleration of activity level   Outcome: Progressing     Problem: DISCHARGE PLANNING  Goal: Discharge to home or other facility with appropriate resources  Description: INTERVENTIONS:  - Identify barriers to discharge w/patient and caregiver  - Arrange for needed discharge resources and transportation as appropriate  - Identify discharge learning needs (meds, wound care, etc )  - Arrange for interpretive services to assist at discharge as needed  - Refer to Case Management Department for coordinating discharge planning if the patient needs post-hospital services based on physician/advanced practitioner order or complex needs related to functional status, cognitive ability, or social support system  Outcome: Progressing     Problem: Knowledge Deficit  Goal: Patient/family/caregiver demonstrates understanding of disease process, treatment plan, medications, and discharge instructions  Description: Complete learning assessment and assess knowledge base    Interventions:  - Provide teaching at level of understanding  - Provide teaching via preferred learning methods  Outcome: Progressing     Problem: NEUROSENSORY - ADULT  Goal: Achieves stable or improved neurological status  Description: INTERVENTIONS  - Monitor and report changes in neurological status  - Monitor vital signs such as temperature, blood pressure, glucose, and any other labs ordered   - Initiate measures to prevent increased intracranial pressure  - Monitor for seizure activity and implement precautions if appropriate      Outcome: Progressing  Goal: Remains free of injury related to seizures activity  Description: INTERVENTIONS  - Maintain airway, patient safety  and administer oxygen as ordered  - Monitor patient for seizure activity, document and report duration and description of seizure to physician/advanced practitioner  - If seizure occurs,  ensure patient safety during seizure  - Reorient patient post seizure  - Seizure pads on all 4 side rails  - Instruct patient/family to notify RN of any seizure activity including if an aura is experienced  - Instruct patient/family to call for assistance with activity based on nursing assessment  - Administer anti-seizure medications if ordered    Outcome: Progressing  Goal: Achieves maximal functionality and self care  Description: INTERVENTIONS  - Monitor swallowing and airway patency with patient fatigue and changes in neurological status  - Encourage and assist patient to increase activity and self care     - Encourage visually impaired, hearing impaired and aphasic patients to use assistive/communication devices  Outcome: Progressing     Problem: CARDIOVASCULAR - ADULT  Goal: Maintains optimal cardiac output and hemodynamic stability  Description: INTERVENTIONS:  - Monitor I/O, vital signs and rhythm  - Monitor for S/S and trends of decreased cardiac output  - Administer and titrate ordered vasoactive medications to optimize hemodynamic stability  - Assess quality of pulses, skin color and temperature  - Assess for signs of decreased coronary artery perfusion  - Instruct patient to report change in severity of symptoms  Outcome: Progressing  Goal: Absence of cardiac dysrhythmias or at baseline rhythm  Description: INTERVENTIONS:  - Continuous cardiac monitoring, vital signs, obtain 12 lead EKG if ordered  - Administer antiarrhythmic and heart rate control medications as ordered  - Monitor electrolytes and administer replacement therapy as ordered  Outcome: Progressing     Problem: RESPIRATORY - ADULT  Goal: Achieves optimal ventilation and oxygenation  Description: INTERVENTIONS:  - Assess for changes in respiratory status  - Assess for changes in mentation and behavior  - Position to facilitate oxygenation and minimize respiratory effort  - Oxygen administered by appropriate delivery if ordered  - Initiate smoking cessation education as indicated  - Encourage broncho-pulmonary hygiene including cough, deep breathe, Incentive Spirometry  - Assess the need for suctioning and aspirate as needed  - Assess and instruct to report SOB or any respiratory difficulty  - Respiratory Therapy support as indicated  Outcome: Progressing     Problem: GASTROINTESTINAL - ADULT  Goal: Minimal or absence of nausea and/or vomiting  Description: INTERVENTIONS:  - Administer IV fluids if ordered to ensure adequate hydration  - Maintain NPO status until nausea and vomiting are resolved  - Nasogastric tube if ordered  - Administer ordered antiemetic medications as needed  - Provide nonpharmacologic comfort measures as appropriate  - Advance diet as tolerated, if ordered  - Consider nutrition services referral to assist patient with adequate nutrition and appropriate food choices  Outcome: Progressing  Goal: Maintains or returns to baseline bowel function  Description: INTERVENTIONS:  - Assess bowel function  - Encourage oral fluids to ensure adequate hydration  - Administer IV fluids if ordered to ensure adequate hydration  - Administer ordered medications as needed  - Encourage mobilization and activity  - Consider nutritional services referral to assist patient with adequate nutrition and appropriate food choices  Outcome: Progressing  Goal: Maintains adequate nutritional intake  Description: INTERVENTIONS:  - Monitor percentage of each meal consumed  - Identify factors contributing to decreased intake, treat as appropriate  - Assist with meals as needed  - Monitor I&O, weight, and lab values if indicated  - Obtain nutrition services referral as needed  Outcome: Progressing  Goal: Establish and maintain optimal ostomy function  Description: INTERVENTIONS:  - Assess bowel function  - Encourage oral fluids to ensure adequate hydration  - Administer IV fluids if ordered to ensure adequate hydration   - Administer ordered medications as needed  - Encourage mobilization and activity  - Nutrition services referral to assist patient with appropriate food choices  - Assess stoma site  - Consider wound care consult   Outcome: Progressing  Goal: Oral mucous membranes remain intact  Description: INTERVENTIONS  - Assess oral mucosa and hygiene practices  - Implement preventative oral hygiene regimen  - Implement oral medicated treatments as ordered  - Initiate Nutrition services referral as needed  Outcome: Progressing     Problem: GENITOURINARY - ADULT  Goal: Maintains or returns to baseline urinary function  Description: INTERVENTIONS:  - Assess urinary function  - Encourage oral fluids to ensure adequate hydration if ordered  - Administer IV fluids as ordered to ensure adequate hydration  - Administer ordered medications as needed  - Offer frequent toileting  - Follow urinary retention protocol if ordered  Outcome: Progressing  Goal: Absence of urinary retention  Description: INTERVENTIONS:  - Assess patient’s ability to void and empty bladder  - Monitor I/O  - Bladder scan as needed  - Discuss with physician/AP medications to alleviate retention as needed  - Discuss catheterization for long term situations as appropriate  Outcome: Progressing  Goal: Urinary catheter remains patent  Description: INTERVENTIONS:  - Assess patency of urinary catheter  - If patient has a chronic hickey, consider changing catheter if non-functioning  - Follow guidelines for intermittent irrigation of non-functioning urinary catheter  Outcome: Progressing     Problem: METABOLIC, FLUID AND ELECTROLYTES - ADULT  Goal: Electrolytes maintained within normal limits  Description: INTERVENTIONS:  - Monitor labs and assess patient for signs and symptoms of electrolyte imbalances  - Administer electrolyte replacement as ordered  - Monitor response to electrolyte replacements, including repeat lab results as appropriate  - Instruct patient on fluid and nutrition as appropriate  Outcome: Progressing  Goal: Fluid balance maintained  Description: INTERVENTIONS:  - Monitor labs   - Monitor I/O and WT  - Instruct patient on fluid and nutrition as appropriate  - Assess for signs & symptoms of volume excess or deficit  Outcome: Progressing  Goal: Glucose maintained within target range  Description: INTERVENTIONS:  - Monitor Blood Glucose as ordered  - Assess for signs and symptoms of hyperglycemia and hypoglycemia  - Administer ordered medications to maintain glucose within target range  - Assess nutritional intake and initiate nutrition service referral as needed  Outcome: Progressing     Problem: SKIN/TISSUE INTEGRITY - ADULT  Goal: Skin Integrity remains intact(Skin Breakdown Prevention)  Description: Assess:  -Perform Juan assessment every   -Clean and moisturize skin every   -Inspect skin when repositioning, toileting, and assisting with ADLS  -Assess under medical devices such as  every   -Assess extremities for adequate circulation and sensation     Bed Management:  -Have minimal linens on bed & keep smooth, unwrinkled  -Change linens as needed when moist or perspiring  -Avoid sitting or lying in one position for more than  hours while in bed  -Keep HOB at degrees     Toileting:  -Offer bedside commode  -Assess for incontinence every   -Use incontinent care products after each incontinent episode such as     Activity:  -Mobilize patient  times a day  -Encourage activity and walks on unit  -Encourage or provide ROM exercises   -Turn and reposition patient every Hours  -Use appropriate equipment to lift or move patient in bed  -Instruct/ Assist with weight shifting every  when out of bed in chair  -Consider limitation of chair time  hour intervals    Skin Care:  -Avoid use of baby powder, tape, friction and shearing, hot water or constrictive clothing  -Relieve pressure over bony prominences using   -Do not massage red bony areas    Next Steps:  -Teach patient strategies to minimize risks such as    -Consider consults to  interdisciplinary teams such as   Outcome: Progressing  Goal: Incision(s), wounds(s) or drain site(s) healing without S/S of infection  Description: INTERVENTIONS  - Assess and document dressing, incision, wound bed, drain sites and surrounding tissue  - Provide patient and family education  - Perform skin care/dressing changes every   Outcome: Progressing  Goal: Pressure injury heals and does not worsen  Description: Interventions:  - Implement low air loss mattress or specialty surface (Criteria met)  - Apply silicone foam dressing  - Instruct/assist with weight shifting every  minutes when in chair   - Limit chair time to  hour intervals  - Use special pressure reducing interventions such as  when in chair   - Apply fecal or urinary incontinence containment device   - Perform passive or active ROM every   - Turn and reposition patient & offload bony prominences every  hours   - Utilize friction reducing device or surface for transfers   - Consider consults to  interdisciplinary teams such as   - Use incontinent care products after each incontinent episode such as   - Consider nutrition services referral as needed  Outcome: Progressing     Problem: HEMATOLOGIC - ADULT  Goal: Maintains hematologic stability  Description: INTERVENTIONS  - Assess for signs and symptoms of bleeding or hemorrhage  - Monitor labs  - Administer supportive blood products/factors as ordered and appropriate  Outcome: Progressing     Problem: MUSCULOSKELETAL - ADULT  Goal: Maintain or return mobility to safest level of function  Description: INTERVENTIONS:  - Assess patient's ability to carry out ADLs; assess patient's baseline for ADL function and identify physical deficits which impact ability to perform ADLs (bathing, care of mouth/teeth, toileting, grooming, dressing, etc )  - Assess/evaluate cause of self-care deficits   - Assess range of motion  - Assess patient's mobility  - Assess patient's need for assistive devices and provide as appropriate  - Encourage maximum independence but intervene and supervise when necessary  - Involve family in performance of ADLs  - Assess for home care needs following discharge   - Consider OT consult to assist with ADL evaluation and planning for discharge  - Provide patient education as appropriate  Outcome: Progressing  Goal: Maintain proper alignment of affected body part  Description: INTERVENTIONS:  - Support, maintain and protect limb and body alignment  - Provide patient/ family with appropriate education  Outcome: Progressing     Problem: MOBILITY - ADULT  Goal: Maintain or return to baseline ADL function  Description: INTERVENTIONS:  -  Assess patient's ability to carry out ADLs; assess patient's baseline for ADL function and identify physical deficits which impact ability to perform ADLs (bathing, care of mouth/teeth, toileting, grooming, dressing, etc )  - Assess/evaluate cause of self-care deficits   - Assess range of motion  - Assess patient's mobility; develop plan if impaired  - Assess patient's need for assistive devices and provide as appropriate  - Encourage maximum independence but intervene and supervise when necessary  - Involve family in performance of ADLs  - Assess for home care needs following discharge   - Consider OT consult to assist with ADL evaluation and planning for discharge  - Provide patient education as appropriate  Outcome: Progressing  Goal: Maintains/Returns to pre admission functional level  Description: INTERVENTIONS:  - Perform BMAT or MOVE assessment daily    - Set and communicate daily mobility goal to care team and patient/family/caregiver  - Collaborate with rehabilitation services on mobility goals if consulted  - Perform Range of Motion  times a day  - Reposition patient every  hours    - Dangle patient  times a day  - Stand patient  times a day  - Ambulate patient  times a day  - Out of bed to chair  times a day   - Out of bed for meals  times a day  - Out of bed for toileting  - Record patient progress and toleration of activity level   Outcome: Progressing

## 2022-11-03 NOTE — ANESTHESIA PREPROCEDURE EVALUATION
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  Patient was maintained on beta-blockers for rate control and Eliquis for anticoagulation  Patient did come with symptoms of dizziness  Patient likely has acute diastolic heart failure triggered by the atrial fibrillation with rapid ventricular rates  Will continue intravenous diuretics for the patient  Continue Eliquis  Rate controlled with sotalol and metoprolol  Patient advised salt restriction  Prognosis guarded  We shall closely follow    1  Paroxysmal atrial fibrillation  · EKG reveals AFib with RVR and PVCs  · Telemetry shows AFib with RVR and bradycardia  · Continue Sotalol 120 mg daily and metoprolol-XL 75 mg b i d   · Continue Eliquis 5 mg for anticoagulation  · Continue telemetry monitoring     2  Heart failure, unspecified  · Likely tachycardia mediated secondary to atrial fibrillation; however cannot definitively rule out ischemia as a cause; plan for future ischemic evaluation  · Echo ordered  · Continue IV Lasix 40 mg daily and Toprol-XL 75 mg BID     3  History of recovered tachycardia mediated cardiomyopathy   · EF recovered from 35-40% in February of 2019 to 60% in July of 2020 after initiating rhythm control per electrophysiology  · Echo ordered     4  Sick sinus syndrome s/p PPM  · S/p DC Medtronic dual-chamber PPM  · Continue to follow-up device clinic     5  Hypertension  · Currently 134/70; Continue to monitor  · Continue Toprol-XL 75 mg b i d  and IV Lasix 40 mg daily     6   Mixed hyperlipidemia  § Continue pravastatin 10 mg     7  Moderate MR, mild AR, moderate to severe TR  § Continue to follow-up as outpatient with serial echocardiograms  § Patient follows with Dr Triana Patient as outpatient    Relevant Problems   CARDIO   (+) Acute congestive heart failure (HCC)   (+) Essential hypertension   (+) Migraine with aura and without status migrainosus, not intractable   (+) Mixed hyperlipidemia   (+) Nonrheumatic aortic valve insufficiency   (+) Nonrheumatic mitral valve regurgitation   (+) Paroxysmal atrial fibrillation (HCC)   (+) Persistent atrial fibrillation with rapid ventricular response (HCC)   (+) SSS (sick sinus syndrome) (HCC)   (+) Sinus bradycardia      /RENAL   (+) Stage 3a chronic kidney disease (HCC)      NEURO/PSYCH   (+) H/O TIA (transient ischemic attack) and stroke   (+) Migraine with aura and without status migrainosus, not intractable      PULMONARY   (+) Chronic obstructive pulmonary disease (HCC)        Physical Exam    Airway      TM Distance: >3 FB  Neck ROM: full     Dental       Cardiovascular  Cardiovascular exam normal    Pulmonary  Pulmonary exam normal     Other Findings        Anesthesia Plan  ASA Score- 3     Anesthesia Type- IV sedation with anesthesia with ASA Monitors  Additional Monitors:   Airway Plan:           Plan Factors-Exercise tolerance (METS): <4 METS  Chart reviewed  EKG reviewed  Imaging results reviewed  Existing labs reviewed  Patient summary reviewed  Patient is not a current smoker  Induction- intravenous  Postoperative Plan-     Informed Consent- Anesthetic plan and risks discussed with patient  I personally reviewed this patient with the CRNA  Discussed and agreed on the Anesthesia Plan with the CRNA  Eddi Barrientos

## 2022-11-03 NOTE — ASSESSMENT & PLAN NOTE
H/o persistent AFib in past   Has been seen by EP cardiologist   Was started on sotalol, metoprolol  Initially was in NSR however Pt has intermittent AFib episodes  Cardiologist suspected 2/2 sleep apnea as she does have problems staying asleep, snoring  Was recommended outpatient sleep study which she is yet to do  Presented with AFib RVR HR in 150 on presentation  Currently controlled however given soft BP further treatment with Cardizem, metoprolol is restricted  Echocardiogram showing reduced EF 40-45% compared to prior echocardiogram likely tachycardia induced  Currently continues to be in AFib HR seems to be tolerating well  No palpitations  Increased dose of sotalol to 160 mg daily HD with renal dose adjustment  Continue metoprolol 75 mg b i d , Cardizem 30 mg q 6h as per holding parameters  Cardiology following  Plan for cardioversion today, continue NPO  Restart diet when able  Lasix as above  Eliquis 5 mg b i d

## 2022-11-03 NOTE — PROGRESS NOTES
[facility]  Progress Note - Mina Parada 1945, 65 y o  female MRN: [id number]  Unit/Bed#: -Salima Encounter: [id number]  Primary Care Provider: Shi Cordero DO   Date and time admitted to hospital: 11/1/2022  9:25 AM    * Acute congestive heart failure [facility])  Assessment & Plan  Presented with SOB on exertion likely 2/2 CHF exacerbation  Noted crackles on examination and CXR showing pulmonary edema  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  Repeat echocardiogram showing mildly reduced EF likely 2/2 AFib around 45%, moderate MR, mild AR  Currently improved  Respiratory status significantly better  Managed with IV Lasix initially  Cardiology following  Continue Lasix 20 mg p o  Daily, will likely need this on discharge  Daily weight, I's and O's, low-salt diet  Cardiology following, recommended will need future ischemic evaluation    Persistent atrial fibrillation with rapid ventricular response ([address] )  Assessment & Plan  H/o persistent AFib in past   Has been seen by EP cardiologist   Was started on sotalol, metoprolol  Initially was in NSR however Pt has intermittent AFib episodes  Cardiologist suspected 2/2 sleep apnea as she does have problems staying asleep, snoring  Was recommended outpatient sleep study which she is yet to do  Presented with AFib RVR HR in 150 on presentation  Currently controlled however given soft BP further treatment with Cardizem, metoprolol is restricted  Echocardiogram showing reduced EF 40-45% compared to prior echocardiogram likely tachycardia induced  Currently continues to be in AFib HR seems to be tolerating well  No palpitations  Increased dose of sotalol to 160 mg daily HD with renal dose adjustment  Continue metoprolol 75 mg b i d , Cardizem 30 mg q 6h as per holding parameters    Cardiology following  Plan for cardioversion today, continue NPO  Restart diet when able  Lasix as above  Eliquis 5 mg b i d     SSS (sick sinus syndrome) ([facility] [address] )  Assessment & Plan  S/p pacemaker placement  Stage 3a chronic kidney disease ([facility] )  Assessment & Plan  Baseline around 1  Currently at baseline  Will continue to monitor renal function  Nonrheumatic mitral valve regurgitation  Assessment & Plan  Does have H/o moderate MR, aortic insufficiency noted on prior echocardiogram   Was doing well  Admitted for CHF exacerbation  Obesity (BMI 30-39  9)  Assessment & Plan  Encouraged diet and exercise modification    Essential hypertension  Assessment & Plan  Will hold home lisinopril given BP within acceptable limits  Restart when able  Pharmacologic: Apixaban (Eliquis)  Mechanical VTE Prophylaxis in Place: Yes    Discussions with Specialists or Other Care Team Provider: Cardio    Education and Discussions with Family / Patient:  Pt, spoke to son patient's condition  He agrees with the plan  Current Length of Stay: 2 day(s)    Current Patient Status: Inpatient     Discharge Plan / Estimated Discharge Date: likely in 24 hrs    Code Status: Level 3 - DNAR and DNI      Subjective:     Patient was seen and examined by me  Communicated clearly  No particular overnight event reported  Hemodynamically stable and afebrile  Patient feels better overall  On room air  Feels a lot better compared to presentation  SOB has significantly improved  No chest pain, palpitations  No N/V  Tolerating diet well  Diuresing well  Objective:     Vitals:   Temp (24hrs), Av 9 °F (35 5 °C), Min:83 8 °F (28 8 °C), Max:98 °F (36 7 °C)    Temp:  [83 8 °F (28 8 °C)-98 °F (36 7 °C)] 97 3 °F (36 3 °C)  HR:  [] 60  Resp:  [15-23] 23  BP: ()/(58-88) 100/63  SpO2:  [94 %-99 %] 98 %  Body mass index is 33 49 kg/m²  Input and Output Summary (last 24 hours):        Intake/Output Summary (Last 24 hours) at 11/3/2022 1348  Last data filed at 11/3/2022 1312  Gross per 24 hour   Intake 1740 ml   Output --   Net 1740 ml Physical Exam:     Physical Exam  Vitals reviewed  Constitutional:       General: She is not in acute distress  Appearance: She is well-developed  HENT:      Head: Normocephalic and atraumatic  Eyes:      General: No scleral icterus  Right eye: No discharge  Left eye: No discharge  Cardiovascular:      Rate and Rhythm: Normal rate and regular rhythm  Pulses: Normal pulses  Heart sounds: Normal heart sounds  Pulmonary:      Effort: Pulmonary effort is normal  No respiratory distress  Breath sounds: No wheezing or rales  Chest:      Chest wall: No tenderness  Abdominal:      General: Bowel sounds are normal  There is no distension  Palpations: Abdomen is soft  Tenderness: There is no abdominal tenderness  Musculoskeletal:         General: No swelling or tenderness  Normal range of motion  Cervical back: Normal range of motion  Right lower leg: No edema  Left lower leg: No edema  Skin:     General: Skin is warm  Coloration: Skin is not pale  Neurological:      General: No focal deficit present  Mental Status: She is alert and oriented to person, place, and time  Mental status is at baseline  Cranial Nerves: No cranial nerve deficit  Sensory: No sensory deficit  Motor: No weakness     Psychiatric:         Mood and Affect: Mood normal          Behavior: Behavior normal          Additional Data:     Labs:    Results from last 7 days   Lab Units 11/01/22  0954   WBC Thousand/uL 8 65   HEMOGLOBIN g/dL 11 9   HEMATOCRIT % 37 2   PLATELETS Thousands/uL 249   NEUTROS PCT % 74   LYMPHS PCT % 13*   MONOS PCT % 11   EOS PCT % 1     Results from last 7 days   Lab Units 11/03/22  0550 11/02/22  0431 11/01/22  0954   POTASSIUM mmol/L 3 4*   < > 4 3   CHLORIDE mmol/L 105   < > 109*   CO2 mmol/L 25   < > 22   BUN mg/dL 22   < > 21   CREATININE mg/dL 1 07   < > 1 00   CALCIUM mg/dL 8 6   < > 8 8   ALK PHOS U/L  --   --  94   ALT U/L  --   --  63   AST U/L  --   --  61*    < > = values in this interval not displayed  Results from last 7 days   Lab Units 11/01/22  0954   INR  1 46*       * I Have Reviewed All Lab Data Listed Above  * Additional Pertinent Lab Tests Reviewed: [address] Admission Reviewed    Imaging:  XR chest 2 views   Final Result by Taylor Donato MD (11/01 1246)      Mild pulmonary edema with trace effusions  Workstation performed: [id number]            Imaging Reports Reviewed Today Include: CXR  Imaging Personally Reviewed by Myself Includes:  Same as above    Recent Cultures (last 7 days):           Last 24 Hours Medication List:   Current Facility-Administered Medications   Medication Dose Route Frequency Provider Last Rate   • acetaminophen  650 mg Oral Q6H PRN Padmini Cam MD     • apixaban  5 mg Oral BID Padmini Cam MD     • diltiazem  30 mg Oral Q6H [address] Lydia Melvin PA-C     • furosemide  20 mg Oral Daily Lydia Melvin PA-C     • metoprolol  5 mg Intravenous Q6H PRN Padmini Cam MD     • metoprolol succinate  75 mg Oral BID Padmini Cam MD     • ondansetron  4 mg Intravenous Q6H PRN Padmini Cam MD     • [START ON 11/4/2022] potassium chloride  10 mEq Oral Daily Lydia Melvin PA-C     • pravastatin  10 mg Oral Daily With Dinner Padmini Cam MD     • [START ON 11/4/2022] sotalol  160 mg Oral Daily Lydia Melvin PA-C          Today, Patient Was Seen By: Padmini Cam    ** Please Note: This note has been constructed using a voice recognition system   **

## 2022-11-03 NOTE — PROGRESS NOTES
Cardiology Progress Note - Cristal Medellin 65 y o  female MRN: [id number]    Unit/Bed#: -01 Encounter: [id number]      Assessment/Plan:  1  Paroxysmal atrial fibrillation  · EKG reveals AFib with RVR and PVCs  · Telemetry shows AFib with RVR; HR averaging 120-130 bpm; continue to monitor  · Continue Cardizem 30 mg q6h and Toprol-XL 75 mg b i d   · Increase Sotalol to 160 mg daily per pharmacy recommendation  · Continue Eliquis 5 mg for anticoagulation  · Plan for cardioversion today; discussed risks and benefits with patient who is agreeable to proceed  Has been NPO since midnight      2  Heart failure with reduced ejection fraction  · Likely tachycardia mediated secondary to atrial fibrillation; plan for DCCV today; cannot definitively rule out ischemia as a cause; plan for future ischemic evaluation  · Euvolemic on exam  · BNP 5,902  · CXR reveals mild pulmonary edema with trace effusions  · Echo reveals EF 40-45%; decreased from previous 60% in July of 2020  · Decrease Lasix to 20 mg PO daily   · Strict I/O and daily weights; sodium and restriction    3  Cardiomyopathy  · Echo reveals EF 40-45%; decreased from previous 60% in July of 2020  · Likely tachycardia mediated secondary to atrial fibrillation; plan for DCCV today; cannot definitively rule out ischemia as a cause; plan for future ischemic evaluation    4  History of recovered tachycardia mediated cardiomyopathy   · EF recovered from 35-40% in February of 2019 to 60% in July of 2020 after initiating rhythm control per electrophysiology  · Echo ordered     5  Sick sinus syndrome s/p PPM  · S/p DC Med[employer] dual-chamber PPM  · Continue to follow-up device clinic     6  Hypertension  · Currently 124/80; continue to monitor  · Continue Toprol-XL 75 mg b i d    · Decrease Lasix to 20 mg PO daily      7  Mixed hyperlipidemia  § Continue pravastatin 10 mg     8   Moderate MR, mild AR, moderate to severe TR  § Continue to follow-up as outpatient with serial echocardiograms  § Patient follows with Dr Gomez as outpatient    UPDATE:  Patient converted back to normal sinus rhythm via cardioversion; ordered follow-up EKG for tomorrow a m ; discontinue Cardizem; resume diet    Subjective:   Patient seen and examined  No significant events overnight  Patient reports she is hungry and looking forward to completing cardioversion  Objective:     Vitals: Blood pressure 124/80, pulse (!) 49, temperature 98 °F (36 7 °C), resp  rate 16, height 5' 5" (1 651 m), weight 91 3 kg (201 lb 4 5 oz), SpO2 96 %  , Body mass index is 33 49 kg/m² ,   Orthostatic Blood Pressures    Flowsheet Row Most Recent Value   Blood Pressure 124/80 filed at 11/03/2022 8998   Patient Position - Orthostatic VS Lying filed at 11/01/2022 1502            Intake/Output Summary (Last 24 hours) at 11/3/2022 1032  Last data filed at 11/3/2022 0900  Gross per 24 hour   Intake 1440 ml   Output --   Net 1440 ml         Physical Exam:  Physical Exam  Vitals and nursing note reviewed  Constitutional:       General: She is not in acute distress  Appearance: She is well-developed  She is not toxic-appearing or diaphoretic  HENT:      Head: Normocephalic and atraumatic  Nose: Nose normal    Eyes:      Conjunctiva/sclera: Conjunctivae normal    Cardiovascular:      Rate and Rhythm: Tachycardia present  Rhythm irregularly irregular  Heart sounds: Normal heart sounds  No murmur heard  Pulmonary:      Effort: Pulmonary effort is normal  No respiratory distress  Breath sounds: Normal breath sounds  No wheezing or rales  Chest:      Chest wall: No tenderness  Abdominal:      Palpations: Abdomen is soft  Tenderness: There is no abdominal tenderness  Musculoskeletal:      Cervical back: Neck supple  Right lower leg: No edema  Left lower leg: No edema  Skin:     General: Skin is warm and dry  Neurological:      Mental Status: She is alert and oriented to person, place, and time  Psychiatric:         Mood and Affect: Mood normal          Judgment: Judgment normal             Medications:      Current Facility-Administered Medications:   •  acetaminophen (TYLENOL) tablet 650 mg, 650 mg, Oral, Q6H PRN, Kali Moreno MD  •  apixaban (ELIQUIS) tablet 5 mg, 5 mg, Oral, BID, Kali Moreno MD, 5 mg at 11/03/22 0800  •  diltiazem (CARDIZEM) tablet 30 mg, 30 mg, Oral, Q6H [facility], Chaz Mueller PA-C, 30 mg at 11/02/22 1819  •  furosemide (LASIX) tablet 20 mg, 20 mg, Oral, Daily, Jorge A Milan PA-C  •  metoprolol (LOPRESSOR) injection 5 mg, 5 mg, Intravenous, Q6H PRN, Kali Moreno MD  •  metoprolol succinate (TOPROL-XL) 24 hr tablet 75 mg, 75 mg, Oral, BID, Kali Moreno MD, 75 mg at 11/02/22 1873  •  ondansetron (ZOFRAN) injection 4 mg, 4 mg, Intravenous, Q6H PRN, Kali Moreno MD  •  pravastatin (PRAVACHOL) tablet 10 mg, 10 mg, Oral, Daily With Dinner, Kali Moreno MD, 10 mg at 11/02/22 1644  •  sotalol (BETAPACE) tablet 120 mg, 120 mg, Oral, Q12H [facility], Jorge A Milan PA-C     Labs & Results:     Results from last 7 days   Lab Units 11/01/22  1334 11/01/22  0954   HS TNI 0HR ng/L  --  8   HS TNI 2HR ng/L 8  --    HSTNI D2 ng/L 0  --    NT-PRO BNP pg/mL  --  8,654*     Results from last 7 days   Lab Units 11/01/22  0954   WBC Thousand/uL 8 65   HEMOGLOBIN g/dL 11 9   HEMATOCRIT % 37 2   PLATELETS Thousands/uL 249     Results from last 7 days   Lab Units 11/02/22  0431   TRIGLYCERIDES mg/dL 75   HDL mg/dL 45*     Results from last 7 days   Lab Units 11/03/22  0550 11/02/22  0431 11/01/22  0954   POTASSIUM mmol/L 3 4* 3 8 4 3   CHLORIDE mmol/L 105 105 109*   CO2 mmol/L 25 23 22   BUN mg/dL 22 23 21   CREATININE mg/dL 1 07 1 17 1 00   CALCIUM mg/dL 8 6 8 7 8 8   ALK PHOS U/L  --   --  94   ALT U/L  --   --  63   AST U/L  --   --  61*     Results from last 7 days   Lab Units 11/01/22  0954   INR  1 46*   PTT seconds 34     Results from last 7 days Lab Units 11/01/22  0954   MAGNESIUM mg/dL 2 0       Vitals: Blood pressure 124/80, pulse (!) 49, temperature 98 °F (36 7 °C), resp  rate 16, height 5' 5" (1 651 m), weight 91 3 kg (201 lb 4 5 oz), SpO2 96 %  , Body mass index is 33 49 kg/m² ,   Orthostatic Blood Pressures    Flowsheet Row Most Recent Value   Blood Pressure 124/80 filed at 11/03/2022 8389   Patient Position - Orthostatic VS Lying filed at 11/01/2022 6648          Systolic (62JGC), ZGN:226 , Min:96 , NFY:840     Diastolic (99KKA), CGQ:76, Min:64, Max:88        Intake/Output Summary (Last 24 hours) at 11/3/2022 1032  Last data filed at 11/3/2022 0900  Gross per 24 hour   Intake 1440 ml   Output --   Net 1440 ml       Invasive Devices  Report    Peripheral Intravenous Line  Duration           Peripheral IV 11/01/22 Right Arm 2 days                  EKG:  AFib with RVR; rightward axis; ST and T-wave abnormality, consider inferolateral ischemia    Telemetry:  Telemetry Orders (From admission, onward)             48 Hour Telemetry Monitoring  Continuous x 48 hours        References:    Telemetry Guidelines   Question:  Reason for 48 Hour Telemetry  Answer:  Arrhythmias Requiring Medical Therapy (eg  SVT, Vtach/fib, Bradycardia, Uncontrolled A-fib)                 Telemetry Reviewed: Atrial fibrillation   HR averaging 120-130  Indication for Continued Telemetry Use: Arrthymias requiring medical therapy    BP Readings from Last 3 Encounters:   11/03/22 124/80   11/01/22 122/78   06/07/22 128/82      Wt Readings from Last 3 Encounters:   11/03/22 91 3 kg (201 lb 4 5 oz)   11/01/22 93 kg (205 lb)   06/07/22 91 6 kg (202 lb)

## 2022-11-03 NOTE — ASSESSMENT & PLAN NOTE
H/o persistent AFib in past   Has been seen by EP cardiologist   Was started on sotalol, metoprolol  Initially was in NSR however Pt has intermittent AFib episodes  Cardiologist suspected 2/2 sleep apnea as she does have problems staying asleep, snoring  Was recommended outpatient sleep study which she is yet to do  Presented with AFib RVR HR in 150 on presentation  Currently controlled however given soft BP further treatment with Cardizem, metoprolol is restricted  Echocardiogram showing reduced EF 40-45% compared to prior echocardiogram likely tachycardia induced  Due to persistent AFib RVR Pt was managed with cardioversion reverting back to NSR  Pt tolerating NSR well overnight  No events on telemetry for AFib  Four advised to start taking sotalol 160 mg daily by cardiologist as per renal dose adjustment  Continue metoprolol, Eliquis as before  Was advised to start taking Lasix and K+ supplements as above  Advised to continue taking medication as above  Follow-up with cardiology in 2 weeks    Also advised to follow-up with PCP regarding recent hospitalization

## 2022-11-03 NOTE — NURSING NOTE
Midnight Cardizem held as per provider  Pt BP did not meet parameters and HR controlled at this time       Marla Rivas LPN

## 2022-11-03 NOTE — ASSESSMENT & PLAN NOTE
Does have H/o moderate MR, aortic insufficiency noted on prior echocardiogram   Following up with Cardiology outpatient

## 2022-11-03 NOTE — PLAN OF CARE
Problem: PAIN - ADULT  Goal: Verbalizes/displays adequate comfort level or baseline comfort level  Description: Interventions:  - Encourage patient to monitor pain and request assistance  - Assess pain using appropriate pain scale  - Administer analgesics based on type and severity of pain and evaluate response  - Implement non-pharmacological measures as appropriate and evaluate response  - Consider cultural and social influences on pain and pain management  - Notify physician/advanced practitioner if interventions unsuccessful or patient reports new pain  Outcome: Progressing     Problem: INFECTION - ADULT  Goal: Absence or prevention of progression during hospitalization  Description: INTERVENTIONS:  - Assess and monitor for signs and symptoms of infection  - Monitor lab/diagnostic results  - Monitor all insertion sites, i e  indwelling lines, tubes, and drains  - Monitor endotracheal if appropriate and nasal secretions for changes in amount and color  - [place] appropriate cooling/warming therapies per order  - Administer medications as ordered  - Instruct and encourage patient and family to use good hand hygiene technique  - Identify and instruct in appropriate isolation precautions for identified infection/condition  Outcome: Progressing     Problem: SAFETY ADULT  Goal: Patient will remain free of falls  Description: INTERVENTIONS:  - Educate patient/family on patient safety including physical limitations  - Instruct patient to call for assistance with activity   - Consult OT/PT to assist with strengthening/mobility   - Keep Call bell within reach  - Keep bed low and locked with side rails adjusted as appropriate  - Keep care items and personal belongings within reach  - Initiate and maintain comfort rounds  - Make Fall Risk Sign visible to staff  - Apply yellow socks and bracelet for high fall risk patients  - Consider moving patient to room near nurses station  Outcome: Progressing     Problem: DISCHARGE PLANNING  Goal: Discharge to home or other facility with appropriate resources  Description: INTERVENTIONS:  - Identify barriers to discharge w/patient and caregiver  - Arrange for needed discharge resources and transportation as appropriate  - Identify discharge learning needs (meds, wound care, etc )  - Arrange for interpretive services to assist at discharge as needed  - Refer to Case Management Department for coordinating discharge planning if the patient needs post-hospital services based on physician/advanced practitioner order or complex needs related to functional status, cognitive ability, or social support system  Outcome: Progressing     Problem: NEUROSENSORY - ADULT  Goal: Achieves stable or improved neurological status  Description: INTERVENTIONS  - Monitor and report changes in neurological status  - Monitor vital signs such as temperature, blood pressure, glucose, and any other labs ordered   - Initiate measures to prevent increased intracranial pressure  - Monitor for seizure activity and implement precautions if appropriate      Outcome: Progressing

## 2022-11-03 NOTE — ANESTHESIA POSTPROCEDURE EVALUATION
Post-Op Assessment Note    CV Status:  Stable    Pain management: adequate     Mental Status:  Alert and awake   Hydration Status:  Euvolemic   PONV Controlled:  Controlled   Airway Patency:  Patent      Post Op Vitals Reviewed: Yes      Staff: CRNA         No complications documented      /63 (11/03/22 1315)    Temp (!) 97 3 °F (36 3 °C) (11/03/22 1310)    Pulse 60 (11/03/22 1315)   Resp (!) 23 (11/03/22 1315)    SpO2 98 % (11/03/22 1315)

## 2022-11-03 NOTE — ASSESSMENT & PLAN NOTE
Presented with SOB on exertion likely 2/2 CHF exacerbation  Noted crackles on examination and CXR showing pulmonary edema  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  Repeat echocardiogram showing mildly reduced EF likely 2/2 AFib around 45%, moderate MR, mild AR  Currently improved  Respiratory status significantly better  Managed with IV Lasix initially  Cardiology following  Continue Lasix 20 mg p o   Daily, will likely need this on discharge  Daily weight, I's and O's, low-salt diet  Cardiology following, recommended will need future ischemic evaluation

## 2022-11-03 NOTE — ASSESSMENT & PLAN NOTE
H/o tachycardia induced cardiomyopathy with recovered EF  Presented with SOB on exertion likely 2/2 recent CHF exacerbation  Noted crackles on presentation with improvement significantly  CXR on admission showing pulmonary edema  Echocardiogram done during this admission showing mildly reduced EF of 40-45 percent with moderate MR and mild AR likely 2/2 uncontrolled AFib  Was noted to have AFib RVR on presentation treated with cardioversion, oral + IV Cardizem, metoprolol  Continue Lasix 20 mg p o   Daily along with K+ supplements 10 mEq daily  Daily weight, I's and O's, low-salt diet  Cardiology following, recommended will need future ischemic evaluation

## 2022-11-04 ENCOUNTER — TELEPHONE (OUTPATIENT)
Dept: FAMILY MEDICINE CLINIC | Facility: CLINIC | Age: 77
End: 2022-11-04

## 2022-11-04 ENCOUNTER — TRANSITIONAL CARE MANAGEMENT (OUTPATIENT)
Dept: FAMILY MEDICINE CLINIC | Facility: CLINIC | Age: 77
End: 2022-11-04

## 2022-11-04 VITALS
DIASTOLIC BLOOD PRESSURE: 76 MMHG | WEIGHT: 199.52 LBS | SYSTOLIC BLOOD PRESSURE: 113 MMHG | BODY MASS INDEX: 33.24 KG/M2 | RESPIRATION RATE: 15 BRPM | OXYGEN SATURATION: 97 % | HEIGHT: 65 IN | TEMPERATURE: 97.9 F | HEART RATE: 67 BPM

## 2022-11-04 PROBLEM — I50.9 ACUTE CONGESTIVE HEART FAILURE (HCC): Status: RESOLVED | Noted: 2022-11-01 | Resolved: 2022-11-04

## 2022-11-04 PROBLEM — I50.23 ACUTE ON CHRONIC SYSTOLIC CONGESTIVE HEART FAILURE (HCC): Status: ACTIVE | Noted: 2022-11-01

## 2022-11-04 LAB
ANION GAP SERPL CALCULATED.3IONS-SCNC: 9 MMOL/L (ref 4–13)
ATRIAL RATE: 65 BPM
BUN SERPL-MCNC: 29 MG/DL (ref 5–25)
CALCIUM SERPL-MCNC: 8.6 MG/DL (ref 8.3–10.1)
CHLORIDE SERPL-SCNC: 106 MMOL/L (ref 96–108)
CO2 SERPL-SCNC: 26 MMOL/L (ref 21–32)
CREAT SERPL-MCNC: 1.11 MG/DL (ref 0.6–1.3)
GFR SERPL CREATININE-BSD FRML MDRD: 47 ML/MIN/1.73SQ M
GLUCOSE SERPL-MCNC: 104 MG/DL (ref 65–140)
POTASSIUM SERPL-SCNC: 4 MMOL/L (ref 3.5–5.3)
PR INTERVAL: 206 MS
QRS AXIS: 14 DEGREES
QRSD INTERVAL: 82 MS
QT INTERVAL: 456 MS
QTC INTERVAL: 474 MS
SODIUM SERPL-SCNC: 141 MMOL/L (ref 135–147)
T WAVE AXIS: 30 DEGREES
VENTRICULAR RATE: 65 BPM

## 2022-11-04 RX ORDER — POTASSIUM CHLORIDE 750 MG/1
10 TABLET, EXTENDED RELEASE ORAL DAILY
Qty: 30 TABLET | Refills: 2 | Status: SHIPPED | OUTPATIENT
Start: 2022-11-05

## 2022-11-04 RX ORDER — SOTALOL HYDROCHLORIDE 80 MG/1
160 TABLET ORAL DAILY
Qty: 270 TABLET | Refills: 0 | Status: SHIPPED | OUTPATIENT
Start: 2022-11-04

## 2022-11-04 RX ORDER — FUROSEMIDE 20 MG/1
20 TABLET ORAL DAILY
Qty: 30 TABLET | Refills: 2 | Status: SHIPPED | OUTPATIENT
Start: 2022-11-05

## 2022-11-04 RX ADMIN — SOTALOL HYDROCHLORIDE 160 MG: 80 TABLET ORAL at 09:55

## 2022-11-04 RX ADMIN — FUROSEMIDE 20 MG: 20 TABLET ORAL at 09:55

## 2022-11-04 RX ADMIN — METOPROLOL SUCCINATE 75 MG: 50 TABLET, EXTENDED RELEASE ORAL at 09:55

## 2022-11-04 RX ADMIN — POTASSIUM CHLORIDE 10 MEQ: 750 TABLET, EXTENDED RELEASE ORAL at 09:55

## 2022-11-04 RX ADMIN — APIXABAN 5 MG: 5 TABLET, FILM COATED ORAL at 09:55

## 2022-11-04 NOTE — TELEPHONE ENCOUNTER
Pt just discharged after 3 day hospital stay -- scheduled TCM for 11/7 with Dr Robbin Jimenez (in an available cancellation spot)

## 2022-11-04 NOTE — PLAN OF CARE
Problem: PAIN - ADULT  Goal: Verbalizes/displays adequate comfort level or baseline comfort level  Description: Interventions:  - Encourage patient to monitor pain and request assistance  - Assess pain using appropriate pain scale  - Administer analgesics based on type and severity of pain and evaluate response  - Implement non-pharmacological measures as appropriate and evaluate response  - Consider cultural and social influences on pain and pain management  - Notify physician/advanced practitioner if interventions unsuccessful or patient reports new pain  Outcome: Progressing     Problem: INFECTION - ADULT  Goal: Absence or prevention of progression during hospitalization  Description: INTERVENTIONS:  - Assess and monitor for signs and symptoms of infection  - Monitor lab/diagnostic results  - Monitor all insertion sites, i e  indwelling lines, tubes, and drains  - Monitor endotracheal if appropriate and nasal secretions for changes in amount and color  - [place] appropriate cooling/warming therapies per order  - Administer medications as ordered  - Instruct and encourage patient and family to use good hand hygiene technique  - Identify and instruct in appropriate isolation precautions for identified infection/condition  Outcome: Progressing     Problem: INFECTION - ADULT  Goal: Absence of fever/infection during neutropenic period  Description: INTERVENTIONS:  - Monitor WBC    Outcome: Progressing     Problem: GENITOURINARY - ADULT  Goal: Maintains or returns to baseline urinary function  Description: INTERVENTIONS:  - Assess urinary function  - Encourage oral fluids to ensure adequate hydration if ordered  - Administer IV fluids as ordered to ensure adequate hydration  - Administer ordered medications as needed  - Offer frequent toileting  - Follow urinary retention protocol if ordered  Outcome: Progressing     Problem: HEMATOLOGIC - ADULT  Goal: Maintains hematologic stability  Description: INTERVENTIONS  - Assess for signs and symptoms of bleeding or hemorrhage  - Monitor labs  - Administer supportive blood products/factors as ordered and appropriate  Outcome: Progressing     Problem: MOBILITY - ADULT  Goal: Maintain or return to baseline ADL function  Description: INTERVENTIONS:  -  Assess patient's ability to carry out ADLs; assess patient's baseline for ADL function and identify physical deficits which impact ability to perform ADLs (bathing, care of mouth/teeth, toileting, grooming, dressing, etc )  - Assess/evaluate cause of self-care deficits   - Assess range of motion  - Assess patient's mobility; develop plan if impaired  - Assess patient's need for assistive devices and provide as appropriate  - Encourage maximum independence but intervene and supervise when necessary  - Involve family in performance of ADLs  - Assess for home care needs following discharge   - Consider OT consult to assist with ADL evaluation and planning for discharge  - Provide patient education as appropriate  Outcome: Progressing

## 2022-11-04 NOTE — DISCHARGE SUMMARY
[facility]  Discharge- Patti Marker 1945, 65 y o  female MRN: [id number]  Unit/Bed#: -[facility] Encounter: [id number]  Primary Care Provider: Germania Blum DO   Date and time admitted to hospital: 11/1/2022  9:25 AM    * Acute on chronic systolic congestive heart failure ([facility] )  Assessment & Plan  H/o tachycardia induced cardiomyopathy with recovered EF  Presented with SOB on exertion likely 2/2 recent CHF exacerbation  Noted crackles on presentation with improvement significantly  CXR on admission showing pulmonary edema  Echocardiogram done during this admission showing mildly reduced EF of 40-45 percent with moderate MR and mild AR likely 2/2 uncontrolled AFib  Was noted to have AFib RVR on presentation treated with cardioversion, oral + IV Cardizem, metoprolol  Continue Lasix 20 mg p o  Daily along with K+ supplements 10 mEq daily  Daily weight, I's and O's, low-salt diet  Cardiology following, recommended will need future ischemic evaluation    Persistent [facility] fibrillation with [facility] ventricular response ([facility] )  Assessment & Plan  H/o persistent AFib in past   Has been seen by EP cardiologist   Was started on sotalol, metoprolol  Initially was in NSR however Pt has intermittent AFib episodes  Cardiologist suspected 2/2 sleep apnea as she does have problems staying asleep, snoring  Was recommended outpatient sleep study which she is yet to do  Presented with AFib RVR HR in 150 on presentation  Currently controlled however given soft BP further treatment with Cardizem, metoprolol is restricted  Echocardiogram showing reduced EF 40-45% compared to prior echocardiogram likely tachycardia induced  Due to persistent AFib RVR Pt was managed with cardioversion reverting back to NSR  Pt tolerating NSR well overnight  No events on telemetry for AFib  Four advised to start taking sotalol 160 mg daily by cardiologist as per renal dose adjustment    Continue metoprolol, Eliquis as before  Was advised to start taking Lasix and K+ supplements as above  Advised to continue taking medication as above  Follow-up with cardiology in 2 weeks  Also advised to follow-up with PCP regarding recent hospitalization    SSS (sick sinus syndrome) ([facility] [address] )  Assessment & Plan  S/p pacemaker placement  Stage 3[facility]  Assessment & Plan  Baseline around 1  Currently at baseline  Will continue to monitor renal function  Nonrheumatic mitral valve regurgitation  Assessment & Plan  Does have H/o moderate MR, aortic insufficiency noted on prior echocardiogram   Following up with Cardiology outpatient    Obesity (BMI 30-39  9)  Assessment & Plan  Encouraged diet and exercise modification    Essential hypertension  Assessment & Plan  Continue home medication of lisinopril, metoprolol      Discharging Resident Physician: Gilmer Bernal  Attending: No att  providers found  PCP: Justina Aguiar DO  Admission Date: 11/1/2022  Admission Date:   Admission Orders (From admission, onward)     Ordered        11/01/22 1107  [address] [facility]  Critical access hospital                      Discharge Date: 11/04/22    Disposition:     Home    Reason for Admission:  AFib RVR, CHF exacerbation    Consultations During Hospital Stay:  · Cardiology    Procedures Performed:     No orders of the defined types were placed in this encounter  Diagnosis:    Medical Problems             Resolved Problems  Date Reviewed: 11/2/2022   None                 Principal Problem:    Acute on chronic systolic congestive heart failure (HCC)  Active Problems:    Persistent atrial fibrillation with rapid ventricular response (HCC)    Essential hypertension    Obesity (BMI 30-39  9)    Nonrheumatic mitral valve regurgitation    Stage 3a chronic kidney disease (HCC)    SSS (sick sinus syndrome) ([facility] )      Significant Findings / Test Results:     · AFib RVR with HR of 150 on presentation    · Noted crackles on B/L lungs on examination  · Elevated proBNP  · CXR with pulmonary edema    Incidental Findings:   · None     Test Results Pending at Discharge (will require follow up): · None     Outpatient Tests Requested:  · None    Complications:  None    Hospital Course:     Ysabel Guerra is a 65 y o  female patient who originally presented to the hospital on 11/1/2022 due to worsening SOB  Was noted to have CHF exacerbation with crackles on examination, CXR with pulmonary edema, elevated proBNP managed by cardiology consult, IV diuretic, K+ supplements, daily weight, I's and O's, low-salt diet  Was advised to start taking low-dose Lasix along with K+ supplement on discharge  Was also noted to have AFib RVR with echocardiogram showing mildly reduced EF to 40-45 percent with moderate MR and mild AR  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 in past   Was managed by Cardiology with cardioversion due to AFib not responding to medication treatment with sotalol  Was managed with Cardizem, Lasix, metoprolol, sotalol with renal dose adjustment and holding parameters for BP as it was soft  Was advised to continue taking metoprolol, Lasix, sotalol on discharge  Follow up with Cardiology in 2 weeks  Was done not given any follow-up BMP as Pt is already taking lisinopril which was held on admission  Rest of the details as per assessment and plan  Condition at Discharge: stable     Discharge Day Visit / Exam:     Subjective:  Patient was seen and examined by me  Communicated clearly  No particular overnight event reported  Hemodynamically stable and afebrile  Patient feels better overall  Shortness of breaths significantly improved  No palpitations, orthopnea, chest pain, N/V  Slept well  On room air        Vitals: Blood Pressure: 113/76 (11/04/22 1113)  Pulse: 67 (11/04/22 1113)  Temperature: 97 9 °F (36 6 °C) (11/04/22 0801)  Temp Source: Oral (11/04/22 0236)  Respirations: 15 (11/04/22 0236)  Height: 5' 5" (165 1 cm) (11/02/22 1400)  Weight - Scale: 90 5 kg (199 lb 8 3 oz) (11/04/22 0600)  SpO2: 97 % (11/04/22 1113)  Exam:   Physical Exam  Vitals reviewed  Constitutional:       General: She is not in acute distress  Appearance: She is well-developed  HENT:      Head: Normocephalic and atraumatic  Eyes:      General: No scleral icterus  Right eye: No discharge  Left eye: No discharge  Cardiovascular:      Rate and Rhythm: Normal rate and regular rhythm  Pulses: Normal pulses  Heart sounds: Murmur (Holosystolic) heard  Pulmonary:      Effort: Pulmonary effort is normal  No respiratory distress  Breath sounds: Normal breath sounds  No wheezing or rales  Chest:      Chest wall: No tenderness  Abdominal:      General: Bowel sounds are normal  There is no distension  Palpations: Abdomen is soft  Tenderness: There is no abdominal tenderness  Musculoskeletal:         General: No swelling or tenderness  Normal range of motion  Cervical back: Normal range of motion  Right lower leg: No edema  Left lower leg: No edema  Skin:     General: Skin is warm  Coloration: Skin is not pale  Neurological:      General: No focal deficit present  Mental Status: She is alert and oriented to person, place, and time  Mental status is at baseline  Cranial Nerves: No cranial nerve deficit  Sensory: No sensory deficit  Motor: No weakness  Psychiatric:         Mood and Affect: Mood normal          Behavior: Behavior normal          Discussion with Family:  Talked to  at bedside  All questions/concerns were answered  He agrees with the plan  Discharge instructions/Information to patient and family:   See after visit summary for information provided to patient and family  Provisions for Follow-Up Care:  See after visit summary for information related to follow-up care and any pertinent home health orders        Planned Readmission:  None     Discharge Medications:  See after visit summary for reconciled discharge medications provided to patient and family  Discharge Statement :  I spent 25 minutes discharging the patient  This time was spent on the day of discharge  I had direct contact with the patient on the day of discharge  Additional documentation is required if more than 30 minutes were spent on discharge           ** Please Note: This note has been constructed using a voice recognition system **

## 2022-11-04 NOTE — PLAN OF CARE
Problem: Potential for Falls  Goal: Patient will remain free of falls  Description: INTERVENTIONS:  - Educate patient/family on patient safety including physical limitations  - Instruct patient to call for assistance with activity   - Consult OT/PT to assist with strengthening/mobility   - Keep Call bell within reach  - Keep bed low and locked with side rails adjusted as appropriate  - Keep care items and personal belongings within reach  - Initiate and maintain comfort rounds  - Make Fall Risk Sign visible to staff  - Offer Toileting every 2 Hours, in advance of need  - Initiate/Maintain bed alarm  - Obtain necessary fall risk management equipment  - Apply yellow socks and bracelet for high fall risk patients  - Consider moving patient to room near nurses station  Outcome: Progressing     Problem: PAIN - ADULT  Goal: Verbalizes/displays adequate comfort level or baseline comfort level  Description: Interventions:  - Encourage patient to monitor pain and request assistance  - Assess pain using appropriate pain scale  - Administer analgesics based on type and severity of pain and evaluate response  - Implement non-pharmacological measures as appropriate and evaluate response  - Consider cultural and social influences on pain and pain management  - Notify physician/advanced practitioner if interventions unsuccessful or patient reports new pain  Outcome: Progressing     Problem: INFECTION - ADULT  Goal: Absence or prevention of progression during hospitalization  Description: INTERVENTIONS:  - Assess and monitor for signs and symptoms of infection  - Monitor lab/diagnostic results  - Monitor all insertion sites, i e  indwelling lines, tubes, and drains  - Monitor endotracheal if appropriate and nasal secretions for changes in amount and color  - [place] appropriate cooling/warming therapies per order  - Administer medications as ordered  - Instruct and encourage patient and family to use good hand hygiene technique  - Identify and instruct in appropriate isolation precautions for identified infection/condition  Outcome: Progressing  Goal: Absence of fever/infection during neutropenic period  Description: INTERVENTIONS:  - Monitor WBC    Outcome: Progressing     Problem: SAFETY ADULT  Goal: Patient will remain free of falls  Description: INTERVENTIONS:  - Educate patient/family on patient safety including physical limitations  - Instruct patient to call for assistance with activity   - Consult OT/PT to assist with strengthening/mobility   - Keep Call bell within reach  - Keep bed low and locked with side rails adjusted as appropriate  - Keep care items and personal belongings within reach  - Initiate and maintain comfort rounds  - Make Fall Risk Sign visible to staff  - Offer Toileting every 2 Hours, in advance of need  - Initiate/Maintain bed alarm  - Obtain necessary fall risk management equipment  - Apply yellow socks and bracelet for high fall risk patients  - Consider moving patient to room near nurses station  Outcome: Progressing  Goal: Maintain or return to baseline ADL function  Description: INTERVENTIONS:  -  Assess patient's ability to carry out ADLs; assess patient's baseline for ADL function and identify physical deficits which impact ability to perform ADLs (bathing, care of mouth/teeth, toileting, grooming, dressing, etc )  - Assess/evaluate cause of self-care deficits   - Assess range of motion  - Assess patient's mobility; develop plan if impaired  - Assess patient's need for assistive devices and provide as appropriate  - Encourage maximum independence but intervene and supervise when necessary  - Involve family in performance of ADLs  - Assess for home care needs following discharge   - Consider OT consult to assist with ADL evaluation and planning for discharge  - Provide patient education as appropriate  Outcome: Progressing  Goal: Maintains/Returns to pre admission functional level  Description: INTERVENTIONS:  - Perform BMAT or MOVE assessment daily    - Set and communicate daily mobility goal to care team and patient/family/caregiver  - Collaborate with rehabilitation services on mobility goals if consulted  - Perform Range of Motion 3 times a day  - Reposition patient every 3 hours  - Dangle patient 3 times a day  - Stand patient 3 times a day  - Ambulate patient 3 times a day  - Out of bed to chair 3 times a day   - Out of bed for meals 3 times a day  - Out of bed for toileting  - Record patient progress and toleration of activity level   Outcome: Progressing     Problem: DISCHARGE PLANNING  Goal: Discharge to home or other facility with appropriate resources  Description: INTERVENTIONS:  - Identify barriers to discharge w/patient and caregiver  - Arrange for needed discharge resources and transportation as appropriate  - Identify discharge learning needs (meds, wound care, etc )  - Arrange for interpretive services to assist at discharge as needed  - Refer to Case Management Department for coordinating discharge planning if the patient needs post-hospital services based on physician/advanced practitioner order or complex needs related to functional status, cognitive ability, or social support system  Outcome: Progressing     Problem: Knowledge Deficit  Goal: Patient/family/caregiver demonstrates understanding of disease process, treatment plan, medications, and discharge instructions  Description: Complete learning assessment and assess knowledge base    Interventions:  - Provide teaching at level of understanding  - Provide teaching via preferred learning methods  Outcome: Progressing     Problem: NEUROSENSORY - ADULT  Goal: Achieves stable or improved neurological status  Description: INTERVENTIONS  - Monitor and report changes in neurological status  - Monitor vital signs such as temperature, blood pressure, glucose, and any other labs ordered   - Initiate measures to prevent increased intracranial pressure  - Monitor for seizure activity and implement precautions if appropriate      Outcome: Progressing  Goal: Remains free of injury related to seizures activity  Description: INTERVENTIONS  - Maintain airway, patient safety  and administer oxygen as ordered  - Monitor patient for seizure activity, document and report duration and description of seizure to physician/advanced practitioner  - If seizure occurs,  ensure patient safety during seizure  - Reorient patient post seizure  - Seizure pads on all 4 side rails  - Instruct patient/family to notify RN of any seizure activity including if an aura is experienced  - Instruct patient/family to call for assistance with activity based on nursing assessment  - Administer anti-seizure medications if ordered    Outcome: Progressing  Goal: Achieves maximal functionality and self care  Description: INTERVENTIONS  - Monitor swallowing and airway patency with patient fatigue and changes in neurological status  - Encourage and assist patient to increase activity and self care     - Encourage visually impaired, hearing impaired and aphasic patients to use assistive/communication devices  Outcome: Progressing     Problem: CARDIOVASCULAR - ADULT  Goal: Maintains optimal cardiac output and hemodynamic stability  Description: INTERVENTIONS:  - Monitor I/O, vital signs and rhythm  - Monitor for S/S and trends of decreased cardiac output  - Administer and titrate ordered vasoactive medications to optimize hemodynamic stability  - Assess quality of pulses, skin color and temperature  - Assess for signs of decreased coronary artery perfusion  - Instruct patient to report change in severity of symptoms  Outcome: Progressing  Goal: Absence of cardiac dysrhythmias or at baseline rhythm  Description: INTERVENTIONS:  - Continuous cardiac monitoring, vital signs, obtain 12 lead EKG if ordered  - Administer antiarrhythmic and heart rate control medications as ordered  - Monitor electrolytes and administer replacement therapy as ordered  Outcome: Progressing     Problem: RESPIRATORY - ADULT  Goal: Achieves optimal ventilation and oxygenation  Description: INTERVENTIONS:  - Assess for changes in respiratory status  - Assess for changes in mentation and behavior  - Position to facilitate oxygenation and minimize respiratory effort  - Oxygen administered by appropriate delivery if ordered  - Initiate smoking cessation education as indicated  - Encourage broncho-pulmonary hygiene including cough, deep breathe, Incentive Spirometry  - Assess the need for suctioning and aspirate as needed  - Assess and instruct to report SOB or any respiratory difficulty  - Respiratory Therapy support as indicated  Outcome: Progressing     Problem: GASTROINTESTINAL - ADULT  Goal: Minimal or absence of nausea and/or vomiting  Description: INTERVENTIONS:  - Administer IV fluids if ordered to ensure adequate hydration  - Maintain NPO status until nausea and vomiting are resolved  - Nasogastric tube if ordered  - Administer ordered antiemetic medications as needed  - Provide nonpharmacologic comfort measures as appropriate  - Advance diet as tolerated, if ordered  - Consider nutrition services referral to assist patient with adequate nutrition and appropriate food choices  Outcome: Progressing  Goal: Maintains or returns to baseline bowel function  Description: INTERVENTIONS:  - Assess bowel function  - Encourage oral fluids to ensure adequate hydration  - Administer IV fluids if ordered to ensure adequate hydration  - Administer ordered medications as needed  - Encourage mobilization and activity  - Consider nutritional services referral to assist patient with adequate nutrition and appropriate food choices  Outcome: Progressing  Goal: Maintains adequate nutritional intake  Description: INTERVENTIONS:  - Monitor percentage of each meal consumed  - Identify factors contributing to decreased intake, treat as appropriate  - Assist with meals as needed  - Monitor I&O, weight, and lab values if indicated  - Obtain nutrition services referral as needed  Outcome: Progressing  Goal: Establish and maintain optimal ostomy function  Description: INTERVENTIONS:  - Assess bowel function  - Encourage oral fluids to ensure adequate hydration  - Administer IV fluids if ordered to ensure adequate hydration   - Administer ordered medications as needed  - Encourage mobilization and activity  - Nutrition services referral to assist patient with appropriate food choices  - Assess stoma site  - Consider wound care consult   Outcome: Progressing  Goal: Oral mucous membranes remain intact  Description: INTERVENTIONS  - Assess oral mucosa and hygiene practices  - Implement preventative oral hygiene regimen  - Implement oral medicated treatments as ordered  - Initiate Nutrition services referral as needed  Outcome: Progressing     Problem: GENITOURINARY - ADULT  Goal: Maintains or returns to baseline urinary function  Description: INTERVENTIONS:  - Assess urinary function  - Encourage oral fluids to ensure adequate hydration if ordered  - Administer IV fluids as ordered to ensure adequate hydration  - Administer ordered medications as needed  - Offer frequent toileting  - Follow urinary retention protocol if ordered  Outcome: Progressing  Goal: Absence of urinary retention  Description: INTERVENTIONS:  - Assess patient’s ability to void and empty bladder  - Monitor I/O  - Bladder scan as needed  - Discuss with physician/AP medications to alleviate retention as needed  - Discuss catheterization for long term situations as appropriate  Outcome: Progressing  Goal: Urinary catheter remains patent  Description: INTERVENTIONS:  - Assess patency of urinary catheter  - If patient has a chronic hickey, consider changing catheter if non-functioning  - Follow guidelines for intermittent irrigation of non-functioning urinary catheter  Outcome: Progressing     Problem: METABOLIC, FLUID AND ELECTROLYTES - ADULT  Goal: Electrolytes maintained within normal limits  Description: INTERVENTIONS:  - Monitor labs and assess patient for signs and symptoms of electrolyte imbalances  - Administer electrolyte replacement as ordered  - Monitor response to electrolyte replacements, including repeat lab results as appropriate  - Instruct patient on fluid and nutrition as appropriate  Outcome: Progressing  Goal: Fluid balance maintained  Description: INTERVENTIONS:  - Monitor labs   - Monitor I/O and WT  - Instruct patient on fluid and nutrition as appropriate  - Assess for signs & symptoms of volume excess or deficit  Outcome: Progressing  Goal: Glucose maintained within target range  Description: INTERVENTIONS:  - Monitor Blood Glucose as ordered  - Assess for signs and symptoms of hyperglycemia and hypoglycemia  - Administer ordered medications to maintain glucose within target range  - Assess nutritional intake and initiate nutrition service referral as needed  Outcome: Progressing     Problem: SKIN/TISSUE INTEGRITY - ADULT  Goal: Skin Integrity remains intact(Skin Breakdown Prevention)  Description: Assess:  -Perform Juan assessment every 2 hours   -Clean and moisturize skin every 2 hours  -Inspect skin when repositioning, toileting, and assisting with ADLS  -Assess under medical devices  -Assess extremities for adequate circulation and sensation     Bed Management:  -Have minimal linens on bed & keep smooth, unwrinkled  -Change linens as needed when moist or perspiring  -Avoid sitting or lying in one position for more than 2 hours while in bed  -Keep HOB at 30 degrees     Toileting:  -Offer bedside commode  -Assess for incontinence   -Use incontinent care products after each incontinent episode     Activity:  -Mobilize patient 3 times a day  -Encourage activity and walks on unit  -Encourage or provide ROM exercises   -Turn and reposition patient every 2 Hours  -Use appropriate equipment to lift or move patient in bed  -Instruct/ Assist with weight shifting every 30 minutes when out of bed in chair  -Consider limitation of chair time 2 hour intervals    Skin Care:  -Avoid use of baby powder, tape, friction and shearing, hot water or constrictive clothing  -Relieve pressure over bony prominences  -Do not massage red bony areas    Next Steps:  -Teach patient strategies to minimize risks   -Consider consults to  interdisciplinary teams  Outcome: Progressing  Goal: Incision(s), wounds(s) or drain site(s) healing without S/S of infection  Description: INTERVENTIONS  - Assess and document dressing, incision, wound bed, drain sites and surrounding tissue  - Provide patient and family education  - Perform skin care/dressing changes  Outcome: Progressing  Goal: Pressure injury heals and does not worsen  Description: Interventions:  - Implement low air loss mattress or specialty surface (Criteria met)  - Apply silicone foam dressing  - Instruct/assist with weight shifting every 30 minutes when in chair   - Limit chair time to 2 hour intervals  - Use special pressure reducing interventions when in chair   - Apply fecal or urinary incontinence containment device   - Perform passive or active ROM   - Turn and reposition patient & offload bony prominences    - Utilize friction reducing device or surface for transfers   - Consider consults to  interdisciplinary teams  - Use incontinent care products after each incontinent episode  - Consider nutrition services referral as needed  Outcome: Progressing     Problem: HEMATOLOGIC - ADULT  Goal: Maintains hematologic stability  Description: INTERVENTIONS  - Assess for signs and symptoms of bleeding or hemorrhage  - Monitor labs  - Administer supportive blood products/factors as ordered and appropriate  Outcome: Progressing     Problem: MUSCULOSKELETAL - ADULT  Goal: Maintain or return mobility to safest level of function  Description: INTERVENTIONS:  - Assess patient's ability to carry out ADLs; assess patient's baseline for ADL function and identify physical deficits which impact ability to perform ADLs (bathing, care of mouth/teeth, toileting, grooming, dressing, etc )  - Assess/evaluate cause of self-care deficits   - Assess range of motion  - Assess patient's mobility  - Assess patient's need for assistive devices and provide as appropriate  - Encourage maximum independence but intervene and supervise when necessary  - Involve family in performance of ADLs  - Assess for home care needs following discharge   - Consider OT consult to assist with ADL evaluation and planning for discharge  - Provide patient education as appropriate  Outcome: Progressing  Goal: Maintain proper alignment of affected body part  Description: INTERVENTIONS:  - Support, maintain and protect limb and body alignment  - Provide patient/ family with appropriate education  Outcome: Progressing     Problem: MOBILITY - ADULT  Goal: Maintain or return to baseline ADL function  Description: INTERVENTIONS:  -  Assess patient's ability to carry out ADLs; assess patient's baseline for ADL function and identify physical deficits which impact ability to perform ADLs (bathing, care of mouth/teeth, toileting, grooming, dressing, etc )  - Assess/evaluate cause of self-care deficits   - Assess range of motion  - Assess patient's mobility; develop plan if impaired  - Assess patient's need for assistive devices and provide as appropriate  - Encourage maximum independence but intervene and supervise when necessary  - Involve family in performance of ADLs  - Assess for home care needs following discharge   - Consider OT consult to assist with ADL evaluation and planning for discharge  - Provide patient education as appropriate  Outcome: Progressing  Goal: Maintains/Returns to pre admission functional level  Description: INTERVENTIONS:  - Perform BMAT or MOVE assessment daily    - Set and communicate daily mobility goal to care team and patient/family/caregiver  - Collaborate with rehabilitation services on mobility goals if consulted  - Perform Range of Motion 3 times a day  - Reposition patient every 3 hours    - Dangle patient 3 times a day  - Stand patient 3 times a day  - Ambulate patient 3 times a day  - Out of bed to chair 3 times a day   - Out of bed for meals 3 times a day  - Out of bed for toileting  - Record patient progress and toleration of activity level   Outcome: Progressing

## 2022-11-04 NOTE — DISCHARGE SUMMARY
[facility]  Discharge- Ingrid Ahmadi 1945, 65 y o  female MRN: [id number]  Unit/Bed#: -01 Encounter: [id number]  Primary Care Provider: Shirley Cassidy DO   Date and time admitted to hospital: 11/1/2022  9:25 AM    No new Assessment & Plan notes have been filed under this hospital service since the last note was generated  Service: Hospitalist      Discharging Resident Physician: Yadi Oropeza  Attending: No att  providers found  PCP: Shirley Cassidy DO  Admission Date: 11/1/2022  Admission Date:   Admission Orders (From admission, onward)     Ordered        11/01/22 1107  [facility]  Once                      Discharge Date: 11/04/22    Disposition:     Home    Reason for Admission:  AFib RVR, CHF exacerbation    Consultations During Hospital Stay:  · Cardiology    Procedures Performed:     No orders of the defined types were placed in this encounter  Diagnosis:    Medical Problems             Resolved Problems  Date Reviewed: 11/2/2022   None                 Principal Problem:    Acute on chronic systolic congestive heart failure (HCC)  Active Problems:    Persistent atrial fibrillation with rapid ventricular response (HCC)    Essential hypertension    Obesity (BMI 30-39  9)    Nonrheumatic mitral valve regurgitation    Stage 3a chronic kidney disease (HCC)    SSS (sick sinus syndrome) ([facility] [address] )      Significant Findings / Test Results:     · AFib RVR with HR of 150 on presentation  · Noted crackles on B/L lungs on examination  · Elevated proBNP  · CXR with pulmonary edema  · LVEF - 40-45% on echo     Incidental Findings:   · None     Test Results Pending at Discharge (will require follow up): · None     Outpatient Tests Requested:  · None    Complications:  None    Hospital Course:     Ingrid Ahmadi is a 65 y o  female patient who originally presented to the hospital on 11/1/2022 due to worsening SOB  EKG showed atrial fibrillation with rapid ventricular response  SOB was likely secondary to acute diastolic congestive heart failure as noted by bilateral crackles on lung auscultation, pulmonary edema on CXR, elevated NT-proBNP and atrial fibrillation  Patient was placed on lasix and her home metoprolol and sotalol were continued  She received an echo, which showed an EF of 40-45%  Cardiology was consulted and performed a synchronized cardioversion on 11/3 due to patient remaining in atrial fibrillation and not converting back to sinus rhythm  Cardioversion was successful and patient is advised to take a low dose of lasix and to continue her metoprolol, sotalol and apixiban  Patient is also advised to complete her sleep study  She has a cardiology follow-up appointment in 2 weeks  Rest of the details as per assessment and plan  Condition at Discharge: stable     Discharge Day Visit / Exam:     Subjective:  No acute overnight events  Patient is feeling significantly better than when she presented to the ED  She is not feeling short of breath  She also states she has been able to get a full night of sleep, which has not happened in a long time  She denies palpitations, orthopnea, dizziness, chest pain or weakness  Vitals: Blood Pressure: 113/76 (11/04/22 1113)  Pulse: 67 (11/04/22 1113)  Temperature: 97 9 °F (36 6 °C) (11/04/22 0801)  Temp Source: Oral (11/04/22 0236)  Respirations: 15 (11/04/22 0236)  Height: 5' 5" (165 1 cm) (11/02/22 1400)  Weight - Scale: 90 5 kg (199 lb 8 3 oz) (11/04/22 0600)  SpO2: 97 % (11/04/22 1113)  Exam:   Physical Exam  Vitals reviewed  Constitutional:       General: She is not in acute distress  Appearance: She is well-developed  HENT:      Head: Normocephalic and atraumatic  Eyes:      General: No scleral icterus  Right eye: No discharge  Left eye: No discharge  Cardiovascular:      Rate and Rhythm: Normal rate and regular rhythm  Pulses: Normal pulses  Heart sounds: Murmur (Holosystolic) heard  Comments: Mitral regurgitation murmur  Pulmonary:      Effort: Pulmonary effort is normal  No respiratory distress  Breath sounds: Normal breath sounds  No wheezing or rales  Chest:      Chest wall: No tenderness  Abdominal:      General: Bowel sounds are normal  There is no distension  Palpations: Abdomen is soft  Tenderness: There is no abdominal tenderness  Musculoskeletal:         General: No swelling or tenderness  Normal range of motion  Cervical back: Normal range of motion  Right lower leg: No edema  Left lower leg: No edema  Skin:     General: Skin is warm  Coloration: Skin is not pale  Neurological:      General: No focal deficit present  Mental Status: She is alert and oriented to person, place, and time  Mental status is at baseline  Cranial Nerves: No cranial nerve deficit  Sensory: No sensory deficit  Motor: No weakness  Psychiatric:         Mood and Affect: Mood normal          Behavior: Behavior normal        Discussion with Family:  Talked to  at bedside  All questions/concerns were answered  He agrees with the plan  Discharge instructions/Information to patient and family:   See after visit summary for information provided to patient and family  Provisions for Follow-Up Care:  See after visit summary for information related to follow-up care and any pertinent home health orders  Planned Readmission:  None     Discharge Medications:  See after visit summary for reconciled discharge medications provided to patient and family  Discharge Statement :  I spent 25 minutes discharging the patient  This time was spent on the day of discharge  I had direct contact with the patient on the day of discharge  Additional documentation is required if more than 30 minutes were spent on discharge           ** Please Note: This note has been constructed using a voice recognition system **

## 2022-11-04 NOTE — DISCHARGE INSTR - AVS FIRST PAGE
Taking medication regularly  Follow-up with your cardiologist in 2 weeks  Your strongly advised to take your sleep study as soon as possible

## 2022-11-04 NOTE — PROGRESS NOTES
Cardiology Progress Note - Fouzia Cole 65 y o  female MRN: [id number]    Unit/Bed#: -01 Encounter: [id number]      Assessment/Plan:  1  Paroxysmal atrial fibrillation  · S/p cardioversion  · EKG reveals sinus rhythm with PACs  · Telemetry shows NSR with PACs  · Continue Toprol-XL 75 mg b i d   · Continue Sotalol 160 mg daily per pharmacy recommendation  · Continue Eliquis 5 mg for anticoagulation     2  Acute heart failure with reduced ejection fraction  · Likely tachycardia mediated secondary to atrial fibrillation; plan for DCCV today; cannot definitively rule out ischemia as a cause; plan for further outpatient evaluation  · Euvolemic on exam  · BNP 5,902  · CXR reveals mild pulmonary edema with trace effusions  · Echo reveals EF 40-45%; decreased from previous 60% in July of 2020  · Continue Lasix 20 mg PO daily   · Strict I/O and daily weights; sodium and restriction     3  Acute cardiomyopathy, unspecified type  § Echo reveals EF 40-45%; decreased from previous 60% in July of 2020  · Likely tachycardia mediated secondary to atrial fibrillation; plan for DCCV today; cannot definitively rule out ischemia as a cause; plan for future ischemic evaluation     4  History of recovered tachycardia mediated cardiomyopathy   · EF recovered from 35-40% in February of 2019 to 60% in July of 2020 after initiating rhythm control per electrophysiology  · Echo ordered     5  Sick sinus syndrome s/p PPM  · S/p DC Medtronic dual-chamber PPM  · Continue to follow-up device clinic     6  Hypertension  · Currently 124/80; continue to monitor  · Continue Toprol-XL 75 mg b i d    · Decrease Lasix to 20 mg PO daily      7  Mixed hyperlipidemia  § Continue pravastatin 10 mg     8  Moderate MR, mild AR, moderate to severe TR  § Continue to follow-up as outpatient with serial echocardiograms  § Patient follows with Dr Gomez as outpatient     Patient is clear for discharge from cardiology perspective   Will follow up with in 2 weeks  Thank you for this consultation  Subjective:   Patient seen and examined  No significant events overnight  Sitting in chair comfortably  Patient notes she is feeling well and denies new complaints at this time  Objective:     Vitals: Blood pressure 122/80, pulse 67, temperature 97 9 °F (36 6 °C), resp  rate 15, height 5' 5" (1 651 m), weight 90 5 kg (199 lb 8 3 oz), SpO2 98 %  , Body mass index is 33 2 kg/m² ,   Orthostatic Blood Pressures    Flowsheet Row Most Recent Value   Blood Pressure 122/80 filed at 11/04/2022 3065   Patient Position - Orthostatic VS Lying filed at 11/01/2022 1502            Intake/Output Summary (Last 24 hours) at 11/4/2022 1112  Last data filed at 11/4/2022 0400  Gross per 24 hour   Intake 660 ml   Output --   Net 660 ml         Physical Exam:  Physical Exam  Vitals and nursing note reviewed  Constitutional:       General: She is not in acute distress  Appearance: She is well-developed  She is not toxic-appearing or diaphoretic  HENT:      Head: Normocephalic and atraumatic  Nose: Nose normal    Eyes:      Conjunctiva/sclera: Conjunctivae normal    Cardiovascular:      Rate and Rhythm: Normal rate and regular rhythm  Pulses: Normal pulses  Heart sounds: Normal heart sounds  No murmur heard  No gallop  Pulmonary:      Effort: Pulmonary effort is normal  No respiratory distress  Breath sounds: Normal breath sounds  No wheezing or rales  Chest:      Chest wall: No tenderness  Abdominal:      Palpations: Abdomen is soft  Tenderness: There is no abdominal tenderness  Musculoskeletal:      Cervical back: Neck supple  Right lower leg: No edema  Left lower leg: No edema  Skin:     General: Skin is warm and dry  Neurological:      Mental Status: She is alert and oriented to person, place, and time     Psychiatric:         Mood and Affect: Mood normal          Judgment: Judgment normal               Medications:      Current Facility-Administered Medications:   •  acetaminophen (TYLENOL) tablet 650 mg, 650 mg, Oral, Q6H PRN, Lita Vincent MD  •  apixaban (ELIQUIS) tablet 5 mg, 5 mg, Oral, BID, Lita Vincent MD, 5 mg at 11/04/22 1083  •  furosemide (LASIX) tablet 20 mg, 20 mg, Oral, Daily, Anoop Mueller PA-C, 20 mg at 11/04/22 9864  •  metoprolol (LOPRESSOR) injection 5 mg, 5 mg, Intravenous, Q6H PRN, Lita Vincent MD  •  metoprolol succinate (TOPROL-XL) 24 hr tablet 75 mg, 75 mg, Oral, BID, Lita Vincent MD, 75 mg at 11/04/22 0955  •  ondansetron (ZOFRAN) injection 4 mg, 4 mg, Intravenous, Q6H PRN, Lita Vincent MD  •  potassium chloride (K-DUR,KLOR-CON) CR tablet 10 mEq, 10 mEq, Oral, Daily, Anoop Mueller PA-C, 10 mEq at 11/04/22 5641  •  pravastatin (PRAVACHOL) tablet 10 mg, 10 mg, Oral, Daily With Dinner, Lita Vincent MD, 10 mg at 11/03/22 1809  •  sotalol (BETAPACE) tablet 160 mg, 160 mg, Oral, Daily, Anoop Mueller PA-C, 160 mg at 11/04/22 0955     Labs & Results:     Results from last 7 days   Lab Units 11/01/22  1334 11/01/22  0954   HS TNI 0HR ng/L  --  8   HS TNI 2HR ng/L 8  --    HSTNI D2 ng/L 0  --    NT-PRO BNP pg/mL  --  2,828*     Results from last 7 days   Lab Units 11/01/22  0954   WBC Thousand/uL 8 65   HEMOGLOBIN g/dL 11 9   HEMATOCRIT % 37 2   PLATELETS Thousands/uL 249     Results from last 7 days   Lab Units 11/02/22  0431   TRIGLYCERIDES mg/dL 75   HDL mg/dL 45*     Results from last 7 days   Lab Units 11/04/22  0431 11/03/22  0550 11/02/22  0431 11/01/22  0954   POTASSIUM mmol/L 4 0 3 4* 3 8 4 3   CHLORIDE mmol/L 106 105 105 109*   CO2 mmol/L 26 25 23 22   BUN mg/dL 29* 22 23 21   CREATININE mg/dL 1 11 1 07 1 17 1 00   CALCIUM mg/dL 8 6 8 6 8 7 8 8   ALK PHOS U/L  --   --   --  94   ALT U/L  --   --   --  63   AST U/L  --   --   --  61*     Results from last 7 days   Lab Units 11/01/22  0954   INR  1 46*   PTT seconds 34     Results from last 7 days   Lab Units 11/01/22  0954   MAGNESIUM mg/dL 2 0       Vitals: Blood pressure 122/80, pulse 67, temperature 97 9 °F (36 6 °C), resp  rate 15, height 5' 5" (1 651 m), weight 90 5 kg (199 lb 8 3 oz), SpO2 98 %  , Body mass index is 33 2 kg/m² ,   Orthostatic Blood Pressures    Flowsheet Row Most Recent Value   Blood Pressure 122/80 filed at 11/04/2022 5124   Patient Position - Orthostatic VS Lying filed at 11/01/2022 6764          Systolic (32GJL), MQY:722 , Min:99 , EWI:929     Diastolic (25ZHP), UFN:72, Min:60, Max:83        Intake/Output Summary (Last 24 hours) at 11/4/2022 1112  Last data filed at 11/4/2022 0400  Gross per 24 hour   Intake 660 ml   Output --   Net 660 ml       Invasive Devices  Report    Peripheral Intravenous Line  Duration           Peripheral IV 11/01/22 Right Arm 3 days                  EKG:  Sinus rhythm with Premature atrial complexes, atrial-paced complexes    Telemetry:  Telemetry Orders (From admission, onward)             48 Hour Telemetry Monitoring  Continuous x 48 hours        References:    Telemetry Guidelines   Question:  Reason for 48 Hour Telemetry  Answer:  Arrhythmias Requiring Medical Therapy (eg  SVT, Vtach/fib, Bradycardia, Uncontrolled A-fib)                 Telemetry Reviewed: Normal Sinus Rhythm  Indication for Continued Telemetry Use: Arrthymias requiring medical therapy    BP Readings from Last 3 Encounters:   11/04/22 122/80   11/01/22 122/78   06/07/22 128/82      Wt Readings from Last 3 Encounters:   11/04/22 90 5 kg (199 lb 8 3 oz)   11/01/22 93 kg (205 lb)   06/07/22 91 6 kg (202 lb)

## 2022-11-07 ENCOUNTER — OFFICE VISIT (OUTPATIENT)
Dept: FAMILY MEDICINE CLINIC | Facility: CLINIC | Age: 77
End: 2022-11-07

## 2022-11-07 ENCOUNTER — PATIENT OUTREACH (OUTPATIENT)
Dept: FAMILY MEDICINE CLINIC | Facility: CLINIC | Age: 77
End: 2022-11-07

## 2022-11-07 VITALS
DIASTOLIC BLOOD PRESSURE: 76 MMHG | HEART RATE: 96 BPM | HEIGHT: 65 IN | SYSTOLIC BLOOD PRESSURE: 120 MMHG | BODY MASS INDEX: 33.66 KG/M2 | OXYGEN SATURATION: 96 % | WEIGHT: 202 LBS | TEMPERATURE: 98 F

## 2022-11-07 DIAGNOSIS — I50.23 ACUTE ON CHRONIC SYSTOLIC CONGESTIVE HEART FAILURE (HCC): ICD-10-CM

## 2022-11-07 DIAGNOSIS — Z23 NEED FOR COVID-19 VACCINE: Primary | ICD-10-CM

## 2022-11-07 DIAGNOSIS — I48.19 PERSISTENT ATRIAL FIBRILLATION WITH RAPID VENTRICULAR RESPONSE (HCC): ICD-10-CM

## 2022-11-07 NOTE — ASSESSMENT & PLAN NOTE
Wt Readings from Last 3 Encounters:   11/07/22 91 6 kg (202 lb)   11/04/22 90 5 kg (199 lb 8 3 oz)   11/01/22 93 kg (205 lb)     Stable, with adjustment of her sotalol to control her rapid atrial fibrillation

## 2022-11-07 NOTE — PROGRESS NOTES
Assessment & Plan     1  Need for COVID-19 vaccine  -     Age 15 y+, BOOSTER (BIVALENT): [id number] [id number] vac bivalent denis-sucr    2  Acute on chronic systolic congestive heart failure (HCC)  Assessment & Plan:  Wt Readings from Last 3 Encounters:   11/07/22 91 6 kg (202 lb)   11/04/22 90 5 kg (199 lb 8 3 oz)   11/01/22 93 kg (205 lb)     Stable, with adjustment of her sotalol to control her rapid atrial fibrillation  3  Persistent atrial fibrillation with rapid ventricular response (HCC)  Assessment & Plan:    Much better  Status post cardioversion, adjustment of her sotalol  Subjective     Transitional Care Management Review:   Sommer Doe is a 65 y o  female here for TCM follow up  During the TCM phone call patient stated:  TCM Call     Date and time call was made  11/4/2022  3:59 PM    Hospital care reviewed  Records reviewed    Patient was hospitialized at  [facility]    Date of Admission  11/01/22    Date of discharge  11/04/22    Diagnosis  Acute on chronic systolic congestive heart failure    Disposition  Home    Were the patients medications reviewed and updated  Yes    Current Symptoms  None    Arm pain left side severity  Mild    Arm pain, left side, onset  Progressive      TCM Call     Clinical progress swelling  Improving    Post hospital issues  Reduced activity    Should patient be enrolled in anticoag monitoring? Yes    Scheduled for follow up? Yes    Patients specialists  Cardiologist    Cardiologist name  DR Dillan [address]     Referrals needed  NO    Did you obtain your prescribed medications  Yes    Do you need help managing your prescriptions or medications  No    Is transportation to your appointment needed  No    I have advised the patient to call PCP with any new or worsening symptoms  Mari Servin/negra      Living Arrangements  Family members    Support System  Family    The type of support provided  Emotional    Do you have social support  Yes, as much as I need Are you recieving any outpatient services  No    Are you recieving home care services  No    Are you using any community resources  No    Current waiver services  No    Have you fallen in the last 12 months  No    Interperter language line needed  No    Counseling  Patient    Counseling topics  patient and family education; Importance of RX compliance; Activities of daily living        Patient presents for TCM  She was admitted for uncontrolled A-fib  She underwent successful cardioversion and Her sotolol was changed by cardiology  She is feeling much better  Review of Systems   Constitutional: Negative for chills, fatigue and fever  HENT: Negative for congestion, ear pain, hearing loss, postnasal drip, rhinorrhea and sore throat  Eyes: Negative for pain and visual disturbance  Respiratory: Negative for chest tightness, shortness of breath and wheezing  Cardiovascular: Negative for chest pain and leg swelling  Gastrointestinal: Negative for abdominal distention, abdominal pain, constipation, diarrhea and vomiting  Endocrine: Negative for cold intolerance and heat intolerance  Genitourinary: Negative for difficulty urinating, frequency and urgency  Musculoskeletal: Negative for arthralgias and gait problem  Skin: Negative for color change  Neurological: Negative for dizziness, tremors, syncope, numbness and headaches  Hematological: Negative for adenopathy  Psychiatric/Behavioral: Negative for agitation, confusion and sleep disturbance  The patient is not nervous/anxious  Objective     /76   Pulse 96   Temp 98 °F (36 7 °C)   Ht 5' 5" (1 651 m)   Wt 91 6 kg (202 lb)   SpO2 96%   BMI 33 61 kg/m²      Physical Exam  Constitutional:       Appearance: She is well-developed  HENT:      Head: Normocephalic and atraumatic        Right Ear: External ear normal       Left Ear: External ear normal       Nose: Nose normal    Eyes:      Conjunctiva/sclera: Conjunctivae normal  Pupils: Pupils are equal, round, and reactive to light  Neck:      Thyroid: No thyromegaly  Cardiovascular:      Rate and Rhythm: Normal rate and regular rhythm  Heart sounds: Normal heart sounds  No murmur heard  Pulmonary:      Effort: Pulmonary effort is normal    Abdominal:      General: Bowel sounds are normal  There is no distension  Palpations: Abdomen is soft  Musculoskeletal:         General: Normal range of motion  Cervical back: Normal range of motion and neck supple  Skin:     General: Skin is warm  Neurological:      Mental Status: She is alert and oriented to person, place, and time         Maikel Enter, DO

## 2022-11-07 NOTE — PROGRESS NOTES
OutPatient Care Management Note:    Inbasket notification received for new bundle episode DRG  Cardiac Arrhythmia with a closure date of  02/02/23  Chart review completed  Patient hospitalized 11/01/22 to 11/04/22 at [facility] for CHF exacerbation, and uncontrolled Afib  She underwent cardioversion which was successful, and Cardiology made changes to the sotolol  Patient was discharged to home and has followed up with PCP today  Her condition appears to be stable       Past Medical History:  COPD, CHF, Afib, CKD 3A, obesity with BMI greater than 33, myocardiopathy,      Risk of Admission or ED visit - 9       Future appointments: Cardiology 11/18/22       I plan to contact patient within 48 to 72 hours to introduce BPCI program

## 2022-11-08 ENCOUNTER — TELEPHONE (OUTPATIENT)
Dept: CARDIOLOGY CLINIC | Facility: CLINIC | Age: 77
End: 2022-11-08

## 2022-11-08 NOTE — TELEPHONE ENCOUNTER
Routine HF F/U    Patient reported she is feeling "really good"    Weight on 11/7 @ PCP appt: 202LBS  Weight on 11/8-202lbs    Patient reports swelling in LE has gone down      Patient denies SOB at rest  Patient reports SOB on exertion only "going up the stairs after dinner "    Patient is following low sodium diet    Patient is taking 20mg Lasix daily

## 2022-11-10 ENCOUNTER — PATIENT OUTREACH (OUTPATIENT)
Dept: FAMILY MEDICINE CLINIC | Facility: CLINIC | Age: 77
End: 2022-11-10

## 2022-11-10 NOTE — PROGRESS NOTES
OutPatient Care Management Note:    Telephone outreach attempt  made to introduce self and role of outpatient care management, BPCI introduction, and assess patient's general health and wellbeing, evaluate for any needs with follow-up care or possible barriers to care that may influence adherence to treatment plan  CM explained that support would be telephonic to provide education about chronic illness, medications and identify any SDOH needs   BPCI program also explained  All questions answered to patient's satisfaction  Patient states "I'm doing great" and did not consent to future outreach  Will maintain BPCI episode on surveillance until end of bundle period  Plan to complete chart check in 30 days

## 2022-11-16 ENCOUNTER — OFFICE VISIT (OUTPATIENT)
Dept: CARDIOLOGY CLINIC | Facility: CLINIC | Age: 77
End: 2022-11-16

## 2022-11-16 VITALS
HEIGHT: 65 IN | OXYGEN SATURATION: 97 % | DIASTOLIC BLOOD PRESSURE: 76 MMHG | BODY MASS INDEX: 33.99 KG/M2 | RESPIRATION RATE: 16 BRPM | HEART RATE: 90 BPM | SYSTOLIC BLOOD PRESSURE: 112 MMHG | WEIGHT: 204 LBS

## 2022-11-16 DIAGNOSIS — Z95.0 PRESENCE OF CARDIAC PACEMAKER: ICD-10-CM

## 2022-11-16 DIAGNOSIS — I35.1 NONRHEUMATIC AORTIC VALVE INSUFFICIENCY: ICD-10-CM

## 2022-11-16 DIAGNOSIS — I42.9 CARDIOMYOPATHY, UNSPECIFIED TYPE (HCC): ICD-10-CM

## 2022-11-16 DIAGNOSIS — G47.33 OSA (OBSTRUCTIVE SLEEP APNEA): ICD-10-CM

## 2022-11-16 DIAGNOSIS — Z79.01 ANTICOAGULANT LONG-TERM USE: ICD-10-CM

## 2022-11-16 DIAGNOSIS — I48.0 PAROXYSMAL ATRIAL FIBRILLATION (HCC): ICD-10-CM

## 2022-11-16 DIAGNOSIS — Z86.73 H/O TIA (TRANSIENT ISCHEMIC ATTACK) AND STROKE: ICD-10-CM

## 2022-11-16 DIAGNOSIS — I50.22 CHRONIC SYSTOLIC (CONGESTIVE) HEART FAILURE (HCC): Primary | ICD-10-CM

## 2022-11-16 DIAGNOSIS — I49.5 SSS (SICK SINUS SYNDROME) (HCC): ICD-10-CM

## 2022-11-16 DIAGNOSIS — I34.0 NONRHEUMATIC MITRAL VALVE REGURGITATION: ICD-10-CM

## 2022-11-16 DIAGNOSIS — I36.1 NONRHEUMATIC TRICUSPID VALVE REGURGITATION: ICD-10-CM

## 2022-11-16 DIAGNOSIS — E66.9 OBESITY (BMI 30-39.9): ICD-10-CM

## 2022-11-16 NOTE — PROGRESS NOTES
CARDIOLOGY OFFICE VISIT  [facility]  [address], [place], [facility] Rohan Obando  Tel: [phone]      NAME: Niesha Silverio  AGE: 65 y o  SEX: female  : 1945  MRN: [id number]      Chief Complaint:  Chief Complaint   Patient presents with   • Follow-up         History of Present Illness:   Patient comes for follow up s/p recent hospitalization at [facility] for acute systolic heart failure (first episode), recurrence of NICMP, PAF with RVR s/p cardioversion (11/3/22)  States post-discharge she has been doing well from cardiac stand point and denies chest pain / pressure, SOB, palpitations, lightheadedness, syncope, swelling feet, orthopnea, PND, claudication  Chronic systolic heart failure - doing well on furosemide 20 mg, metoprolol succinate, lisinopril  States she has been watching her salt intake closely    Nonischemic cardiomyopathy, likely tachycardia mediated -  patient has a history of nonischemic cardiomyopathy with improvement in EF after rate/rhythm control of her atrial fibrillation  Now her EF is again down to 40-45%      PAF - Patient was diagnosed with atrial fibrillation on an EKG done at her PCP office  Patient initially had no symptoms from it  She was cardioverted on 2018 and was in sinus rhythm for a while but then went back into Afib  Then she was started on rhythm control with Sotalol  Did well for a while but again required cardioversion on 11/3/2022  Currently she is in sinus rhythm  She is on Eliquis for anticoagulation  Denies bleeding from any site     SSS s/p PPM - last pacemaker interrogation discussed with the patient     HLP -  Has had hyperlipidemia for many years  Nabil Urban statin regularly along with diet control   Denies myalgia   PCP closely monitoring the blood work      Mod MR, Mild AR, Mod TR, mild CT -   asymptomatic   Being followed up with serial echocardiograms at recommended intervals     Obesity - Trying to lose weight, she states     H/O TIA    Past Medical History:  Past Medical History:   Diagnosis Date   • Aphasia, mixed 2017   • Atrial fibrillation (HCC)    • CHF (congestive heart failure) (HCC)    • Colon polyp    • Headache    • Hyperlipidemia    • Hypertension    • Lyme disease    • Shortness of breath    • TIA (transient ischemic attack)    • Transient cerebral ischemia     Last assessed - 18   • Vertigo          Past Surgical History:  Past Surgical History:   Procedure Laterality Date   • CARDIAC PACEMAKER PLACEMENT  2019   • COLONOSCOPY     • WY SIGMOIDOSCOPY FLX DX W/COLLJ SPEC BR/WA IF PFRMD N/A 2017    Procedure: Maykel Ritter;  Surgeon: Jennifer Delgado MD;  Location: [facility]; Service: Gastroenterology   • TONSILLECTOMY           Family History:  Family History   Problem Relation Age of Onset   • Lung cancer Mother    • Dementia Father    • Alzheimer's disease Father    • Other Father         Cardiac Disorder          Social History:  Social History     Socioeconomic History   • Marital status: /Civil Union     Spouse name: None   • Number of children: None   • Years of education: None   • Highest education level: None   Occupational History   • Occupation: Retired   Tobacco Use   • Smoking status: Former     Types: Cigarettes     Quit date:      Years since quittin 9   • Smokeless tobacco: Never   • Tobacco comments:     no smoking for 46 years   Vaping Use   • Vaping Use: Never used   Substance and Sexual Activity   • Alcohol use:  Yes     Alcohol/week: 10 0 standard drinks     Types: 10 Glasses of wine per week     Comment: occ   • Drug use: No   • Sexual activity: Never     Partners: Male     Birth control/protection: None   Other Topics Concern   • None   Social History Narrative    Always uses seat belt     Social Determinants of Health     Financial Resource Strain: Not on file   Food Insecurity: Not on file Transportation Needs: Not on file   Physical Activity: Not on file   Stress: Not on file   Social Connections: Not on file   Intimate Partner Violence: Not on file   Housing Stability: Not on file         Active Problems:  Patient Active Problem List   Diagnosis   • Persistent atrial fibrillation with rapid ventricular response ([facility] )   • Essential hypertension   • Mixed hyperlipidemia   • Obesity (BMI 30-39  9)   • H/O TIA (transient ischemic attack) and stroke   • Migraine with aura and without status migrainosus, not intractable   • Myocardiopathy ([facility] )   • Anticoagulant long-term use   • Nonrheumatic mitral valve regurgitation   • Nonrheumatic aortic valve insufficiency   • Nonrheumatic tricuspid valve regurgitation   • Sinus bradycardia   • Paroxysmal atrial fibrillation (HCC)   • Other insomnia   • Chronic obstructive pulmonary disease (HCC)   • BMI 33 0-33 9,adult   • Stage 3a chronic kidney disease (HCC)   • Acute on chronic systolic congestive heart failure (HCC)   • SSS (sick sinus syndrome) ([facility] )         The following portions of the patient's history were reviewed and updated as appropriate: past medical history, past surgical history, past family history,  past social history, current medications, allergies and problem list       Review of Systems:  Constitutional: Denies fever, chills  Eyes: Denies eye redness, eye discharge  ENT: Denies hearing loss, sneezing, nasal discharge, sore throat   Respiratory: Denies cough, expectoration, shortness of breath  Cardiovascular: Denies chest pain, palpitations, lower extremity swelling  Gastrointestinal: Denies abdominal pain, nausea, vomiting, diarrhea  Genito-Urinary: Denies dysuria, incontinence  Musculoskeletal: Denies joint pain, muscle pain  Neurologic: Denies lightheadedness, syncope, headache, seizures  Endocrine: Denies polydipsia, temperature intolerance  Allergy and Immunology: Denies hives, insect bite sensitivity  Hematological and Lymphatic: Denies bleeding problems, swollen glands   Psychological: Denies depression, suicidal ideation, anxiety, panic  Dermatological: Denies pruritus, rash, skin lesion changes      Vitals:  Vitals:    11/16/22 1014   BP: 112/76   Pulse: 90   Resp: 16   SpO2: 97%       Body mass index is 33 95 kg/m²  Weight (last 2 days)     Date/Time Weight    11/16/22 1014 92 5 (204)            Physical Examination:  General: Patient is not in acute distress  Awake, alert, oriented in time, place and person  Responding to commands  Head: Normocephalic  Atraumatic  Eyes: Both pupils normal sized, round and reactive to light  Nonicteric  ENT: Normal external ear canals  Neck: Supple  JVP not raised  Trachea central  No thyromegaly  Lungs: Bilateral bronchovascular breath sounds with no crackles or rhonchi  Chest wall: No tenderness  Cardiovascular: RRR  S1 and S2 normal  Grade 3/6 PSMs at apex and LLSB+  Gastrointestinal: Abdomen soft, nontender  No guarding or rigidity  Liver and spleen not palpable  Bowel sounds present  Neurologic: Patient is awake, alert, oriented in time, place and person  Responding to commands  Moving all extremities  Integumentary:  No skin rash  Lymphatic: No cervical lymphadenopathy  Back: Symmetric   No CVA tenderness  Extremities: No clubbing, cyanosis or edema      Laboratory Results:  CBC with diff:   Lab Results   Component Value Date    WBC 8 65 11/01/2022    RBC 4 24 11/01/2022    HGB 11 9 11/01/2022    HCT 37 2 11/01/2022    MCV 88 11/01/2022    MCH 28 1 11/01/2022    RDW 16 4 (H) 11/01/2022     11/01/2022       CMP:  Lab Results   Component Value Date    CREATININE 1 11 11/04/2022    BUN 29 (H) 11/04/2022    K 4 0 11/04/2022     11/04/2022    CO2 26 11/04/2022    ALKPHOS 94 11/01/2022    ALT 63 11/01/2022    AST 61 (H) 11/01/2022    BILIDIR 0 15 11/01/2022       Lab Results   Component Value Date    HGBA1C 5 8 (H) 11/01/2022    MG 2 0 11/01/2022       Lab Results   Component Value Date TROPONINI 0 05 (H) 02/10/2019    TROPONINI 0 06 (H) 2019    TROPONINI 0 06 (H) 2019       Cardiac testing:   Results for orders placed during the hospital encounter of 22    Echo complete w/ contrast if indicated    Interpretation Summary  •  Left Ventricle: Left ventricular cavity size is normal  Systolic function is mildly reduced  LVEF approximately 40 to 45%  Patient was in atrial fibrillation with periods of rapid ventricular response which interferes with interpretation  There is mild global hypokinesis  Unable to assess diastolic function due to atrial fibrillation  •  Right Ventricle: Right ventricular cavity size is mildly dilated  Systolic function is mildly to moderately reduced  •  Left Atrium: The atrium is moderate to markedly dilated  •  Right Atrium: The atrium is moderately dilated  •  Aortic Valve: There is mild regurgitation  •  Mitral Valve: There is moderate annular calcification  There is moderate regurgitation  •  Tricuspid Valve: There is moderate regurgitation  The right ventricular systolic pressure is mildly elevated  Estimated RVSP 35 mm Hg  Pacer leads noted  •  Pulmonic Valve: There is mild regurgitation        Results for orders placed during the hospital encounter of 19    NM myocardial perfusion spect (rx stress and/or rest)    [place] [address]  [phone]    Rest/Stress Gated SPECT Myocardial Perfusion Imaging After Regadenoson    Patient: Camille Ball  MR number: [id number]  Account number: [de-identified]  : 1945  Age: 75 years  Gender: Female  Status: Inpatient  Location: Stress lab  Height: 65 in  Weight: 212 lb  BP: 161/ 94 mmHg    Allergies: NO KNOWN ALLERGIES    Diagnosis: R07 9 - Chest pain, unspecified    Primary Physician:  Marjorie Stoner DO  RN:  Soto Garcia  Referring Physician:  Carlo Figueroa MD  Group:  [facility]  Report Prepared By[de-identified]  Scarlett Jauregui  Interpreting Physician:  Cindi Diaz MD    INDICATIONS: Detection of Coronary artery disease  HISTORY: The patient is a 75year old  female  Chest pain status: recent onset chest pain  Other symptoms: NSTEMI  Coronary artery disease risk factors: dyslipidemia, hypertension, family history of premature coronary artery  disease (Father MI @ 52's), and post-menopausal state  Cardiovascular history: arrhythmia (afib) and transient ischemic attack  Prior cardiovascular procedures: pacemaker 1/2019  Medications: a beta blocker, an ACE inhibitor/ARB, a lipid  lowering agent, and an antiarrhythmic agent  PHYSICAL EXAM: Baseline physical exam screening: no wheezes audible, no loud murmur  REST ECG: Normal sinus rhythm  PROCEDURE: The study was performed in the the Stress lab  A regadenoson infusion pharmacologic stress test was performed  Gated SPECT myocardial perfusion imaging was performed after stress  Systolic blood pressure was 161 mmHg, at the  start of the study  Diastolic blood pressure was 94 mmHg, at the start of the study  The heart rate was 68 bpm, at the start of the study  IV double checked  Regadenoson protocol:  HR bpm SBP mmHg DBP mmHg Symptoms  Baseline 68 161 94 none  Immediate 134 175 86 moderate dyspnea, flushing  2 min 142 169 100 subsiding  4 min 139 174 98 none  No medications or fluids given  STRESS SUMMARY: Duration of pharmacologic stress was 3 min and 0 sec  Maximal heart rate during stress was 164 bpm  The rate-pressure product for the peak heart rate and blood pressure was 39166  There was no chest pain during stress  The  stress test was terminated due to protocol completion  Pre oxygen saturation: 96 %  Peak oxygen saturation: 95 %  The stress ECG was negative for ischemia and normal  There were no stress arrhythmias or conduction abnormalities      ISOTOPE ADMINISTRATION:  Resting isotope administration Stress isotope administration  Agent Tetrofosmin Tetrofosmin  Dose 10 1 mCi 32 mCi  Date 02/11/2019 02/11/2019  Injection-image interval 30 min 45 min    The radiopharmaceutical was injected at the peak effect of pharmacologic stress  PERFUSION DEFECTS:  -  There was a small to moderate, mildly severe, paradoxical (reverse redistribution) myocardial perfusion defect of the inferior and apical wall likely due to diaphragm attenuation  GATED SPECT:  The calculated left ventricular ejection fraction was 44 %  (Probabaly not accurate)  LVEF by visual estimation is 55%  Left ventricular ejection fraction was within normal limits by visual estimate  There was no left ventricular regional  abnormality  SUMMARY:  -  Stress results: There was no chest pain during stress  -  ECG conclusions: The stress ECG was negative for ischemia and normal   -  Perfusion imaging: There was a small to moderate, mildly severe, paradoxical (reverse redistribution) myocardial perfusion defect of the inferior and apical wall likely due to diaphragm attenuation   -  Gated SPECT: The calculated left ventricular ejection fraction was 44 %  (Probabaly not accurate)  LVEF by visual estimation is 55%  Left ventricular ejection fraction was within normal limits by visual estimate  There was no left  ventricular regional abnormality  IMPRESSIONS: Normal study  There was image artifact, without diagnostic evidence for perfusion abnormality   Left ventricular systolic function was normal     Prepared and signed by    Julian Mcwilliams MD  Signed 02/11/2019 13:56:11      Medications:    Current Outpatient Medications:   •  apixaban (Eliquis) 5 mg, Take 1 tablet (5 mg total) by mouth 2 (two) times a day, Disp: 180 tablet, Rfl: 3  •  furosemide (LASIX) 20 mg tablet, Take 1 tablet (20 mg total) by mouth daily Do not start before November 5, 2022 , Disp: 30 tablet, Rfl: 2  •  lisinopril (ZESTRIL) 2 5 mg tablet, Take 1 tablet (2 5 mg total) by mouth daily, Disp: 90 tablet, Rfl: 3  •  lovastatin (MEVACOR) 10 MG tablet, Take 1 tablet (10 mg total) by mouth daily at bedtime, Disp: 90 tablet, Rfl: 3  •  metoprolol succinate (TOPROL-XL) 50 mg 24 hr tablet, Take 1 5 tablets (75 mg total) by mouth 2 (two) times a day, Disp: 270 tablet, Rfl: 3  •  potassium chloride (K-DUR,KLOR-CON) 10 mEq tablet, Take 1 tablet (10 mEq total) by mouth daily Do not start before November 5, 2022 , Disp: 30 tablet, Rfl: 2  •  sotalol (BETAPACE) 80 mg tablet, Take 2 tablets (160 mg total) by mouth daily, Disp: 270 tablet, Rfl: 0      Allergies:  No Known Allergies    EKG: Reviewed by me   11/16/2022  Sinus rhythm with first-degree AV block    Assessment and Plan:  1  Chronic systolic (congestive) heart failure (HCC)  Continue furosemide, potassium, lisinopril, metoprolol succinate  Low-salt diet  Daily weights    2  Cardiomyopathy, unspecified type ([address] )  Likely nonischemic  Continue lisinopril, metoprolol succinate  Follow-up echocardiogram in 3 months    3  Paroxysmal atrial fibrillation ([address] )  Anticoagulant long-term use  Currently in sinus rhythm  Continue beta-blocker for rate control and Eliquis for anticoagulation and sotalol for rhythm control  If she goes back in atrial fibrillation, will refer to EP for ablation    4  SSS (sick sinus syndrome) ([address] )  Presence of cardiac pacemaker  Continue regular pacemaker interrogations    5  Nonrheumatic mitral valve regurgitation  Nonrheumatic aortic valve insufficiency  Nonrheumatic tricuspid valve regurgitation  Being followed with serial echocardiograms at recommended intervals    6  H/O TIA (transient ischemic attack) and stroke  Continue Eliquis, statin    Recommend aggressive risk factor modification and therapeutic lifestyle changes  Low-salt, low-calorie, low-fat, low-cholesterol diet with regular exercise and to optimize weight    I will defer the ordering and monitoring of necessity lab studies to you, but I am available and happy to review and manage any of the data at your request in the future  Discussed concepts of atherosclerosis, including signs and symptoms of cardiac disease  Previous studies were reviewed  Safety measures were reviewed  Questions were entertained and answered  Patient was advised to report any problems requiring medical attention  Follow-up with PCP and appropriate specialist and lab work as discussed  Return for follow up visit as scheduled or earlier, if needed  Thank you for allowing me to participate in the care and evaluation of your patient  Should you have any questions, please feel free to contact me        John Rosenthal MD  11/16/2022,12:27 PM

## 2022-11-23 ENCOUNTER — APPOINTMENT (EMERGENCY)
Dept: RADIOLOGY | Facility: HOSPITAL | Age: 77
End: 2022-11-23

## 2022-11-23 ENCOUNTER — HOSPITAL ENCOUNTER (EMERGENCY)
Facility: HOSPITAL | Age: 77
Discharge: HOME | End: 2022-11-23
Attending: EMERGENCY MEDICINE

## 2022-11-23 VITALS
SYSTOLIC BLOOD PRESSURE: 120 MMHG | HEART RATE: 83 BPM | DIASTOLIC BLOOD PRESSURE: 84 MMHG | OXYGEN SATURATION: 99 % | RESPIRATION RATE: 20 BRPM | TEMPERATURE: 97.8 F

## 2022-11-23 DIAGNOSIS — R06.00 DYSPNEA: Primary | ICD-10-CM

## 2022-11-23 LAB
ALBUMIN SERPL BCP-MCNC: 3.8 G/DL (ref 3.5–5)
ALP SERPL-CCNC: 99 U/L (ref 46–116)
ALT SERPL W P-5'-P-CCNC: 27 U/L (ref 12–78)
ANION GAP SERPL CALCULATED.3IONS-SCNC: 10 MMOL/L (ref 4–13)
AST SERPL W P-5'-P-CCNC: 20 U/L (ref 5–45)
BASOPHILS # BLD AUTO: 0.06 THOUSANDS/ÂΜL (ref 0–0.1)
BASOPHILS NFR BLD AUTO: 1 % (ref 0–1)
BILIRUB SERPL-MCNC: 0.59 MG/DL (ref 0.2–1)
BUN SERPL-MCNC: 26 MG/DL (ref 5–25)
CALCIUM SERPL-MCNC: 8.7 MG/DL (ref 8.3–10.1)
CARDIAC TROPONIN I PNL SERPL HS: 6 NG/L
CHLORIDE SERPL-SCNC: 105 MMOL/L (ref 96–108)
CO2 SERPL-SCNC: 26 MMOL/L (ref 21–32)
CREAT SERPL-MCNC: 1.3 MG/DL (ref 0.6–1.3)
EOSINOPHIL # BLD AUTO: 0.12 THOUSAND/ÂΜL (ref 0–0.61)
EOSINOPHIL NFR BLD AUTO: 1 % (ref 0–6)
ERYTHROCYTE [DISTWIDTH] IN BLOOD BY AUTOMATED COUNT: 17.2 % (ref 11.6–15.1)
FLUAV RNA RESP QL NAA+PROBE: NEGATIVE
FLUBV RNA RESP QL NAA+PROBE: NEGATIVE
GFR SERPL CREATININE-BSD FRML MDRD: 39 ML/MIN/1.73SQ M
GLUCOSE SERPL-MCNC: 110 MG/DL (ref 65–140)
HCT VFR BLD AUTO: 38.5 % (ref 34.8–46.1)
HGB BLD-MCNC: 12.1 G/DL (ref 11.5–15.4)
IMM GRANULOCYTES # BLD AUTO: 0.04 THOUSAND/UL (ref 0–0.2)
IMM GRANULOCYTES NFR BLD AUTO: 0 % (ref 0–2)
LYMPHOCYTES # BLD AUTO: 1.27 THOUSANDS/ÂΜL (ref 0.6–4.47)
LYMPHOCYTES NFR BLD AUTO: 14 % (ref 14–44)
MCH RBC QN AUTO: 27.9 PG (ref 26.8–34.3)
MCHC RBC AUTO-ENTMCNC: 31.4 G/DL (ref 31.4–37.4)
MCV RBC AUTO: 89 FL (ref 82–98)
MONOCYTES # BLD AUTO: 0.84 THOUSAND/ÂΜL (ref 0.17–1.22)
MONOCYTES NFR BLD AUTO: 9 % (ref 4–12)
NEUTROPHILS # BLD AUTO: 6.83 THOUSANDS/ÂΜL (ref 1.85–7.62)
NEUTS SEG NFR BLD AUTO: 75 % (ref 43–75)
NRBC BLD AUTO-RTO: 0 /100 WBCS
NT-PROBNP SERPL-MCNC: 3631 PG/ML
PLATELET # BLD AUTO: 249 THOUSANDS/UL (ref 149–390)
PMV BLD AUTO: 10.9 FL (ref 8.9–12.7)
POTASSIUM SERPL-SCNC: 4.5 MMOL/L (ref 3.5–5.3)
PROT SERPL-MCNC: 7.4 G/DL (ref 6.4–8.4)
RBC # BLD AUTO: 4.33 MILLION/UL (ref 3.81–5.12)
RSV RNA RESP QL NAA+PROBE: NEGATIVE
SARS-COV-2 RNA RESP QL NAA+PROBE: NEGATIVE
SODIUM SERPL-SCNC: 141 MMOL/L (ref 135–147)
WBC # BLD AUTO: 9.16 THOUSAND/UL (ref 4.31–10.16)

## 2022-11-23 RX ORDER — FUROSEMIDE 10 MG/ML
20 INJECTION INTRAMUSCULAR; INTRAVENOUS ONCE
Status: COMPLETED | OUTPATIENT
Start: 2022-11-23 | End: 2022-11-23

## 2022-11-23 RX ADMIN — FUROSEMIDE 20 MG: 10 INJECTION, SOLUTION INTRAMUSCULAR; INTRAVENOUS at 20:25

## 2022-11-24 LAB
ATRIAL RATE: 66 BPM
P AXIS: 60 DEGREES
PR INTERVAL: 222 MS
QRS AXIS: 50 DEGREES
QRSD INTERVAL: 82 MS
QT INTERVAL: 426 MS
QTC INTERVAL: 446 MS
T WAVE AXIS: 44 DEGREES
VENTRICULAR RATE: 66 BPM

## 2022-11-24 NOTE — ED PROVIDER NOTES
History  Chief Complaint   Patient presents with   • Shortness of Breath     Pt's son states the pt has been short of breath for the last 3 days along with hypotension  Pt had a cardioversion on Nov 2nd  Pt states she has SOB with exertion      29-year-old female presenting to emergency department for evaluation of shortness of breath  Patient has been short of breath for past few days, describes dyspnea on exertion, similar to when she was recently admitted for a CHF exacerbation, she denies leg swelling, she denies chest pain or tightness, she has no cough, she has no fevers or chills  She has no abdominal pain nausea vomiting, she denies palpitations lightheadedness syncope or presyncope  Prior to Admission Medications   Prescriptions Last Dose Informant Patient Reported? Taking? apixaban (Eliquis) 5 mg   No No   Sig: Take 1 tablet (5 mg total) by mouth 2 (two) times a day   furosemide (LASIX) 20 mg tablet   No No   Sig: Take 1 tablet (20 mg total) by mouth daily Do not start before November 5, 2022  lisinopril (ZESTRIL) 2 5 mg tablet   No No   Sig: Take 1 tablet (2 5 mg total) by mouth daily   lovastatin (MEVACOR) 10 MG tablet   No No   Sig: Take 1 tablet (10 mg total) by mouth daily at bedtime   metoprolol succinate (TOPROL-XL) 50 mg 24 hr tablet   No No   Sig: Take 1 5 tablets (75 mg total) by mouth 2 (two) times a day   potassium chloride (K-DUR,KLOR-CON) 10 mEq tablet   No No   Sig: Take 1 tablet (10 mEq total) by mouth daily Do not start before November 5, 2022     sotalol (BETAPACE) 80 mg tablet   No No   Sig: Take 2 tablets (160 mg total) by mouth daily      Facility-Administered Medications: None       Past Medical History:   Diagnosis Date   • Aphasia, mixed 7/28/2017   • Atrial fibrillation (HCC)    • CHF (congestive heart failure) (HCC)    • Colon polyp    • Headache    • Hyperlipidemia    • Hypertension    • Lyme disease    • Shortness of breath    • TIA (transient ischemic attack)    • Transient cerebral ischemia     Last assessed - 18   • Vertigo        Past Surgical History:   Procedure Laterality Date   • CARDIAC PACEMAKER PLACEMENT  2019   • COLONOSCOPY     • ND SIGMOIDOSCOPY FLX DX W/COLLJ SPEC BR/WA IF PFRMD N/A 2017    Procedure: Ezequiel Bae;  Surgeon: Mei Estrada MD;  Location: [facility]; Service: Gastroenterology   • TONSILLECTOMY         Family History   Problem Relation Age of Onset   • Lung cancer Mother    • Dementia Father    • Alzheimer's disease Father    • Other Father         Cardiac Disorder      I have reviewed and agree with the history as documented  E-Cigarette/Vaping   • E-Cigarette Use Never User      E-Cigarette/Vaping Substances   • Nicotine No    • THC No    • CBD No    • Flavoring No    • Other No    • Unknown No      Social History     Tobacco Use   • Smoking status: Former     Types: Cigarettes     Quit date:      Years since quittin 9   • Smokeless tobacco: Never   • Tobacco comments:     no smoking for 46 years   Vaping Use   • Vaping Use: Never used   Substance Use Topics   • Alcohol use: Yes     Alcohol/week: 10 0 standard drinks     Types: 10 Glasses of wine per week     Comment: occ   • Drug use: No       Review of Systems   Constitutional: Negative for appetite change, chills, fatigue and fever  HENT: Negative for sneezing and sore throat  Eyes: Negative for visual disturbance  Respiratory: Positive for shortness of breath  Negative for cough, choking, chest tightness and wheezing  Cardiovascular: Negative for chest pain and palpitations  Gastrointestinal: Negative for abdominal pain, constipation, diarrhea, nausea and vomiting  Genitourinary: Negative for difficulty urinating and dysuria  Neurological: Negative for dizziness, weakness, light-headedness, numbness and headaches  All other systems reviewed and are negative  Physical Exam  Physical Exam  Vitals and nursing note reviewed  Constitutional:       General: She is not in acute distress  Appearance: She is well-developed and well-nourished  She is not diaphoretic  HENT:      Head: Normocephalic and atraumatic  Mouth/Throat:      Mouth: Oropharynx is clear and moist    Eyes:      Extraocular Movements: EOM normal       Pupils: Pupils are equal, round, and reactive to light  Neck:      Vascular: No JVD  Trachea: No tracheal deviation  Cardiovascular:      Rate and Rhythm: Normal rate and regular rhythm  Pulses: Intact distal pulses  Heart sounds: Normal heart sounds  No murmur heard  No friction rub  No gallop  Pulmonary:      Effort: Pulmonary effort is normal  No respiratory distress  Breath sounds: Normal breath sounds  No wheezing or rales  Abdominal:      General: Bowel sounds are normal  There is no distension  Palpations: Abdomen is soft  Tenderness: There is no abdominal tenderness  There is no guarding or rebound  Skin:     General: Skin is warm and dry  Coloration: Skin is not pale  Neurological:      Mental Status: She is alert and oriented to person, place, and time  Cranial Nerves: No cranial nerve deficit  Motor: No abnormal muscle tone     Psychiatric:         Mood and Affect: Mood and affect normal          Behavior: Behavior normal          Vital Signs  ED Triage Vitals [11/23/22 1738]   Temperature Pulse Respirations Blood Pressure SpO2   97 8 °F (36 6 °C) 83 20 120/84 99 %      Temp src Heart Rate Source Patient Position - Orthostatic VS BP Location FiO2 (%)   -- Monitor -- Left arm --      Pain Score       --           Vitals:    11/23/22 1738   BP: 120/84   Pulse: 83         Visual Acuity      ED Medications  Medications   furosemide (LASIX) injection 20 mg (20 mg Intravenous Given 11/23/22 2025)       Diagnostic Studies  Results Reviewed     Procedure Component Value Units Date/Time    FLU/RSV/COVID - if FLU/RSV clinically relevant [[id number]] (Normal) Collected: 11/23/22 1928    Lab Status: Final result Specimen: Nares from Nose Updated: 11/23/22 2014     SARS-CoV-2 Negative     INFLUENZA A PCR Negative     INFLUENZA B PCR Negative     RSV PCR Negative    Narrative:      FOR PEDIATRIC PATIENTS - copy/paste COVID Guidelines URL to browser: [email or web address]    SARS-CoV-2 assay is a Nucleic Acid Amplification assay intended for the  qualitative detection of nucleic acid from SARS-CoV-2 in nasopharyngeal  swabs  Results are for the presumptive identification of SARS-CoV-2 RNA  Positive results are indicative of infection with SARS-CoV-2, the virus  causing COVID-19, but do not rule out bacterial infection or co-infection  with other viruses  Laboratories within the [place] and its  territories are required to report all positive results to the appropriate  public health authorities  Negative results do not preclude SARS-CoV-2  infection and should not be used as the sole basis for treatment or other  patient management decisions  Negative results must be combined with  clinical observations, patient history, and epidemiological information  This test has not been FDA cleared or approved  This test has been authorized by FDA under an Emergency Use Authorization  (EUA)  This test is only authorized for the duration of time the  declaration that circumstances exist justifying the authorization of the  emergency use of an in vitro diagnostic tests for detection of SARS-CoV-2  virus and/or diagnosis of COVID-19 infection under section 564(b)(1) of  the Act, 21 U  S C  510VOR-5(U)(7), unless the authorization is terminated  or revoked sooner  The test has been validated but independent review by FDA  and CLIA is pending  Test performed using [employer] Gene[employer]pert: This RT-PCR assay targets N2,  a region unique to SARS-CoV-2   A conserved region in the E-gene was chosen  for pan-Sarbecovirus detection which includes SARS-CoV-2  According to CMS-2020-01-R, this platform meets the definition of high-throughput technology      NT-BNP PRO [[id number]]  (Abnormal) Collected: 11/23/22 1921    Lab Status: Final result Specimen: Blood from Arm, Right Updated: 11/23/22 1952     NT-proBNP 3,631 pg/mL     Comprehensive metabolic panel [[id number]]  (Abnormal) Collected: 11/23/22 1921    Lab Status: Final result Specimen: Blood from Arm, Right Updated: 11/23/22 1948     Sodium 141 mmol/L      Potassium 4 5 mmol/L      Chloride 105 mmol/L      CO2 26 mmol/L      ANION GAP 10 mmol/L      BUN 26 mg/dL      Creatinine 1 30 mg/dL      Glucose 110 mg/dL      Calcium 8 7 mg/dL      AST 20 U/L      ALT 27 U/L      Alkaline Phosphatase 99 U/L      Total Protein 7 4 g/dL      Albumin 3 8 g/dL      Total Bilirubin 0 59 mg/dL      eGFR 39 ml/min/1 73sq m     Narrative:      [facility] guidelines for Chronic Kidney Disease (CKD):   •  Stage 1 with normal or high GFR (GFR > 90 mL/min/1 73 square meters)  •  Stage 2 Mild CKD (GFR = 60-89 mL/min/1 73 square meters)  •  Stage 3A Moderate CKD (GFR = 45-59 mL/min/1 73 square meters)  •  Stage 3B Moderate CKD (GFR = 30-44 mL/min/1 73 square meters)  •  Stage 4 Severe CKD (GFR = 15-29 mL/min/1 73 square meters)  •  Stage 5 End Stage CKD (GFR <15 mL/min/1 73 square meters)  Note: GFR calculation is accurate only with a steady state creatinine    CBC and differential [[id number]]  (Abnormal) Collected: 11/23/22 1921    Lab Status: Final result Specimen: Blood from Arm, Right Updated: 11/23/22 1927     WBC 9 16 Thousand/uL      RBC 4 33 Million/uL      Hemoglobin 12 1 g/dL      Hematocrit 38 5 %      MCV 89 fL      MCH 27 9 pg      MCHC 31 4 g/dL      RDW 17 2 %      MPV 10 9 fL      Platelets 248 Thousands/uL      nRBC 0 /100 WBCs      Neutrophils Relative 75 %      Immat GRANS % 0 %      Lymphocytes Relative 14 %      Monocytes Relative 9 %      Eosinophils Relative 1 %      Basophils Relative 1 %      Neutrophils Absolute 6 83 Thousands/µL      Immature Grans Absolute 0 04 Thousand/uL      Lymphocytes Absolute 1 27 Thousands/µL      Monocytes Absolute 0 84 Thousand/µL      Eosinophils Absolute 0 12 Thousand/µL      Basophils Absolute 0 06 Thousands/µL     HS Troponin 0hr (reflex protocol) [[id number]] Collected: 11/23/22 1921    Lab Status: In process Specimen: Blood from Arm, Right Updated: 11/23/22 1925                 XR chest 1 view portable    (Results Pending)              Procedures  Procedures         ED Course                               SBIRT 20yo+    Flowsheet Row Most Recent Value   SBIRT (23 yo +)    In order to provide better care to our patients, we are screening all of our patients for alcohol and drug use  Would it be okay to ask you these screening questions? No Filed at: 11/23/2022 1908                    MDM  Number of Diagnoses or Management Options  Dyspnea  Diagnosis management comments: 54-year-old female with dyspnea on exertion, suspect mild upper CHF exacerbation, has normal pulmonary exam here normal oxygenation, will check labs EKG troponin, reassess  Workup suggest mild CHF exacerbation, he feels better here compared to when she was previously hospitalized, will attempt dose of IV Lasix here, patient requesting discharge home which I feel is reasonable, instructed her to maintain strict restrictions of salt and fluids in her diet and to call PCP as soon as possible  To return if symptoms worsen  Disposition  Final diagnoses:   Dyspnea     Time reflects when diagnosis was documented in both MDM as applicable and the Disposition within this note     Time User Action Codes Description Comment    11/23/2022  8:21 PM Arpita Hunt Add [R06 00] Dyspnea       ED Disposition     ED Disposition   Discharge    Condition   Stable    Date/Time   Wed Nov 23, 2022  8:21 PM    Comment   Selin Ramires discharge to home/self care  Follow-up Information     Follow up With Specialties Details Why [address] [facility]   [address]  [phone]            Patient's Medications   Discharge Prescriptions    No medications on file       No discharge procedures on file      PDMP Review     None          ED Provider  Electronically Signed by           Mary Webb MD  11/23/22 2027

## 2022-11-25 ENCOUNTER — TELEPHONE (OUTPATIENT)
Dept: FAMILY MEDICINE CLINIC | Facility: CLINIC | Age: 77
End: 2022-11-25

## 2022-11-25 NOTE — TELEPHONE ENCOUNTER
Pt would like to let Dr Tracy Shetty know that she was in ER 11/23 for heart palpitations - pt was given water pill and is feeling good - pt has appt 12/5 - would Dr Tracy Shetty like to see her earlier then that?

## 2022-11-28 ENCOUNTER — TELEPHONE (OUTPATIENT)
Dept: SLEEP CENTER | Facility: CLINIC | Age: 77
End: 2022-11-28

## 2022-11-28 NOTE — TELEPHONE ENCOUNTER
----- Message from Anam Haley MD sent at 11/27/2022  5:08 PM EST -----  Approve      ----- Message -----  From: Megha Mcwilliams  Sent: 11/18/2022   8:39 AM EST  To: Sleep Medicine [facility] Provider    This Sleep sleep study needs approval      If approved please sign and return to clerical pool  If denied please include reasons why  Also provide alternative testing if warranted  Please sign and return to clerical pool

## 2022-11-29 ENCOUNTER — RA CDI HCC (OUTPATIENT)
Dept: OTHER | Facility: HOSPITAL | Age: 77
End: 2022-11-29

## 2022-11-29 ENCOUNTER — TELEPHONE (OUTPATIENT)
Dept: CARDIOLOGY CLINIC | Facility: CLINIC | Age: 77
End: 2022-11-29

## 2022-11-29 NOTE — TELEPHONE ENCOUNTER
Received fax stating pt declined sleep study    Called pt to f/u, pt is sched on 12/15/22 at [facility] [phone], confirmed pt is picking up at [address] [place] [address]

## 2022-11-29 NOTE — PROGRESS NOTES
Lv [facility] 75  coding opportunities     I13 0     Chart Reviewed number of suggestions sent to Provider: 1     Patients Insurance     Medicare Insurance: [employer]

## 2022-11-30 ENCOUNTER — TELEPHONE (OUTPATIENT)
Dept: CARDIOLOGY CLINIC | Facility: CLINIC | Age: 77
End: 2022-11-30

## 2022-11-30 NOTE — TELEPHONE ENCOUNTER
Spoke with pt to conduct HF f/u  Weight has been stable without significant gain  Denies any symptoms  Has been compliant with diuretictherapy and reports compliance withlow Na diet  Reinforced to stay within 2000 mg an read labels of food  Advised to call the office with any issues       Will do next f/u call on 12/7

## 2022-12-01 ENCOUNTER — TELEPHONE (OUTPATIENT)
Dept: CARDIOLOGY CLINIC | Facility: CLINIC | Age: 77
End: 2022-12-01

## 2022-12-01 ENCOUNTER — TELEPHONE (OUTPATIENT)
Dept: FAMILY MEDICINE CLINIC | Facility: CLINIC | Age: 77
End: 2022-12-01

## 2022-12-01 DIAGNOSIS — I50.22 CHRONIC SYSTOLIC (CONGESTIVE) HEART FAILURE (HCC): Primary | ICD-10-CM

## 2022-12-01 NOTE — TELEPHONE ENCOUNTER
Pt states she is having fatigue and shortness of breath again - should she take any med differently?

## 2022-12-05 ENCOUNTER — TELEPHONE (OUTPATIENT)
Dept: NON INVASIVE DIAGNOSTICS | Facility: HOSPITAL | Age: 77
End: 2022-12-05

## 2022-12-05 ENCOUNTER — OFFICE VISIT (OUTPATIENT)
Dept: FAMILY MEDICINE CLINIC | Facility: CLINIC | Age: 77
End: 2022-12-05

## 2022-12-05 VITALS
TEMPERATURE: 97.6 F | SYSTOLIC BLOOD PRESSURE: 112 MMHG | HEIGHT: 65 IN | DIASTOLIC BLOOD PRESSURE: 70 MMHG | WEIGHT: 202 LBS | OXYGEN SATURATION: 97 % | BODY MASS INDEX: 33.66 KG/M2 | HEART RATE: 85 BPM

## 2022-12-05 DIAGNOSIS — J44.9 CHRONIC OBSTRUCTIVE PULMONARY DISEASE, UNSPECIFIED COPD TYPE (HCC): ICD-10-CM

## 2022-12-05 DIAGNOSIS — I48.19 PERSISTENT ATRIAL FIBRILLATION WITH RAPID VENTRICULAR RESPONSE (HCC): ICD-10-CM

## 2022-12-05 DIAGNOSIS — I50.23 ACUTE ON CHRONIC SYSTOLIC CONGESTIVE HEART FAILURE (HCC): ICD-10-CM

## 2022-12-05 DIAGNOSIS — Z12.31 SCREENING MAMMOGRAM, ENCOUNTER FOR: ICD-10-CM

## 2022-12-05 DIAGNOSIS — I10 ESSENTIAL HYPERTENSION: ICD-10-CM

## 2022-12-05 DIAGNOSIS — E28.39 MENOPAUSE OVARIAN FAILURE: ICD-10-CM

## 2022-12-05 DIAGNOSIS — Z00.00 HEALTH CARE MAINTENANCE: ICD-10-CM

## 2022-12-05 DIAGNOSIS — Z23 IMMUNIZATION DUE: Primary | ICD-10-CM

## 2022-12-05 PROBLEM — R00.1 SINUS BRADYCARDIA: Status: RESOLVED | Noted: 2018-12-29 | Resolved: 2022-12-05

## 2022-12-05 NOTE — ASSESSMENT & PLAN NOTE
Wt Readings from Last 3 Encounters:   12/05/22 91 6 kg (202 lb)   11/16/22 92 5 kg (204 lb)   11/07/22 91 6 kg (202 lb)      well compensated, continue her sotalol, furosemide, metoprolol, discussed with her precautions to be aware of with a weight gain of more than 3 lb in 24 hours  She is to contact her cardiologist if this occurs

## 2022-12-05 NOTE — PROGRESS NOTES
Assessment and Plan:     Problem List Items Addressed This Visit        Respiratory    Chronic obstructive pulmonary disease ([address] )       Stable, continue to monitor            Cardiovascular and Mediastinum    Persistent atrial fibrillation with rapid ventricular response ([address] )      Rate controlled, continue her metoprolol, sotalol, Eliquis         Essential hypertension       Well controlled, continue lisinopril, metoprolol         Acute on chronic systolic congestive heart failure (HCC)     Wt Readings from Last 3 Encounters:   12/05/22 91 6 kg (202 lb)   11/16/22 92 5 kg (204 lb)   11/07/22 91 6 kg (202 lb)      well compensated, continue her sotalol, furosemide, metoprolol, discussed with her precautions to be aware of with a weight gain of more than 3 lb in 24 hours  She is to contact her cardiologist if this occurs  Other Visit Diagnoses     Immunization due    -  Primary    Relevant Orders    Pneumococcal Conjugate Vaccine 20-valent (Pcv20) (Completed)    Health care maintenance        Screening mammogram, encounter for        Relevant Orders    Mammo screening bilateral w cad    Menopause ovarian failure        Relevant Orders    DXA bone density spine hip and pelvis        BMI Counseling: Body mass index is 33 61 kg/m²  The BMI is above normal  Nutrition recommendations include decreasing portion sizes and encouraging healthy choices of fruits and vegetables  Exercise recommendations include moderate physical activity 150 minutes/week  No pharmacotherapy was ordered  Rationale for BMI follow-up plan is due to patient being overweight or obese  Depression Screening and Follow-up Plan: Patient was screened for depression during today's encounter  They screened negative with a PHQ-2 score of 0  Preventive health issues were discussed with patient, and age appropriate screening tests were ordered as noted in patient's After Visit Summary    Personalized health advice and appropriate referrals for health education or preventive services given if needed, as noted in patient's After Visit Summary  History of Present Illness:     Patient presents for a Medicare Wellness Visit    Patient presents for check-up, no complaints  Patient Care Team:  Gaviota Burk DO as PCP - General (Family Medicine)  MD Tod Mendoza MD as [facility], RN as RN Care Manager (Care Coordination)     Review of Systems:     Review of Systems   Constitutional: Negative for chills, fatigue and fever  HENT: Negative for congestion, ear pain, hearing loss, postnasal drip, rhinorrhea and sore throat  Eyes: Negative for pain and visual disturbance  Respiratory: Negative for chest tightness, shortness of breath and wheezing  Cardiovascular: Negative for chest pain and leg swelling  Gastrointestinal: Negative for abdominal distention, abdominal pain, constipation, diarrhea and vomiting  Endocrine: Negative for cold intolerance and heat intolerance  Genitourinary: Negative for difficulty urinating, frequency and urgency  Musculoskeletal: Negative for arthralgias and gait problem  Skin: Negative for color change  Neurological: Negative for dizziness, tremors, syncope, numbness and headaches  Hematological: Negative for adenopathy  Psychiatric/Behavioral: Negative for agitation, confusion and sleep disturbance  The patient is not nervous/anxious  Problem List:     Patient Active Problem List   Diagnosis   • Persistent atrial fibrillation with rapid ventricular response (HCC)   • Essential hypertension   • Mixed hyperlipidemia   • Obesity (BMI 30-39  9)   • H/O TIA (transient ischemic attack) and stroke   • Migraine with aura and without status migrainosus, not intractable   • Myocardiopathy ([address] )   • Anticoagulant long-term use   • Nonrheumatic mitral valve regurgitation   • Nonrheumatic aortic valve insufficiency   • Nonrheumatic tricuspid valve regurgitation   • Paroxysmal atrial fibrillation (HCC)   • Other insomnia   • Chronic obstructive pulmonary disease (HCC)   • BMI 33 0-33 9,adult   • Stage 3a chronic kidney disease (HCC)   • Acute on chronic systolic congestive heart failure (HCC)   • SSS (sick sinus syndrome) ([facility] [address] )      Past Medical and Surgical History:     Past Medical History:   Diagnosis Date   • Aphasia, mixed 2017   • Atrial fibrillation (HCC)    • CHF (congestive heart failure) (HCC)    • Colon polyp    • Headache    • Hyperlipidemia    • Hypertension    • Lyme disease    • Shortness of breath    • TIA (transient ischemic attack)    • Transient cerebral ischemia     Last assessed - 18   • Vertigo      Past Surgical History:   Procedure Laterality Date   • CARDIAC PACEMAKER PLACEMENT  2019   • COLONOSCOPY     • AR SIGMOIDOSCOPY FLX DX W/COLLJ SPEC BR/WA IF PFRMD N/A 2017    Procedure: Connor Maria;  Surgeon: Dontae Diaz MD;  Location: [facility]; Service: Gastroenterology   • TONSILLECTOMY        Family History:     Family History   Problem Relation Age of Onset   • Lung cancer Mother    • Dementia Father    • Alzheimer's disease Father    • Other Father         Cardiac Disorder       Social History:     Social History     Socioeconomic History   • Marital status: /Civil Union     Spouse name: None   • Number of children: None   • Years of education: None   • Highest education level: None   Occupational History   • Occupation: Retired   Tobacco Use   • Smoking status: Former     Types: Cigarettes     Quit date:      Years since quittin 9   • Smokeless tobacco: Never   • Tobacco comments:     no smoking for 46 years   Vaping Use   • Vaping Use: Never used   Substance and Sexual Activity   • Alcohol use:  Yes     Alcohol/week: 10 0 standard drinks     Types: 10 Glasses of wine per week     Comment: occ   • Drug use: No   • Sexual activity: Never     Partners: Male     Birth control/protection: None   Other Topics Concern   • None   Social History Narrative    Always uses seat belt     Social Determinants of Health     Financial Resource Strain: Low Risk    • Difficulty of Paying Living Expenses: Not hard at all   Food Insecurity: Not on file   Transportation Needs: No Transportation Needs   • Lack of Transportation (Medical): No   • Lack of Transportation (Non-Medical): No   Physical Activity: Not on file   Stress: Not on file   Social Connections: Not on file   Intimate Partner Violence: Not on file   Housing Stability: Not on file      Medications and Allergies:     Current Outpatient Medications   Medication Sig Dispense Refill   • apixaban (Eliquis) 5 mg Take 1 tablet (5 mg total) by mouth 2 (two) times a day 180 tablet 3   • furosemide (LASIX) 20 mg tablet Take 1 tablet (20 mg total) by mouth daily Do not start before November 5, 2022  30 tablet 2   • lisinopril (ZESTRIL) 2 5 mg tablet Take 1 tablet (2 5 mg total) by mouth daily 90 tablet 3   • lovastatin (MEVACOR) 10 MG tablet Take 1 tablet (10 mg total) by mouth daily at bedtime 90 tablet 3   • metoprolol succinate (TOPROL-XL) 50 mg 24 hr tablet Take 1 5 tablets (75 mg total) by mouth 2 (two) times a day 270 tablet 3   • potassium chloride (K-DUR,KLOR-CON) 10 mEq tablet Take 1 tablet (10 mEq total) by mouth daily Do not start before November 5, 2022  30 tablet 2   • sotalol (BETAPACE) 80 mg tablet Take 2 tablets (160 mg total) by mouth daily 270 tablet 0     No current facility-administered medications for this visit       No Known Allergies   Immunizations:     Immunization History   Administered Date(s) Administered   • COVID-19 MODERNA VACC 0 25 ML IM BOOSTER 11/17/2021   • COVID-19 MODERNA VACC 0 5 ML IM 02/13/2021, 03/12/2021   • COVID-19 Pfizer Vac BIVALENT Brian-sucrose 12 Yr+ IM (BOOSTER ONLY) 11/07/2022   • INFLUENZA 11/02/2022   • Influenza, high dose seasonal 0 7 mL 03/02/2020, 09/30/2020, 11/02/2022   • Pneumococcal Conjugate Vaccine 20-valent (Pcv20), Polysace 12/05/2022      Health Maintenance:         Topic Date Due   • Hepatitis C Screening  Never done   • Breast Cancer Screening: Mammogram  11/23/2022   • Colorectal Cancer Screening  07/19/2026         Topic Date Due   • Hepatitis B Vaccine (1 of 3 - 3-dose series) Never done   • COVID-19 Vaccine (4 - Booster for Edwards series) 03/17/2022      Medicare Screening Tests and Risk Assessments:     Babatunde Garcia is here for her Subsequent Wellness visit  Last Medicare Wellness visit information reviewed, patient interviewed and updates made to the record today  Health Risk Assessment:   Patient rates overall health as fair  Patient feels that their physical health rating is slightly worse  Patient is very satisfied with their life  Eyesight was rated as same  Hearing was rated as same  Patient feels that their emotional and mental health rating is same  Patients states they are never, rarely angry  Patient states they are sometimes unusually tired/fatigued  Pain experienced in the last 7 days has been none  Patient states that she has experienced no weight loss or gain in last 6 months  Depression Screening:   PHQ-2 Score: 0      Fall Risk Screening: In the past year, patient has experienced: no history of falling in past year      Urinary Incontinence Screening:   Patient has not leaked urine accidently in the last six months  Home Safety:  Patient has trouble with stairs inside or outside of their home  Patient has working smoke alarms and has working carbon monoxide detector  Home safety hazards include: none  Nutrition:   Current diet is Regular  Medications:   Patient is not currently taking any over-the-counter supplements  Patient is able to manage medications       Activities of Daily Living (ADLs)/Instrumental Activities of Daily Living (IADLs):   Walk and transfer into and out of bed and chair?: Yes  Dress and groom yourself?: Yes    Bathe or shower yourself?: Yes Feed yourself? Yes  Do your laundry/housekeeping?: Yes  Manage your money, pay your bills and track your expenses?: Yes  Make your own meals?: Yes    Do your own shopping?: Yes    Previous Hospitalizations:   Any hospitalizations or ED visits within the last 12 months?: Yes    How many hospitalizations have you had in the last year?: 1-2    Advance Care Planning:   Living will: Yes    Advanced directive: Yes      PREVENTIVE SCREENINGS      Cardiovascular Screening:    General: Screening Not Indicated and History Lipid Disorder      Diabetes Screening:     General: Screening Current      Colorectal Cancer Screening:     General: Screening Current      Breast Cancer Screening:       Due for: Mammogram        Cervical Cancer Screening:    General: Screening Not Indicated      Osteoporosis Screening:      Due for: DXA Axial      Abdominal Aortic Aneurysm (AAA) Screening:        General: Screening Not Indicated      Lung Cancer Screening:     General: Screening Not Indicated      Hepatitis C Screening:    General: Risks and Benefits Discussed    Screening, Brief Intervention, and Referral to Treatment (SBIRT)    Screening  Typical number of drinks in a day: 0  Typical number of drinks in a week: 4  Interpretation: Low risk drinking behavior  Single Item Drug Screening:  How often have you used an illegal drug (including marijuana) or a prescription medication for non-medical reasons in the past year? never    Single Item Drug Screen Score: 0  Interpretation: Negative screen for possible drug use disorder    No results found  Physical Exam:     /70 (BP Location: Left arm, Patient Position: Sitting)   Pulse 85   Temp 97 6 °F (36 4 °C) (Tympanic)   Ht 5' 5" (1 651 m)   Wt 91 6 kg (202 lb)   SpO2 97%   BMI 33 61 kg/m²     Physical Exam  Constitutional:       Appearance: She is well-developed and well-nourished  HENT:      Head: Normocephalic and atraumatic        Right Ear: External ear normal  Left Ear: External ear normal       Nose: Nose normal       Mouth/Throat:      Mouth: Oropharynx is clear and moist    Eyes:      Extraocular Movements: EOM normal       Conjunctiva/sclera: Conjunctivae normal       Pupils: Pupils are equal, round, and reactive to light  Neck:      Thyroid: No thyromegaly  Cardiovascular:      Rate and Rhythm: Normal rate  Rhythm irregular  Heart sounds: Normal heart sounds  No murmur heard  Pulmonary:      Effort: Pulmonary effort is normal    Abdominal:      General: Bowel sounds are normal  There is no distension  Palpations: Abdomen is soft  Musculoskeletal:         General: Normal range of motion  Cervical back: Normal range of motion and neck supple  Skin:     General: Skin is warm  Neurological:      Mental Status: She is alert and oriented to person, place, and time     Psychiatric:         Mood and Affect: Mood and affect normal           Marisol Sanchez DO

## 2022-12-05 NOTE — PATIENT INSTRUCTIONS
Medicare Preventive Visit Patient Instructions  Thank you for completing your Welcome to Medicare Visit or Medicare Annual Wellness Visit today  Your next wellness visit will be due in one year (12/6/2023)  The screening/preventive services that you may require over the next 5-10 years are detailed below  Some tests may not apply to you based off risk factors and/or age  Screening tests ordered at today's visit but not completed yet may show as past due  Also, please note that scanned in results may not display below  Preventive Screenings:  Service Recommendations Previous Testing/Comments   Colorectal Cancer Screening  * Colonoscopy    * Fecal Occult Blood Test (FOBT)/Fecal Immunochemical Test (FIT)  * Fecal DNA/Cologuard Test  * Flexible Sigmoidoscopy Age: 39-70 years old   Colonoscopy: every 10 years (may be performed more frequently if at higher risk)  OR  FOBT/FIT: every 1 year  OR  Cologuard: every 3 years  OR  Sigmoidoscopy: every 5 years  Screening may be recommended earlier than age 39 if at higher risk for colorectal cancer  Also, an individualized decision between you and your healthcare provider will decide whether screening between the ages of 74-80 would be appropriate  Colonoscopy: 07/20/2021  FOBT/FIT: Not on file  Cologuard: Not on file  Sigmoidoscopy: 11/28/2017    Screening Current     Breast Cancer Screening Age: 36 years old  Frequency: every 1-2 years  Not required if history of left and right mastectomy Mammogram: 11/23/2020        Cervical Cancer Screening Between the ages of 21-29, pap smear recommended once every 3 years  Between the ages of 33-67, can perform pap smear with HPV co-testing every 5 years     Recommendations may differ for women with a history of total hysterectomy, cervical cancer, or abnormal pap smears in past  Pap Smear: Not on file    Screening Not Indicated   Hepatitis C Screening Once for adults born between 1945 and 1965  More frequently in patients at high risk for Hepatitis C Hep C Antibody: Not on file        Diabetes Screening 1-2 times per year if you're at risk for diabetes or have pre-diabetes Fasting glucose: 96 mg/dL (11/22/2021)  A1C: 5 8 % (11/1/2022)  Screening Current   Cholesterol Screening Once every 5 years if you don't have a lipid disorder  May order more often based on risk factors  Lipid panel: 11/02/2022    Screening Not Indicated  History Lipid Disorder     Other Preventive Screenings Covered by Medicare:  1  Abdominal Aortic Aneurysm (AAA) Screening: covered once if your at risk  You're considered to be at risk if you have a family history of AAA  2  Lung Cancer Screening: covers low dose CT scan once per year if you meet all of the following conditions: (1) Age 50-69; (2) No signs or symptoms of lung cancer; (3) Current smoker or have quit smoking within the last 15 years; (4) You have a tobacco smoking history of at least 20 pack years (packs per day multiplied by number of years you smoked); (5) You get a written order from a healthcare provider  3  Glaucoma Screening: covered annually if you're considered high risk: (1) You have diabetes OR (2) Family history of glaucoma OR (3)  aged 48 and older OR (3)  American aged 72 and older  3  Osteoporosis Screening: covered every 2 years if you meet one of the following conditions: (1) You're estrogen deficient and at risk for osteoporosis based off medical history and other findings; (2) Have a vertebral abnormality; (3) On glucocorticoid therapy for more than 3 months; (4) Have primary hyperparathyroidism; (5) On osteoporosis medications and need to assess response to drug therapy  · Last bone density test (DXA Scan): 11/23/2020   5  HIV Screening: covered annually if you're between the age of 15-65  Also covered annually if you are younger than 13 and older than 72 with risk factors for HIV infection   For pregnant patients, it is covered up to 3 times per pregnancy  Immunizations:  Immunization Recommendations   Influenza Vaccine Annual influenza vaccination during flu season is recommended for all persons aged >= 6 months who do not have contraindications   Pneumococcal Vaccine   * Pneumococcal conjugate vaccine = PCV13 (Prevnar 13), PCV15 (Vaxneuvance), PCV20 (Prevnar 20)  * Pneumococcal polysaccharide vaccine = PPSV23 (Pneumovax) Adults 25-60 years old: 1-3 doses may be recommended based on certain risk factors  Adults 72 years old: 1-2 doses may be recommended based off what pneumonia vaccine you previously received   Hepatitis B Vaccine 3 dose series if at intermediate or high risk (ex: diabetes, end stage renal disease, liver disease)   Tetanus (Td) Vaccine - COST NOT COVERED BY MEDICARE PART B Following completion of primary series, a booster dose should be given every 10 years to maintain immunity against tetanus  Td may also be given as tetanus wound prophylaxis  Tdap Vaccine - COST NOT COVERED BY MEDICARE PART B Recommended at least once for all adults  For pregnant patients, recommended with each pregnancy  Shingles Vaccine (Shingrix) - COST NOT COVERED BY MEDICARE PART B  2 shot series recommended in those aged 48 and above     Health Maintenance Due:      Topic Date Due   • Hepatitis C Screening  Never done   • Breast Cancer Screening: Mammogram  11/23/2022   • Colorectal Cancer Screening  07/19/2026     Immunizations Due:      Topic Date Due   • Hepatitis B Vaccine (1 of 3 - 3-dose series) Never done   • Pneumococcal Vaccine: 65+ Years (1 - PCV) Never done   • COVID-19 Vaccine (4 - Booster for Jerriarunn Kendell series) 03/17/2022     Advance Directives   What are advance directives? Advance directives are legal documents that state your wishes and plans for medical care  These plans are made ahead of time in case you lose your ability to make decisions for yourself   Advance directives can apply to any medical decision, such as the treatments you want, and if you want to donate organs  What are the types of advance directives? There are many types of advance directives, and each state has rules about how to use them  You may choose a combination of any of the following:  · Living will: This is a written record of the treatment you want  You can also choose which treatments you do not want, which to limit, and which to stop at a certain time  This includes surgery, medicine, IV fluid, and tube feedings  · Durable power of  for healthcare [facility]): This is a written record that states who you want to make healthcare choices for you when you are unable to make them for yourself  This person, called a proxy, is usually a family member or a friend  You may choose more than 1 proxy  · Do not resuscitate (DNR) order:  A DNR order is used in case your heart stops beating or you stop breathing  It is a request not to have certain forms of treatment, such as CPR  A DNR order may be included in other types of advance directives  · Medical directive: This covers the care that you want if you are in a coma, near death, or unable to make decisions for yourself  You can list the treatments you want for each condition  Treatment may include pain medicine, surgery, blood transfusions, dialysis, IV or tube feedings, and a ventilator (breathing machine)  · Values history: This document has questions about your views, beliefs, and how you feel and think about life  This information can help others choose the care that you would choose  Why are advance directives important? An advance directive helps you control your care  Although spoken wishes may be used, it is better to have your wishes written down  Spoken wishes can be misunderstood, or not followed  Treatments may be given even if you do not want them  An advance directive may make it easier for your family to make difficult choices about your care     Urinary Incontinence   Urinary incontinence (UI)  is when you lose control of your bladder  UI develops because your bladder cannot store or empty urine properly  The 3 most common types of UI are stress incontinence, urge incontinence, or both  Medicines:   · May be given to help strengthen your bladder control  Report any side effects of medication to your healthcare provider  Do pelvic muscle exercises often:  Your pelvic muscles help you stop urinating  Squeeze these muscles tight for 5 seconds, then relax for 5 seconds  Gradually work up to squeezing for 10 seconds  Do 3 sets of 15 repetitions a day, or as directed  This will help strengthen your pelvic muscles and improve bladder control  Train your bladder:  Go to the bathroom at set times, such as every 2 hours, even if you do not feel the urge to go  You can also try to hold your urine when you feel the urge to go  For example, hold your urine for 5 minutes when you feel the urge to go  As that becomes easier, hold your urine for 10 minutes  Self-care:   · Keep a UI record  Write down how often you leak urine and how much you leak  Make a note of what you were doing when you leaked urine  · Drink liquids as directed  You may need to limit the amount of liquid you drink to help control your urine leakage  Do not drink any liquid right before you go to bed  Limit or do not have drinks that contain caffeine or alcohol  · Prevent constipation  Eat a variety of high-fiber foods  Good examples are high-fiber cereals, beans, vegetables, and whole-grain breads  Walking is the best way to trigger your intestines to have a bowel movement  · Exercise regularly and maintain a healthy weight  Weight loss and exercise will decrease pressure on your bladder and help you control your leakage  · Use a catheter as directed  to help empty your bladder  A catheter is a tiny, plastic tube that is put into your bladder to drain your urine  · Go to behavior therapy as directed    Behavior therapy may be used to help you learn to control your urge to urinate  Weight Management   Why it is important to manage your weight:  Being overweight increases your risk of health conditions such as heart disease, high blood pressure, type 2 diabetes, and certain types of cancer  It can also increase your risk for osteoarthritis, sleep apnea, and other respiratory problems  Aim for a slow, steady weight loss  Even a small amount of weight loss can lower your risk of health problems  How to lose weight safely:  A safe and healthy way to lose weight is to eat fewer calories and get regular exercise  You can lose up about 1 pound a week by decreasing the number of calories you eat by 500 calories each day  Healthy meal plan for weight management:  A healthy meal plan includes a variety of foods, contains fewer calories, and helps you stay healthy  A healthy meal plan includes the following:  · Eat whole-grain foods more often  A healthy meal plan should contain fiber  Fiber is the part of grains, fruits, and vegetables that is not broken down by your body  Whole-grain foods are healthy and provide extra fiber in your diet  Some examples of whole-grain foods are whole-wheat breads and pastas, oatmeal, brown rice, and bulgur  · Eat a variety of vegetables every day  Include dark, leafy greens such as spinach, kale, cecilia greens, and mustard greens  Eat yellow and orange vegetables such as carrots, sweet potatoes, and winter squash  · Eat a variety of fruits every day  Choose fresh or canned fruit (canned in its own juice or light syrup) instead of juice  Fruit juice has very little or no fiber  · Eat low-fat dairy foods  Drink fat-free (skim) milk or 1% milk  Eat fat-free yogurt and low-fat cottage cheese  Try low-fat cheeses such as mozzarella and other reduced-fat cheeses  · Choose meat and other protein foods that are low in fat  Choose beans or other legumes such as split peas or lentils   Choose fish, skinless poultry (chicken or turkey), or lean cuts of red meat (beef or pork)  Before you cook meat or poultry, cut off any visible fat  · Use less fat and oil  Try baking foods instead of frying them  Add less fat, such as margarine, sour cream, regular salad dressing and mayonnaise to foods  Eat fewer high-fat foods  Some examples of high-fat foods include french fries, doughnuts, ice cream, and cakes  · Eat fewer sweets  Limit foods and drinks that are high in sugar  This includes candy, cookies, regular soda, and sweetened drinks  Exercise:  Exercise at least 30 minutes per day on most days of the week  Some examples of exercise include walking, biking, dancing, and swimming  You can also fit in more physical activity by taking the stairs instead of the elevator or parking farther away from stores  Ask your healthcare provider about the best exercise plan for you  © Copyright [employer] 2018 Information is for End User's use only and may not be sold, redistributed or otherwise used for commercial purposes   All illustrations and images included in CareNotes® are the copyrighted property of [employer]  or [address] [place] [address]

## 2022-12-07 ENCOUNTER — TELEPHONE (OUTPATIENT)
Dept: CARDIOLOGY CLINIC | Facility: CLINIC | Age: 77
End: 2022-12-07

## 2022-12-07 ENCOUNTER — APPOINTMENT (OUTPATIENT)
Dept: LAB | Facility: CLINIC | Age: 77
End: 2022-12-07

## 2022-12-07 LAB
ANION GAP SERPL CALCULATED.3IONS-SCNC: 3 MMOL/L (ref 4–13)
BUN SERPL-MCNC: 20 MG/DL (ref 5–25)
CALCIUM SERPL-MCNC: 9.3 MG/DL (ref 8.3–10.1)
CHLORIDE SERPL-SCNC: 107 MMOL/L (ref 96–108)
CO2 SERPL-SCNC: 27 MMOL/L (ref 21–32)
CREAT SERPL-MCNC: 1.07 MG/DL (ref 0.6–1.3)
GFR SERPL CREATININE-BSD FRML MDRD: 49 ML/MIN/1.73SQ M
GLUCOSE SERPL-MCNC: 116 MG/DL (ref 65–140)
POTASSIUM SERPL-SCNC: 4.1 MMOL/L (ref 3.5–5.3)
SODIUM SERPL-SCNC: 137 MMOL/L (ref 135–147)

## 2022-12-07 NOTE — TELEPHONE ENCOUNTER
Spoke with pt to conduct HF f/u  Weight has been stable without significant gain actually went down a pound  Denies any symptoms  Has been compliant with diuretic therapy and low Na diet  Advised to call the office with any issues       Will do next f/u call on 12/14

## 2022-12-08 ENCOUNTER — TELEPHONE (OUTPATIENT)
Dept: CARDIOLOGY CLINIC | Facility: CLINIC | Age: 77
End: 2022-12-08

## 2022-12-08 NOTE — TELEPHONE ENCOUNTER
----- Message from Giana North MD sent at 12/8/2022 12:36 PM EST -----  Inform patient    Blood work normal      ----- Message -----  From: Lab, Background User  Sent: 12/7/2022   8:20 PM EST  To: Giana North MD

## 2022-12-09 ENCOUNTER — PATIENT OUTREACH (OUTPATIENT)
Dept: FAMILY MEDICINE CLINIC | Facility: CLINIC | Age: 77
End: 2022-12-09

## 2022-12-09 NOTE — PROGRESS NOTES
Out Patient Care Management Note:  REGGIE completed 30 day chart review  Bundle patient for cardiac arrhythmia  Patient demonstrates independence with self-care and disease management  There was 1 unplanned ED visit on 11/23/22 for dyspnea due to suspected CHF exacerbation  Will continue to follow until end of bundle period 02/02/23   30 day chart review set again

## 2022-12-14 ENCOUNTER — HOSPITAL ENCOUNTER (OUTPATIENT)
Dept: BONE DENSITY | Facility: CLINIC | Age: 77
Discharge: HOME | End: 2022-12-14

## 2022-12-14 DIAGNOSIS — E28.39 MENOPAUSE OVARIAN FAILURE: ICD-10-CM

## 2022-12-19 ENCOUNTER — TELEPHONE (OUTPATIENT)
Dept: NON INVASIVE DIAGNOSTICS | Facility: HOSPITAL | Age: 77
End: 2022-12-19

## 2022-12-19 ENCOUNTER — TELEPHONE (OUTPATIENT)
Dept: FAMILY MEDICINE CLINIC | Facility: CLINIC | Age: 77
End: 2022-12-19

## 2022-12-19 NOTE — TELEPHONE ENCOUNTER
Spoke with pt to conduct HF f/u  Weight has been stable without significant gain  Reinforced to call the office for gain of 3l bs in a day or 5 lbs in a week  Denies any symptoms  States she is having diplopia, has happened in the past when she had lyme disease  Denies any other focal neuro changes, advised to call her PCP ASAP      Has been compliant with diuretic therapy and low Na diet  Reinforced 2000 mg Na restricted diet  Advised to call the office with any issues       Will do next f/u call on 12/26

## 2022-12-19 NOTE — TELEPHONE ENCOUNTER
T/c from pt - pt states she woke up in the middle of the night to read and had double vision - still has double vision  Pt inquiring on what to do? States it last happened when she had Lyme disease a few years ago and it lasted about 4 days  Please advise

## 2022-12-19 NOTE — TELEPHONE ENCOUNTER
Dr Judi Ramirez with pt, she stated that she only has double vision, denied dizziness, head ache, nausea, weakness, pressure in her eyes    etc  She called her cardiologist and was advised by his office to call her PCP, she hasn't call her ophthalmologist either       Mari Servin/negra  12/19/22  4:05 PM

## 2022-12-19 NOTE — TELEPHONE ENCOUNTER
I recommend she contact her ophthalmologist, as long as she is not having any other symptoms like a headache, weakness in any extremity I want her to see her ophthalmologist first

## 2022-12-21 ENCOUNTER — APPOINTMENT (EMERGENCY)
Dept: CT IMAGING | Facility: HOSPITAL | Age: 77
End: 2022-12-21

## 2022-12-21 ENCOUNTER — HOSPITAL ENCOUNTER (INPATIENT)
Facility: HOSPITAL | Age: 77
LOS: 2 days | Discharge: HOME | End: 2022-12-23
Attending: EMERGENCY MEDICINE | Admitting: INTERNAL MEDICINE

## 2022-12-21 ENCOUNTER — REMOTE DEVICE CLINIC VISIT (OUTPATIENT)
Dept: CARDIOLOGY CLINIC | Facility: CLINIC | Age: 77
End: 2022-12-21

## 2022-12-21 DIAGNOSIS — J10.1 INFLUENZA A: ICD-10-CM

## 2022-12-21 DIAGNOSIS — Z86.73 HISTORY OF TIA (TRANSIENT ISCHEMIC ATTACK): ICD-10-CM

## 2022-12-21 DIAGNOSIS — H49.01 3RD NERVE PALSY, PARTIAL, RIGHT: Primary | ICD-10-CM

## 2022-12-21 DIAGNOSIS — J84.10 PULMONARY FIBROSIS (HCC): ICD-10-CM

## 2022-12-21 DIAGNOSIS — Z95.0 PRESENCE OF PERMANENT CARDIAC PACEMAKER: Primary | ICD-10-CM

## 2022-12-21 PROBLEM — H53.2 DIPLOPIA: Status: ACTIVE | Noted: 2022-12-21

## 2022-12-21 LAB
2HR DELTA HS TROPONIN: -1 NG/L
4HR DELTA HS TROPONIN: -1 NG/L
ALBUMIN SERPL BCP-MCNC: 3.5 G/DL (ref 3.5–5)
ALP SERPL-CCNC: 88 U/L (ref 46–116)
ALT SERPL W P-5'-P-CCNC: 19 U/L (ref 12–78)
ANION GAP SERPL CALCULATED.3IONS-SCNC: 9 MMOL/L (ref 4–13)
APTT PPP: 37 SECONDS (ref 23–37)
AST SERPL W P-5'-P-CCNC: 25 U/L (ref 5–45)
BASOPHILS # BLD AUTO: 0.01 THOUSANDS/ÂΜL (ref 0–0.1)
BASOPHILS NFR BLD AUTO: 0 % (ref 0–1)
BILIRUB DIRECT SERPL-MCNC: 0.13 MG/DL (ref 0–0.2)
BILIRUB SERPL-MCNC: 0.39 MG/DL (ref 0.2–1)
BUN SERPL-MCNC: 15 MG/DL (ref 5–25)
CALCIUM SERPL-MCNC: 8.5 MG/DL (ref 8.3–10.1)
CARDIAC TROPONIN I PNL SERPL HS: 6 NG/L
CARDIAC TROPONIN I PNL SERPL HS: 6 NG/L
CARDIAC TROPONIN I PNL SERPL HS: 7 NG/L
CHLORIDE SERPL-SCNC: 104 MMOL/L (ref 96–108)
CO2 SERPL-SCNC: 25 MMOL/L (ref 21–32)
CREAT SERPL-MCNC: 1.22 MG/DL (ref 0.6–1.3)
EOSINOPHIL # BLD AUTO: 0.11 THOUSAND/ÂΜL (ref 0–0.61)
EOSINOPHIL NFR BLD AUTO: 3 % (ref 0–6)
ERYTHROCYTE [DISTWIDTH] IN BLOOD BY AUTOMATED COUNT: 17.2 % (ref 11.6–15.1)
FLUAV RNA RESP QL NAA+PROBE: POSITIVE
FLUBV RNA RESP QL NAA+PROBE: NEGATIVE
GFR SERPL CREATININE-BSD FRML MDRD: 42 ML/MIN/1.73SQ M
GLUCOSE SERPL-MCNC: 115 MG/DL (ref 65–140)
HCT VFR BLD AUTO: 41 % (ref 34.8–46.1)
HGB BLD-MCNC: 12.9 G/DL (ref 11.5–15.4)
IMM GRANULOCYTES # BLD AUTO: 0.01 THOUSAND/UL (ref 0–0.2)
IMM GRANULOCYTES NFR BLD AUTO: 0 % (ref 0–2)
INR PPP: 1.41 (ref 0.84–1.19)
LYMPHOCYTES # BLD AUTO: 0.64 THOUSANDS/ÂΜL (ref 0.6–4.47)
LYMPHOCYTES NFR BLD AUTO: 17 % (ref 14–44)
MCH RBC QN AUTO: 27 PG (ref 26.8–34.3)
MCHC RBC AUTO-ENTMCNC: 31.5 G/DL (ref 31.4–37.4)
MCV RBC AUTO: 86 FL (ref 82–98)
MONOCYTES # BLD AUTO: 0.65 THOUSAND/ÂΜL (ref 0.17–1.22)
MONOCYTES NFR BLD AUTO: 17 % (ref 4–12)
NEUTROPHILS # BLD AUTO: 2.46 THOUSANDS/ÂΜL (ref 1.85–7.62)
NEUTS SEG NFR BLD AUTO: 63 % (ref 43–75)
NRBC BLD AUTO-RTO: 0 /100 WBCS
PLATELET # BLD AUTO: 196 THOUSANDS/UL (ref 149–390)
PMV BLD AUTO: 11.1 FL (ref 8.9–12.7)
POTASSIUM SERPL-SCNC: 3.9 MMOL/L (ref 3.5–5.3)
PROT SERPL-MCNC: 7.6 G/DL (ref 6.4–8.4)
PROTHROMBIN TIME: 17 SECONDS (ref 11.6–14.5)
RBC # BLD AUTO: 4.78 MILLION/UL (ref 3.81–5.12)
RSV RNA RESP QL NAA+PROBE: NEGATIVE
SARS-COV-2 RNA RESP QL NAA+PROBE: NEGATIVE
SODIUM SERPL-SCNC: 138 MMOL/L (ref 135–147)
WBC # BLD AUTO: 3.88 THOUSAND/UL (ref 4.31–10.16)

## 2022-12-21 RX ORDER — FUROSEMIDE 20 MG/1
20 TABLET ORAL DAILY
Status: DISCONTINUED | OUTPATIENT
Start: 2022-12-22 | End: 2022-12-23 | Stop reason: HOSPADM

## 2022-12-21 RX ORDER — PRAVASTATIN SODIUM 20 MG
10 TABLET ORAL
Status: DISCONTINUED | OUTPATIENT
Start: 2022-12-21 | End: 2022-12-23 | Stop reason: HOSPADM

## 2022-12-21 RX ORDER — LISINOPRIL 2.5 MG/1
2.5 TABLET ORAL DAILY
Status: DISCONTINUED | OUTPATIENT
Start: 2022-12-22 | End: 2022-12-23 | Stop reason: HOSPADM

## 2022-12-21 RX ORDER — SOTALOL HYDROCHLORIDE 80 MG/1
160 TABLET ORAL DAILY
Status: DISCONTINUED | OUTPATIENT
Start: 2022-12-22 | End: 2022-12-23 | Stop reason: HOSPADM

## 2022-12-21 RX ADMIN — IOHEXOL 85 ML: 350 INJECTION, SOLUTION INTRAVENOUS at 11:55

## 2022-12-21 RX ADMIN — APIXABAN 5 MG: 5 TABLET, FILM COATED ORAL at 17:46

## 2022-12-21 RX ADMIN — PRAVASTATIN SODIUM 10 MG: 20 TABLET ORAL at 17:45

## 2022-12-21 NOTE — H&P
[address] [id number], 65 y o  female MRN: [id number]  Unit/Bed#: ED 20 Encounter: [id number]  Primary Care Provider: Thressa Fabry, DO   Date and time admitted to hospital: 12/21/2022 10:46 AM    Paroxysmal atrial fibrillation [facility])  Assessment & Plan  Patient with history of paroxysmal A  Fib  On Eliquis for anticoagulation, will continue 5 mg twice daily  continue on Toprol-XL 75 mg twice daily and sotalol 160 mg daily-creatinine clearance currently borderline for sotalol  Continue to monitor renal function    Essential hypertension  Assessment & Plan  With history of hypertension managed on Lasix 20 mg daily, lisinopril 2 5 mg daily as an outpatient  Continue furosemide lisinopril and metoprolol    * Diplopia  Assessment & Plan  Patient presents with several days of diplopia  She presented to her ophthalmologist and was diagnosed with right cranial nerve 3 palsy  Patient reports her vision improved with the placement of eye patch over her right eye  She also tested positive for flu, reports typical viral symptoms  CTA reviewed without any findings to explain her presentation  Pending MRI brain and orbits with and without contrast          VTE Pharmacologic Prophylaxis:   Moderate Risk (Score 3-4) - Pharmacological DVT Prophylaxis Ordered: apixaban (Eliquis)  Code Status: Level 1 - Full Code   Discussion with family: Updated  (daughter) at bedside  Anticipated Length of Stay: Patient will be admitted on an inpatient basis with an anticipated length of stay of greater than 2 midnights secondary to Diplopia cranial nerve III palsy  Total Time for Visit, including Counseling / Coordination of Care: 30 minutes Greater than 50% of this total time spent on direct patient counseling and coordination of care      Chief Complaint: blurry vision    History of Present Illness:  Dread [place]en[place] is a 65 y o  female with a PMH of A  fib on Eliquis, systolic heart failure, migraine, hyperlipidemia, hypertension, history of TIA  Patient presents with several days of diplopia  Patient denies any other complaints including headache focal weakness numbness and tingling in any of her extremities chest pain shortness of breath  She reported to the ophthalmologist who diagnosed her with cranial nerve III palsy  She reports that placing an eye patch over her right eye is helping with her vision  Review of Systems:  Review of Systems   Constitutional: Negative for chills, fatigue and fever  HENT: Negative for ear pain and sore throat  Eyes: Positive for visual disturbance  Negative for photophobia, pain, discharge, redness and itching  Respiratory: Negative for cough, shortness of breath, wheezing and stridor  Cardiovascular: Negative for chest pain, palpitations and leg swelling  Gastrointestinal: Negative for abdominal pain and vomiting  Genitourinary: Negative for dysuria and hematuria  Musculoskeletal: Negative for arthralgias and back pain  Skin: Negative for color change, pallor, rash and wound  Neurological: Negative for dizziness, tremors, seizures, syncope, weakness, numbness and headaches  All other systems reviewed and are negative  Past Medical and Surgical History:   Past Medical History:   Diagnosis Date   • Aphasia, mixed 7/28/2017   • Atrial fibrillation (HCC)    • CHF (congestive heart failure) (HCC)    • Colon polyp    • Headache    • Hyperlipidemia    • Hypertension    • Lyme disease    • Shortness of breath    • TIA (transient ischemic attack)    • Transient cerebral ischemia     Last assessed - 1/23/18   • Vertigo        Past Surgical History:   Procedure Laterality Date   • CARDIAC PACEMAKER PLACEMENT  12/2019   • COLONOSCOPY     • [place] SIGMOIDOSCOPY FLX DX W/COLLJ SPEC BR/WA IF PFRMD N/A 11/28/2017    Procedure: Aundrea Heredia;  Surgeon: Fam Hutchison MD;  Location: [facility];   Service: Gastroenterology   • TONSILLECTOMY         Meds/Allergies:  Prior to Admission medications    Medication Sig Start Date End Date Taking? Authorizing Provider   apixaban (Eliquis) 5 mg Take 1 tablet (5 mg total) by mouth 2 (two) times a day 22   Aleksandr Borges MD   furosemide (LASIX) 20 mg tablet Take 1 tablet (20 mg total) by mouth daily Do not start before 22   Magdi Bhatt MD   lisinopril (ZESTRIL) 2 5 mg tablet Take 1 tablet (2 5 mg total) by mouth daily 22   Aleksandr Borges MD   lovastatin (MEVACOR) 10 MG tablet Take 1 tablet (10 mg total) by mouth daily at bedtime 22   Aleksandr Borges MD   metoprolol succinate (TOPROL-XL) 50 mg 24 hr tablet Take 1 5 tablets (75 mg total) by mouth 2 (two) times a day 22   Aleksandr Borges MD   potassium chloride (K-DUR,KLOR-CON) 10 mEq tablet Take 1 tablet (10 mEq total) by mouth daily Do not start before 22   Magdi Bhatt MD   sotalol (BETAPACE) 80 mg tablet Take 2 tablets (160 mg total) by mouth daily 22   Magdi Bhatt MD     I have reviewed home medications with patient personally      Allergies: No Known Allergies    Social History:  Marital Status: /Civil Union   Occupation:   Patient Pre-hospital Living Situation: Home  Patient Pre-hospital Level of Mobility: walks  Patient Pre-hospital Diet Restrictions:   Substance Use History:   Social History     Substance and Sexual Activity   Alcohol Use Yes   • Alcohol/week: 10 0 standard drinks   • Types: 10 Glasses of wine per week    Comment: occ     Social History     Tobacco Use   Smoking Status Former   • Types: Cigarettes   • Quit date:    • Years since quittin 0   Smokeless Tobacco Never   Tobacco Comments    no smoking for 46 years     Social History     Substance and Sexual Activity   Drug Use No       Family History:  Family History   Problem Relation Age of Onset   • Lung cancer Mother    • Dementia Father    • Alzheimer's disease Father    • Other Father         Cardiac Disorder        Physical Exam:     Vitals:   Blood Pressure: 121/69 (12/21/22 1630)  Pulse: 59 (12/21/22 1630)  Temperature: 97 8 °F (36 6 °C) (12/21/22 1044)  Temp Source: Oral (12/21/22 1044)  Respirations: 22 (12/21/22 1630)  SpO2: 98 % (12/21/22 1630)    Physical Exam  Vitals and nursing note reviewed  Constitutional:       General: She is not in acute distress  Appearance: She is well-developed  She is not ill-appearing, toxic-appearing or diaphoretic  HENT:      Head: Normocephalic and atraumatic  Ears:      Comments: right eye ptosis, cn3 palsy right  Pupils equal and reactive to light bilaterally  Eyes:      General: No scleral icterus  Conjunctiva/sclera: Conjunctivae normal    Cardiovascular:      Rate and Rhythm: Normal rate and regular rhythm  Heart sounds: No murmur heard  No friction rub  No gallop  Pulmonary:      Effort: Pulmonary effort is normal  No respiratory distress  Breath sounds: Normal breath sounds  No stridor  No wheezing, rhonchi or rales  Chest:      Chest wall: No tenderness  Abdominal:      General: There is no distension  Palpations: Abdomen is soft  There is no mass  Tenderness: There is no abdominal tenderness  There is no guarding or rebound  Musculoskeletal:         General: No swelling, tenderness, deformity or signs of injury  Cervical back: Neck supple  Skin:     General: Skin is warm and dry  Capillary Refill: Capillary refill takes less than 2 seconds  Coloration: Skin is not jaundiced or pale  Neurological:      Mental Status: She is alert and oriented to person, place, and time     Psychiatric:         Mood and Affect: Mood normal           Additional Data:     Lab Results:  Results from last 7 days   Lab Units 12/21/22  1102   WBC Thousand/uL 3 88*   HEMOGLOBIN g/dL 12 9   HEMATOCRIT % 41 0   PLATELETS Thousands/uL 196   NEUTROS PCT % 63   LYMPHS PCT % 17   MONOS PCT % 17*   EOS PCT % 3     Results from last 7 days   Lab Units 12/21/22  1102   SODIUM mmol/L 138   POTASSIUM mmol/L 3 9   CHLORIDE mmol/L 104   CO2 mmol/L 25   BUN mg/dL 15   CREATININE mg/dL 1 22   ANION GAP mmol/L 9   CALCIUM mg/dL 8 5   ALBUMIN g/dL 3 5   TOTAL BILIRUBIN mg/dL 0 39   ALK PHOS U/L 88   ALT U/L 19   AST U/L 25   GLUCOSE RANDOM mg/dL 115     Results from last 7 days   Lab Units 12/21/22  1102   INR  1 41*                   Lines/Drains:  Invasive Devices     Peripheral Intravenous Line  Duration           Peripheral IV 12/21/22 Right Antecubital <1 day                    Imaging: No pertinent imaging reviewed  CTA head and neck with and without contrast   Final Result by Lisandra Potts MD (12/21 1236)      No acute intracranial abnormality  Tiny infundibular origins of tiny bilateral posterior communicating arteries (left larger than right)  Specifically, no aneurysm of right posterior communicating artery to explain right 3rd palsy  Consider follow-up MRI brain and orbits with and    without contrast for further evaluation  Negative CTA head and neck for large vessel occlusion, dissection, aneurysm, or high-grade stenosis  Mild paraseptal emphysema with probable subpleural pulmonary fibrosis  Consider follow-up CT chest for further evaluation  Additional chronic/incidental findings as detailed above  The study was marked in [facility] for immediate notification  Workstation performed: [id number]         MRI inpatient order    (Results Pending)       EKG and Other Studies Reviewed on Admission:   · EKG: pending  ** Please Note: This note has been constructed using a voice recognition system   **

## 2022-12-21 NOTE — ED PROVIDER NOTES
History  Chief Complaint   Patient presents with   • Visual Field Change     Pt was sent from opthalmology  Has been experiencing double vision for the past three days  66 yo female with h/o CHF and Afib on chronic eliquis who presents to ED for 3 days of binocular diplopia resolved with covering her R eye  Went to the eye doctor today who diagnosed pt with R 3rd nerve palsy without pupillary involvement and he sent her here for further evaluation  I spoke directly with him and part of the history was obtained from that conversation  Pt denies recent headache, fall, trauma, neck pain or additional neurologic deficit aside from diplopia  No h/o same  No other symptoms or concerns  Prior to Admission Medications   Prescriptions Last Dose Informant Patient Reported? Taking? apixaban (Eliquis) 5 mg   No No   Sig: Take 1 tablet (5 mg total) by mouth 2 (two) times a day   furosemide (LASIX) 20 mg tablet   No No   Sig: Take 1 tablet (20 mg total) by mouth daily Do not start before November 5, 2022  lisinopril (ZESTRIL) 2 5 mg tablet   No No   Sig: Take 1 tablet (2 5 mg total) by mouth daily   lovastatin (MEVACOR) 10 MG tablet   No No   Sig: Take 1 tablet (10 mg total) by mouth daily at bedtime   metoprolol succinate (TOPROL-XL) 50 mg 24 hr tablet   No No   Sig: Take 1 5 tablets (75 mg total) by mouth 2 (two) times a day   potassium chloride (K-DUR,KLOR-CON) 10 mEq tablet   No No   Sig: Take 1 tablet (10 mEq total) by mouth daily Do not start before November 5, 2022     sotalol (BETAPACE) 80 mg tablet   No No   Sig: Take 2 tablets (160 mg total) by mouth daily      Facility-Administered Medications: None       Past Medical History:   Diagnosis Date   • Aphasia, mixed 7/28/2017   • Atrial fibrillation (HCC)    • CHF (congestive heart failure) (HCC)    • Colon polyp    • Headache    • Hyperlipidemia    • Hypertension    • Lyme disease    • Shortness of breath    • TIA (transient ischemic attack)    • Transient cerebral ischemia     Last assessed - 18   • Vertigo        Past Surgical History:   Procedure Laterality Date   • CARDIAC PACEMAKER PLACEMENT  2019   • COLONOSCOPY     • [place] SIGMOIDOSCOPY FLX DX W/COLLJ SPEC BR/WA IF PFRMD N/A 2017    Procedure: Rizwana Harris;  Surgeon: Kendra Pham MD;  Location: [facility]; Service: Gastroenterology   • TONSILLECTOMY         Family History   Problem Relation Age of Onset   • Lung cancer Mother    • Dementia Father    • Alzheimer's disease Father    • Other Father         Cardiac Disorder      I have reviewed and agree with the history as documented  E-Cigarette/Vaping   • E-Cigarette Use Never User      E-Cigarette/Vaping Substances   • Nicotine No    • THC No    • CBD No    • Flavoring No    • Other No    • Unknown No      Social History     Tobacco Use   • Smoking status: Former     Types: Cigarettes     Quit date:      Years since quittin 0   • Smokeless tobacco: Never   • Tobacco comments:     no smoking for 46 years   Vaping Use   • Vaping Use: Never used   Substance Use Topics   • Alcohol use: Yes     Alcohol/week: 10 0 standard drinks     Types: 10 Glasses of wine per week     Comment: occ   • Drug use: No       Review of Systems   Eyes: Positive for visual disturbance  Neurological: Negative for dizziness, tremors, seizures, syncope, facial asymmetry, speech difficulty, weakness, light-headedness, numbness and headaches  All other systems reviewed and are negative  Physical Exam  Physical Exam  Vitals and nursing note reviewed  Constitutional:       General: She is not in acute distress  Appearance: Normal appearance  She is well-developed  She is not ill-appearing or diaphoretic  HENT:      Head: Normocephalic and atraumatic  Eyes:      Conjunctiva/sclera: Conjunctivae normal       Pupils: Pupils are equal, round, and reactive to light  Neck:      Vascular: No carotid bruit or JVD     Cardiovascular: Rate and Rhythm: Normal rate  Pulses: Normal pulses  Pulmonary:      Effort: Pulmonary effort is normal  No respiratory distress  Breath sounds: Normal breath sounds  No stridor  Abdominal:      General: There is no distension  Palpations: Abdomen is soft  Tenderness: There is no abdominal tenderness  There is no guarding or rebound  Musculoskeletal:         General: No tenderness or deformity  Normal range of motion  Cervical back: Normal range of motion and neck supple  No rigidity  Skin:     General: Skin is warm and dry  Capillary Refill: Capillary refill takes less than 2 seconds  Coloration: Skin is not pale  Findings: No erythema or rash  Neurological:      Mental Status: She is alert and oriented to person, place, and time  Cranial Nerves: Cranial nerve deficit present  Sensory: No sensory deficit  Motor: No weakness or abnormal muscle tone  Coordination: Coordination normal       Gait: Gait normal       Comments: R 3rd nerve palsy  Normal pupil  R eyelid ptosis  Vital Signs  ED Triage Vitals [12/21/22 1044]   Temperature Pulse Respirations Blood Pressure SpO2   97 8 °F (36 6 °C) 85 20 112/67 98 %      Temp Source Heart Rate Source Patient Position - Orthostatic VS BP Location FiO2 (%)   Oral Monitor Sitting Left arm --      Pain Score       --           Vitals:    12/21/22 1044   BP: 112/67   Pulse: 85   Patient Position - Orthostatic VS: Sitting         Visual Acuity  Visual Acuity    Flowsheet Row Most Recent Value   L Pupil Size (mm) 4   R Pupil Size (mm) 4          ED Medications  Medications   iohexol (OMNIPAQUE) 350 MG/ML injection (SINGLE-DOSE) 85 mL (85 mL Intravenous Given 12/21/22 1155)       Diagnostic Studies  Results Reviewed     Procedure Component Value Units Date/Time    HS Troponin I 2hr [[id number]] Collected: 12/21/22 1321    Lab Status:  In process Specimen: Blood from Arm, Right Updated: 12/21/22 8962 HS Troponin I 4hr [[id number]]     Lab Status: No result Specimen: Blood     FLU/RSV/COVID - if FLU/RSV clinically relevant [[id number]]  (Abnormal) Collected: 12/21/22 1102    Lab Status: Final result Specimen: Nares from Nose Updated: 12/21/22 1154     SARS-CoV-2 Negative     INFLUENZA A PCR Positive     INFLUENZA B PCR Negative     RSV PCR Negative    Narrative:      FOR PEDIATRIC PATIENTS - copy/paste COVID Guidelines URL to browser: [email or web address]  [employer]    SARS-CoV-2 assay is a Nucleic Acid Amplification assay intended for the  qualitative detection of nucleic acid from SARS-CoV-2 in nasopharyngeal  swabs  Results are for the presumptive identification of SARS-CoV-2 RNA  Positive results are indicative of infection with SARS-CoV-2, the virus  causing COVID-19, but do not rule out bacterial infection or co-infection  with other viruses  Laboratories within the [place] and its  territories are required to report all positive results to the appropriate  public health authorities  Negative results do not preclude SARS-CoV-2  infection and should not be used as the sole basis for treatment or other  patient management decisions  Negative results must be combined with  clinical observations, patient history, and epidemiological information  This test has not been FDA cleared or approved  This test has been authorized by FDA under an Emergency Use Authorization  (EUA)  This test is only authorized for the duration of time the  declaration that circumstances exist justifying the authorization of the  emergency use of an in vitro diagnostic tests for detection of SARS-CoV-2  virus and/or diagnosis of COVID-19 infection under section 564(b)(1) of  the Act, 21 U  S C  533QRF-4(N)(0), unless the authorization is terminated  or revoked sooner  The test has been validated but independent review by FDA  and CLIA is pending      Test performed using [employer]: This RT-PCR assay targets N2,  a region unique to SARS-CoV-2  A conserved region in the E-gene was chosen  for pan-Sarbecovirus detection which includes SARS-CoV-2  According to CMS-2020-01-R, this platform meets the definition of high-throughput technology      HS Troponin 0hr (reflex protocol) [[id number]]  (Normal) Collected: 12/21/22 1102    Lab Status: Final result Specimen: Blood from Arm, Right Updated: 12/21/22 1147     hs TnI 0hr 7 ng/L     Protime-INR [[id number]]  (Abnormal) Collected: 12/21/22 1102    Lab Status: Final result Specimen: Blood from Arm, Right Updated: 12/21/22 1143     Protime 17 0 seconds      INR 1 41    APTT [[id number]]  (Normal) Collected: 12/21/22 1102    Lab Status: Final result Specimen: Blood from Arm, Right Updated: 12/21/22 1143     PTT 37 seconds     Hepatic function panel [[id number]]  (Normal) Collected: 12/21/22 1102    Lab Status: Final result Specimen: Blood from Arm, Right Updated: 12/21/22 1141     Total Bilirubin 0 39 mg/dL      Bilirubin, Direct 0 13 mg/dL      Alkaline Phosphatase 88 U/L      AST 25 U/L      ALT 19 U/L      Total Protein 7 6 g/dL      Albumin 3 5 g/dL     Basic metabolic panel [[id number]] Collected: 12/21/22 1102    Lab Status: Final result Specimen: Blood from Arm, Right Updated: 12/21/22 1141     Sodium 138 mmol/L      Potassium 3 9 mmol/L      Chloride 104 mmol/L      CO2 25 mmol/L      ANION GAP 9 mmol/L      BUN 15 mg/dL      Creatinine 1 22 mg/dL      Glucose 115 mg/dL      Calcium 8 5 mg/dL      eGFR 42 ml/min/1 73sq m     Narrative:      [place] guidelines for Chronic Kidney Disease (CKD):   •  Stage 1 with normal or high GFR (GFR > 90 mL/min/1 73 square meters)  •  Stage 2 Mild CKD (GFR = 60-89 mL/min/1 73 square meters)  •  Stage 3A Moderate CKD (GFR = 45-59 mL/min/1 73 square meters)  •  Stage 3B Moderate CKD (GFR = 30-44 mL/min/1 73 square meters)  •  Stage 4 Severe CKD (GFR = 15-29 mL/min/1 73 square meters)  •  Stage 5 End Stage CKD (GFR <15 mL/min/1 73 square meters)  Note: GFR calculation is accurate only with a steady state creatinine    CBC and differential [[id number]]  (Abnormal) Collected: 12/21/22 1102    Lab Status: Final result Specimen: Blood from Arm, Right Updated: 12/21/22 1112     WBC 3 88 Thousand/uL      RBC 4 78 Million/uL      Hemoglobin 12 9 g/dL      Hematocrit 41 0 %      MCV 86 fL      MCH 27 0 pg      MCHC 31 5 g/dL      RDW 17 2 %      MPV 11 1 fL      Platelets 163 Thousands/uL      nRBC 0 /100 WBCs      Neutrophils Relative 63 %      Immat GRANS % 0 %      Lymphocytes Relative 17 %      Monocytes Relative 17 %      Eosinophils Relative 3 %      Basophils Relative 0 %      Neutrophils Absolute 2 46 Thousands/µL      Immature Grans Absolute 0 01 Thousand/uL      Lymphocytes Absolute 0 64 Thousands/µL      Monocytes Absolute 0 65 Thousand/µL      Eosinophils Absolute 0 11 Thousand/µL      Basophils Absolute 0 01 Thousands/µL                  CTA head and neck with and without contrast   Final Result by My Fuchs MD (12/21 1236)      No acute intracranial abnormality  Tiny infundibular origins of tiny bilateral posterior communicating arteries (left larger than right)  Specifically, no aneurysm of right posterior communicating artery to explain right 3rd palsy  Consider follow-up MRI brain and orbits with and    without contrast for further evaluation  Negative CTA head and neck for large vessel occlusion, dissection, aneurysm, or high-grade stenosis  Mild paraseptal emphysema with probable subpleural pulmonary fibrosis  Consider follow-up CT chest for further evaluation  Additional chronic/incidental findings as detailed above  The study was marked in [facility] for immediate notification                       Workstation performed: [id number]                    Procedures  Procedures         ED Course                               SBIRT 20yo+ Flowsheet Row Most Recent Value   SBIRT (23 yo +)    In order to provide better care to our patients, we are screening all of our patients for alcohol and drug use  Would it be okay to ask you these screening questions? No Filed at: 12/21/2022 1114                    MDM  Number of Diagnoses or Management Options     Amount and/or Complexity of Data Reviewed  Clinical lab tests: ordered and reviewed  Tests in the radiology section of CPT®: ordered and reviewed  Tests in the medicine section of CPT®: reviewed and ordered  Obtain history from someone other than the patient: yes  Discuss the patient with other providers: yes  Independent visualization of images, tracings, or specimens: yes        Disposition  Final diagnoses:   3rd nerve palsy, partial, right   Influenza A   History of TIA (transient ischemic attack)     Time reflects when diagnosis was documented in both MDM as applicable and the Disposition within this note     Time User Action Codes Description Comment    12/21/2022 12:57 PM Javier Garcia Add [H49 01] 3rd nerve palsy, partial, right     12/21/2022 12:57 PM Steve Rizvi Add [J10 1] Influenza A     12/21/2022 12:58 PM Jaime Rizvi Add [Z86 73] History of TIA (transient ischemic attack)       ED Disposition     ED Disposition   Admit    Condition   Stable    Date/Time   Wed Dec 21, 2022  1:27 PM    Comment   Case was discussed with Dr Salima Rodriguez and the patient's admission status was agreed to be Admission Status: inpatient status to the service of Dr Salima Rodriguez   Follow-up Information    None         Patient's Medications   Discharge Prescriptions    No medications on file       No discharge procedures on file      PDMP Review     None          ED Provider  Electronically Signed by           Amaya Tom MD  12/21/22 6464

## 2022-12-21 NOTE — ASSESSMENT & PLAN NOTE
Patient presents with several days of diplopia  She presented to her ophthalmologist and was diagnosed with right cranial nerve 3 palsy  Patient reports her vision improved with the placement of eye patch over her right eye  She also tested positive for flu, reports typical viral symptoms  CTA reviewed without any findings to explain her presentation  Pending MRI brain and orbits with and without contrast

## 2022-12-21 NOTE — ASSESSMENT & PLAN NOTE
With history of hypertension managed on Lasix 20 mg daily, lisinopril 2 5 mg daily as an outpatient  Continue furosemide lisinopril and metoprolol

## 2022-12-21 NOTE — ASSESSMENT & PLAN NOTE
Patient with history of paroxysmal A  Fib  On Eliquis for anticoagulation, will continue 5 mg twice daily  continue on Toprol-XL 75 mg twice daily and sotalol 160 mg daily-creatinine clearance currently borderline for sotalol    Continue to monitor renal function

## 2022-12-22 ENCOUNTER — PATIENT OUTREACH (OUTPATIENT)
Dept: FAMILY MEDICINE CLINIC | Facility: CLINIC | Age: 77
End: 2022-12-22

## 2022-12-22 ENCOUNTER — APPOINTMENT (INPATIENT)
Dept: MRI IMAGING | Facility: HOSPITAL | Age: 77
End: 2022-12-22

## 2022-12-22 LAB
ANION GAP SERPL CALCULATED.3IONS-SCNC: 11 MMOL/L (ref 4–13)
ATRIAL RATE: 67 BPM
ATRIAL RATE: 82 BPM
BASOPHILS # BLD AUTO: 0.01 THOUSANDS/ÂΜL (ref 0–0.1)
BASOPHILS NFR BLD AUTO: 0 % (ref 0–1)
BUN SERPL-MCNC: 14 MG/DL (ref 5–25)
CALCIUM SERPL-MCNC: 8.2 MG/DL (ref 8.3–10.1)
CHLORIDE SERPL-SCNC: 102 MMOL/L (ref 96–108)
CO2 SERPL-SCNC: 24 MMOL/L (ref 21–32)
CREAT SERPL-MCNC: 1.01 MG/DL (ref 0.6–1.3)
EOSINOPHIL # BLD AUTO: 0.07 THOUSAND/ÂΜL (ref 0–0.61)
EOSINOPHIL NFR BLD AUTO: 2 % (ref 0–6)
ERYTHROCYTE [DISTWIDTH] IN BLOOD BY AUTOMATED COUNT: 17 % (ref 11.6–15.1)
GFR SERPL CREATININE-BSD FRML MDRD: 53 ML/MIN/1.73SQ M
GLUCOSE SERPL-MCNC: 108 MG/DL (ref 65–140)
HCT VFR BLD AUTO: 37.2 % (ref 34.8–46.1)
HGB BLD-MCNC: 11.9 G/DL (ref 11.5–15.4)
IMM GRANULOCYTES # BLD AUTO: 0.01 THOUSAND/UL (ref 0–0.2)
IMM GRANULOCYTES NFR BLD AUTO: 0 % (ref 0–2)
LYMPHOCYTES # BLD AUTO: 0.75 THOUSANDS/ÂΜL (ref 0.6–4.47)
LYMPHOCYTES NFR BLD AUTO: 19 % (ref 14–44)
MCH RBC QN AUTO: 26.7 PG (ref 26.8–34.3)
MCHC RBC AUTO-ENTMCNC: 32 G/DL (ref 31.4–37.4)
MCV RBC AUTO: 83 FL (ref 82–98)
MONOCYTES # BLD AUTO: 0.66 THOUSAND/ÂΜL (ref 0.17–1.22)
MONOCYTES NFR BLD AUTO: 17 % (ref 4–12)
NEUTROPHILS # BLD AUTO: 2.49 THOUSANDS/ÂΜL (ref 1.85–7.62)
NEUTS SEG NFR BLD AUTO: 62 % (ref 43–75)
NRBC BLD AUTO-RTO: 0 /100 WBCS
P AXIS: 43 DEGREES
P AXIS: 69 DEGREES
PLATELET # BLD AUTO: 177 THOUSANDS/UL (ref 149–390)
PMV BLD AUTO: 10.8 FL (ref 8.9–12.7)
POTASSIUM SERPL-SCNC: 3.6 MMOL/L (ref 3.5–5.3)
PR INTERVAL: 214 MS
PR INTERVAL: 214 MS
QRS AXIS: 38 DEGREES
QRS AXIS: 72 DEGREES
QRSD INTERVAL: 80 MS
QRSD INTERVAL: 80 MS
QT INTERVAL: 414 MS
QT INTERVAL: 448 MS
QTC INTERVAL: 473 MS
QTC INTERVAL: 483 MS
RBC # BLD AUTO: 4.46 MILLION/UL (ref 3.81–5.12)
SODIUM SERPL-SCNC: 137 MMOL/L (ref 135–147)
T WAVE AXIS: -14 DEGREES
T WAVE AXIS: 11 DEGREES
VENTRICULAR RATE: 67 BPM
VENTRICULAR RATE: 82 BPM
WBC # BLD AUTO: 3.99 THOUSAND/UL (ref 4.31–10.16)

## 2022-12-22 RX ORDER — ACETAMINOPHEN 325 MG/1
650 TABLET ORAL EVERY 6 HOURS PRN
Status: DISCONTINUED | OUTPATIENT
Start: 2022-12-22 | End: 2022-12-23 | Stop reason: HOSPADM

## 2022-12-22 RX ORDER — LORAZEPAM 2 MG/ML
0.5 INJECTION INTRAMUSCULAR ONCE
Status: COMPLETED | OUTPATIENT
Start: 2022-12-22 | End: 2022-12-22

## 2022-12-22 RX ADMIN — GADOBUTROL 9 ML: 604.72 INJECTION INTRAVENOUS at 13:12

## 2022-12-22 RX ADMIN — APIXABAN 5 MG: 5 TABLET, FILM COATED ORAL at 09:27

## 2022-12-22 RX ADMIN — ACETAMINOPHEN 650 MG: 325 TABLET, FILM COATED ORAL at 16:43

## 2022-12-22 RX ADMIN — LORAZEPAM 0.5 MG: 2 INJECTION INTRAMUSCULAR; INTRAVENOUS at 11:56

## 2022-12-22 RX ADMIN — ACETAMINOPHEN 650 MG: 325 TABLET, FILM COATED ORAL at 03:54

## 2022-12-22 RX ADMIN — PRAVASTATIN SODIUM 10 MG: 20 TABLET ORAL at 16:44

## 2022-12-22 RX ADMIN — FUROSEMIDE 20 MG: 20 TABLET ORAL at 09:27

## 2022-12-22 RX ADMIN — APIXABAN 5 MG: 5 TABLET, FILM COATED ORAL at 18:18

## 2022-12-22 RX ADMIN — SOTALOL HYDROCHLORIDE 160 MG: 80 TABLET ORAL at 09:27

## 2022-12-22 RX ADMIN — METOPROLOL SUCCINATE 75 MG: 50 TABLET, EXTENDED RELEASE ORAL at 09:27

## 2022-12-22 RX ADMIN — LISINOPRIL 2.5 MG: 2.5 TABLET ORAL at 09:27

## 2022-12-22 NOTE — PROGRESS NOTES
[facility]  Progress Note - Jc Earing [birth year], 65 y o  female MRN: [id number]  Unit/Bed#: -01 Encounter: [id number]  Primary Care Provider: Rosy Chase DO   Date and time admitted to hospital: 12/21/2022 10:46 AM    * Diplopia  Assessment & Plan  Patient presents with several days of diplopia  Tested positive for influenza A   Multiple family members recently tested positive for influenza  She presented to her ophthalmologist and was diagnosed with right cranial nerve 3 palsy  Patient reports her vision improved with the placement of eye patch over her right eye  CTA reviewed without any findings to explain her presentation  Brain MRI brain negative for acute finding, noted with chronic CVA with chronic microangiopathic changes  Continue Eliquis, statin  Likely discharge tomorrow if continues to do well  Paroxysmal atrial fibrillation [facility])  Assessment & Plan  Patient with history of paroxysmal A  Fib  On Eliquis for anticoagulation, will continue 5 mg twice daily  continue on Toprol-XL 75 mg twice daily and sotalol 160 mg daily-creatinine clearance currently borderline for sotalol  Continue to monitor renal function    Essential hypertension  Assessment & Plan  With history of hypertension managed on Lasix 20 mg daily, lisinopril 2 5 mg daily as an outpatient  Continue furosemide lisinopril and metoprolol      VTE Pharmacologic Prophylaxis:   Moderate Risk (Score 3-4) - Pharmacological DVT Prophylaxis Ordered: apixaban (Eliquis)  Patient Centered Rounds: I performed bedside rounds with nursing staff today  Education and Discussions with Family / Patient: Updated  (son) at bedside  Time Spent for Care: 30 minutes  More than 50% of total time spent on counseling and coordination of care as described above      Current Length of Stay: 1 day(s)  Current Patient Status: Inpatient   Certification Statement: The patient will continue to require additional inpatient hospital stay due to diplopia  Discharge Plan: Anticipate discharge tomorrow to home  Code Status: Level 1 - Full Code    Subjective:   Currently patient does report feeling better with having eye patch on the right side  Patient does have persistent diplopia without the eye patch  Denies any other focal neurological complaints  No other events noted  Objective:     Vitals:   Temp (24hrs), Av 1 °F (36 7 °C), Min:97 6 °F (36 4 °C), Max:98 5 °F (36 9 °C)    Temp:  [97 6 °F (36 4 °C)-98 5 °F (36 9 °C)] 97 6 °F (36 4 °C)  HR:  [60-76] 70  Resp:  [16-20] 16  BP: (102-132)/(59-83) 102/59  SpO2:  [94 %-97 %] 97 %  Body mass index is 33 49 kg/m²  Input and Output Summary (last 24 hours): Intake/Output Summary (Last 24 hours) at 2022 1722  Last data filed at 2022 0300  Gross per 24 hour   Intake 240 ml   Output --   Net 240 ml       Physical Exam:   Physical Exam  Constitutional:       General: She is not in acute distress  Appearance: Normal appearance  She is not ill-appearing  HENT:      Head: Normocephalic and atraumatic  Eyes:      Pupils: Pupils are equal, round, and reactive to light  Comments: Right-sided mild ptosis noted: Patient does report history of ptosis   Cardiovascular:      Rate and Rhythm: Normal rate  Pulses: Normal pulses  Pulmonary:      Effort: Pulmonary effort is normal  No respiratory distress  Breath sounds: No wheezing  Abdominal:      General: Bowel sounds are normal  There is no distension  Palpations: Abdomen is soft  Tenderness: There is no abdominal tenderness  Musculoskeletal:      Cervical back: Normal range of motion and neck supple  No rigidity  Right lower leg: No edema  Left lower leg: No edema  Neurological:      Mental Status: She is alert and oriented to person, place, and time  Mental status is at baseline     Psychiatric:         Behavior: Behavior normal          Additional Data: Labs:  Results from last 7 days   Lab Units 12/22/22  0442   WBC Thousand/uL 3 99*   HEMOGLOBIN g/dL 11 9   HEMATOCRIT % 37 2   PLATELETS Thousands/uL 177   NEUTROS PCT % 62   LYMPHS PCT % 19   MONOS PCT % 17*   EOS PCT % 2     Results from last 7 days   Lab Units 12/22/22  0442 12/21/22  1102   SODIUM mmol/L 137 138   POTASSIUM mmol/L 3 6 3 9   CHLORIDE mmol/L 102 104   CO2 mmol/L 24 25   BUN mg/dL 14 15   CREATININE mg/dL 1 01 1 22   ANION GAP mmol/L 11 9   CALCIUM mg/dL 8 2* 8 5   ALBUMIN g/dL  --  3 5   TOTAL BILIRUBIN mg/dL  --  0 39   ALK PHOS U/L  --  88   ALT U/L  --  19   AST U/L  --  25   GLUCOSE RANDOM mg/dL 108 115     Results from last 7 days   Lab Units 12/21/22  1102   INR  1 41*                   Lines/Drains:  Invasive Devices     Peripheral Intravenous Line  Duration           Peripheral IV 12/21/22 Right Antecubital 1 day                  Telemetry:  Telemetry Orders (From admission, onward)             48 Hour Telemetry Monitoring  (ED Bridging Orders Panel)  Continuous x 48 hours        References:    Telemetry Guidelines   Question:  Reason for 48 Hour Telemetry  Answer:  Acute CVA (<24 hrs old, hemispheric strokes, selected brainstem strokes, cardiac arrhythmias)                 Telemetry Reviewed: NSR             Imaging: Reviewed radiology reports from this admission including: MRI brain    Recent Cultures (last 7 days):         Last 24 Hours Medication List:   Current Facility-Administered Medications   Medication Dose Route Frequency Provider Last Rate   • acetaminophen  650 mg Oral Q6H PRN Elmer Falcon PA-C     • apixaban  5 mg Oral BID Dave Banai, DO     • furosemide  20 mg Oral Daily Dave Banai, DO     • lisinopril  2 5 mg Oral Daily Dave Banai, DO     • metoprolol succinate  75 mg Oral BID Dave Banai, DO     • pravastatin  10 mg Oral Daily With [employer], DO     • sotalol  160 mg Oral Daily Dave Banai, DO          Today, Patient Was Seen By: Reji Fonseca MD Zabrina    **Please Note: This note may have been constructed using a voice recognition system  **

## 2022-12-22 NOTE — ED NOTES
Observed AAOx4, talkative with family member at bedside, no reports of pain or discomfort to right eye at present       Mahsa Dupree RN  12/21/22 1950

## 2022-12-22 NOTE — CASE MANAGEMENT
Case Management Assessment & Discharge Planning Note    Patient name Morse Skiff  Location [address] 235/-[id number] MRN [id number]  : 1945 Date 2022       Current Admission Date: 2022  Current Admission Diagnosis:Diplopia   Patient Active Problem List    Diagnosis Date Noted   • Diplopia 2022   • Acute on chronic systolic congestive heart failure ([address] ) 2022   • SSS (sick sinus syndrome) ([facility] ) 2022   • Stage 3a chronic kidney disease ([facility] ) 2021   • Other insomnia 2021   • Chronic obstructive pulmonary disease ([facility] ) 2021   • BMI 33 0-33 9,adult 2021   • Paroxysmal atrial fibrillation ([facility] ) 2019   • Myocardiopathy ([facility] ) 2018   • Anticoagulant long-term use 2018   • Nonrheumatic mitral valve regurgitation 2018   • Nonrheumatic aortic valve insufficiency 2018   • Nonrheumatic tricuspid valve regurgitation 2018   • Persistent atrial fibrillation with rapid ventricular response ([facility] ) 2018   • Essential hypertension 2018   • Mixed hyperlipidemia 2018   • Obesity (BMI 30-39 9) 2018   • H/O TIA (transient ischemic attack) and stroke 2018   • Migraine with aura and without status migrainosus, not intractable 2016      LOS (days): 1  Geometric Mean LOS (GMLOS) (days): 2 00  Days to GMLOS:1 1     OBJECTIVE:    Risk of Unplanned Readmission Score: 14 15         Current admission status: Inpatient       Preferred Pharmacy:   [address] [place] [address] R R 1 [phone] R R [phone])  [facility]  Phone: [phone] Fax: [phone]    [facility] ([facility],   [address] [address]  Phone: [phone] Fax: [phone]    Primary Care Provider: Henrique Jones DO    Primary Insurance: MEDICARE  Secondary Insurance: AARP    ASSESSMENT:  [address] Proxies    There are no active Health Care Proxies on file  Readmission Root Cause  30 Day Readmission: No    Patient Information  Admitted from[de-identified] Home  Mental Status: Alert  During Assessment patient was accompanied by: Not accompanied during assessment  Assessment information provided by[de-identified] Patient  Primary Caregiver: Self  Support Systems: Spouse/significant other  [place] of Residence: [place] [address] do you live in?: [address] [facility] entry access options  Select all that apply : Stairs  Number of steps to enter home  : 1  Do the steps have railings?: No  Type of Current Residence: 2 story home  Upon entering residence, is there a bedroom on the main floor (no further steps)?: No  A bedroom is located on the following floor levels of residence (select all that apply):: 2nd Floor  Upon entering residence, is there a bathroom on the main floor (no further steps)?: No  Indicate which floors of current residence have a bathroom (select all the apply):: 2nd Floor  Number of steps to 2nd floor from main floor: One Flight  In the last 12 months, was there a time when you were not able to pay the mortgage or rent on time?: No  In the last 12 months, how many places have you lived?: 1  In the last 12 months, was there a time when you did not have a steady place to sleep or slept in a shelter (including now)?: No  Homeless/housing insecurity resource given?: N/A  Living Arrangements: Lives w/ Extended Family  Is patient a ?: No    Activities of Daily Living Prior to Admission  Functional Status: Independent  Completes ADLs independently?: Yes  Ambulates independently?: Yes  Does patient use assisted devices?: No  Does patient currently own DME?: No  Does patient have a history of Outpatient Therapy (PT/OT)?: No  Does the patient have a history of Short-Term Rehab?: No  Does patient have a history of HHC?: No  Does patient currently have [address]?: No         Patient Information Continued  Income Source: SSI/SSD  Does patient have prescription coverage?: Yes  Within the past 12 months, you worried that your food would run out before you got the money to buy more : Never true  Within the past 12 months, the food you bought just didn't last and you didn't have money to get more : Never true  Food insecurity resource given?: N/A  Does patient receive dialysis treatments?: No  Does patient have a history of substance abuse?: No  Does patient have a history of Mental Health Diagnosis?: No         Means of Transportation  Means of Transport to Appts[de-identified] Family transport  In the past 12 months, has lack of transportation kept you from medical appointments or from getting medications?: No  In the past 12 months, has lack of transportation kept you from meetings, work, or from getting things needed for daily living?: No  Was application for public transport provided?: N/A        DISCHARGE DETAILS:    Discharge planning discussed with[de-identified] Patient at bedside  Freedom of Choice: Yes  Comments - Freedom of Choice: No referrals needed at this time  PT/OT recommendation of no rehabilitation needs  Were Treatment Team discharge recommendations reviewed with patient/caregiver?: Yes  Did patient/caregiver verbalize understanding of patient care needs?: Yes  Were patient/caregiver advised of the risks associated with not following Treatment Team discharge recommendations?: Yes    Contacts  Reason/Outcome: Discharge [address]         [facility] the [facility]  at discharge?: No    DME Referral Provided  Referral made for DME?: No    Other Referral/Resources/Interventions Provided:  Interventions: Short Term Rehab  Referral Comments: No referrals needed at this time  PT/OT recommendation of no rehabilitation needs      Would you like to participate in our [address] service program?  : No - Declined    Treatment Team Recommendation: Home  Discharge Destination Plan[de-identified] Home  Transport at Discharge : Family

## 2022-12-22 NOTE — OCCUPATIONAL THERAPY NOTE
Occupational Therapy Evaluation         Patient Name: Dariana Sierra  SUNFX'U Date: 12/22/2022 12/22/22 1100   OT Last Visit   OT Visit Date 12/22/22   Note Type   Note type Evaluation   Pain Assessment   Pain Assessment Tool 0-10   Pain Score No Pain   Restrictions/Precautions   Weight Bearing Precautions Per Order No   Braces or Orthoses   (none reported)   Other Precautions Contact/isolation;Droplet precautions; Telemetry; Visual impairment  (+influenza)   Home Living   Type of [facility] [address] Two level;Bed/bath upstairs; Performs ADLs on one level;1/2 bath on main level;Stairs to enter with rails  (1 BEBE)   Bathroom Shower/Tub Walk-in shower  (also has spa tub)   [address] [facility] [address]  (no DME used at baseline)   [facility]   Additional Comments no AD used at baseline   Prior Function   Level of [place] Independent with ADLs; Independent with functional mobility; Independent with IADLS   Lives With Spouse; Family   Receives Help From Family   IADLs Independent with meal prep; Independent with medication management; Family/Friend/Other provides transportation   Falls in the last 6 months 0   Vocational Retired   Lifestyle   Autonomy Pt lives with family in two story house with 1 BEBE  Pt independent with ADLs, IADL, and functional mobility   Pt reports 0 falls in the last 6 months   Reciprocal Relationships supportive family   Service to Others retired   General   Family/Caregiver Present No   ADL   Where Assessed Chair   Equipment Provided Other (Comment)  (no AD provided)   Eating Assistance 6  Modified independent   Grooming Assistance 6  Modified Independent   UB Bathing Assistance 6  Modified Independent   LB Bathing Assistance 6  [address] 6  Modified independent   [address] [facility] 6  Modified independent   [address]  6  Modified independent   Functional Assistance 6  Modified independent   Bed Mobility   Supine to Sit 6  Modified independent   Transfers   Sit to Stand 6  Modified independent   Stand to Sit 6  Modified independent   Functional Mobility   Functional Mobility 6  Modified independent   Balance   Static Sitting Normal   Dynamic Sitting Normal   Static Standing Normal   Dynamic Standing Normal   Ambulatory Good   Activity Tolerance   Activity Tolerance Patient tolerated treatment well   Medical Staff Made Aware Pt seen as a co-eval with PT Ryan Basilio due to the patient's co-morbidities; Dr Rochelle Tripp   Gross Motor Coordination Functional   Fine Motor Coordination Functional   Vision-Basic Assessment   Current Vision Wears glasses all the time   Patient Visual Report   (diplopia (up/down))   Vision - Complex Assessment   Tracking Intact   Saccades Intact   Convergence   (R eye broke at 5" from nose  R eye with eye droop and break )   Additional Comments Pt reports R eye is more "bright" than left when L eye is covered  Pt with eye patch on R eye when received  Donned eye patch back on R eye at end of session  Psychosocial   Psychosocial (WDL) WDL   Cognition   Overall Cognitive Status WFL   Arousal/Participation Alert; Responsive; Cooperative   Attention Within functional limits   Orientation Level Oriented X4   Memory Within functional limits   Following Commands Follows all commands and directions without difficulty   Comments Pt agreeable to OT   Assessment   Limitation Visual deficit   Prognosis Good   Assessment Pt is a 65 y o  Female seen for an OT evaluation admitted to [address] on 12/21/2022 w/ diplopia  OT orders placed for evaluation and treatment  Performed at least two patient identifiers during session including name and wristband   Comorbidities affecting the patient at time of admission include: essential HTN and paroxysmal atrial fibrillation  PTA, Pt lives with family in two story house with 1 BEBE  Pt independent with ADLs, IADL, and functional mobility  Pt reports 0 falls in the last 6 months  Personal factors affecting Pt at time of IE include: stairs to enter house and stairs to navigate within hosue  The following outcome measures performed on IE also indicate need for skilled therapy: Barthel Index: 943/836 and AM-PAC 6-Clicks: 85/04  Upon evaluation, Pt requires no assistance for grooming, UB/LB ADLs, and toileting/functional assistance  Pt is alert and oriented x4  Occupational performance is affected by the following deficits, visual deficits  Pt would benefit from continued OT treatment while in the hospital to address deficits as defined above and maximize level of functional independence  Occupational performance areas to address will include: visual rehabilitation  From OT standpoint, recommendation at time of d/c would be home with OP if symptoms do not resolve in order to facilitate return to PLOF     Goals   Patient Goals to go home today   Plan   Treatment Interventions Visual perceptual retraining   Goal Expiration Date 01/05/23   OT Treatment Day 0   OT Frequency 1-2x/wk   Recommendation   OT Discharge Recommendation Home with outpatient rehabilitation  (if symptoms do not resolve)   AM-PAC Daily Activity Inpatient   Lower Body Dressing 4   Bathing 4   Toileting 4   Upper Body Dressing 4   Grooming 4   Eating 4   Daily Activity Raw Score 24   Daily Activity Standardized Score (Calc for Raw Score >=11) 57 54   AM-PAC Applied Cognition Inpatient   Following a Speech/Presentation 4   Understanding Ordinary Conversation 4   Taking Medications 4   Remembering Where Things Are Placed or Put Away 4   Remembering List of 4-5 Errands 4   Taking Care of Complicated Tasks 4   Applied Cognition Raw Score 24   Applied Cognition Standardized Score 62 21   Barthel Index   Feeding 10   Bathing 5   Grooming Score 5 Dressing Score 10   Bladder Score 10   Bowels Score 10   Toilet Use Score 10   Transfers (Bed/Chair) Score 15   Mobility (Level Surface) Score 15   Stairs Score 10   Barthel Index Score 100     Occupational Therapy goals: In 7-14 days:    Patient will verbalize a resolve in diplopia symptoms  Patient will complete convergence testing with no breakage of either eye 100% of the time  Pt will engage in ongoing visual perceptual assessments and interventions to r/o visual perceptual impairments affecting functional performance    Pt will complete figure ground activity with 90% accuracy       Pt will demonstrate independence in carryover of eye patch for visual/perceptual impairment during daily activities

## 2022-12-22 NOTE — PLAN OF CARE
Problem: OCCUPATIONAL THERAPY ADULT  Goal: Performs self-care activities at highest level of function for planned discharge setting  See evaluation for individualized goals  Description: Treatment Interventions: Visual perceptual retraining          See flowsheet documentation for full assessment, interventions and recommendations  Note: Limitation: Visual deficit  Prognosis: Good  Assessment: Pt is a 65 y o  Female seen for an OT evaluation admitted to [address] [place] [address] on 12/21/2022 w/ diplopia  OT orders placed for evaluation and treatment  Performed at least two patient identifiers during session including name and wristband  Comorbidities affecting the patient at time of admission include: essential HTN and paroxysmal atrial fibrillation  PTA, Pt lives with family in two story house with 1 BEBE  Pt independent with ADLs, IADL, and functional mobility  Pt reports 0 falls in the last 6 months  Personal factors affecting Pt at time of IE include: stairs to enter house and stairs to navigate within hosue  The following outcome measures performed on IE also indicate need for skilled therapy: Barthel Index: 347/763 and AM-PAC 6-Clicks: 98/98  Upon evaluation, Pt requires no assistance for grooming, UB/LB ADLs, and toileting/functional assistance  Pt is alert and oriented x4  Occupational performance is affected by the following deficits, visual deficits  Pt would benefit from continued OT treatment while in the hospital to address deficits as defined above and maximize level of functional independence  Occupational performance areas to address will include: visual rehabilitation  From OT standpoint, recommendation at time of d/c would be home with OP if symptoms do not resolve in order to facilitate return to PLOF       OT Discharge Recommendation: Home with outpatient rehabilitation (if symptoms do not resolve)

## 2022-12-22 NOTE — ASSESSMENT & PLAN NOTE
Patient presents with several days of diplopia  Tested positive for influenza A   Multiple family members recently tested positive for influenza  She presented to her ophthalmologist and was diagnosed with right cranial nerve 3 palsy  Patient reports her vision improved with the placement of eye patch over her right eye  CTA reviewed without any findings to explain her presentation  Brain MRI brain negative for acute finding, noted with chronic CVA with chronic microangiopathic changes  Continue Eliquis, statin  Likely discharge tomorrow if continues to do well

## 2022-12-22 NOTE — PLAN OF CARE
Problem: PAIN - ADULT  Goal: Verbalizes/displays adequate comfort level or baseline comfort level  Description: Interventions:  - Encourage patient to monitor pain and request assistance  - Assess pain using appropriate pain scale  - Administer analgesics based on type and severity of pain and evaluate response  - Implement non-pharmacological measures as appropriate and evaluate response  - Consider cultural and social influences on pain and pain management  - Notify physician/advanced practitioner if interventions unsuccessful or patient reports new pain  Outcome: Progressing     Problem: INFECTION - ADULT  Goal: Absence or prevention of progression during hospitalization  Description: INTERVENTIONS:  - Assess and monitor for signs and symptoms of infection  - Monitor lab/diagnostic results  - Monitor all insertion sites, i e  indwelling lines, tubes, and drains  - Monitor endotracheal if appropriate and nasal secretions for changes in amount and color  - Shiner appropriate cooling/warming therapies per order  - Administer medications as ordered  - Instruct and encourage patient and family to use good hand hygiene technique  - Identify and instruct in appropriate isolation precautions for identified infection/condition  Outcome: Progressing  Goal: Absence of fever/infection during neutropenic   Description: INTERVENTIONS:  - Monitor WBC    Outcome: Progressing

## 2022-12-22 NOTE — PHYSICAL THERAPY NOTE
Physical Therapy Evaluation   Time in: 1040  Time out: 1052  Total evaluation time: 12 minutes    Patient's Name: Nitin Sanchez    Admitting Diagnosis  Influenza A [J10 1]  History of TIA (transient ischemic attack) [Z86 73]  3rd nerve palsy, partial, right [H49 01]    Problem List  Patient Active Problem List   Diagnosis    Persistent atrial fibrillation with rapid ventricular response ([address] )    Essential hypertension    Mixed hyperlipidemia    Obesity (BMI 30-39  9)    H/O TIA (transient ischemic attack) and stroke    Migraine with aura and without status migrainosus, not intractable    Myocardiopathy (HCC)    Anticoagulant long-term use    Nonrheumatic mitral valve regurgitation    Nonrheumatic aortic valve insufficiency    Nonrheumatic tricuspid valve regurgitation    Paroxysmal atrial fibrillation (HCC)    Other insomnia    Chronic obstructive pulmonary disease (HCC)    BMI 33 0-33 9,adult    Stage 3a chronic kidney disease (HCC)    Acute on chronic systolic congestive heart failure (HCC)    SSS (sick sinus syndrome) ([address] )    Diplopia       Past Medical History  Past Medical History:   Diagnosis Date    Aphasia, mixed 7/28/2017    Atrial fibrillation (HCC)     CHF (congestive heart failure) (HCC)     Colon polyp     Headache     Hyperlipidemia     Hypertension     Lyme disease     Shortness of breath     TIA (transient ischemic attack)     Transient cerebral ischemia     Last assessed - 1/23/18    Vertigo        Past Surgical History  Past Surgical History:   Procedure Laterality Date    CARDIAC PACEMAKER PLACEMENT  12/2019    COLONOSCOPY      IN SIGMOIDOSCOPY FLX DX W/COLLJ SPEC BR/WA IF PFRMD N/A 11/28/2017    Procedure: Fanny Fitzgerald;  Surgeon: Sia Marrufo MD;  Location: [facility]; Service: Gastroenterology    TONSILLECTOMY         PT performed at least 2 patient identifiers during session: Name and wristband         12/22/22 1040   PT Last Visit   PT Visit Date 12/22/22   Note Type   Note type Evaluation   Pain Assessment   Pain Assessment Tool 0-10   Pain Score No Pain   Restrictions/Precautions   Weight Bearing Precautions Per Order No   Braces or Orthoses   (none reported)   Other Precautions Contact/isolation;Droplet precautions; Telemetry; Visual impairment  (+influenza)   Home Living   Type of [address] [facility] Two level;Bed/bath upstairs; Performs ADLs on one level;1/2 bath on main level;Stairs to enter with rails  (1 BEBE)   Bathroom Shower/Tub Walk-in shower  (also has spa tub)   [facility]  (no DME used at baseline)   [facility]  (no AD used at baseline)   Prior Function   Level of [place] Independent with ADLs; Independent with functional mobility; Independent with IADLS   Lives With Spouse; Family   Receives Help From Family   IADLs Independent with meal prep; Independent with medication management; Family/Friend/Other provides transportation   Falls in the last 6 months 0   Vocational Retired   General   Family/Caregiver Present No   Cognition   Overall Cognitive Status WFL   Arousal/Participation Alert   Orientation Level Oriented X4   Memory Within functional limits   Following Commands Follows all commands and directions without difficulty   RLE Assessment   RLE Assessment WFL  (grossly 5/5 hip flexion, knee extension)   LLE Assessment   LLE Assessment WFL  (grossly 5/5 hip flexion, knee extension)   Coordination   Movements are Fluid and Coordinated 1   Sensation WFL   Bed Mobility   Supine to Sit 6  Modified independent   Transfers   Sit to Stand 6  Modified independent   Stand to Sit 6  Modified independent   Ambulation/Elevation   Gait pattern WNL   Gait Assistance 7  Independent   Assistive Device None   Distance > 200'   Stair Management Assistance 6  Modified independent   Stair Management Technique One rail L;Alternating pattern; Foreward   Number of Stairs 6  (no concerns for full flight)   Balance   Static Sitting Normal   Dynamic Sitting Normal   Static Standing Normal   Dynamic Standing Normal   Ambulatory Good   Endurance Deficit   Endurance Deficit No   Activity Tolerance   Activity Tolerance Patient tolerated treatment well   Medical Staff Made Aware OT Renetta Stout   Assessment   Prognosis Excellent   Assessment Pt is 65 y o  female seen for PT evaluation s/p admit to [facility] on 12/21/2022 w/ Diplopia  PT consulted to assess pt's functional mobility and d/c needs  Order placed for PT eval and tx  Comorbidities affecting pt's physical performance at time of assessment include: diplopia, HTN, A fib, obesity, h/o TIA, migraine, myocardiopathy, mitral valve regurgitation, COPD, SSS  PTA, pt was independent w/ all functional mobility w/ no AD/DME, ambulates unrestricted distances and all terrain, has 1 BEBE, lives w/ spouse and family in 2 level home and retired  Please find objective findings from PT assessment regarding body systems outlined above  The following objective measures performed on IE: Barthel Index: 100/100, Modified [place]: 1 (no significant disability) and AM-PAC 6-Clicks: 24/98  Pt's clinical presentation is currently evolving seen in pt's presentation of pending brain MRI to r/o CVA, on telemetry monitoring, continued diplopia  From PT/mobility standpoint, pt appears to be functioning close to or at mobility baseline, therefore no further immediate skilled PT needs are warranted at this time  Pt currently performing all phases of functional mobility at independent level without need for AD  Recommend pt continue to mobilize ad shanon while here  Recommend pt return to previous living environment once medically cleared  Will sign off, D/C PT  Please reconsult if further immediate skilled PT needs are warranted     Barriers to Discharge None   Goals   Patient Goals to go home today   Plan   PT Frequency   (D/C PT)   Recommendation PT Discharge Recommendation No rehabilitation needs   AM-PAC Basic Mobility Inpatient   Turning in Bed Without Bedrails 4   Lying on Back to Sitting on Edge of Flat Bed 4   Moving Bed to Chair 4   Standing Up From Chair 4   Walk in Room 4   Climb 3-5 Stairs 4   Basic Mobility Inpatient Raw Score 24   Basic Mobility Standardized Score 57 68   Highest Level Of Mobility   -HLM Goal 8: Walk 250 feet or more   JH-HLM Achieved 7: Walk 25 feet or more   Modified [place] Scale   Modified [place] Scale 1   Barthel Index   Feeding 10   Bathing 5   Grooming Score 5   Dressing Score 10   Bladder Score 10   Bowels Score 10   Toilet Use Score 10   Transfers (Bed/Chair) Score 15   Mobility (Level Surface) Score 15   Stairs Score 10   Barthel Index Score 100       Cayla Machado, PT, DPT

## 2022-12-22 NOTE — PLAN OF CARE
Problem: PAIN - ADULT  Goal: Verbalizes/displays adequate comfort level or baseline comfort level  Description: Interventions:  - Encourage patient to monitor pain and request assistance  - Assess pain using appropriate pain scale  - Administer analgesics based on type and severity of pain and evaluate response  - Implement non-pharmacological measures as appropriate and evaluate response  - Consider cultural and social influences on pain and pain management  - Notify physician/advanced practitioner if interventions unsuccessful or patient reports new pain  Outcome: Progressing     Problem: INFECTION - ADULT  Goal: Absence or prevention of progression during hospitalization  Description: INTERVENTIONS:  - Assess and monitor for signs and symptoms of infection  - Monitor lab/diagnostic results  - Monitor all insertion sites, i e  indwelling lines, tubes, and drains  - Monitor endotracheal if appropriate and nasal secretions for changes in amount and color  - [place] appropriate cooling/warming therapies per order  - Administer medications as ordered  - Instruct and encourage patient and family to use good hand hygiene technique  - Identify and instruct in appropriate isolation precautions for identified infection/condition  Outcome: Progressing  Goal: Absence of fever/infection during neutropenic period  Description: INTERVENTIONS:  - Monitor WBC    Outcome: Progressing     Problem: SAFETY ADULT  Goal: Patient will remain free of falls  Description: INTERVENTIONS:  - Educate patient/family on patient safety including physical limitations  - Instruct patient to call for assistance with activity   - Consult OT/PT to assist with strengthening/mobility   - Keep Call bell within reach  - Keep bed low and locked with side rails adjusted as appropriate  - Keep care items and personal belongings within reach  - Initiate and maintain comfort rounds  - Make Fall Risk Sign visible to staff  - Offer Toileting every  Hours, in advance of need  - Initiate/Maintain alarm  - Obtain necessary fall risk management equipment:   - Apply yellow socks and bracelet for high fall risk patients  - Consider moving patient to room near nurses station  Outcome: Progressing  Goal: Maintain or return to baseline ADL function  Description: INTERVENTIONS:  -  Assess patient's ability to carry out ADLs; assess patient's baseline for ADL function and identify physical deficits which impact ability to perform ADLs (bathing, care of mouth/teeth, toileting, grooming, dressing, etc )  - Assess/evaluate cause of self-care deficits   - Assess range of motion  - Assess patient's mobility; develop plan if impaired  - Assess patient's need for assistive devices and provide as appropriate  - Encourage maximum independence but intervene and supervise when necessary  - Involve family in performance of ADLs  - Assess for home care needs following discharge   - Consider OT consult to assist with ADL evaluation and planning for discharge  - Provide patient education as appropriate  Outcome: Progressing  Goal: Maintains/Returns to pre admission functional level  Description: INTERVENTIONS:  - Perform BMAT or MOVE assessment daily    - Set and communicate daily mobility goal to care team and patient/family/caregiver  - Collaborate with rehabilitation services on mobility goals if consulted  - Perform Range of Motion  times a day  - Reposition patient every  hours    - Dangle patient  times a day  - Stand patient  times a day  - Ambulate patient  times a day  - Out of bed to chair  times a day   - Out of bed for meals times a day  - Out of bed for toileting  - Record patient progress and toleration of activity level   Outcome: Progressing     Problem: DISCHARGE PLANNING  Goal: Discharge to home or other facility with appropriate resources  Description: INTERVENTIONS:  - Identify barriers to discharge w/patient and caregiver  - Arrange for needed discharge resources and transportation as appropriate  - Identify discharge learning needs (meds, wound care, etc )  - Arrange for interpretive services to assist at discharge as needed  - Refer to Case Management Department for coordinating discharge planning if the patient needs post-hospital services based on physician/advanced practitioner order or complex needs related to functional status, cognitive ability, or social support system  Outcome: Progressing     Problem: Knowledge Deficit  Goal: Patient/family/caregiver demonstrates understanding of disease process, treatment plan, medications, and discharge instructions  Description: Complete learning assessment and assess knowledge base    Interventions:  - Provide teaching at level of understanding  - Provide teaching via preferred learning methods  Outcome: Progressing

## 2022-12-22 NOTE — PROGRESS NOTES
Outpatient Care Management Note:  Inbasket admission alert received  Chart review completed  This OPCM will continue to monitor EMR for progress towards discharge and EVELYN

## 2022-12-23 VITALS
DIASTOLIC BLOOD PRESSURE: 78 MMHG | WEIGHT: 201.28 LBS | BODY MASS INDEX: 33.54 KG/M2 | RESPIRATION RATE: 18 BRPM | SYSTOLIC BLOOD PRESSURE: 124 MMHG | HEIGHT: 65 IN | HEART RATE: 68 BPM | TEMPERATURE: 98 F | OXYGEN SATURATION: 95 %

## 2022-12-23 LAB
BASOPHILS # BLD AUTO: 0.01 THOUSANDS/ÂΜL (ref 0–0.1)
BASOPHILS NFR BLD AUTO: 0 % (ref 0–1)
EOSINOPHIL # BLD AUTO: 0.09 THOUSAND/ÂΜL (ref 0–0.61)
EOSINOPHIL NFR BLD AUTO: 2 % (ref 0–6)
ERYTHROCYTE [DISTWIDTH] IN BLOOD BY AUTOMATED COUNT: 17.2 % (ref 11.6–15.1)
HCT VFR BLD AUTO: 38.1 % (ref 34.8–46.1)
HGB BLD-MCNC: 11.9 G/DL (ref 11.5–15.4)
IMM GRANULOCYTES # BLD AUTO: 0.01 THOUSAND/UL (ref 0–0.2)
IMM GRANULOCYTES NFR BLD AUTO: 0 % (ref 0–2)
LYMPHOCYTES # BLD AUTO: 0.81 THOUSANDS/ÂΜL (ref 0.6–4.47)
LYMPHOCYTES NFR BLD AUTO: 21 % (ref 14–44)
MCH RBC QN AUTO: 26.7 PG (ref 26.8–34.3)
MCHC RBC AUTO-ENTMCNC: 31.2 G/DL (ref 31.4–37.4)
MCV RBC AUTO: 86 FL (ref 82–98)
MONOCYTES # BLD AUTO: 0.5 THOUSAND/ÂΜL (ref 0.17–1.22)
MONOCYTES NFR BLD AUTO: 13 % (ref 4–12)
NEUTROPHILS # BLD AUTO: 2.52 THOUSANDS/ÂΜL (ref 1.85–7.62)
NEUTS SEG NFR BLD AUTO: 64 % (ref 43–75)
NRBC BLD AUTO-RTO: 0 /100 WBCS
PLATELET # BLD AUTO: 158 THOUSANDS/UL (ref 149–390)
PMV BLD AUTO: 11.1 FL (ref 8.9–12.7)
RBC # BLD AUTO: 4.45 MILLION/UL (ref 3.81–5.12)
WBC # BLD AUTO: 3.94 THOUSAND/UL (ref 4.31–10.16)

## 2022-12-23 RX ADMIN — LISINOPRIL 2.5 MG: 2.5 TABLET ORAL at 09:31

## 2022-12-23 RX ADMIN — APIXABAN 5 MG: 5 TABLET, FILM COATED ORAL at 09:31

## 2022-12-23 RX ADMIN — METOPROLOL SUCCINATE 75 MG: 50 TABLET, EXTENDED RELEASE ORAL at 09:30

## 2022-12-23 RX ADMIN — FUROSEMIDE 20 MG: 20 TABLET ORAL at 09:31

## 2022-12-23 RX ADMIN — SOTALOL HYDROCHLORIDE 160 MG: 80 TABLET ORAL at 09:30

## 2022-12-23 NOTE — DISCHARGE SUMMARY
[address]  Discharge- Nitin Sanchez 1945, 65 y o  female MRN: [id number]  Unit/Bed#: -01 Encounter: [id number]  Primary Care Provider: Carlton Keita DO   Date and time admitted to hospital: 12/21/2022 10:46 AM    * Diplopia  Assessment & Plan  Patient presents with several days of diplopia  Tested positive for influenza A   Multiple family members recently tested positive for influenza  She presented to her ophthalmologist and was diagnosed with right cranial nerve 3 palsy and was referred to ED to rule out stroke due to diplopia  Patient reports her vision improved with the placement of eye patch over her right eye  CTA report reviewed and noted negative for acute finding  Brain MRI brain negative for acute finding, noted with chronic CVA with chronic microangiopathic changes  Morning patient reports improvement  Patient does report having history of similar complaints last with vial illness  Continue Eliquis, statin  Current hemodynamically stable for discharge with close outpatient follow-up with primary care provider, primary ophthalmology  Patient is agreeable with above plan  Of note: Recommend outpatient follow-up with pulmonology for incidentally noted pulmonary fibrosis  Paroxysmal atrial fibrillation [facility])  Assessment & Plan  Patient with history of paroxysmal A  Fib  On Eliquis for anticoagulation, will continue 5 mg twice daily  continue on Toprol-XL 75 mg twice daily and sotalol 160 mg       Essential hypertension  Assessment & Plan  With history of hypertension managed on Lasix 20 mg daily, lisinopril 2 5 mg daily as an outpatient  Patient is also on Toprol as well as sotalol for A  fib  Outpatient follow-up with PCP and primary cardiology        Medical Problems     Resolved Problems  Date Reviewed: 12/23/2022   None       Discharging Physician / Practitioner: Kandace Mcallister MD  PCP: Carlton Keita DO  Admission Date:   Admission Orders (From admission, onward)     Ordered        12/21/22 1327  INPATIENT ADMISSION  Once                      Discharge Date: 12/23/22        Incidental Findings:   · Pulmonary fibrosis on CTA head and neck report  Reason for Admission: Binocular diplopia, right eye ptosis  Hospital Course:     Hilda Lopez is a 65 y o  female patient with past medical history of chronic systolic heart failure, nonischemic cardiomyopathy, paroxysmal A  fib currently on Eliquis,  SSS s/p PPM, hyperlipidemia, history of CVA, history of Lyme disease, moderate MR, mild AR, obesity, who originally presented to the hospital on 12/21/2022 due to diplopia  Patient reported that recently she was tested positive for influenza multiple however also tested positive for influenza  For last few days she started having double vision and her primary ophthalmology had for her to emergency room rule out stroke  Patient does report having history of similar symptoms in the past when she was diagnosed with Lyme disease  CTA head and neck, brain MRI report negative for acute stroke  Diplopia resolves with covering right eye with a patch  Patient is ambulating independently  She denies fever, chills, chest pain, nausea, vomiting, tick bite, bug bite, any skin rash, or any other associated neurological complaints  Currently she reports feeling much better and eager to go home  I have discussed with patient at length to follow-up with primary care provider to consider antibody testing for myasthenia gravis as well as Lyme  Also follow-up with primary ophthalmology  Patient is agreeable with above plan  No other events reported  Refer to earlier notes for further verification  Please see above list of diagnoses and related plan for additional information       Condition at Discharge: good    Discharge Day Visit / Exam:   Subjective: Patient reports diplopia has improved without eyepatch and gets completely resolved with covering right eye   Denies any other new complaints at this time  She reports feeling well and eager to go home  Vitals: Blood Pressure: 124/78 (12/23/22 0932)  Pulse: 68 (12/23/22 0932)  Temperature: 98 °F (36 7 °C) (12/23/22 0242)  Temp Source: Oral (12/22/22 2212)  Respirations: 18 (12/23/22 0242)  Height: 5' 5" (165 1 cm) (12/21/22 2009)  Weight - Scale: 91 3 kg (201 lb 4 5 oz) (12/23/22 0533)  SpO2: 95 % (12/23/22 1010)  Exam:   Physical Exam  Constitutional:       General: She is not in acute distress  Appearance: Normal appearance  She is not ill-appearing  HENT:      Head: Normocephalic and atraumatic  Eyes:      Pupils: Pupils are equal, round, and reactive to light  Comments: Right-sided mild ptosis noted: Patient does report history of ptosis   Cardiovascular:      Rate and Rhythm: Normal rate  Pulses: Normal pulses  Pulmonary:      Effort: Pulmonary effort is normal  No respiratory distress  Breath sounds: No wheezing  Abdominal:      General: Bowel sounds are normal  There is no distension  Palpations: Abdomen is soft  Tenderness: There is no abdominal tenderness  Musculoskeletal:      Cervical back: Normal range of motion and neck supple  No rigidity  Right lower leg: No edema  Left lower leg: No edema  Neurological:      Mental Status: She is alert and oriented to person, place, and time  Mental status is at baseline  Comments: Slight right ptosis noted  No other gross motor neurological deficit noted on exam      Psychiatric:         Behavior: Behavior normal          Discussion with Family: Updated  (grand son) at bedside  Discharge instructions/Information to patient and family:   See after visit summary for information provided to patient and family  Provisions for Follow-Up Care:  See after visit summary for information related to follow-up care and any pertinent home health orders         Disposition:   Home    Planned Readmission:      Discharge Statement:  I spent 35 minutes discharging the patient  This time was spent on the day of discharge  I had direct contact with the patient on the day of discharge  Greater than 50% of the total time was spent examining patient, answering all patient questions, arranging and discussing plan of care with patient as well as directly providing post-discharge instructions  Additional time then spent on discharge activities  Discharge Medications:  See after visit summary for reconciled discharge medications provided to patient and/or family        **Please Note: This note may have been constructed using a voice recognition system**

## 2022-12-23 NOTE — DISCHARGE INSTR - AVS FIRST PAGE
Recommended close follow-up with primary care provider in 3 to 5 days of discharge  Recommended outpatient follow-up with primary ophthalmology  Incidental finding    Mild paraseptal emphysema with probable subpleural pulmonary fibrosis note on CTA head and neck  Consider follow-up CT chest for further evaluation  Recommend outpatient follow-up with primary care provider as well as pulmonology for pulm ordered CT finding

## 2022-12-23 NOTE — PROGRESS NOTES
Results for orders placed or performed in visit on 12/21/22   Cardiac EP device report    Narrative    MDT DUAL CHAMBER PM  - ACTIVE SYSTEM IS MRI CONDITIONAL  CARELINK TRANSMISSION: BATTERY VOLTAGE ADEQUATE (9 4 YRS)  AP 77 1%  1 2% ALL AVAILABLE LEAD PARAMETERS WITHIN NORMAL LIMITS  ONE VT-NS EPISODE WITH EGM SHOWING AFIB WITH RVR  BPM  609 AT/AF EPISODES WITH AVAILABLE EGMS SHOWING LONGEST AT >22 HRS  AT/AF BURDEN 20 9%  26 FAST A&V EPISODES NO AVAILABLE EGMS  EF 40-45% (ECHO 11/2/22)  PT TAKES ELIQUIS, SOTALOL, & METOPROLOL SUCC  NORMAL DEVICE FUNCTION   AM/NC

## 2022-12-23 NOTE — CASE MANAGEMENT
Case Management Discharge Planning Note    Patient name Cody [facility]  Location [address] [id number] MRN [id number]  : 1945 Date 2022       Current Admission Date: 2022  Current Admission Diagnosis:Diplopia   Patient Active Problem List    Diagnosis Date Noted   • Diplopia 2022   • Acute on chronic systolic congestive heart failure ([address] ) 2022   • SSS (sick sinus syndrome) ([facility] ) 2022   • Stage 3a chronic kidney disease ([facility] ) 2021   • Other insomnia 2021   • Chronic obstructive pulmonary disease ([facility] ) 2021   • BMI 33 0-33 9,adult 2021   • Paroxysmal atrial fibrillation ([facility] ) 2019   • Myocardiopathy ([facility] ) 2018   • Anticoagulant long-term use 2018   • Nonrheumatic mitral valve regurgitation 2018   • Nonrheumatic aortic valve insufficiency 2018   • Nonrheumatic tricuspid valve regurgitation 2018   • Persistent atrial fibrillation with rapid ventricular response ([facility] ) 2018   • Essential hypertension 2018   • Mixed hyperlipidemia 2018   • Obesity (BMI 30-39 9) 2018   • H/O TIA (transient ischemic attack) and stroke 2018   • Migraine with aura and without status migrainosus, not intractable 2016      LOS (days): 2  Geometric Mean LOS (GMLOS) (days): 2 00  Days to GMLOS:0 1     OBJECTIVE:  Risk of Unplanned Readmission Score: 14 36         Current admission status: Inpatient   Preferred Pharmacy:   [facility] [address] R R 1 [phone] R R [phone])  [facility]  Phone: [phone] Fax: [phone]    [facility] ([address],   [address]  [address] [place] [id number]  Phone: [phone] Fax: [phone]    Primary Care Provider: Tonio Adamson DO    Primary Insurance: MEDICARE  Secondary Insurance: AARP    DISCHARGE DETAILS:    Treatment Team Recommendation: Home  Discharge Destination Plan[de-identified] Home  Transport at Discharge : Family     ETA of Transport (Date): 12/23/22     IMM Given (Date):: 12/23/22  IMM Given to[de-identified] Patient  Family notified[de-identified] CM reviewed IMM with pt and pt's grandson at bedside  Pt agrees with discharge plan  IMM placed in pt's file  Cristi Watkins  IMM reviewed with patient, patient agrees with discharge determination

## 2022-12-23 NOTE — PLAN OF CARE
Problem: PAIN - ADULT  Goal: Verbalizes/displays adequate comfort level or baseline comfort level  Description: Interventions:  - Encourage patient to monitor pain and request assistance  - Assess pain using appropriate pain scale  - Administer analgesics based on type and severity of pain and evaluate response  - Implement non-pharmacological measures as appropriate and evaluate response  - Consider cultural and social influences on pain and pain management  - Notify physician/advanced practitioner if interventions unsuccessful or patient reports new pain  Outcome: Progressing     Problem: INFECTION - ADULT  Goal: Absence or prevention of progression during hospitalization  Description: INTERVENTIONS:  - Assess and monitor for signs and symptoms of infection  - Monitor lab/diagnostic results  - Monitor all insertion sites, i e  indwelling lines, tubes, and drains  - Monitor endotracheal if appropriate and nasal secretions for changes in amount and color  - [place] appropriate cooling/warming therapies per order  - Administer medications as ordered  - Instruct and encourage patient and family to use good hand hygiene technique  - Identify and instruct in appropriate isolation precautions for identified infection/condition  Outcome: Progressing  Goal: Absence of fever/infection during neutropenic period  Description: INTERVENTIONS:  - Monitor WBC    Outcome: Progressing     Problem: SAFETY ADULT  Goal: Patient will remain free of falls  Description: INTERVENTIONS:  - Educate patient/family on patient safety including physical limitations  - Instruct patient to call for assistance with activity   - Consult OT/PT to assist with strengthening/mobility   - Keep Call bell within reach  - Keep bed low and locked with side rails adjusted as appropriate  - Keep care items and personal belongings within reach  - Initiate and maintain comfort rounds  - Make Fall Risk Sign visible to staff  - Offer Toileting every  Hours, in advance of need  - Initiate/Maintain alarm  - Obtain necessary fall risk management equipment:   - Apply yellow socks and bracelet for high fall risk patients  - Consider moving patient to room near nurses station  Outcome: Progressing  Goal: Maintain or return to baseline ADL function  Description: INTERVENTIONS:  -  Assess patient's ability to carry out ADLs; assess patient's baseline for ADL function and identify physical deficits which impact ability to perform ADLs (bathing, care of mouth/teeth, toileting, grooming, dressing, etc )  - Assess/evaluate cause of self-care deficits   - Assess range of motion  - Assess patient's mobility; develop plan if impaired  - Assess patient's need for assistive devices and provide as appropriate  - Encourage maximum independence but intervene and supervise when necessary  - Involve family in performance of ADLs  - Assess for home care needs following discharge   - Consider OT consult to assist with ADL evaluation and planning for discharge  - Provide patient education as appropriate  Outcome: Progressing  Goal: Maintains/Returns to pre admission functional level  Description: INTERVENTIONS:  - Perform BMAT or MOVE assessment daily    - Set and communicate daily mobility goal to care team and patient/family/caregiver  - Collaborate with rehabilitation services on mobility goals if consulted  - Perform Range of Motion  times a day  - Reposition patient every  hours    - Dangle patient  times a day  - Stand patient  times a day  - Ambulate patient  times a day  - Out of bed to chair  times a day   - Out of bed for meals  times a day  - Out of bed for toileting  - Record patient progress and toleration of activity level   Outcome: Progressing     Problem: DISCHARGE PLANNING  Goal: Discharge to home or other facility with appropriate resources  Description: INTERVENTIONS:  - Identify barriers to discharge w/patient and caregiver  - Arrange for needed discharge resources and transportation as appropriate  - Identify discharge learning needs (meds, wound care, etc )  - Arrange for interpretive services to assist at discharge as needed  - Refer to Case Management Department for coordinating discharge planning if the patient needs post-hospital services based on physician/advanced practitioner order or complex needs related to functional status, cognitive ability, or social support system  Outcome: Progressing     Problem: Knowledge Deficit  Goal: Patient/family/caregiver demonstrates understanding of disease process, treatment plan, medications, and discharge instructions  Description: Complete learning assessment and assess knowledge base    Interventions:  - Provide teaching at level of understanding  - Provide teaching via preferred learning methods  Outcome: Progressing     Problem: Potential for Falls  Goal: Patient will remain free of falls  Description: INTERVENTIONS:  - Educate patient/family on patient safety including physical limitations  - Instruct patient to call for assistance with activity   - Consult OT/PT to assist with strengthening/mobility   - Keep Call bell within reach  - Keep bed low and locked with side rails adjusted as appropriate  - Keep care items and personal belongings within reach  - Initiate and maintain comfort rounds  - Make Fall Risk Sign visible to staff  - Offer Toileting every  Hours, in advance of need  - Initiate/Maintain alarm  - Obtain necessary fall risk management equipment:   - Apply yellow socks and bracelet for high fall risk patients  - Consider moving patient to room near nurses station  Outcome: Progressing

## 2022-12-23 NOTE — INCIDENTAL FINDINGS
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

## 2022-12-27 ENCOUNTER — TRANSITIONAL CARE MANAGEMENT (OUTPATIENT)
Dept: FAMILY MEDICINE CLINIC | Facility: CLINIC | Age: 77
End: 2022-12-27

## 2022-12-27 ENCOUNTER — TELEPHONE (OUTPATIENT)
Dept: FAMILY MEDICINE CLINIC | Facility: CLINIC | Age: 77
End: 2022-12-27

## 2022-12-27 NOTE — TELEPHONE ENCOUNTER
T/c from pt - pt needs TCM - pt is requesting to come in on the 30th as her  has an appt at 8am - I told her Dr Satya Novak doesn't have any openings to accommodate        Please advise 11-Jul-2021 19:50

## 2022-12-29 LAB — ACHR BLOCK AB/ACHR TOTAL SFR SER: 18 % (ref 0–25)

## 2022-12-30 ENCOUNTER — OFFICE VISIT (OUTPATIENT)
Dept: FAMILY MEDICINE CLINIC | Facility: CLINIC | Age: 77
End: 2022-12-30

## 2022-12-30 VITALS
DIASTOLIC BLOOD PRESSURE: 68 MMHG | BODY MASS INDEX: 33.15 KG/M2 | SYSTOLIC BLOOD PRESSURE: 124 MMHG | TEMPERATURE: 96.4 F | HEART RATE: 72 BPM | HEIGHT: 65 IN | WEIGHT: 199 LBS | OXYGEN SATURATION: 98 %

## 2022-12-30 DIAGNOSIS — H49.03: Primary | ICD-10-CM

## 2022-12-30 DIAGNOSIS — J84.10 PULMONARY FIBROSIS (HCC): ICD-10-CM

## 2023-01-03 ENCOUNTER — TELEPHONE (OUTPATIENT)
Dept: NON INVASIVE DIAGNOSTICS | Facility: HOSPITAL | Age: 78
End: 2023-01-03

## 2023-01-03 NOTE — TELEPHONE ENCOUNTER
Spoke with pt to conduct HF f/u  Weight has been stable without significant gain  Reinforced to call the office for gain of 3l bs in a day or 5 lbs in a week  Denies any symptoms  Has been compliant with diuretic therapy and low Na diet  Reinforced 2000 mg Na restricted diet  Advised to call the office with any issues       Will do next f/u call on 1/9

## 2023-01-05 ENCOUNTER — HOSPITAL ENCOUNTER (EMERGENCY)
Facility: HOSPITAL | Age: 78
Discharge: HOME | End: 2023-01-05
Attending: EMERGENCY MEDICINE

## 2023-01-05 ENCOUNTER — APPOINTMENT (EMERGENCY)
Dept: RADIOLOGY | Facility: HOSPITAL | Age: 78
End: 2023-01-05

## 2023-01-05 VITALS
HEART RATE: 70 BPM | SYSTOLIC BLOOD PRESSURE: 114 MMHG | DIASTOLIC BLOOD PRESSURE: 68 MMHG | RESPIRATION RATE: 17 BRPM | TEMPERATURE: 98.6 F | OXYGEN SATURATION: 97 %

## 2023-01-05 DIAGNOSIS — I48.91 ATRIAL FIBRILLATION WITH RAPID VENTRICULAR RESPONSE (HCC): Primary | ICD-10-CM

## 2023-01-05 DIAGNOSIS — R06.00 DYSPNEA: ICD-10-CM

## 2023-01-05 LAB
ALBUMIN SERPL BCP-MCNC: 3.6 G/DL (ref 3.5–5)
ALP SERPL-CCNC: 94 U/L (ref 46–116)
ALT SERPL W P-5'-P-CCNC: 14 U/L (ref 12–78)
ANION GAP SERPL CALCULATED.3IONS-SCNC: 11 MMOL/L (ref 4–13)
AST SERPL W P-5'-P-CCNC: 18 U/L (ref 5–45)
BASOPHILS # BLD AUTO: 0.07 THOUSANDS/ÂΜL (ref 0–0.1)
BASOPHILS NFR BLD AUTO: 1 % (ref 0–1)
BILIRUB DIRECT SERPL-MCNC: 0.13 MG/DL (ref 0–0.2)
BILIRUB SERPL-MCNC: 0.5 MG/DL (ref 0.2–1)
BUN SERPL-MCNC: 18 MG/DL (ref 5–25)
CALCIUM SERPL-MCNC: 8.5 MG/DL (ref 8.3–10.1)
CARDIAC TROPONIN I PNL SERPL HS: 8 NG/L
CHLORIDE SERPL-SCNC: 106 MMOL/L (ref 96–108)
CO2 SERPL-SCNC: 26 MMOL/L (ref 21–32)
CREAT SERPL-MCNC: 1.09 MG/DL (ref 0.6–1.3)
EOSINOPHIL # BLD AUTO: 0.15 THOUSAND/ÂΜL (ref 0–0.61)
EOSINOPHIL NFR BLD AUTO: 2 % (ref 0–6)
ERYTHROCYTE [DISTWIDTH] IN BLOOD BY AUTOMATED COUNT: 17.6 % (ref 11.6–15.1)
GFR SERPL CREATININE-BSD FRML MDRD: 48 ML/MIN/1.73SQ M
GLUCOSE SERPL-MCNC: 96 MG/DL (ref 65–140)
HCT VFR BLD AUTO: 40.8 % (ref 34.8–46.1)
HGB BLD-MCNC: 13.1 G/DL (ref 11.5–15.4)
IMM GRANULOCYTES # BLD AUTO: 0.04 THOUSAND/UL (ref 0–0.2)
IMM GRANULOCYTES NFR BLD AUTO: 0 % (ref 0–2)
LYMPHOCYTES # BLD AUTO: 1.84 THOUSANDS/ÂΜL (ref 0.6–4.47)
LYMPHOCYTES NFR BLD AUTO: 20 % (ref 14–44)
MCH RBC QN AUTO: 27.5 PG (ref 26.8–34.3)
MCHC RBC AUTO-ENTMCNC: 32.1 G/DL (ref 31.4–37.4)
MCV RBC AUTO: 86 FL (ref 82–98)
MONOCYTES # BLD AUTO: 1.13 THOUSAND/ÂΜL (ref 0.17–1.22)
MONOCYTES NFR BLD AUTO: 12 % (ref 4–12)
NEUTROPHILS # BLD AUTO: 5.93 THOUSANDS/ÂΜL (ref 1.85–7.62)
NEUTS SEG NFR BLD AUTO: 65 % (ref 43–75)
NRBC BLD AUTO-RTO: 0 /100 WBCS
PLATELET # BLD AUTO: 276 THOUSANDS/UL (ref 149–390)
PMV BLD AUTO: 11.1 FL (ref 8.9–12.7)
POTASSIUM SERPL-SCNC: 3.8 MMOL/L (ref 3.5–5.3)
PROT SERPL-MCNC: 7.6 G/DL (ref 6.4–8.4)
RBC # BLD AUTO: 4.77 MILLION/UL (ref 3.81–5.12)
SODIUM SERPL-SCNC: 143 MMOL/L (ref 135–147)
WBC # BLD AUTO: 9.16 THOUSAND/UL (ref 4.31–10.16)

## 2023-01-05 RX ORDER — DILTIAZEM HYDROCHLORIDE 5 MG/ML
15 INJECTION INTRAVENOUS ONCE
Status: COMPLETED | OUTPATIENT
Start: 2023-01-05 | End: 2023-01-05

## 2023-01-05 RX ADMIN — DILTIAZEM HYDROCHLORIDE 15 MG: 5 INJECTION INTRAVENOUS at 17:23

## 2023-01-05 NOTE — ED PROVIDER NOTES
History  Chief Complaint   Patient presents with   • Shortness of Breath     Patient c/o shortness of breath that started yesterday  Patient states"she has a history of Afib and was to to come to ER by cardiology"  64 yo female with h/o PAF on eliquis and beta blocker who presents to ED c/o several days of exertional dyspnea and dizziness  Associated palpitations  Compliant with meds  No fever  No chest pain  No syncope  No leg pain or swelling  No hemoptysis  Prior to Admission Medications   Prescriptions Last Dose Informant Patient Reported? Taking? apixaban (Eliquis) 5 mg   No No   Sig: Take 1 tablet (5 mg total) by mouth 2 (two) times a day   furosemide (LASIX) 20 mg tablet   No No   Sig: Take 1 tablet (20 mg total) by mouth daily Do not start before November 5, 2022  lisinopril (ZESTRIL) 2 5 mg tablet   No No   Sig: Take 1 tablet (2 5 mg total) by mouth daily   lovastatin (MEVACOR) 10 MG tablet   No No   Sig: Take 1 tablet (10 mg total) by mouth daily at bedtime   metoprolol succinate (TOPROL-XL) 50 mg 24 hr tablet   No No   Sig: Take 1 5 tablets (75 mg total) by mouth 2 (two) times a day   potassium chloride (K-DUR,KLOR-CON) 10 mEq tablet   No No   Sig: Take 1 tablet (10 mEq total) by mouth daily Do not start before November 5, 2022     sotalol (BETAPACE) 80 mg tablet   No No   Sig: Take 2 tablets (160 mg total) by mouth daily      Facility-Administered Medications: None       Past Medical History:   Diagnosis Date   • Aphasia, mixed 7/28/2017   • Atrial fibrillation (HCC)    • CHF (congestive heart failure) (HCC)    • Colon polyp    • Headache    • Hyperlipidemia    • Hypertension    • Lyme disease    • Shortness of breath    • TIA (transient ischemic attack)    • Transient cerebral ischemia     Last assessed - 1/23/18   • Vertigo        Past Surgical History:   Procedure Laterality Date   • CARDIAC PACEMAKER PLACEMENT  12/2019   • COLONOSCOPY     • TX SIGMOIDOSCOPY FLX DX W/COLLJ SPEC BR/WA IF PFRMD N/A 2017    Procedure: Marvel Tobar;  Surgeon: Betzy Rob MD;  Location: [facility]; Service: Gastroenterology   • TONSILLECTOMY         Family History   Problem Relation Age of Onset   • Lung cancer Mother    • Dementia Father    • Alzheimer's disease Father    • Other Father         Cardiac Disorder      I have reviewed and agree with the history as documented  E-Cigarette/Vaping   • E-Cigarette Use Never User      E-Cigarette/Vaping Substances   • Nicotine No    • THC No    • CBD No    • Flavoring No    • Other No    • Unknown No      Social History     Tobacco Use   • Smoking status: Former     Types: Cigarettes     Quit date:      Years since quittin 0   • Smokeless tobacco: Never   • Tobacco comments:     no smoking for 46 years   Vaping Use   • Vaping Use: Never used   Substance Use Topics   • Alcohol use: Yes     Alcohol/week: 10 0 standard drinks     Types: 10 Glasses of wine per week     Comment: occ   • Drug use: No       Review of Systems   Cardiovascular: Positive for palpitations  Neurological: Positive for dizziness  All other systems reviewed and are negative  Physical Exam  Physical Exam  Vitals and nursing note reviewed  Constitutional:       General: She is not in acute distress  Appearance: Normal appearance  She is well-developed  She is not ill-appearing, toxic-appearing or diaphoretic  HENT:      Head: Normocephalic and atraumatic  Eyes:      Conjunctiva/sclera: Conjunctivae normal       Pupils: Pupils are equal, round, and reactive to light  Neck:      Vascular: No JVD  Cardiovascular:      Rate and Rhythm: Tachycardia present  Rhythm irregular  Pulses: Normal pulses  Heart sounds: Normal heart sounds  Pulmonary:      Effort: Pulmonary effort is normal       Breath sounds: Normal breath sounds  No stridor  No wheezing  Abdominal:      General: There is no distension  Palpations: Abdomen is soft  Tenderness: There is no abdominal tenderness  There is no guarding or rebound  Musculoskeletal:         General: No tenderness or deformity  Normal range of motion  Cervical back: Normal range of motion and neck supple  Skin:     General: Skin is warm and dry  Capillary Refill: Capillary refill takes less than 2 seconds  Coloration: Skin is not jaundiced or pale  Findings: No bruising, erythema, lesion or rash  Neurological:      General: No focal deficit present  Mental Status: She is alert and oriented to person, place, and time  Cranial Nerves: No cranial nerve deficit  Sensory: No sensory deficit  Motor: No weakness or abnormal muscle tone        Coordination: Coordination normal       Gait: Gait normal          Vital Signs  ED Triage Vitals [01/05/23 1653]   Temperature Pulse Respirations Blood Pressure SpO2   98 6 °F (37 °C) (!) 139 22 124/91 99 %      Temp src Heart Rate Source Patient Position - Orthostatic VS BP Location FiO2 (%)   -- -- -- -- --      Pain Score       --           Vitals:    01/05/23 1653   BP: 124/91   Pulse: (!) 139         Visual Acuity      ED Medications  Medications   diltiazem (CARDIZEM) injection 15 mg (15 mg Intravenous Given 1/5/23 1723)       Diagnostic Studies  Results Reviewed     Procedure Component Value Units Date/Time    HS Troponin 0hr (reflex protocol) [[id number]]  (Normal) Collected: 01/05/23 1727    Lab Status: Final result Specimen: Blood from Arm, Right Updated: 01/05/23 1813     hs TnI 0hr 8 ng/L     HS Troponin I 2hr [[id number]]     Lab Status: No result Specimen: Blood     Basic metabolic panel [[id number]] Collected: 01/05/23 1727    Lab Status: Final result Specimen: Blood from Arm, Right Updated: 01/05/23 1807     Sodium 143 mmol/L      Potassium 3 8 mmol/L      Chloride 106 mmol/L      CO2 26 mmol/L      ANION GAP 11 mmol/L      BUN 18 mg/dL      Creatinine 1 09 mg/dL      Glucose 96 mg/dL      Calcium 8 5 mg/dL eGFR 48 ml/min/1 73sq m     Narrative:      [place] guidelines for Chronic Kidney Disease (CKD):   •  Stage 1 with normal or high GFR (GFR > 90 mL/min/1 73 square meters)  •  Stage 2 Mild CKD (GFR = 60-89 mL/min/1 73 square meters)  •  Stage 3A Moderate CKD (GFR = 45-59 mL/min/1 73 square meters)  •  Stage 3B Moderate CKD (GFR = 30-44 mL/min/1 73 square meters)  •  Stage 4 Severe CKD (GFR = 15-29 mL/min/1 73 square meters)  •  Stage 5 End Stage CKD (GFR <15 mL/min/1 73 square meters)  Note: GFR calculation is accurate only with a steady state creatinine    Hepatic function panel [[id number]]  (Normal) Collected: 01/05/23 1727    Lab Status: Final result Specimen: Blood from Arm, Right Updated: 01/05/23 1807     Total Bilirubin 0 50 mg/dL      Bilirubin, Direct 0 13 mg/dL      Alkaline Phosphatase 94 U/L      AST 18 U/L      ALT 14 U/L      Total Protein 7 6 g/dL      Albumin 3 6 g/dL     CBC and differential [[id number]]  (Abnormal) Collected: 01/05/23 1727    Lab Status: Final result Specimen: Blood from Arm, Right Updated: 01/05/23 1739     WBC 9 16 Thousand/uL      RBC 4 77 Million/uL      Hemoglobin 13 1 g/dL      Hematocrit 40 8 %      MCV 86 fL      MCH 27 5 pg      MCHC 32 1 g/dL      RDW 17 6 %      MPV 11 1 fL      Platelets 148 Thousands/uL      nRBC 0 /100 WBCs      Neutrophils Relative 65 %      Immat GRANS % 0 %      Lymphocytes Relative 20 %      Monocytes Relative 12 %      Eosinophils Relative 2 %      Basophils Relative 1 %      Neutrophils Absolute 5 93 Thousands/µL      Immature Grans Absolute 0 04 Thousand/uL      Lymphocytes Absolute 1 84 Thousands/µL      Monocytes Absolute 1 13 Thousand/µL      Eosinophils Absolute 0 15 Thousand/µL      Basophils Absolute 0 07 Thousands/µL                  XR chest 2 views   ED Interpretation by Celia Younger MD (01/05 1811)   Cardiomegaly  Hiatal hernia                    Procedures  CriticalCare Time  Performed by: Celia Younger MD  Authorized by: Shira Barlow MD     Critical care provider statement:     Critical care time (minutes):  35    Critical care time was exclusive of:  Separately billable procedures and treating other patients  Comments:      Treatment of atrial fibrillation with rvr with parenteral rate control agent  ECG 12 Lead Documentation Only    Date/Time: 1/5/2023 5:24 PM  Performed by: Shira Barlow MD  Authorized by: Shira Barlow MD     Indications / Diagnosis:  Dyspnea  ECG reviewed by me, the ED Provider: yes    Patient location:  ED  Previous ECG:     Previous ECG:  Compared to current  Interpretation:     Interpretation: non-specific    Rate:     ECG rate:  120    ECG rate assessment: tachycardic    Rhythm:     Rhythm: atrial fibrillation               ED Course  ED Course as of 01/05/23 1843   u Jan 05, 2023   1734 Reexamined  HR improved to the 60's after single dose of cardizem  Still in Afib but rate controlled  Medical Decision Making  Atrial fibrillation with rapid ventricular response [facility]): acute illness or injury  Dyspnea: acute illness or injury  Amount and/or Complexity of Data Reviewed  External Data Reviewed: ECG  Labs: ordered  Radiology: ordered and independent interpretation performed  Risk  Prescription drug management  Disposition  Final diagnoses:   Atrial fibrillation with rapid ventricular response ([address] )   Dyspnea     Time reflects when diagnosis was documented in both MDM as applicable and the Disposition within this note     Time User Action Codes Description Comment    1/5/2023  6:30 PM Amadeo Barthel Add [I48 91] Atrial fibrillation with rapid ventricular response ([address] )     1/5/2023  6:30 PM Amadeo Barthel Add [R06 00] Dyspnea       ED Disposition     ED Disposition   Discharge    Condition   Stable    Date/Time   Thu Jan 5, 2023  6:30 PM    Comment   Ples Glow discharge to home/self care                 Follow-up Information     Follow up With Specialties Details Why Contact Info Additional Information    [facility] Emergency Department Emergency Medicine  If symptoms worsen [address] [facility] Emergency Department, [address] [place] [address], [facility]          Patient's Medications   Discharge Prescriptions    No medications on file       No discharge procedures on file      PDMP Review     None          ED Provider  Electronically Signed by           Stefan Dixon MD  01/05/23 3674

## 2023-01-06 LAB
ATRIAL RATE: 125 BPM
QRS AXIS: 47 DEGREES
QRSD INTERVAL: 80 MS
QT INTERVAL: 366 MS
QTC INTERVAL: 517 MS
T WAVE AXIS: -27 DEGREES
VENTRICULAR RATE: 120 BPM

## 2023-01-11 ENCOUNTER — PATIENT OUTREACH (OUTPATIENT)
Dept: FAMILY MEDICINE CLINIC | Facility: CLINIC | Age: 78
End: 2023-01-11

## 2023-01-11 DIAGNOSIS — I48.19 PERSISTENT ATRIAL FIBRILLATION (HCC): ICD-10-CM

## 2023-01-11 RX ORDER — SOTALOL HYDROCHLORIDE 80 MG/1
160 TABLET ORAL DAILY
Qty: 270 TABLET | Refills: 0 | Status: SHIPPED | OUTPATIENT
Start: 2023-01-11 | End: 2023-01-20 | Stop reason: SDUPTHER

## 2023-01-11 NOTE — PROGRESS NOTES
Out Patient Care Management Note:  REGGIE completed 30 day chart review  Bundle patient for cardiac arrhythmia  Patient was sent to the ED on 01/0/23 by her Cardiologist after her complaints of SOB  In the ED she was treated for AFIB with RVR  Single dose of IV cardizem administered with good results  Patient has a follow up appointment with Cardiology next month  Will continue to follow until end of episode

## 2023-01-11 NOTE — TELEPHONE ENCOUNTER
Name of Caller:Silvana from [facility]     Call back Number:[phone]      Medication(s):Sotalol   Are we prescribing provider?:    30 or 90 day supply:90    Pharmacy name/number:[facility]    Last or Next appt?:

## 2023-01-12 ENCOUNTER — HOSPITAL ENCOUNTER (OUTPATIENT)
Dept: MAMMOGRAPHY | Facility: CLINIC | Age: 78
Discharge: HOME | End: 2023-01-12

## 2023-01-12 DIAGNOSIS — Z12.31 SCREENING MAMMOGRAM, ENCOUNTER FOR: ICD-10-CM

## 2023-01-13 ENCOUNTER — TELEPHONE (OUTPATIENT)
Dept: CARDIOLOGY CLINIC | Facility: CLINIC | Age: 78
End: 2023-01-13

## 2023-01-13 NOTE — TELEPHONE ENCOUNTER
Weekly H/F F/U    Patient reports she is "feeling really good, for the past couple weeks "    Weight is stable at 198lbs     Last weight at OV 199lbs    Patient denies SOB at rest, no edema in LE     Has been compliant with diuretic therapy and low Na diet  Reinforced 2000 mg Na restricted diet      Advised to call the office with any issues       Next f/u 1/20

## 2023-01-13 NOTE — TELEPHONE ENCOUNTER
Patient called today stating that she needs her refill sent to the [facility] service   Prescription was previously sent to [facility]

## 2023-01-19 ENCOUNTER — HOSPITAL ENCOUNTER (OUTPATIENT)
Dept: CT IMAGING | Facility: HOSPITAL | Age: 78
Discharge: HOME | End: 2023-01-19
Attending: FAMILY MEDICINE

## 2023-01-19 ENCOUNTER — APPOINTMENT (OUTPATIENT)
Dept: RADIOLOGY | Facility: HOSPITAL | Age: 78
End: 2023-01-19

## 2023-01-19 DIAGNOSIS — J84.10 PULMONARY FIBROSIS (HCC): ICD-10-CM

## 2023-01-20 ENCOUNTER — TELEPHONE (OUTPATIENT)
Dept: CARDIOLOGY CLINIC | Facility: CLINIC | Age: 78
End: 2023-01-20

## 2023-01-20 DIAGNOSIS — I48.19 PERSISTENT ATRIAL FIBRILLATION (HCC): ICD-10-CM

## 2023-01-20 RX ORDER — SOTALOL HYDROCHLORIDE 80 MG/1
160 TABLET ORAL DAILY
Qty: 180 TABLET | Refills: 3 | Status: SHIPPED | OUTPATIENT
Start: 2023-01-20

## 2023-01-20 NOTE — TELEPHONE ENCOUNTER
Patient called requesting refill of sotalol 80 mg  Original request on 1/11/23 sent to incorrect pharmacy      Refill sent to [facility] per pt request

## 2023-01-20 NOTE — TELEPHONE ENCOUNTER
Weekly H/F F/U    Patient reports she is doing very well  Weight on 1/13-198lbs  Weight as of 1/20-198lbs    Patient denies SOB, LE edema or needing to prop herself up with pillows at night to sleep  Has been compliant with diuretic therapy and low Na diet  Reinforced 2000 mg Na restricted diet      Advised to call the office with any issues  Patient is very optimistic and seems very happy with progress      Next f/u call 1/27

## 2023-01-30 DIAGNOSIS — I50.9 CHF (CONGESTIVE HEART FAILURE) (HCC): ICD-10-CM

## 2023-01-30 RX ORDER — FUROSEMIDE 20 MG/1
20 TABLET ORAL DAILY
Qty: 90 TABLET | Refills: 0 | Status: SHIPPED | OUTPATIENT
Start: 2023-01-30

## 2023-01-30 RX ORDER — POTASSIUM CHLORIDE 750 MG/1
10 TABLET, EXTENDED RELEASE ORAL DAILY
Qty: 30 TABLET | Refills: 2 | OUTPATIENT
Start: 2023-01-30

## 2023-01-30 RX ORDER — POTASSIUM CHLORIDE 750 MG/1
10 TABLET, EXTENDED RELEASE ORAL DAILY
Qty: 90 TABLET | Refills: 0 | Status: SHIPPED | OUTPATIENT
Start: 2023-01-30

## 2023-01-30 RX ORDER — FUROSEMIDE 20 MG/1
20 TABLET ORAL DAILY
Qty: 30 TABLET | Refills: 2 | OUTPATIENT
Start: 2023-01-30

## 2023-01-30 NOTE — TELEPHONE ENCOUNTER
Name of Caller:   Patient     Call back Number:  [phone]    Medication(s):  furosemide (LASIX) 20 mg tablet, potassium chloride (K-DUR,KLOR-CON) 10 mEq tablet       Are we prescribing provider?:    30 or 90 day supply: 90 days supply     Pharmacy name/number:  [facility] - [place], [place] - [address]      Last or Next appt?: 2/14/23

## 2023-02-02 ENCOUNTER — PATIENT OUTREACH (OUTPATIENT)
Dept: CASE MANAGEMENT | Facility: OTHER | Age: 78
End: 2023-02-02

## 2023-02-02 NOTE — PROGRESS NOTES
BPCI episode closed   I updated the care coordination note and removed the care manager from the team

## 2023-02-06 NOTE — ASSESSMENT & PLAN NOTE
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 The surgical margins are positive for tumor cells.

## 2023-02-14 ENCOUNTER — OFFICE VISIT (OUTPATIENT)
Dept: CARDIOLOGY CLINIC | Facility: CLINIC | Age: 78
End: 2023-02-14

## 2023-02-14 VITALS
WEIGHT: 203 LBS | BODY MASS INDEX: 33.82 KG/M2 | RESPIRATION RATE: 16 BRPM | DIASTOLIC BLOOD PRESSURE: 68 MMHG | HEIGHT: 65 IN | SYSTOLIC BLOOD PRESSURE: 115 MMHG

## 2023-02-14 DIAGNOSIS — R06.02 SOB (SHORTNESS OF BREATH): Primary | ICD-10-CM

## 2023-02-14 DIAGNOSIS — N18.31 CHRONIC KIDNEY DISEASE, STAGE 3A (HCC): ICD-10-CM

## 2023-02-14 DIAGNOSIS — I42.9 CARDIOMYOPATHY, UNSPECIFIED TYPE (HCC): ICD-10-CM

## 2023-02-14 DIAGNOSIS — I48.19 PERSISTENT ATRIAL FIBRILLATION (HCC): ICD-10-CM

## 2023-02-14 NOTE — PROGRESS NOTES
[address] [facility]   Office Follow Up    Lis Ramos 65 y o  female MRN: [id number]    02/14/23        Assessment/Plan:  1  Paroxysmal atrial fibrillation  • S/p DCCV (11/3/2022)  • Most recent device report reveals 609 AT/AF episodes with longest lasting greater than 22 hours; AT/AF burden 20 9%  • Continue Toprol-XL 75 mg b i d   • Continue Sotalol 160 mg daily per pharmacy recommendation; discuss further management with Dr Bossman Cortés given significant AT/AF burden  • Continue Eliquis 5 mg for anticoagulation     2   Chronic systolic heart failure  • Euvolemic on exam  • Most recent Echo revealed EF 40-45% (11/2/2022); decreased from previous 60% (July of 2020)  • Repeat limited echo ordered to evaluate EF  • Continue Lasix 20 mg PO daily    • Recommend daily weights and sodium restriction     3    Cardiomyopathy with EF 40-45%  - Most recent Echo revealed EF 40-45% (11/2/2022); decreased from previous 60% (July of 2020)  - Repeat limited echo ordered to evaluate EF  • Likely tachycardia mediated secondary to atrial fibrillation, however cannot definitively rule out ischemia as a cause  • Pharmacological stress test ordered; patient experiences significant HSARMA with minimal exertion requiring her to stop to catch breath - likely unable to tolerate treadmill     4  Sick sinus syndrome s/p PPM  • S/p DC Medtronic dual-chamber PPM  • Continue to follow-up device clinic     5  Hypertension  • Currently well controlled; typically runs 110-120/65-80 at home  • Continue Toprol-XL, lisinopril, and Lasix     6  Mixed hyperlipidemia  - Continue lovastatin     7   Moderate MR, mild AR, moderate to severe TR  - Continue periodic outpatient surveillance      HPI: Lis Ramos is a 65y o  year old female with history of Paroxysmal atrial fibrillation, Chronic systolic heart failure,  Cardiomyopathy with EF 40-45%, Sick sinus syndrome s/p PPM, Hypertension, hyperlipidemia, Moderate MR, mild AR, moderate to severe TR who presents with for follow-up visit  Endorses SHARMA with minimal exertion  She had to stop while walking up stairs for visit today to catch her breath  This is noted to be chronic and not worsening  Endorses some intermittent very mild LE edema  Reports strict compliance to home medications an low sodium diet  Notes BP typically runs between 110-120 / 65-80  Denies chest pain, lightheadedness, or syncope  Review of Systems:  Review of Systems   Constitutional: Negative for chills, diaphoresis and fever  HENT: Negative for ear pain and sore throat  Eyes: Negative for pain and visual disturbance  Respiratory: Positive for shortness of breath  Negative for cough, chest tightness and wheezing  Cardiovascular: Negative for chest pain  Leg swelling: mild intermittent LE edema  Gastrointestinal: Negative for abdominal pain and vomiting  Genitourinary: Negative for dysuria and hematuria  Musculoskeletal: Negative for arthralgias and back pain  Skin: Negative for color change and rash  Neurological: Negative for seizures and syncope  All other systems reviewed and are negative  PHYSICAL EXAM:  Vitals:   Vitals:    02/14/23 1319   BP: 115/68   BP Location: Left arm   Patient Position: Sitting   Cuff Size: Standard   Resp: 16   Weight: 92 1 kg (203 lb)   Height: 5' 5" (1 651 m)        Physical Exam:  Physical Exam  Constitutional:       General: She is not in acute distress  Appearance: She is not diaphoretic  HENT:      Head: Normocephalic and atraumatic  Nose: Nose normal    Eyes:      Conjunctiva/sclera: Conjunctivae normal    Cardiovascular:      Rate and Rhythm: Normal rate and regular rhythm  Pulses: Normal pulses  Heart sounds: Normal heart sounds  No murmur heard  No friction rub  Pulmonary:      Effort: Pulmonary effort is normal  No respiratory distress  Breath sounds: Normal breath sounds  No rhonchi or rales  Chest:      Chest wall: No tenderness  Abdominal:      Tenderness: There is no guarding  Musculoskeletal:      Cervical back: Neck supple  Right lower leg: No edema  Left lower leg: No edema  Skin:     General: Skin is warm and dry  Capillary Refill: Capillary refill takes less than 2 seconds  Neurological:      Mental Status: She is alert and oriented to person, place, and time     Psychiatric:         Mood and Affect: Mood normal          Judgment: Judgment normal          Follow up: 4 months or sooner as needed    No Known Allergies      Current Outpatient Medications:   •  apixaban (Eliquis) 5 mg, Take 1 tablet (5 mg total) by mouth 2 (two) times a day, Disp: 180 tablet, Rfl: 3  •  furosemide (LASIX) 20 mg tablet, Take 1 tablet (20 mg total) by mouth daily, Disp: 90 tablet, Rfl: 0  •  lisinopril (ZESTRIL) 2 5 mg tablet, Take 1 tablet (2 5 mg total) by mouth daily, Disp: 90 tablet, Rfl: 3  •  lovastatin (MEVACOR) 10 MG tablet, Take 1 tablet (10 mg total) by mouth daily at bedtime, Disp: 90 tablet, Rfl: 3  •  metoprolol succinate (TOPROL-XL) 50 mg 24 hr tablet, Take 1 5 tablets (75 mg total) by mouth 2 (two) times a day, Disp: 270 tablet, Rfl: 3  •  potassium chloride (K-DUR,KLOR-CON) 10 mEq tablet, Take 1 tablet (10 mEq total) by mouth daily, Disp: 90 tablet, Rfl: 0  •  sotalol (BETAPACE) 80 mg tablet, Take 2 tablets (160 mg total) by mouth daily, Disp: 180 tablet, Rfl: 3    Past Medical History:   Diagnosis Date   • Aphasia, mixed 7/28/2017   • Atrial fibrillation (HCC)    • CHF (congestive heart failure) (HCC)    • Colon polyp    • Headache    • Hyperlipidemia    • Hypertension    • Lyme disease    • Shortness of breath    • TIA (transient ischemic attack)    • Transient cerebral ischemia     Last assessed - 1/23/18   • Vertigo        Family History   Problem Relation Age of Onset   • Lung cancer Mother    • Dementia Father    • Alzheimer's disease Father    • Other Father         Cardiac Disorder    • Lung cancer Maternal Grandmother    • Breast cancer Paternal Aunt 44   • No Known Problems Paternal Aunt    • No Known Problems Paternal Aunt        Past Medical History:   Diagnosis Date   • Aphasia, mixed 2017   • Atrial fibrillation (HCC)    • CHF (congestive heart failure) (HCC)    • Colon polyp    • Headache    • Hyperlipidemia    • Hypertension    • Lyme disease    • Shortness of breath    • TIA (transient ischemic attack)    • Transient cerebral ischemia     Last assessed - 18   • Vertigo        Past Surgical History:   Procedure Laterality Date   • CARDIAC PACEMAKER PLACEMENT  2019   • COLONOSCOPY     • DC SIGMOIDOSCOPY FLX DX W/COLLJ SPEC BR/WA IF PFRMD N/A 2017    Procedure: John Yepez;  Surgeon: Magaly Miller MD;  Location: [facility]; Service: Gastroenterology   • TONSILLECTOMY         Social History     Socioeconomic History   • Marital status: /Civil Union     Spouse name: Not on file   • Number of children: Not on file   • Years of education: Not on file   • Highest education level: Not on file   Occupational History   • Occupation: Retired   Tobacco Use   • Smoking status: Former     Types: Cigarettes     Quit date:      Years since quittin 1   • Smokeless tobacco: Never   • Tobacco comments:     no smoking for 46 years   Vaping Use   • Vaping Use: Never used   Substance and Sexual Activity   • Alcohol use:  Yes     Alcohol/week: 10 0 standard drinks     Types: 10 Glasses of wine per week     Comment: occ   • Drug use: No   • Sexual activity: Never     Partners: Male     Birth control/protection: None   Other Topics Concern   • Not on file   Social History Narrative    Always uses seat belt     Social Determinants of Health     Financial Resource Strain: Low Risk    • Difficulty of Paying Living Expenses: Not hard at all   Food Insecurity: No Food Insecurity   • Worried About Running Out of Food in the Last Year: Never true   • Ran Out of Food in the Last Year: Never true   Transportation Needs: No Transportation Needs   • Lack of Transportation (Medical): No   • Lack of Transportation (Non-Medical): No   Physical Activity: Not on file   Stress: Not on file   Social Connections: Not on file   Intimate Partner Violence: Not on file   Housing Stability: Low Risk    • Unable to Pay for Housing in the Last Year: No   • Number of Places Lived in the Last Year: 1   • Unstable Housing in the Last Year: No             LABORATORY RESULTS:    Lab Results   Component Value Date    WBC 9 16 01/05/2023    HGB 13 1 01/05/2023    HCT 40 8 01/05/2023    MCV 86 01/05/2023     01/05/2023     Lab Results   Component Value Date    CALCIUM 8 5 01/05/2023    K 3 8 01/05/2023    CO2 26 01/05/2023     01/05/2023    BUN 18 01/05/2023    CREATININE 1 09 01/05/2023     Lab Results   Component Value Date    HGBA1C 5 8 (H) 11/01/2022       Lipid Profile:   No results found for: CHOL  Lab Results   Component Value Date    HDL 45 (L) 11/02/2022    HDL 55 11/22/2021    HDL 59 09/17/2020     Lab Results   Component Value Date    LDLCALC 62 11/02/2022    LDLCALC 74 11/22/2021    LDLCALC 80 09/17/2020     Lab Results   Component Value Date    TRIG 75 11/02/2022    TRIG 90 11/22/2021    TRIG 119 09/17/2020       The ASCVD Risk score (Shanta DK, et al , 2019) failed to calculate for the following reasons: The valid total cholesterol range is 130 to 320 mg/dL    1  SOB (shortness of breath)  NM myocardial perfusion spect (rx stress and/or rest)      2  Persistent atrial fibrillation ([facility] [address] )  Echo follow up/limited w/ contrast if indicated      3  Cardiomyopathy, unspecified type ([address] )  Echo follow up/limited w/ contrast if indicated    NM myocardial perfusion spect (rx stress and/or rest)      4  Chronic kidney disease, stage 3a ([address] )            Imaging: I have personally reviewed pertinent reports            Brijesh Figueroa PA-C  2/14/2023,5:23 PM

## 2023-02-28 ENCOUNTER — OFFICE VISIT (OUTPATIENT)
Dept: CARDIOLOGY CLINIC | Facility: CLINIC | Age: 78
End: 2023-02-28

## 2023-02-28 VITALS
BODY MASS INDEX: 33.32 KG/M2 | SYSTOLIC BLOOD PRESSURE: 120 MMHG | WEIGHT: 200 LBS | HEART RATE: 78 BPM | DIASTOLIC BLOOD PRESSURE: 84 MMHG | HEIGHT: 65 IN

## 2023-02-28 DIAGNOSIS — I48.0 PAROXYSMAL ATRIAL FIBRILLATION (HCC): Primary | Chronic | ICD-10-CM

## 2023-02-28 DIAGNOSIS — I10 ESSENTIAL HYPERTENSION: ICD-10-CM

## 2023-02-28 DIAGNOSIS — E66.9 OBESITY (BMI 30-39.9): ICD-10-CM

## 2023-02-28 DIAGNOSIS — I48.19 PERSISTENT ATRIAL FIBRILLATION WITH RAPID VENTRICULAR RESPONSE (HCC): ICD-10-CM

## 2023-02-28 DIAGNOSIS — Z86.73 H/O TIA (TRANSIENT ISCHEMIC ATTACK) AND STROKE: ICD-10-CM

## 2023-02-28 DIAGNOSIS — Z79.01 ANTICOAGULANT LONG-TERM USE: ICD-10-CM

## 2023-02-28 DIAGNOSIS — J44.9 CHRONIC OBSTRUCTIVE PULMONARY DISEASE, UNSPECIFIED COPD TYPE (HCC): ICD-10-CM

## 2023-02-28 DIAGNOSIS — I50.23 ACUTE ON CHRONIC SYSTOLIC CONGESTIVE HEART FAILURE (HCC): ICD-10-CM

## 2023-02-28 DIAGNOSIS — I49.5 SSS (SICK SINUS SYNDROME) (HCC): ICD-10-CM

## 2023-02-28 DIAGNOSIS — N18.31 STAGE 3A CHRONIC KIDNEY DISEASE (HCC): ICD-10-CM

## 2023-02-28 DIAGNOSIS — E78.2 MIXED HYPERLIPIDEMIA: ICD-10-CM

## 2023-02-28 NOTE — LETTER
February 28, 2023     Adarsh Razo DO  [address]    Patient: Everardo Harry   YOB: 1945   Date of Visit: 2/28/2023       Dear Dr Altman Expose: Thank you for referring Cathy Cullen to me for evaluation  Below are my notes for this consultation  If you have questions, please do not hesitate to call me  I look forward to following your patient along with you  Sincerely,        Brit Navarro MD        CC: MD Brit Zapata MD  2/28/2023  2:57 PM  Sign when Signing Visit                                             Cardiology Follow Up    Everardo Harry  1945  [id number]  [address]  [place] [phone]  [phone]    1  Paroxysmal atrial fibrillation (HCC)  POCT ECG      2  Chronic obstructive pulmonary disease, unspecified COPD type ([facility] )        3  SSS (sick sinus syndrome) ([facility])        4  Persistent atrial fibrillation with rapid ventricular response ([facility] )        5  Essential hypertension        6  Acute on chronic systolic congestive heart failure (HCC)        7  Stage 3a chronic kidney disease ([facility] [address] )        8  Obesity (BMI 30-39 9)        9  Mixed hyperlipidemia        10  H/O TIA (transient ischemic attack) and stroke        6  BMI 33 0-33 9,adult        12   Anticoagulant long-term use            Interval History:   The patient was not doing well in December 2022 and  in January 2023  She was in the hospital and in the ER in [facility]  She suffered from pneumonia as well as the flu  She was having much more frequent episodes of palpitation    She has been feeling better in the last 1 month  She feels well now  She is currently in atrially paced and ventricular sensed rhythm  The patient is here for follow-up of her atrial fibrillation    She has an echocardiogram and a nuclear stress study which have been ordered but not yet done  She does have a history of tachycardia mediated cardiomyopathy  Will reassess once test results are available     The patient is not complaining of anginal like chest pain or chest pressure  There is no worsening orthopnea, paroxysmal nocturnal dyspnea  There is some  leg swelling     No significant palpitation  There is no history of presyncope or syncope  There is rare history of transient  lightheadedness  There has been some improvement in exertional intolerance      Past medical history  The patient is a very pleasant 75year old lady referred to me by Dr Isidoro De La Torre for management of A fi + RVR + heart failure      She does have significant medical illnesses in the form of  PAF  Hypertension  Hyperlipidemia  CMP - suspected tachy mediated      As for the atrial fibrillation  It has been present for quite some time now  Initially to present as episodes of palpitation  This has progressively increased in frequency and duration         There is history of snoring at night  There is occasional history of morning fatigability  There is occasional history of daytime sleepiness    /84 (BP Location: Left arm, Patient Position: Sitting)   Pulse 78   Ht 5' 5" (1 651 m)   Wt 90 7 kg (200 lb)   BMI 33 28 kg/m²       Patient Active Problem List   Diagnosis   • Persistent atrial fibrillation with rapid ventricular response ([address] )   • Essential hypertension   • Mixed hyperlipidemia   • Obesity (BMI 30-39  9)   • H/O TIA (transient ischemic attack) and stroke   • Migraine with aura and without status migrainosus, not intractable   • Myocardiopathy (HCC)   • Anticoagulant long-term use   • Nonrheumatic mitral valve regurgitation   • Nonrheumatic aortic valve insufficiency   • Nonrheumatic tricuspid valve regurgitation   • Paroxysmal atrial fibrillation (HCC)   • Other insomnia   • Chronic obstructive pulmonary disease (HCC)   • BMI 33 0-33 9,adult   • Stage 3a chronic kidney disease (HCC)   • Acute on chronic systolic congestive heart failure (HCC)   • SSS (sick sinus syndrome) ([facility])   • Diplopia     Past Medical History:   Diagnosis Date   • Aphasia, mixed 2017   • Atrial fibrillation (HCC)    • CHF (congestive heart failure) ([facility])    • Colon polyp    • Headache    • Hyperlipidemia    • Hypertension    • Lyme disease    • Shortness of breath    • TIA (transient ischemic attack)    • Vertigo      Social History     Socioeconomic History   • Marital status: /Civil Union     Spouse name: Not on file   • Number of children: Not on file   • Years of education: Not on file   • Highest education level: Not on file   Occupational History   • Occupation: Retired   Tobacco Use   • Smoking status: Former     Types: Cigarettes     Quit date:      Years since quittin 1   • Smokeless tobacco: Never   • Tobacco comments:     no smoking for 46 years   Vaping Use   • Vaping Use: Never used   Substance and Sexual Activity   • Alcohol use: Yes     Alcohol/week: 10 0 standard drinks     Types: 10 Glasses of wine per week     Comment: occ   • Drug use: No   • Sexual activity: Never     Partners: Male     Birth control/protection: None   Other Topics Concern   • Not on file   Social History Narrative    Always uses seat belt     Social Determinants of Health     Financial Resource Strain: Low Risk    • Difficulty of Paying Living Expenses: Not hard at all   Food Insecurity: No Food Insecurity   • Worried About Running Out of Food in the Last Year: Never true   • Ran Out of Food in the Last Year: Never true   Transportation Needs: No Transportation Needs   • Lack of Transportation (Medical): No   • Lack of Transportation (Non-Medical):  No   Physical Activity: Not on file   Stress: Not on file   Social Connections: Not on file   Intimate Partner Violence: Not on file   Housing Stability: Low Risk    • Unable to Pay for Housing in the Last Year: No   • Number of Places Lived in the Last Year: 1   • Unstable Housing in the Last Year: No Family History   Problem Relation Age of Onset   • Lung cancer Mother    • Dementia Father    • Alzheimer's disease Father    • Other Father         Cardiac Disorder    • Lung cancer Maternal Grandmother    • Breast cancer Paternal Aunt 44   • No Known Problems Paternal Aunt    • No Known Problems Paternal Aunt      Past Surgical History:   Procedure Laterality Date   • CARDIAC PACEMAKER PLACEMENT  12/2019   • COLONOSCOPY     • MN SIGMOIDOSCOPY FLX DX W/COLLJ SPEC BR/WA IF PFRMD N/A 11/28/2017    Procedure: SIGMOIDOSCOPY FLEXIBLE;  Surgeon: Guanako Patel MD;  Location: [facility];   Service: Gastroenterology   • TONSILLECTOMY         Current Outpatient Medications:   •  apixaban (Eliquis) 5 mg, Take 1 tablet (5 mg total) by mouth 2 (two) times a day, Disp: 180 tablet, Rfl: 3  •  furosemide (LASIX) 20 mg tablet, Take 1 tablet (20 mg total) by mouth daily, Disp: 90 tablet, Rfl: 0  •  lisinopril (ZESTRIL) 2 5 mg tablet, Take 1 tablet (2 5 mg total) by mouth daily, Disp: 90 tablet, Rfl: 3  •  lovastatin (MEVACOR) 10 MG tablet, Take 1 tablet (10 mg total) by mouth daily at bedtime, Disp: 90 tablet, Rfl: 3  •  metoprolol succinate (TOPROL-XL) 50 mg 24 hr tablet, Take 1 5 tablets (75 mg total) by mouth 2 (two) times a day, Disp: 270 tablet, Rfl: 3  •  potassium chloride (K-DUR,KLOR-CON) 10 mEq tablet, Take 1 tablet (10 mEq total) by mouth daily, Disp: 90 tablet, Rfl: 0  •  sotalol (BETAPACE) 80 mg tablet, Take 2 tablets (160 mg total) by mouth daily, Disp: 180 tablet, Rfl: 3  No Known Allergies    Labs:  Lab Results   Component Value Date    K 3 8 01/05/2023     01/05/2023    CO2 26 01/05/2023    BUN 18 01/05/2023    CREATININE 1 09 01/05/2023    CALCIUM 8 5 01/05/2023     Lab Results   Component Value Date    TROPONINI 0 05 (H) 02/10/2019     Lab Results   Component Value Date    WBC 9 16 01/05/2023    HGB 13 1 01/05/2023    HCT 40 8 01/05/2023    MCV 86 01/05/2023     01/05/2023     Lab Results Component Value Date    TRIG 75 11/02/2022    HDL 45 (L) 11/02/2022     Imaging:     DEVICE CHECK  11-    MDT DUAL CHAMBER PM  - ACTIVE SYSTEM IS MRI CONDITIONAL   CARELINK TRANSMISSION: BATTERY VOLTAGE ADEQUATE (10 6 YRS)  AP 90 7%  0 14% (AAIR-DDDR 60)  ALL AVAILABLE LEAD PARAMETERS WITHIN NORMAL LIMITS  3 DEVICE CLASSIFIED VT-MONITOR EPISODES W/ALL AVAIL  EGRMS FOR PAT, RVR, AVG RATE 154-162 BPM  EF 60% (7/2020)  79 AT/AF EPISODES FOR PAF, MAX EPISODE DURATION 03:42:38 HRS  AT/AF BURDEN 1 6%  HX: PAF & PT ON ELIQUIS, SOTATOL, METOPROLOL SUCC  NORMAL DEVICE FUNCTION   ES           ECHO  July 2020  SUMMARY     LEFT VENTRICLE:  Ejection fraction was estimated to be 60 %  There were no regional wall motion abnormalities  Concentric hypertrophy was present      RIGHT VENTRICLE:  Estimated peak pressure was at least 50 mmHg  Pacer lead noted      LEFT ATRIUM:  The atrium was mildly to moderately dilated      MITRAL VALVE:  There was moderate regurgitation      AORTIC VALVE:  There was mild regurgitation      TRICUSPID VALVE:  There was moderate to severe regurgitation      PULMONIC VALVE:  There was trace regurgitation                Echo , Oct 22 2018  LEFT VENTRICLE:  Systolic function was moderately reduced  Ejection fraction was estimated in the range of 35 % to 40 %  This study was inadequate for the evaluation of regional wall motion  Wall thickness was mildly increased  There was mild concentric hypertrophy      RIGHT VENTRICLE:  The size was normal   Systolic function was mildly reduced      LEFT ATRIUM:  The atrium was mildly dilated      RIGHT ATRIUM:  The atrium was dilated      MITRAL VALVE:  There was mild regurgitation      AORTIC VALVE:  There was mild regurgitation      TRICUSPID VALVE:  There was mild to moderate regurgitation  Pulmonary artery systolic pressure was at the upper limits of normal      PERICARDIUM:  A small pericardial effusion was identified   There was no evidence of hemodynamic compromise                  Nuclear stress  Feb 2018  SUMMARY:  -  Stress results: There was no chest pain during stress  -  ECG conclusions: The stress ECG was negative for ischemia and normal   -  Perfusion imaging: There were no perfusion defects  A large sized perfusion defect seen in most part of the anterior and anterolateral wall in the rest and supine stress images was seen to be significantly improved in the prone stress images indicating that it was likely a breast  attenuation artifact  -  Gated SPECT: The calculated left ventricular ejection fraction was 50 %  Left ventricular ejection fraction was within normal limits by visual estimate  There was no diagnostic evidence for left ventricular regional abnormality      IMPRESSIONS: Normal study after pharmacologic stress  Myocardial perfusion imaging was normal at rest and with stress  Left ventricular systolic function was normal             Review of Systems:  Review of Systems   All other systems reviewed and are negative  As described in my history of present illness    Physical Exam:  Physical Exam  Vitals reviewed  Constitutional:       Appearance: Normal appearance  She is well-developed  She is obese  She is not ill-appearing  Comments: Not in any distress at the current time   HENT:      Head: Normocephalic and atraumatic  Right Ear: External ear normal       Left Ear: External ear normal       Nose: Nose normal       Mouth/Throat:      Pharynx: Uvula midline  Eyes:      General: Lids are normal  No scleral icterus  Extraocular Movements: Extraocular movements intact  Conjunctiva/sclera: Conjunctivae normal       Pupils: Pupils are equal, round, and reactive to light  Comments: No pallor  No cyanosis  No icterus   Neck:      Thyroid: No thyromegaly  Vascular: No carotid bruit or JVD        Trachea: Trachea normal       Comments: No jugular lymphadenopathy  Short thick neck  Cardiovascular:      Rate and Rhythm: Normal rate and regular rhythm  Chest Wall: PMI is not displaced  Pulses: Normal pulses  Heart sounds: Normal heart sounds, S1 normal and S2 normal  No murmur heard  No friction rub  No gallop  No S3 or S4 sounds  Pulmonary:      Effort: Pulmonary effort is normal  No accessory muscle usage or respiratory distress  Breath sounds: Normal breath sounds  No decreased breath sounds, wheezing, rhonchi or rales  Chest:      Chest wall: No tenderness  Abdominal:      General: Bowel sounds are normal  There is no distension  Palpations: Abdomen is soft  There is no hepatomegaly, splenomegaly or mass  Tenderness: There is no abdominal tenderness  Comments: Central obesity present   Musculoskeletal:         General: Swelling present  No tenderness or deformity  Normal range of motion  Cervical back: Normal range of motion  Right lower leg: No edema  Left lower leg: Edema present  Lymphadenopathy:      Cervical: No cervical adenopathy  Skin:     Findings: No abrasion, erythema, lesion or rash  Nails: There is no clubbing  Neurological:      Mental Status: She is alert and oriented to person, place, and time  Comments: Facial symmetry is retained  Extraocular movements are retained  Head neck tongue and palate movement are retained and symmetric   Psychiatric:         Speech: Speech normal          Behavior: Behavior normal          Thought Content: Thought content normal          Discussion/Summary:  1  Persistent atrial fibrillation    Currently in atrial paced and ventricular sensed rhythm  Currently the patient is in rhythm  We had a very detailed discussion as far as management of atrial fibrillation   my detailed recommendation for this patient are as follows     Patient was sick in December 2022 and in January 2023  At that time she had fever, pneumonia, flu  She had much more frequent episodes of atrial fibrillation at that time  She is feeling much better in the last 1 month        1 - natural history of disease   atrial fibrillation is a disease of age   prevalence is 1% in the 4th decade , 2-3% by age 72 and 12-13% by age 80   since it increases with age , definitive therapy is indicated         2 - sleep apnea   patient is recommended to follow up with Pulmonary and Sleep Medicine - for REYNOLD        3 - thyroid function   hyperthyroidism is a common precipitator of atrial fibrillation   Check TSH - 2 05 in Jan 2018 and normal        4 -Aggressive management of  hypertension - controlled now  diabetes mellitus - check HbA1c  heart failure - currently EF has improved        5 - anticoagulation   patient's chads Vasc score is -5  congestive heart failure   hypertension   age   prior TIA   gender    patient is on eliquis     6 - rate control medication   beta-blocker - toprol XL  calcium channel blocker to be avoided as low EF        7 - rhythm control medication        class 3 agent - Sotalol   Keep potassium between 4 and 4 5   keep magnesium between 2 and 2 5   follow QTC   Follow creatinine   Patient is currently on sotalol         8 - ablation   this is the most effective method to achieve and maintain sinus rhythm      paroxysmal atrial fibrillation - 70-80% chance in the 1st year  and falls to 50% by 5 years   persistent atrial fibrillation - 50-60% chance in the 1st year and falls to 30% or less by 5 years      we use a combination of cryo and radiofrequency ablation      there is a 2-5% chance of serious complication which include - bleeding around access site, heart attack , stroke , bleeding around the heart requiring drainage , perforation requiring open heart surgery , phrenic nerve paralysis causing diaphragmatic paralysis, atrial esophageal fistula   we do deployed multiple methods to prevent such complication :  - heparin anticoagulation with ACT between 350 and 400s to prevent stroke   - coronary angiography if we are going to ablate close to any major blood vessel   -access to the pericardial drain if pericardial effusion were to happen   - pressure sensing catheters to reduce excessive pressure and chances of perforation   - esophageal temperature monitoring to reduce chances of injury               Summary of my recommendations:  Patient is currently in sinus rhythm, on sotalol, is atrially paced and ventricular sensed  -If medication failed proceed with ablation  CRYO, NAVX, hold eliquis for day before and day of ablation              2  Cardiomyopathy , NYHA II, LVEF =35%  Improved currently to 60%  Suspected tachy mediated  Increasing beta blockers for better rate control  On ACEI  EF has improved to 60%           3  Hypertension  On lisinopril and metoprolol   Blood pressure is 120s/70     4  Hyperlipidemia  On statin-lovastatin   No myositis or myalgia     5  Suspected REYNOLD  Need to be evaluated for CPAP         6  Obesity  BMI is 36 point  Patient advised on dietary him restrictions to lose weight        7  Long-term anticoagulation  Chads Vasc score is 5  To continue with apixaban        8   Sinus bradycardia, sick sinus syndrome  Patient is post pacemaker  All device parameters a stable                 Summary of my recommendation  Patient remains in sinus rhythm, atrially paced and ventricular sensed  Continue with antiarrhythmic therapy    Await results of echo and nuclear stress study    -If medication failed proceed with ablation  CRYO, NAVX, hold eliquis for day before and day of ablation

## 2023-02-28 NOTE — PROGRESS NOTES
Cardiology Follow Up    Lamont Sow  1945  [id number]  [facility] [place]  [address]  [phone]    1  Paroxysmal atrial fibrillation (HCC)  POCT ECG      2  Chronic obstructive pulmonary disease, unspecified COPD type ([facility] [address] )        3  SSS (sick sinus syndrome) ([facility])        4  Persistent atrial fibrillation with rapid ventricular response ([facility] )        5  Essential hypertension        6  Acute on chronic systolic congestive heart failure (HCC)        7  Stage 3a chronic kidney disease ([facility] )        8  Obesity (BMI 30-39 9)        9  Mixed hyperlipidemia        10  H/O TIA (transient ischemic attack) and stroke        6  BMI 33 0-33 9,adult        12   Anticoagulant long-term use            Interval History:   The patient was not doing well in December 2022 and  in January 2023  She was in the hospital and in the ER in [facility]  She suffered from pneumonia as well as the flu  She was having much more frequent episodes of palpitation    She has been feeling better in the last 1 month  She feels well now  She is currently in atrially paced and ventricular sensed rhythm  The patient is here for follow-up of her atrial fibrillation    She has an echocardiogram and a nuclear stress study which have been ordered but not yet done  She does have a history of tachycardia mediated cardiomyopathy  Will reassess once test results are available     The patient is not complaining of anginal like chest pain or chest pressure  There is no worsening orthopnea, paroxysmal nocturnal dyspnea  There is some  leg swelling     No significant palpitation  There is no history of presyncope or syncope  There is rare history of transient  lightheadedness  There has been some improvement in exertional intolerance      Past medical history  The patient is a very pleasant 75year old lady referred to me by  Tri Riggs for management of A fi + RVR + heart failure      She does have significant medical illnesses in the form of  PAF  Hypertension  Hyperlipidemia  CMP - suspected tachy mediated      As for the atrial fibrillation  It has been present for quite some time now  Initially to present as episodes of palpitation  This has progressively increased in frequency and duration         There is history of snoring at night  There is occasional history of morning fatigability  There is occasional history of daytime sleepiness    /84 (BP Location: Left arm, Patient Position: Sitting)   Pulse 78   Ht 5' 5" (1 651 m)   Wt 90 7 kg (200 lb)   BMI 33 28 kg/m²       Patient Active Problem List   Diagnosis   • Persistent atrial fibrillation with rapid ventricular response (HCC)   • Essential hypertension   • Mixed hyperlipidemia   • Obesity (BMI 30-39  9)   • H/O TIA (transient ischemic attack) and stroke   • Migraine with aura and without status migrainosus, not intractable   • Myocardiopathy (HCC)   • Anticoagulant long-term use   • Nonrheumatic mitral valve regurgitation   • Nonrheumatic aortic valve insufficiency   • Nonrheumatic tricuspid valve regurgitation   • Paroxysmal atrial fibrillation (HCC)   • Other insomnia   • Chronic obstructive pulmonary disease (HCC)   • BMI 33 0-33 9,adult   • Stage 3a chronic kidney disease (HCC)   • Acute on chronic systolic congestive heart failure (HCC)   • SSS (sick sinus syndrome) ([facility] [address] )   • Diplopia     Past Medical History:   Diagnosis Date   • Aphasia, mixed 07/28/2017   • Atrial fibrillation (HCC)    • CHF (congestive heart failure) ([facility])    • Colon polyp    • Headache    • Hyperlipidemia    • Hypertension    • Lyme disease    • Shortness of breath    • TIA (transient ischemic attack)    • Vertigo      Social History     Socioeconomic History   • Marital status: /Civil Union     Spouse name: Not on file   • Number of children: Not on file   • Years of education: Not on file • Highest education level: Not on file   Occupational History   • Occupation: Retired   Tobacco Use   • Smoking status: Former     Types: Cigarettes     Quit date:      Years since quittin 1   • Smokeless tobacco: Never   • Tobacco comments:     no smoking for 46 years   Vaping Use   • Vaping Use: Never used   Substance and Sexual Activity   • Alcohol use: Yes     Alcohol/week: 10 0 standard drinks     Types: 10 Glasses of wine per week     Comment: occ   • Drug use: No   • Sexual activity: Never     Partners: Male     Birth control/protection: None   Other Topics Concern   • Not on file   Social History Narrative    Always uses seat belt     Social Determinants of Health     Financial Resource Strain: Low Risk    • Difficulty of Paying Living Expenses: Not hard at all   Food Insecurity: No Food Insecurity   • Worried About Running Out of Food in the Last Year: Never true   • Ran Out of Food in the Last Year: Never true   Transportation Needs: No Transportation Needs   • Lack of Transportation (Medical): No   • Lack of Transportation (Non-Medical):  No   Physical Activity: Not on file   Stress: Not on file   Social Connections: Not on file   Intimate Partner Violence: Not on file   Housing Stability: Low Risk    • Unable to Pay for Housing in the Last Year: No   • Number of Places Lived in the Last Year: 1   • Unstable Housing in the Last Year: No      Family History   Problem Relation Age of Onset   • Lung cancer Mother    • Dementia Father    • Alzheimer's disease Father    • Other Father         Cardiac Disorder    • Lung cancer Maternal Grandmother    • Breast cancer Paternal Aunt 44   • No Known Problems Paternal Aunt    • No Known Problems Paternal Aunt      Past Surgical History:   Procedure Laterality Date   • CARDIAC PACEMAKER PLACEMENT  2019   • COLONOSCOPY     • ID SIGMOIDOSCOPY FLX DX W/COLLJ SPEC BR/WA IF PFRMD N/A 2017    Procedure: SIGMOIDOSCOPY FLEXIBLE;  Surgeon: Raúl Carvajal Maikel Schilling MD;  Location: [facility]; Service: Gastroenterology   • TONSILLECTOMY         Current Outpatient Medications:   •  apixaban (Eliquis) 5 mg, Take 1 tablet (5 mg total) by mouth 2 (two) times a day, Disp: 180 tablet, Rfl: 3  •  furosemide (LASIX) 20 mg tablet, Take 1 tablet (20 mg total) by mouth daily, Disp: 90 tablet, Rfl: 0  •  lisinopril (ZESTRIL) 2 5 mg tablet, Take 1 tablet (2 5 mg total) by mouth daily, Disp: 90 tablet, Rfl: 3  •  lovastatin (MEVACOR) 10 MG tablet, Take 1 tablet (10 mg total) by mouth daily at bedtime, Disp: 90 tablet, Rfl: 3  •  metoprolol succinate (TOPROL-XL) 50 mg 24 hr tablet, Take 1 5 tablets (75 mg total) by mouth 2 (two) times a day, Disp: 270 tablet, Rfl: 3  •  potassium chloride (K-DUR,KLOR-CON) 10 mEq tablet, Take 1 tablet (10 mEq total) by mouth daily, Disp: 90 tablet, Rfl: 0  •  sotalol (BETAPACE) 80 mg tablet, Take 2 tablets (160 mg total) by mouth daily, Disp: 180 tablet, Rfl: 3  No Known Allergies    Labs:  Lab Results   Component Value Date    K 3 8 01/05/2023     01/05/2023    CO2 26 01/05/2023    BUN 18 01/05/2023    CREATININE 1 09 01/05/2023    CALCIUM 8 5 01/05/2023     Lab Results   Component Value Date    TROPONINI 0 05 (H) 02/10/2019     Lab Results   Component Value Date    WBC 9 16 01/05/2023    HGB 13 1 01/05/2023    HCT 40 8 01/05/2023    MCV 86 01/05/2023     01/05/2023     Lab Results   Component Value Date    TRIG 75 11/02/2022    HDL 45 (L) 11/02/2022     Imaging:     DEVICE CHECK  11-    MDT DUAL CHAMBER PM  - ACTIVE SYSTEM IS MRI CONDITIONAL   CARELINK TRANSMISSION: BATTERY VOLTAGE ADEQUATE (10 6 YRS)  AP 90 7%  0 14% (AAIR-DDDR 60)  ALL AVAILABLE LEAD PARAMETERS WITHIN NORMAL LIMITS  3 DEVICE CLASSIFIED VT-MONITOR EPISODES W/ALL AVAIL  EGRMS FOR PAT, RVR, AVG RATE 154-162 BPM  EF 60% (7/2020)  79 AT/AF EPISODES FOR PAF, MAX EPISODE DURATION 03:42:38 HRS  AT/AF BURDEN 1 6%  HX: PAF & PT ON ELIQUIS, SOTATOL, METOPROLOL SUCC  NORMAL DEVICE FUNCTION   ES           ECHO  July 2020  SUMMARY     LEFT VENTRICLE:  Ejection fraction was estimated to be 60 %  There were no regional wall motion abnormalities  Concentric hypertrophy was present      RIGHT VENTRICLE:  Estimated peak pressure was at least 50 mmHg  Pacer lead noted      LEFT ATRIUM:  The atrium was mildly to moderately dilated      MITRAL VALVE:  There was moderate regurgitation      AORTIC VALVE:  There was mild regurgitation      TRICUSPID VALVE:  There was moderate to severe regurgitation      PULMONIC VALVE:  There was trace regurgitation                Echo , Oct 22 2018  LEFT VENTRICLE:  Systolic function was moderately reduced  Ejection fraction was estimated in the range of 35 % to 40 %  This study was inadequate for the evaluation of regional wall motion  Wall thickness was mildly increased  There was mild concentric hypertrophy      RIGHT VENTRICLE:  The size was normal   Systolic function was mildly reduced      LEFT ATRIUM:  The atrium was mildly dilated      RIGHT ATRIUM:  The atrium was dilated      MITRAL VALVE:  There was mild regurgitation      AORTIC VALVE:  There was mild regurgitation      TRICUSPID VALVE:  There was mild to moderate regurgitation  Pulmonary artery systolic pressure was at the upper limits of normal      PERICARDIUM:  A small pericardial effusion was identified  There was no evidence of hemodynamic compromise                  Nuclear stress  Feb 2018  SUMMARY:  -  Stress results: There was no chest pain during stress  -  ECG conclusions: The stress ECG was negative for ischemia and normal   -  Perfusion imaging: There were no perfusion defects  A large sized perfusion defect seen in most part of the anterior and anterolateral wall in the rest and supine stress images was seen to be significantly improved in the prone stress images indicating that it was likely a breast  attenuation artifact    -  Gated SPECT: The calculated left ventricular ejection fraction was 50 %  Left ventricular ejection fraction was within normal limits by visual estimate  There was no diagnostic evidence for left ventricular regional abnormality      IMPRESSIONS: Normal study after pharmacologic stress  Myocardial perfusion imaging was normal at rest and with stress  Left ventricular systolic function was normal             Review of Systems:  Review of Systems   All other systems reviewed and are negative  As described in my history of present illness    Physical Exam:  Physical Exam  Vitals reviewed  Constitutional:       Appearance: Normal appearance  She is well-developed  She is obese  She is not ill-appearing  Comments: Not in any distress at the current time   HENT:      Head: Normocephalic and atraumatic  Right Ear: External ear normal       Left Ear: External ear normal       Nose: Nose normal       Mouth/Throat:      Pharynx: Uvula midline  Eyes:      General: Lids are normal  No scleral icterus  Extraocular Movements: Extraocular movements intact  Conjunctiva/sclera: Conjunctivae normal       Pupils: Pupils are equal, round, and reactive to light  Comments: No pallor  No cyanosis  No icterus   Neck:      Thyroid: No thyromegaly  Vascular: No carotid bruit or JVD  Trachea: Trachea normal       Comments: No jugular lymphadenopathy  Short thick neck  Cardiovascular:      Rate and Rhythm: Normal rate and regular rhythm  Chest Wall: PMI is not displaced  Pulses: Normal pulses  Heart sounds: Normal heart sounds, S1 normal and S2 normal  No murmur heard  No friction rub  No gallop  No S3 or S4 sounds  Pulmonary:      Effort: Pulmonary effort is normal  No accessory muscle usage or respiratory distress  Breath sounds: Normal breath sounds  No decreased breath sounds, wheezing, rhonchi or rales  Chest:      Chest wall: No tenderness  Abdominal:      General: Bowel sounds are normal  There is no distension  Palpations: Abdomen is soft  There is no hepatomegaly, splenomegaly or mass  Tenderness: There is no abdominal tenderness  Comments: Central obesity present   Musculoskeletal:         General: Swelling present  No tenderness or deformity  Normal range of motion  Cervical back: Normal range of motion  Right lower leg: No edema  Left lower leg: Edema present  Lymphadenopathy:      Cervical: No cervical adenopathy  Skin:     Findings: No abrasion, erythema, lesion or rash  Nails: There is no clubbing  Neurological:      Mental Status: She is alert and oriented to person, place, and time  Comments: Facial symmetry is retained  Extraocular movements are retained  Head neck tongue and palate movement are retained and symmetric   Psychiatric:         Speech: Speech normal          Behavior: Behavior normal          Thought Content: Thought content normal          Discussion/Summary:  1  Persistent atrial fibrillation    Currently in atrial paced and ventricular sensed rhythm  Currently the patient is in rhythm  We had a very detailed discussion as far as management of atrial fibrillation   my detailed recommendation for this patient are as follows     Patient was sick in December 2022 and in January 2023  At that time she had fever, pneumonia, flu  She had much more frequent episodes of atrial fibrillation at that time  She is feeling much better in the last 1 month        1 - natural history of disease   atrial fibrillation is a disease of age   prevalence is 1% in the 4th decade , 2-3% by age 72 and 12-13% by age 80   since it increases with age , definitive therapy is indicated         2 - sleep apnea   patient is recommended to follow up with Pulmonary and Sleep Medicine - for REYNOLD        3 - thyroid function   hyperthyroidism is a common precipitator of atrial fibrillation   Check TSH - 2 05 in Jan 2018 and normal        4 -Aggressive management of  hypertension - controlled now  diabetes mellitus - check HbA1c  heart failure - currently EF has improved        5 - anticoagulation   patient's chads Vasc score is -5  congestive heart failure   hypertension   age   prior TIA   gender    patient is on eliquis     6 - rate control medication   beta-blocker - toprol XL  calcium channel blocker to be avoided as low EF        7 - rhythm control medication        class 3 agent - Sotalol   Keep potassium between 4 and 4 5   keep magnesium between 2 and 2 5   follow QTC   Follow creatinine   Patient is currently on sotalol         8 - ablation   this is the most effective method to achieve and maintain sinus rhythm      paroxysmal atrial fibrillation - 70-80% chance in the 1st year  and falls to 50% by 5 years   persistent atrial fibrillation - 50-60% chance in the 1st year and falls to 30% or less by 5 years      we use a combination of cryo and radiofrequency ablation      there is a 2-5% chance of serious complication which include - bleeding around access site, heart attack , stroke , bleeding around the heart requiring drainage , perforation requiring open heart surgery , phrenic nerve paralysis causing diaphragmatic paralysis, atrial esophageal fistula   we do deployed multiple methods to prevent such complication :  - heparin anticoagulation with ACT between 350 and 400s to prevent stroke   - coronary angiography if we are going to ablate close to any major blood vessel   -access to the pericardial drain if pericardial effusion were to happen   - pressure sensing catheters to reduce excessive pressure and chances of perforation   - esophageal temperature monitoring to reduce chances of injury               Summary of my recommendations:  Patient is currently in sinus rhythm, on sotalol, is atrially paced and ventricular sensed  -If medication failed proceed with ablation  CRYO, NAVX, hold eliquis for day before and day of ablation              2   Cardiomyopathy , NYHA II, LVEF =35%  Improved currently to 60%  Suspected tachy mediated  Increasing beta blockers for better rate control  On ACEI  EF has improved to 60%           3  Hypertension  On lisinopril and metoprolol   Blood pressure is 120s/70     4  Hyperlipidemia  On statin-lovastatin   No myositis or myalgia     5  Suspected REYNOLD  Need to be evaluated for CPAP         6  Obesity  BMI is 36 point  Patient advised on dietary him restrictions to lose weight        7  Long-term anticoagulation  Chads Vasc score is 5  To continue with apixaban        8   Sinus bradycardia, sick sinus syndrome  Patient is post pacemaker  All device parameters a stable                 Summary of my recommendation  Patient remains in sinus rhythm, atrially paced and ventricular sensed  Continue with antiarrhythmic therapy    Await results of echo and nuclear stress study    -If medication failed proceed with ablation  CRYO, NAVX, hold eliquis for day before and day of ablation

## 2023-03-07 ENCOUNTER — TELEPHONE (OUTPATIENT)
Dept: CARDIOLOGY CLINIC | Facility: CLINIC | Age: 78
End: 2023-03-07

## 2023-03-07 ENCOUNTER — HOSPITAL ENCOUNTER (OUTPATIENT)
Dept: NON INVASIVE DIAGNOSTICS | Facility: CLINIC | Age: 78
Discharge: HOME | End: 2023-03-07

## 2023-03-07 VITALS
SYSTOLIC BLOOD PRESSURE: 120 MMHG | WEIGHT: 200 LBS | HEART RATE: 78 BPM | HEIGHT: 65 IN | DIASTOLIC BLOOD PRESSURE: 84 MMHG | BODY MASS INDEX: 33.32 KG/M2

## 2023-03-07 VITALS
HEIGHT: 65 IN | BODY MASS INDEX: 33.32 KG/M2 | WEIGHT: 200 LBS | SYSTOLIC BLOOD PRESSURE: 142 MMHG | DIASTOLIC BLOOD PRESSURE: 102 MMHG | HEART RATE: 72 BPM | OXYGEN SATURATION: 95 %

## 2023-03-07 DIAGNOSIS — I42.9 CARDIOMYOPATHY, UNSPECIFIED TYPE (HCC): ICD-10-CM

## 2023-03-07 DIAGNOSIS — I48.19 PERSISTENT ATRIAL FIBRILLATION (HCC): ICD-10-CM

## 2023-03-07 DIAGNOSIS — R06.02 SOB (SHORTNESS OF BREATH): ICD-10-CM

## 2023-03-07 LAB
AORTIC ROOT: 3.1 CM
APICAL FOUR CHAMBER EJECTION FRACTION: 69 %
AV REGURGITATION PRESSURE HALF TIME: 529 MS
FRACTIONAL SHORTENING: 37 (ref 28–44)
INTERVENTRICULAR SEPTUM IN DIASTOLE (PARASTERNAL SHORT AXIS VIEW): 1.2 CM
INTERVENTRICULAR SEPTUM: 1.2 CM (ref 0.6–1.1)
LAAS-AP4: 26.9 CM2
LEFT ATRIUM SIZE: 3.7 CM
LEFT INTERNAL DIMENSION IN SYSTOLE: 2.6 CM (ref 2.1–4)
LEFT VENTRICULAR INTERNAL DIMENSION IN DIASTOLE: 4.1 CM (ref 3.5–6)
LEFT VENTRICULAR POSTERIOR WALL IN END DIASTOLE: 1.2 CM
LEFT VENTRICULAR STROKE VOLUME: 50 ML
LVSV (TEICH): 50 ML
NUC STRESS EJECTION FRACTION: 79 %
PA SYSTOLIC PRESSURE: 52 MMHG
RATE PRESSURE PRODUCT: NORMAL
RIGHT ATRIUM AREA SYSTOLE A4C: 19.1 CM2
RIGHT VENTRICLE ID DIMENSION: 3.2 CM
SL CV AV DECELERATION TIME RETROGRADE: 1825 MS
SL CV AV PEAK GRADIENT RETROGRADE: 65 MMHG
SL CV LV EF: 55
SL CV PED ECHO LEFT VENTRICLE DIASTOLIC VOLUME (MOD BIPLANE) 2D: 74 ML
SL CV PED ECHO LEFT VENTRICLE SYSTOLIC VOLUME (MOD BIPLANE) 2D: 24 ML
SL CV REST NUCLEAR ISOTOPE DOSE: 10.7 MCI
SL CV STRESS NUCLEAR ISOTOPE DOSE: 31 MCI
SL CV STRESS RECOVERY BP: NORMAL MMHG
SL CV STRESS RECOVERY HR: 83 BPM
SL CV STRESS RECOVERY O2 SAT: 97 %
STRESS ANGINA INDEX: 0
STRESS BASELINE BP: NORMAL MMHG
STRESS BASELINE HR: 72 BPM
STRESS O2 SAT REST: 95 %
STRESS PEAK HR: 92 BPM
STRESS POST O2 SAT PEAK: 100 %
STRESS POST PEAK BP: 128 MMHG
STRESS ST DEPRESSION: 0 MM
STRESS/REST PERFUSION RATIO: 1.03
TR MAX PG: 47 MMHG
TR PEAK VELOCITY: 3.4 M/S
TRICUSPID VALVE PEAK REGURGITATION VELOCITY: 3.43 M/S

## 2023-03-07 RX ADMIN — REGADENOSON 0.4 MG: 0.08 INJECTION, SOLUTION INTRAVENOUS at 12:44

## 2023-03-07 NOTE — TELEPHONE ENCOUNTER
Patient called the office back relayed [employer] response, patient verbally understood

## 2023-03-07 NOTE — TELEPHONE ENCOUNTER
----- Message from Araseli Orta PA-C sent at 3/7/2023  1:59 PM EST -----  Regarding: Results  Good afternoon, please call patient and inform her heart pumping function improved to 55% (previously 40-45%)  She can discuss results further with Dr Chela Yepez at next office visit   Thank you!  ----- Message -----  From: Larry Colindres MD  Sent: 3/7/2023   1:35 PM EST  To: Araseli Orta PA-C

## 2023-03-07 NOTE — TELEPHONE ENCOUNTER
----- Message from Fahad Gutierrez PA-C sent at 3/7/2023  1:59 PM EST -----  Regarding: Results  Good afternoon, please call patient and inform her heart pumping function improved to 55% (previously 40-45%)  She can discuss results further with Dr Kishore Nguyen at next office visit   Thank you!  ----- Message -----  From: Angie Estes MD  Sent: 3/7/2023   1:35 PM EST  To: Fahad Gutierrez PA-C

## 2023-03-08 ENCOUNTER — TELEPHONE (OUTPATIENT)
Dept: CARDIOLOGY CLINIC | Facility: CLINIC | Age: 78
End: 2023-03-08

## 2023-03-08 ENCOUNTER — OFFICE VISIT (OUTPATIENT)
Dept: CARDIOLOGY CLINIC | Facility: CLINIC | Age: 78
End: 2023-03-08

## 2023-03-08 VITALS
OXYGEN SATURATION: 99 % | DIASTOLIC BLOOD PRESSURE: 90 MMHG | SYSTOLIC BLOOD PRESSURE: 136 MMHG | WEIGHT: 202 LBS | RESPIRATION RATE: 16 BRPM | BODY MASS INDEX: 33.66 KG/M2 | HEIGHT: 65 IN | HEART RATE: 77 BPM

## 2023-03-08 DIAGNOSIS — I35.1 NONRHEUMATIC AORTIC VALVE INSUFFICIENCY: ICD-10-CM

## 2023-03-08 DIAGNOSIS — I48.0 PAROXYSMAL ATRIAL FIBRILLATION (HCC): Chronic | ICD-10-CM

## 2023-03-08 DIAGNOSIS — I49.5 SSS (SICK SINUS SYNDROME) (HCC): Primary | ICD-10-CM

## 2023-03-08 DIAGNOSIS — I34.0 NONRHEUMATIC MITRAL VALVE REGURGITATION: ICD-10-CM

## 2023-03-08 LAB
CHEST PAIN STATEMENT: NORMAL
MAX DIASTOLIC BP: 102 MMHG
MAX HEART RATE: 92 BPM
MAX PREDICTED HEART RATE: 142 BPM
MAX. SYSTOLIC BP: 142 MMHG
PROTOCOL NAME: NORMAL
REASON FOR TERMINATION: NORMAL
TARGET HR FORMULA: NORMAL
TIME IN EXERCISE PHASE: NORMAL

## 2023-03-08 NOTE — TELEPHONE ENCOUNTER
----- Message from Ingris Cuadra PA-C sent at 3/7/2023  4:53 PM EST -----  This patient needs to be set up with Dr Ian Aleman in the next 7 to 10 days for abnormal stress test   May need consideration for cardiac catheterization

## 2023-03-08 NOTE — PROGRESS NOTES
Cardiology Follow Up    Elby Epley  [birth year]  [id number]  [facility] [place]  [address] [place] [address] [phone]    1  SSS (sick sinus syndrome) ([facility])    2  Paroxysmal atrial fibrillation ([address] )    3  Nonrheumatic mitral valve regurgitation    4  Nonrheumatic aortic valve insufficiency          Interval History: Patient of Dr Ben Perry who has a previous history of systolic congestive heart failure, previous pacemaker for sick sinus syndrome, previous paroxysmal atrial fibrillation, chronic interstitial lung disease who had an outpatient echo which showed a normal ejection fraction and a stress test which suggested anterior ischemia  She states she feels well  She does not get exertional chest discomfort  She can walk up steps albeit slowly  She is a former smoker  Her heart rhythm has been under control  Her appointment was moved up because of an abnormal stress test     Patient Active Problem List   Diagnosis   • Persistent atrial fibrillation with rapid ventricular response ([address] )   • Essential hypertension   • Mixed hyperlipidemia   • Obesity (BMI 30-39  9)   • H/O TIA (transient ischemic attack) and stroke   • Migraine with aura and without status migrainosus, not intractable   • Myocardiopathy ([address] )   • Anticoagulant long-term use   • Nonrheumatic mitral valve regurgitation   • Nonrheumatic aortic valve insufficiency   • Nonrheumatic tricuspid valve regurgitation   • Paroxysmal atrial fibrillation (HCC)   • Other insomnia   • Chronic obstructive pulmonary disease (HCC)   • BMI 33 0-33 9,adult   • Stage 3a chronic kidney disease (HCC)   • Acute on chronic systolic congestive heart failure (HCC)   • SSS (sick sinus syndrome) ([address] )   • Diplopia     Past Medical History:   Diagnosis Date   • Aphasia, mixed 07/28/2017   • Atrial fibrillation (HCC)    • CHF (congestive heart failure) [facility])    • Colon polyp    • Headache    • Hyperlipidemia    • Hypertension    • Lyme disease    • Shortness of breath    • TIA (transient ischemic attack)    • Vertigo      Social History     Socioeconomic History   • Marital status: /Civil Union     Spouse name: Not on file   • Number of children: Not on file   • Years of education: Not on file   • Highest education level: Not on file   Occupational History   • Occupation: Retired   Tobacco Use   • Smoking status: Former     Types: Cigarettes     Quit date:      Years since quittin 2   • Smokeless tobacco: Never   • Tobacco comments:     no smoking for 46 years   Vaping Use   • Vaping Use: Never used   Substance and Sexual Activity   • Alcohol use: Yes     Alcohol/week: 10 0 standard drinks     Types: 10 Glasses of wine per week     Comment: occ   • Drug use: No   • Sexual activity: Never     Partners: Male     Birth control/protection: None   Other Topics Concern   • Not on file   Social History Narrative    Always uses seat belt     Social Determinants of Health     Financial Resource Strain: Low Risk    • Difficulty of Paying Living Expenses: Not hard at all   Food Insecurity: No Food Insecurity   • Worried About Running Out of Food in the Last Year: Never true   • Ran Out of Food in the Last Year: Never true   Transportation Needs: No Transportation Needs   • Lack of Transportation (Medical): No   • Lack of Transportation (Non-Medical):  No   Physical Activity: Not on file   Stress: Not on file   Social Connections: Not on file   Intimate Partner Violence: Not on file   Housing Stability: Low Risk    • Unable to Pay for Housing in the Last Year: No   • Number of Places Lived in the Last Year: 1   • Unstable Housing in the Last Year: No      Family History   Problem Relation Age of Onset   • Lung cancer Mother    • Dementia Father    • Alzheimer's disease Father    • Other Father         Cardiac Disorder    • Lung cancer Maternal Grandmother    • Breast cancer Paternal Aunt 36   • No Known Problems Paternal Aunt    • No Known Problems Paternal Aunt      Past Surgical History:   Procedure Laterality Date   • CARDIAC PACEMAKER PLACEMENT  12/2019   • COLONOSCOPY     • AL SIGMOIDOSCOPY FLX DX W/COLLJ SPEC BR/WA IF PFRMD N/A 11/28/2017    Procedure: Diandra Avila;  Surgeon: Josiah Hernandez MD;  Location: [facility]; Service: Gastroenterology   • TONSILLECTOMY         Current Outpatient Medications:   •  apixaban (Eliquis) 5 mg, Take 1 tablet (5 mg total) by mouth 2 (two) times a day, Disp: 180 tablet, Rfl: 3  •  furosemide (LASIX) 20 mg tablet, Take 1 tablet (20 mg total) by mouth daily, Disp: 90 tablet, Rfl: 0  •  lisinopril (ZESTRIL) 2 5 mg tablet, Take 1 tablet (2 5 mg total) by mouth daily, Disp: 90 tablet, Rfl: 3  •  lovastatin (MEVACOR) 10 MG tablet, Take 1 tablet (10 mg total) by mouth daily at bedtime, Disp: 90 tablet, Rfl: 3  •  metoprolol succinate (TOPROL-XL) 50 mg 24 hr tablet, Take 1 5 tablets (75 mg total) by mouth 2 (two) times a day, Disp: 270 tablet, Rfl: 3  •  potassium chloride (K-DUR,KLOR-CON) 10 mEq tablet, Take 1 tablet (10 mEq total) by mouth daily, Disp: 90 tablet, Rfl: 0  •  sotalol (BETAPACE) 80 mg tablet, Take 2 tablets (160 mg total) by mouth daily, Disp: 180 tablet, Rfl: 3  No current facility-administered medications for this visit    No Known Allergies    Labs:  Hospital Outpatient Visit on 03/07/2023   Component Date Value   • Rest Nuclear Isotope Dose 03/07/2023 10 70    • Stress Nuclear Isotope D* 03/07/2023 31 00    • Baseline HR 03/07/2023 72    • Baseline BP 03/07/2023 142/102    • O2 sat rest 03/07/2023 95    • Stress peak HR 03/07/2023 92    • Post peak BP 03/07/2023 128    • Rate Pressure Product 03/07/2023 11,776 0    • O2 sat peak 03/07/2023 100    • Recovery HR 03/07/2023 83    • Recovery BP 03/07/2023 142/96    • O2 sat recovery 03/07/2023 97    • Angina Index 03/07/2023 0    • EF (%) 03/07/2023 79    • ST Depression (mm) 03/07/2023 0    • Stress/rest perfusion ra* 03/07/2023 1 03    • Protocol Name 03/08/2023 AGNES- WALK    • Time In Exercise Phase 03/08/2023 00:03:00    • MAX  SYSTOLIC BP 73/55/2282 286    • Max Diastolic Bp 72/83/1858 781    • Max Heart Rate 03/08/2023 92    • Max Predicted Heart Rate 03/08/2023 142    • Reason for Termination 03/08/2023 Protocol Complete    • Test Indication 03/08/2023                      Value:cardiomyopathy  SOB     • Target Hr Formular 03/08/2023 (220 - Age)*85%    • Chest Pain Statement 03/08/2023 none    Hospital Outpatient Visit on 03/07/2023   Component Date Value   • A4C EF 03/07/2023 69    • LVIDd 03/07/2023 4 10    • LVIDS 03/07/2023 2 60    • IVSd 03/07/2023 1 20    • LVPWd 03/07/2023 1 20    • FS 03/07/2023 37    • RVID d 03/07/2023 3 2    • LA size 03/07/2023 3 7    • RAA A4C 03/07/2023 19 1    • AV Deceleration Time 03/07/2023 1,825    • AV regurgitation pressur* 03/07/2023 529    • TR Peak Gal 03/07/2023 3 4    • Triscuspid Valve Regurgi* 03/07/2023 47 0    • Ao root 03/07/2023 3 10    • AV peak gradient 03/07/2023 65    • Tricuspid valve peak reg* 03/07/2023 3 43    • Left ventricular stroke * 03/07/2023 50 00    • IVS 03/07/2023 1 2    • LEFT VENTRICLE SYSTOLIC * 53/74/0753 24    • LV DIASTOLIC VOLUME (MOD* 43/17/6511 74    • Left Atrium Area-systoli* 03/07/2023 26 9    • LVSV, 2D 03/07/2023 50    • LV EF 03/07/2023 55    • PASP 03/07/2023 52 0      Imaging: Stress strip    Result Date: 3/8/2023  Narrative: Confirmed by Archive, Archive ([id number]),  Remi Shcmitz ([id number]) on 3/8/2023 7:51:15 AM    Echo follow up/limited w/ contrast if indicated    Result Date: 3/7/2023  Narrative: •  Left Ventricle: Left ventricular cavity size is normal  The left ventricular ejection fraction is 55%  Systolic function is normal  •  Left Atrium: The atrium is moderately dilated  •  Right Atrium: The atrium is mildly dilated  •  Aortic Valve:  There is mild to moderate regurgitation  •  Tricuspid Valve: There is moderate regurgitation  •  Pulmonary Artery: The estimated pulmonary artery systolic pressure is 27 7 mmHg  The pulmonary artery systolic pressure is moderately increased  •  This is a limited echocardiogram performed to evaluate LV function  Interpretation is therefore limited  NM myocardial perfusion spect (rx stress and/or rest)    Result Date: 3/7/2023  Narrative: •  Stress ECG: No ST deviation is noted  There were no arrhythmias during recovery    The ECG was not diagnostic due to pharmacological (vasodilator) stress  •  Perfusion Defect Conclusion: The stress/rest perfusion ratio is 1 03   There is no evidence of transient ischemic dilation (TID)  •  Stress Function: Left ventricular function post-stress is normal  Post-stress ejection fraction is 79 %  •  Perfusion: There is a left ventricular perfusion defect that is medium in size with moderate reduction in uptake present in the mid to apical anterior and anterolateral location(s) that is partially reversible  •  Stress Combined Conclusion: Left ventricular perfusion is probably abnormal        Review of Systems:  Review of Systems    Physical Exam:  Rhythm regular  Blood pressure 136/90  Heart rate 77 and regular  Carotids 2+ without bruits  Lungs reveal chronic sounding minimal rales in the left base  Regular rhythm  No murmurs or gallops  No edema    I reviewed the stress test and most of the perfusion abnormality goes away with prone testing  She may have a mild persistent abnormality which could relate to breast attenuation since she has large breasts  Discussion/Summary:    #1  No history to suggest coronary disease and abnormal stress test may relate to tissue attenuation  2  Valvular heart disease as noted without current congestive failure  3  History of congestive failure but normal systolic ejection fraction now  4  History of paroxysmal atrial fibrillation-under control  5  Interstitial lung disease with chronic sounding rales    Recommendations:    1  I do not think cardiac catheterization is clearly indicated at this point given the above information  2  Continue current medications otherwise  3  Report any symptoms of exertionally related discomfort or shortness of breath  4  Follow-up with Dr Jeb Wasserman at regular scheduled appointment        Yamileth Acevedo MD

## 2023-03-14 ENCOUNTER — HOSPITAL ENCOUNTER (EMERGENCY)
Facility: HOSPITAL | Age: 78
Discharge: HOME | End: 2023-03-14
Attending: EMERGENCY MEDICINE

## 2023-03-14 ENCOUNTER — APPOINTMENT (EMERGENCY)
Dept: RADIOLOGY | Facility: HOSPITAL | Age: 78
End: 2023-03-14

## 2023-03-14 VITALS
RESPIRATION RATE: 19 BRPM | OXYGEN SATURATION: 97 % | TEMPERATURE: 97.5 F | HEART RATE: 71 BPM | DIASTOLIC BLOOD PRESSURE: 70 MMHG | SYSTOLIC BLOOD PRESSURE: 103 MMHG

## 2023-03-14 DIAGNOSIS — I48.91 ATRIAL FIBRILLATION AND FLUTTER (HCC): Primary | ICD-10-CM

## 2023-03-14 DIAGNOSIS — I48.92 ATRIAL FIBRILLATION AND FLUTTER (HCC): Primary | ICD-10-CM

## 2023-03-14 LAB
ANION GAP SERPL CALCULATED.3IONS-SCNC: 9 MMOL/L (ref 4–13)
BASOPHILS # BLD AUTO: 0.06 THOUSANDS/ÂΜL (ref 0–0.1)
BASOPHILS NFR BLD AUTO: 1 % (ref 0–1)
BNP SERPL-MCNC: 1113 PG/ML (ref 0–100)
BUN SERPL-MCNC: 17 MG/DL (ref 5–25)
CALCIUM SERPL-MCNC: 9.5 MG/DL (ref 8.4–10.2)
CARDIAC TROPONIN I PNL SERPL HS: 8 NG/L
CHLORIDE SERPL-SCNC: 107 MMOL/L (ref 96–108)
CO2 SERPL-SCNC: 25 MMOL/L (ref 21–32)
CREAT SERPL-MCNC: 1.21 MG/DL (ref 0.6–1.3)
EOSINOPHIL # BLD AUTO: 0.17 THOUSAND/ÂΜL (ref 0–0.61)
EOSINOPHIL NFR BLD AUTO: 2 % (ref 0–6)
ERYTHROCYTE [DISTWIDTH] IN BLOOD BY AUTOMATED COUNT: 18.1 % (ref 11.6–15.1)
GFR SERPL CREATININE-BSD FRML MDRD: 42 ML/MIN/1.73SQ M
GLUCOSE SERPL-MCNC: 139 MG/DL (ref 65–140)
HCT VFR BLD AUTO: 43.1 % (ref 34.8–46.1)
HGB BLD-MCNC: 13.6 G/DL (ref 11.5–15.4)
IMM GRANULOCYTES # BLD AUTO: 0.03 THOUSAND/UL (ref 0–0.2)
IMM GRANULOCYTES NFR BLD AUTO: 0 % (ref 0–2)
LYMPHOCYTES # BLD AUTO: 1.72 THOUSANDS/ÂΜL (ref 0.6–4.47)
LYMPHOCYTES NFR BLD AUTO: 21 % (ref 14–44)
MCH RBC QN AUTO: 29.2 PG (ref 26.8–34.3)
MCHC RBC AUTO-ENTMCNC: 31.6 G/DL (ref 31.4–37.4)
MCV RBC AUTO: 93 FL (ref 82–98)
MONOCYTES # BLD AUTO: 0.85 THOUSAND/ÂΜL (ref 0.17–1.22)
MONOCYTES NFR BLD AUTO: 10 % (ref 4–12)
NEUTROPHILS # BLD AUTO: 5.45 THOUSANDS/ÂΜL (ref 1.85–7.62)
NEUTS SEG NFR BLD AUTO: 66 % (ref 43–75)
NRBC BLD AUTO-RTO: 0 /100 WBCS
PLATELET # BLD AUTO: 226 THOUSANDS/UL (ref 149–390)
PMV BLD AUTO: 11.3 FL (ref 8.9–12.7)
POTASSIUM SERPL-SCNC: 4.1 MMOL/L (ref 3.5–5.3)
RBC # BLD AUTO: 4.65 MILLION/UL (ref 3.81–5.12)
SODIUM SERPL-SCNC: 141 MMOL/L (ref 135–147)
WBC # BLD AUTO: 8.28 THOUSAND/UL (ref 4.31–10.16)

## 2023-03-14 RX ORDER — DILTIAZEM HYDROCHLORIDE 5 MG/ML
10 INJECTION INTRAVENOUS ONCE
Status: COMPLETED | OUTPATIENT
Start: 2023-03-14 | End: 2023-03-14

## 2023-03-14 RX ORDER — SODIUM CHLORIDE 9 MG/ML
3 INJECTION INTRAVENOUS
Status: DISCONTINUED | OUTPATIENT
Start: 2023-03-14 | End: 2023-03-14 | Stop reason: HOSPADM

## 2023-03-14 RX ADMIN — DILTIAZEM HYDROCHLORIDE 10 MG: 5 INJECTION INTRAVENOUS at 14:22

## 2023-03-14 RX ADMIN — SODIUM CHLORIDE 250 ML: 0.9 INJECTION, SOLUTION INTRAVENOUS at 14:21

## 2023-03-14 NOTE — DISCHARGE INSTRUCTIONS
A  personal message from Dr Alyse Cote,  Thank you so much for allowing me to care for you today  I pride myself in the care and attention I give all my patients  I hope you were a witness to this tonight  If for any reason your condition does not improve, worsens, or you have a question that was not answered during your visit you can feel free to text me on my personal phone   # [phone]  I will answer to your message and continue your care past your emergency room visit

## 2023-03-14 NOTE — ED PROVIDER NOTES
History  Chief Complaint   Patient presents with   • Shortness of Breath     Pt reports increased SOB over the last 2 days  Hx of afib      This is a 41-year-old female with underlying history of atrial fibrillation, sick sinus syndrome, MVP, hypercholesterolemia, hypertension  She sees Dr Sam Sapp and Dr Clay Perez from cardiology  Recently had a perfusion scan that was okay    Today she presents emergency department with shortness of breath that got acutely worse last night but has been going on for the last 48 hours  Also fatigue and lightheadedness  No swelling of the lower extremities  And progressive severe exertional dyspnea  Patient does have history of paroxysmal atrial fibrillation she does have a pacemaker/defibrillator and is currently on Eliquis  History provided by:  Patient and relative   used: No    Shortness of Breath  Severity:  Moderate  Associated symptoms: no abdominal pain, no chest pain, no cough, no ear pain, no fever, no rash, no sore throat and no vomiting        Prior to Admission Medications   Prescriptions Last Dose Informant Patient Reported? Taking?    apixaban (Eliquis) 5 mg   No No   Sig: Take 1 tablet (5 mg total) by mouth 2 (two) times a day   furosemide (LASIX) 20 mg tablet   No No   Sig: Take 1 tablet (20 mg total) by mouth daily   lisinopril (ZESTRIL) 2 5 mg tablet   No No   Sig: Take 1 tablet (2 5 mg total) by mouth daily   lovastatin (MEVACOR) 10 MG tablet   No No   Sig: Take 1 tablet (10 mg total) by mouth daily at bedtime   metoprolol succinate (TOPROL-XL) 50 mg 24 hr tablet   No No   Sig: Take 1 5 tablets (75 mg total) by mouth 2 (two) times a day   potassium chloride (K-DUR,KLOR-CON) 10 mEq tablet   No No   Sig: Take 1 tablet (10 mEq total) by mouth daily   sotalol (BETAPACE) 80 mg tablet   No No   Sig: Take 2 tablets (160 mg total) by mouth daily      Facility-Administered Medications: None       Past Medical History:   Diagnosis Date   • Aphasia, mixed 2017   • Atrial fibrillation (HCC)    • CHF (congestive heart failure) (HCC)    • Colon polyp    • Headache    • Hyperlipidemia    • Hypertension    • Lyme disease    • Shortness of breath    • TIA (transient ischemic attack)    • Vertigo        Past Surgical History:   Procedure Laterality Date   • CARDIAC PACEMAKER PLACEMENT  2019   • COLONOSCOPY     • MI SIGMOIDOSCOPY FLX DX W/COLLJ SPEC BR/WA IF PFRMD N/A 2017    Procedure: Linda Dawson;  Surgeon: Parker Ahmadi MD;  Location: [facility]; Service: Gastroenterology   • TONSILLECTOMY         Family History   Problem Relation Age of Onset   • Lung cancer Mother    • Dementia Father    • Alzheimer's disease Father    • Other Father         Cardiac Disorder    • Lung cancer Maternal Grandmother    • Breast cancer Paternal Aunt 44   • No Known Problems Paternal Aunt    • No Known Problems Paternal Aunt      I have reviewed and agree with the history as documented  E-Cigarette/Vaping   • E-Cigarette Use Never User      E-Cigarette/Vaping Substances   • Nicotine No    • THC No    • CBD No    • Flavoring No    • Other No    • Unknown No      Social History     Tobacco Use   • Smoking status: Former     Types: Cigarettes     Quit date:      Years since quittin 2   • Smokeless tobacco: Never   • Tobacco comments:     no smoking for 46 years   Vaping Use   • Vaping Use: Never used   Substance Use Topics   • Alcohol use: Yes     Alcohol/week: 10 0 standard drinks     Types: 10 Glasses of wine per week     Comment: occ   • Drug use: No       Review of Systems   Constitutional: Negative for chills and fever  HENT: Negative for ear pain and sore throat  Eyes: Negative for pain and visual disturbance  Respiratory: Positive for shortness of breath  Negative for cough  Cardiovascular: Negative for chest pain and palpitations  Gastrointestinal: Negative for abdominal pain and vomiting     Genitourinary: Negative for dysuria and hematuria  Musculoskeletal: Negative for arthralgias and back pain  Skin: Negative for color change and rash  Neurological: Negative for seizures and syncope  All other systems reviewed and are negative  Physical Exam  Physical Exam  Vitals and nursing note reviewed  Constitutional:       General: She is not in acute distress  Appearance: She is well-developed and normal weight  HENT:      Head: Normocephalic and atraumatic  Eyes:      Conjunctiva/sclera: Conjunctivae normal       Pupils: Pupils are equal, round, and reactive to light  Cardiovascular:      Rate and Rhythm: Tachycardia present  Rhythm irregular  No extrasystoles are present  Heart sounds: No murmur heard  No friction rub  Gallop present  Pulmonary:      Effort: Pulmonary effort is normal  No respiratory distress  Breath sounds: Examination of the right-lower field reveals rales  Rales present  No decreased breath sounds or wheezing  Chest:      Chest wall: No mass, deformity or crepitus  Abdominal:      Palpations: Abdomen is soft  Tenderness: There is no abdominal tenderness  Musculoskeletal:         General: No swelling  Cervical back: Neck supple  Right lower leg: No tenderness  No edema  Left lower leg: No tenderness  No edema  Skin:     General: Skin is warm and dry  Capillary Refill: Capillary refill takes less than 2 seconds  Neurological:      General: No focal deficit present  Mental Status: She is alert and oriented to person, place, and time     Psychiatric:         Mood and Affect: Mood normal          Behavior: Behavior normal          Vital Signs  ED Triage Vitals   Temperature Pulse Respirations Blood Pressure SpO2   03/14/23 1353 03/14/23 1348 03/14/23 1348 03/14/23 1348 03/14/23 1348   97 5 °F (36 4 °C) 55 22 109/74 100 %      Temp src Heart Rate Source Patient Position - Orthostatic VS BP Location FiO2 (%)   -- 03/14/23 1348 03/14/23 1430 03/14/23 1348 --    Monitor Lying Right arm       Pain Score       --                  Vitals:    03/14/23 1348 03/14/23 1430   BP: 109/74 99/67   Pulse: 55 85   Patient Position - Orthostatic VS:  Lying         Visual Acuity      ED Medications  Medications   sodium chloride (PF) 0 9 % injection 3 mL (has no administration in time range)   diltiazem (CARDIZEM) injection 10 mg (10 mg Intravenous Given 3/14/23 1422)   sodium chloride 0 9 % bolus 250 mL (250 mL Intravenous New Bag 3/14/23 1421)       Diagnostic Studies  Results Reviewed     Procedure Component Value Units Date/Time    B-Type Natriuretic Peptide(BNP) [[id number]]  (Abnormal) Collected: 03/14/23 1422    Lab Status: Final result Specimen: Blood from Arm, Left Updated: 03/14/23 1519     BNP 1,113 pg/mL     HS Troponin I 2hr [[id number]]     Lab Status: No result Specimen: Blood     HS Troponin 0hr (reflex protocol) [[id number]]  (Normal) Collected: 03/14/23 1422    Lab Status: Final result Specimen: Blood from Arm, Left Updated: 03/14/23 1500     hs TnI 0hr 8 ng/L     Basic metabolic panel [[id number]] Collected: 03/14/23 1422    Lab Status: Final result Specimen: Blood from Arm, Left Updated: 03/14/23 1455     Sodium 141 mmol/L      Potassium 4 1 mmol/L      Chloride 107 mmol/L      CO2 25 mmol/L      ANION GAP 9 mmol/L      BUN 17 mg/dL      Creatinine 1 21 mg/dL      Glucose 139 mg/dL      Calcium 9 5 mg/dL      eGFR 42 ml/min/1 73sq m     Narrative:      [facility] guidelines for Chronic Kidney Disease (CKD):   •  Stage 1 with normal or high GFR (GFR > 90 mL/min/1 73 square meters)  •  Stage 2 Mild CKD (GFR = 60-89 mL/min/1 73 square meters)  •  Stage 3A Moderate CKD (GFR = 45-59 mL/min/1 73 square meters)  •  Stage 3B Moderate CKD (GFR = 30-44 mL/min/1 73 square meters)  •  Stage 4 Severe CKD (GFR = 15-29 mL/min/1 73 square meters)  •  Stage 5 End Stage CKD (GFR <15 mL/min/1 73 square meters)  Note: GFR calculation is accurate only with a steady state creatinine    CBC and differential [[id number]]  (Abnormal) Collected: 03/14/23 1422    Lab Status: Final result Specimen: Blood from Arm, Left Updated: 03/14/23 1432     WBC 8 28 Thousand/uL      RBC 4 65 Million/uL      Hemoglobin 13 6 g/dL      Hematocrit 43 1 %      MCV 93 fL      MCH 29 2 pg      MCHC 31 6 g/dL      RDW 18 1 %      MPV 11 3 fL      Platelets 568 Thousands/uL      nRBC 0 /100 WBCs      Neutrophils Relative 66 %      Immat GRANS % 0 %      Lymphocytes Relative 21 %      Monocytes Relative 10 %      Eosinophils Relative 2 %      Basophils Relative 1 %      Neutrophils Absolute 5 45 Thousands/µL      Immature Grans Absolute 0 03 Thousand/uL      Lymphocytes Absolute 1 72 Thousands/µL      Monocytes Absolute 0 85 Thousand/µL      Eosinophils Absolute 0 17 Thousand/µL      Basophils Absolute 0 06 Thousands/µL                  X-ray chest 1 view portable   ED Interpretation by Rosio Jaimes MD (03/14 1440)   Cardiomegaly, no pleural effusion, no signs of CHF  Pacemaker in place  Previous x-ray reviewed and shows a hiatal hernia which is a bit obscured on this x-ray  Procedures  Procedures         ED Course  ED Course as of 03/14/23 1524   Tue Mar 14, 2023   1439 WBC: 8 28   1439 Hemoglobin: 13 6   1439 HCT: 43 1   1455 Sodium: 141   1455 Potassium: 4 1   1455 BUN: 17   1455 Creatinine: 1 21   1516 hs TnI 0hr: 8               Identification of Seniors at [facility] Most Recent Value   (ISAR) Identification of Seniors at Risk    Before the illness or injury that brought you to the Emergency, did you need someone to help you on a regular basis? 0 Filed at: 03/14/2023 1348   In the last 24 hours, have you needed more help than usual? 0 Filed at: 03/14/2023 1348   Have you been hospitalized for one or more nights during the past 6 months? 1 Filed at: 03/14/2023 1348   In general, do you see well?  1 Filed at: 03/14/2023 1348   In general, do you have serious problems with your memory? 0 Filed at: 03/14/2023 134   Do you take more than three different medications every day? 1 Filed at: 03/14/2023 1343   ISAR Score 3 Filed at: 03/14/2023 1348                                    Medical Decision Making  EKG: None at 87 bpm and shows normal axis  Atrial fibrillation with rate control with a rate of 87  No ST elevations or depressions  Inferior T wave inversions  Earlier EKG showed atrial flutter with a rate in the 140s  Atrial rate in the 300s  Differential diagnosis includes but not limited to: A-fib, CHF, acute MI, ACS, PE, electrolyte imbalance    Reexamination of the patient shows that her heart rate is now consistently on the 70s and 80s  Blood pressure remains stable  Patient feels better than when she arrived  Amount and/or Complexity of Data Reviewed  Labs: ordered  Decision-making details documented in ED Course  Details: Normal troponin  Normal electrolytes and kidney function  Repeat EKG shows improvement of the atrial fibrillation rate  Radiology: ordered and independent interpretation performed  Risk  Prescription drug management  Disposition  Final diagnoses:   Atrial fibrillation and flutter ([facility] [address] )     Time reflects when diagnosis was documented in both MDM as applicable and the Disposition within this note     Time User Action Codes Description Comment    3/14/2023  3:22 PM Nilesh Avilez Add [I48 91,  I48 92] Atrial fibrillation and flutter [facility])       ED Disposition     ED Disposition   Discharge    Condition   Stable    Date/Time   Tue Mar 14, 2023  3:21 PM    Comment   Sandra Day discharge to home/self care                 Follow-up Information     Follow up With Specialties Details Why Contact Info    Antolin Bey, DO Family Medicine In 3 days consider calling your cardiologist tomorrow so that they can further adjust your medications [facility] [address] [facility]  [phone]            Patient's Medications   Discharge Prescriptions    DILTIAZEM (CARDIZEM) 30 MG TABLET    Take 1 tablet (30 mg total) by mouth 4 (four) times a day Only use as needed to keep HR under 100 bpm   Continue all your other medications       Start Date: 3/14/2023 End Date: --       Order Dose: 30 mg       Quantity: 30 tablet    Refills: 0       No discharge procedures on file      PDMP Review     None          ED Provider  Electronically Signed by           Carlin Perla MD  03/14/23 5611

## 2023-03-15 ENCOUNTER — APPOINTMENT (EMERGENCY)
Dept: RADIOLOGY | Facility: HOSPITAL | Age: 78
End: 2023-03-15

## 2023-03-15 ENCOUNTER — VBI (OUTPATIENT)
Dept: FAMILY MEDICINE CLINIC | Facility: CLINIC | Age: 78
End: 2023-03-15

## 2023-03-15 ENCOUNTER — TELEPHONE (OUTPATIENT)
Dept: FAMILY MEDICINE CLINIC | Facility: CLINIC | Age: 78
End: 2023-03-15

## 2023-03-15 ENCOUNTER — HOSPITAL ENCOUNTER (INPATIENT)
Facility: HOSPITAL | Age: 78
LOS: 1 days | Discharge: HOME | End: 2023-03-17
Attending: EMERGENCY MEDICINE | Admitting: INTERNAL MEDICINE

## 2023-03-15 DIAGNOSIS — I48.91 ATRIAL FIBRILLATION WITH RAPID VENTRICULAR RESPONSE (HCC): Primary | ICD-10-CM

## 2023-03-15 PROBLEM — R06.09 DOE (DYSPNEA ON EXERTION): Status: ACTIVE | Noted: 2018-05-29

## 2023-03-15 LAB
2HR DELTA HS TROPONIN: -1 NG/L
4HR DELTA HS TROPONIN: -1 NG/L
ALBUMIN SERPL BCP-MCNC: 3.8 G/DL (ref 3.5–5)
ALP SERPL-CCNC: 76 U/L (ref 34–104)
ALT SERPL W P-5'-P-CCNC: 17 U/L (ref 7–52)
ANION GAP SERPL CALCULATED.3IONS-SCNC: 7 MMOL/L (ref 4–13)
APTT PPP: 33 SECONDS (ref 23–37)
AST SERPL W P-5'-P-CCNC: 19 U/L (ref 13–39)
BASOPHILS # BLD AUTO: 0.05 THOUSANDS/ÂΜL (ref 0–0.1)
BASOPHILS NFR BLD AUTO: 1 % (ref 0–1)
BILIRUB SERPL-MCNC: 0.89 MG/DL (ref 0.2–1)
BUN SERPL-MCNC: 17 MG/DL (ref 5–25)
CALCIUM SERPL-MCNC: 8.9 MG/DL (ref 8.4–10.2)
CARDIAC TROPONIN I PNL SERPL HS: 6 NG/L
CARDIAC TROPONIN I PNL SERPL HS: 6 NG/L
CARDIAC TROPONIN I PNL SERPL HS: 7 NG/L
CHLORIDE SERPL-SCNC: 110 MMOL/L (ref 96–108)
CO2 SERPL-SCNC: 23 MMOL/L (ref 21–32)
CREAT SERPL-MCNC: 1.11 MG/DL (ref 0.6–1.3)
EOSINOPHIL # BLD AUTO: 0.13 THOUSAND/ÂΜL (ref 0–0.61)
EOSINOPHIL NFR BLD AUTO: 2 % (ref 0–6)
ERYTHROCYTE [DISTWIDTH] IN BLOOD BY AUTOMATED COUNT: 17.8 % (ref 11.6–15.1)
GFR SERPL CREATININE-BSD FRML MDRD: 47 ML/MIN/1.73SQ M
GLUCOSE SERPL-MCNC: 103 MG/DL (ref 65–140)
HCT VFR BLD AUTO: 39.6 % (ref 34.8–46.1)
HGB BLD-MCNC: 12.7 G/DL (ref 11.5–15.4)
IMM GRANULOCYTES # BLD AUTO: 0.03 THOUSAND/UL (ref 0–0.2)
IMM GRANULOCYTES NFR BLD AUTO: 0 % (ref 0–2)
INR PPP: 1.46 (ref 0.84–1.19)
LYMPHOCYTES # BLD AUTO: 1.15 THOUSANDS/ÂΜL (ref 0.6–4.47)
LYMPHOCYTES NFR BLD AUTO: 16 % (ref 14–44)
MAGNESIUM SERPL-MCNC: 2.1 MG/DL (ref 1.9–2.7)
MCH RBC QN AUTO: 28.9 PG (ref 26.8–34.3)
MCHC RBC AUTO-ENTMCNC: 32.1 G/DL (ref 31.4–37.4)
MCV RBC AUTO: 90 FL (ref 82–98)
MONOCYTES # BLD AUTO: 0.77 THOUSAND/ÂΜL (ref 0.17–1.22)
MONOCYTES NFR BLD AUTO: 11 % (ref 4–12)
NEUTROPHILS # BLD AUTO: 5.13 THOUSANDS/ÂΜL (ref 1.85–7.62)
NEUTS SEG NFR BLD AUTO: 70 % (ref 43–75)
NRBC BLD AUTO-RTO: 0 /100 WBCS
PHOSPHATE SERPL-MCNC: 4 MG/DL (ref 2.3–4.1)
PLATELET # BLD AUTO: 222 THOUSANDS/UL (ref 149–390)
PMV BLD AUTO: 11.1 FL (ref 8.9–12.7)
POTASSIUM SERPL-SCNC: 4 MMOL/L (ref 3.5–5.3)
PROT SERPL-MCNC: 6.9 G/DL (ref 6.4–8.4)
PROTHROMBIN TIME: 17.4 SECONDS (ref 11.6–14.5)
RBC # BLD AUTO: 4.4 MILLION/UL (ref 3.81–5.12)
SODIUM SERPL-SCNC: 140 MMOL/L (ref 135–147)
TSH SERPL DL<=0.05 MIU/L-ACNC: 1.96 UIU/ML (ref 0.45–4.5)
WBC # BLD AUTO: 7.26 THOUSAND/UL (ref 4.31–10.16)

## 2023-03-15 RX ORDER — FUROSEMIDE 40 MG/1
20 TABLET ORAL DAILY
Status: DISCONTINUED | OUTPATIENT
Start: 2023-03-15 | End: 2023-03-15

## 2023-03-15 RX ORDER — ACETAMINOPHEN 325 MG/1
650 TABLET ORAL EVERY 6 HOURS PRN
Status: DISCONTINUED | OUTPATIENT
Start: 2023-03-15 | End: 2023-03-17 | Stop reason: HOSPADM

## 2023-03-15 RX ORDER — FUROSEMIDE 20 MG/1
20 TABLET ORAL DAILY
Status: DISCONTINUED | OUTPATIENT
Start: 2023-03-16 | End: 2023-03-17 | Stop reason: HOSPADM

## 2023-03-15 RX ORDER — PRAVASTATIN SODIUM 20 MG
10 TABLET ORAL
Status: DISCONTINUED | OUTPATIENT
Start: 2023-03-15 | End: 2023-03-15

## 2023-03-15 RX ORDER — LISINOPRIL 2.5 MG/1
2.5 TABLET ORAL DAILY
Status: DISCONTINUED | OUTPATIENT
Start: 2023-03-15 | End: 2023-03-15

## 2023-03-15 RX ORDER — SOTALOL HYDROCHLORIDE 80 MG/1
160 TABLET ORAL DAILY
Status: DISCONTINUED | OUTPATIENT
Start: 2023-03-15 | End: 2023-03-15

## 2023-03-15 RX ORDER — DILTIAZEM HYDROCHLORIDE 5 MG/ML
10 INJECTION INTRAVENOUS ONCE
Status: COMPLETED | OUTPATIENT
Start: 2023-03-15 | End: 2023-03-15

## 2023-03-15 RX ORDER — SOTALOL HYDROCHLORIDE 80 MG/1
160 TABLET ORAL DAILY
Status: DISCONTINUED | OUTPATIENT
Start: 2023-03-16 | End: 2023-03-16

## 2023-03-15 RX ORDER — LISINOPRIL 2.5 MG/1
2.5 TABLET ORAL DAILY
Status: DISCONTINUED | OUTPATIENT
Start: 2023-03-16 | End: 2023-03-17 | Stop reason: HOSPADM

## 2023-03-15 RX ORDER — PRAVASTATIN SODIUM 20 MG
10 TABLET ORAL
Status: DISCONTINUED | OUTPATIENT
Start: 2023-03-15 | End: 2023-03-17 | Stop reason: HOSPADM

## 2023-03-15 RX ADMIN — DILTIAZEM HYDROCHLORIDE 10 MG: 5 INJECTION INTRAVENOUS at 11:18

## 2023-03-15 RX ADMIN — DILTIAZEM HYDROCHLORIDE 30 MG: 30 TABLET, FILM COATED ORAL at 13:21

## 2023-03-15 RX ADMIN — PRAVASTATIN SODIUM 10 MG: 20 TABLET ORAL at 17:12

## 2023-03-15 RX ADMIN — METOPROLOL SUCCINATE 75 MG: 50 TABLET, EXTENDED RELEASE ORAL at 17:13

## 2023-03-15 RX ADMIN — APIXABAN 5 MG: 5 TABLET, FILM COATED ORAL at 17:13

## 2023-03-15 NOTE — ED PROVIDER NOTES
History  Chief Complaint   Patient presents with   • Palpitations     Pt states she was seen here yesterday for SOB and palpitations and continues to have these symptoms  67yo female with a history of atrial fibrillation, sick sinus syndrome s/p pacemaker, CHF, hypertension, hyperlipidemia presenting for evaluation of shortness of breath  She has been having exertional dyspnea for the past few days with associated palpitations  She was seen in the ED yesterday for the same and was found to be in atrial fibrillation with RVR  She was given a dose of IV Cardizem and heart rate improved  She was ultimately discharged with a script for PRN Cardizem  She is here today with recurrent symptoms  She felt very short of breath this morning so decided to come back to the ED  She denies any chest pain, dizziness, syncope  She is currently on metoprolol, sotalol, and Eliquis for her atrial fibrillation and reports compliance  History provided by:  Patient and medical records   used: No    Shortness of Breath  Severity:  Moderate  Onset quality:  Gradual  Timing:  Intermittent  Progression:  Worsening  Chronicity:  New  Worsened by:  Exertion  Associated symptoms: no chest pain, no cough, no diaphoresis, no fever, no neck pain, no rash, no syncope and no vomiting        Prior to Admission Medications   Prescriptions Last Dose Informant Patient Reported? Taking?    apixaban (Eliquis) 5 mg   No No   Sig: Take 1 tablet (5 mg total) by mouth 2 (two) times a day   diltiazem (CARDIZEM) 30 mg tablet   No No   Sig: Take 1 tablet (30 mg total) by mouth 4 (four) times a day Only use as needed to keep HR under 100 bpm   Continue all your other medications   furosemide (LASIX) 20 mg tablet   No No   Sig: Take 1 tablet (20 mg total) by mouth daily   lisinopril (ZESTRIL) 2 5 mg tablet   No No   Sig: Take 1 tablet (2 5 mg total) by mouth daily   lovastatin (MEVACOR) 10 MG tablet   No No   Sig: Take 1 tablet (10 mg total) by mouth daily at bedtime   metoprolol succinate (TOPROL-XL) 50 mg 24 hr tablet   No No   Sig: Take 1 5 tablets (75 mg total) by mouth 2 (two) times a day   potassium chloride (K-DUR,KLOR-CON) 10 mEq tablet   No No   Sig: Take 1 tablet (10 mEq total) by mouth daily   sotalol (BETAPACE) 80 mg tablet   No No   Sig: Take 2 tablets (160 mg total) by mouth daily      Facility-Administered Medications: None       Past Medical History:   Diagnosis Date   • Aphasia, mixed 2017   • Atrial fibrillation (HCC)    • CHF (congestive heart failure) (HCC)    • Colon polyp    • Headache    • Hyperlipidemia    • Hypertension    • Lyme disease    • Shortness of breath    • TIA (transient ischemic attack)    • Vertigo        Past Surgical History:   Procedure Laterality Date   • CARDIAC PACEMAKER PLACEMENT  2019   • COLONOSCOPY     • [place] SIGMOIDOSCOPY FLX DX W/COLLJ SPEC BR/WA IF PFRMD N/A 2017    Procedure: Noel Simmons;  Surgeon: Rayo Ramos MD;  Location: [facility]; Service: Gastroenterology   • TONSILLECTOMY         Family History   Problem Relation Age of Onset   • Lung cancer Mother    • Dementia Father    • Alzheimer's disease Father    • Other Father         Cardiac Disorder    • Lung cancer Maternal Grandmother    • Breast cancer Paternal Aunt 44   • No Known Problems Paternal Aunt    • No Known Problems Paternal Aunt      I have reviewed and agree with the history as documented  E-Cigarette/Vaping   • E-Cigarette Use Never User      E-Cigarette/Vaping Substances   • Nicotine No    • THC No    • CBD No    • Flavoring No    • Other No    • Unknown No      Social History     Tobacco Use   • Smoking status: Former     Types: Cigarettes     Quit date:      Years since quittin 2   • Smokeless tobacco: Never   • Tobacco comments:     no smoking for 46 years   Vaping Use   • Vaping Use: Never used   Substance Use Topics   • Alcohol use:  Yes     Alcohol/week: 10 0 standard drinks Types: 10 Glasses of wine per week     Comment: occ   • Drug use: No       Review of Systems   Constitutional: Negative for chills, diaphoresis and fever  HENT: Negative for drooling and voice change  Eyes: Negative for discharge and redness  Respiratory: Positive for shortness of breath  Negative for cough and stridor  Cardiovascular: Positive for palpitations  Negative for chest pain, leg swelling and syncope  Gastrointestinal: Negative for nausea and vomiting  Musculoskeletal: Negative for neck pain and neck stiffness  Skin: Negative for color change and rash  Neurological: Negative for seizures and syncope  Psychiatric/Behavioral: Negative for confusion  The patient is not nervous/anxious  All other systems reviewed and are negative  Physical Exam  Physical Exam  Vitals and nursing note reviewed  Constitutional:       General: She is not in acute distress  Appearance: She is well-developed  She is not diaphoretic  HENT:      Head: Normocephalic and atraumatic  Right Ear: External ear normal       Left Ear: External ear normal       Nose: Nose normal    Eyes:      General: No scleral icterus  Right eye: No discharge  Left eye: No discharge  Conjunctiva/sclera: Conjunctivae normal    Cardiovascular:      Rate and Rhythm: Tachycardia present  Rhythm irregularly irregular  Heart sounds: Normal heart sounds  No murmur heard  Pulmonary:      Effort: Pulmonary effort is normal  No respiratory distress  Breath sounds: Normal breath sounds  No stridor  No wheezing or rales  Musculoskeletal:         General: No deformity  Normal range of motion  Cervical back: Normal range of motion and neck supple  Right lower leg: No edema  Left lower leg: No edema  Lymphadenopathy:      Cervical: No cervical adenopathy  Skin:     General: Skin is warm and dry  Neurological:      Mental Status: She is alert  She is not disoriented        GCS: GCS eye subscore is 4  GCS verbal subscore is 5  GCS motor subscore is 6  Psychiatric:         Behavior: Behavior normal          Vital Signs  ED Triage Vitals   Temperature Pulse Respirations Blood Pressure SpO2   03/15/23 1006 03/15/23 1006 03/15/23 1006 03/15/23 1006 03/15/23 1006   98 8 °F (37 1 °C) (!) 146 18 118/77 97 %      Temp Source Heart Rate Source Patient Position - Orthostatic VS BP Location FiO2 (%)   03/15/23 1006 03/15/23 1006 03/15/23 1006 03/15/23 1006 --   Oral Monitor Lying Right arm       Pain Score       03/15/23 1030       No Pain           Vitals:    03/15/23 1006 03/15/23 1030 03/15/23 1123   BP: 118/77 94/59 143/58   Pulse: (!) 146 (!) 133 (!) 145   Patient Position - Orthostatic VS: Lying Sitting Sitting         Visual Acuity      ED Medications  Medications   diltiazem (CARDIZEM) tablet 30 mg (has no administration in time range)   diltiazem (CARDIZEM) injection 10 mg (10 mg Intravenous Given 3/15/23 1118)       Diagnostic Studies  Results Reviewed     Procedure Component Value Units Date/Time    HS Troponin I 4hr [[id number]]     Lab Status: No result Specimen: Blood     TSH, 3rd generation with Free T4 reflex [[id number]]  (Normal) Collected: 03/15/23 1040    Lab Status: Final result Specimen: Blood from Arm, Right Updated: 03/15/23 1136     TSH 3RD GENERATON 1 957 uIU/mL     Narrative:      Patients undergoing fluorescein dye angiography may retain small amounts of fluorescein in the body for 48-72 hours post procedure  Samples containing fluorescein can produce falsely depressed TSH values  If the patient had this procedure,a specimen should be resubmitted post fluorescein clearance        Comprehensive metabolic panel [[id number]]  (Abnormal) Collected: 03/15/23 1040    Lab Status: Final result Specimen: Blood from Arm, Right Updated: 03/15/23 1134     Sodium 140 mmol/L      Potassium 4 0 mmol/L      Chloride 110 mmol/L      CO2 23 mmol/L      ANION GAP 7 mmol/L      BUN 17 mg/dL Creatinine 1 11 mg/dL      Glucose 103 mg/dL      Calcium 8 9 mg/dL      AST 19 U/L      ALT 17 U/L      Alkaline Phosphatase 76 U/L      Total Protein 6 9 g/dL      Albumin 3 8 g/dL      Total Bilirubin 0 89 mg/dL      eGFR 47 ml/min/1 73sq m     Narrative:      [facility] guidelines for Chronic Kidney Disease (CKD):   •  Stage 1 with normal or high GFR (GFR > 90 mL/min/1 73 square meters)  •  Stage 2 Mild CKD (GFR = 60-89 mL/min/1 73 square meters)  •  Stage 3A Moderate CKD (GFR = 45-59 mL/min/1 73 square meters)  •  Stage 3B Moderate CKD (GFR = 30-44 mL/min/1 73 square meters)  •  Stage 4 Severe CKD (GFR = 15-29 mL/min/1 73 square meters)  •  Stage 5 End Stage CKD (GFR <15 mL/min/1 73 square meters)  Note: GFR calculation is accurate only with a steady state creatinine    Magnesium [[id number]]  (Normal) Collected: 03/15/23 1040    Lab Status: Final result Specimen: Blood from Arm, Right Updated: 03/15/23 1134     Magnesium 2 1 mg/dL     Protime-INR [[id number]]  (Abnormal) Collected: 03/15/23 1058    Lab Status: Final result Specimen: Blood from Arm, Right Updated: 03/15/23 1129     Protime 17 4 seconds      INR 1 46    APTT [[id number]]  (Normal) Collected: 03/15/23 1058    Lab Status: Final result Specimen: Blood from Arm, Right Updated: 03/15/23 1129     PTT 33 seconds     HS Troponin I 2hr [[id number]]     Lab Status: No result Specimen: Blood     HS Troponin 0hr (reflex protocol) [[id number]]  (Normal) Collected: 03/15/23 1040    Lab Status: Final result Specimen: Blood from Arm, Right Updated: 03/15/23 1115     hs TnI 0hr 7 ng/L     CBC and differential [[id number]]  (Abnormal) Collected: 03/15/23 1040    Lab Status: Final result Specimen: Blood from Arm, Right Updated: 03/15/23 1045     WBC 7 26 Thousand/uL      RBC 4 40 Million/uL      Hemoglobin 12 7 g/dL      Hematocrit 39 6 %      MCV 90 fL      MCH 28 9 pg      MCHC 32 1 g/dL      RDW 17 8 %      MPV 11 1 fL      Platelets 044 Thousands/uL      nRBC 0 /100 WBCs      Neutrophils Relative 70 %      Immat GRANS % 0 %      Lymphocytes Relative 16 %      Monocytes Relative 11 %      Eosinophils Relative 2 %      Basophils Relative 1 %      Neutrophils Absolute 5 13 Thousands/µL      Immature Grans Absolute 0 03 Thousand/uL      Lymphocytes Absolute 1 15 Thousands/µL      Monocytes Absolute 0 77 Thousand/µL      Eosinophils Absolute 0 13 Thousand/µL      Basophils Absolute 0 05 Thousands/µL                  XR chest 1 view portable   Final Result by Merlene Maxwell MD (03/15 1400)   Persistent mild cardiomegaly   Intact appearing AICD   Hiatal hernia   No acute cardiopulmonary disease  Findings are stable               Workstation performed: [id number]                    Procedures  ECG 12 Lead Documentation Only    Date/Time: 3/15/2023 3:55 PM  Performed by: Shira Jones PA-C  Authorized by: Shira Jones PA-C     Indications / Diagnosis:  SOB  ECG reviewed by me, the ED Provider: yes    Patient location:  ED  Rate:     ECG rate:  128    ECG rate assessment: tachycardic    Rhythm:     Rhythm: atrial fibrillation    Ectopy:     Ectopy: none    QRS:     QRS axis:  Normal  Conduction:     Conduction: normal    ST segments:     ST segments:  Normal  T waves:     T waves: non-specific               ED Course                       Medical Decision Making  77yoF with a history of afib presenting for exertional dyspnea  Seen in the ED yesterday for afib with RVR and discharged on PRN Cardizem  Currently on sotalol and metoprolol  No chest pain or syncope  She is tachycardic in the 140s on arrival  Blood pressure stable  She appears comfortable on exam in no distress  Differential diagnosis includes but is not limited to: arrhythmia, ACS, electrolyte imbalance, thyroid dysfunction    Initial ED plan: EKG confirms afib with RVR  Check cardiac labs, magnesium, TSH, EKG, and CXR  IV Cardizem and reassess   She will require admission given that this is her 2nd ED visit in 24 hours for uncontrolled afib  Atrial fibrillation with rapid ventricular response ([facility] [address] ): acute illness or injury  Amount and/or Complexity of Data Reviewed  External Data Reviewed: notes  Details: reviewed yesterday's ED note  Labs: ordered  Decision-making details documented in ED Course  Radiology: ordered  ECG/medicine tests: ordered and independent interpretation performed  Discussion of management or test interpretation with external provider(s): SLIM    Risk  Prescription drug management  Decision regarding hospitalization  Disposition  Final diagnoses:   Atrial fibrillation with rapid ventricular response ([facility] [address] )     Time reflects when diagnosis was documented in both MDM as applicable and the Disposition within this note     Time User Action Codes Description Comment    3/15/2023 12:25 PM [address] [place] [address] [I48 [id number]] Atrial fibrillation with rapid ventricular response [facility])       ED Disposition     ED Disposition   Admit    Condition   Stable    Date/Time   Wed Mar 15, 2023 12:25 PM    Comment   Case was discussed with Dr Rachel Bailey and the patient's admission status was agreed to be Admission Status: observation status to the service of Dr Rachel Bailey   Follow-up Information    None         Patient's Medications   Discharge Prescriptions    No medications on file       No discharge procedures on file      PDMP Review     None          ED Provider  Electronically Signed by           Myra Price PA-C  03/16/23 9892

## 2023-03-15 NOTE — ASSESSMENT & PLAN NOTE
Reports ongoing sob with exertion and stairs x worse over last week  Nuclear stress test completed 3/8 showed perfusion defect that is medium in size with moderate reduction in uptake present in the mid to apical anterior and al that is partially reversible   In setting of a fib with RVR vs ACS vs underlying copd  · Denies CP  · Follows with cardiology outpatient, given lack of sx and stable conditions no further workup recommended at that time  · Chest xray negative for acute cardiopulmonary dx  · Cardiology consulted, appreciate recommendations  · telemetry

## 2023-03-15 NOTE — TELEPHONE ENCOUNTER
Dr Fan Yepez with pt this morning, she c/o severe sob, lightheadedness, fatigue and has a strong flutter feeling in her chest   She denied chest pain, vision changes or head ache  She was seen at the ER yesterday and after she got home she started to feel better but today she feels worse that she felt yesterday  I advised pt to go to the ER and pt agreed  Her son will take her to the emergency room       Mari Marcus  03/15/23  9:31 AM

## 2023-03-15 NOTE — TELEPHONE ENCOUNTER
Dread [place]en[place]ro    ED Visit Information     Ed visit date: 3-14-23  Diagnosis Description: Atrial fibrillation and flutter [facility])  In Network? Yes [facility]  Discharge status: Home  Discharged with meds ? Yes  Number of ED visits to date: 2  ED Severity:N/A     Outreach Information    Outreach successful: Yes 1  Date letter mailed:0  Date Finalized:3-15-23    Care Coordination    Follow up appointment with specialilty: yes Pulmonology appt 3-17-23   Transportation issues ? NA    Value Base Outreach    Outreach type: 7 Day Outreach  Emergent necessity warranted by diagnosis: Yes  [facility] PCP: Yes  Transportation: Friend/Family Transport  03/15/2023 09:01 AM EDT by Jessica Lacy 03/15/2023 09:01 AM EDT by Jessica Lacy  [facility] (Self) [phone] [facility])    Personal communication with patient regarding recent ED visit on 3-14-23  Patient stated that she is very short of breathe and having fluttering  I did not ask any further questions as I could hear patient was very out of breathe  I asked patient is she was home alone and she stated no that her daughter in law and  are with her, I told her I will call PCP office for medical advice and I will have PCP office call patient to determine her next steps  I called PCP and spoke with Monika Hernandez and was transfer to the office Dr Michael Barth is currently in, unfortunately I was unable to speak with facility staff  However we did have a successful outreach to practice manager and she is going to have a staff member contact patient

## 2023-03-15 NOTE — ASSESSMENT & PLAN NOTE
Lab Results   Component Value Date    EGFR 47 03/15/2023    EGFR 42 03/14/2023    EGFR 48 01/05/2023    CREATININE 1 11 03/15/2023    CREATININE 1 21 03/14/2023    CREATININE 1 09 01/05/2023   Cr stable at baseline  Avoid nephrotoxic agent  Monitor wth am bmp

## 2023-03-15 NOTE — H&P
[address] [facility] 1945, 65 y o  female MRN: [id number]  Unit/Bed#: -01 Encounter: [id number]  Primary Care Provider: Allie Nichols DO   Date and time admitted to hospital: 3/15/2023  9:59 AM    SSS (sick sinus syndrome) ([facility] [address] )  Assessment & Plan  S/p PPM    Stage 3a chronic kidney disease [facility])  Assessment & Plan  Lab Results   Component Value Date    EGFR 47 03/15/2023    EGFR 42 03/14/2023    EGFR 48 01/05/2023    CREATININE 1 11 03/15/2023    CREATININE 1 21 03/14/2023    CREATININE 1 09 01/05/2023   Cr stable at baseline  Avoid nephrotoxic agent  Monitor wth am bmp    Chronic obstructive pulmonary disease ([facility] )  Assessment & Plan  No acute signs of exacerbation  Not currently on home therapy  Would benefit from outpatient PFT    SHARMA (dyspnea on exertion)  Assessment & Plan  Reports ongoing sob with exertion and stairs x worse over last week  Nuclear stress test completed 3/8 showed perfusion defect that is medium in size with moderate reduction in uptake present in the mid to apical anterior and al that is partially reversible  In setting of a fib with RVR vs ACS vs underlying copd  · Denies CP  · Follows with cardiology outpatient, given lack of sx and stable conditions no further workup recommended at that time  · Chest xray negative for acute cardiopulmonary dx  · Cardiology consulted, appreciate recommendations  · telemetry    H/O TIA (transient ischemic attack) and stroke  Assessment & Plan  Continue eliquis and statin    Mixed hyperlipidemia  Assessment & Plan  Continue home dose statin    Essential hypertension  Assessment & Plan  BP stable  Continue with sotalol, Toprol XL, lisinopril daily       * Persistent atrial fibrillation with rapid ventricular response (HCC)  Assessment & Plan  Px with sob, palpitations  ED visit yesterday for similar sx, tx with IV cardizem and d/c  Pt returning with recurrence of sx   HR in the 140s, s/p IV cardizem, now rate-controlled  Known hx of a fib on eliquis, sotalol, and BB  Endorses compliance  · Continue with eliquis bid   · Continue BB and sotalol   · Discussed with cardiology, hold off on additional cardizem for now  Consider cardioversion vs switching to amiodarone  · Telemetry  · Monitor electrolytes closely   · Cardiology consulted, appreciate recommendations            VTE Pharmacologic Prophylaxis: VTE Score: 5 High Risk (Score >/= 5) - Pharmacological DVT Prophylaxis Ordered: apixaban (Eliquis)  Sequential Compression Devices Ordered  Code Status: Level 1 - Full Code   Discussion with family: Updated  () at bedside  Anticipated Length of Stay: Patient will be admitted on an observation basis with an anticipated length of stay of less than 2 midnights secondary to a fib with RVR  Total Time Spent on Date of Encounter in care of patient: 65 minutes This time was spent on one or more of the following: performing physical exam; counseling and coordination of care; obtaining or reviewing history; documenting in the medical record; reviewing/ordering tests, medications or procedures; communicating with other healthcare professionals and discussing with patient's family/caregivers  Chief Complaint: sob, palpitations    History of Present Illness:  Ana Maria Aguillon is a 65 y o  female with a PMH of sss s/p PPM, HFpEF, HTN, HLD, Hx of TIA, CKD3 who presents with sob, palpitations since last night  Px to ED yesterday with similar sx, treated with IV cardizem x 1 with resolution of palpitations  Discharged with PRN cardizem for palpitations  Reports worsening sob overnight with exertion and climbing stairs which prompted her to return to ED  Found to be in afib with RVR, improved with IV cardizem  Currently asymptomatic  Pt reports she took all her morning meds today  Confirmed level 1 full code status       Review of Systems:  Review of Systems   Constitutional: Negative for activity change, appetite change, chills, fatigue and fever  HENT: Negative for congestion, facial swelling and trouble swallowing  Eyes: Negative for photophobia, pain, discharge and visual disturbance  Respiratory: Positive for shortness of breath  Negative for apnea, cough, chest tightness and wheezing  Cardiovascular: Positive for palpitations  Negative for chest pain and leg swelling  Gastrointestinal: Negative for abdominal distention, abdominal pain, blood in stool, constipation, diarrhea, nausea and vomiting  Endocrine: Negative for polyuria  Genitourinary: Negative for difficulty urinating, dysuria, flank pain and hematuria  Musculoskeletal: Negative for arthralgias and back pain  Skin: Negative for pallor and rash  Neurological: Negative for dizziness, tremors and weakness  Psychiatric/Behavioral: Negative for agitation, behavioral problems and suicidal ideas  The patient is not nervous/anxious  Past Medical and Surgical History:   Past Medical History:   Diagnosis Date   • Aphasia, mixed 07/28/2017   • Atrial fibrillation (HCC)    • CHF (congestive heart failure) (HCC)    • Colon polyp    • Headache    • Hyperlipidemia    • Hypertension    • Lyme disease    • Shortness of breath    • TIA (transient ischemic attack)    • Vertigo        Past Surgical History:   Procedure Laterality Date   • CARDIAC PACEMAKER PLACEMENT  12/2019   • COLONOSCOPY     • WA SIGMOIDOSCOPY FLX DX W/COLLJ SPEC BR/WA IF PFRMD N/A 11/28/2017    Procedure: Floyce Paac Ciinak;  Surgeon: Alberto Lara MD;  Location: [facility]; Service: Gastroenterology   • TONSILLECTOMY         Meds/Allergies:  Prior to Admission medications    Medication Sig Start Date End Date Taking?  Authorizing Provider   apixaban (Eliquis) 5 mg Take 1 tablet (5 mg total) by mouth 2 (two) times a day 7/12/22  Yes Roman Henderson MD   diltiazem (CARDIZEM) 30 mg tablet Take 1 tablet (30 mg total) by mouth 4 (four) times a day Only use as needed to keep HR under 100 bpm   Continue all your other medications 3/14/23  Yes Rocio Aguillon MD   furosemide (LASIX) 20 mg tablet Take 1 tablet (20 mg total) by mouth daily 23  Yes Deatrice Showers, DO   lisinopril (ZESTRIL) 2 5 mg tablet Take 1 tablet (2 5 mg total) by mouth daily 22  Yes Roman Henderson MD   lovastatin (MEVACOR) 10 MG tablet Take 1 tablet (10 mg total) by mouth daily at bedtime 22  Yes Roman Henderson MD   metoprolol succinate (TOPROL-XL) 50 mg 24 hr tablet Take 1 5 tablets (75 mg total) by mouth 2 (two) times a day 22  Yes Roamn Henderson MD   potassium chloride (K-DUR,KLOR-CON) 10 mEq tablet Take 1 tablet (10 mEq total) by mouth daily 23  Yes Roxy Showkai, DO   sotalol (BETAPACE) 80 mg tablet Take 2 tablets (160 mg total) by mouth daily 23  Yes Roman Henderson MD     I have reviewed home medications with patient personally      Allergies: No Known Allergies    Social History:  Marital Status: /Civil Union     Substance Use History:   Social History     Substance and Sexual Activity   Alcohol Use Yes   • Alcohol/week: 10 0 standard drinks   • Types: 10 Glasses of wine per week    Comment: occ     Social History     Tobacco Use   Smoking Status Former   • Types: Cigarettes   • Quit date:    • Years since quittin 2   Smokeless Tobacco Never   Tobacco Comments    no smoking for 46 years     Social History     Substance and Sexual Activity   Drug Use No       Family History:  Family History   Problem Relation Age of Onset   • Lung cancer Mother    • Dementia Father    • Alzheimer's disease Father    • Other Father         Cardiac Disorder    • Lung cancer Maternal Grandmother    • Breast cancer Paternal Aunt 44   • No Known Problems Paternal Aunt    • No Known Problems Paternal Aunt        Physical Exam:     Vitals:   Blood Pressure: 116/85 (03/15/23 1306)  Pulse: 90 (03/15/23 1306)  Temperature: (!) 97 3 °F (36 3 °C) (03/15/23 1306)  Temp Source: Oral (03/15/23 1006)  Respirations: 17 (03/15/23 1306)  Height: 5' 5" (165 1 cm) (03/15/23 1306)  Weight - Scale: 94 kg (207 lb 3 7 oz) (03/15/23 1306)  SpO2: 96 % (03/15/23 1306)    Physical Exam  Vitals reviewed  Constitutional:       General: She is not in acute distress  Appearance: She is well-developed  She is not diaphoretic  HENT:      Head: Normocephalic and atraumatic  Nose: Nose normal       Mouth/Throat:      Pharynx: No oropharyngeal exudate  Eyes:      General: No scleral icterus  Right eye: No discharge  Left eye: No discharge  Conjunctiva/sclera: Conjunctivae normal    Cardiovascular:      Rate and Rhythm: Normal rate  Rhythm irregular  Heart sounds: Normal heart sounds  No murmur heard  No friction rub  No gallop  Pulmonary:      Effort: Pulmonary effort is normal  No respiratory distress  Breath sounds: Normal breath sounds  No wheezing or rales  Abdominal:      General: Bowel sounds are normal  There is no distension  Palpations: Abdomen is soft  Tenderness: There is no abdominal tenderness  There is no guarding  Musculoskeletal:         General: Normal range of motion  Cervical back: Normal range of motion and neck supple  Skin:     General: Skin is warm and dry  Findings: No erythema  Neurological:      Mental Status: She is alert and oriented to person, place, and time     Psychiatric:         Behavior: Behavior normal           Additional Data:     Lab Results:  Results from last 7 days   Lab Units 03/15/23  1040   WBC Thousand/uL 7 26   HEMOGLOBIN g/dL 12 7   HEMATOCRIT % 39 6   PLATELETS Thousands/uL 222   NEUTROS PCT % 70   LYMPHS PCT % 16   MONOS PCT % 11   EOS PCT % 2     Results from last 7 days   Lab Units 03/15/23  1040   SODIUM mmol/L 140   POTASSIUM mmol/L 4 0   CHLORIDE mmol/L 110*   CO2 mmol/L 23   BUN mg/dL 17   CREATININE mg/dL 1 11   ANION GAP mmol/L 7   CALCIUM mg/dL 8 9   ALBUMIN g/dL 3 8   TOTAL BILIRUBIN mg/dL 0  89   ALK PHOS U/L 76   ALT U/L 17   AST U/L 19   GLUCOSE RANDOM mg/dL 103     Results from last 7 days   Lab Units 03/15/23  1058   INR  1 46*                   Lines/Drains:  Invasive Devices     Peripheral Intravenous Line  Duration           Peripheral IV 03/15/23 Proximal;Right;Ventral (anterior) Forearm <1 day                    Imaging: Reviewed radiology reports from this admission including: chest xray  XR chest 1 view portable   Final Result by Sebastien Jackson MD (03/15 1400)   Persistent mild cardiomegaly   Intact appearing AICD   Hiatal hernia   No acute cardiopulmonary disease  Findings are stable               Workstation performed: [id number]             EKG and Other Studies Reviewed on Admission:   · EKG: Atrial fibrillation

## 2023-03-15 NOTE — PROGRESS NOTES
[address] [facility]  Progress Note - Stephen Connolly [birth year], 65 y o  female MRN: [id number]  Unit/Bed#: -01 Encounter: [id number]  Primary Care Provider: Tavon Solomon DO   Date and time admitted to hospital: 3/15/2023  9:59 AM    SSS (sick sinus syndrome) ([facility] [address] )  Assessment & Plan  S/p PPM    Stage 3a chronic kidney disease [facility])  Assessment & Plan  Lab Results   Component Value Date    EGFR 47 03/15/2023    EGFR 42 03/14/2023    EGFR 48 01/05/2023    CREATININE 1 11 03/15/2023    CREATININE 1 21 03/14/2023    CREATININE 1 09 01/05/2023   Cr stable at baseline  Avoid nephrotoxic agent  Monitor wth am bmp    Chronic obstructive pulmonary disease ([facility] [address] )  Assessment & Plan  No acute signs of exacerbation  Not currently on home therapy  Would benefit from outpatient PFT    SHARMA (dyspnea on exertion)  Assessment & Plan  Reports ongoing sob with exertion and stairs x worse over last week  Nuclear stress test completed 3/8 showed perfusion defect that is medium in size with moderate reduction in uptake present in the mid to apical anterior and al that is partially reversible  In setting of a fib with RVR vs ACS vs underlying copd  · Denies CP  · Follows with cardiology outpatient, given lack of sx and stable conditions no further workup recommended at that time  · Chest xray negative for acute cardiopulmonary dx  · Cardiology consulted, appreciate recommendations  · telemetry    H/O TIA (transient ischemic attack) and stroke  Assessment & Plan  Continue eliquis and statin    Mixed hyperlipidemia  Assessment & Plan  Continue home dose statin    Essential hypertension  Assessment & Plan  BP stable  Continue with sotalol, Toprol XL, lisinopril daily       * Persistent atrial fibrillation with rapid ventricular response (HCC)  Assessment & Plan  Px with sob, palpitations  ED visit yesterday for similar sx, tx with IV cardizem and d/c  Pt returning with recurrence of sx   HR in the 140s, s/p IV cardizem, now rate-controlled  Known hx of a fib on eliquis, sotalol, and BB  Endorses compliance  · Continue with eliquis bid   · Continue BB and sotalol   · Discussed with cardiology, hold off on additional cardizem for now  Consider cardioversion vs switching to amiodarone  · Telemetry  · Monitor electrolytes closely   · Cardiology consulted, appreciate recommendations      VTE Pharmacologic Prophylaxis: VTE Score: 5 High Risk (Score >/= 5) - Pharmacological DVT Prophylaxis Ordered: apixaban (Eliquis)  Sequential Compression Devices Ordered  Code Status: Level 1 - Full Code   Discussion with family: Updated  () at bedside  Anticipated Length of Stay: Patient will be admitted on an observation basis with an anticipated length of stay of less than 2 midnights secondary to a fib with RVR  Total Time Spent on Date of Encounter in care of patient: 65 minutes This time was spent on one or more of the following: performing physical exam; counseling and coordination of care; obtaining or reviewing history; documenting in the medical record; reviewing/ordering tests, medications or procedures; communicating with other healthcare professionals and discussing with patient's family/caregivers  Chief Complaint: sob, palpitations    History of Present Illness:  Ansley Cazares is a 65 y o  female with a PMH of sss s/p PPM, HFpEF, HTN, HLD, Hx of TIA, CKD3 who presents with sob, palpitations since last night  Px to ED yesterday with similar sx, treated with IV cardizem x 1 with resolution of palpitations  Discharged with PRN cardizem for palpitations  Reports worsening sob overnight with exertion and climbing stairs which prompted her to return to ED  Found to be in afib with RVR, improved with IV cardizem  Currently asymptomatic  Pt reports she took all her morning meds today  Confirmed level 1 full code status       Review of Systems:  Review of Systems   Constitutional: Negative for activity change, appetite change, chills, fatigue and fever  HENT: Negative for congestion, facial swelling and trouble swallowing  Eyes: Negative for photophobia, pain, discharge and visual disturbance  Respiratory: Positive for shortness of breath  Negative for apnea, cough, chest tightness and wheezing  Cardiovascular: Positive for palpitations  Negative for chest pain and leg swelling  Gastrointestinal: Negative for abdominal distention, abdominal pain, blood in stool, constipation, diarrhea, nausea and vomiting  Endocrine: Negative for polyuria  Genitourinary: Negative for difficulty urinating, dysuria, flank pain and hematuria  Musculoskeletal: Negative for arthralgias and back pain  Skin: Negative for pallor and rash  Neurological: Negative for dizziness, tremors and weakness  Psychiatric/Behavioral: Negative for agitation, behavioral problems and suicidal ideas  The patient is not nervous/anxious  Past Medical and Surgical History:   Past Medical History:   Diagnosis Date   • Aphasia, mixed 07/28/2017   • Atrial fibrillation (HCC)    • CHF (congestive heart failure) (HCC)    • Colon polyp    • Headache    • Hyperlipidemia    • Hypertension    • Lyme disease    • Shortness of breath    • TIA (transient ischemic attack)    • Vertigo        Past Surgical History:   Procedure Laterality Date   • CARDIAC PACEMAKER PLACEMENT  12/2019   • COLONOSCOPY     • RI SIGMOIDOSCOPY FLX DX W/COLLJ SPEC BR/WA IF PFRMD N/A 11/28/2017    Procedure: Laura Beasley;  Surgeon: Fabiano Varner MD;  Location: [facility]; Service: Gastroenterology   • TONSILLECTOMY         Meds/Allergies:  Prior to Admission medications    Medication Sig Start Date End Date Taking?  Authorizing Provider   apixaban (Eliquis) 5 mg Take 1 tablet (5 mg total) by mouth 2 (two) times a day 7/12/22  Yes Nat Aaron MD   diltiazem (CARDIZEM) 30 mg tablet Take 1 tablet (30 mg total) by mouth 4 (four) times a day Only use as needed to keep HR under 100 bpm   Continue all your other medications 3/14/23  Yes Jian Castano MD   furosemide (LASIX) 20 mg tablet Take 1 tablet (20 mg total) by mouth daily 23  Yes Mauricio Burroughs DO   lisinopril (ZESTRIL) 2 5 mg tablet Take 1 tablet (2 5 mg total) by mouth daily 22  Yes Elia Cooper MD   lovastatin (MEVACOR) 10 MG tablet Take 1 tablet (10 mg total) by mouth daily at bedtime 22  Yes Elia Cooper MD   metoprolol succinate (TOPROL-XL) 50 mg 24 hr tablet Take 1 5 tablets (75 mg total) by mouth 2 (two) times a day 22  Yes Elia Cooper MD   potassium chloride (K-DUR,KLOR-CON) 10 mEq tablet Take 1 tablet (10 mEq total) by mouth daily 23  Yes Mauricio Burroughs DO   sotalol (BETAPACE) 80 mg tablet Take 2 tablets (160 mg total) by mouth daily 23  Yes Elia Cooper MD     I have reviewed home medications with patient personally      Allergies: No Known Allergies    Social History:  Marital Status: /Civil Union     Substance Use History:   Social History     Substance and Sexual Activity   Alcohol Use Yes   • Alcohol/week: 10 0 standard drinks   • Types: 10 Glasses of wine per week    Comment: occ     Social History     Tobacco Use   Smoking Status Former   • Types: Cigarettes   • Quit date:    • Years since quittin 2   Smokeless Tobacco Never   Tobacco Comments    no smoking for 46 years     Social History     Substance and Sexual Activity   Drug Use No       Family History:  Family History   Problem Relation Age of Onset   • Lung cancer Mother    • Dementia Father    • Alzheimer's disease Father    • Other Father         Cardiac Disorder    • Lung cancer Maternal Grandmother    • Breast cancer Paternal Aunt 44   • No Known Problems Paternal Aunt    • No Known Problems Paternal Aunt        Physical Exam:     Vitals:   Blood Pressure: 116/85 (03/15/23 1306)  Pulse: 90 (03/15/23 1306)  Temperature: (!) 97 3 °F (36 3 °C) (03/15/23 1306)  Temp Source: Oral (03/15/23 1006)  Respirations: 17 (03/15/23 1306)  Height: 5' 5" (165 1 cm) (03/15/23 1306)  Weight - Scale: 94 kg (207 lb 3 7 oz) (03/15/23 1306)  SpO2: 96 % (03/15/23 1306)    Physical Exam  Vitals reviewed  Constitutional:       General: She is not in acute distress  Appearance: She is well-developed  She is not diaphoretic  HENT:      Head: Normocephalic and atraumatic  Nose: Nose normal       Mouth/Throat:      Pharynx: No oropharyngeal exudate  Eyes:      General: No scleral icterus  Right eye: No discharge  Left eye: No discharge  Conjunctiva/sclera: Conjunctivae normal    Cardiovascular:      Rate and Rhythm: Normal rate  Rhythm irregular  Heart sounds: Normal heart sounds  No murmur heard  No friction rub  No gallop  Pulmonary:      Effort: Pulmonary effort is normal  No respiratory distress  Breath sounds: Normal breath sounds  No wheezing or rales  Abdominal:      General: Bowel sounds are normal  There is no distension  Palpations: Abdomen is soft  Tenderness: There is no abdominal tenderness  There is no guarding  Musculoskeletal:         General: Normal range of motion  Cervical back: Normal range of motion and neck supple  Skin:     General: Skin is warm and dry  Findings: No erythema  Neurological:      Mental Status: She is alert and oriented to person, place, and time     Psychiatric:         Behavior: Behavior normal           Additional Data:     Lab Results:  Results from last 7 days   Lab Units 03/15/23  1040   WBC Thousand/uL 7 26   HEMOGLOBIN g/dL 12 7   HEMATOCRIT % 39 6   PLATELETS Thousands/uL 222   NEUTROS PCT % 70   LYMPHS PCT % 16   MONOS PCT % 11   EOS PCT % 2     Results from last 7 days   Lab Units 03/15/23  1040   SODIUM mmol/L 140   POTASSIUM mmol/L 4 0   CHLORIDE mmol/L 110*   CO2 mmol/L 23   BUN mg/dL 17   CREATININE mg/dL 1 11   ANION GAP mmol/L 7   CALCIUM mg/dL 8 9   ALBUMIN g/dL 3 8   TOTAL BILIRUBIN mg/dL 0  89   ALK PHOS U/L 76   ALT U/L 17   AST U/L 19   GLUCOSE RANDOM mg/dL 103     Results from last 7 days   Lab Units 03/15/23  1058   INR  1 46*                   Lines/Drains:  Invasive Devices     Peripheral Intravenous Line  Duration           Peripheral IV 03/15/23 Proximal;Right;Ventral (anterior) Forearm <1 day                    Imaging: Reviewed radiology reports from this admission including: chest xray  XR chest 1 view portable   Final Result by Leroy Mcgrath MD (03/15 1400)   Persistent mild cardiomegaly   Intact appearing AICD   Hiatal hernia   No acute cardiopulmonary disease  Findings are stable               Workstation performed: [id number]             EKG and Other Studies Reviewed on Admission:   · EKG: Atrial fibrillation    ** Please Note: This note has been constructed using a voice recognition system   **

## 2023-03-15 NOTE — ASSESSMENT & PLAN NOTE
Px with sob, palpitations  ED visit yesterday for similar sx, tx with IV cardizem and d/c  Pt returning with recurrence of sx  HR in the 140s, s/p IV cardizem, now rate-controlled  Known hx of a fib on eliquis, sotalol, and BB  Endorses compliance  · Continue with eliquis bid   · Continue BB and sotalol   · Discussed with cardiology, hold off on additional cardizem for now   Consider cardioversion vs switching to amiodarone  · Telemetry  · Monitor electrolytes closely   · Cardiology consulted, appreciate recommendations

## 2023-03-16 LAB
ANION GAP SERPL CALCULATED.3IONS-SCNC: 9 MMOL/L (ref 4–13)
ATRIAL RATE: 159 BPM
ATRIAL RATE: 202 BPM
ATRIAL RATE: 300 BPM
BUN SERPL-MCNC: 16 MG/DL (ref 5–25)
CALCIUM SERPL-MCNC: 9 MG/DL (ref 8.4–10.2)
CARDIAC TROPONIN I PNL SERPL HS: 7 NG/L (ref 8–18)
CHLORIDE SERPL-SCNC: 107 MMOL/L (ref 96–108)
CO2 SERPL-SCNC: 22 MMOL/L (ref 21–32)
CREAT SERPL-MCNC: 0.94 MG/DL (ref 0.6–1.3)
ERYTHROCYTE [DISTWIDTH] IN BLOOD BY AUTOMATED COUNT: 17.6 % (ref 11.6–15.1)
GFR SERPL CREATININE-BSD FRML MDRD: 58 ML/MIN/1.73SQ M
GLUCOSE SERPL-MCNC: 116 MG/DL (ref 65–140)
HCT VFR BLD AUTO: 39.5 % (ref 34.8–46.1)
HGB BLD-MCNC: 12.9 G/DL (ref 11.5–15.4)
MAGNESIUM SERPL-MCNC: 2 MG/DL (ref 1.9–2.7)
MCH RBC QN AUTO: 28.8 PG (ref 26.8–34.3)
MCHC RBC AUTO-ENTMCNC: 32.7 G/DL (ref 31.4–37.4)
MCV RBC AUTO: 88 FL (ref 82–98)
PHOSPHATE SERPL-MCNC: 3.5 MG/DL (ref 2.3–4.1)
PLATELET # BLD AUTO: 215 THOUSANDS/UL (ref 149–390)
PMV BLD AUTO: 10.9 FL (ref 8.9–12.7)
POTASSIUM SERPL-SCNC: 3.8 MMOL/L (ref 3.5–5.3)
QRS AXIS: 51 DEGREES
QRS AXIS: 68 DEGREES
QRS AXIS: 81 DEGREES
QRSD INTERVAL: 76 MS
QRSD INTERVAL: 76 MS
QRSD INTERVAL: 78 MS
QT INTERVAL: 316 MS
QT INTERVAL: 326 MS
QT INTERVAL: 382 MS
QTC INTERVAL: 459 MS
QTC INTERVAL: 474 MS
QTC INTERVAL: 475 MS
RBC # BLD AUTO: 4.48 MILLION/UL (ref 3.81–5.12)
SODIUM SERPL-SCNC: 138 MMOL/L (ref 135–147)
T WAVE AXIS: -1 DEGREES
T WAVE AXIS: -20 DEGREES
T WAVE AXIS: -22 DEGREES
VENTRICULAR RATE: 128 BPM
VENTRICULAR RATE: 135 BPM
VENTRICULAR RATE: 87 BPM
WBC # BLD AUTO: 8.64 THOUSAND/UL (ref 4.31–10.16)

## 2023-03-16 RX ORDER — POTASSIUM CHLORIDE 20 MEQ/1
40 TABLET, EXTENDED RELEASE ORAL ONCE
Status: COMPLETED | OUTPATIENT
Start: 2023-03-16 | End: 2023-03-16

## 2023-03-16 RX ORDER — DILTIAZEM HYDROCHLORIDE 5 MG/ML
15 INJECTION INTRAVENOUS ONCE
Status: DISCONTINUED | OUTPATIENT
Start: 2023-03-16 | End: 2023-03-16

## 2023-03-16 RX ADMIN — FUROSEMIDE 20 MG: 20 TABLET ORAL at 09:00

## 2023-03-16 RX ADMIN — METOPROLOL SUCCINATE 75 MG: 50 TABLET, EXTENDED RELEASE ORAL at 17:51

## 2023-03-16 RX ADMIN — APIXABAN 5 MG: 5 TABLET, FILM COATED ORAL at 17:51

## 2023-03-16 RX ADMIN — LISINOPRIL 2.5 MG: 2.5 TABLET ORAL at 09:00

## 2023-03-16 RX ADMIN — APIXABAN 5 MG: 5 TABLET, FILM COATED ORAL at 09:00

## 2023-03-16 RX ADMIN — AMIODARONE HYDROCHLORIDE 1 MG/MIN: 50 INJECTION, SOLUTION INTRAVENOUS at 11:08

## 2023-03-16 RX ADMIN — AMIODARONE HYDROCHLORIDE 0.5 MG/MIN: 50 INJECTION, SOLUTION INTRAVENOUS at 16:43

## 2023-03-16 RX ADMIN — PRAVASTATIN SODIUM 10 MG: 20 TABLET ORAL at 16:27

## 2023-03-16 RX ADMIN — SOTALOL HYDROCHLORIDE 160 MG: 80 TABLET ORAL at 04:47

## 2023-03-16 RX ADMIN — DEXTROSE MONOHYDRATE 150 MG: 50 INJECTION, SOLUTION INTRAVENOUS at 11:05

## 2023-03-16 RX ADMIN — METOPROLOL SUCCINATE 75 MG: 50 TABLET, EXTENDED RELEASE ORAL at 09:00

## 2023-03-16 RX ADMIN — POTASSIUM CHLORIDE 40 MEQ: 1500 TABLET, EXTENDED RELEASE ORAL at 05:57

## 2023-03-16 NOTE — PLAN OF CARE
Problem: PAIN - ADULT  Goal: Verbalizes/displays adequate comfort level or baseline comfort level  Description: Interventions:  - Encourage patient to monitor pain and request assistance  - Assess pain using appropriate pain scale  - Administer analgesics based on type and severity of pain and evaluate response  - Implement non-pharmacological measures as appropriate and evaluate response  - Consider cultural and social influences on pain and pain management  - Notify physician/advanced practitioner if interventions unsuccessful or patient reports new pain  Outcome: Progressing     Problem: INFECTION - ADULT  Goal: Absence or prevention of progression during hospitalization  Description: INTERVENTIONS:  - Assess and monitor for signs and symptoms of infection  - Monitor lab/diagnostic results  - Monitor all insertion sites, i e  indwelling lines, tubes, and drains  - Monitor endotracheal if appropriate and nasal secretions for changes in amount and color  - [place] appropriate cooling/warming therapies per order  - Administer medications as ordered  - Instruct and encourage patient and family to use good hand hygiene technique  - Identify and instruct in appropriate isolation precautions for identified infection/condition  Outcome: Progressing  Goal: Absence of fever/infection during neutropenic period  Description: INTERVENTIONS:  - Monitor WBC    Outcome: Progressing     Problem: SAFETY ADULT  Goal: Patient will remain free of falls  Description: INTERVENTIONS:  - Educate patient/family on patient safety including physical limitations  - Instruct patient to call for assistance with activity   - Consult OT/PT to assist with strengthening/mobility   - Keep Call bell within reach  - Keep bed low and locked with side rails adjusted as appropriate  - Keep care items and personal belongings within reach  - Initiate and maintain comfort rounds  - Make Fall Risk Sign visible to staff  - Offer Toileting every  Hours, in advance of need  - Initiate/Maintain alarm  - Obtain necessary fall risk management equipment:   - Apply yellow socks and bracelet for high fall risk patients  - Consider moving patient to room near nurses station  Outcome: Progressing  Goal: Maintain or return to baseline ADL function  Description: INTERVENTIONS:  -  Assess patient's ability to carry out ADLs; assess patient's baseline for ADL function and identify physical deficits which impact ability to perform ADLs (bathing, care of mouth/teeth, toileting, grooming, dressing, etc )  - Assess/evaluate cause of self-care deficits   - Assess range of motion  - Assess patient's mobility; develop plan if impaired  - Assess patient's need for assistive devices and provide as appropriate  - Encourage maximum independence but intervene and supervise when necessary  - Involve family in performance of ADLs  - Assess for home care needs following discharge   - Consider OT consult to assist with ADL evaluation and planning for discharge  - Provide patient education as appropriate  Outcome: Progressing  Goal: Maintains/Returns to pre admission functional level  Description: INTERVENTIONS:  - Perform BMAT or MOVE assessment daily    - Set and communicate daily mobility goal to care team and patient/family/caregiver  - Collaborate with rehabilitation services on mobility goals if consulted  - Perform Range of Motion  times a day  - Reposition patient every  hours    - Dangle patient  times a day  - Stand patient  times a day  - Ambulate patient  times a day  - Out of bed to chair  times a day   - Out of bed for meals times a day  - Out of bed for toileting  - Record patient progress and toleration of activity level   Outcome: Progressing     Problem: DISCHARGE PLANNING  Goal: Discharge to home or other facility with appropriate resources  Description: INTERVENTIONS:  - Identify barriers to discharge w/patient and caregiver  - Arrange for needed discharge resources and transportation as appropriate  - Identify discharge learning needs (meds, wound care, etc )  - Arrange for interpretive services to assist at discharge as needed  - Refer to Case Management Department for coordinating discharge planning if the patient needs post-hospital services based on physician/advanced practitioner order or complex needs related to functional status, cognitive ability, or social support system  Outcome: Progressing     Problem: Knowledge Deficit  Goal: Patient/family/caregiver demonstrates understanding of disease process, treatment plan, medications, and discharge instructions  Description: Complete learning assessment and assess knowledge base    Interventions:  - Provide teaching at level of understanding  - Provide teaching via preferred learning methods  Outcome: Progressing

## 2023-03-16 NOTE — ASSESSMENT & PLAN NOTE
Px with sob, palpitations  ED visit yesterday for similar sx, tx with IV cardizem and d/c  Pt returning with recurrence of sx  HR in the 140s, s/p IV cardizem, now rate-controlled  Known hx of a fib on eliquis, sotalol, and BB  Endorses compliance  · Continue with eliquis bid   · Patient being seen by cardiology, they have started her on IV amiodarone infusion and there is consideration for cardioversion as well  · Given this Angiocath continue the patient stay, continue telemetry monitoring  Cardiology input moving forward is appreciated

## 2023-03-16 NOTE — ASSESSMENT & PLAN NOTE
· No acute signs of exacerbation  · Not currently on home therapy  · Would benefit from outpatient PFT

## 2023-03-16 NOTE — ASSESSMENT & PLAN NOTE
Lab Results   Component Value Date    EGFR 58 03/16/2023    EGFR 47 03/15/2023    EGFR 42 03/14/2023    CREATININE 0 94 03/16/2023    CREATININE 1 11 03/15/2023    CREATININE 1 21 03/14/2023   ·   Cr stable at baseline around 1  · Monitor

## 2023-03-16 NOTE — CONSULTS
Consultation - Cardiology   Roman Johnson 65 y o  female MRN: [id number]  Unit/Bed#: -01 Encounter: [id number]  03/16/23  11:11 AM    Assessment/ Plan:  1  Paroxysmal atrial fibrillation with RVR heart rate in the 130s to 140s  Will initiate amiodarone bolus, amiodarone drip  Continue Eliquis, Toprol  Discontinue sotalol  Plan for LUIZA/cardioversion tomorrow if patient does not convert by then  N p o  at midnight  Risk and benefits reviewed  Also discussed possibility of ablation down the line  Continue telemetry  2   Dyspnea on exertion likely related to #1; recent stress test done LV perfusion defect medium in size with moderate reduction in uptake present in the mid to apical anterior and anterior lateral location that is partially reversible  Continue Eliquis, Lasix, lisinopril, Toprol, pravastatin  3   Hypertension, stable continue with Lasix, lisinopril, Toprol    4  Hyperlipidemia on pravastatin    5  History of sick sinus syndrome status post pacemaker implantation  Continue to follow in device clinic  History of Present Illness   Physician Requesting Consult: Adrianna Hart,*    Reason for Consult / Principal Problem: afib    HPI: Roman Johnson is a 65y o  year old female who presents with shortness of breath, palpitations  Patient states she has been feeling the symptoms for the last couple of months  She states the symptoms come and go  She states they became very severe and therefore she came in  She was actually seen in the emergency room 2 days ago heart rate was in the 140s but by the time she was actually seen by the ED physician her heart rate did come down to 80s  She was then discharged to home  Patient has previously been seen by electrophysiology was started on sotalol at that time and his note states that if she broke her sotalol would need to consider ablation      PMH: Paroxysmal atrial fibrillation, MR/AI, sick sinus syndrome with history of PPM, chronic heart failure, nonischemic cardiomyopathy, hyperlipidemia, obesity, history of TIA    Consults    EKG: afib with rvr      Review of Systems   Constitutional: Negative  Cardiovascular: Positive for palpitations  Neurological: Negative  Hematological: Negative  Psychiatric/Behavioral: Negative  All other systems reviewed and are negative  Historical Information   Past Medical History:   Diagnosis Date   • Aphasia, mixed 2017   • Atrial fibrillation (HCC)    • CHF (congestive heart failure) (HCC)    • Colon polyp    • Headache    • Hyperlipidemia    • Hypertension    • Lyme disease    • Shortness of breath    • TIA (transient ischemic attack)    • Vertigo      Past Surgical History:   Procedure Laterality Date   • CARDIAC PACEMAKER PLACEMENT  2019   • COLONOSCOPY     • DC SIGMOIDOSCOPY FLX DX W/COLLJ SPEC BR/WA IF PFRMD N/A 2017    Procedure: Altagracia Kramer;  Surgeon: Tim Patel MD;  Location: [facility];   Service: Gastroenterology   • TONSILLECTOMY       Social History     Substance and Sexual Activity   Alcohol Use Yes   • Alcohol/week: 10 0 standard drinks   • Types: 10 Glasses of wine per week    Comment: occ     Social History     Substance and Sexual Activity   Drug Use No     Social History     Tobacco Use   Smoking Status Former   • Types: Cigarettes   • Quit date:    • Years since quittin 2   Smokeless Tobacco Never   Tobacco Comments    no smoking for 55 years       Family History:   Family History   Problem Relation Age of Onset   • Lung cancer Mother    • Dementia Father    • Alzheimer's disease Father    • Other Father         Cardiac Disorder    • Lung cancer Maternal Grandmother    • Breast cancer Paternal Aunt 44   • No Known Problems Paternal Aunt    • No Known Problems Paternal Aunt        Meds/Allergies   all current active meds have been reviewed and current meds:   Current Facility-Administered Medications   Medication Dose Route Frequency   • acetaminophen (TYLENOL) tablet 650 mg  650 mg Oral Q6H PRN   • amiodarone (CORDARONE) 900 mg in dextrose 5 % 500 mL infusion  1 mg/min Intravenous Continuous   • amiodarone (CORDARONE) 900 mg in dextrose 5 % 500 mL infusion  0 5 mg/min Intravenous Continuous   • amiodarone 150 mg in dextrose 5 % 100 mL IV bolus  150 mg Intravenous Once   • apixaban (ELIQUIS) tablet 5 mg  5 mg Oral BID   • furosemide (LASIX) tablet 20 mg  20 mg Oral Daily   • lisinopril (ZESTRIL) tablet 2 5 mg  2 5 mg Oral Daily   • metoprolol succinate (TOPROL-XL) 24 hr tablet 75 mg  75 mg Oral BID   • pravastatin (PRAVACHOL) tablet 10 mg  10 mg Oral Daily With Dinner     No Known Allergies    Objective   Vitals: Blood pressure 122/78, pulse (!) 142, temperature 97 7 °F (36 5 °C), resp  rate 17, height 5' 5" (1 651 m), weight 94 kg (207 lb 3 7 oz), SpO2 91 % , Body mass index is 34 49 kg/m² ,   Orthostatic Blood Pressures    Flowsheet Row Most Recent Value   Blood Pressure 122/78 filed at 03/16/2023 0756   Patient Position - Orthostatic VS Sitting filed at 03/15/2023 3842          Systolic (92NWH), QUO:647 , Min:106 , WHO:739     Diastolic (14FMW), WQV:34, Min:58, Max:87        Intake/Output Summary (Last 24 hours) at 3/16/2023 1111  Last data filed at 3/15/2023 1425  Gross per 24 hour   Intake 360 ml   Output --   Net 360 ml       Invasive Devices     Peripheral Intravenous Line  Duration           Peripheral IV 03/15/23 Proximal;Right;Ventral (anterior) Forearm 1 day                    Physical Exam:  GEN: Alert and oriented x 3, in no acute distress  Well appearing and well nourished  HEENT: Sclera anicteric, conjunctivae pink, mucous membranes moist  Oropharynx clear  NECK: Supple, no carotid bruits, no significant JVD  Trachea midline, no thyromegaly  HEART: Irregularly irregular tachycardic, normal S1 and S2, no murmurs, clicks, gallops or rubs  PMI nondisplaced, no thrills     LUNGS: Clear to auscultation bilaterally; no wheezes, rales, or rhonchi  No increased work of breathing or signs of respiratory distress  ABDOMEN: Soft, nontender, nondistended, normoactive bowel sounds  EXTREMITIES: Skin warm and well perfused, no clubbing, cyanosis, or edema  NEURO: No focal findings  Normal speech  Mood and affect normal    SKIN: Normal without suspicious lesions on exposed skin        Lab Results:     Troponins:   Results from last 7 days   Lab Units 03/15/23  1616 03/15/23  1358 03/15/23  1040 03/14/23  1422   HS TNI 0HR ng/L  --   --  7 8   HS TNI 2HR ng/L  --  6  --   --    HS TNI 4HR ng/L 6  --   --   --    HSTNI D4 ng/L -1  --   --   --        CBC with diff:   Results from last 7 days   Lab Units 03/16/23  0446 03/15/23  1040 03/14/23  1422   WBC Thousand/uL 8 64 7 26 8 28   HEMOGLOBIN g/dL 12 9 12 7 13 6   HEMATOCRIT % 39 5 39 6 43 1   MCV fL 88 90 93   PLATELETS Thousands/uL 215 222 226   MCH pg 28 8 28 9 29 2   MCHC g/dL 32 7 32 1 31 6   RDW % 17 6* 17 8* 18 1*   MPV fL 10 9 11 1 11 3   NRBC AUTO /100 WBCs  --  0 0         CMP:   Results from last 7 days   Lab Units 03/16/23  0446 03/15/23  1040 03/14/23  1422   POTASSIUM mmol/L 3 8 4 0 4 1   CHLORIDE mmol/L 107 110* 107   CO2 mmol/L 22 23 25   BUN mg/dL 16 17 17   CREATININE mg/dL 0 94 1 11 1 21   CALCIUM mg/dL 9 0 8 9 9 5   AST U/L  --  19  --    ALT U/L  --  17  --    ALK PHOS U/L  --  76  --    EGFR ml/min/1 73sq m 58 47 42

## 2023-03-16 NOTE — ASSESSMENT & PLAN NOTE
Reports ongoing sob with exertion and stairs x worse over last week  Nuclear stress test completed 3/8 showed perfusion defect that is medium in size with moderate reduction in uptake present in the mid to apical anterior and al that is partially reversible   In setting of a fib with RVR vs ACS vs underlying copd  · Denies CP  · Her symptoms are likely related to uncontrolled atrial fibrillation

## 2023-03-16 NOTE — CASE MANAGEMENT
Case Management Discharge Planning Note    Patient name Radha Lopez  Location [address] [id number] MRN [id number]  : 1945 Date 3/16/2023       Current Admission Date: 3/15/2023  Current Admission Diagnosis:Persistent atrial fibrillation with rapid ventricular response [facility])   Patient Active Problem List    Diagnosis Date Noted   • Diplopia 2022   • Acute on chronic systolic congestive heart failure ([address] ) 2022   • SSS (sick sinus syndrome) ([address] ) 2022   • Stage 3a chronic kidney disease ([facility] [address] ) 2021   • Other insomnia 2021   • Chronic obstructive pulmonary disease ([facility] [address] ) 2021   • BMI 33 0-33 9,adult 2021   • Paroxysmal atrial fibrillation ([address] ) 2019   • Myocardiopathy ([address] ) 2018   • SHARMA (dyspnea on exertion) 2018   • Anticoagulant long-term use 2018   • Nonrheumatic mitral valve regurgitation 2018   • Nonrheumatic aortic valve insufficiency 2018   • Nonrheumatic tricuspid valve regurgitation 2018   • Persistent atrial fibrillation with rapid ventricular response ([facility] [place] [address] ) 2018   • Essential hypertension 2018   • Mixed hyperlipidemia 2018   • Obesity (BMI 30-39 9) 2018   • H/O TIA (transient ischemic attack) and stroke 2018   • Migraine with aura and without status migrainosus, not intractable 2016      LOS (days): 0  Geometric Mean LOS (GMLOS) (days): 2 30  Days to GMLOS:2 1     OBJECTIVE:  Risk of Unplanned Readmission Score: 18 91      Current admission status: Inpatient   Preferred Pharmacy:   [facility] ([facility] [address]  [address] [place] [id number]  Phone: [phone] Fax: [phone]    Primary Care Provider: Kerri Arvizu DO    Primary Insurance: MEDICARE  Secondary Insurance: AARP    DISCHARGE DETAILS:  Patient reviewed during Interdisciplinary Rounds with SLIM today  Anticipated d/c in 24hrs    Patient is IPTA   No anticipated CM needs  CM assessment pending  Will follow for needs & d/c planning

## 2023-03-16 NOTE — PLAN OF CARE
Problem: PAIN - ADULT  Goal: Verbalizes/displays adequate comfort level or baseline comfort level  Description: Interventions:  - Encourage patient to monitor pain and request assistance  - Assess pain using appropriate pain scale  - Administer analgesics based on type and severity of pain and evaluate response  - Implement non-pharmacological measures as appropriate and evaluate response  - Consider cultural and social influences on pain and pain management  - Notify physician/advanced practitioner if interventions unsuccessful or patient reports new pain  Outcome: Progressing     Problem: INFECTION - ADULT  Goal: Absence or prevention of progression during hospitalization  Description: INTERVENTIONS:  - Assess and monitor for signs and symptoms of infection  - Monitor lab/diagnostic results  - Monitor all insertion sites, i e  indwelling lines, tubes, and drains  - Monitor endotracheal if appropriate and nasal secretions for changes in amount and color  - [place] appropriate cooling/warming therapies per order  - Administer medications as ordered  - Instruct and encourage patient and family to use good hand hygiene technique  - Identify and instruct in appropriate isolation precautions for identified infection/condition  Outcome: Progressing  Goal: Absence of fever/infection during neutropenic period  Description: INTERVENTIONS:  - Monitor WBC    Outcome: Progressing

## 2023-03-16 NOTE — QUICK NOTE
Notified by RN, patient complaining of shortness of breath had brief episode of dizziness that is now resolved  EKG reveals atrial fibrillation with RVR heart rate 115  Patient denies chest pain  CXR yesterday revealed persistent mild cardiomegaly  No acute cardiopulmonary disease  Advised RN to give sotalol and check labs

## 2023-03-16 NOTE — PROGRESS NOTES
[facility]  Progress Note - Dominga Jeff [id number], 65 y o  female MRN: [id number]  Unit/Bed#: -01 Encounter: [id number]  Primary Care Provider: Elbridge Baumgarten, DO   Date and time admitted to hospital: 3/15/2023  9:59 AM    * Persistent atrial fibrillation with rapid ventricular response (HCC)  Assessment & Plan  Px with sob, palpitations  ED visit yesterday for similar sx, tx with IV cardizem and d/c  Pt returning with recurrence of sx  HR in the 140s, s/p IV cardizem, now rate-controlled  Known hx of a fib on eliquis, sotalol, and BB  Endorses compliance  · Continue with eliquis bid   · Patient being seen by cardiology, they have started her on IV amiodarone infusion and there is consideration for cardioversion as well  · Given this patient requires continued inpatient stay, continue telemetry monitoring  Cardiology input moving forward is appreciated  SSS (sick sinus syndrome) ([facility])  Assessment & Plan  · S/p PPM    Stage 3a chronic kidney disease [facility])  Assessment & Plan  Lab Results   Component Value Date    EGFR 58 03/16/2023    EGFR 47 03/15/2023    EGFR 42 03/14/2023    CREATININE 0 94 03/16/2023    CREATININE 1 11 03/15/2023    CREATININE 1 21 03/14/2023   ·   Cr stable at baseline around 1  · Monitor    Chronic obstructive pulmonary disease ([facility] [address] )  Assessment & Plan  · No acute signs of exacerbation  · Not currently on home therapy  · Would benefit from outpatient PFT    SHARMA (dyspnea on exertion)  Assessment & Plan  Reports ongoing sob with exertion and stairs x worse over last week  Nuclear stress test completed 3/8 showed perfusion defect that is medium in size with moderate reduction in uptake present in the mid to apical anterior and al that is partially reversible   In setting of a fib with RVR vs ACS vs underlying copd  · Denies CP  · Her symptoms are likely related to uncontrolled atrial fibrillation    H/O TIA (transient ischemic attack) and stroke  Assessment & Plan  · Continue eliquis and statin    Mixed hyperlipidemia  Assessment & Plan  · Continue home dose statin    Essential hypertension  Assessment & Plan    · Continue with sotalol, Toprol XL, lisinopril daily       VTE Pharmacologic Prophylaxis:   Pharmacologic: Apixaban (Eliquis)  Mechanical VTE Prophylaxis in Place: Yes    Patient Centered Rounds: I have performed bedside rounds with nursing staff today  Discussed with care management team    Education and Discussions with Family / Patient: Patient    Time Spent for Care: 1 hour  More than 50% of total time spent on counseling and coordination of care as described above  Current Length of Stay: 0 day(s)    Current Patient Status: Inpatient   Certification Statement: The patient will continue to require additional inpatient hospital stay due to Need for IV treatment    Discharge Plan: Once stable    Code Status: Level 1 - Full Code      Subjective:     Patient valuated this morning  Still mentions some lightheadedness and fatigue  Denies any chest pain  Objective:     Vitals:   Temp (24hrs), Av 5 °F (36 4 °C), Min:97 3 °F (36 3 °C), Max:97 7 °F (36 5 °C)    Temp:  [97 3 °F (36 3 °C)-97 7 °F (36 5 °C)] 97 7 °F (36 5 °C)  HR:  [] 63  Resp:  [17-18] 18  BP: (106-122)/(71-87) 115/71  SpO2:  [91 %-98 %] 97 %  Body mass index is 34 49 kg/m²  Input and Output Summary (last 24 hours): Intake/Output Summary (Last 24 hours) at 3/16/2023 1159  Last data filed at 3/15/2023 1425  Gross per 24 hour   Intake 360 ml   Output --   Net 360 ml       Physical Exam:     Physical Exam  Vitals and nursing note reviewed  Constitutional:       Appearance: Normal appearance  She is normal weight  Comments: Female in bed, awake   HENT:      Head: Normocephalic and atraumatic  Right Ear: External ear normal       Left Ear: External ear normal       Nose: Nose normal  No congestion        Mouth/Throat:      Mouth: Mucous membranes are moist  Pharynx: Oropharynx is clear  No oropharyngeal exudate or posterior oropharyngeal erythema  Eyes:      General: No scleral icterus  Right eye: No discharge  Left eye: No discharge  Extraocular Movements: Extraocular movements intact  Conjunctiva/sclera: Conjunctivae normal       Pupils: Pupils are equal, round, and reactive to light  Cardiovascular:      Rate and Rhythm: Tachycardia present  Rhythm irregular  Pulses: Normal pulses  Heart sounds: Normal heart sounds  No murmur heard  No friction rub  No gallop  Pulmonary:      Effort: Pulmonary effort is normal  No respiratory distress  Breath sounds: Normal breath sounds  No stridor  No wheezing, rhonchi or rales  Chest:      Chest wall: No tenderness  Abdominal:      General: Abdomen is flat  Bowel sounds are normal  There is no distension  Palpations: Abdomen is soft  There is no mass  Tenderness: There is no abdominal tenderness  There is no guarding or rebound  Musculoskeletal:         General: No swelling, tenderness, deformity or signs of injury  Normal range of motion  Cervical back: Normal range of motion and neck supple  No rigidity  No muscular tenderness  Skin:     General: Skin is warm and dry  Capillary Refill: Capillary refill takes less than 2 seconds  Coloration: Skin is not jaundiced or pale  Findings: No bruising, erythema, lesion or rash  Neurological:      General: No focal deficit present  Mental Status: She is alert and oriented to person, place, and time  Mental status is at baseline  Cranial Nerves: No cranial nerve deficit  Sensory: No sensory deficit  Motor: No weakness  Coordination: Coordination normal    Psychiatric:         Mood and Affect: Mood normal          Behavior: Behavior normal          Thought Content:  Thought content normal          Judgment: Judgment normal            Additional Data:     Labs:    Results from last 7 days   Lab Units 03/16/23  0446 03/15/23  1040   WBC Thousand/uL 8 64 7 26   HEMOGLOBIN g/dL 12 9 12 7   HEMATOCRIT % 39 5 39 6   PLATELETS Thousands/uL 215 222   NEUTROS PCT %  --  70   LYMPHS PCT %  --  16   MONOS PCT %  --  11   EOS PCT %  --  2     Results from last 7 days   Lab Units 03/16/23  0446 03/15/23  1040   SODIUM mmol/L 138 140   POTASSIUM mmol/L 3 8 4 0   CHLORIDE mmol/L 107 110*   CO2 mmol/L 22 23   BUN mg/dL 16 17   CREATININE mg/dL 0 94 1 11   ANION GAP mmol/L 9 7   CALCIUM mg/dL 9 0 8 9   ALBUMIN g/dL  --  3 8   TOTAL BILIRUBIN mg/dL  --  0 89   ALK PHOS U/L  --  76   ALT U/L  --  17   AST U/L  --  19   GLUCOSE RANDOM mg/dL 116 103     Results from last 7 days   Lab Units 03/15/23  1058   INR  1 46*                       * I Have Reviewed All Lab Data Listed Above  * Additional Pertinent Lab Tests Reviewed: [address] 65 Admission Reviewed        Recent Cultures (last 7 days):           Last 24 Hours Medication List:   Current Facility-Administered Medications   Medication Dose Route Frequency Provider Last Rate   • acetaminophen  650 mg Oral Q6H PRN Mallory Emily Saraiya, DO     • amiodarone  1 mg/min Intravenous Continuous Cece Florez PA-C 1 mg/min (03/16/23 1108)   • amiodarone  0 5 mg/min Intravenous Continuous Mell Obando PA-C     • apixaban  5 mg Oral BID Mallory Emily Saraiya, DO     • furosemide  20 mg Oral Daily Mallory Emily Saraiya, DO     • lisinopril  2 5 mg Oral Daily Mallory Emily Saraiya, DO     • metoprolol succinate  75 mg Oral BID Mallory Emily Saraiya, DO     • pravastatin  10 mg Oral Daily With [address], DO          Today, Patient Was Seen By: Hi Rudolph MD    ** Please Note: Dictation voice to text software may have been used in the creation of this document   **

## 2023-03-17 ENCOUNTER — TRANSITIONAL CARE MANAGEMENT (OUTPATIENT)
Dept: INTERNAL MEDICINE CLINIC | Facility: CLINIC | Age: 78
End: 2023-03-17

## 2023-03-17 ENCOUNTER — TELEPHONE (OUTPATIENT)
Dept: FAMILY MEDICINE CLINIC | Facility: CLINIC | Age: 78
End: 2023-03-17

## 2023-03-17 VITALS
WEIGHT: 207.23 LBS | TEMPERATURE: 97.5 F | SYSTOLIC BLOOD PRESSURE: 129 MMHG | HEIGHT: 65 IN | OXYGEN SATURATION: 96 % | RESPIRATION RATE: 18 BRPM | DIASTOLIC BLOOD PRESSURE: 84 MMHG | HEART RATE: 66 BPM | BODY MASS INDEX: 34.53 KG/M2

## 2023-03-17 LAB
ANION GAP SERPL CALCULATED.3IONS-SCNC: 8 MMOL/L (ref 4–13)
BASOPHILS # BLD AUTO: 0.05 THOUSANDS/ÂΜL (ref 0–0.1)
BASOPHILS NFR BLD AUTO: 1 % (ref 0–1)
BUN SERPL-MCNC: 19 MG/DL (ref 5–25)
CALCIUM SERPL-MCNC: 8.8 MG/DL (ref 8.4–10.2)
CHLORIDE SERPL-SCNC: 106 MMOL/L (ref 96–108)
CO2 SERPL-SCNC: 23 MMOL/L (ref 21–32)
CREAT SERPL-MCNC: 1.11 MG/DL (ref 0.6–1.3)
EOSINOPHIL # BLD AUTO: 0.18 THOUSAND/ÂΜL (ref 0–0.61)
EOSINOPHIL NFR BLD AUTO: 3 % (ref 0–6)
ERYTHROCYTE [DISTWIDTH] IN BLOOD BY AUTOMATED COUNT: 17.7 % (ref 11.6–15.1)
GFR SERPL CREATININE-BSD FRML MDRD: 47 ML/MIN/1.73SQ M
GLUCOSE SERPL-MCNC: 106 MG/DL (ref 65–140)
HCT VFR BLD AUTO: 37.5 % (ref 34.8–46.1)
HGB BLD-MCNC: 12.1 G/DL (ref 11.5–15.4)
IMM GRANULOCYTES # BLD AUTO: 0.02 THOUSAND/UL (ref 0–0.2)
IMM GRANULOCYTES NFR BLD AUTO: 0 % (ref 0–2)
LYMPHOCYTES # BLD AUTO: 1.45 THOUSANDS/ÂΜL (ref 0.6–4.47)
LYMPHOCYTES NFR BLD AUTO: 21 % (ref 14–44)
MCH RBC QN AUTO: 28.8 PG (ref 26.8–34.3)
MCHC RBC AUTO-ENTMCNC: 32.3 G/DL (ref 31.4–37.4)
MCV RBC AUTO: 89 FL (ref 82–98)
MONOCYTES # BLD AUTO: 0.81 THOUSAND/ÂΜL (ref 0.17–1.22)
MONOCYTES NFR BLD AUTO: 12 % (ref 4–12)
NEUTROPHILS # BLD AUTO: 4.3 THOUSANDS/ÂΜL (ref 1.85–7.62)
NEUTS SEG NFR BLD AUTO: 63 % (ref 43–75)
NRBC BLD AUTO-RTO: 0 /100 WBCS
PLATELET # BLD AUTO: 211 THOUSANDS/UL (ref 149–390)
PMV BLD AUTO: 10.9 FL (ref 8.9–12.7)
POTASSIUM SERPL-SCNC: 3.6 MMOL/L (ref 3.5–5.3)
RBC # BLD AUTO: 4.2 MILLION/UL (ref 3.81–5.12)
SODIUM SERPL-SCNC: 137 MMOL/L (ref 135–147)
WBC # BLD AUTO: 6.81 THOUSAND/UL (ref 4.31–10.16)

## 2023-03-17 RX ORDER — AMIODARONE HYDROCHLORIDE 200 MG/1
400 TABLET ORAL 2 TIMES DAILY WITH MEALS
Status: DISCONTINUED | OUTPATIENT
Start: 2023-03-17 | End: 2023-03-17 | Stop reason: HOSPADM

## 2023-03-17 RX ORDER — AMIODARONE HYDROCHLORIDE 200 MG/1
TABLET ORAL
Qty: 70 TABLET | Refills: 0 | Status: SHIPPED | OUTPATIENT
Start: 2023-03-17 | End: 2023-04-04 | Stop reason: SDUPTHER

## 2023-03-17 RX ADMIN — LISINOPRIL 2.5 MG: 2.5 TABLET ORAL at 09:15

## 2023-03-17 RX ADMIN — AMIODARONE HYDROCHLORIDE 0.5 MG/MIN: 50 INJECTION, SOLUTION INTRAVENOUS at 07:15

## 2023-03-17 RX ADMIN — AMIODARONE HYDROCHLORIDE 400 MG: 200 TABLET ORAL at 11:00

## 2023-03-17 RX ADMIN — APIXABAN 5 MG: 5 TABLET, FILM COATED ORAL at 09:15

## 2023-03-17 RX ADMIN — METOPROLOL SUCCINATE 75 MG: 50 TABLET, EXTENDED RELEASE ORAL at 09:15

## 2023-03-17 RX ADMIN — FUROSEMIDE 20 MG: 20 TABLET ORAL at 09:15

## 2023-03-17 NOTE — PROGRESS NOTES
Cardiology Progress Note - Ghazal Prescott 65 y o  female MRN: [id number]    Unit/Bed#: -01 Encounter: [id number]      Assessment/Plan:  1  Paroxsymal Afib with RVR now back in NSR; DC amiodarone gtt, initiate amiodarone oral 400mg bid x1 week then 200mg bid  Continue with Eliquis  Monitor Tele  Aron/ cardioversion canceled  2  Dyspnea on exertion, likely related to #1, recent stress test done reversible anterior defect that improved on prone imaging likely artifact, patient had persistent symptoms despite maintaining sinus rhythm could consider cardiac cath    3  Hypertension, stable continue Lasix, lisinopril, Toprol    4  Hyperlipidemia, continue with pravastatin    5  History of sick sinus syndrome status post pacemaker implantation, continue to follow device clinic    6  History of moderate MR/moderate pulmonary hypertension, continue medications  Patient appears euvolemic  Patient is stable from a cardiac standpoint for discharge  We will follow in the office  Subjective:   Patient seen and examined  No significant events overnight  Im feeling good  Pt converted to NSR  Denies chest pain    Objective:     Vitals: Blood pressure 129/84, pulse 66, temperature 97 5 °F (36 4 °C), resp  rate 18, height 5' 5" (1 651 m), weight 94 kg (207 lb 3 7 oz), SpO2 96 %  , Body mass index is 34 49 kg/m² ,   Orthostatic Blood Pressures    Flowsheet Row Most Recent Value   Blood Pressure 129/84 filed at 03/17/2023 1102   Patient Position - Orthostatic VS Sitting filed at 03/17/2023 0715          No intake or output data in the 24 hours ending 03/17/23 1518      Physical Exam:    GEN: Ghazal Prescott appears well, alert and oriented x 3, pleasant and cooperative   HEENT: pupils equal, round, and reactive to light; extraocular muscles intact  NECK: supple, no carotid bruits   HEART: regular rhythm, normal S1 and S2, no murmurs, clicks, gallops or rubs   LUNGS: clear to auscultation bilaterally; no wheezes, rales, or rhonchi   ABDOMEN: normal bowel sounds, soft, no tenderness, no distention  EXTREMITIES: peripheral pulses normal; no clubbing, cyanosis, or edema      Medications:      Current Facility-Administered Medications:   •  acetaminophen (TYLENOL) tablet 650 mg, 650 mg, Oral, Q6H PRN, [facility], DO  •  amiodarone tablet 400 mg, 400 mg, Oral, BID With Meals, Stalin Green PA-C, 400 mg at 03/17/23 1100  •  apixaban (ELIQUIS) tablet 5 mg, 5 mg, Oral, BID, [facility], 5 mg at 03/17/23 0915  •  furosemide (LASIX) tablet 20 mg, 20 mg, Oral, Daily, [facility], DO, 20 mg at 03/17/23 0915  •  lisinopril (ZESTRIL) tablet 2 5 mg, 2 5 mg, Oral, Daily, [facility], DO, 2 5 mg at 03/17/23 0915  •  metoprolol succinate (TOPROL-XL) 24 hr tablet 75 mg, 75 mg, Oral, BID, [facility], [facility], 75 mg at 03/17/23 0915  •  pravastatin (PRAVACHOL) tablet 10 mg, 10 mg, Oral, Daily With Dinner, [facility], DO, 10 mg at 03/16/23 1627    Current Outpatient Medications:   •  amiodarone 200 mg tablet, Take 400mg twice a day for a week, then 200mg twice a day, Disp: 70 tablet, Rfl: 0  •  apixaban (Eliquis) 5 mg, Take 1 tablet (5 mg total) by mouth 2 (two) times a day, Disp: 180 tablet, Rfl: 3  •  diltiazem (CARDIZEM) 30 mg tablet, Take 1 tablet (30 mg total) by mouth 4 (four) times a day Only use as needed to keep HR under 100 bpm  Continue all your other medications, Disp: 30 tablet, Rfl: 0  •  furosemide (LASIX) 20 mg tablet, Take 1 tablet (20 mg total) by mouth daily, Disp: 90 tablet, Rfl: 0  •  lisinopril (ZESTRIL) 2 5 mg tablet, Take 1 tablet (2 5 mg total) by mouth daily, Disp: 90 tablet, Rfl: 3  •  lovastatin (MEVACOR) 10 MG tablet, Take 1 tablet (10 mg total) by mouth daily at bedtime, Disp: 90 tablet, Rfl: 3  •  metoprolol succinate (TOPROL-XL) 50 mg 24 hr tablet, Take 1 5 tablets (75 mg total) by mouth 2 (two) times a day, Disp: 270 tablet, Rfl: 3  •  potassium chloride (K-DUR,KLOR-CON) 10 mEq tablet, Take 1 tablet (10 mEq total) by mouth daily, Disp: 90 tablet, Rfl: 0     Labs & Results:    Results from last 7 days   Lab Units 03/15/23  1616 03/15/23  1358 03/15/23  1040 03/14/23  1422   HS TNI 0HR ng/L  --   --  7 8   HS TNI 2HR ng/L  --  6  --   --    HS TNI 4HR ng/L 6  --   --   --    HSTNI D4 ng/L -1  --   --   --      Results from last 7 days   Lab Units 03/17/23  0441 03/16/23  0446 03/15/23  1040   WBC Thousand/uL 6 81 8 64 7 26   HEMOGLOBIN g/dL 12 1 12 9 12 7   HEMATOCRIT % 37 5 39 5 39 6   PLATELETS Thousands/uL 211 215 222         Results from last 7 days   Lab Units 03/17/23  0441 03/16/23  0446 03/15/23  1040   POTASSIUM mmol/L 3 6 3 8 4 0   CHLORIDE mmol/L 106 107 110*   CO2 mmol/L 23 22 23   BUN mg/dL 19 16 17   CREATININE mg/dL 1 11 0 94 1 11   CALCIUM mg/dL 8 8 9 0 8 9   ALK PHOS U/L  --   --  76   ALT U/L  --   --  17   AST U/L  --   --  19     Results from last 7 days   Lab Units 03/15/23  1058   INR  1 46*   PTT seconds 33     Results from last 7 days   Lab Units 03/16/23  0446 03/15/23  1040   MAGNESIUM mg/dL 2 0 2 1

## 2023-03-17 NOTE — PLAN OF CARE
Problem: PAIN - ADULT  Goal: Verbalizes/displays adequate comfort level or baseline comfort level  Description: Interventions:  - Encourage patient to monitor pain and request assistance  - Assess pain using appropriate pain scale  - Administer analgesics based on type and severity of pain and evaluate response  - Implement non-pharmacological measures as appropriate and evaluate response  - Consider cultural and social influences on pain and pain management  - Notify physician/advanced practitioner if interventions unsuccessful or patient reports new pain  Outcome: Progressing     Problem: INFECTION - ADULT  Goal: Absence or prevention of progression during hospitalization  Description: INTERVENTIONS:  - Assess and monitor for signs and symptoms of infection  - Monitor lab/diagnostic results  - Monitor all insertion sites, i e  indwelling lines, tubes, and drains  - Monitor endotracheal if appropriate and nasal secretions for changes in amount and color  - [employer] appropriate cooling/warming therapies per order  - Administer medications as ordered  - Instruct and encourage patient and family to use good hand hygiene technique  - Identify and instruct in appropriate isolation precautions for identified infection/condition  Outcome: Progressing  Goal: Absence of fever/infection during neutropenic period  Description: INTERVENTIONS:  - Monitor WBC    Outcome: Progressing     Problem: SAFETY ADULT  Goal: Patient will remain free of falls  Description: INTERVENTIONS:  - Educate patient/family on patient safety including physical limitations  - Instruct patient to call for assistance with activity   - Consult OT/PT to assist with strengthening/mobility   - Keep Call bell within reach  - Keep bed low and locked with side rails adjusted as appropriate  - Keep care items and personal belongings within reach  - Initiate and maintain comfort rounds  - Make Fall Risk Sign visible to staff  - Offer Toileting every 2 Hours, in advance of need  - Initiate/Maintain bed alarm  - Obtain necessary fall risk management equipment: call bell within reach  - Apply yellow socks and bracelet for high fall risk patients  - Consider moving patient to room near nurses station  Outcome: Progressing  Goal: Maintain or return to baseline ADL function  Description: INTERVENTIONS:  -  Assess patient's ability to carry out ADLs; assess patient's baseline for ADL function and identify physical deficits which impact ability to perform ADLs (bathing, care of mouth/teeth, toileting, grooming, dressing, etc )  - Assess/evaluate cause of self-care deficits   - Assess range of motion  - Assess patient's mobility; develop plan if impaired  - Assess patient's need for assistive devices and provide as appropriate  - Encourage maximum independence but intervene and supervise when necessary  - Involve family in performance of ADLs  - Assess for home care needs following discharge   - Consider OT consult to assist with ADL evaluation and planning for discharge  - Provide patient education as appropriate  Outcome: Progressing  Goal: Maintains/Returns to pre admission functional level  Description: INTERVENTIONS:  - Perform BMAT or MOVE assessment daily    - Set and communicate daily mobility goal to care team and patient/family/caregiver  - Collaborate with rehabilitation services on mobility goals if consulted  - Perform Range of Motion 3 times a day  - Reposition patient every 2 hours    - Dangle patient 3 times a day  - Stand patient 3 times a day  - Ambulate patient 3 times a day  - Out of bed to chair 3 times a day   - Out of bed for meals 3 times a day  - Out of bed for toileting  - Record patient progress and toleration of activity level   Outcome: Progressing     Problem: DISCHARGE PLANNING  Goal: Discharge to home or other facility with appropriate resources  Description: INTERVENTIONS:  - Identify barriers to discharge w/patient and caregiver  - Arrange for needed discharge resources and transportation as appropriate  - Identify discharge learning needs (meds, wound care, etc )  - Arrange for interpretive services to assist at discharge as needed  - Refer to Case Management Department for coordinating discharge planning if the patient needs post-hospital services based on physician/advanced practitioner order or complex needs related to functional status, cognitive ability, or social support system  Outcome: Progressing     Problem: Knowledge Deficit  Goal: Patient/family/caregiver demonstrates understanding of disease process, treatment plan, medications, and discharge instructions  Description: Complete learning assessment and assess knowledge base    Interventions:  - Provide teaching at level of understanding  - Provide teaching via preferred learning methods  Outcome: Progressing

## 2023-03-17 NOTE — PROGRESS NOTES
Patient alert and oriented x 4  Pleasant to staff and cooperative with care  Patient quiet during shift  No complaints of pain or discomfort  No signs of respiratory distress  Patient slept during shift  Continues on amiodarone drip  No deviation in assessment finding noted when compared to previous documentation  Will continue to monitor

## 2023-03-17 NOTE — PLAN OF CARE
Problem: PAIN - ADULT  Goal: Verbalizes/displays adequate comfort level or baseline comfort level  Description: Interventions:  - Encourage patient to monitor pain and request assistance  - Assess pain using appropriate pain scale  - Administer analgesics based on type and severity of pain and evaluate response  - Implement non-pharmacological measures as appropriate and evaluate response  - Consider cultural and social influences on pain and pain management  - Notify physician/advanced practitioner if interventions unsuccessful or patient reports new pain  Outcome: Adequate for Discharge     Problem: INFECTION - ADULT  Goal: Absence or prevention of progression during hospitalization  Description: INTERVENTIONS:  - Assess and monitor for signs and symptoms of infection  - Monitor lab/diagnostic results  - Monitor all insertion sites, i e  indwelling lines, tubes, and drains  - Monitor endotracheal if appropriate and nasal secretions for changes in amount and color  - [place] appropriate cooling/warming therapies per order  - Administer medications as ordered  - Instruct and encourage patient and family to use good hand hygiene technique  - Identify and instruct in appropriate isolation precautions for identified infection/condition  Outcome: Adequate for Discharge  Goal: Absence of fever/infection during neutropenic period  Description: INTERVENTIONS:  - Monitor WBC    Outcome: Adequate for Discharge     Problem: SAFETY ADULT  Goal: Patient will remain free of falls  Description: INTERVENTIONS:  - Educate patient/family on patient safety including physical limitations  - Instruct patient to call for assistance with activity   - Consult OT/PT to assist with strengthening/mobility   - Keep Call bell within reach  - Keep bed low and locked with side rails adjusted as appropriate  - Keep care items and personal belongings within reach  - Initiate and maintain comfort rounds  - Make Fall Risk Sign visible to staff  - Offer Toileting every  Hours, in advance of need  - Initiate/Maintain alarm  - Obtain necessary fall risk management equipment:   - Apply yellow socks and bracelet for high fall risk patients  - Consider moving patient to room near nurses station  Outcome: Adequate for Discharge  Goal: Maintain or return to baseline ADL function  Description: INTERVENTIONS:  -  Assess patient's ability to carry out ADLs; assess patient's baseline for ADL function and identify physical deficits which impact ability to perform ADLs (bathing, care of mouth/teeth, toileting, grooming, dressing, etc )  - Assess/evaluate cause of self-care deficits   - Assess range of motion  - Assess patient's mobility; develop plan if impaired  - Assess patient's need for assistive devices and provide as appropriate  - Encourage maximum independence but intervene and supervise when necessary  - Involve family in performance of ADLs  - Assess for home care needs following discharge   - Consider OT consult to assist with ADL evaluation and planning for discharge  - Provide patient education as appropriate  Outcome: Adequate for Discharge  Goal: Maintains/Returns to pre admission functional level  Description: INTERVENTIONS:  - Perform BMAT or MOVE assessment daily    - Set and communicate daily mobility goal to care team and patient/family/caregiver  - Collaborate with rehabilitation services on mobility goals if consulted  - Perform Range of Motion  times a day  - Reposition patient every  hours    - Dangle patient  times a day  - Stand patient  times a day  - Ambulate patient  times a day  - Out of bed to chair  times a day   - Out of bed for meals times a day  - Out of bed for toileting  - Record patient progress and toleration of activity level   Outcome: Adequate for Discharge     Problem: DISCHARGE PLANNING  Goal: Discharge to home or other facility with appropriate resources  Description: INTERVENTIONS:  - Identify barriers to discharge w/patient and caregiver  - Arrange for needed discharge resources and transportation as appropriate  - Identify discharge learning needs (meds, wound care, etc )  - Arrange for interpretive services to assist at discharge as needed  - Refer to Case Management Department for coordinating discharge planning if the patient needs post-hospital services based on physician/advanced practitioner order or complex needs related to functional status, cognitive ability, or social support system  Outcome: Adequate for Discharge     Problem: Knowledge Deficit  Goal: Patient/family/caregiver demonstrates understanding of disease process, treatment plan, medications, and discharge instructions  Description: Complete learning assessment and assess knowledge base    Interventions:  - Provide teaching at level of understanding  - Provide teaching via preferred learning methods  Outcome: Adequate for Discharge

## 2023-03-17 NOTE — DISCHARGE INSTR - AVS FIRST PAGE
Take 400mg twice a day for a week, then 200mg twice a day    STOP taking sotalol    Follow up with primary care physician    Follow up with cardiology

## 2023-03-17 NOTE — DISCHARGE SUMMARY
[address] [facility]  Discharge- Radha Lopez 1945, 65 y o  female MRN: [id number]  Unit/Bed#: -01 Encounter: [id number]  Primary Care Provider: Kerri Arvizu DO   Date and time admitted to hospital: 3/15/2023  9:59 AM    Discharge Diagnosis:    Paroxysmal atrial fibrillation, symptomatic, converted back to sinus rhythm on amiodarone  Hx of NICM with EF recovery  Chronic HFpEF  Moderate MR  Moderate pulmonary hypertension   Hypertension  Hyperlipidemia  SSS s/p PPM  COPD  History of TIA  Stage III CKD    Discharging Physician / Practitioner: Dixie Steel MD  PCP: Kerri Arvizu DO  Admission Date:   Admission Orders (From admission, onward)     Ordered        03/16/23 1154  Inpatient Admission  Once            03/15/23 1225  Place in Observation  Once                      Discharge Date: 03/17/23     Outpatient follow-up requested:  · Primary care physician  · Cardiology    Complications: None    Reason for Admission:   Symptomatic atrial fibrillation    Hospital Course:     Patient was hospitalized, she was seen by cardiology, she was started on amiodarone infusion and eventually she converted to sinus rhythm  Sotalol was discontinued  Patient should continue Midrin 400 mg twice daily for 7 days followed by 200 mg twice daily  She should follow-up with cardiology in the outpatient setting  She should continue anticoagulation  She verbalized understanding  Patient be discharged in stable condition  Condition at Discharge: good     Discharge Day Visit / Exam:     Subjective:    Patient related this morning  Feeling much better, she converted to sinus rhythm overnight, currently paced with a heart rate around 59  She denies any lightheadedness chest pain nausea or vomiting  She wishes to go home    Vitals: Blood Pressure: 129/84 (03/17/23 1102)  Pulse: 66 (03/17/23 1102)  Temperature: 97 5 °F (36 4 °C) (03/17/23 1102)  Temp Source: Oral (03/17/23 0715)  Respirations: 18 (03/17/23 0715)  Height: 5' 5" (165 1 cm) (03/15/23 1306)  Weight - Scale: 94 kg (207 lb 3 7 oz) (03/15/23 1306)  SpO2: 96 % (03/17/23 1102)     Exam:   Physical Exam  Vitals and nursing note reviewed  Constitutional:       General: She is not in acute distress  Appearance: Normal appearance  She is normal weight  She is not ill-appearing, toxic-appearing or diaphoretic  Comments: Female in bed, awake   HENT:      Head: Normocephalic and atraumatic  Right Ear: External ear normal       Left Ear: External ear normal       Nose: Nose normal  No congestion  Mouth/Throat:      Mouth: Mucous membranes are moist       Pharynx: Oropharynx is clear  No oropharyngeal exudate or posterior oropharyngeal erythema  Eyes:      General: No scleral icterus  Right eye: No discharge  Left eye: No discharge  Extraocular Movements: Extraocular movements intact  Conjunctiva/sclera: Conjunctivae normal       Pupils: Pupils are equal, round, and reactive to light  Cardiovascular:      Rate and Rhythm: Normal rate and regular rhythm  Pulses: Normal pulses  Heart sounds: Normal heart sounds  No murmur heard  No friction rub  No gallop  Pulmonary:      Effort: Pulmonary effort is normal  No respiratory distress  Breath sounds: Normal breath sounds  No stridor  No wheezing, rhonchi or rales  Chest:      Chest wall: No tenderness  Abdominal:      General: Abdomen is flat  Bowel sounds are normal  There is no distension  Palpations: Abdomen is soft  There is no mass  Tenderness: There is no abdominal tenderness  There is no guarding or rebound  Musculoskeletal:         General: No swelling, tenderness, deformity or signs of injury  Normal range of motion  Cervical back: Normal range of motion and neck supple  No rigidity  No muscular tenderness  Skin:     General: Skin is warm and dry        Capillary Refill: Capillary refill takes less than 2 seconds  Coloration: Skin is not jaundiced or pale  Findings: No bruising, erythema, lesion or rash  Neurological:      General: No focal deficit present  Mental Status: She is alert and oriented to person, place, and time  Mental status is at baseline  Cranial Nerves: No cranial nerve deficit  Sensory: No sensory deficit  Motor: No weakness  Coordination: Coordination normal    Psychiatric:         Mood and Affect: Mood normal          Behavior: Behavior normal          Thought Content: Thought content normal          Judgment: Judgment normal            Discussion with Family: Discussed with  at the bedside    Discharge instructions/Information to patient and family:   See after visit summary for information provided to patient and family  Provisions for Follow-Up Care:  See after visit summary for information related to follow-up care and any pertinent home health orders  Disposition:     Home    For Discharges to [facility]:   · Not Applicable to this Patient - Not Applicable to this Patient    Planned Readmission: No     Discharge Statement:  I spent 95 minutes discharging the patient  This time was spent on the day of discharge  I had direct contact with the patient on the day of discharge  Greater than 50% of the total time was spent examining patient, answering all patient questions, arranging and discussing plan of care with patient as well as directly providing post-discharge instructions  Additional time then spent on discharge activities  Discharge Medications:  See after visit summary for reconciled discharge medications provided to patient and family        ** Please Note: This note has been constructed using a voice recognition system **

## 2023-03-20 ENCOUNTER — TELEPHONE (OUTPATIENT)
Dept: CARDIOLOGY CLINIC | Facility: CLINIC | Age: 78
End: 2023-03-20

## 2023-03-20 NOTE — TELEPHONE ENCOUNTER
----- Message from Jg Aly PA-C sent at 3/17/2023  4:32 PM EDT -----  Regarding: Sirena Chavez  Cardiology Follow-up:    Patient clinical visit in 4 week at the [facility] location: [facility] office. .    Schedule visit with Cardio [facility] Providers: Precious WESTFALL    Type of Visit: VISIT TYPE: in-person office visit. Test ordered: Cardiac tests: .     Additional Details:

## 2023-03-20 NOTE — TELEPHONE ENCOUNTER
Patient is already scheduled with  [facility] April 4th and she would like to keep that appt.

## 2023-03-21 ENCOUNTER — TELEPHONE (OUTPATIENT)
Dept: PULMONOLOGY | Facility: CLINIC | Age: 78
End: 2023-03-21

## 2023-03-22 DIAGNOSIS — I50.9 CHF (CONGESTIVE HEART FAILURE) (HCC): ICD-10-CM

## 2023-03-22 RX ORDER — FUROSEMIDE 20 MG/1
TABLET ORAL
Qty: 90 TABLET | Refills: 3 | Status: SHIPPED | OUTPATIENT
Start: 2023-03-22

## 2023-03-22 RX ORDER — POTASSIUM CHLORIDE 750 MG/1
TABLET, EXTENDED RELEASE ORAL
Qty: 90 TABLET | Refills: 3 | Status: SHIPPED | OUTPATIENT
Start: 2023-03-22

## 2023-03-23 ENCOUNTER — REMOTE DEVICE CLINIC VISIT (OUTPATIENT)
Dept: CARDIOLOGY CLINIC | Facility: CLINIC | Age: 78
End: 2023-03-23

## 2023-03-23 DIAGNOSIS — Z95.0 CARDIAC PACEMAKER IN SITU: Primary | ICD-10-CM

## 2023-03-23 NOTE — PROGRESS NOTES
Results for orders placed or performed in visit on 03/23/23   Cardiac EP device report    Narrative    MDT DUAL CHAMBER PM  - ACTIVE SYSTEM IS MRI CONDITIONAL  CARELINK TRANSMISSION: BATTERY VOLTAGE ADEQUATE (9 1 YRS)  AP-85%, -0 7%  ALL AVAILABLE LEAD PARAMETERS WITHIN NORMAL LIMITS  1 DEVICE CLASSIFIED NSVT EPISODE- RVR ON EGM  39 FAST A&V EPISODES @ 130-171 BPM MAX DURATION 7 5 MINS  250 AF EPISODES  AF BURDEN-11 5%  PT ON ELIQUIS, AMIO, DILTIAZEM & METOPROLOL  NORMAL DEVICE FUNCTION   GV

## 2023-03-24 ENCOUNTER — OFFICE VISIT (OUTPATIENT)
Dept: FAMILY MEDICINE CLINIC | Facility: CLINIC | Age: 78
End: 2023-03-24

## 2023-03-24 VITALS
WEIGHT: 202 LBS | OXYGEN SATURATION: 97 % | HEIGHT: 65 IN | BODY MASS INDEX: 33.66 KG/M2 | TEMPERATURE: 97.2 F | DIASTOLIC BLOOD PRESSURE: 74 MMHG | HEART RATE: 85 BPM | SYSTOLIC BLOOD PRESSURE: 118 MMHG

## 2023-03-24 DIAGNOSIS — I50.23 ACUTE ON CHRONIC SYSTOLIC CONGESTIVE HEART FAILURE (HCC): ICD-10-CM

## 2023-03-24 DIAGNOSIS — I48.19 PERSISTENT ATRIAL FIBRILLATION WITH RAPID VENTRICULAR RESPONSE (HCC): ICD-10-CM

## 2023-03-24 DIAGNOSIS — I48.0 PAROXYSMAL ATRIAL FIBRILLATION (HCC): Primary | Chronic | ICD-10-CM

## 2023-03-24 NOTE — ASSESSMENT & PLAN NOTE
Wt Readings from Last 3 Encounters:   03/24/23 91 6 kg (202 lb)   03/15/23 94 kg (207 lb 3 7 oz)   03/08/23 91 6 kg (202 lb)     Stable, continue the amiodarone for rate control, in addition to her metoprolol

## 2023-03-24 NOTE — ASSESSMENT & PLAN NOTE
Currently in sinus rhythm, continue the amiodarone, continue the Eliquis  Follow-up with cardiologist as scheduled

## 2023-03-24 NOTE — PROGRESS NOTES
Name: Ashleigh Mckeon      :       MRN: [id number]  Encounter Provider: Kenney Boxer, DO  Encounter Date: 3/24/2023   Encounter department: [place] [address] [address] [facility] [address]     1  Paroxysmal atrial fibrillation (HCC)  Assessment & Plan:  Currently in sinus rhythm, continue the amiodarone, continue the Eliquis  Follow-up with cardiologist as scheduled  2  Acute on chronic systolic congestive heart failure (HCC)  Assessment & Plan:  Wt Readings from Last 3 Encounters:   23 91 6 kg (202 lb)   03/15/23 94 kg (207 lb 3 7 oz)   23 91 6 kg (202 lb)     Stable, continue the amiodarone for rate control, in addition to her metoprolol          3  Persistent atrial fibrillation with rapid ventricular response (HCC)  Assessment & Plan:  Resolved after amiodarone infusion, follow-up with cardiology to evaluate the long-term need of this  Depression Screening and Follow-up Plan: Patient was screened for depression during today's encounter  They screened negative with a PHQ-2 score of 0  Subjective      TCM Call     Date and time call was made  3/17/2023  2:39 PM    Hospital care reviewed  Records reviewed    Patient was hospitialized at  [facility]    Date of Admission  03/15/23    Date of discharge  23    Diagnosis  Persistent atrial fibrillation with rapid ventricular response      Disposition  Home    Were the patients medications reviewed and updated  Yes    Current Symptoms  Fatigue    Fatigue severity  Mild    Arm pain left side severity  Mild    Arm pain, left side, onset  Progressive      TCM Call     Clinical progress swelling  Improving    Post hospital issues  Reduced activity    Should patient be enrolled in anticoag monitoring? Yes    Scheduled for follow up? Yes    Patients specialists  Cardiologist    Cardiologist name  DR [address]ill[address]     Referrals needed  NO    Did you obtain your prescribed medications  Yes    Do you need help managing your prescriptions or medications  No    Is transportation to your appointment needed  No    I have advised the patient to call PCP with any new or worsening symptoms    Mari Servin/negra  Living Arrangements  Alone    Support System  Family    The type of support provided  Emotional    Do you have social support  Yes, as much as I need    Are you recieving any outpatient services  No    Are you recieving home care services  No    Are you using any community resources  No    Current waiver services  No    Have you fallen in the last 12 months  No    Interperter language line needed  No    Counseling  Patient    Counseling topics  patient and family education; Importance of RX   compliance; Activities of daily living    Comments  Spoke with pt on 03/21/23 after her hospital d/c on 03/17/23  She stated that she feels very good and denied SOB, difficulty breathing,   chest pain, weakness, dizziness   etc, she was started on amiodarone   infusion while she was admitted  Sotalol was discontinued and she was   advised to continue Midrin 400 mg twice daily for 7 days followed by 200   mg twice daily and to continue with all other medications prescribed by   PCP  She will follow-up with cardiology and has a TCM appt scheduled for   03/24/23  Patient comes in today for TCM, she was admitted to [address] [place] [address] on March 15 and discharged on March 17 with a discharge diagnosis of paroxysmal atrial fibrillation  When she was admitted she was started on an amiodarone drip and converted to sinus rhythm  She was converted to oral amiodarone, and her sotalol was discontinued  She states she is feeling great  She did not ever start the diltiazem from her previous admission  Review of Systems   Constitutional: Negative for chills, fatigue and fever  HENT: Negative for congestion, ear pain, hearing loss, postnasal drip, rhinorrhea and sore throat      Eyes: Negative for pain and visual disturbance  Respiratory: Negative for chest tightness, shortness of breath and wheezing  Cardiovascular: Negative for chest pain and leg swelling  Gastrointestinal: Negative for abdominal distention, abdominal pain, constipation, diarrhea and vomiting  Endocrine: Negative for cold intolerance and heat intolerance  Genitourinary: Negative for difficulty urinating, frequency and urgency  Musculoskeletal: Negative for arthralgias and gait problem  Skin: Negative for color change  Neurological: Negative for dizziness, tremors, syncope, numbness and headaches  Hematological: Negative for adenopathy  Psychiatric/Behavioral: Negative for agitation, confusion and sleep disturbance  The patient is not nervous/anxious  Current Outpatient Medications on File Prior to Visit   Medication Sig   • amiodarone 200 mg tablet Take 400mg twice a day for a week, then 200mg twice a day   • apixaban (Eliquis) 5 mg Take 1 tablet (5 mg total) by mouth 2 (two) times a day   • furosemide (LASIX) 20 mg tablet TAKE 1 TABLET BY MOUTH DAILY   • lisinopril (ZESTRIL) 2 5 mg tablet Take 1 tablet (2 5 mg total) by mouth daily   • lovastatin (MEVACOR) 10 MG tablet Take 1 tablet (10 mg total) by mouth daily at bedtime   • metoprolol succinate (TOPROL-XL) 50 mg 24 hr tablet Take 1 5 tablets (75 mg total) by mouth 2 (two) times a day   • potassium chloride (K-DUR,KLOR-CON) 10 mEq tablet TAKE 1 TABLET BY MOUTH DAILY   • [DISCONTINUED] diltiazem (CARDIZEM) 30 mg tablet Take 1 tablet (30 mg total) by mouth 4 (four) times a day Only use as needed to keep HR under 100 bpm   Continue all your other medications       Objective     /74 (BP Location: Left arm, Patient Position: Sitting, Cuff Size: Standard)   Pulse 85   Temp (!) 97 2 °F (36 2 °C)   Ht 5' 5" (1 651 m)   Wt 91 6 kg (202 lb)   SpO2 97%   BMI 33 61 kg/m²     Physical Exam  Vitals and nursing note reviewed     Constitutional:       Appearance: She is well-developed  HENT:      Head: Normocephalic  Eyes:      General: No scleral icterus  Conjunctiva/sclera: Conjunctivae normal    Neck:      Thyroid: No thyromegaly  Cardiovascular:      Rate and Rhythm: Normal rate and regular rhythm  Heart sounds: Normal heart sounds  No murmur heard  Pulmonary:      Effort: Pulmonary effort is normal  No respiratory distress  Breath sounds: Normal breath sounds  No wheezing  Abdominal:      General: Bowel sounds are normal  There is no distension  Palpations: Abdomen is soft  Tenderness: There is no abdominal tenderness  Musculoskeletal:         General: No tenderness  Normal range of motion  Cervical back: Normal range of motion  Lymphadenopathy:      Cervical: No cervical adenopathy  Skin:     General: Skin is warm and dry  Coloration: Skin is not pale  Findings: No rash  Neurological:      Mental Status: She is alert and oriented to person, place, and time  Cranial Nerves: No cranial nerve deficit     Psychiatric:         Behavior: Behavior normal        Shabnam Sánchez DO

## 2023-03-24 NOTE — ASSESSMENT & PLAN NOTE
Resolved after amiodarone infusion, follow-up with cardiology to evaluate the long-term need of this

## 2023-04-04 ENCOUNTER — OFFICE VISIT (OUTPATIENT)
Dept: CARDIOLOGY CLINIC | Facility: CLINIC | Age: 78
End: 2023-04-04

## 2023-04-04 VITALS
WEIGHT: 200 LBS | OXYGEN SATURATION: 99 % | SYSTOLIC BLOOD PRESSURE: 106 MMHG | HEIGHT: 65 IN | HEART RATE: 89 BPM | DIASTOLIC BLOOD PRESSURE: 81 MMHG | RESPIRATION RATE: 16 BRPM | BODY MASS INDEX: 33.32 KG/M2

## 2023-04-04 DIAGNOSIS — I49.5 SSS (SICK SINUS SYNDROME) (HCC): ICD-10-CM

## 2023-04-04 DIAGNOSIS — E78.2 MIXED HYPERLIPIDEMIA: ICD-10-CM

## 2023-04-04 DIAGNOSIS — I34.0 NONRHEUMATIC MITRAL VALVE REGURGITATION: ICD-10-CM

## 2023-04-04 DIAGNOSIS — Z95.0 CARDIAC PACEMAKER IN SITU: ICD-10-CM

## 2023-04-04 DIAGNOSIS — I42.9 CARDIOMYOPATHY, UNSPECIFIED TYPE (HCC): ICD-10-CM

## 2023-04-04 DIAGNOSIS — I50.9 CHF (CONGESTIVE HEART FAILURE) (HCC): ICD-10-CM

## 2023-04-04 DIAGNOSIS — E66.9 OBESITY (BMI 30-39.9): ICD-10-CM

## 2023-04-04 DIAGNOSIS — I48.0 PAROXYSMAL ATRIAL FIBRILLATION (HCC): Primary | ICD-10-CM

## 2023-04-04 DIAGNOSIS — Z79.01 ANTICOAGULANT LONG-TERM USE: ICD-10-CM

## 2023-04-04 DIAGNOSIS — I10 ESSENTIAL HYPERTENSION: ICD-10-CM

## 2023-04-04 DIAGNOSIS — I36.1 NONRHEUMATIC TRICUSPID VALVE REGURGITATION: ICD-10-CM

## 2023-04-04 DIAGNOSIS — Z86.73 H/O TIA (TRANSIENT ISCHEMIC ATTACK) AND STROKE: ICD-10-CM

## 2023-04-04 DIAGNOSIS — I35.1 NONRHEUMATIC AORTIC VALVE INSUFFICIENCY: ICD-10-CM

## 2023-04-04 DIAGNOSIS — I50.22 CHRONIC SYSTOLIC (CONGESTIVE) HEART FAILURE (HCC): ICD-10-CM

## 2023-04-04 RX ORDER — AMIODARONE HYDROCHLORIDE 200 MG/1
200 TABLET ORAL DAILY
Qty: 90 TABLET | Refills: 3 | Status: SHIPPED | OUTPATIENT
Start: 2023-04-04

## 2023-04-04 RX ORDER — METOPROLOL SUCCINATE 50 MG/1
50 TABLET, EXTENDED RELEASE ORAL 2 TIMES DAILY
Qty: 180 TABLET | Refills: 3 | Status: SHIPPED | OUTPATIENT
Start: 2023-04-04

## 2023-04-04 RX ORDER — POTASSIUM CHLORIDE 750 MG/1
10 TABLET, EXTENDED RELEASE ORAL EVERY OTHER DAY
Qty: 45 TABLET | Refills: 3 | Status: SHIPPED | OUTPATIENT
Start: 2023-04-04

## 2023-04-04 RX ORDER — FUROSEMIDE 20 MG/1
20 TABLET ORAL EVERY OTHER DAY
Qty: 45 TABLET | Refills: 3 | Status: SHIPPED | OUTPATIENT
Start: 2023-04-04

## 2023-04-04 NOTE — PROGRESS NOTES
CARDIOLOGY OFFICE VISIT  [facility]  [address], [facility], [address] [facility], [place], [facility] Rohan Obando  Tel: [phone]      NAME: Jerod Ovalle  AGE: 65 y o  SEX: female  : 1945  MRN: [id number]      Chief Complaint:  Chief Complaint   Patient presents with   • Follow-up       History of Present Illness:   Patient comes for follow up  States she is doing well from cardiac stand point and denies chest pain / pressure, SOB, palpitations, lightheadedness, syncope, swelling feet, orthopnea, PND, claudication  Chronic systolic heart failure - doing well on furosemide 20 mg OD, potassium, metoprolol succinate, lisinopril  States she has been watching her salt intake closely     Nonischemic cardiomyopathy, likely tachycardia mediated -  patient has a history of nonischemic cardiomyopathy with improvement in EF after rate/rhythm control of her atrial fibrillation  Current EF is 55% (2023)     PAF - Patient was diagnosed with atrial fibrillation on an EKG done at her PCP office  Patient initially had no symptoms from it  She was cardioverted on 2018 and was in sinus rhythm for a while but then went back into Afib  Then she was started on rhythm control with Sotalol  Did well for a while but again required cardioversion on 11/3/2022  Currently she is in sinus rhythm with Amiodarone   She is on Eliquis for anticoagulation   Denies bleeding from any site  Follws with Dr Frannie Ontiveros     SSS s/p PPM - last pacemaker interrogation discussed with the patient    Essential hypertension -  Has been hypertensive for many years  Taking medications regularly  Denies lightheadedness, headache, medication side effects  Mixed hyperlipidemia -  Has had hyperlipidemia for many years  Taking statin regularly along with diet control  Denies myalgia  PCP closely monitoring the blood work  Mod MR, Mild AR, Mod TR, mild NH -   asymptomatic   Being followed up with serial echocardiograms at recommended intervals     Obesity - Trying to lose weight, she states     H/O TIA    Past Medical History:  Past Medical History:   Diagnosis Date   • Aphasia, mixed 2017   • Atrial fibrillation (HCC)    • CHF (congestive heart failure) (HCC)    • Colon polyp    • Headache    • Hyperlipidemia    • Hypertension    • Lyme disease    • Shortness of breath    • TIA (transient ischemic attack)    • Vertigo          Past Surgical History:  Past Surgical History:   Procedure Laterality Date   • CARDIAC PACEMAKER PLACEMENT  2019   • COLONOSCOPY     • SD SIGMOIDOSCOPY FLX DX W/COLLJ SPEC BR/WA IF PFRMD N/A 2017    Procedure: Brian Wakefield;  Surgeon: Karan Holt MD;  Location: [facility]; Service: Gastroenterology   • TONSILLECTOMY           Family History:  Family History   Problem Relation Age of Onset   • Lung cancer Mother    • Dementia Father    • Alzheimer's disease Father    • Other Father         Cardiac Disorder    • Lung cancer Maternal Grandmother    • Breast cancer Paternal Aunt 44   • No Known Problems Paternal Aunt    • No Known Problems Paternal Aunt          Social History:  Social History     Socioeconomic History   • Marital status: /Civil Union     Spouse name: None   • Number of children: None   • Years of education: None   • Highest education level: None   Occupational History   • Occupation: Retired   Tobacco Use   • Smoking status: Former     Types: Cigarettes     Quit date:      Years since quittin 2   • Smokeless tobacco: Never   • Tobacco comments:     no smoking for 46 years   Vaping Use   • Vaping Use: Never used   Substance and Sexual Activity   • Alcohol use:  Yes     Alcohol/week: 10 0 standard drinks     Types: 10 Glasses of wine per week     Comment: occ   • Drug use: No   • Sexual activity: Never     Partners: Male     Birth control/protection: None   Other Topics Concern   • None   Social History Narrative    Always uses seat belt     Social Determinants of Health     Financial Resource Strain: Low Risk    • Difficulty of Paying Living Expenses: Not hard at all   Food Insecurity: No Food Insecurity   • Worried About Running Out of Food in the Last Year: Never true   • Ran Out of Food in the Last Year: Never true   Transportation Needs: No Transportation Needs   • Lack of Transportation (Medical): No   • Lack of Transportation (Non-Medical): No   Physical Activity: Not on file   Stress: Not on file   Social Connections: Not on file   Intimate Partner Violence: Not on file   Housing Stability: Low Risk    • Unable to Pay for Housing in the Last Year: No   • Number of Places Lived in the Last Year: 1   • Unstable Housing in the Last Year: No         Active Problems:  Patient Active Problem List   Diagnosis   • Persistent atrial fibrillation with rapid ventricular response ([address] )   • Essential hypertension   • Mixed hyperlipidemia   • Obesity (BMI 30-39  9)   • H/O TIA (transient ischemic attack) and stroke   • Migraine with aura and without status migrainosus, not intractable   • Myocardiopathy ([address] )   • SHARMA (dyspnea on exertion)   • Anticoagulant long-term use   • Nonrheumatic mitral valve regurgitation   • Nonrheumatic aortic valve insufficiency   • Nonrheumatic tricuspid valve regurgitation   • Paroxysmal atrial fibrillation (HCC)   • Other insomnia   • Chronic obstructive pulmonary disease (HCC)   • BMI 33 0-33 9,adult   • Stage 3a chronic kidney disease (HCC)   • Acute on chronic systolic congestive heart failure (HCC)   • SSS (sick sinus syndrome) ([facility])   • Diplopia         The following portions of the patient's history were reviewed and updated as appropriate: past medical history, past surgical history, past family history,  past social history, current medications, allergies and problem list       Review of Systems:  Constitutional: Denies fever, chills  Eyes: Denies eye redness, eye discharge  ENT: Denies hearing loss, sneezing, nasal discharge, sore throat   Respiratory: Denies cough, expectoration, shortness of breath  Cardiovascular: Denies chest pain, palpitations, lower extremity swelling  Gastrointestinal: Denies abdominal pain, nausea, vomiting, diarrhea  Genito-Urinary: Denies dysuria, incontinence  Musculoskeletal: Denies back pain, joint pain, muscle pain  Neurologic: Denies lightheadedness, syncope, headache, seizures  Endocrine: Denies polydipsia, temperature intolerance  Allergy and Immunology: Denies hives, insect bite sensitivity  Hematological and Lymphatic: Denies bleeding problems, swollen glands   Psychological: Denies depression, suicidal ideation, anxiety, panic  Dermatological: Denies pruritus, rash, skin lesion changes      Vitals:  Vitals:    04/04/23 1417   BP: 106/81   Pulse: 89   Resp: 16   SpO2: 99%       Body mass index is 33 28 kg/m²  Weight (last 2 days)     Date/Time Weight    04/04/23 1417 90 7 (200)            Physical Examination:  General: Patient is not in acute distress  Awake, alert, oriented in time, place and person  Responding to commands  Head: Normocephalic  Atraumatic  Eyes: Both pupils normal sized, round and reactive to light  Nonicteric  ENT: Normal external ear canals  Neck: Supple  JVP not raised  Trachea central  No thyromegaly  Lungs: Bilateral bronchovascular breath sounds with no crackles or rhonchi  Chest wall: No tenderness  Cardiovascular: RRR  S1 and S2 normal  No murmur, rub or gallop  Gastrointestinal: Abdomen soft, nontender  No guarding or rigidity  Liver and spleen not palpable  Bowel sounds present  Neurologic: Patient is awake, alert, oriented in time, place and person  Responding to commands  Moving all extremities  Integumentary:  No skin rash  Lymphatic: No cervical lymphadenopathy  Back: Symmetric   No CVA tenderness  Extremities: No clubbing, cyanosis or edema      Laboratory Results:  CBC with diff:   Lab Results   Component Value Date    WBC 6 81 03/17/2023 RBC 4 20 03/17/2023    HGB 12 1 03/17/2023    HCT 37 5 03/17/2023    MCV 89 03/17/2023    MCH 28 8 03/17/2023    RDW 17 7 (H) 03/17/2023     03/17/2023       CMP:  Lab Results   Component Value Date    CREATININE 1 11 03/17/2023    BUN 19 03/17/2023    K 3 6 03/17/2023     03/17/2023    CO2 23 03/17/2023    ALKPHOS 76 03/15/2023    ALT 17 03/15/2023    AST 19 03/15/2023    BILIDIR 0 13 01/05/2023       Lab Results   Component Value Date    HGBA1C 5 8 (H) 11/01/2022    MG 2 0 03/16/2023    PHOS 3 5 03/16/2023       Lab Results   Component Value Date    TROPONINI 0 05 (H) 02/10/2019    TROPONINI 0 06 (H) 02/09/2019    TROPONINI 0 06 (H) 02/09/2019       Lipid Profile:   No results found for: CHOL  Lab Results   Component Value Date    HDL 45 (L) 11/02/2022    HDL 55 11/22/2021    HDL 59 09/17/2020     Lab Results   Component Value Date    LDLCALC 62 11/02/2022    LDLCALC 74 11/22/2021    LDLCALC 80 09/17/2020     Lab Results   Component Value Date    TRIG 75 11/02/2022    TRIG 90 11/22/2021    TRIG 119 09/17/2020         Cardiac testing:   Results for orders placed during the hospital encounter of 11/01/22    Echo complete w/ contrast if indicated    Interpretation Summary  •  Left Ventricle: Left ventricular cavity size is normal  Systolic function is mildly reduced  LVEF approximately 40 to 45%  Patient was in atrial fibrillation with periods of rapid ventricular response which interferes with interpretation  There is mild global hypokinesis  Unable to assess diastolic function due to atrial fibrillation  •  Right Ventricle: Right ventricular cavity size is mildly dilated  Systolic function is mildly to moderately reduced  •  Left Atrium: The atrium is moderate to markedly dilated  •  Right Atrium: The atrium is moderately dilated  •  Aortic Valve: There is mild regurgitation  •  Mitral Valve: There is moderate annular calcification  There is moderate regurgitation  •  Tricuspid Valve:  There is moderate regurgitation  The right ventricular systolic pressure is mildly elevated  Estimated RVSP 35 mm Hg  Pacer leads noted  •  Pulmonic Valve: There is mild regurgitation  Results for orders placed during the hospital encounter of 03/07/23    Echo follow up/limited w/ contrast if indicated    Interpretation Summary  •  Left Ventricle: Left ventricular cavity size is normal  The left ventricular ejection fraction is 55%  Systolic function is normal   •  Left Atrium: The atrium is moderately dilated  •  Right Atrium: The atrium is mildly dilated  •  Aortic Valve: There is mild to moderate regurgitation  •  Tricuspid Valve: There is moderate regurgitation  •  Pulmonary Artery: The estimated pulmonary artery systolic pressure is 04 6 mmHg  The pulmonary artery systolic pressure is moderately increased  •  This is a limited echocardiogram performed to evaluate LV function  Interpretation is therefore limited  Results for orders placed during the hospital encounter of 03/07/23    NM myocardial perfusion spect (rx stress and/or rest)    Interpretation Summary  •  Stress ECG: No ST deviation is noted  There were no arrhythmias during recovery    The ECG was not diagnostic due to pharmacological (vasodilator) stress  •  Perfusion Defect Conclusion: The stress/rest perfusion ratio is 1 03   There is no evidence of transient ischemic dilation (TID)  •  Stress Function: Left ventricular function post-stress is normal  Post-stress ejection fraction is 79 %  •  Perfusion: There is a left ventricular perfusion defect that is medium in size with moderate reduction in uptake present in the mid to apical anterior and anterolateral location(s) that is partially reversible    •  Stress Combined Conclusion: Left ventricular perfusion is probably abnormal       Medications:    Current Outpatient Medications:   •  amiodarone 200 mg tablet, Take 400mg twice a day for a week, then 200mg twice a day (Patient taking differently: Take 200 mg by mouth daily), Disp: 70 tablet, Rfl: 0  •  apixaban (Eliquis) 5 mg, Take 1 tablet (5 mg total) by mouth 2 (two) times a day, Disp: 180 tablet, Rfl: 3  •  furosemide (LASIX) 20 mg tablet, TAKE 1 TABLET BY MOUTH DAILY, Disp: 90 tablet, Rfl: 3  •  lisinopril (ZESTRIL) 2 5 mg tablet, Take 1 tablet (2 5 mg total) by mouth daily, Disp: 90 tablet, Rfl: 3  •  lovastatin (MEVACOR) 10 MG tablet, Take 1 tablet (10 mg total) by mouth daily at bedtime, Disp: 90 tablet, Rfl: 3  •  metoprolol succinate (TOPROL-XL) 50 mg 24 hr tablet, Take 1 5 tablets (75 mg total) by mouth 2 (two) times a day, Disp: 270 tablet, Rfl: 3  •  potassium chloride (K-DUR,KLOR-CON) 10 mEq tablet, TAKE 1 TABLET BY MOUTH DAILY, Disp: 90 tablet, Rfl: 3      Allergies:  No Known Allergies      Assessment and Plan:  1  Paroxysmal atrial fibrillation (HCC)  Currently in Sinus rhythm  Continue amiodarone for rhythm control, metoprolol for rate control and Eliquis for anticoagulation    2  Anticoagulant long-term use  Continue Eliquis  Denies bleeding from any site    3  SSS (sick sinus syndrome) ([facility] )  Cardiac pacemaker in situ  Continue regular pacemaker interrogations    4  Chronic systolic (congestive) heart failure (HCC)  Euvolemic  Decrease furosemide 20 mg and potassium to every other day  Decrease metoprolol succinate to 50 mg twice daily  Continue lisinopril  Low-salt diet  Daily weights    5  Cardiomyopathy, unspecified type ([facility] )  Current EF 55%  Continue lisinopril and metoprolol succinate    6  Essential hypertension  BP soft  Lasix and metoprolol succinate dose is decreased    7  Mixed hyperlipidemia  Continue statin and diet control  Her PCP closely monitors her blood work    8  Nonrheumatic mitral valve regurgitation  Nonrheumatic aortic valve insufficiency   Nonrheumatic tricuspid valve regurgitation  To be followed up with serial echocardiograms at recommended interval    Recommend aggressive risk factor modification and therapeutic lifestyle changes  Low-salt, low-calorie, low-fat, low-cholesterol diet with regular exercise and to optimize weight  I will defer the ordering and monitoring of necessity lab studies to you, but I am available and happy to review and manage any of the data at your request in the future  Discussed concepts of atherosclerosis, including signs and symptoms of cardiac disease  Previous studies were reviewed  Safety measures were reviewed  Questions were entertained and answered  Patient was advised to report any problems requiring medical attention  Follow-up with PCP and appropriate specialist and lab work as discussed  Return for follow up visit as scheduled or earlier, if needed  Thank you for allowing me to participate in the care and evaluation of your patient  Should you have any questions, please feel free to contact me        Lilia Moe MD  4/4/2023,3:01 PM

## 2023-05-30 ENCOUNTER — RA CDI HCC (OUTPATIENT)
Dept: OTHER | Facility: HOSPITAL | Age: 78
End: 2023-05-30

## 2023-05-30 NOTE — PROGRESS NOTES
[facility]  coding opportunities    I13 0     Chart Reviewed number of suggestions sent to Provider: 1     Patients Insurance     Medicare Insurance: [employer]

## 2023-06-05 ENCOUNTER — OFFICE VISIT (OUTPATIENT)
Dept: FAMILY MEDICINE CLINIC | Facility: CLINIC | Age: 78
End: 2023-06-05
Payer: MEDICARE

## 2023-06-05 VITALS
OXYGEN SATURATION: 96 % | HEART RATE: 66 BPM | TEMPERATURE: 96.3 F | BODY MASS INDEX: 33.55 KG/M2 | SYSTOLIC BLOOD PRESSURE: 110 MMHG | WEIGHT: 201.4 LBS | DIASTOLIC BLOOD PRESSURE: 80 MMHG | HEIGHT: 65 IN

## 2023-06-05 DIAGNOSIS — I50.23 ACUTE ON CHRONIC SYSTOLIC CONGESTIVE HEART FAILURE (HCC): Primary | ICD-10-CM

## 2023-06-05 DIAGNOSIS — I48.0 PAROXYSMAL ATRIAL FIBRILLATION (HCC): Chronic | ICD-10-CM

## 2023-06-05 DIAGNOSIS — E78.2 MIXED HYPERLIPIDEMIA: ICD-10-CM

## 2023-06-05 DIAGNOSIS — I10 ESSENTIAL HYPERTENSION: ICD-10-CM

## 2023-06-05 DIAGNOSIS — N18.31 STAGE 3A CHRONIC KIDNEY DISEASE (HCC): ICD-10-CM

## 2023-06-05 PROBLEM — I48.19 PERSISTENT ATRIAL FIBRILLATION WITH RAPID VENTRICULAR RESPONSE (HCC): Status: RESOLVED | Noted: 2018-02-23 | Resolved: 2023-06-05

## 2023-06-05 PROCEDURE — 99214 OFFICE O/P EST MOD 30 MIN: CPT | Performed by: FAMILY MEDICINE

## 2023-06-05 NOTE — ASSESSMENT & PLAN NOTE
Lab Results   Component Value Date    CREATININE 1 11 03/17/2023    CREATININE 0 94 03/16/2023    CREATININE 1 11 03/15/2023    EGFR 47 03/17/2023    EGFR 58 03/16/2023    EGFR 47 03/15/2023   Stable, will continue to monitor with her routine blood work

## 2023-06-05 NOTE — PROGRESS NOTES
Name: Abbey Cruz      :       MRN: [id number]  Encounter Provider: Lucita Loyola DO  Encounter Date: 2023   Encounter department: [facility] [address] [place] [address]     1  Acute on chronic systolic congestive heart failure [facility])  Assessment & Plan:  Wt Readings from Last 3 Encounters:   23 91 4 kg (201 lb 6 4 oz)   23 90 7 kg (200 lb)   23 91 6 kg (202 lb)     Well compensated, continue her metoprolol, furosemide          2  Essential hypertension  Assessment & Plan: We will discontinue her lisinopril due to her dizziness  She does have a cardiologist appointment in 2 weeks to recheck her blood pressure  Continue the metoprolol  Orders:  -     CBC; Future  -     TSH, 3rd generation; Future    3  Paroxysmal atrial fibrillation (HCC)  Assessment & Plan:  Continue metoprolol and amiodarone per cardiology, continue the Eliquis  4  Stage 3a chronic kidney disease [facility])  Assessment & Plan:  Lab Results   Component Value Date    CREATININE 1 11 2023    CREATININE 0 94 2023    CREATININE 1 11 03/15/2023    EGFR 47 2023    EGFR 58 2023    EGFR 47 03/15/2023   Stable, will continue to monitor with her routine blood work  Orders:  -     Comprehensive metabolic panel; Future    5  Mixed hyperlipidemia  Assessment & Plan:  Continue lovastatin, check a CMP, lipid profile in 6 months  Orders:  -     Comprehensive metabolic panel; Future  -     Lipid panel; Future           Subjective      Patient comes in today for checkup  She does complain of dizziness when she stands from a seated position  She has been monitoring her blood pressure at home and has been running from 110-120/70-80  She notes that her heart rate is running around the 60s when she is at home  Review of Systems   Constitutional: Negative for chills, fatigue and fever     HENT: Negative for congestion, ear pain, hearing loss, postnasal "drip, rhinorrhea and sore throat  Eyes: Negative for pain and visual disturbance  Respiratory: Negative for chest tightness, shortness of breath and wheezing  Cardiovascular: Negative for chest pain and leg swelling  Gastrointestinal: Negative for abdominal distention, abdominal pain, constipation, diarrhea and vomiting  Endocrine: Negative for cold intolerance and heat intolerance  Genitourinary: Negative for difficulty urinating, frequency and urgency  Musculoskeletal: Negative for arthralgias and gait problem  Skin: Negative for color change  Neurological: Positive for light-headedness  Negative for dizziness, tremors, syncope, numbness and headaches  Hematological: Negative for adenopathy  Psychiatric/Behavioral: Negative for agitation, confusion and sleep disturbance  The patient is not nervous/anxious  Current Outpatient Medications on File Prior to Visit   Medication Sig   • amiodarone 200 mg tablet Take 1 tablet (200 mg total) by mouth daily   • apixaban (Eliquis) 5 mg Take 1 tablet (5 mg total) by mouth 2 (two) times a day   • furosemide (LASIX) 20 mg tablet Take 1 tablet (20 mg total) by mouth every other day   • lovastatin (MEVACOR) 10 MG tablet Take 1 tablet (10 mg total) by mouth daily at bedtime   • metoprolol succinate (TOPROL-XL) 50 mg 24 hr tablet Take 1 tablet (50 mg total) by mouth 2 (two) times a day   • potassium chloride (K-DUR,KLOR-CON) 10 mEq tablet Take 1 tablet (10 mEq total) by mouth every other day   • [DISCONTINUED] lisinopril (ZESTRIL) 2 5 mg tablet Take 1 tablet (2 5 mg total) by mouth daily       Objective     /80 (BP Location: Left arm, Patient Position: Sitting, Cuff Size: Standard)   Pulse 66   Temp (!) 96 3 °F (35 7 °C) (Tympanic)   Ht 5' 5\" (1 651 m)   Wt 91 4 kg (201 lb 6 4 oz)   SpO2 96%   BMI 33 51 kg/m²     Physical Exam  Vitals and nursing note reviewed  Constitutional:       Appearance: She is well-developed     HENT:      Head: " Normocephalic  Eyes:      General: No scleral icterus  Conjunctiva/sclera: Conjunctivae normal    Neck:      Thyroid: No thyromegaly  Cardiovascular:      Rate and Rhythm: Normal rate and regular rhythm  Heart sounds: Normal heart sounds  No murmur heard  Pulmonary:      Effort: Pulmonary effort is normal  No respiratory distress  Breath sounds: Normal breath sounds  No wheezing  Abdominal:      General: Bowel sounds are normal  There is no distension  Palpations: Abdomen is soft  Tenderness: There is no abdominal tenderness  Musculoskeletal:         General: No tenderness  Normal range of motion  Cervical back: Normal range of motion  Lymphadenopathy:      Cervical: No cervical adenopathy  Skin:     General: Skin is warm and dry  Coloration: Skin is not pale  Findings: No rash  Neurological:      Mental Status: She is alert and oriented to person, place, and time  Cranial Nerves: No cranial nerve deficit     Psychiatric:         Behavior: Behavior normal        Tammy Stewart DO

## 2023-06-05 NOTE — ASSESSMENT & PLAN NOTE
We will discontinue her lisinopril due to her dizziness  She does have a cardiologist appointment in 2 weeks to recheck her blood pressure  Continue the metoprolol

## 2023-06-05 NOTE — ASSESSMENT & PLAN NOTE
Wt Readings from Last 3 Encounters:   06/05/23 91 4 kg (201 lb 6 4 oz)   04/04/23 90 7 kg (200 lb)   03/24/23 91 6 kg (202 lb)     Well compensated, continue her metoprolol, furosemide

## 2023-06-21 ENCOUNTER — REMOTE DEVICE CLINIC VISIT (OUTPATIENT)
Dept: CARDIOLOGY CLINIC | Facility: CLINIC | Age: 78
End: 2023-06-21
Payer: MEDICARE

## 2023-06-21 DIAGNOSIS — Z95.0 PRESENCE OF PERMANENT CARDIAC PACEMAKER: Primary | ICD-10-CM

## 2023-06-21 PROCEDURE — 93296 REM INTERROG EVL PM/IDS: CPT | Performed by: INTERNAL MEDICINE

## 2023-06-21 PROCEDURE — 93294 REM INTERROG EVL PM/LDLS PM: CPT | Performed by: INTERNAL MEDICINE

## 2023-06-21 NOTE — PROGRESS NOTES
Results for orders placed or performed in visit on 06/21/23   Cardiac EP device report    Narrative    MDT DUAL CHAMBER PM  - ACTIVE SYSTEM IS MRI CONDITIONAL  CARELINK TRANSMISSION: BATTERY VOLTAGE ADEQUATE (9 YRS)  AP 96 2%  <0 1% ALL AVAILABLE LEAD PARAMETERS WITHIN NORMAL LIMITS  4 AT/AF EPISODES WITH LONGEST AT 4 MINS  AT/AF BURDEN <0 1% SINCE 3/23/23  TAKES ELIQUIS, METOPROLOL SUCC, & AMIODARONE  NORMAL DEVICE FUNCTION   AM/NC

## 2023-07-17 ENCOUNTER — APPOINTMENT (EMERGENCY)
Dept: RADIOLOGY | Facility: HOSPITAL | Age: 78
End: 2023-07-17
Payer: MEDICARE

## 2023-07-17 ENCOUNTER — HOSPITAL ENCOUNTER (EMERGENCY)
Facility: HOSPITAL | Age: 78
Discharge: HOME | End: 2023-07-17
Attending: EMERGENCY MEDICINE
Payer: MEDICARE

## 2023-07-17 VITALS
RESPIRATION RATE: 14 BRPM | SYSTOLIC BLOOD PRESSURE: 109 MMHG | OXYGEN SATURATION: 99 % | DIASTOLIC BLOOD PRESSURE: 84 MMHG | HEART RATE: 100 BPM | TEMPERATURE: 97.8 F

## 2023-07-17 DIAGNOSIS — R42 LIGHTHEADEDNESS: ICD-10-CM

## 2023-07-17 DIAGNOSIS — I48.91 ATRIAL FIBRILLATION WITH RAPID VENTRICULAR RESPONSE (HCC): Primary | ICD-10-CM

## 2023-07-17 LAB
2HR DELTA HS TROPONIN: -1 NG/L
ANION GAP SERPL CALCULATED.3IONS-SCNC: 7 MMOL/L
ATRIAL RATE: 258 BPM
BASOPHILS # BLD AUTO: 0.06 THOUSANDS/ÂΜL (ref 0–0.1)
BASOPHILS NFR BLD AUTO: 1 % (ref 0–1)
BUN SERPL-MCNC: 23 MG/DL (ref 5–25)
CALCIUM SERPL-MCNC: 9.1 MG/DL (ref 8.4–10.2)
CARDIAC TROPONIN I PNL SERPL HS: 5 NG/L
CARDIAC TROPONIN I PNL SERPL HS: 6 NG/L
CHLORIDE SERPL-SCNC: 107 MMOL/L (ref 96–108)
CO2 SERPL-SCNC: 24 MMOL/L (ref 21–32)
CREAT SERPL-MCNC: 1.36 MG/DL (ref 0.6–1.3)
EOSINOPHIL # BLD AUTO: 0.1 THOUSAND/ÂΜL (ref 0–0.61)
EOSINOPHIL NFR BLD AUTO: 1 % (ref 0–6)
ERYTHROCYTE [DISTWIDTH] IN BLOOD BY AUTOMATED COUNT: 15.7 % (ref 11.6–15.1)
GFR SERPL CREATININE-BSD FRML MDRD: 37 ML/MIN/1.73SQ M
GLUCOSE SERPL-MCNC: 112 MG/DL (ref 65–140)
HCT VFR BLD AUTO: 32.6 % (ref 34.8–46.1)
HGB BLD-MCNC: 10 G/DL (ref 11.5–15.4)
IMM GRANULOCYTES # BLD AUTO: 0.03 THOUSAND/UL (ref 0–0.2)
IMM GRANULOCYTES NFR BLD AUTO: 0 % (ref 0–2)
LYMPHOCYTES # BLD AUTO: 0.73 THOUSANDS/ÂΜL (ref 0.6–4.47)
LYMPHOCYTES NFR BLD AUTO: 9 % (ref 14–44)
MCH RBC QN AUTO: 25.8 PG (ref 26.8–34.3)
MCHC RBC AUTO-ENTMCNC: 30.7 G/DL (ref 31.4–37.4)
MCV RBC AUTO: 84 FL (ref 82–98)
MONOCYTES # BLD AUTO: 0.77 THOUSAND/ÂΜL (ref 0.17–1.22)
MONOCYTES NFR BLD AUTO: 10 % (ref 4–12)
NEUTROPHILS # BLD AUTO: 6.24 THOUSANDS/ÂΜL (ref 1.85–7.62)
NEUTS SEG NFR BLD AUTO: 79 % (ref 43–75)
NRBC BLD AUTO-RTO: 0 /100 WBCS
PLATELET # BLD AUTO: 284 THOUSANDS/UL (ref 149–390)
PMV BLD AUTO: 10.1 FL (ref 8.9–12.7)
POTASSIUM SERPL-SCNC: 4 MMOL/L (ref 3.5–5.3)
QRS AXIS: 67 DEGREES
QRSD INTERVAL: 82 MS
QT INTERVAL: 336 MS
QTC INTERVAL: 464 MS
RBC # BLD AUTO: 3.88 MILLION/UL (ref 3.81–5.12)
SODIUM SERPL-SCNC: 138 MMOL/L (ref 135–147)
T WAVE AXIS: -12 DEGREES
VENTRICULAR RATE: 115 BPM
WBC # BLD AUTO: 7.93 THOUSAND/UL (ref 4.31–10.16)

## 2023-07-17 PROCEDURE — 96374 THER/PROPH/DIAG INJ IV PUSH: CPT

## 2023-07-17 PROCEDURE — 80048 BASIC METABOLIC PNL TOTAL CA: CPT | Performed by: EMERGENCY MEDICINE

## 2023-07-17 PROCEDURE — 93005 ELECTROCARDIOGRAM TRACING: CPT

## 2023-07-17 PROCEDURE — 93010 ELECTROCARDIOGRAM REPORT: CPT | Performed by: INTERNAL MEDICINE

## 2023-07-17 PROCEDURE — 71046 X-RAY EXAM CHEST 2 VIEWS: CPT

## 2023-07-17 PROCEDURE — 96361 HYDRATE IV INFUSION ADD-ON: CPT

## 2023-07-17 PROCEDURE — 36415 COLL VENOUS BLD VENIPUNCTURE: CPT | Performed by: EMERGENCY MEDICINE

## 2023-07-17 PROCEDURE — 99284 EMERGENCY DEPT VISIT MOD MDM: CPT

## 2023-07-17 PROCEDURE — 85025 COMPLETE CBC W/AUTO DIFF WBC: CPT | Performed by: EMERGENCY MEDICINE

## 2023-07-17 PROCEDURE — 99285 EMERGENCY DEPT VISIT HI MDM: CPT | Performed by: EMERGENCY MEDICINE

## 2023-07-17 PROCEDURE — 84484 ASSAY OF TROPONIN QUANT: CPT | Performed by: EMERGENCY MEDICINE

## 2023-07-17 RX ORDER — METOPROLOL TARTRATE 5 MG/5ML
2.5 INJECTION INTRAVENOUS ONCE
Status: COMPLETED | OUTPATIENT
Start: 2023-07-17 | End: 2023-07-17

## 2023-07-17 RX ADMIN — SODIUM CHLORIDE 500 ML: 0.9 INJECTION, SOLUTION INTRAVENOUS at 12:32

## 2023-07-17 RX ADMIN — METOPROLOL TARTRATE 2.5 MG: 5 INJECTION INTRAVENOUS at 12:10

## 2023-07-17 RX ADMIN — SODIUM CHLORIDE 500 ML: 0.9 INJECTION, SOLUTION INTRAVENOUS at 14:11

## 2023-07-17 NOTE — DISCHARGE INSTRUCTIONS
Please follow up PCP and cardiology. Recommend tylenol 650 mg and ibuprofen 600 mg every 6 hours as needed for pain. Please return for severe chest pain, significant shortness of breath, severely worsening symptoms, or any other concerning signs or symptoms. Please refer to the following documents for additional instructions and return precautions.

## 2023-07-17 NOTE — ED PROVIDER NOTES
History  Chief Complaint   Patient presents with   • Dizziness      Woke up lightheaded and dizzy, took pulse and found to be fast      58-year-old female history of hypertension, TIA, A-fib on Eliquis and metoprolol presenting with lightheadedness. Patient reports positional lightheadedness beginning last night improved with sitting and worse with standing up and walking. Denies associated chest pain shortness of breath. Denies any falls or head strike. Denies fevers pitting dizziness. Currently without complaints while sitting in the stretcher. Denies any recent illness or abdominal pain nausea vomiting diarrhea. No urinary changes. Reports compliance with metoprolol. Denies any other complaints. Chart reviewed. 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  Taking?   amiodarone 200 mg tablet   No No   Sig: Take 1 tablet (200 mg total) by mouth daily   apixaban (Eliquis) 5 mg  Self No No   Sig: Take 1 tablet (5 mg total) by mouth 2 (two) times a day   furosemide (LASIX) 20 mg tablet   No No   Sig: Take 1 tablet (20 mg total) by mouth every other day   lovastatin (MEVACOR) 10 MG tablet  Self No No   Sig: Take 1 tablet (10 mg total) by mouth daily at bedtime   metoprolol succinate (TOPROL-XL) 50 mg 24 hr tablet   No No   Sig: Take 1 tablet (50 mg total) by mouth 2 (two) times a day   potassium chloride (K-DUR,KLOR-CON) 10 mEq tablet   No No   Sig: Take 1 tablet (10 mEq total) by mouth every other day      Facility-Administered Medications: None       Past Medical History: Diagnosis Date   • Aphasia, mixed 2017   • Atrial fibrillation (HCC)    • CHF (congestive heart failure) (HCC)    • Colon polyp    • Headache    • Hyperlipidemia    • Hypertension    • Lyme disease    • Shortness of breath    • TIA (transient ischemic attack)    • Vertigo        Past Surgical History:   Procedure Laterality Date   • CARDIAC PACEMAKER PLACEMENT  2019   • COLONOSCOPY     • ND SIGMOIDOSCOPY FLX DX W/COLLJ SPEC BR/WA IF PFRMD N/A 2017    Procedure: Dianne Frank;  Surgeon: Pablo Ortega MD;  Location: [facility]; Service: Gastroenterology   • TONSILLECTOMY         Family History   Problem Relation Age of Onset   • Lung cancer Mother    • Dementia Father    • Alzheimer's disease Father    • Other Father         Cardiac Disorder    • Lung cancer Maternal Grandmother    • Breast cancer Paternal Aunt 44   • No Known Problems Paternal Aunt    • No Known Problems Paternal Aunt      I have reviewed and agree with the history as documented. E-Cigarette/Vaping   • E-Cigarette Use Never User      E-Cigarette/Vaping Substances   • Nicotine No    • THC No    • CBD No    • Flavoring No    • Other No    • Unknown No      Social History     Tobacco Use   • Smoking status: Former     Types: Cigarettes     Quit date:      Years since quittin.5   • Smokeless tobacco: Never   • Tobacco comments:     no smoking for 46 years   Vaping Use   • Vaping Use: Never used   Substance Use Topics   • Alcohol use: Yes     Alcohol/week: 10.0 standard drinks of alcohol     Types: 10 Glasses of wine per week     Comment: occ   • Drug use: No       Review of Systems   Constitutional: Negative for appetite change, chills, diaphoresis, fever and unexpected weight change. HENT: Negative for congestion and rhinorrhea. Eyes: Negative for photophobia and visual disturbance. Respiratory: Negative for cough, chest tightness and shortness of breath.     Cardiovascular: Negative for chest pain, palpitations and leg swelling. Gastrointestinal: Negative for abdominal distention, abdominal pain, blood in stool, constipation, diarrhea, nausea and vomiting. Genitourinary: Negative for dysuria and hematuria. Musculoskeletal: Negative for back pain, joint swelling, neck pain and neck stiffness. Skin: Negative for color change, pallor, rash and wound. Neurological: Positive for light-headedness. Negative for dizziness, syncope, weakness and headaches. Psychiatric/Behavioral: Negative for agitation. All other systems reviewed and are negative. Physical Exam  Physical Exam  Vitals and nursing note reviewed. Constitutional:       General: She is not in acute distress. Appearance: Normal appearance. She is well-developed. She is not ill-appearing, toxic-appearing or diaphoretic. HENT:      Head: Normocephalic and atraumatic. Nose: Nose normal. No congestion or rhinorrhea. Mouth/Throat:      Mouth: Mucous membranes are moist.      Pharynx: Oropharynx is clear. No oropharyngeal exudate or posterior oropharyngeal erythema. Eyes:      General: No scleral icterus. Right eye: No discharge. Left eye: No discharge. Extraocular Movements: Extraocular movements intact. Conjunctiva/sclera: Conjunctivae normal.      Pupils: Pupils are equal, round, and reactive to light. Neck:      Vascular: No JVD. Trachea: No tracheal deviation. Comments: Supple. Normal range of motion. Cardiovascular:      Rate and Rhythm: Tachycardia present. Rhythm irregular. Heart sounds: Normal heart sounds. No murmur heard. No friction rub. No gallop. Comments: Tachycardia to 120s and irregularly irregular rhythm  Pulmonary:      Effort: Pulmonary effort is normal. No respiratory distress. Breath sounds: Normal breath sounds. No stridor. No wheezing or rales. Comments: Clear to auscultation bilaterally  Chest:      Chest wall: No tenderness.    Abdominal: General: Bowel sounds are normal. There is no distension. Palpations: Abdomen is soft. Tenderness: There is no abdominal tenderness. There is no right CVA tenderness, left CVA tenderness, guarding or rebound. Comments: Soft, nontender, nondistended. Normal bowel sounds throughout   Musculoskeletal:         General: No swelling, tenderness, deformity or signs of injury. Normal range of motion. Cervical back: Normal range of motion and neck supple. No rigidity. No muscular tenderness. Right lower leg: No edema. Left lower leg: No edema. Lymphadenopathy:      Cervical: No cervical adenopathy. Skin:     General: Skin is warm and dry. Coloration: Skin is not pale. Findings: No erythema or rash. Neurological:      General: No focal deficit present. Mental Status: She is alert. Mental status is at baseline. Sensory: No sensory deficit. Motor: No weakness or abnormal muscle tone. Coordination: Coordination normal.      Gait: Gait normal.      Comments: Alert. Strength and sensation grossly intact. Ambulatory without difficulty at baseline. Psychiatric:         Behavior: Behavior normal.         Thought Content:  Thought content normal.         Vital Signs  ED Triage Vitals [07/17/23 1141]   Temperature Pulse Respirations Blood Pressure SpO2   97.8 °F (36.6 °C) (!) 120 20 121/78 100 %      Temp Source Heart Rate Source Patient Position - Orthostatic VS BP Location FiO2 (%)   Temporal Monitor Sitting Left arm --      Pain Score       --           Vitals:    07/17/23 1315 07/17/23 1330 07/17/23 1400 07/17/23 1500   BP: 96/72 111/73 109/84    Pulse: (!) 107 (!) 108 (!) 109 100   Patient Position - Orthostatic VS:             Visual Acuity      ED Medications  Medications   sodium chloride 0.9 % bolus 500 mL (0 mL Intravenous Stopped 7/17/23 1410)   metoprolol (LOPRESSOR) injection 2.5 mg (2.5 mg Intravenous Given 7/17/23 1210)   sodium chloride 0.9 % bolus 500 mL (0 mL Intravenous Stopped 7/17/23 1506)       Diagnostic Studies  Results Reviewed     Procedure Component Value Units Date/Time    HS Troponin I 2hr [[id number]]  (Normal) Collected: 07/17/23 1410    Lab Status: Final result Specimen: Blood from Arm, Right Updated: 07/17/23 1450     hs TnI 2hr 5 ng/L      Delta 2hr hsTnI -1 ng/L     HS Troponin 0hr (reflex protocol) [[id number]]  (Normal) Collected: 07/17/23 1210    Lab Status: Final result Specimen: Blood from Arm, Right Updated: 07/17/23 1308     hs TnI 0hr 6 ng/L     Basic metabolic panel [[id number]]  (Abnormal) Collected: 07/17/23 1210    Lab Status: Final result Specimen: Blood from Arm, Right Updated: 07/17/23 1302     Sodium 138 mmol/L      Potassium 4.0 mmol/L      Chloride 107 mmol/L      CO2 24 mmol/L      ANION GAP 7 mmol/L      BUN 23 mg/dL      Creatinine 1.36 mg/dL      Glucose 112 mg/dL      Calcium 9.1 mg/dL      eGFR 37 ml/min/1.73sq m     Narrative:      [place] guidelines for Chronic Kidney Disease (CKD):   •  Stage 1 with normal or high GFR (GFR > 90 mL/min/1.73 square meters)  •  Stage 2 Mild CKD (GFR = 60-89 mL/min/1.73 square meters)  •  Stage 3A Moderate CKD (GFR = 45-59 mL/min/1.73 square meters)  •  Stage 3B Moderate CKD (GFR = 30-44 mL/min/1.73 square meters)  •  Stage 4 Severe CKD (GFR = 15-29 mL/min/1.73 square meters)  •  Stage 5 End Stage CKD (GFR <15 mL/min/1.73 square meters)  Note: GFR calculation is accurate only with a steady state creatinine    CBC and differential [[id number]]  (Abnormal) Collected: 07/17/23 1210    Lab Status: Final result Specimen: Blood from Arm, Right Updated: 07/17/23 1226     WBC 7.93 Thousand/uL      RBC 3.88 Million/uL      Hemoglobin 10.0 g/dL      Hematocrit 32.6 %      MCV 84 fL      MCH 25.8 pg      MCHC 30.7 g/dL      RDW 15.7 %      MPV 10.1 fL      Platelets 591 Thousands/uL      nRBC 0 /100 WBCs      Neutrophils Relative 79 %      Immat GRANS % 0 % Lymphocytes Relative 9 %      Monocytes Relative 10 %      Eosinophils Relative 1 %      Basophils Relative 1 %      Neutrophils Absolute 6.24 Thousands/µL      Immature Grans Absolute 0.03 Thousand/uL      Lymphocytes Absolute 0.73 Thousands/µL      Monocytes Absolute 0.77 Thousand/µL      Eosinophils Absolute 0.10 Thousand/µL      Basophils Absolute 0.06 Thousands/µL                  XR chest 2 views   Final Result by General Jonathan MD (07/17 1551)      AICD remains in place. Stable large hiatal hernia. No acute cardiopulmonary disease. I, the attending radiologist, have reviewed the images and agree with the final report above. Workstation performed: [id number]                    Procedures  Procedures         ED Course             HEART Risk Score    Flowsheet Row Most Recent Value   Heart Score Risk Calculator    History 0 Filed at: 07/17/2023 1411   ECG 1 Filed at: 07/17/2023 1411   Age 2 Filed at: 07/17/2023 1411   Risk Factors 2 Filed at: 07/17/2023 1411   Troponin 0 Filed at: 07/17/2023 1411   HEART Score 5 Filed at: 07/17/2023 1411                        SBIRT 20yo+    Flowsheet Row Most Recent Value   Initial Alcohol Screen: US AUDIT-C     1. How often do you have a drink containing alcohol? 0 Filed at: 07/17/2023 1142   2. How many drinks containing alcohol do you have on a typical day you are drinking? 0 Filed at: 07/17/2023 1142   3a. Male UNDER 65: How often do you have five or more drinks on one occasion? 0 Filed at: 07/17/2023 1142   3b. FEMALE Any Age, or MALE 65+: How often do you have 4 or more drinks on one occassion? 0 Filed at: 07/17/2023 1142   Audit-C Score 0 Filed at: 07/17/2023 1142   MARTHA: How many times in the past year have you. .. Used an illegal drug or used a prescription medication for non-medical reasons?  Never Filed at: 07/17/2023 1142                    Medical Decision Making  77-year-old female history of hypertension, TIA, A-fib on Eliquis and metoprolol presenting with lightheadedness. Patient took blood pressure readings at home and was largely 90s over 60s. Heart rate still 120s despite much improved blood pressure 120s over 70s so suspect likely A-fib contributing to blood pressure. Cardiac evaluation including EKG and troponin plus chest x-ray. Basic labs. Plan for management with IV fluids and IV metoprolol. Reassess. EKG interpreted by me with atrial fibrillation rate 115 with nonspecific ST abnormality. Labs interpreted me notable for initial troponin of 6 with normal delta. Chest x-ray interpreted by me without acute cardiopulmonary process. Heart rate improved from the 120s down to around 100 after medications and fluids. Initial dose of metoprolol dropped blood pressure back into the 90s but improved. Will hold off on additional medication at this time. Symptoms resolved and ambulatory at baseline. Discussed results and recommendations. Advised follow up PCP and cardiology. Medication recommendations. Given instructions and return precautions. Patient/family at bedside acknowledged understanding of all written and verbal instructions and return precautions. Discharged. Amount and/or Complexity of Data Reviewed  Labs: ordered. Radiology: ordered. Risk  Prescription drug management. Disposition  Final diagnoses:   Atrial fibrillation with rapid ventricular response ([address])   Lightheadedness     Time reflects when diagnosis was documented in both MDM as applicable and the Disposition within this note     Time User Action Codes Description Comment    7/17/2023 12:04 PM Beverely Hashimoto Add [I48.91] Atrial fibrillation with rapid ventricular response ([address])     7/17/2023 12:04 PM Beverely Hashimoto Add [R42] [facility]       ED Disposition     ED Disposition   Discharge    Condition   Stable    Date/Time   Mon Jul 17, 2023  2:31 PM    [address] [facility] discharge to home/self care.                Follow-up Information     Follow up With Specialties Details Why Contact Info Additional Information    Eve Yeager DO Family Medicine Schedule an appointment as soon as possible for a visit in 1 week  [address] [place] [address] 2  [address] [facility]       [address] [facility] Schedule an appointment as soon as possible for a visit in 3 days  [address] [facility] [id number]  [address] [facility], [facility], [address] [place] [address], [id number] [phone]          Discharge Medication List as of 7/17/2023  2:53 PM      CONTINUE these medications which have NOT CHANGED    Details   amiodarone 200 mg tablet Take 1 tablet (200 mg total) by mouth daily, Starting Tue 4/4/2023, Normal      apixaban (Eliquis) 5 mg Take 1 tablet (5 mg total) by mouth 2 (two) times a day, Starting Tue 7/12/2022, Normal      furosemide (LASIX) 20 mg tablet Take 1 tablet (20 mg total) by mouth every other day, Starting Tue 4/4/2023, Normal      lovastatin (MEVACOR) 10 MG tablet Take 1 tablet (10 mg total) by mouth daily at bedtime, Starting Tue 7/12/2022, Normal      metoprolol succinate (TOPROL-XL) 50 mg 24 hr tablet Take 1 tablet (50 mg total) by mouth 2 (two) times a day, Starting Tue 4/4/2023, Normal      potassium chloride (K-DUR,KLOR-CON) 10 mEq tablet Take 1 tablet (10 mEq total) by mouth every other day, Starting Tue 4/4/2023, Normal                 PDMP Review     None          ED Provider  Electronically Signed by           Fausto Cobian MD  07/17/23 6060

## 2023-07-18 ENCOUNTER — OFFICE VISIT (OUTPATIENT)
Dept: FAMILY MEDICINE CLINIC | Facility: CLINIC | Age: 78
End: 2023-07-18
Payer: MEDICARE

## 2023-07-18 ENCOUNTER — VBI (OUTPATIENT)
Dept: FAMILY MEDICINE CLINIC | Facility: CLINIC | Age: 78
End: 2023-07-18

## 2023-07-18 VITALS
BODY MASS INDEX: 33.32 KG/M2 | HEART RATE: 129 BPM | TEMPERATURE: 98.6 F | HEIGHT: 65 IN | DIASTOLIC BLOOD PRESSURE: 78 MMHG | RESPIRATION RATE: 18 BRPM | WEIGHT: 200 LBS | OXYGEN SATURATION: 98 % | SYSTOLIC BLOOD PRESSURE: 104 MMHG

## 2023-07-18 DIAGNOSIS — Z79.01 ANTICOAGULANT LONG-TERM USE: ICD-10-CM

## 2023-07-18 DIAGNOSIS — I48.0 PAROXYSMAL ATRIAL FIBRILLATION (HCC): Primary | ICD-10-CM

## 2023-07-18 PROBLEM — R06.09 DOE (DYSPNEA ON EXERTION): Status: RESOLVED | Noted: 2018-05-29 | Resolved: 2023-07-18

## 2023-07-18 PROCEDURE — 99215 OFFICE O/P EST HI 40 MIN: CPT | Performed by: STUDENT IN AN ORGANIZED HEALTH CARE EDUCATION/TRAINING PROGRAM

## 2023-07-18 RX ORDER — DILTIAZEM HYDROCHLORIDE 60 MG/1
60 TABLET, FILM COATED ORAL 3 TIMES DAILY
Qty: 60 TABLET | Refills: 0 | Status: SHIPPED | OUTPATIENT
Start: 2023-07-18 | End: 2023-07-19

## 2023-07-18 NOTE — PROGRESS NOTES
Assessment/Plan:         Problem List Items Addressed This Visit        Cardiovascular and Mediastinum    Paroxysmal atrial fibrillation (HCC) - Primary (Chronic)    Relevant Medications    diltiazem (CARDIZEM) 60 mg tablet       Other    Anticoagulant long-term use     Blood pressure seems to be on the softer side but patient is asymptomatic and not in acute heart failure. Discussed trialing Cardizem and discussed monitoring the blood pressure. Would hold on digoxin as her GFR was mildly decreased in the ER yesterday. Also discussed with patient to increase hydration as she does appear clinically dehydrated this may have set off the A-fib. Discussed symptoms that warrant ER evaluation and she will call back in 2 days to let us know how she is doing and what her heart rate is    Subjective:      Patient ID: Romina Duncan is a 65 y.o. female. HPI    Patient coming in to be seen for follow-up from the ER. Was seen in the ER for her A-fib not being well controlled. She takes metoprolol 100 mg XL daily total and is also on amiodarone. Has been on sotalol in the past as well. Currently patient denies any symptoms. Denies any chest pain, shortness of breath, or dizziness. She does report last week she had an episode where she stood up and had some presyncopal dizziness but otherwise has been stable. Does follow cardiology and has an appointment coming up    The following portions of the patient's history were reviewed and updated as appropriate:   Past Medical History:  She has a past medical history of Aphasia, mixed (07/28/2017), Atrial fibrillation ([address]), CHF (congestive heart failure) ([address]), Colon polyp, Headache, Hyperlipidemia, Hypertension, Lyme disease, Shortness of breath, TIA (transient ischemic attack), and Vertigo. ,  _______________________________________________________________________  Medical Problems:  does not have any pertinent problems on file.,  _______________________________________________________________________  Past Surgical History:   has a past surgical history that includes Tonsillectomy; Colonoscopy; pr sigmoidoscopy flx dx w/collj spec br/wa if pfrmd (N/A, 11/28/2017); and Cardiac pacemaker placement (12/2019). ,  _______________________________________________________________________  Family History:  family history includes Alzheimer's disease in her father; Breast cancer (age of onset: 36) in her paternal aunt; Dementia in her father; Lung cancer in her maternal grandmother and mother; No Known Problems in her paternal aunt and paternal aunt; Other in her father.,  _______________________________________________________________________  Social History:   reports that she quit smoking about 46 years ago. Her smoking use included cigarettes. She has never used smokeless tobacco. She reports current alcohol use of about 10.0 standard drinks of alcohol per week. She reports that she does not use drugs. ,  _______________________________________________________________________  Allergies:  has No Known Allergies. .  _______________________________________________________________________  Current Outpatient Medications   Medication Sig Dispense Refill   • amiodarone 200 mg tablet Take 1 tablet (200 mg total) by mouth daily 90 tablet 3   • apixaban (Eliquis) 5 mg Take 1 tablet (5 mg total) by mouth 2 (two) times a day 180 tablet 3   • diltiazem (CARDIZEM) 60 mg tablet Take 1 tablet (60 mg total) by mouth 3 (three) times a day 60 tablet 0   • furosemide (LASIX) 20 mg tablet Take 1 tablet (20 mg total) by mouth every other day 45 tablet 3   • lovastatin (MEVACOR) 10 MG tablet Take 1 tablet (10 mg total) by mouth daily at bedtime 90 tablet 3   • metoprolol succinate (TOPROL-XL) 50 mg 24 hr tablet Take 1 tablet (50 mg total) by mouth 2 (two) times a day 180 tablet 3   • potassium chloride (K-DUR,KLOR-CON) 10 mEq tablet Take 1 tablet (10 mEq total) by mouth every other day 45 tablet 3     No current facility-administered medications for this visit.     _______________________________________________________________________  Review of Systems   Constitutional: Negative for activity change, appetite change, fatigue and fever. HENT: Negative for congestion, rhinorrhea and sore throat. Eyes: Negative for visual disturbance. Respiratory: Negative for cough and shortness of breath. Cardiovascular: Negative for chest pain, palpitations and leg swelling. Gastrointestinal: Negative for nausea and vomiting. Objective:  Vitals:    07/18/23 1329   BP: 104/78   BP Location: Left arm   Patient Position: Sitting   Cuff Size: Standard   Pulse: (!) 129   Resp: 18   Temp: 98.6 °F (37 °C)   TempSrc: Tympanic   SpO2: 98%   Weight: 90.7 kg (200 lb)   Height: 5' 5" (1.651 m)     Body mass index is 33.28 kg/m². Physical Exam  Constitutional:       General: She is not in acute distress. Appearance: She is not ill-appearing. HENT:      Head: Normocephalic and atraumatic. Right Ear: External ear normal.      Left Ear: External ear normal.      Nose: Nose normal. No congestion or rhinorrhea. Mouth/Throat:      Mouth: Mucous membranes are moist.      Pharynx: Oropharynx is clear. No oropharyngeal exudate or posterior oropharyngeal erythema. Comments: Mildly dry  Eyes:      Extraocular Movements: Extraocular movements intact. Conjunctiva/sclera: Conjunctivae normal.      Pupils: Pupils are equal, round, and reactive to light. Cardiovascular:      Rate and Rhythm: Tachycardia present. Rhythm irregular. Pulses: Normal pulses. Heart sounds: No murmur heard. No gallop. Pulmonary:      Effort: Pulmonary effort is normal. No respiratory distress. Breath sounds: Normal breath sounds. No wheezing. Chest:      Chest wall: No tenderness. Abdominal:      General: Bowel sounds are normal.      Palpations: Abdomen is soft. Tenderness: There is no abdominal tenderness. Musculoskeletal:         General: Normal range of motion. Cervical back: Normal range of motion. Skin:     General: Skin is warm and dry. Capillary Refill: Capillary refill takes less than 2 seconds. Findings: No rash. Neurological:      General: No focal deficit present. Mental Status: She is alert. Mental status is at baseline.

## 2023-07-18 NOTE — TELEPHONE ENCOUNTER
07/18/23 12:24 PM    Patient contacted post ED visit, [facility] department spoke with patient/caregiver and outreach was successful. Thank you.   TYRONE SALOMON  [facility]

## 2023-07-19 ENCOUNTER — OFFICE VISIT (OUTPATIENT)
Dept: CARDIOLOGY CLINIC | Facility: CLINIC | Age: 78
End: 2023-07-19
Payer: MEDICARE

## 2023-07-19 VITALS
SYSTOLIC BLOOD PRESSURE: 110 MMHG | HEART RATE: 72 BPM | OXYGEN SATURATION: 98 % | WEIGHT: 200 LBS | DIASTOLIC BLOOD PRESSURE: 82 MMHG | RESPIRATION RATE: 16 BRPM | BODY MASS INDEX: 33.32 KG/M2 | HEIGHT: 65 IN

## 2023-07-19 DIAGNOSIS — I48.0 PAROXYSMAL ATRIAL FIBRILLATION (HCC): Chronic | ICD-10-CM

## 2023-07-19 DIAGNOSIS — I49.5 SSS (SICK SINUS SYNDROME) (HCC): Primary | ICD-10-CM

## 2023-07-19 DIAGNOSIS — Z79.01 ANTICOAGULANT LONG-TERM USE: ICD-10-CM

## 2023-07-19 DIAGNOSIS — I35.1 NONRHEUMATIC AORTIC VALVE INSUFFICIENCY: ICD-10-CM

## 2023-07-19 PROBLEM — R42 LIGHTHEADEDNESS: Status: ACTIVE | Noted: 2023-07-19

## 2023-07-19 PROCEDURE — 99213 OFFICE O/P EST LOW 20 MIN: CPT | Performed by: INTERNAL MEDICINE

## 2023-07-19 RX ORDER — DILTIAZEM HYDROCHLORIDE 120 MG/1
120 CAPSULE, EXTENDED RELEASE ORAL 2 TIMES DAILY
Qty: 90 CAPSULE | Refills: 3 | Status: SHIPPED | OUTPATIENT
Start: 2023-07-19

## 2023-07-19 NOTE — PROGRESS NOTES
Cardiology Follow Up    Boris Lowe  [id number]  [id number]  [facility] [address] [address] [phone]    1. SSS (sick sinus syndrome) (HCC)  -     diltiazem (CARDIZEM SR) 120 mg 12 hr capsule; Take 1 capsule (120 mg total) by mouth 2 (two) times a day    2. Paroxysmal atrial fibrillation (HCC)  -     diltiazem (CARDIZEM SR) 120 mg 12 hr capsule; Take 1 capsule (120 mg total) by mouth 2 (two) times a day    3. Anticoagulant long-term use    4. Nonrheumatic aortic valve insufficiency          Interval History: Patient with diagnoses as noted. Followed by Dr. Olimpia Patel. She has a history of a pacemaker for sick sinus syndrome and longstanding atrial fibrillation. Rates have tended to be rapid at times and she went to the ED because of lightheadedness. Heart rate was slightly rapid at that time. She saw her family doctor who started her on Cardizem 60 mg 3 times a day. She has tolerated this so far. She she does get lightheaded sometimes after standing. She fights it by holding onto something rather than sitting down or lying down. She said she passed out once. Her family doctor recommended Pedialyte    Patient Active Problem List   Diagnosis   • Essential hypertension   • Mixed hyperlipidemia   • Obesity (BMI 30-39. 9)   • H/O TIA (transient ischemic attack) and stroke   • Migraine with aura and without status migrainosus, not intractable   • Myocardiopathy (HCC)   • Anticoagulant long-term use   • Nonrheumatic mitral valve regurgitation   • Nonrheumatic aortic valve insufficiency   • Nonrheumatic tricuspid valve regurgitation   • Paroxysmal atrial fibrillation (HCC)   • Other insomnia   • Chronic obstructive pulmonary disease (HCC)   • BMI 33.0-33.9,adult   • Stage 3a chronic kidney disease (HCC)   • Acute on chronic systolic congestive heart failure (HCC)   • SSS (sick sinus syndrome) (HCC)   • Diplopia   • Lightheadedness     Past Medical History:   Diagnosis Date   • Aphasia, mixed 2017   • Atrial fibrillation (HCC)    • CHF (congestive heart failure) (HCC)    • Colon polyp    • Headache    • Hyperlipidemia    • Hypertension    • Lyme disease    • Shortness of breath    • TIA (transient ischemic attack)    • Vertigo      Social History     Socioeconomic History   • Marital status: /Civil Union     Spouse name: Not on file   • Number of children: Not on file   • Years of education: Not on file   • Highest education level: Not on file   Occupational History   • Occupation: Retired   Tobacco Use   • Smoking status: Former     Types: Cigarettes     Quit date:      Years since quittin.5   • Smokeless tobacco: Never   • Tobacco comments:     no smoking for 46 years   Vaping Use   • Vaping Use: Never used   Substance and Sexual Activity   • Alcohol use: Yes     Alcohol/week: 10.0 standard drinks of alcohol     Types: 10 Glasses of wine per week     Comment: occ   • Drug use: No   • Sexual activity: Never     Partners: Male     Birth control/protection: None   Other Topics Concern   • Not on file   Social History Narrative    Always uses seat belt     Social Determinants of Health     Financial Resource Strain: Low Risk  (2022)    Overall Financial Resource Strain (CARDIA)    • Difficulty of Paying Living Expenses: Not hard at all   Food Insecurity: No Food Insecurity (2022)    Hunger Vital Sign    • Worried About Running Out of Food in the Last Year: Never true    • Ran Out of Food in the Last Year: Never true   Transportation Needs: No Transportation Needs (2022)    PRAPARE - Transportation    • Lack of Transportation (Medical): No    • Lack of Transportation (Non-Medical):  No   Physical Activity: Not on file   Stress: Not on file   Social Connections: Not on file   Intimate Partner Violence: Not on file   Housing Stability: Low Risk (12/22/2022)    Housing Stability Vital Sign    • Unable to Pay for Housing in the Last Year: No    • Number of Places Lived in the Last Year: 1    • Unstable Housing in the Last Year: No      Family History   Problem Relation Age of Onset   • Lung cancer Mother    • Dementia Father    • Alzheimer's disease Father    • Other Father         Cardiac Disorder    • Lung cancer Maternal Grandmother    • Breast cancer Paternal Aunt 44   • No Known Problems Paternal Aunt    • No Known Problems Paternal Aunt      Past Surgical History:   Procedure Laterality Date   • CARDIAC PACEMAKER PLACEMENT  12/2019   • COLONOSCOPY     • WI SIGMOIDOSCOPY FLX DX W/COLLJ SPEC BR/WA IF PFRMD N/A 11/28/2017    Procedure: Barry Contreras;  Surgeon: Luisa Jackson MD;  Location: [facility];   Service: Gastroenterology   • TONSILLECTOMY         Current Outpatient Medications:   •  amiodarone 200 mg tablet, Take 1 tablet (200 mg total) by mouth daily, Disp: 90 tablet, Rfl: 3  •  apixaban (Eliquis) 5 mg, Take 1 tablet (5 mg total) by mouth 2 (two) times a day, Disp: 180 tablet, Rfl: 3  •  diltiazem (CARDIZEM SR) 120 mg 12 hr capsule, Take 1 capsule (120 mg total) by mouth 2 (two) times a day, Disp: 90 capsule, Rfl: 3  •  furosemide (LASIX) 20 mg tablet, Take 1 tablet (20 mg total) by mouth every other day, Disp: 45 tablet, Rfl: 3  •  lovastatin (MEVACOR) 10 MG tablet, Take 1 tablet (10 mg total) by mouth daily at bedtime, Disp: 90 tablet, Rfl: 3  •  metoprolol succinate (TOPROL-XL) 50 mg 24 hr tablet, Take 1 tablet (50 mg total) by mouth 2 (two) times a day, Disp: 180 tablet, Rfl: 3  •  potassium chloride (K-DUR,KLOR-CON) 10 mEq tablet, Take 1 tablet (10 mEq total) by mouth every other day, Disp: 45 tablet, Rfl: 3  No Known Allergies    Labs:  Admission on 07/17/2023, Discharged on 07/17/2023   Component Date Value   • Ventricular Rate 07/17/2023 115    • Atrial Rate 07/17/2023 258    • QRSD Interval 07/17/2023 82    • QT Interval 07/17/2023 336    • QTC Interval 07/17/2023 464    • QRS Axis 07/17/2023 67    • T Wave Axis 07/17/2023 -12    • WBC 07/17/2023 7.93    • RBC 07/17/2023 3.88    • Hemoglobin 07/17/2023 10.0 (L)    • Hematocrit 07/17/2023 32.6 (L)    • MCV 07/17/2023 84    • MCH 07/17/2023 25.8 (L)    • MCHC 07/17/2023 30.7 (L)    • RDW 07/17/2023 15.7 (H)    • MPV 07/17/2023 10.1    • Platelets 98/01/7452 284    • nRBC 07/17/2023 0    • Neutrophils Relative 07/17/2023 79 (H)    • Immat GRANS % 07/17/2023 0    • Lymphocytes Relative 07/17/2023 9 (L)    • Monocytes Relative 07/17/2023 10    • Eosinophils Relative 07/17/2023 1    • Basophils Relative 07/17/2023 1    • Neutrophils Absolute 07/17/2023 6.24    • Immature Grans Absolute 07/17/2023 0.03    • Lymphocytes Absolute 07/17/2023 0.73    • Monocytes Absolute 07/17/2023 0.77    • Eosinophils Absolute 07/17/2023 0.10    • Basophils Absolute 07/17/2023 0.06    • Sodium 07/17/2023 138    • Potassium 07/17/2023 4.0    • Chloride 07/17/2023 107    • CO2 07/17/2023 24    • ANION GAP 07/17/2023 7    • BUN 07/17/2023 23    • Creatinine 07/17/2023 1.36 (H)    • Glucose 07/17/2023 112    • Calcium 07/17/2023 9.1    • eGFR 07/17/2023 37    • hs TnI 0hr 07/17/2023 6    • hs TnI 2hr 07/17/2023 5    • Delta 2hr hsTnI 07/17/2023 -1      Imaging: XR chest 2 views    Result Date: 7/17/2023  Narrative: CHEST INDICATION:   lightheadedness. COMPARISON: 3/15/2023 EXAM PERFORMED/VIEWS:  XR CHEST PA & LATERAL FINDINGS: Left subclavian AICD with leads in the right atrium and right ventricle. Stable large hiatal hernia persists. Cardiomediastinal silhouette appears unremarkable. The lungs are clear. No pneumothorax or pleural effusion. Osseous structures appear within normal limits for patient age. Impression: AICD remains in place. Stable large hiatal hernia. No acute cardiopulmonary disease. I, the attending radiologist, have reviewed the images and agree with the final report above.  Workstation performed: [id number]     Cardiac EP device report    Result Date: 6/21/2023  Narrative: MDLÓPEZ DUAL CHAMBER PM  - ACTIVE SYSTEM IS MRI CONDITIONAL CARELINK TRANSMISSION: BATTERY VOLTAGE ADEQUATE (9 YRS). AP 96.2%  <0.1% ALL AVAILABLE LEAD PARAMETERS WITHIN NORMAL LIMITS. 4 AT/AF EPISODES WITH LONGEST AT 4 MINS. AT/AF BURDEN <0.1% SINCE 3/23/23. TAKES ELIQUIS, METOPROLOL SUCC, & AMIODARONE. NORMAL DEVICE FUNCTION. AM/NC       Review of Systems:  Review of Systems    Physical Exam:  She is overweight. Blood pressure 100/80 with no drop when standing. Heart rate 102 and irregular. Lungs clear. Irregular rhythm. Systolic ejection murmur at the base. Discussion/Summary:    1. Atrial fibrillation with a tendency towards rapid ventricular response  2. Orthostatic symptoms. 3.  Presence of permanent pacemaker    Recommendations:    1. We will consolidate Cardizem with Cardizem  daily and DC p.o. Cardizem 60 mg  2. Continue other medications as noted  3. Exercise since she is deconditioned. This may help her cardiovascular status  4. Consider support stockings  5. Patient advised she must sit down or lie down if she gets lightheaded  6.   Return to see Dr. Juan Daniel Carrillo at scheduled time      Maki Colon MD

## 2023-08-18 ENCOUNTER — OFFICE VISIT (OUTPATIENT)
Dept: FAMILY MEDICINE CLINIC | Facility: CLINIC | Age: 78
End: 2023-08-18
Payer: MEDICARE

## 2023-08-18 VITALS
DIASTOLIC BLOOD PRESSURE: 68 MMHG | OXYGEN SATURATION: 99 % | HEART RATE: 120 BPM | HEIGHT: 65 IN | BODY MASS INDEX: 33.66 KG/M2 | WEIGHT: 202 LBS | TEMPERATURE: 97.6 F | SYSTOLIC BLOOD PRESSURE: 98 MMHG

## 2023-08-18 DIAGNOSIS — R42 LIGHTHEADEDNESS: Primary | ICD-10-CM

## 2023-08-18 PROCEDURE — 99213 OFFICE O/P EST LOW 20 MIN: CPT | Performed by: FAMILY MEDICINE

## 2023-08-18 NOTE — PROGRESS NOTES
Name: Chaim Bowers      :       MRN: [id number]  Encounter Provider: Mercy Rubi DO  Encounter Date: 2023   Encounter department: [address] [place] [address] [address] [facility]     1. Lightheadedness  Assessment & Plan:  Discussed with her that I believe this is due to her low blood pressure however we are not able to change her Cardizem and her metoprolol due to her propensity to develop rapid atrial fibrillation. Encouraged her to increase salt in her diet, monitor her blood pressure for the next week on a daily basis, if she is not able to get her systolic blood pressure to over 110 she is to let us know and we will consider starting medication. If she is able to get to 110 and does not have any change in her symptoms we will also consider starting medication. Subjective      Patient comes in today for hospital follow-up, she was seen for rapid atrial fibrillation, she was started on amiodarone. She states that since then she has felt dizzy, weak, short of breath with activity. She has been checking her blood pressure and it has been ranging in the low to mid 90s over 60s. Review of Systems   Constitutional: Positive for fatigue. Negative for chills and fever. HENT: Negative for congestion, ear pain, hearing loss, postnasal drip, rhinorrhea and sore throat. Eyes: Negative for pain and visual disturbance. Respiratory: Positive for shortness of breath. Negative for chest tightness and wheezing. Cardiovascular: Negative for chest pain and leg swelling. Gastrointestinal: Negative for abdominal distention, abdominal pain, constipation, diarrhea and vomiting. Endocrine: Negative for cold intolerance and heat intolerance. Genitourinary: Negative for difficulty urinating, frequency and urgency. Musculoskeletal: Negative for arthralgias and gait problem. Skin: Negative for color change.    Neurological: Positive for dizziness. Negative for tremors, syncope, numbness and headaches. Hematological: Negative for adenopathy. Psychiatric/Behavioral: Negative for agitation, confusion and sleep disturbance. The patient is not nervous/anxious. Current Outpatient Medications on File Prior to Visit   Medication Sig   • amiodarone 200 mg tablet Take 1 tablet (200 mg total) by mouth daily   • apixaban (Eliquis) 5 mg Take 1 tablet (5 mg total) by mouth 2 (two) times a day   • diltiazem (CARDIZEM SR) 120 mg 12 hr capsule Take 1 capsule (120 mg total) by mouth 2 (two) times a day   • furosemide (LASIX) 20 mg tablet Take 1 tablet (20 mg total) by mouth every other day   • lovastatin (MEVACOR) 10 MG tablet Take 1 tablet (10 mg total) by mouth daily at bedtime   • metoprolol succinate (TOPROL-XL) 50 mg 24 hr tablet Take 1 tablet (50 mg total) by mouth 2 (two) times a day   • potassium chloride (K-DUR,KLOR-CON) 10 mEq tablet Take 1 tablet (10 mEq total) by mouth every other day       Objective     BP 98/68 (BP Location: Left arm, Patient Position: Sitting, Cuff Size: Adult)   Pulse (!) 120   Temp 97.6 °F (36.4 °C) (Tympanic)   Ht 5' 5" (1.651 m)   Wt 91.6 kg (202 lb)   SpO2 99%   BMI 33.61 kg/m²     Physical Exam  Vitals and nursing note reviewed. Constitutional:       Appearance: She is well-developed. HENT:      Head: Normocephalic. Eyes:      General: No scleral icterus. Conjunctiva/sclera: Conjunctivae normal.   Neck:      Thyroid: No thyromegaly. Cardiovascular:      Rate and Rhythm: Normal rate. Rhythm irregular. Heart sounds: Normal heart sounds. No murmur heard. Pulmonary:      Effort: Pulmonary effort is normal. No respiratory distress. Breath sounds: Normal breath sounds. No wheezing. Abdominal:      General: Bowel sounds are normal. There is no distension. Palpations: Abdomen is soft. Tenderness: There is no abdominal tenderness. Musculoskeletal:         General: No tenderness. Normal range of motion. Cervical back: Normal range of motion. Lymphadenopathy:      Cervical: No cervical adenopathy. Skin:     General: Skin is warm and dry. Coloration: Skin is not pale. Findings: No rash. Neurological:      Mental Status: She is alert and oriented to person, place, and time. Cranial Nerves: No cranial nerve deficit.    Psychiatric:         Behavior: Behavior normal.       Leroy Patel,

## 2023-08-18 NOTE — ASSESSMENT & PLAN NOTE
Discussed with her that I believe this is due to her low blood pressure however we are not able to change her Cardizem and her metoprolol due to her propensity to develop rapid atrial fibrillation. Encouraged her to increase salt in her diet, monitor her blood pressure for the next week on a daily basis, if she is not able to get her systolic blood pressure to over 110 she is to let us know and we will consider starting medication. If she is able to get to 110 and does not have any change in her symptoms we will also consider starting medication.

## 2023-08-25 ENCOUNTER — TELEPHONE (OUTPATIENT)
Dept: FAMILY MEDICINE CLINIC | Facility: CLINIC | Age: 78
End: 2023-08-25

## 2023-08-25 DIAGNOSIS — I95.0 IDIOPATHIC HYPOTENSION: Primary | ICD-10-CM

## 2023-08-25 RX ORDER — MIDODRINE HYDROCHLORIDE 2.5 MG/1
2.5 TABLET ORAL
Qty: 90 TABLET | Refills: 0 | Status: SHIPPED | OUTPATIENT
Start: 2023-08-25

## 2023-08-25 NOTE — TELEPHONE ENCOUNTER
T/c from pt - pt was seen last Friday - blood pressure is between 92 & 102 and was told to call if it couldn't get any higher and can get a script sent in to help - also a few months ago her lasix & potassium were cut back to every other day but she notes her feet get swollen - can she go back to taking them everyday? Please advise.

## 2023-08-25 NOTE — TELEPHONE ENCOUNTER
I sent in midodrine to [facility] to take three times a day before meals to bring up her blood pressure.

## 2023-08-25 NOTE — TELEPHONE ENCOUNTER
Left detailed message for pt. RCB to confirm that she has received this message and understands advisement.

## 2023-08-28 NOTE — TELEPHONE ENCOUNTER
T/c from pt -- states she picked up the medication an has been taking it for the last few days. Pt is wondering if she needs a follow up to see how the medication is working or if she just needs to call.      Please advise

## 2023-08-31 DIAGNOSIS — E78.2 MIXED HYPERLIPIDEMIA: ICD-10-CM

## 2023-08-31 DIAGNOSIS — I48.19 PERSISTENT ATRIAL FIBRILLATION (HCC): ICD-10-CM

## 2023-09-01 RX ORDER — LOVASTATIN 10 MG/1
10 TABLET ORAL
Qty: 90 TABLET | Refills: 3 | Status: SHIPPED | OUTPATIENT
Start: 2023-09-01

## 2023-09-01 RX ORDER — APIXABAN 5 MG/1
5 TABLET, FILM COATED ORAL 2 TIMES DAILY
Qty: 180 TABLET | Refills: 3 | Status: SHIPPED | OUTPATIENT
Start: 2023-09-01

## 2023-09-06 ENCOUNTER — IMMUNIZATIONS (OUTPATIENT)
Dept: FAMILY MEDICINE CLINIC | Facility: CLINIC | Age: 78
End: 2023-09-06
Payer: MEDICARE

## 2023-09-06 ENCOUNTER — TELEPHONE (OUTPATIENT)
Dept: FAMILY MEDICINE CLINIC | Facility: CLINIC | Age: 78
End: 2023-09-06

## 2023-09-06 DIAGNOSIS — Z23 NEED FOR COVID-19 VACCINE: Primary | ICD-10-CM

## 2023-09-06 PROCEDURE — 91312 PR SARSCOV2 VACCINE BIVALENT 30 MCG/0.3 ML IM USE: CPT

## 2023-09-06 PROCEDURE — 0124A PR IMM ADMN SARSCOV2 BIVALENT 30 MCG/0.3 ML BST: CPT

## 2023-09-06 NOTE — TELEPHONE ENCOUNTER
Patient called in to give you an update on her blood pressure, Luanamaritza have been on medication for 2 weeks and blood pressure went up to 100 to 110.   Thanks

## 2023-09-07 ENCOUNTER — OFFICE VISIT (OUTPATIENT)
Dept: CARDIOLOGY CLINIC | Facility: CLINIC | Age: 78
End: 2023-09-07
Payer: MEDICARE

## 2023-09-07 VITALS
WEIGHT: 202 LBS | DIASTOLIC BLOOD PRESSURE: 78 MMHG | OXYGEN SATURATION: 96 % | RESPIRATION RATE: 16 BRPM | SYSTOLIC BLOOD PRESSURE: 118 MMHG | HEIGHT: 65 IN | BODY MASS INDEX: 33.66 KG/M2 | HEART RATE: 88 BPM

## 2023-09-07 DIAGNOSIS — I50.22 CHRONIC SYSTOLIC (CONGESTIVE) HEART FAILURE (HCC): Primary | ICD-10-CM

## 2023-09-07 DIAGNOSIS — I34.0 NONRHEUMATIC MITRAL VALVE REGURGITATION: ICD-10-CM

## 2023-09-07 DIAGNOSIS — Z95.0 PRESENCE OF PERMANENT CARDIAC PACEMAKER: ICD-10-CM

## 2023-09-07 DIAGNOSIS — I10 ESSENTIAL HYPERTENSION: ICD-10-CM

## 2023-09-07 DIAGNOSIS — I36.1 NONRHEUMATIC TRICUSPID VALVE REGURGITATION: ICD-10-CM

## 2023-09-07 DIAGNOSIS — I49.5 SSS (SICK SINUS SYNDROME) (HCC): ICD-10-CM

## 2023-09-07 DIAGNOSIS — I48.0 PAF (PAROXYSMAL ATRIAL FIBRILLATION) (HCC): ICD-10-CM

## 2023-09-07 DIAGNOSIS — I42.8 NONISCHEMIC CARDIOMYOPATHY (HCC): ICD-10-CM

## 2023-09-07 DIAGNOSIS — I95.0 IDIOPATHIC HYPOTENSION: ICD-10-CM

## 2023-09-07 DIAGNOSIS — E78.2 MIXED HYPERLIPIDEMIA: ICD-10-CM

## 2023-09-07 DIAGNOSIS — Z79.01 ANTICOAGULANT LONG-TERM USE: ICD-10-CM

## 2023-09-07 DIAGNOSIS — I35.1 NONRHEUMATIC AORTIC VALVE INSUFFICIENCY: ICD-10-CM

## 2023-09-07 PROCEDURE — 99214 OFFICE O/P EST MOD 30 MIN: CPT | Performed by: INTERNAL MEDICINE

## 2023-09-07 RX ORDER — POTASSIUM CHLORIDE 750 MG/1
10 TABLET, EXTENDED RELEASE ORAL DAILY
Qty: 90 TABLET | Refills: 3 | Status: SHIPPED | OUTPATIENT
Start: 2023-09-07

## 2023-09-07 RX ORDER — FUROSEMIDE 20 MG/1
20 TABLET ORAL DAILY
Qty: 90 TABLET | Refills: 3
Start: 2023-09-07

## 2023-09-07 RX ORDER — MIDODRINE HYDROCHLORIDE 5 MG/1
5 TABLET ORAL
Qty: 90 TABLET | Refills: 5 | Status: SHIPPED | OUTPATIENT
Start: 2023-09-07

## 2023-09-07 RX ORDER — METOPROLOL SUCCINATE 50 MG/1
75 TABLET, EXTENDED RELEASE ORAL DAILY
Qty: 135 TABLET | Refills: 3 | Status: SHIPPED | OUTPATIENT
Start: 2023-09-07

## 2023-09-07 NOTE — PROGRESS NOTES
CARDIOLOGY OFFICE VISIT  [facility]  [address], [place], [facility] [address], [address] [place] [address]  Tel: [phone]      NAME: Mona Cazares  AGE: 65 y.o. SEX: female  : 1945  MRN: [id number]      Chief Complaint:  Chief Complaint   Patient presents with   • Follow-up         History of Present Illness:   Patient comes for follow up. States she gets short of breath after walking a certain distance. Denies associated chest pain/pressure. Still has some swelling in her legs. Lightheadedness is less as she is very careful while changing positions. Patient denies chest pain / pressure, palpitations, syncope, orthopnea, PND, claudication. Chronic systolic heart failure - currently on furosemide 20 mg and potassium every other day. On daily metoprolol succinate. Lisinopril was discontinued for soft BP.  States she has been watching her salt intake closely     Nonischemic cardiomyopathy, likely tachycardia mediated -  patient has a history of nonischemic cardiomyopathy with improvement in EF after rate/rhythm control of her atrial fibrillation. Current EF is 55% (2023). On metoprolol succinate. Lisinopril was discontinued for soft BP     PAF - Patient was diagnosed with atrial fibrillation on an EKG done at her PCP office. Patient initially had no symptoms from it. She was cardioverted on 2018 and was in sinus rhythm for a while but then went back into Afib. Then she was started on rhythm control with Sotalol. Did well for a while but again required cardioversion on 11/3/2022. Currently she is in sinus rhythm with Amiodarone.  She is on Eliquis for anticoagulation.  Denies bleeding from any site. Follws with Dr Jada Garcia     SSS s/p PPM - last pacemaker interrogation discussed with the patient. Continue regular pacemaker interrogations    Essential hypertension -  Has been hypertensive for many years. Taking medications regularly.   Denies lightheadedness, headache, medication side effects. Mixed hyperlipidemia -  Has had hyperlipidemia for many years. Taking statin regularly along with diet control. Denies myalgia. PCP closely monitoring the blood work. On midodrine for orthostatic hypotension    Mod MR, Mod TR, Mild PI, Mild AI    H/O TIA  Obesity    Past Medical History:  Past Medical History:   Diagnosis Date   • Aphasia, mixed 2017   • Atrial fibrillation (HCC)    • CHF (congestive heart failure) (HCC)    • Colon polyp    • Headache    • Hyperlipidemia    • Hypertension    • Lyme disease    • Shortness of breath    • TIA (transient ischemic attack)    • Vertigo          Past Surgical History:  Past Surgical History:   Procedure Laterality Date   • CARDIAC PACEMAKER PLACEMENT  2019   • COLONOSCOPY     • TX SIGMOIDOSCOPY FLX DX W/COLLJ SPEC BR/WA IF PFRMD N/A 2017    Procedure: [place] Rule;  Surgeon: Maria Del Carmen Ugarte MD;  Location: [facility]; Service: Gastroenterology   • TONSILLECTOMY           Family History:  Family History   Problem Relation Age of Onset   • Lung cancer Mother    • Dementia Father    • Alzheimer's disease Father    • Other Father         Cardiac Disorder    • Lung cancer Maternal Grandmother    • Breast cancer Paternal Aunt 44   • No Known Problems Paternal Aunt    • No Known Problems Paternal Aunt          Social History:  Social History     Socioeconomic History   • Marital status: /Civil Union     Spouse name: None   • Number of children: None   • Years of education: None   • Highest education level: None   Occupational History   • Occupation: Retired   Tobacco Use   • Smoking status: Former     Types: Cigarettes     Quit date:      Years since quittin.7   • Smokeless tobacco: Never   • Tobacco comments:     no smoking for 46 years   Vaping Use   • Vaping Use: Never used   Substance and Sexual Activity   • Alcohol use:  Yes     Alcohol/week: 10.0 standard drinks of alcohol     Types: 10 Glasses of wine per week     Comment: occ   • Drug use: No   • Sexual activity: Never     Partners: Male     Birth control/protection: None   Other Topics Concern   • None   Social History Narrative    Always uses seat belt     Social Determinants of Health     Financial Resource Strain: Low Risk  (12/5/2022)    Overall Financial Resource Strain (CARDIA)    • Difficulty of Paying Living Expenses: Not hard at all   Food Insecurity: No Food Insecurity (12/22/2022)    Hunger Vital Sign    • Worried About Running Out of Food in the Last Year: Never true    • Ran Out of Food in the Last Year: Never true   Transportation Needs: No Transportation Needs (12/22/2022)    PRAPARE - Transportation    • Lack of Transportation (Medical): No    • Lack of Transportation (Non-Medical): No   Physical Activity: Not on file   Stress: Not on file   Social Connections: Not on file   Intimate Partner Violence: Not on file   Housing Stability: Low Risk  (12/22/2022)    Housing Stability Vital Sign    • Unable to Pay for Housing in the Last Year: No    • Number of Places Lived in the Last Year: 1    • Unstable Housing in the Last Year: No         Active Problems:  Patient Active Problem List   Diagnosis   • Essential hypertension   • Mixed hyperlipidemia   • Obesity (BMI 30-39. 9)   • H/O TIA (transient ischemic attack) and stroke   • Migraine with aura and without status migrainosus, not intractable   • Myocardiopathy ([facility])   • Anticoagulant long-term use   • Nonrheumatic mitral valve regurgitation   • Nonrheumatic aortic valve insufficiency   • Nonrheumatic tricuspid valve regurgitation   • Paroxysmal atrial fibrillation ([facility])   • Other insomnia   • Chronic obstructive pulmonary disease ([facility])   • BMI 33.0-33.9,adult   • Stage 3a chronic kidney disease ([facility])   • Acute on chronic systolic congestive heart failure ([facility])   • SSS (sick sinus syndrome) ([facility])   • Diplopia   • Lightheadedness         The following portions of the patient's history were reviewed and updated as appropriate: past medical history, past surgical history, past family history,  past social history, current medications, allergies and problem list.      Review of Systems:  Constitutional: Denies fever, chills  Eyes: Denies eye redness, eye discharge  ENT: Denies hearing loss, sneezing, nasal discharge, sore throat   Respiratory: Denies cough, expectoration. +shortness of breath  Cardiovascular: Denies chest pain, palpitations. +lower extremity swelling  Gastrointestinal: Denies abdominal pain, nausea, vomiting, diarrhea  Genito-Urinary: Denies dysuria, incontinence  Musculoskeletal: Denies back pain, joint pain, muscle pain  Neurologic: Denies syncope, headache, seizures  Endocrine: Denies polydipsia, temperature intolerance  Allergy and Immunology: Denies hives, insect bite sensitivity  Hematological and Lymphatic: Denies bleeding problems, swollen glands   Psychological: Denies depression, suicidal ideation, anxiety, panic  Dermatological: Denies pruritus, rash, skin lesion changes      Vitals:  Vitals:    09/07/23 0929   BP: 118/78   Pulse: 88   Resp: 16   SpO2: 96%       Body mass index is 33.61 kg/m². Weight (last 2 days)     Date/Time Weight    09/07/23 0929 91.6 (202)            Physical Examination:  General: Patient is not in acute distress. Awake, alert, oriented in time, place and person. Responding to commands  Head: Normocephalic. Atraumatic  Eyes: Both pupils normal sized, round and reactive to light. Nonicteric  ENT: Normal external ear canals  Neck: Supple. JVP not raised. Trachea central. No thyromegaly  Lungs: Bilateral bronchovascular breath sounds with no crackles or rhonchi  Chest wall: No tenderness  Cardiovascular: S1 and S2 normal. Grade 3/6 PSMs at apex and LLSB+  Gastrointestinal: Abdomen soft, nontender. No guarding or rigidity. Liver and spleen not palpable.  Bowel sounds present  Neurologic: Patient is awake, alert, oriented in time, place and person. Responding to commands. Moving all extremities  Integumentary:  No skin rash  Lymphatic: No cervical lymphadenopathy  Back: Symmetric. No CVA tenderness  Extremities: No clubbing, cyanosis. B/L LE edema+      Laboratory Results:  CBC with diff:   Lab Results   Component Value Date    WBC 7.93 07/17/2023    RBC 3.88 07/17/2023    HGB 10.0 (L) 07/17/2023    HCT 32.6 (L) 07/17/2023    MCV 84 07/17/2023    MCH 25.8 (L) 07/17/2023    RDW 15.7 (H) 07/17/2023     07/17/2023       CMP:  Lab Results   Component Value Date    CREATININE 1.36 (H) 07/17/2023    BUN 23 07/17/2023    K 4.0 07/17/2023     07/17/2023    CO2 24 07/17/2023    ALKPHOS 76 03/15/2023    ALT 17 03/15/2023    AST 19 03/15/2023    BILIDIR 0.13 01/05/2023       Lab Results   Component Value Date    HGBA1C 5.8 (H) 11/01/2022    MG 2.0 03/16/2023    PHOS 3.5 03/16/2023       Lab Results   Component Value Date    TROPONINI 0.05 (H) 02/10/2019    TROPONINI 0.06 (H) 02/09/2019    TROPONINI 0.06 (H) 02/09/2019       Lipid Profile:   No results found for: "CHOL"  Lab Results   Component Value Date    HDL 45 (L) 11/02/2022    HDL 55 11/22/2021    HDL 59 09/17/2020     Lab Results   Component Value Date    LDLCALC 62 11/02/2022    LDLCALC 74 11/22/2021    LDLCALC 80 09/17/2020     Lab Results   Component Value Date    TRIG 75 11/02/2022    TRIG 90 11/22/2021    TRIG 119 09/17/2020       Cardiac testing:   Results for orders placed during the hospital encounter of 11/01/22    Echo complete w/ contrast if indicated    Interpretation Summary  •  Left Ventricle: Left ventricular cavity size is normal. Systolic function is mildly reduced. LVEF approximately 40 to 45%. Patient was in atrial fibrillation with periods of rapid ventricular response which interferes with interpretation. There is mild global hypokinesis. Unable to assess diastolic function due to atrial fibrillation.   •  Right Ventricle: Right ventricular cavity size is mildly dilated. Systolic function is mildly to moderately reduced. •  Left Atrium: The atrium is moderate to markedly dilated. •  Right Atrium: The atrium is moderately dilated. •  Aortic Valve: There is mild regurgitation. •  Mitral Valve: There is moderate annular calcification. There is moderate regurgitation. •  Tricuspid Valve: There is moderate regurgitation. The right ventricular systolic pressure is mildly elevated. Estimated RVSP 35 mm Hg. Pacer leads noted. •  Pulmonic Valve: There is mild regurgitation. Results for orders placed during the hospital encounter of 03/07/23    Echo follow up/limited w/ contrast if indicated    Interpretation Summary  •  Left Ventricle: Left ventricular cavity size is normal. The left ventricular ejection fraction is 55%. Systolic function is normal.  •  Left Atrium: The atrium is moderately dilated. •  Right Atrium: The atrium is mildly dilated. •  Aortic Valve: There is mild to moderate regurgitation. •  Tricuspid Valve: There is moderate regurgitation. •  Pulmonary Artery: The estimated pulmonary artery systolic pressure is 24.9 mmHg. The pulmonary artery systolic pressure is moderately increased. •  This is a limited echocardiogram performed to evaluate LV function. Interpretation is therefore limited. Results for orders placed during the hospital encounter of 03/07/23    NM myocardial perfusion spect (rx stress and/or rest)    Interpretation Summary  •  Stress ECG: No ST deviation is noted. There were no arrhythmias during recovery. . The ECG was not diagnostic due to pharmacological (vasodilator) stress. •  Perfusion Defect Conclusion: The stress/rest perfusion ratio is 1.03 . There is no evidence of transient ischemic dilation (TID). •  Stress Function: Left ventricular function post-stress is normal. Post-stress ejection fraction is 79 %.   •  Perfusion: There is a left ventricular perfusion defect that is medium in size with moderate reduction in uptake present in the mid to apical anterior and anterolateral location(s) that is partially reversible. •  Stress Combined Conclusion: Left ventricular perfusion is probably abnormal.      Medications:    Current Outpatient Medications:   •  amiodarone 200 mg tablet, Take 1 tablet (200 mg total) by mouth daily, Disp: 90 tablet, Rfl: 3  •  Eliquis 5 MG, TAKE 1 TABLET BY MOUTH  TWICE DAILY, Disp: 180 tablet, Rfl: 3  •  furosemide (LASIX) 20 mg tablet, Take 1 tablet (20 mg total) by mouth every other day, Disp: 45 tablet, Rfl: 3  •  lovastatin (MEVACOR) 10 MG tablet, TAKE 1 TABLET BY MOUTH  DAILY AT BEDTIME, Disp: 90 tablet, Rfl: 3  •  metoprolol succinate (TOPROL-XL) 50 mg 24 hr tablet, Take 1 tablet (50 mg total) by mouth 2 (two) times a day, Disp: 180 tablet, Rfl: 3  •  midodrine (PROAMATINE) 2.5 mg tablet, Take 1 tablet (2.5 mg total) by mouth 3 (three) times a day before meals, Disp: 90 tablet, Rfl: 0  •  potassium chloride (K-DUR,KLOR-CON) 10 mEq tablet, Take 1 tablet (10 mEq total) by mouth every other day, Disp: 45 tablet, Rfl: 3      Allergies:  No Known Allergies      Assessment and Plan:  1. Chronic systolic (congestive) heart failure (HCC)  Take furosemide 20 mg and potassium daily. Continue beta-blocker. No ACE inhibitor/ARB due to soft BP. Low-salt diet. Daily weights  Increase midodrine to 5 mg 3 times daily  Decrease metoprolol succinate to 75 mg daily    2. Nonischemic cardiomyopathy (HCC)  Continue beta-blocker. No ACE inhibitor/ARB due to soft BP    3. SSS (sick sinus syndrome) ([address]). Presence of permanent cardiac pacemaker  Continue regular pacemaker interrogations. Last interrogation discussed with patient and spouse    4. PAF (paroxysmal atrial fibrillation) ([address] Anticoagulant long-term use  Continue amiodarone for rhythm control, metoprolol for rate control, Eliquis for anticoagulation    5. Essential hypertension  Currently BP maintained on midodrine.     6. Mixed hyperlipidemia  Continue statin and diet control. Her PCP closely monitors her blood work    7. Nonrheumatic mitral valve regurgitation. Nonrheumatic aortic valve insufficiency. Nonrheumatic tricuspid valve regurgitation  To be followed up with serial echocardiograms at recommended intervals    Recommend aggressive risk factor modification and therapeutic lifestyle changes. Low-salt, low-calorie, low-fat, low-cholesterol diet with regular exercise and to optimize weight. I will defer the ordering and monitoring of necessity lab studies to you, but I am available and happy to review and manage any of the data at your request in the future. Discussed concepts of atherosclerosis, including signs and symptoms of cardiac disease. Previous studies were reviewed. Safety measures were reviewed. Questions were entertained and answered. Patient was advised to report any problems requiring medical attention. Follow-up with PCP and appropriate specialist and lab work as discussed. Return for follow up visit as scheduled or earlier, if needed. Thank you for allowing me to participate in the care and evaluation of your patient. Should you have any questions, please feel free to contact me.       Carlos Kramer MD  9/7/2023,10:14 AM

## 2023-09-07 NOTE — TELEPHONE ENCOUNTER
T/c from pt - returned our call - pt states she still is lightheaded & weakness at times that can be pretty bad - her BP was reading 121 - 113 - 120 yesterday - should she keep taking the Diltoazem? Please advise.

## 2023-09-07 NOTE — TELEPHONE ENCOUNTER
Spoke with pt -- reports the heart rates she has been getting have been in the range of 65 thru 87. Please advise.

## 2023-09-15 ENCOUNTER — IN-CLINIC DEVICE VISIT (OUTPATIENT)
Dept: CARDIOLOGY CLINIC | Facility: CLINIC | Age: 78
End: 2023-09-15
Payer: MEDICARE

## 2023-09-15 DIAGNOSIS — Z95.0 PRESENCE OF PERMANENT CARDIAC PACEMAKER: Primary | ICD-10-CM

## 2023-09-15 PROCEDURE — 93280 PM DEVICE PROGR EVAL DUAL: CPT | Performed by: INTERNAL MEDICINE

## 2023-09-15 NOTE — PROGRESS NOTES
MDT DUAL CHAMBER PM  - ACTIVE SYSTEM IS MRI CONDITIONAL   DEVICE INTERROGATED IN THE [place] OFFICE:  BATTERY VOLTAGE ADEQUATE (8.8 YR.).  AP 71.9%  5.8%.   ALL AVAILABLE LEAD PARAMETERS WITHIN NORMAL LIMITS.  AF IN PROGRESS WITH AVG V -158 BPM.  AF BURDEN 26.0% SINCE 12/2022.  PATIENT TAKES ELIQUIS, METOPROLOL, AND AMIODARONE.  NO PROGRAMMING CHANGES MADE TO DEVICE PARAMETERS.  NORMAL DEVICE FUNCTION.  RG

## 2023-09-26 DIAGNOSIS — I48.0 PAF (PAROXYSMAL ATRIAL FIBRILLATION) (HCC): Primary | ICD-10-CM

## 2023-09-26 RX ORDER — DIGOXIN 125 MCG
125 TABLET ORAL DAILY
Qty: 30 TABLET | Refills: 3 | Status: SHIPPED | OUTPATIENT
Start: 2023-09-26

## 2023-09-26 NOTE — TELEPHONE ENCOUNTER
Per Dr. Mickie Ansari note from device interrogation 9/15/23, patient candidate for afib ablation and should be set up. Patient's HR has been elevated and she c/o SOB with walking, lightheadedness, GE. I spoke with patient at length and answered all questions in reference to ablation. She would like to set this up. Per Dr. Mike Drake, due to elevated HR, to start digoxin 0.125mg q.o.d and have EKG, BMP and device check in one week. I will send message to scheduling to set up afib ablation. Orders placed for EKG (to be done at [facility]). She will have BMP and send device transmission in one week.

## 2023-09-29 ENCOUNTER — TELEPHONE (OUTPATIENT)
Dept: CARDIOLOGY CLINIC | Facility: CLINIC | Age: 78
End: 2023-09-29

## 2023-09-29 NOTE — TELEPHONE ENCOUNTER
----- Message from Charisse Zhang sent at 9/28/2023  5:00 PM EDT -----  Dave Masters,    Is there any way we could schedule this patient to have an EKG with rhythm strips at your office next week 10/5 or 10/6, as patient lives in [place]. We started her on digoxin yesterday and are going to be scheduling an ablation for her as well. Dr. Mike Drake needed a one week EKG to make sure that her heart rate has come down with the digoxin. If so, can you have your clerical team schedule her and just let me know. We appreciate it.       Page Giron

## 2023-10-04 ENCOUNTER — APPOINTMENT (OUTPATIENT)
Dept: LAB | Facility: HOSPITAL | Age: 78
End: 2023-10-04
Attending: INTERNAL MEDICINE
Payer: MEDICARE

## 2023-10-04 DIAGNOSIS — I48.0 PAF (PAROXYSMAL ATRIAL FIBRILLATION) (HCC): ICD-10-CM

## 2023-10-04 LAB
ANION GAP SERPL CALCULATED.3IONS-SCNC: 6 MMOL/L
BUN SERPL-MCNC: 19 MG/DL (ref 5–25)
CALCIUM SERPL-MCNC: 9.4 MG/DL (ref 8.4–10.2)
CHLORIDE SERPL-SCNC: 109 MMOL/L (ref 96–108)
CO2 SERPL-SCNC: 25 MMOL/L (ref 21–32)
CREAT SERPL-MCNC: 1.24 MG/DL (ref 0.6–1.3)
GFR SERPL CREATININE-BSD FRML MDRD: 41 ML/MIN/1.73SQ M
GLUCOSE P FAST SERPL-MCNC: 120 MG/DL (ref 65–99)
POTASSIUM SERPL-SCNC: 5 MMOL/L (ref 3.5–5.3)
SODIUM SERPL-SCNC: 140 MMOL/L (ref 135–147)

## 2023-10-04 PROCEDURE — 36415 COLL VENOUS BLD VENIPUNCTURE: CPT

## 2023-10-04 PROCEDURE — 80048 BASIC METABOLIC PNL TOTAL CA: CPT

## 2023-10-05 ENCOUNTER — CLINICAL SUPPORT (OUTPATIENT)
Dept: CARDIOLOGY CLINIC | Facility: CLINIC | Age: 78
End: 2023-10-05
Payer: MEDICARE

## 2023-10-05 DIAGNOSIS — I50.23 ACUTE ON CHRONIC SYSTOLIC CONGESTIVE HEART FAILURE (HCC): Primary | ICD-10-CM

## 2023-10-05 PROCEDURE — 93000 ELECTROCARDIOGRAM COMPLETE: CPT

## 2023-10-05 PROCEDURE — 99211 OFF/OP EST MAY X REQ PHY/QHP: CPT

## 2023-10-05 NOTE — PROGRESS NOTES
Patient came in today for a nurse visit for EKG as per . Patient is on Digoxin 125 mcg 1 tablet daily. Patient has a upcoming ablation. Patient EKG was done. Patient EKG was discuss and reviewed by Malka Azul states that EKG looks good.  states to forward EKG to . EKG scanned into chart  and forward to .

## 2023-10-09 ENCOUNTER — OFFICE VISIT (OUTPATIENT)
Dept: CARDIOLOGY CLINIC | Facility: CLINIC | Age: 78
End: 2023-10-09
Payer: MEDICARE

## 2023-10-09 ENCOUNTER — PREP FOR PROCEDURE (OUTPATIENT)
Dept: CARDIOLOGY CLINIC | Facility: CLINIC | Age: 78
End: 2023-10-09

## 2023-10-09 ENCOUNTER — TELEPHONE (OUTPATIENT)
Dept: CARDIOLOGY CLINIC | Facility: CLINIC | Age: 78
End: 2023-10-09

## 2023-10-09 VITALS
SYSTOLIC BLOOD PRESSURE: 108 MMHG | WEIGHT: 200 LBS | HEIGHT: 65 IN | DIASTOLIC BLOOD PRESSURE: 90 MMHG | HEART RATE: 95 BPM | BODY MASS INDEX: 33.32 KG/M2

## 2023-10-09 DIAGNOSIS — I48.0 PAF (PAROXYSMAL ATRIAL FIBRILLATION) (HCC): Primary | ICD-10-CM

## 2023-10-09 DIAGNOSIS — I48.91 ATRIAL FIBRILLATION, UNSPECIFIED TYPE (HCC): Primary | ICD-10-CM

## 2023-10-09 PROCEDURE — 93000 ELECTROCARDIOGRAM COMPLETE: CPT | Performed by: PHYSICIAN ASSISTANT

## 2023-10-09 PROCEDURE — 99215 OFFICE O/P EST HI 40 MIN: CPT | Performed by: PHYSICIAN ASSISTANT

## 2023-10-09 NOTE — TELEPHONE ENCOUNTER
Dr. Stacy Nguyen, any hold on Eliquis? Also, what company? CT PV prior? Patient scheduled for LUIZA/AFIB ABL PW on 10/19/23 at [facility] with Dr. Stacy Nguyen. Instructions given to patient in office. Medication hold Lasix AM.     Per patient, Medicare as primary insurance.

## 2023-10-09 NOTE — H&P (VIEW-ONLY)
Electrophysiology Office Note    Sharri Wheatley  17/06/2001  [id number]  [facility]  [address] [address]    Assessment/Plan     Primary diagnosis:   1. Atrial fibrillation - persistent   -First diagnosed with atrial fibrillation on EKG done at PCPs office.  -Has been cardioverted December 20, 2018 and was in sinus rhythm for period of time but reverted back into A-fib  In January 2019  - Previously on sotalol in 2018 changed to amiodarone March 2023 2/2 breakthrough  - DCCV x 3 (6/2018, 12/2018, 11/2022)   -Anticoagulated with Eliquis for [id number] score of 7  -continue to have RVR on device check with significant burden of afib (100%) w/ uncontrolled rates on amio     Plan:   -Discussed need for atrial fibrillation ablation given breakthrough on amiodarone  -Discussed risks and benefits of procedure and patient has opted to proceed with A-fib ablation.  -Discussed with Dr. Jaya Alfaro patient will undergo PVI and posterior wall ablation  -Patient will be set up for A-fib ablation as soon as possible  2. SSS s/p DC PPM 2018  - after cardioversion during sotalol load found to be bradycardiac to the 40's  - s/p implant 12/31/2018   -Most recent device interrogation shows 100% A-fib burden with uncontrolled rates   3. Systolic CHF  -Felt to be tachycardia mediated with LVEF as low as 35 to 40% in echo in 2018  -EF appears to have recovered to around 50 to 55%. 4.  History of TIA and stroke  5. Hypertension  6. Obesity  7. CKD 3 A  8.   Hyperlipidemia      Cardiac Testing:     ECHO: Results for orders placed during the hospital encounter of 07/30/20    Echo complete with contrast if indicated    Narrative  [address] [place] [address]  [phone]    Transthoracic Echocardiogram  2D, M-mode, and Color Doppler    Study date:  30-Jul-2020    Patient: Sharri Wheatley  MR number: [id number]  Account number: [id number]  : 1945  Age: 75 years  Gender: Female  Status: Outpatient  Location: [facility]  Height: 65 in  Weight: 223 lb  BP: 118/ 78 mmHg    Indications: Cardiomyopathy. Diagnoses: I42.9 - Cardiomyopathy, unspecified    Sonographer:  Steele RCS  Primary Physician:  Pam Penaloza DO  Referring Physician:  Devon Godoy MD  Group:  [facility]  Interpreting Physician:  Wyatt Salvador MD    SUMMARY    LEFT VENTRICLE:  Ejection fraction was estimated to be 60 %. There were no regional wall motion abnormalities. Concentric hypertrophy was present. RIGHT VENTRICLE:  Estimated peak pressure was at least 50 mmHg. Pacer lead noted. LEFT ATRIUM:  The atrium was mildly to moderately dilated. MITRAL VALVE:  There was moderate regurgitation. AORTIC VALVE:  There was mild regurgitation. TRICUSPID VALVE:  There was moderate to severe regurgitation. PULMONIC VALVE:  There was trace regurgitation. HISTORY: PRIOR HISTORY: TIA. Paroxysmal atrial fibrillation. Bradycardia. Hypertension. Aortic insufficiency. Tricuspid regurgitation. Mitral regurgitation. Pacemaker. Cardiomyopathy. PROCEDURE: The study was performed in the [facility]. This was a routine study. The transthoracic approach was used. The study included complete 2D imaging, M-mode, and color Doppler. The heart rate was 85 bpm, at the  start of the study. Images were obtained from the parasternal, apical, subcostal, and suprasternal notch acoustic windows. Image quality was adequate. LEFT VENTRICLE: Size was normal. Ejection fraction was estimated to be 60 %. There were no regional wall motion abnormalities. Concentric hypertrophy was present. RIGHT VENTRICLE: The size was normal. Systolic function was normal. Wall thickness was normal. DOPPLER: Estimated peak pressure was at least 50 mmHg. Pacer lead noted.     LEFT ATRIUM: The atrium was mildly to moderately dilated. RIGHT ATRIUM: Size was normal.    MITRAL VALVE: There was annular calcification. DOPPLER: There was moderate regurgitation. AORTIC VALVE: The valve was trileaflet. Leaflets exhibited mildly increased thickness and normal cuspal separation. DOPPLER: Transaortic velocity was within the normal range. There was no evidence for stenosis. There was mild  regurgitation. TRICUSPID VALVE: The valve structure was normal. There was normal leaflet separation. DOPPLER: The transtricuspid velocity was within the normal range. There was no evidence for stenosis. There was moderate to severe regurgitation. PULMONIC VALVE: Leaflets exhibited normal thickness, no calcification, and normal cuspal separation. DOPPLER: The transpulmonic velocity was within the normal range. There was trace regurgitation. PERICARDIUM: There was no pericardial effusion. The pericardium was normal in appearance. AORTA: The root exhibited normal size. SYSTEM MEASUREMENT TABLES    2D  %FS: 31.49 %  Ao Diam: 3.15 cm  EDV(Teich): 97.57 ml  EF(Teich): 59.45 %  ESV(Teich): 39.57 ml  IVSd: 1.1 cm  LA Area: 22.84 cm2  LA Diam: 4.2 cm  LVEDV MOD A4C: 82.51 ml  LVEF MOD A4C: 73.31 %  LVESV MOD A4C: 22.02 ml  LVIDd: 4.6 cm  LVIDs: 3.15 cm  LVLd A4C: 7.1 cm  LVLs A4C: 5.81 cm  LVPWd: 1.09 cm  RA Area: 12.88 cm2  RVIDd: 2.71 cm  RWT: 0.47  SV MOD A4C: 60.48 ml  SV(Teich): 58 ml    CW  AR Dec Nye: 2.59 m/s2  AR Dec Time: 1703.32 ms  AR PHT: 493.96 ms  AR Vmax: 4.39 m/s  AR maxP.28 mmHg  AV Env. Ti: 384.4 ms  AV MaxP.3 mmHg  AV VTI: 18.65 cm  AV Vmax: 1.04 m/s  AV Vmean: 0.49 m/s  AV meanP.5 mmHg  MR VTI: 246.71 cm  MR Vmax: 5.77 m/s  MR Vmean: 4.77 m/s  MR maxP.39 mmHg  MR meanP.1 mmHg  TR MaxP.67 mmHg  TR Vmax: 3.45 m/s    MM  TAPSE: 2.04 cm    PW  E': 0.05 m/s  E/E': 20.58  LVOT Vmax: 0.66 m/s  LVOT maxP.73 mmHg  MV A Gal: 0.46 m/s  MV Dec Nye: 5.79 m/s2  MV DecT: 192.32 ms  MV E Gal: 1.11 m/s  MV E/A Ratio: 2.4  MV PHT: 55.77 ms  MVA By PHT: 3.94 cm2    [facility] Accredited Echocardiography Laboratory    Prepared and electronically signed by    Jake Quick MD  Signed 30-Jul-2020 16:01:20        History of Present Illness     HPI/INTERVAL HISTORY:  Myra Wilder is a 78-year-old female with past medical history significant for TIA/stroke, obesity, sick sinus syndrome status post dual-chamber pacemaker implantation 2018, hypertension, CKD 3, hyperlipidemia, systolic heart failure. She presents to the office today to discuss further management of her atrial fibrillation. Briefly, patient's history begins when she was first diagnosed with atrial fibrillation "many years ago" by her PCP during routine EKG. She was relatively asymptomatic at that time. She was started on anticoagulation for [id number] score of 7 and was put on rate control medications with Toprol-XL. She continued to have repeat bouts of rapid atrial fibrillation causing associated palpitations and some shortness of breath. She underwent her first cardioversion for atrial fibrillation on 6/11/2018   Subsequent increase in her Toprol-XL to 100 mg daily. She had echocardiogram done at that time showing EF of around 35 to 40% and was felt that her cardiomyopathy was likely tachycardia mediated. She had repeat office visit with her cardiologist in November 2018 and was found to again be back in atrial fibrillation after cardioversion a couple months prior. She was referred at this time to Dr. Ananth Lan on December 2018. She was admitted to the hospital for sotalol initiation 120 mg twice daily and her beta-blocker dose was decreased. Patient underwent cardioversion during December admission for sotalol load and was found to cardioverted into sinus bradycardia with QTc of 407 ms in sinus rhythm. Her Toprol-XL was stopped due to bradycardia and sotalol dose was reduced to 80 mg p.o. twice daily.   She underwent permanent pacemaker implantation on 2018 secondary to sick sinus syndrome. Patient did follow up with Dr. Marimar Ambrosio in 2019 at which time she was found to be back in atrial fibrillation. Her sotalol was increased back to 120 mg daily. She had intermittent periods of chest pain as well for which she had hospital admissions underwent stress testing showing no ischemia. She underwent repeat cardioversion in 2022 after she was found to be back in atrial fibrillation with rapid ventricular response. Patient continued to have breakthrough episodes of atrial fibrillation was admitted in 2023 for RVR. Sotalol was discontinued at that time and amiodarone was started. She did follow-up with her cardiologist and device reports showed persistent 100% atrial fibrillation burden on last check with uncontrolled rates despite amiodarone, and beta-blocker therapy. She was brought into the office on 10/9/2023 to discuss further options including ablation for which she was agreeable to. We did discuss versus benefits of ablation in detail and patient wishes to proceed with ablation as soon as possible. Review of Systems   Respiratory: Positive for shortness of breath. Negative for chest tightness. Cardiovascular: Positive for leg swelling. Negative for chest pain and palpitations. Neurological: Negative for dizziness, syncope and light-headedness. All other systems reviewed and are negative. ROS as noted above, otherwise 12 point review of systems was performed and is negative.        Historical Information   Social History     Socioeconomic History   • Marital status: /Civil Union     Spouse name: Not on file   • Number of children: Not on file   • Years of education: Not on file   • Highest education level: Not on file   Occupational History   • Occupation: Retired   Tobacco Use   • Smoking status: Former     Types: Cigarettes     Quit date:      Years since quittin.8   • Smokeless tobacco: Never   • Tobacco comments:     no smoking for 46 years   Vaping Use   • Vaping Use: Never used   Substance and Sexual Activity   • Alcohol use: Yes     Alcohol/week: 10.0 standard drinks of alcohol     Types: 10 Glasses of wine per week     Comment: occ   • Drug use: No   • Sexual activity: Never     Partners: Male     Birth control/protection: None   Other Topics Concern   • Not on file   Social History Narrative    Always uses seat belt     Social Determinants of Health     Financial Resource Strain: Low Risk  (12/5/2022)    Overall Financial Resource Strain (CARDIA)    • Difficulty of Paying Living Expenses: Not hard at all   Food Insecurity: No Food Insecurity (12/22/2022)    Hunger Vital Sign    • Worried About Running Out of Food in the Last Year: Never true    • Ran Out of Food in the Last Year: Never true   Transportation Needs: No Transportation Needs (12/22/2022)    PRAPARE - Transportation    • Lack of Transportation (Medical): No    • Lack of Transportation (Non-Medical):  No   Physical Activity: Not on file   Stress: Not on file   Social Connections: Not on file   Intimate Partner Violence: Not on file   Housing Stability: Low Risk  (12/22/2022)    Housing Stability Vital Sign    • Unable to Pay for Housing in the Last Year: No    • Number of Places Lived in the Last Year: 1    • Unstable Housing in the Last Year: No     Past Medical History:   Diagnosis Date   • Aphasia, mixed 07/28/2017   • Atrial fibrillation (HCC)    • CHF (congestive heart failure) (HCC)    • Colon polyp    • Headache    • Hyperlipidemia    • Hypertension    • Lyme disease    • Shortness of breath    • TIA (transient ischemic attack)    • Vertigo      Past Surgical History:   Procedure Laterality Date   • CARDIAC PACEMAKER PLACEMENT  12/2019   • COLONOSCOPY     • IA SIGMOIDOSCOPY FLX DX W/COLLJ SPEC BR/WA IF PFRMD N/A 11/28/2017    Procedure: [place];  Surgeon: Leslie Reyes MD;  Location: [facility]; Service: Gastroenterology   • TONSILLECTOMY       Social History     Substance and Sexual Activity   Alcohol Use Yes   • Alcohol/week: 10.0 standard drinks of alcohol   • Types: 10 Glasses of wine per week    Comment: occ     Social History     Substance and Sexual Activity   Drug Use No     Social History     Tobacco Use   Smoking Status Former   • Types: Cigarettes   • Quit date:    • Years since quittin.8   Smokeless Tobacco Never   Tobacco Comments    no smoking for 55 years     Family History   Problem Relation Age of Onset   • Lung cancer Mother    • Dementia Father    • Alzheimer's disease Father    • Other Father         Cardiac Disorder    • Lung cancer Maternal Grandmother    • Breast cancer Paternal Aunt 44   • No Known Problems Paternal Aunt    • No Known Problems Paternal Aunt        Meds/Allergies       Current Outpatient Medications:   •  amiodarone 200 mg tablet, Take 1 tablet (200 mg total) by mouth daily, Disp: 90 tablet, Rfl: 3  •  digoxin (Lanoxin) 0.125 mg tablet, Take 1 tablet (125 mcg total) by mouth daily, Disp: 30 tablet, Rfl: 3  •  Eliquis 5 MG, TAKE 1 TABLET BY MOUTH  TWICE DAILY, Disp: 180 tablet, Rfl: 3  •  furosemide (LASIX) 20 mg tablet, Take 1 tablet (20 mg total) by mouth daily, Disp: 90 tablet, Rfl: 3  •  lovastatin (MEVACOR) 10 MG tablet, TAKE 1 TABLET BY MOUTH  DAILY AT BEDTIME, Disp: 90 tablet, Rfl: 3  •  metoprolol succinate (TOPROL-XL) 50 mg 24 hr tablet, Take 1.5 tablets (75 mg total) by mouth daily, Disp: 135 tablet, Rfl: 3  •  midodrine (PROAMATINE) 5 mg tablet, Take 1 tablet (5 mg total) by mouth 3 (three) times a day before meals, Disp: 90 tablet, Rfl: 5  •  potassium chloride (K-DUR,KLOR-CON) 10 mEq tablet, Take 1 tablet (10 mEq total) by mouth daily, Disp: 90 tablet, Rfl: 3    No Known Allergies    Objective   Vitals: Blood pressure 108/90, pulse 95, height 5' 5" (1.651 m), weight 90.7 kg (200 lb). Physical Exam  Vitals and nursing note reviewed. Constitutional:       General: She is not in acute distress. Cardiovascular:      Rate and Rhythm: Normal rate. Rhythm irregular. Pulmonary:      Effort: Pulmonary effort is normal.      Breath sounds: Normal breath sounds. Abdominal:      General: Abdomen is flat. Palpations: Abdomen is soft. Musculoskeletal:      Right lower leg: No edema. Left lower leg: No edema. Skin:     General: Skin is warm and dry. Coloration: Skin is not jaundiced. Neurological:      General: No focal deficit present. Mental Status: She is alert and oriented to person, place, and time. Psychiatric:         Mood and Affect: Mood normal.           Labs:  Appointment on 10/04/2023   Component Date Value   • Sodium 10/04/2023 140    • Potassium 10/04/2023 5.0    • Chloride 10/04/2023 109 (H)    • CO2 10/04/2023 25    • ANION GAP 10/04/2023 6    • BUN 10/04/2023 19    • Creatinine 10/04/2023 1.24    • Glucose, Fasting 10/04/2023 120 (H)    • Calcium 10/04/2023 9.4    • eGFR 10/04/2023 41        Imaging: I have personally reviewed pertinent reports.

## 2023-10-09 NOTE — PROGRESS NOTES
Electrophysiology Office Note    Rosalba Burnette  [birth year]  [id number]  [facility]  [address] [facility] [address]    Assessment/Plan     Primary diagnosis:   1. Atrial fibrillation - persistent   -First diagnosed with atrial fibrillation on EKG done at PCPs office.  -Has been cardioverted December 20, 2018 and was in sinus rhythm for period of time but reverted back into A-fib  In January 2019  - Previously on sotalol in 2018 changed to amiodarone March 2023 2/2 breakthrough  - DCCV x 3 (6/2018, 12/2018, 11/2022)   -Anticoagulated with Eliquis for [id number] score of 7  -continue to have RVR on device check with significant burden of afib (100%) w/ uncontrolled rates on amio     Plan:   -Discussed need for atrial fibrillation ablation given breakthrough on amiodarone  -Discussed risks and benefits of procedure and patient has opted to proceed with A-fib ablation.  -Discussed with Dr. Ravi Sands patient will undergo PVI and posterior wall ablation  -Patient will be set up for A-fib ablation as soon as possible  2. SSS s/p DC PPM 2018  - after cardioversion during sotalol load found to be bradycardiac to the 40's  - s/p implant 12/31/2018   -Most recent device interrogation shows 100% A-fib burden with uncontrolled rates   3. Systolic CHF  -Felt to be tachycardia mediated with LVEF as low as 35 to 40% in echo in 2018  -EF appears to have recovered to around 50 to 55%. 4.  History of TIA and stroke  5. Hypertension  6. Obesity  7. CKD 3 A  8.   Hyperlipidemia      Cardiac Testing:     ECHO: Results for orders placed during the hospital encounter of 07/30/20    Echo complete with contrast if indicated    Narrative  [address] [place] [address]  [phone]    Transthoracic Echocardiogram  2D, M-mode, and Color Doppler    Study date:  30-Jul-2020    Patient: Rosalba Burnette  MR number: [id number]  Account number: [id number]  : 1945  Age: 75 years  Gender: Female  Status: Outpatient  Location: [facility]  Height: 65 in  Weight: 223 lb  BP: 118/ 78 mmHg    Indications: Cardiomyopathy. Diagnoses: I42.9 - Cardiomyopathy, unspecified    Sonographer:  Muñoz RCS  Primary Physician:  Army Tereso DO  Referring Physician:  Paula Moses MD  Group:  [facility]  Interpreting Physician:  Yelitza Concepcion MD    SUMMARY    LEFT VENTRICLE:  Ejection fraction was estimated to be 60 %. There were no regional wall motion abnormalities. Concentric hypertrophy was present. RIGHT VENTRICLE:  Estimated peak pressure was at least 50 mmHg. Pacer lead noted. LEFT ATRIUM:  The atrium was mildly to moderately dilated. MITRAL VALVE:  There was moderate regurgitation. AORTIC VALVE:  There was mild regurgitation. TRICUSPID VALVE:  There was moderate to severe regurgitation. PULMONIC VALVE:  There was trace regurgitation. HISTORY: PRIOR HISTORY: TIA. Paroxysmal atrial fibrillation. Bradycardia. Hypertension. Aortic insufficiency. Tricuspid regurgitation. Mitral regurgitation. Pacemaker. Cardiomyopathy. PROCEDURE: The study was performed in the [facility]. This was a routine study. The transthoracic approach was used. The study included complete 2D imaging, M-mode, and color Doppler. The heart rate was 85 bpm, at the  start of the study. Images were obtained from the parasternal, apical, subcostal, and suprasternal notch acoustic windows. Image quality was adequate. LEFT VENTRICLE: Size was normal. Ejection fraction was estimated to be 60 %. There were no regional wall motion abnormalities. Concentric hypertrophy was present. RIGHT VENTRICLE: The size was normal. Systolic function was normal. Wall thickness was normal. DOPPLER: Estimated peak pressure was at least 50 mmHg. Pacer lead noted.     LEFT ATRIUM: The atrium was mildly to moderately dilated. RIGHT ATRIUM: Size was normal.    MITRAL VALVE: There was annular calcification. DOPPLER: There was moderate regurgitation. AORTIC VALVE: The valve was trileaflet. Leaflets exhibited mildly increased thickness and normal cuspal separation. DOPPLER: Transaortic velocity was within the normal range. There was no evidence for stenosis. There was mild  regurgitation. TRICUSPID VALVE: The valve structure was normal. There was normal leaflet separation. DOPPLER: The transtricuspid velocity was within the normal range. There was no evidence for stenosis. There was moderate to severe regurgitation. PULMONIC VALVE: Leaflets exhibited normal thickness, no calcification, and normal cuspal separation. DOPPLER: The transpulmonic velocity was within the normal range. There was trace regurgitation. PERICARDIUM: There was no pericardial effusion. The pericardium was normal in appearance. AORTA: The root exhibited normal size. SYSTEM MEASUREMENT TABLES    2D  %FS: 31.49 %  Ao Diam: 3.15 cm  EDV(Teich): 97.57 ml  EF(Teich): 59.45 %  ESV(Teich): 39.57 ml  IVSd: 1.1 cm  LA Area: 22.84 cm2  LA Diam: 4.2 cm  LVEDV MOD A4C: 82.51 ml  LVEF MOD A4C: 73.31 %  LVESV MOD A4C: 22.02 ml  LVIDd: 4.6 cm  LVIDs: 3.15 cm  LVLd A4C: 7.1 cm  LVLs A4C: 5.81 cm  LVPWd: 1.09 cm  RA Area: 12.88 cm2  RVIDd: 2.71 cm  RWT: 0.47  SV MOD A4C: 60.48 ml  SV(Teich): 58 ml    CW  AR Dec [place]: 2.59 m/s2  AR Dec Time: 1703.32 ms  AR PHT: 493.96 ms  AR Vmax: 4.39 m/s  AR maxP.28 mmHg  AV Env. Ti: 384.4 ms  AV MaxP.3 mmHg  AV VTI: 18.65 cm  AV Vmax: 1.04 m/s  AV Vmean: 0.49 m/s  AV meanP.5 mmHg  MR VTI: 246.71 cm  MR Vmax: 5.77 m/s  MR Vmean: 4.77 m/s  MR maxP.39 mmHg  MR meanP.1 mmHg  TR MaxP.67 mmHg  TR Vmax: 3.45 m/s    MM  TAPSE: 2.04 cm    PW  E': 0.05 m/s  E/E': 20.58  LVOT Vmax: 0.66 m/s  LVOT maxP.73 mmHg  MV A Gal: 0.46 m/s  MV Dec [place]: 5.79 m/s2  MV DecT: 192.32 ms  MV E Gal: 1.11 m/s  MV E/A Ratio: 2.4  MV PHT: 55.77 ms  MVA By PHT: 3.94 cm2    [facility] Accredited Echocardiography Laboratory    Prepared and electronically signed by    Yelitza Concepcion MD  Signed 30-Jul-2020 16:01:20        History of Present Illness     HPI/INTERVAL HISTORY:  Tamra Buenrostro is a 69-year-old female with past medical history significant for TIA/stroke, obesity, sick sinus syndrome status post dual-chamber pacemaker implantation 2018, hypertension, CKD 3, hyperlipidemia, systolic heart failure. She presents to the office today to discuss further management of her atrial fibrillation. Briefly, patient's history begins when she was first diagnosed with atrial fibrillation "many years ago" by her PCP during routine EKG. She was relatively asymptomatic at that time. She was started on anticoagulation for [id number] score of 7 and was put on rate control medications with Toprol-XL. She continued to have repeat bouts of rapid atrial fibrillation causing associated palpitations and some shortness of breath. She underwent her first cardioversion for atrial fibrillation on 6/11/2018   Subsequent increase in her Toprol-XL to 100 mg daily. She had echocardiogram done at that time showing EF of around 35 to 40% and was felt that her cardiomyopathy was likely tachycardia mediated. She had repeat office visit with her cardiologist in November 2018 and was found to again be back in atrial fibrillation after cardioversion a couple months prior. She was referred at this time to Dr. Fouzia Mendes on December 2018. She was admitted to the hospital for sotalol initiation 120 mg twice daily and her beta-blocker dose was decreased. Patient underwent cardioversion during December admission for sotalol load and was found to cardioverted into sinus bradycardia with QTc of 407 ms in sinus rhythm. Her Toprol-XL was stopped due to bradycardia and sotalol dose was reduced to 80 mg p.o. twice daily.   She underwent permanent pacemaker implantation on 2018 secondary to sick sinus syndrome. Patient did follow up with Dr. Gonzalo Alonzo in 2019 at which time she was found to be back in atrial fibrillation. Her sotalol was increased back to 120 mg daily. She had intermittent periods of chest pain as well for which she had hospital admissions underwent stress testing showing no ischemia. She underwent repeat cardioversion in 2022 after she was found to be back in atrial fibrillation with rapid ventricular response. Patient continued to have breakthrough episodes of atrial fibrillation was admitted in 2023 for RVR. Sotalol was discontinued at that time and amiodarone was started. She did follow-up with her cardiologist and device reports showed persistent 100% atrial fibrillation burden on last check with uncontrolled rates despite amiodarone, and beta-blocker therapy. She was brought into the office on 10/9/2023 to discuss further options including ablation for which she was agreeable to. We did discuss versus benefits of ablation in detail and patient wishes to proceed with ablation as soon as possible. Review of Systems   Respiratory: Positive for shortness of breath. Negative for chest tightness. Cardiovascular: Positive for leg swelling. Negative for chest pain and palpitations. Neurological: Negative for dizziness, syncope and light-headedness. All other systems reviewed and are negative. ROS as noted above, otherwise 12 point review of systems was performed and is negative.        Historical Information   Social History     Socioeconomic History   • Marital status: /Civil Union     Spouse name: Not on file   • Number of children: Not on file   • Years of education: Not on file   • Highest education level: Not on file   Occupational History   • Occupation: Retired   Tobacco Use   • Smoking status: Former     Types: Cigarettes     Quit date:      Years since quittin.8   • Smokeless tobacco: Never   • Tobacco comments:     no smoking for 46 years   Vaping Use   • Vaping Use: Never used   Substance and Sexual Activity   • Alcohol use: Yes     Alcohol/week: 10.0 standard drinks of alcohol     Types: 10 Glasses of wine per week     Comment: occ   • Drug use: No   • Sexual activity: Never     Partners: Male     Birth control/protection: None   Other Topics Concern   • Not on file   Social History Narrative    Always uses seat belt     Social Determinants of Health     Financial Resource Strain: Low Risk  (12/5/2022)    Overall Financial Resource Strain (CARDIA)    • Difficulty of Paying Living Expenses: Not hard at all   Food Insecurity: No Food Insecurity (12/22/2022)    Hunger Vital Sign    • Worried About Running Out of Food in the Last Year: Never true    • Ran Out of Food in the Last Year: Never true   Transportation Needs: No Transportation Needs (12/22/2022)    PRAPARE - Transportation    • Lack of Transportation (Medical): No    • Lack of Transportation (Non-Medical):  No   Physical Activity: Not on file   Stress: Not on file   Social Connections: Not on file   Intimate Partner Violence: Not on file   Housing Stability: Low Risk  (12/22/2022)    Housing Stability Vital Sign    • Unable to Pay for Housing in the Last Year: No    • Number of Places Lived in the Last Year: 1    • Unstable Housing in the Last Year: No     Past Medical History:   Diagnosis Date   • Aphasia, mixed 07/28/2017   • Atrial fibrillation (HCC)    • CHF (congestive heart failure) (HCC)    • Colon polyp    • Headache    • Hyperlipidemia    • Hypertension    • Lyme disease    • Shortness of breath    • TIA (transient ischemic attack)    • Vertigo      Past Surgical History:   Procedure Laterality Date   • CARDIAC PACEMAKER PLACEMENT  12/2019   • COLONOSCOPY     • [place] SIGMOIDOSCOPY FLX DX W/COLLJ SPEC BR/WA IF PFRMD N/A 11/28/2017    Procedure: Niesha Luong;  Surgeon: Randall Nunez MD;  Location: [facility]; Service: Gastroenterology   • TONSILLECTOMY       Social History     Substance and Sexual Activity   Alcohol Use Yes   • Alcohol/week: 10.0 standard drinks of alcohol   • Types: 10 Glasses of wine per week    Comment: occ     Social History     Substance and Sexual Activity   Drug Use No     Social History     Tobacco Use   Smoking Status Former   • Types: Cigarettes   • Quit date:    • Years since quittin.8   Smokeless Tobacco Never   Tobacco Comments    no smoking for 55 years     Family History   Problem Relation Age of Onset   • Lung cancer Mother    • Dementia Father    • Alzheimer's disease Father    • Other Father         Cardiac Disorder    • Lung cancer Maternal Grandmother    • Breast cancer Paternal Aunt 44   • No Known Problems Paternal Aunt    • No Known Problems Paternal Aunt        Meds/Allergies       Current Outpatient Medications:   •  amiodarone 200 mg tablet, Take 1 tablet (200 mg total) by mouth daily, Disp: 90 tablet, Rfl: 3  •  digoxin (Lanoxin) 0.125 mg tablet, Take 1 tablet (125 mcg total) by mouth daily, Disp: 30 tablet, Rfl: 3  •  Eliquis 5 MG, TAKE 1 TABLET BY MOUTH  TWICE DAILY, Disp: 180 tablet, Rfl: 3  •  furosemide (LASIX) 20 mg tablet, Take 1 tablet (20 mg total) by mouth daily, Disp: 90 tablet, Rfl: 3  •  lovastatin (MEVACOR) 10 MG tablet, TAKE 1 TABLET BY MOUTH  DAILY AT BEDTIME, Disp: 90 tablet, Rfl: 3  •  metoprolol succinate (TOPROL-XL) 50 mg 24 hr tablet, Take 1.5 tablets (75 mg total) by mouth daily, Disp: 135 tablet, Rfl: 3  •  midodrine (PROAMATINE) 5 mg tablet, Take 1 tablet (5 mg total) by mouth 3 (three) times a day before meals, Disp: 90 tablet, Rfl: 5  •  potassium chloride (K-DUR,KLOR-CON) 10 mEq tablet, Take 1 tablet (10 mEq total) by mouth daily, Disp: 90 tablet, Rfl: 3    No Known Allergies    Objective   Vitals: Blood pressure 108/90, pulse 95, height 5' 5" (1.651 m), weight 90.7 kg (200 lb). Physical Exam  Vitals and nursing note reviewed. Constitutional:       General: She is not in acute distress. Cardiovascular:      Rate and Rhythm: Normal rate. Rhythm irregular. Pulmonary:      Effort: Pulmonary effort is normal.      Breath sounds: Normal breath sounds. Abdominal:      General: Abdomen is flat. Palpations: Abdomen is soft. Musculoskeletal:      Right lower leg: No edema. Left lower leg: No edema. Skin:     General: Skin is warm and dry. Coloration: Skin is not jaundiced. Neurological:      General: No focal deficit present. Mental Status: She is alert and oriented to person, place, and time. Psychiatric:         Mood and Affect: Mood normal.           Labs:  Appointment on 10/04/2023   Component Date Value   • Sodium 10/04/2023 140    • Potassium 10/04/2023 5.0    • Chloride 10/04/2023 109 (H)    • CO2 10/04/2023 25    • ANION GAP 10/04/2023 6    • BUN 10/04/2023 19    • Creatinine 10/04/2023 1.24    • Glucose, Fasting 10/04/2023 120 (H)    • Calcium 10/04/2023 9.4    • eGFR 10/04/2023 41        Imaging: I have personally reviewed pertinent reports.

## 2023-10-10 DIAGNOSIS — I48.0 PAF (PAROXYSMAL ATRIAL FIBRILLATION) (HCC): Primary | ICD-10-CM

## 2023-10-11 ENCOUNTER — HOSPITAL ENCOUNTER (OUTPATIENT)
Dept: RADIOLOGY | Facility: HOSPITAL | Age: 78
Discharge: HOME | End: 2023-10-11
Attending: INTERNAL MEDICINE
Payer: MEDICARE

## 2023-10-11 DIAGNOSIS — I48.0 PAF (PAROXYSMAL ATRIAL FIBRILLATION) (HCC): ICD-10-CM

## 2023-10-11 PROCEDURE — G1004 CDSM NDSC: HCPCS

## 2023-10-11 PROCEDURE — 75572 CT HRT W/3D IMAGE: CPT

## 2023-10-11 RX ORDER — IODIXANOL 320 MG/ML
100 INJECTION, SOLUTION INTRAVASCULAR
Status: COMPLETED | OUTPATIENT
Start: 2023-10-11 | End: 2023-10-11

## 2023-10-11 RX ADMIN — IODIXANOL 100 ML: 320 INJECTION, SOLUTION INTRAVASCULAR at 10:38

## 2023-10-12 ENCOUNTER — APPOINTMENT (OUTPATIENT)
Dept: LAB | Facility: HOSPITAL | Age: 78
End: 2023-10-12
Attending: INTERNAL MEDICINE
Payer: MEDICARE

## 2023-10-12 DIAGNOSIS — I48.91 ATRIAL FIBRILLATION, UNSPECIFIED TYPE (HCC): ICD-10-CM

## 2023-10-12 LAB
ALBUMIN SERPL BCP-MCNC: 4.3 G/DL (ref 3.5–5)
ALP SERPL-CCNC: 73 U/L (ref 34–104)
ALT SERPL W P-5'-P-CCNC: 15 U/L (ref 7–52)
ANION GAP SERPL CALCULATED.3IONS-SCNC: 6 MMOL/L
AST SERPL W P-5'-P-CCNC: 20 U/L (ref 13–39)
BASOPHILS # BLD AUTO: 0.08 THOUSANDS/ÂΜL (ref 0–0.1)
BASOPHILS NFR BLD AUTO: 1 % (ref 0–1)
BILIRUB SERPL-MCNC: 0.9 MG/DL (ref 0.2–1)
BUN SERPL-MCNC: 20 MG/DL (ref 5–25)
CALCIUM SERPL-MCNC: 9.2 MG/DL (ref 8.4–10.2)
CHLORIDE SERPL-SCNC: 106 MMOL/L (ref 96–108)
CO2 SERPL-SCNC: 27 MMOL/L (ref 21–32)
CREAT SERPL-MCNC: 1.3 MG/DL (ref 0.6–1.3)
EOSINOPHIL # BLD AUTO: 0.15 THOUSAND/ÂΜL (ref 0–0.61)
EOSINOPHIL NFR BLD AUTO: 2 % (ref 0–6)
ERYTHROCYTE [DISTWIDTH] IN BLOOD BY AUTOMATED COUNT: 19.5 % (ref 11.6–15.1)
GFR SERPL CREATININE-BSD FRML MDRD: 39 ML/MIN/1.73SQ M
GLUCOSE SERPL-MCNC: 107 MG/DL (ref 65–140)
HCT VFR BLD AUTO: 33.1 % (ref 34.8–46.1)
HGB BLD-MCNC: 9.4 G/DL (ref 11.5–15.4)
IMM GRANULOCYTES # BLD AUTO: 0.03 THOUSAND/UL (ref 0–0.2)
IMM GRANULOCYTES NFR BLD AUTO: 0 % (ref 0–2)
LYMPHOCYTES # BLD AUTO: 1.03 THOUSANDS/ÂΜL (ref 0.6–4.47)
LYMPHOCYTES NFR BLD AUTO: 13 % (ref 14–44)
MCH RBC QN AUTO: 21.3 PG (ref 26.8–34.3)
MCHC RBC AUTO-ENTMCNC: 28.4 G/DL (ref 31.4–37.4)
MCV RBC AUTO: 75 FL (ref 82–98)
MONOCYTES # BLD AUTO: 0.9 THOUSAND/ÂΜL (ref 0.17–1.22)
MONOCYTES NFR BLD AUTO: 12 % (ref 4–12)
NEUTROPHILS # BLD AUTO: 5.6 THOUSANDS/ÂΜL (ref 1.85–7.62)
NEUTS SEG NFR BLD AUTO: 72 % (ref 43–75)
NRBC BLD AUTO-RTO: 0 /100 WBCS
PLATELET # BLD AUTO: 367 THOUSANDS/UL (ref 149–390)
PMV BLD AUTO: 10.1 FL (ref 8.9–12.7)
POTASSIUM SERPL-SCNC: 4.4 MMOL/L (ref 3.5–5.3)
PROT SERPL-MCNC: 7.7 G/DL (ref 6.4–8.4)
RBC # BLD AUTO: 4.41 MILLION/UL (ref 3.81–5.12)
SODIUM SERPL-SCNC: 139 MMOL/L (ref 135–147)
WBC # BLD AUTO: 7.79 THOUSAND/UL (ref 4.31–10.16)

## 2023-10-12 PROCEDURE — 36415 COLL VENOUS BLD VENIPUNCTURE: CPT

## 2023-10-12 PROCEDURE — 80053 COMPREHEN METABOLIC PANEL: CPT

## 2023-10-12 PROCEDURE — 85025 COMPLETE CBC W/AUTO DIFF WBC: CPT

## 2023-10-13 ENCOUNTER — TELEPHONE (OUTPATIENT)
Dept: FAMILY MEDICINE CLINIC | Facility: CLINIC | Age: 78
End: 2023-10-13

## 2023-10-13 DIAGNOSIS — D50.9 IRON DEFICIENCY ANEMIA, UNSPECIFIED IRON DEFICIENCY ANEMIA TYPE: Primary | ICD-10-CM

## 2023-10-13 NOTE — TELEPHONE ENCOUNTER
Pt called and states that she has an ablation scheduled for 10/19/23. Pt states that Dr. Rabia Simon noted that her CBC was abnormal and states that her blood count is low. Does pt need an appt with us? She states that Dr. Rabia Simon told her to call to reach out to us to have you review her CBC.

## 2023-10-18 ENCOUNTER — ANESTHESIA EVENT (OUTPATIENT)
Dept: GASTROENTEROLOGY | Facility: HOSPITAL | Age: 78
DRG: 274 | End: 2023-10-18
Payer: MEDICARE

## 2023-10-18 DIAGNOSIS — I48.0 PAF (PAROXYSMAL ATRIAL FIBRILLATION) (HCC): Primary | ICD-10-CM

## 2023-10-19 ENCOUNTER — ANESTHESIA EVENT (OUTPATIENT)
Dept: NON INVASIVE DIAGNOSTICS | Facility: HOSPITAL | Age: 78
DRG: 274 | End: 2023-10-19
Payer: MEDICARE

## 2023-10-19 ENCOUNTER — HOSPITAL ENCOUNTER (INPATIENT)
Facility: HOSPITAL | Age: 78
LOS: 2 days | Discharge: HOME | DRG: 274 | End: 2023-10-22
Attending: INTERNAL MEDICINE | Admitting: INTERNAL MEDICINE
Payer: MEDICARE

## 2023-10-19 ENCOUNTER — ANESTHESIA (OUTPATIENT)
Dept: GASTROENTEROLOGY | Facility: HOSPITAL | Age: 78
DRG: 274 | End: 2023-10-19
Payer: MEDICARE

## 2023-10-19 ENCOUNTER — HOSPITAL ENCOUNTER (OUTPATIENT)
Dept: NON INVASIVE DIAGNOSTICS | Facility: HOSPITAL | Age: 78
Discharge: HOME | End: 2023-10-19
Attending: INTERNAL MEDICINE
Payer: MEDICARE

## 2023-10-19 VITALS
HEART RATE: 111 BPM | WEIGHT: 199 LBS | HEIGHT: 65 IN | BODY MASS INDEX: 33.15 KG/M2 | DIASTOLIC BLOOD PRESSURE: 83 MMHG | SYSTOLIC BLOOD PRESSURE: 140 MMHG

## 2023-10-19 DIAGNOSIS — I48.91 ATRIAL FIBRILLATION, UNSPECIFIED TYPE (HCC): ICD-10-CM

## 2023-10-19 DIAGNOSIS — I48.0 PAF (PAROXYSMAL ATRIAL FIBRILLATION) (HCC): ICD-10-CM

## 2023-10-19 PROBLEM — I48.19 PERSISTENT ATRIAL FIBRILLATION (HCC): Status: ACTIVE | Noted: 2019-02-09

## 2023-10-19 LAB
ANION GAP SERPL CALCULATED.3IONS-SCNC: 9 MMOL/L
ATRIAL RATE: 272 BPM
BUN SERPL-MCNC: 20 MG/DL (ref 5–25)
CALCIUM SERPL-MCNC: 9.3 MG/DL (ref 8.4–10.2)
CHLORIDE SERPL-SCNC: 106 MMOL/L (ref 96–108)
CO2 SERPL-SCNC: 23 MMOL/L (ref 21–32)
CREAT SERPL-MCNC: 1.24 MG/DL (ref 0.6–1.3)
ERYTHROCYTE [DISTWIDTH] IN BLOOD BY AUTOMATED COUNT: 19.7 % (ref 11.6–15.1)
GFR SERPL CREATININE-BSD FRML MDRD: 41 ML/MIN/1.73SQ M
GLUCOSE P FAST SERPL-MCNC: 119 MG/DL (ref 65–99)
GLUCOSE SERPL-MCNC: 119 MG/DL (ref 65–140)
HCT VFR BLD AUTO: 32.7 % (ref 34.8–46.1)
HGB BLD-MCNC: 9.5 G/DL (ref 11.5–15.4)
INR PPP: 1.38 (ref 0.84–1.19)
KCT BLD-ACNC: 334 SEC (ref 89–137)
KCT BLD-ACNC: 341 SEC (ref 89–137)
KCT BLD-ACNC: 348 SEC (ref 89–137)
KCT BLD-ACNC: 355 SEC (ref 89–137)
KCT BLD-ACNC: 362 SEC (ref 89–137)
KCT BLD-ACNC: 362 SEC (ref 89–137)
KCT BLD-ACNC: 369 SEC (ref 89–137)
KCT BLD-ACNC: 369 SEC (ref 89–137)
KCT BLD-ACNC: 384 SEC (ref 89–137)
KCT BLD-ACNC: 406 SEC (ref 89–137)
KCT BLD-ACNC: 427 SEC (ref 89–137)
MCH RBC QN AUTO: 21.5 PG (ref 26.8–34.3)
MCHC RBC AUTO-ENTMCNC: 29.1 G/DL (ref 31.4–37.4)
MCV RBC AUTO: 74 FL (ref 82–98)
PLATELET # BLD AUTO: 375 THOUSANDS/UL (ref 149–390)
PMV BLD AUTO: 10.4 FL (ref 8.9–12.7)
POTASSIUM SERPL-SCNC: 3.8 MMOL/L (ref 3.5–5.3)
PROTHROMBIN TIME: 16.8 SECONDS (ref 11.6–14.5)
QRS AXIS: 27 DEGREES
QRSD INTERVAL: 90 MS
QT INTERVAL: 374 MS
QTC INTERVAL: 439 MS
RBC # BLD AUTO: 4.41 MILLION/UL (ref 3.81–5.12)
SODIUM SERPL-SCNC: 138 MMOL/L (ref 135–147)
SPECIMEN SOURCE: ABNORMAL
T WAVE AXIS: -30 DEGREES
VENTRICULAR RATE: 83 BPM
WBC # BLD AUTO: 7.16 THOUSAND/UL (ref 4.31–10.16)

## 2023-10-19 PROCEDURE — C1894 INTRO/SHEATH, NON-LASER: HCPCS | Performed by: INTERNAL MEDICINE

## 2023-10-19 PROCEDURE — 93657 TX L/R ATRIAL FIB ADDL: CPT | Performed by: INTERNAL MEDICINE

## 2023-10-19 PROCEDURE — 76937 US GUIDE VASCULAR ACCESS: CPT | Performed by: INTERNAL MEDICINE

## 2023-10-19 PROCEDURE — 4A023FZ MEASUREMENT OF CARDIAC RHYTHM, PERCUTANEOUS APPROACH: ICD-10-PCS | Performed by: INTERNAL MEDICINE

## 2023-10-19 PROCEDURE — 93312 ECHO TRANSESOPHAGEAL: CPT

## 2023-10-19 PROCEDURE — C1730 CATH, EP, 19 OR FEW ELECT: HCPCS | Performed by: INTERNAL MEDICINE

## 2023-10-19 PROCEDURE — C1893 INTRO/SHEATH, FIXED,NON-PEEL: HCPCS | Performed by: INTERNAL MEDICINE

## 2023-10-19 PROCEDURE — 93005 ELECTROCARDIOGRAM TRACING: CPT

## 2023-10-19 PROCEDURE — 93655 ICAR CATH ABLTJ DSCRT ARRHYT: CPT | Performed by: INTERNAL MEDICINE

## 2023-10-19 PROCEDURE — 85347 COAGULATION TIME ACTIVATED: CPT

## 2023-10-19 PROCEDURE — 93656 COMPRE EP EVAL ABLTJ ATR FIB: CPT | Performed by: INTERNAL MEDICINE

## 2023-10-19 PROCEDURE — C1769 GUIDE WIRE: HCPCS | Performed by: INTERNAL MEDICINE

## 2023-10-19 PROCEDURE — C1733 CATH, EP, OTHR THAN COOL-TIP: HCPCS | Performed by: INTERNAL MEDICINE

## 2023-10-19 PROCEDURE — 80048 BASIC METABOLIC PNL TOTAL CA: CPT | Performed by: PHYSICIAN ASSISTANT

## 2023-10-19 PROCEDURE — 02K83ZZ MAP CONDUCTION MECHANISM, PERCUTANEOUS APPROACH: ICD-10-PCS | Performed by: INTERNAL MEDICINE

## 2023-10-19 PROCEDURE — C1731 CATH, EP, 20 OR MORE ELEC: HCPCS | Performed by: INTERNAL MEDICINE

## 2023-10-19 PROCEDURE — 85027 COMPLETE CBC AUTOMATED: CPT | Performed by: PHYSICIAN ASSISTANT

## 2023-10-19 PROCEDURE — C1732 CATH, EP, DIAG/ABL, 3D/VECT: HCPCS | Performed by: INTERNAL MEDICINE

## 2023-10-19 PROCEDURE — C9113 INJ PANTOPRAZOLE SODIUM, VIA: HCPCS | Performed by: PHYSICIAN ASSISTANT

## 2023-10-19 PROCEDURE — 4A0234Z MEASUREMENT OF CARDIAC ELECTRICAL ACTIVITY, PERCUTANEOUS APPROACH: ICD-10-PCS | Performed by: INTERNAL MEDICINE

## 2023-10-19 PROCEDURE — 85610 PROTHROMBIN TIME: CPT | Performed by: PHYSICIAN ASSISTANT

## 2023-10-19 PROCEDURE — C1759 CATH, INTRA ECHOCARDIOGRAPHY: HCPCS | Performed by: INTERNAL MEDICINE

## 2023-10-19 PROCEDURE — 93010 ELECTROCARDIOGRAM REPORT: CPT | Performed by: INTERNAL MEDICINE

## 2023-10-19 PROCEDURE — 02583ZZ DESTRUCTION OF CONDUCTION MECHANISM, PERCUTANEOUS APPROACH: ICD-10-PCS | Performed by: INTERNAL MEDICINE

## 2023-10-19 RX ORDER — FUROSEMIDE 20 MG/1
20 TABLET ORAL DAILY
Status: DISCONTINUED | OUTPATIENT
Start: 2023-10-20 | End: 2023-10-22 | Stop reason: HOSPADM

## 2023-10-19 RX ORDER — HYDROMORPHONE HCL IN WATER/PF 6 MG/30 ML
0.2 PATIENT CONTROLLED ANALGESIA SYRINGE INTRAVENOUS
Status: DISCONTINUED | OUTPATIENT
Start: 2023-10-19 | End: 2023-10-19 | Stop reason: HOSPADM

## 2023-10-19 RX ORDER — CEFAZOLIN SODIUM 2 G/50ML
SOLUTION INTRAVENOUS AS NEEDED
Status: DISCONTINUED | OUTPATIENT
Start: 2023-10-19 | End: 2023-10-19

## 2023-10-19 RX ORDER — SODIUM CHLORIDE 9 MG/ML
20 INJECTION, SOLUTION INTRAVENOUS CONTINUOUS
Status: DISCONTINUED | OUTPATIENT
Start: 2023-10-19 | End: 2023-10-19

## 2023-10-19 RX ORDER — PANTOPRAZOLE SODIUM 40 MG/10ML
40 INJECTION, POWDER, LYOPHILIZED, FOR SOLUTION INTRAVENOUS ONCE
Status: COMPLETED | OUTPATIENT
Start: 2023-10-19 | End: 2023-10-19

## 2023-10-19 RX ORDER — FENTANYL CITRATE/PF 50 MCG/ML
25 SYRINGE (ML) INJECTION
Status: CANCELLED | OUTPATIENT
Start: 2023-10-19

## 2023-10-19 RX ORDER — POTASSIUM CHLORIDE 750 MG/1
10 TABLET, EXTENDED RELEASE ORAL DAILY
Status: DISCONTINUED | OUTPATIENT
Start: 2023-10-20 | End: 2023-10-22 | Stop reason: HOSPADM

## 2023-10-19 RX ORDER — PROTAMINE SULFATE 10 MG/ML
INJECTION, SOLUTION INTRAVENOUS AS NEEDED
Status: DISCONTINUED | OUTPATIENT
Start: 2023-10-19 | End: 2023-10-19

## 2023-10-19 RX ORDER — HEPARIN SODIUM 1000 [USP'U]/ML
INJECTION, SOLUTION INTRAVENOUS; SUBCUTANEOUS CODE/TRAUMA/SEDATION MEDICATION
Status: DISCONTINUED | OUTPATIENT
Start: 2023-10-19 | End: 2023-10-19 | Stop reason: HOSPADM

## 2023-10-19 RX ORDER — SUCCINYLCHOLINE/SOD CL,ISO/PF 100 MG/5ML
SYRINGE (ML) INTRAVENOUS AS NEEDED
Status: DISCONTINUED | OUTPATIENT
Start: 2023-10-19 | End: 2023-10-19

## 2023-10-19 RX ORDER — COLCHICINE 0.6 MG/1
0.6 TABLET ORAL 2 TIMES DAILY
Status: DISCONTINUED | OUTPATIENT
Start: 2023-10-19 | End: 2023-10-22

## 2023-10-19 RX ORDER — MIDODRINE HYDROCHLORIDE 5 MG/1
5 TABLET ORAL
Status: DISCONTINUED | OUTPATIENT
Start: 2023-10-19 | End: 2023-10-22 | Stop reason: HOSPADM

## 2023-10-19 RX ORDER — FENTANYL CITRATE/PF 50 MCG/ML
25 SYRINGE (ML) INJECTION
Status: DISCONTINUED | OUTPATIENT
Start: 2023-10-19 | End: 2023-10-19 | Stop reason: HOSPADM

## 2023-10-19 RX ORDER — PROPOFOL 10 MG/ML
INJECTION, EMULSION INTRAVENOUS AS NEEDED
Status: DISCONTINUED | OUTPATIENT
Start: 2023-10-19 | End: 2023-10-19

## 2023-10-19 RX ORDER — DEXAMETHASONE SODIUM PHOSPHATE 10 MG/ML
INJECTION, SOLUTION INTRAMUSCULAR; INTRAVENOUS AS NEEDED
Status: DISCONTINUED | OUTPATIENT
Start: 2023-10-19 | End: 2023-10-19

## 2023-10-19 RX ORDER — DIGOXIN 125 MCG
125 TABLET ORAL DAILY
Status: CANCELLED | OUTPATIENT
Start: 2023-10-19

## 2023-10-19 RX ORDER — SODIUM CHLORIDE 9 MG/ML
20 INJECTION, SOLUTION INTRAVENOUS ONCE
Status: DISCONTINUED | OUTPATIENT
Start: 2023-10-19 | End: 2023-10-22

## 2023-10-19 RX ORDER — HEPARIN SODIUM 10000 [USP'U]/100ML
INJECTION, SOLUTION INTRAVENOUS
Status: DISCONTINUED | OUTPATIENT
Start: 2023-10-19 | End: 2023-10-19 | Stop reason: HOSPADM

## 2023-10-19 RX ORDER — PRAVASTATIN SODIUM 10 MG
10 TABLET ORAL
Status: DISCONTINUED | OUTPATIENT
Start: 2023-10-19 | End: 2023-10-22 | Stop reason: HOSPADM

## 2023-10-19 RX ORDER — PANTOPRAZOLE SODIUM 40 MG/1
40 TABLET, DELAYED RELEASE ORAL
Status: DISCONTINUED | OUTPATIENT
Start: 2023-10-20 | End: 2023-10-22 | Stop reason: HOSPADM

## 2023-10-19 RX ORDER — ACETAMINOPHEN 325 MG/1
650 TABLET ORAL EVERY 4 HOURS PRN
Status: DISCONTINUED | OUTPATIENT
Start: 2023-10-19 | End: 2023-10-22 | Stop reason: HOSPADM

## 2023-10-19 RX ORDER — AMIODARONE HYDROCHLORIDE 200 MG/1
200 TABLET ORAL DAILY
Status: DISCONTINUED | OUTPATIENT
Start: 2023-10-20 | End: 2023-10-20

## 2023-10-19 RX ORDER — SODIUM CHLORIDE 9 MG/ML
INJECTION, SOLUTION INTRAVENOUS CONTINUOUS PRN
Status: DISCONTINUED | OUTPATIENT
Start: 2023-10-19 | End: 2023-10-19

## 2023-10-19 RX ORDER — LIDOCAINE HYDROCHLORIDE 10 MG/ML
INJECTION, SOLUTION EPIDURAL; INFILTRATION; INTRACAUDAL; PERINEURAL CODE/TRAUMA/SEDATION MEDICATION
Status: DISCONTINUED | OUTPATIENT
Start: 2023-10-19 | End: 2023-10-19 | Stop reason: HOSPADM

## 2023-10-19 RX ORDER — FENTANYL CITRATE 50 UG/ML
INJECTION, SOLUTION INTRAMUSCULAR; INTRAVENOUS AS NEEDED
Status: DISCONTINUED | OUTPATIENT
Start: 2023-10-19 | End: 2023-10-19

## 2023-10-19 RX ORDER — ONDANSETRON 2 MG/ML
INJECTION INTRAMUSCULAR; INTRAVENOUS AS NEEDED
Status: DISCONTINUED | OUTPATIENT
Start: 2023-10-19 | End: 2023-10-19

## 2023-10-19 RX ORDER — HYDROMORPHONE HCL IN WATER/PF 6 MG/30 ML
0.2 PATIENT CONTROLLED ANALGESIA SYRINGE INTRAVENOUS
Status: CANCELLED | OUTPATIENT
Start: 2023-10-19

## 2023-10-19 RX ADMIN — FENTANYL CITRATE 50 MCG: 50 INJECTION, SOLUTION INTRAMUSCULAR; INTRAVENOUS at 08:54

## 2023-10-19 RX ADMIN — APIXABAN 5 MG: 5 TABLET, FILM COATED ORAL at 21:23

## 2023-10-19 RX ADMIN — SODIUM CHLORIDE: 0.9 INJECTION, SOLUTION INTRAVENOUS at 08:44

## 2023-10-19 RX ADMIN — PROTAMINE SULFATE 20 MG: 10 INJECTION, SOLUTION INTRAVENOUS at 14:34

## 2023-10-19 RX ADMIN — PROTAMINE SULFATE 80 MG: 10 INJECTION, SOLUTION INTRAVENOUS at 14:23

## 2023-10-19 RX ADMIN — DEXAMETHASONE SODIUM PHOSPHATE 10 MG: 10 INJECTION, SOLUTION INTRAMUSCULAR; INTRAVENOUS at 09:15

## 2023-10-19 RX ADMIN — SODIUM CHLORIDE: 0.9 INJECTION, SOLUTION INTRAVENOUS at 14:24

## 2023-10-19 RX ADMIN — MIDODRINE HYDROCHLORIDE 5 MG: 5 TABLET ORAL at 21:29

## 2023-10-19 RX ADMIN — CEFAZOLIN SODIUM 2000 MG: 2 SOLUTION INTRAVENOUS at 09:14

## 2023-10-19 RX ADMIN — SODIUM CHLORIDE: 0.9 INJECTION, SOLUTION INTRAVENOUS at 08:38

## 2023-10-19 RX ADMIN — NOREPINEPHRINE BITARTRATE 16 MCG: 1 INJECTION INTRAVENOUS at 11:12

## 2023-10-19 RX ADMIN — NOREPINEPHRINE BITARTRATE 16 MCG: 1 INJECTION INTRAVENOUS at 11:29

## 2023-10-19 RX ADMIN — NOREPINEPHRINE BITARTRATE 16 MCG: 1 INJECTION INTRAVENOUS at 09:06

## 2023-10-19 RX ADMIN — NOREPINEPHRINE BITARTRATE 5 MCG/MIN: 1 INJECTION INTRAVENOUS at 09:15

## 2023-10-19 RX ADMIN — FENTANYL CITRATE 25 MCG: 50 INJECTION INTRAMUSCULAR; INTRAVENOUS at 16:16

## 2023-10-19 RX ADMIN — Medication 100 MG: at 09:06

## 2023-10-19 RX ADMIN — ONDANSETRON 4 MG: 2 INJECTION INTRAMUSCULAR; INTRAVENOUS at 09:00

## 2023-10-19 RX ADMIN — PANTOPRAZOLE SODIUM 40 MG: 40 INJECTION, POWDER, FOR SOLUTION INTRAVENOUS at 09:14

## 2023-10-19 RX ADMIN — COLCHICINE 0.6 MG: 0.6 TABLET ORAL at 21:29

## 2023-10-19 RX ADMIN — NOREPINEPHRINE BITARTRATE 16 MCG: 1 INJECTION INTRAVENOUS at 14:06

## 2023-10-19 RX ADMIN — PRAVASTATIN SODIUM 10 MG: 10 TABLET ORAL at 21:23

## 2023-10-19 RX ADMIN — CEFAZOLIN SODIUM 2000 MG: 2 SOLUTION INTRAVENOUS at 13:04

## 2023-10-19 RX ADMIN — FENTANYL CITRATE 100 MCG: 50 INJECTION, SOLUTION INTRAMUSCULAR; INTRAVENOUS at 09:26

## 2023-10-19 RX ADMIN — PROPOFOL 200 MG: 10 INJECTION, EMULSION INTRAVENOUS at 09:06

## 2023-10-19 RX ADMIN — FENTANYL CITRATE 50 MCG: 50 INJECTION, SOLUTION INTRAMUSCULAR; INTRAVENOUS at 08:57

## 2023-10-19 NOTE — INTERVAL H&P NOTE
Please see recent office visit with Harriett Bence PA-C for full details. Brief this patient is a pleasant 24-year-old female with symptomatic persistent atrial fibrillation with breakthrough despite amiodarone, prior tachycardia mediated cardiomyopathy with now improved LVEF 55% per echo 3/2023 but previously as low as 35% in 2018, chronic HFpEF, hypertension, hyperlipidemia, CKD 3, history of CVA, and obesity with BMI 33. She typically follows with Dr. Hussein Leggett and Dr. Carley Moseley as an outpatient. She was initially diagnosed with atrial fibrillation in early 2018 after an EKG was performed at her PCPs office. Echocardiogram around that time showed LVEF 40-45%. She was reportedly asymptomatic and was later seen by cardiology. She was started on Eliquis anticoagulation, and rate controlled with beta-blockers. At follow-up visit 5/2018 she was again in rapid atrial fibrillation, that she underwent successful cardioversion 6/2018. Unfortunately, she eventually reverted back to atrial fibrillation, and follow-up echocardiogram 10/2018 showed further reduction in LVEF to 35-40%. She was thus seen in EP consultation by Dr. Carley Moseley, and eventually was admitted for sotalol initiation and cardioversion 12/2018. During that admission, she was noted to have subsequent sinus bradycardia after cardioversion and that she underwent [employer] dual-chamber pacemaker implantation. She initially did well following hospitalization, with echocardiogram 2/2019 showing improved LVEF 60%. She was readmitted to the hospital 11/2022 with breakthrough atrial fibrillation with rapid ventricular response despite sotalol, and underwent successful cardioversion during that admission. She was continued on sotalol. Unfortunately, she again presented to the hospital 3/2023 with recurrent rapid atrial fibrillation.   Sotalol was discontinued in favor of amiodarone antiarrhythmic therapy, and reports note that she chemically converted on this medication. Subsequent device interrogations show that she then reverted back to atrial fibrillation and has been in persistent A-fib since 7/2023. She was most recently seen in the EP office to discuss ongoing treatment options, and ultimately atrial fibrillation ablation was recommended. She presents today to undergo that procedure. No significant changes over the recent past, physical exam unchanged and she remains in atrial fibrillation.     Vitals:    10/19/23 0721   BP: 140/83   Pulse: (!) 124   Resp: 16   Temp: 97.6 °F (36.4 °C)   SpO2: 97%

## 2023-10-19 NOTE — ANESTHESIA POSTPROCEDURE EVALUATION
Post-Op Assessment Note    CV Status:  Stable    Pain management: adequate     Mental Status:  Alert and awake   Hydration Status:  Euvolemic   PONV Controlled:  Controlled   Airway Patency:  Patent      Post Op Vitals Reviewed: Yes      Staff: Anesthesiologist, CRNA         There were no known notable events for this encounter.     BP   96/56   Temp      Pulse 70   Resp   16   SpO2   96

## 2023-10-19 NOTE — ANESTHESIA PREPROCEDURE EVALUATION
Procedure:  Cardiac eps/afib ablation PVI/POST WALL (Chest)    Shilo Eagle is a 65 y.o. female h/o of persistent atrial fibrillation with rapid ventricular rate. 1. Paroxysmal atrial fibrillation  Last took eliquis yesterday morning, taking amiodarone and metoprolol  2. Heart failure, unspecified  EF 60%     3. History of recovered tachycardia mediated cardiomyopathy        4. Sick sinus syndrome s/p PPM       5. Hypertension  Now takes midodrine  6. Mixed hyperlipidemia  On statin     7. Moderate MR, mild AR, moderate to severe TR    Last echo showed Mod pulm htn with PA pressures in 50s  CT chest positive for pulm fibrosis, on RA at home does not use any inhalers. Has been having mild dry cough chronically       Currently being worked up for anemia, last HB- 9.5    Drinks a cocktail everynight. Relevant Problems   CARDIO   (+) Acute on chronic systolic congestive heart failure (HCC)   (+) Essential hypertension   (+) Migraine with aura and without status migrainosus, not intractable   (+) Mixed hyperlipidemia   (+) Nonrheumatic aortic valve insufficiency   (+) Nonrheumatic mitral valve regurgitation   (+) Paroxysmal atrial fibrillation (HCC)   (+) SSS (sick sinus syndrome) ([facility])      /RENAL   (+) Stage 3a chronic kidney disease ([facility])      NEURO/PSYCH   (+) Diplopia   (+) Migraine with aura and without status migrainosus, not intractable      PULMONARY   (+) Chronic obstructive pulmonary disease ([facility])        Physical Exam    Airway    Mallampati score: IV  TM Distance: >3 FB  Neck ROM: full     Dental   Comment: Multiple caps     Cardiovascular  Rhythm: irregular    Pulmonary  Comment: Significant kyphosis, Pulmonary exam normal     Other Findings        Anesthesia Plan  ASA Score- 3     Anesthesia Type- general with ASA Monitors. Additional Monitors: arterial line. Airway Plan: ETT. Plan Factors-Exercise tolerance (METS): <4 METS. Exercise comment: Cannot go up 2 flights of stairs without stopping. .    Chart reviewed. EKG reviewed. Imaging results reviewed. Existing labs reviewed. Patient summary reviewed. Patient is not a current smoker. Induction- intravenous. Postoperative Plan-     Informed Consent- Anesthetic plan and risks discussed with patient and spouse. I personally reviewed this patient with the CRNA. Discussed and agreed on the Anesthesia Plan with the CRNA. Ирина Quezada

## 2023-10-19 NOTE — PERIOPERATIVE NURSING NOTE
Alexis Lee RN removed right arterial line with catheter removed intact. Atrerial line removed per protocol.

## 2023-10-20 LAB
ANION GAP SERPL CALCULATED.3IONS-SCNC: 7 MMOL/L
ATRIAL RATE: 69 BPM
BUN SERPL-MCNC: 19 MG/DL (ref 5–25)
CALCIUM SERPL-MCNC: 8.8 MG/DL (ref 8.4–10.2)
CHLORIDE SERPL-SCNC: 110 MMOL/L (ref 96–108)
CO2 SERPL-SCNC: 21 MMOL/L (ref 21–32)
CREAT SERPL-MCNC: 1.05 MG/DL (ref 0.6–1.3)
ERYTHROCYTE [DISTWIDTH] IN BLOOD BY AUTOMATED COUNT: 19.9 % (ref 11.6–15.1)
GFR SERPL CREATININE-BSD FRML MDRD: 50 ML/MIN/1.73SQ M
GLUCOSE SERPL-MCNC: 120 MG/DL (ref 65–140)
HCT VFR BLD AUTO: 27.9 % (ref 34.8–46.1)
HGB BLD-MCNC: 8.1 G/DL (ref 11.5–15.4)
MAGNESIUM SERPL-MCNC: 1.9 MG/DL (ref 1.9–2.7)
MCH RBC QN AUTO: 21.6 PG (ref 26.8–34.3)
MCHC RBC AUTO-ENTMCNC: 29 G/DL (ref 31.4–37.4)
MCV RBC AUTO: 74 FL (ref 82–98)
PLATELET # BLD AUTO: 285 THOUSANDS/UL (ref 149–390)
PMV BLD AUTO: 10.5 FL (ref 8.9–12.7)
POTASSIUM SERPL-SCNC: 4.1 MMOL/L (ref 3.5–5.3)
PR INTERVAL: 891 MS
QRS AXIS: 63 DEGREES
QRSD INTERVAL: 88 MS
QT INTERVAL: 567 MS
QTC INTERVAL: 608 MS
RBC # BLD AUTO: 3.75 MILLION/UL (ref 3.81–5.12)
SODIUM SERPL-SCNC: 138 MMOL/L (ref 135–147)
T WAVE AXIS: 50 DEGREES
VENTRICULAR RATE: 69 BPM
WBC # BLD AUTO: 11.99 THOUSAND/UL (ref 4.31–10.16)

## 2023-10-20 PROCEDURE — 83735 ASSAY OF MAGNESIUM: CPT | Performed by: PHYSICIAN ASSISTANT

## 2023-10-20 PROCEDURE — 93010 ELECTROCARDIOGRAM REPORT: CPT | Performed by: INTERNAL MEDICINE

## 2023-10-20 PROCEDURE — 80048 BASIC METABOLIC PNL TOTAL CA: CPT | Performed by: PHYSICIAN ASSISTANT

## 2023-10-20 PROCEDURE — 85027 COMPLETE CBC AUTOMATED: CPT | Performed by: PHYSICIAN ASSISTANT

## 2023-10-20 PROCEDURE — 99232 SBSQ HOSP IP/OBS MODERATE 35: CPT | Performed by: STUDENT IN AN ORGANIZED HEALTH CARE EDUCATION/TRAINING PROGRAM

## 2023-10-20 RX ORDER — DIGOXIN 125 MCG
125 TABLET ORAL DAILY
Qty: 90 TABLET | Refills: 2 | Status: SHIPPED | OUTPATIENT
Start: 2023-10-20 | End: 2023-10-22

## 2023-10-20 RX ORDER — LANOLIN ALCOHOL/MO/W.PET/CERES
3 CREAM (GRAM) TOPICAL
Status: DISCONTINUED | OUTPATIENT
Start: 2023-10-20 | End: 2023-10-22 | Stop reason: HOSPADM

## 2023-10-20 RX ORDER — HYDROXYZINE HYDROCHLORIDE 25 MG/1
50 TABLET, FILM COATED ORAL EVERY 6 HOURS PRN
Status: DISCONTINUED | OUTPATIENT
Start: 2023-10-20 | End: 2023-10-22 | Stop reason: HOSPADM

## 2023-10-20 RX ORDER — DOCUSATE SODIUM 100 MG/1
100 CAPSULE, LIQUID FILLED ORAL 2 TIMES DAILY PRN
Status: DISCONTINUED | OUTPATIENT
Start: 2023-10-20 | End: 2023-10-22 | Stop reason: HOSPADM

## 2023-10-20 RX ORDER — AMIODARONE HYDROCHLORIDE 200 MG/1
200 TABLET ORAL 2 TIMES DAILY WITH MEALS
Status: DISCONTINUED | OUTPATIENT
Start: 2023-10-20 | End: 2023-10-22

## 2023-10-20 RX ORDER — DIGOXIN 125 MCG
125 TABLET ORAL ONCE
Status: COMPLETED | OUTPATIENT
Start: 2023-10-20 | End: 2023-10-20

## 2023-10-20 RX ADMIN — AMIODARONE HYDROCHLORIDE 200 MG: 200 TABLET ORAL at 09:27

## 2023-10-20 RX ADMIN — FUROSEMIDE 20 MG: 20 TABLET ORAL at 09:27

## 2023-10-20 RX ADMIN — COLCHICINE 0.6 MG: 0.6 TABLET ORAL at 18:24

## 2023-10-20 RX ADMIN — APIXABAN 5 MG: 5 TABLET, FILM COATED ORAL at 09:27

## 2023-10-20 RX ADMIN — MIDODRINE HYDROCHLORIDE 5 MG: 5 TABLET ORAL at 18:24

## 2023-10-20 RX ADMIN — AMIODARONE HYDROCHLORIDE 200 MG: 200 TABLET ORAL at 18:24

## 2023-10-20 RX ADMIN — POTASSIUM CHLORIDE 10 MEQ: 750 TABLET, EXTENDED RELEASE ORAL at 09:27

## 2023-10-20 RX ADMIN — DIGOXIN 125 MCG: 125 TABLET ORAL at 12:02

## 2023-10-20 RX ADMIN — PRAVASTATIN SODIUM 10 MG: 10 TABLET ORAL at 18:24

## 2023-10-20 RX ADMIN — COLCHICINE 0.6 MG: 0.6 TABLET ORAL at 09:26

## 2023-10-20 RX ADMIN — METOPROLOL SUCCINATE 75 MG: 50 TABLET, EXTENDED RELEASE ORAL at 09:27

## 2023-10-20 RX ADMIN — MIDODRINE HYDROCHLORIDE 5 MG: 5 TABLET ORAL at 12:02

## 2023-10-20 RX ADMIN — APIXABAN 5 MG: 5 TABLET, FILM COATED ORAL at 18:24

## 2023-10-20 RX ADMIN — MIDODRINE HYDROCHLORIDE 5 MG: 5 TABLET ORAL at 06:24

## 2023-10-20 RX ADMIN — ACETAMINOPHEN 650 MG: 325 TABLET, FILM COATED ORAL at 12:07

## 2023-10-20 RX ADMIN — PANTOPRAZOLE SODIUM 40 MG: 40 TABLET, DELAYED RELEASE ORAL at 06:24

## 2023-10-20 NOTE — DISCHARGE INSTR - AVS FIRST PAGE
MEDICATION INSTRUCTIONS/CHANGES:  Please start taking Protonix 40mg once daily for 1 month in the post ablation setting. Please increase metoprolol succinate to 100 mg daily (as you appear to have 75 mg tablets at home, will order 25 mg tablets to take 1 of each on a daily basis). Please continue taking amiodarone 200 mg daily, please call the office if you see that your heart rate is elevated consistently and we can trial increasing this medication. Please be aware in the future that colchicine was a culprit medication for diarrhea. Please STOP taking digoxin. RESTRICTIONS:   No heavy lifting (more than 5-10 pounds) or strenuous activity for one week. No soaking in a bath tub/hot tub/swimming pool for one week or until groin heals. You may shower - please let soap and water run over the groins, no scrubbing, and pat the area dry. Please place band-aid on groins daily for up to five days, but you may remove sooner if no issues are noted. If you notice ongoing bleeding, swelling, or firm lumps in groin near ablation incision, please contact Dr. Markel Buchanan office - [phone]. If you have any significant issues after hours or on the weekends, please call the on call cardiology number at [phone].

## 2023-10-20 NOTE — DISCHARGE SUMMARY
Discharge Summary - Rizwana Salas 77 y.o. female MRN: [id number]    Unit/Bed#: -01 Encounter: [id number]      Admission Date: 10/19/2023   Discharge Date: 10/22/2023    Discharge Diagnosis:   1. Symptomatic persistent atrial fibrillation with breakthrough despite amiodarone, status post cryoablation with pulmonary vein isolation, posterior wall isolation, and ablation of mitral isthmus dependent flutter 10/19/2023              A.)  Initially diagnosed in early 2018, started on beta-blockers and Eliquis anticoagulation              B.)  Found to be in rapid atrial fibrillation 5/2018, underwent successful cardioversion 6/2018              C.)  Recurrent A-fib noted 10/2018 in the setting of reduced EF, admitted for sotalol initiation and cardioversion 12/2018              D.)  Breakthrough despite sotalol 11/2022, status post successful cardioversion with no medication changes at that time              E.)  Recurrent A-fib 3/2023, sotalol discontinued and amiodarone antiarrhythmic therapy started at that time              F.)  Has been in persistent atrial fibrillation since 7/2023 despite amiodarone per device interrogations  G.)  Also maintained on metoprolol and digoxin for ongoing rate control  H.)  [id number] = 7, maintained on Eliquis anticoagulation  I.)  Recurrent atrial fibrillation in the immediate post ablation setting, increased oral dose of amiodarone along with continuation of digoxin and up-titration of metoprolol  - converted back to 10/21  2. Prior tachycardia mediated cardiomyopathy with now improved LVEF 55% per echo 3/2023              A.)  Previously as low as 35% in 2018              B.)  Prior nonischemic nuclear stress tests              C.)  Maintained on Toprol-XL, not on ACEi/ARB/ARNi due to orthostatic hypotension requiring midodrine  3. Chronic HFpEF  4. Hypertension  5. Hyperlipidemia  6. CKD 3  7. Prior CVA  8.   Obesity with BMI 33    Procedures Performed:   Orders Placed This Encounter   Procedures    Cardiac ep lab eps/ablations   1. Ablation 1 - Cryoablation of atrial fibrillation - pulmonary vein isolation - PVI     2. Ablation 2 - Cryoablation + Radiofrequency ablation of Left atrial posterior wall - PW isolation     3. Ablation 3 - Left atrial mitral isthmus dependent flutter - Radiofrequency ablation - anterior mitral isthmus line has block      4. Limited  EP measurement   5. 3-D electro-anatomic mapping using NavX system  6. Intracardiac echocardiography  7. Transeptal  8. Access obtained under ultrasound guidance using Seldinger technique      Consultants: none      HPI: Please refer to the initial history and physical as well as procedure notes for full details. Briefly, Pool Galeano is a 77y.o. year old female with symptomatic persistent atrial fibrillation with breakthrough despite amiodarone, prior tachycardia mediated cardiomyopathy with now improved LVEF 55% per echo 3/2023 but previously as low as 35% in 2018, chronic HFpEF, hypertension, hyperlipidemia, CKD 3, history of CVA, and obesity with BMI 33. She typically follows with  [facility] and Dr. Emerita Ag as an outpatient. She was initially diagnosed with atrial fibrillation in early 2018 after an EKG was performed at her PCPs office. Echocardiogram around that time showed LVEF 40-45%. She was reportedly asymptomatic and was later seen by cardiology. She was started on Eliquis anticoagulation, and rate controlled with beta-blockers. At follow-up visit 5/2018 she was again in rapid atrial fibrillation, that she underwent successful cardioversion 6/2018. Unfortunately, she eventually reverted back to atrial fibrillation, and follow-up echocardiogram 10/2018 showed further reduction in LVEF to 35-40%. She was thus seen in EP consultation by Dr. Emerita Ag, and eventually was admitted for sotalol initiation and cardioversion 12/2018.   During that admission, she was noted to have subsequent sinus bradycardia after cardioversion and that she underwent [employer] dual-chamber pacemaker implantation. She initially did well following hospitalization, with echocardiogram 2/2019 showing improved LVEF 60%. She was readmitted to the hospital 11/2022 with breakthrough atrial fibrillation with rapid ventricular response despite sotalol, and underwent successful cardioversion during that admission. She was continued on sotalol. Unfortunately, she again presented to the hospital 3/2023 with recurrent rapid atrial fibrillation. Sotalol was discontinued in favor of amiodarone antiarrhythmic therapy, and reports note that she chemically converted on this medication. Subsequent device interrogations show that she then reverted back to atrial fibrillation and has been in persistent A-fib since 7/2023. She was most recently seen in the EP office to discuss ongoing treatment options, and ultimately atrial fibrillation ablation was recommended. She presented this hospital admission to undergo this procedure. Hospital Course: Santos Em presented at her baseline state of health. After the procedure was explained in detail and consent was obtained, she underwent the above procedures without complications. Please see operative notes by Dr. Arley Scott for full details. She tolerated the procedure well. She was then monitored overnight for further observation, and started on prophylactic colchicine given her extensive ablation. Unfortunately, she reverted back to atrial fibrillation later that same evening. She was noted to have periods of rapid ventricular response. The next morning she admitted to palpitations and recurrent lightheadedness, and was aware that she was back in atrial fibrillation. Otherwise, she denied significant pain consistent with pericarditis, shortness of breath, or symptoms consistent with volume overload.   Vital signs showed tachycardia but stable blood pressure, labs showed slight drop in hemoglobin, within the normal range following a procedure. Her oral amiodarone was increased to 200 mg twice daily, her prior dose of Toprol-XL 75 mg daily was continued and she was given digoxin 125 mg. She remained in the hospital for ongoing observation, and to ensure that her rates became better controlled. Her groins were soft without significant hematoma or recurrent bleeding, and sutures were removed without incident. There were no acute issues or events overnight. On POD #2 she denied all cardiac complaints, including chest pain/pressure, dyspnea, palpitations, dizziness, lightheadedness, or syncope. Her vital signs were reviewed and labs are stable. Telemetry unfortunately still showed elevated heart rates and med adjustments were made to metoprolol and she was given another dose of digoxin. She did convert on her own with no significant conversion pauses later that day. She did unfortunately start to have bouts of diarrhea. Postop day 3 she felt good besides still having looser stools in the morning but colchicine was discontinued and she had improvement there. Heart rates were in the 60s and 70s. Review of Systems   Constitutional: Negative for chills, diaphoresis, fever and malaise/fatigue. Cardiovascular:  Negative for chest pain, claudication, cyanosis, dyspnea on exertion, irregular heartbeat, leg swelling, near-syncope, orthopnea, palpitations, paroxysmal nocturnal dyspnea and syncope. Respiratory:  Negative for shortness of breath and sleep disturbances due to breathing. Neurological:  Negative for dizziness and light-headedness. All other systems reviewed and are negative.         Physical exam:  GEN: NAD, alert and oriented x 3, well appearing  SKIN: dry without significant lesions or rashes  HEENT: NCAT, PERRL, EOMs intact  NECK: No JVD appreciated  CARDIOVASCULAR: RRR, normal S1, S2 without murmurs, rubs, or gallops appreciated  LUNGS: Clear to auscultation bilaterally without wheezes, rhonchi, or rales  ABDOMEN: Soft, nontender, nondistended  EXTREMITIES/VASCULAR: perfused without clubbing, cyanosis, or LE edema b/l  PSYCH: Normal mood and affect  NEURO: CN ll-Xll grossly intact    She was given routine post ablation discharge instructions and restrictions, and these were explained in detail. She was given a follow up appointment with Maryana Betancourt PA-C, and she was instructed to follow up with her primary cardiologist as previously instructed. In terms of her medications, she was continued on Amio 200 mg daily (lowered from twice daily dosing in house). Her metoprolol was increased to 100 mg daily (as she had 75 mg tablets at home, was sent in 25 mg tablets to her home pharmacy) with digoxin discontinued. Cosmo Inman She was asked to take pantoprazole 40 mg daily x 30 days, at which time it can be discontinued. She is stable for discharge at this time with all questions answered. She was discussed in detail with Dr. Lea Manning who are in agreement with this discharge summary. Discharge Medications:  See after visit summary for reconciled discharge medications provided to patient and family.     Medications Prior to Admission   Medication    amiodarone 200 mg tablet    Eliquis 5 MG    furosemide (LASIX) 20 mg tablet    lovastatin (MEVACOR) 10 MG tablet    metoprolol succinate (TOPROL-XL) 50 mg 24 hr tablet    midodrine (PROAMATINE) 5 mg tablet    potassium chloride (K-DUR,KLOR-CON) 10 mEq tablet         [facility]inet Labs/diagnostics:  CBC with diff:   Results from last 7 days   Lab Units 10/20/23  0532 10/19/23  0650   WBC Thousand/uL 11.99* 7.16   HEMOGLOBIN g/dL 8.1* 9.5*   HEMATOCRIT % 27.9* 32.7*   MCV fL 74* 74*   PLATELETS Thousands/uL 285 375   RBC Million/uL 3.75* 4.41   MCH pg 21.6* 21.5*   MCHC g/dL 29.0* 29.1*   RDW % 19.9* 19.7*   MPV fL 10.5 10.4       BMP:  Results from last 7 days   Lab Units 10/20/23  0532 10/19/23  0650   POTASSIUM mmol/L 4.1 3.8   CHLORIDE mmol/L 110* 106   CO2 mmol/L 21 23   BUN mg/dL 19 20   CREATININE mg/dL 1.05 1.24   CALCIUM mg/dL 8.8 9.3       Magnesium:   Results from last 7 days   Lab Units 10/20/23  0532   MAGNESIUM mg/dL 1.9       Coags:   Results from last 7 days   Lab Units 10/19/23  0650   INR  4.21*         Complications: none    Condition at Discharge: good     Discharge instructions/Information to patient and family:   See after visit summary for information provided to patient and family. Provisions for Follow-Up Care:  See after visit summary for information related to follow-up care and any pertinent home health orders. Disposition: Home    Planned Readmission: No    Discharge Statement   I spent 45 minutes minutes discharging the patient. This time was spent on the day of discharge. I had direct contact with the patient on the day of discharge. Additional documentation is required if more than 30 minutes were spent on discharge.  Evaluating the incision, discussing discharge instructions and restrictions, arranging follow up appointments, discussing medications

## 2023-10-20 NOTE — PROGRESS NOTES
Progress Note - Electrophysiology-Cardiology (EP)   Mary De La Torre 77 y.o. female MRN: [id number]  Unit/Bed#: -01 Encounter: [id number]      Assessment:  1. Symptomatic persistent atrial fibrillation with breakthrough despite amiodarone, status post cryoablation with pulmonary vein isolation, posterior wall isolation, and ablation of mitral isthmus dependent flutter 10/19/2023   A.)  Initially diagnosed in early 2018, started on beta-blockers and Eliquis anticoagulation   B.)  Found to be in rapid atrial fibrillation 5/2018, underwent successful cardioversion 6/2018   C.)  Recurrent A-fib noted 10/2018 in the setting of reduced EF, admitted for sotalol initiation and cardioversion 12/2018   D.)  Breakthrough despite sotalol 11/2022, status post successful cardioversion with no medication changes at that time   E.)  Recurrent A-fib 3/2023, sotalol discontinued and amiodarone antiarrhythmic therapy started at that time   F.)  Has been in persistent atrial fibrillation since 7/2023 despite amiodarone per device interrogations  G.)  Also maintained on metoprolol and digoxin for ongoing rate control  H.)  [id number] = 7, maintained on Eliquis anticoagulation  2. Prior tachycardia mediated cardiomyopathy with now improved LVEF 55% per echo 3/2023   A.)  Previously as low as 35% in 2018   B.)  Prior nonischemic nuclear stress tests   C.)  Maintained on Toprol-XL, not on ACEi/ARB/ARNi due to orthostatic hypotension requiring midodrine  3. Chronic HFpEF  4. Hypertension  5. Hyperlipidemia  6. CKD 3  7. Prior CVA  8. Obesity with BMI 33      Plan:  She unfortunately reverted back to atrial fibrillation late last evening, with rapid ventricular response at times. We explained that this is not uncommon in the immediate post ablation setting due to postop inflammation. She has been maintained on oral amiodarone since 3/2023, for now will increase to 200 mg twice daily with meals.   We will continue Toprol-XL 75 mg daily, and give digoxin 125 mcg today. We will check a digoxin level tomorrow, and determine ongoing digoxin dosing based on these results and her heart rate. While her blood pressures are currently stable, she was hypotensive in the more immediate post ablation setting and has had recurrent dizziness and lightheadedness especially upon standing. I am thus hesitant to increase her Toprol-XL dosing at this time. She has a known history of orthostatic hypotension requiring midodrine. She will need to remain in the hospital until at least tomorrow, 10/21/2023. She will be reevaluated at that time to determine an ongoing treatment plan. If her rates are controlled, she can likely be discharged and scheduled for an outpatient cardioversion. She understand that she will need to remain in the hospital if she remains uncontrolled. Hopefully, she chemically converts on higher dose amiodarone. Her hemoglobin today is 8.1, down from 9.5 on admission. Her hemoglobin 7/2023 was 10.0. This is within the expected drop following ablation, and we will recheck labs tomorrow. Bilateral groin sutures were removed without issues, bandages should be placed later by nursing. She denies symptoms consistent with pericarditis, but will continue colchicine while inpatient given extensive ablation. Continue to monitor telemetry, will recheck a.m. labs. Will order physical therapy evaluation given her lightheadedness upon standing and to ensure that she is safe with ambulation. Subjective/Objective   Chief Complaint: Lightheadedness, palpitations    Subjective: She reports being aware of recurrent atrial fibrillation, with lightheadedness and palpitations. She admitted to symptoms consistent with orthostatic hypotension while trying to get out of bed to use the bathroom, is now utilizing a bedside commode. She denies chest pain or pressure, no evidence of pericarditis.  She denies dyspnea or symptoms consistent with fluid overload. Objective:     Vitals: /94   Pulse (!) 136   Temp 98.7 °F (37.1 °C)   Resp 18   Ht 5' 5" (1.651 m)   Wt 90.7 kg (199 lb 15.3 oz)   SpO2 95%   BMI 33.27 kg/m²   Vitals:    10/19/23 0721   Weight: 90.7 kg (199 lb 15.3 oz)     Orthostatic Blood Pressures      Flowsheet Row Most Recent Value   Blood Pressure 145/94 filed at 10/20/2023 7735              Intake/Output Summary (Last 24 hours) at 10/20/2023 1032  Last data filed at 10/20/2023 0411  Gross per 24 hour   Intake 2852 ml   Output 1125 ml   Net 1727 ml       Invasive Devices       Peripheral Intravenous Line  Duration             Peripheral IV 10/19/23 Distal;Left;Ventral (anterior) Forearm 1 day                                Scheduled Meds:  Current Facility-Administered Medications   Medication Dose Route Frequency Provider Last Rate    acetaminophen  650 mg Oral Q4H PRN Burr Oak Gallon, PA-C      amiodarone  200 mg Oral BID With Meals Burr [place] Gallon, PA-C      apixaban  5 mg Oral BID Burr [place] Gallon, PA-C      colchicine  0.6 mg Oral BID Burr Oak Gallon, PA-C      digoxin  125 mcg Oral Once Burr Oak Gallon, PA-C      furosemide  20 mg Oral Daily Burr Oak Gallon, PA-C      metoprolol succinate  75 mg Oral Daily Burr Oak Gallon, PA-C      midodrine  5 mg Oral TID AC Burr Oak Gallon, PA-C      norepinephrine  1-30 mcg/min Intravenous Continuous Anila Harris CRNA      pantoprazole  40 mg Oral Early Morning Burr Oak Gallon, PA-C      potassium chloride  10 mEq Oral Daily Burr Oak Gallon, PA-C      pravastatin  10 mg Oral Daily With Dinner Burr Oak Gallon, PA-C      sodium chloride  20 mL/hr Intravenous Once Burr Oak Gallon, PA-C       Continuous Infusions:norepinephrine, 1-30 mcg/min      PRN Meds:.  acetaminophen    Review of Systems   Constitutional: Negative for fever and malaise/fatigue. Cardiovascular:  Positive for irregular heartbeat, near-syncope and palpitations.  Negative for chest pain, dyspnea on exertion, leg swelling, orthopnea, paroxysmal nocturnal dyspnea and syncope. Neurological:  Positive for dizziness and light-headedness. All other systems reviewed and are negative. Physical Exam:   GEN: NAD, alert and oriented x 3, well appearing  SKIN: dry without significant lesions or rashes  HEENT: NCAT, PERRL, EOMs intact  NECK: No JVD appreciated  CARDIOVASCULAR: Irregularly irregular, normal S1, S2 without murmurs, rubs, or gallops appreciated  LUNGS: Clear to auscultation bilaterally without wheezes, rhonchi, or rales  ABDOMEN: Soft, nontender, nondistended  EXTREMITIES/VASCULAR: perfused without clubbing, cyanosis, or LE edema b/l  PSYCH: Normal mood and affect  NEURO: CN ll-Xll grossly intact                Lab Results: I have personally reviewed pertinent lab results. Results from last 7 days   Lab Units 10/20/23  0532 10/19/23  0650   WBC Thousand/uL 11.99* 7.16   HEMOGLOBIN g/dL 8.1* 9.5*   HEMATOCRIT % 27.9* 32.7*   PLATELETS Thousands/uL 285 375     Results from last 7 days   Lab Units 10/20/23  0532 10/19/23  0650   POTASSIUM mmol/L 4.1 3.8   CHLORIDE mmol/L 110* 106   CO2 mmol/L 21 23   BUN mg/dL 19 20   CREATININE mg/dL 1.05 1.24   CALCIUM mg/dL 8.8 9.3     Results from last 7 days   Lab Units 10/19/23  0650   INR  1.38*     Results from last 7 days   Lab Units 10/20/23  0532   MAGNESIUM mg/dL 1.9         Imaging: I have personally reviewed pertinent reports.       ECHO: Results for orders placed during the hospital encounter of 20    Echo complete with contrast if indicated    Narrative  [address] [place] [address]  [phone]    Transthoracic Echocardiogram  2D, M-mode, and Color Doppler    Study date:  2020    Patient: Cass Lynn  MR number: [id number]  Account number: [de-identified]  : 1945  Age: 75 years  Gender: Female  Status: Outpatient  Location: [facility]  Height: 65 in  Weight: 223 lb  BP: 118/ 78 mmHg    Indications: Cardiomyopathy. Diagnoses: I42.9 - Cardiomyopathy, unspecified    Sonographer:  Nina Membreno, ANTOINE  Primary Physician:  Terrance Culp DO  Referring Physician:  Petrona Melissa MD  Group:  [facility]  Interpreting Physician:  Rashad Dean MD    SUMMARY    LEFT VENTRICLE:  Ejection fraction was estimated to be 60 %. There were no regional wall motion abnormalities. Concentric hypertrophy was present. RIGHT VENTRICLE:  Estimated peak pressure was at least 50 mmHg. Pacer lead noted. LEFT ATRIUM:  The atrium was mildly to moderately dilated. MITRAL VALVE:  There was moderate regurgitation. AORTIC VALVE:  There was mild regurgitation. TRICUSPID VALVE:  There was moderate to severe regurgitation. PULMONIC VALVE:  There was trace regurgitation. HISTORY: PRIOR HISTORY: TIA. Paroxysmal atrial fibrillation. Bradycardia. Hypertension. Aortic insufficiency. Tricuspid regurgitation. Mitral regurgitation. Pacemaker. Cardiomyopathy. PROCEDURE: The study was performed in the [facility]. This was a routine study. The transthoracic approach was used. The study included complete 2D imaging, M-mode, and color Doppler. The heart rate was 85 bpm, at the  start of the study. Images were obtained from the parasternal, apical, subcostal, and suprasternal notch acoustic windows. Image quality was adequate. LEFT VENTRICLE: Size was normal. Ejection fraction was estimated to be 60 %. There were no regional wall motion abnormalities. Concentric hypertrophy was present. RIGHT VENTRICLE: The size was normal. Systolic function was normal. Wall thickness was normal. DOPPLER: Estimated peak pressure was at least 50 mmHg. Pacer lead noted. LEFT ATRIUM: The atrium was mildly to moderately dilated. RIGHT ATRIUM: Size was normal.    MITRAL VALVE: There was annular calcification.  DOPPLER: There was moderate regurgitation. AORTIC VALVE: The valve was trileaflet. Leaflets exhibited mildly increased thickness and normal cuspal separation. DOPPLER: Transaortic velocity was within the normal range. There was no evidence for stenosis. There was mild  regurgitation. TRICUSPID VALVE: The valve structure was normal. There was normal leaflet separation. DOPPLER: The transtricuspid velocity was within the normal range. There was no evidence for stenosis. There was moderate to severe regurgitation. PULMONIC VALVE: Leaflets exhibited normal thickness, no calcification, and normal cuspal separation. DOPPLER: The transpulmonic velocity was within the normal range. There was trace regurgitation. PERICARDIUM: There was no pericardial effusion. The pericardium was normal in appearance. AORTA: The root exhibited normal size. SYSTEM MEASUREMENT TABLES    2D  %FS: 31.49 %  Ao Diam: 3.15 cm  EDV(Teich): 97.57 ml  EF(Teich): 59.45 %  ESV(Teich): 39.57 ml  IVSd: 1.1 cm  LA Area: 22.84 cm2  LA Diam: 4.2 cm  LVEDV MOD A4C: 82.51 ml  LVEF MOD A4C: 73.31 %  LVESV MOD A4C: 22.02 ml  LVIDd: 4.6 cm  LVIDs: 3.15 cm  LVLd A4C: 7.1 cm  LVLs A4C: 5.81 cm  LVPWd: 1.09 cm  RA Area: 12.88 cm2  RVIDd: 2.71 cm  RWT: 0.47  SV MOD A4C: 60.48 ml  SV(Teich): 58 ml    CW  AR Dec Hood: 2.59 m/s2  AR Dec Time: 1703.32 ms  AR PHT: 493.96 ms  AR Vmax: 4.39 m/s  AR maxP.28 mmHg  AV Env. Ti: 384.4 ms  AV MaxP.3 mmHg  AV VTI: 18.65 cm  AV Vmax: 1.04 m/s  AV Vmean: 0.49 m/s  AV meanP.5 mmHg  MR VTI: 246.71 cm  MR Vmax: 5.77 m/s  MR Vmean: 4.77 m/s  MR maxP.39 mmHg  MR meanP.1 mmHg  TR MaxP.67 mmHg  TR Vmax: 3.45 m/s    MM  TAPSE: 2.04 cm    PW  E': 0.05 m/s  E/E': 20.58  LVOT Vmax: 0.66 m/s  LVOT maxP.73 mmHg  MV A Gal: 0.46 m/s  MV Dec Hood: 5.79 m/s2  MV DecT: 192.32 ms  MV E Gal: 1.11 m/s  MV E/A Ratio: 2.4  MV PHT: 55.77 ms  MVA By PHT: 3.94 cm2    [facility]    Prepared and electronically signed by    Fam Napier MD  Signed 30-Jul-2020 16:01:20      Results for orders placed during the hospital encounter of 11/01/22    Echo complete w/ contrast if indicated    Interpretation Summary    Left Ventricle: Left ventricular cavity size is normal. Systolic function is mildly reduced. LVEF approximately 40 to 45%. Patient was in atrial fibrillation with periods of rapid ventricular response which interferes with interpretation. There is mild global hypokinesis. Unable to assess diastolic function due to atrial fibrillation. Right Ventricle: Right ventricular cavity size is mildly dilated. Systolic function is mildly to moderately reduced. Left Atrium: The atrium is moderate to markedly dilated. Right Atrium: The atrium is moderately dilated. Aortic Valve: There is mild regurgitation. Mitral Valve: There is moderate annular calcification. There is moderate regurgitation. Tricuspid Valve: There is moderate regurgitation. The right ventricular systolic pressure is mildly elevated. Estimated RVSP 35 mm Hg. Pacer leads noted. Pulmonic Valve: There is mild regurgitation.         EKG/TELEMETRY:             VTE Pharmacologic Prophylaxis: Eliquis

## 2023-10-20 NOTE — PLAN OF CARE
Problem: CARDIOVASCULAR - ADULT  Goal: Maintains optimal cardiac output and hemodynamic stability  Description: INTERVENTIONS:  - Monitor I/O, vital signs and rhythm  - Monitor for S/S and trends of decreased cardiac output  - Administer and titrate ordered vasoactive medications to optimize hemodynamic stability  - Assess quality of pulses, skin color and temperature  - Assess for signs of decreased coronary artery perfusion  - Instruct patient to report change in severity of symptoms  Outcome: Progressing  Goal: Absence of cardiac dysrhythmias or at baseline rhythm  Description: INTERVENTIONS:  - Continuous cardiac monitoring, vital signs, obtain 12 lead EKG if ordered  - Administer antiarrhythmic and heart rate control medications as ordered  - Monitor electrolytes and administer replacement therapy as ordered  Outcome: Progressing     Problem: DISCHARGE PLANNING  Goal: Discharge to home or other facility with appropriate resources  Description: INTERVENTIONS:  - Identify barriers to discharge w/patient and caregiver  - Arrange for needed discharge resources and transportation as appropriate  - Identify discharge learning needs (meds, wound care, etc.)  - Arrange for interpretive services to assist at discharge as needed  - Refer to Case Management Department for coordinating discharge planning if the patient needs post-hospital services based on physician/advanced practitioner order or complex needs related to functional status, cognitive ability, or social support system  Outcome: Progressing today

## 2023-10-21 LAB
ANION GAP SERPL CALCULATED.3IONS-SCNC: 7 MMOL/L
BASOPHILS # BLD AUTO: 0.03 THOUSANDS/ÂΜL (ref 0–0.1)
BASOPHILS NFR BLD AUTO: 0 % (ref 0–1)
BUN SERPL-MCNC: 20 MG/DL (ref 5–25)
CALCIUM SERPL-MCNC: 8.2 MG/DL (ref 8.4–10.2)
CHLORIDE SERPL-SCNC: 106 MMOL/L (ref 96–108)
CO2 SERPL-SCNC: 25 MMOL/L (ref 21–32)
CREAT SERPL-MCNC: 1.07 MG/DL (ref 0.6–1.3)
DIGOXIN SERPL-MCNC: 0.6 NG/ML (ref 0.8–2)
EOSINOPHIL # BLD AUTO: 0.05 THOUSAND/ÂΜL (ref 0–0.61)
EOSINOPHIL NFR BLD AUTO: 0 % (ref 0–6)
ERYTHROCYTE [DISTWIDTH] IN BLOOD BY AUTOMATED COUNT: 20.1 % (ref 11.6–15.1)
GFR SERPL CREATININE-BSD FRML MDRD: 49 ML/MIN/1.73SQ M
GLUCOSE SERPL-MCNC: 103 MG/DL (ref 65–140)
HCT VFR BLD AUTO: 27.2 % (ref 34.8–46.1)
HGB BLD-MCNC: 8.1 G/DL (ref 11.5–15.4)
IMM GRANULOCYTES # BLD AUTO: 0.08 THOUSAND/UL (ref 0–0.2)
IMM GRANULOCYTES NFR BLD AUTO: 1 % (ref 0–2)
LYMPHOCYTES # BLD AUTO: 1.08 THOUSANDS/ÂΜL (ref 0.6–4.47)
LYMPHOCYTES NFR BLD AUTO: 9 % (ref 14–44)
MCH RBC QN AUTO: 21.9 PG (ref 26.8–34.3)
MCHC RBC AUTO-ENTMCNC: 29.8 G/DL (ref 31.4–37.4)
MCV RBC AUTO: 74 FL (ref 82–98)
MONOCYTES # BLD AUTO: 1.22 THOUSAND/ÂΜL (ref 0.17–1.22)
MONOCYTES NFR BLD AUTO: 10 % (ref 4–12)
NEUTROPHILS # BLD AUTO: 9.24 THOUSANDS/ÂΜL (ref 1.85–7.62)
NEUTS SEG NFR BLD AUTO: 80 % (ref 43–75)
NRBC BLD AUTO-RTO: 0 /100 WBCS
PLATELET # BLD AUTO: 268 THOUSANDS/UL (ref 149–390)
PMV BLD AUTO: 10.2 FL (ref 8.9–12.7)
POTASSIUM SERPL-SCNC: 3.9 MMOL/L (ref 3.5–5.3)
RBC # BLD AUTO: 3.7 MILLION/UL (ref 3.81–5.12)
SL CV LV EF: 55
SODIUM SERPL-SCNC: 138 MMOL/L (ref 135–147)
WBC # BLD AUTO: 11.7 THOUSAND/UL (ref 4.31–10.16)

## 2023-10-21 PROCEDURE — 80048 BASIC METABOLIC PNL TOTAL CA: CPT | Performed by: PHYSICIAN ASSISTANT

## 2023-10-21 PROCEDURE — 80162 ASSAY OF DIGOXIN TOTAL: CPT | Performed by: PHYSICIAN ASSISTANT

## 2023-10-21 PROCEDURE — 99232 SBSQ HOSP IP/OBS MODERATE 35: CPT | Performed by: STUDENT IN AN ORGANIZED HEALTH CARE EDUCATION/TRAINING PROGRAM

## 2023-10-21 PROCEDURE — 87493 C DIFF AMPLIFIED PROBE: CPT | Performed by: INTERNAL MEDICINE

## 2023-10-21 PROCEDURE — 97163 PT EVAL HIGH COMPLEX 45 MIN: CPT

## 2023-10-21 PROCEDURE — 85025 COMPLETE CBC W/AUTO DIFF WBC: CPT | Performed by: PHYSICIAN ASSISTANT

## 2023-10-21 RX ORDER — DIGOXIN 125 MCG
125 TABLET ORAL ONCE
Status: COMPLETED | OUTPATIENT
Start: 2023-10-21 | End: 2023-10-21

## 2023-10-21 RX ORDER — METOPROLOL SUCCINATE 100 MG/1
100 TABLET, EXTENDED RELEASE ORAL DAILY
Status: DISCONTINUED | OUTPATIENT
Start: 2023-10-21 | End: 2023-10-22 | Stop reason: HOSPADM

## 2023-10-21 RX ADMIN — PANTOPRAZOLE SODIUM 40 MG: 40 TABLET, DELAYED RELEASE ORAL at 06:21

## 2023-10-21 RX ADMIN — MIDODRINE HYDROCHLORIDE 5 MG: 5 TABLET ORAL at 17:21

## 2023-10-21 RX ADMIN — POTASSIUM CHLORIDE 10 MEQ: 750 TABLET, EXTENDED RELEASE ORAL at 08:54

## 2023-10-21 RX ADMIN — APIXABAN 5 MG: 5 TABLET, FILM COATED ORAL at 08:54

## 2023-10-21 RX ADMIN — COLCHICINE 0.6 MG: 0.6 TABLET ORAL at 17:21

## 2023-10-21 RX ADMIN — FUROSEMIDE 20 MG: 20 TABLET ORAL at 08:53

## 2023-10-21 RX ADMIN — MIDODRINE HYDROCHLORIDE 5 MG: 5 TABLET ORAL at 06:21

## 2023-10-21 RX ADMIN — AMIODARONE HYDROCHLORIDE 200 MG: 200 TABLET ORAL at 08:53

## 2023-10-21 RX ADMIN — COLCHICINE 0.6 MG: 0.6 TABLET ORAL at 08:55

## 2023-10-21 RX ADMIN — DIGOXIN 125 MCG: 125 TABLET ORAL at 12:08

## 2023-10-21 RX ADMIN — PRAVASTATIN SODIUM 10 MG: 10 TABLET ORAL at 17:21

## 2023-10-21 RX ADMIN — AMIODARONE HYDROCHLORIDE 200 MG: 200 TABLET ORAL at 17:21

## 2023-10-21 RX ADMIN — MIDODRINE HYDROCHLORIDE 5 MG: 5 TABLET ORAL at 12:08

## 2023-10-21 RX ADMIN — METOPROLOL SUCCINATE 100 MG: 100 TABLET, EXTENDED RELEASE ORAL at 08:54

## 2023-10-21 RX ADMIN — APIXABAN 5 MG: 5 TABLET, FILM COATED ORAL at 17:21

## 2023-10-21 NOTE — PLAN OF CARE
Problem: MOBILITY - ADULT  Goal: Maintain or return to baseline ADL function  Description: INTERVENTIONS:  -  Assess patient's ability to carry out ADLs; assess patient's baseline for ADL function and identify physical deficits which impact ability to perform ADLs (bathing, care of mouth/teeth, toileting, grooming, dressing, etc.)  - Assess/evaluate cause of self-care deficits   - Assess range of motion  - Assess patient's mobility; develop plan if impaired  - Assess patient's need for assistive devices and provide as appropriate  - Encourage maximum independence but intervene and supervise when necessary  - Involve family in performance of ADLs  - Assess for home care needs following discharge   - Consider OT consult to assist with ADL evaluation and planning for discharge  - Provide patient education as appropriate  10/21/2023 1039 by Isidro Proctor  Outcome: Progressing  10/21/2023 1038 by Isidro Proctor  Outcome: Progressing  Goal: Maintains/Returns to pre admission functional level  Description: INTERVENTIONS:  - Perform BMAT or MOVE assessment daily.   - Set and communicate daily mobility goal to care team and patient/family/caregiver. - Collaborate with rehabilitation services on mobility goals if consulted  - Perform Range of Motion  times a day. - Reposition patient every  hours.   - Dangle patient  times a day  - Stand patient  times a day  - Ambulate patient  times a day  - Out of bed to chair times a day   - Out of bed for meals times a day  - Out of bed for toileting  - Record patient progress and toleration of activity level   10/21/2023 1039 by Isidro Proctor  Outcome: Progressing  10/21/2023 1038 by Isidro Proctor  Outcome: Progressing     Problem: CARDIOVASCULAR - ADULT  Goal: Maintains optimal cardiac output and hemodynamic stability  Description: INTERVENTIONS:  - Monitor I/O, vital signs and rhythm  - Monitor for S/S and trends of decreased cardiac output  - Administer and titrate ordered vasoactive medications to optimize hemodynamic stability  - Assess quality of pulses, skin color and temperature  - Assess for signs of decreased coronary artery perfusion  - Instruct patient to report change in severity of symptoms  10/21/2023 1039 by Emiliano Napoles  Outcome: Progressing  10/21/2023 1038 by Emiliano Napoles  Outcome: Progressing  Goal: Absence of cardiac dysrhythmias or at baseline rhythm  Description: INTERVENTIONS:  - Continuous cardiac monitoring, vital signs, obtain 12 lead EKG if ordered  - Administer antiarrhythmic and heart rate control medications as ordered  - Monitor electrolytes and administer replacement therapy as ordered  10/21/2023 1039 by Emiliano Napoles  Outcome: Progressing  10/21/2023 1038 by Emiliano Napoles  Outcome: Progressing     Problem: DISCHARGE PLANNING  Goal: Discharge to home or other facility with appropriate resources  Description: INTERVENTIONS:  - Identify barriers to discharge w/patient and caregiver  - Arrange for needed discharge resources and transportation as appropriate  - Identify discharge learning needs (meds, wound care, etc.)  - Arrange for interpretive services to assist at discharge as needed  - Refer to Case Management Department for coordinating discharge planning if the patient needs post-hospital services based on physician/advanced practitioner order or complex needs related to functional status, cognitive ability, or social support system  10/21/2023 1039 by Emiliano Napoles  Outcome: Progressing  10/21/2023 1038 by Emiliano Napoles  Outcome: Progressing

## 2023-10-21 NOTE — PLAN OF CARE
Problem: MOBILITY - ADULT  Goal: Maintain or return to baseline ADL function  Description: INTERVENTIONS:  -  Assess patient's ability to carry out ADLs; assess patient's baseline for ADL function and identify physical deficits which impact ability to perform ADLs (bathing, care of mouth/teeth, toileting, grooming, dressing, etc.)  - Assess/evaluate cause of self-care deficits   - Assess range of motion  - Assess patient's mobility; develop plan if impaired  - Assess patient's need for assistive devices and provide as appropriate  - Encourage maximum independence but intervene and supervise when necessary  - Involve family in performance of ADLs  - Assess for home care needs following discharge   - Consider OT consult to assist with ADL evaluation and planning for discharge  - Provide patient education as appropriate  Outcome: Progressing  Goal: Maintains/Returns to pre admission functional level  Description: INTERVENTIONS:  - Perform BMAT or MOVE assessment daily.   - Set and communicate daily mobility goal to care team and patient/family/caregiver. - Collaborate with rehabilitation services on mobility goals if consulted  - Perform Range of Motion  times a day. - Reposition patient every  hours.   - Dangle patient  times a day  - Stand patient  times a day  - Ambulate patient  times a day  - Out of bed to chair times a day   - Out of bed for meals  times a day  - Out of bed for toileting  - Record patient progress and toleration of activity level   Outcome: Progressing     Problem: CARDIOVASCULAR - ADULT  Goal: Maintains optimal cardiac output and hemodynamic stability  Description: INTERVENTIONS:  - Monitor I/O, vital signs and rhythm  - Monitor for S/S and trends of decreased cardiac output  - Administer and titrate ordered vasoactive medications to optimize hemodynamic stability  - Assess quality of pulses, skin color and temperature  - Assess for signs of decreased coronary artery perfusion  - Instruct patient to report change in severity of symptoms  Outcome: Progressing  Goal: Absence of cardiac dysrhythmias or at baseline rhythm  Description: INTERVENTIONS:  - Continuous cardiac monitoring, vital signs, obtain 12 lead EKG if ordered  - Administer antiarrhythmic and heart rate control medications as ordered  - Monitor electrolytes and administer replacement therapy as ordered  Outcome: Progressing     Problem: DISCHARGE PLANNING  Goal: Discharge to home or other facility with appropriate resources  Description: INTERVENTIONS:  - Identify barriers to discharge w/patient and caregiver  - Arrange for needed discharge resources and transportation as appropriate  - Identify discharge learning needs (meds, wound care, etc.)  - Arrange for interpretive services to assist at discharge as needed  - Refer to Case Management Department for coordinating discharge planning if the patient needs post-hospital services based on physician/advanced practitioner order or complex needs related to functional status, cognitive ability, or social support system  Outcome: Progressing

## 2023-10-21 NOTE — PROGRESS NOTES
Progress Note - Electrophysiology  Augusta Dickerson 77 y.o. female MRN: [id number]  Unit/Bed#: -01 Encounter: [id number]      Assessment:  1. Symptomatic persistent atrial fibrillation with breakthrough despite amiodarone, status post cryoablation with pulmonary vein isolation, posterior wall isolation, and ablation of mitral isthmus dependent flutter 10/19/2023              A.)  Initially diagnosed in early 2018, started on beta-blockers and Eliquis anticoagulation              B.)  Found to be in rapid atrial fibrillation 5/2018, underwent successful cardioversion 6/2018              C.)  Recurrent A-fib noted 10/2018 in the setting of reduced EF, admitted for sotalol initiation and cardioversion 12/2018              D.)  Breakthrough despite sotalol 11/2022, status post successful cardioversion with no medication changes at that time              E.)  Recurrent A-fib 3/2023, sotalol discontinued and amiodarone antiarrhythmic therapy started at that time              F.)  Has been in persistent atrial fibrillation since 7/2023 despite amiodarone per device interrogations  G.)  Also maintained on metoprolol and digoxin for ongoing rate control  H.)  [id number] = 7, maintained on Eliquis anticoagulation  I.)  Recurrent atrial fibrillation in the immediate post ablation setting, increased oral dose of amiodarone along with continuation of digoxin and up-titration of metoprolol  2. Prior tachycardia mediated cardiomyopathy with now improved LVEF 55% per echo 3/2023              A.)  Previously as low as 35% in 2018              B.)  Prior nonischemic nuclear stress tests              C.)  Maintained on Toprol-XL, not on ACEi/ARB/ARNi due to orthostatic hypotension requiring midodrine  3. Chronic HFpEF  4. Hypertension  5. Hyperlipidemia  6. CKD 3  7. Prior CVA  8.   Obesity with BMI 33     Plan:  Patient is status post ablation and did revert back to atrial fibrillation (postop inflammation) it is me that was me and are currently attempting rate control immediately postop. Antiarrhythmic has been uptitrated, will continue 200 mg twice daily for the time being. Metoprolol will be increased to 100 mg daily today (is receiving midodrine with systolic in the 587'F) and digoxin will be continued as well with repeat dig level tomorrow morning. We discussed with the patient how her heart rates are still not controlled enough to send her home and she is agreeable to stay another day. Overall plan is to rate control her until outpatient cardioversion in a week or so. Subjective/Objective   Subjective: Patient presented for elective procedure and underwent extensive ablation with pulmonary vein isolation, posterior wall isolation, and ablation of mitral isthmus dependent flutter by Dr. Polina Poe on 10/19. Unfortunately, she did revert back into atrial fibrillation that same night. Yesterday, med adjustments were changed with up titration of antiarrhythmic therapy of amiodarone. Home doses of metoprolol and digoxin were continued. Repeat dig level today was 0.6. Hemoglobin is stable at 8.1 from yesterday. She reports that she has been walking around and that she was evaluated by PT yesterday although no notes are in. In review of her telemetry, heart rates are still elevated in the low 100s.       Objective:  Vitals: /78 (BP Location: Left arm)   Pulse 103   Temp 98.3 °F (36.8 °C) (Oral)   Resp 16   Ht 5' 5" (1.651 m)   Wt 90.7 kg (199 lb 15.3 oz)   SpO2 95%   BMI 33.27 kg/m²     Vitals:    10/19/23 0721   Weight: 90.7 kg (199 lb 15.3 oz)     Orthostatic Blood Pressures      Flowsheet Row Most Recent Value   Blood Pressure 123/78 filed at 10/21/2023 0559   Patient Position - Orthostatic VS Lying filed at 10/21/2023 0659              Intake/Output Summary (Last 24 hours) at 10/21/2023 0990  Last data filed at 10/20/2023 2101  Gross per 24 hour   Intake 447 ml   Output 950 ml   Net -503 ml Invasive Devices       Peripheral Intravenous Line  Duration             Peripheral IV 10/19/23 Distal;Left;Ventral (anterior) Forearm 2 days                              Scheduled Meds:  Current Facility-Administered Medications   Medication Dose Route Frequency Provider Last Rate    acetaminophen  650 mg Oral Q4H PRN Phyllis Patel PA-C      amiodarone  200 mg Oral BID With Meals TD Kim-DAPHNE      apixaban  5 mg Oral BID Phyllis Patel, PA-DAPHNE      colchicine  0.6 mg Oral BID Phyllis Patel PA-C      digoxin  125 mcg Oral Once [facility], JENARO      docusate sodium  100 mg Oral BID PRN Hue Soden, PA-DAPHNE      furosemide  20 mg Oral Daily Phyllis Patel, PA-DAPHNE      hydrOXYzine HCL  50 mg Oral Q6H PRN Hue Soden, PA-DAPHNE      melatonin  3 mg Oral HS PRN Hue Soden, PA-DAPHNE      metoprolol succinate  100 mg Oral Daily Hue Soden, PA-DAPHNE      midodrine  5 mg Oral TID AC TD Kim-DAPHNE      pantoprazole  40 mg Oral Early Morning TD Kim-DAPHNE      potassium chloride  10 mEq Oral Daily Phyllis Patel PA-C      pravastatin  10 mg Oral Daily With Dinner Phyllis Patel PA-C      sodium chloride  20 mL/hr Intravenous Once Phyllis Patel PA-C       Continuous Infusions:   PRN Meds:.  acetaminophen    docusate sodium    hydrOXYzine HCL    melatonin    Review of Systems:  Review of Systems   Constitutional: Negative for chills, diaphoresis, fever and malaise/fatigue. Cardiovascular:  Negative for chest pain, claudication, cyanosis, dyspnea on exertion, irregular heartbeat, leg swelling, near-syncope, orthopnea, palpitations, paroxysmal nocturnal dyspnea and syncope. Respiratory:  Negative for shortness of breath and sleep disturbances due to breathing. Neurological:  Negative for dizziness and light-headedness. All other systems reviewed and are negative. ROS as noted above, otherwise 12 point review of systems was performed and is negative.      Physical Exam:   Physical Exam  Vitals and nursing note reviewed. Constitutional:       General: She is not in acute distress. Appearance: She is well-developed. She is not diaphoretic. HENT:      Head: Normocephalic and atraumatic. Eyes:      Pupils: Pupils are equal, round, and reactive to light. Neck:      Vascular: No JVD. Cardiovascular:      Rate and Rhythm: Regular rhythm. Tachycardia present. Heart sounds: No murmur heard. No friction rub. No gallop. Pulmonary:      Effort: Pulmonary effort is normal. No respiratory distress. Breath sounds: Normal breath sounds. No wheezing. Abdominal:      Palpations: Abdomen is soft. Musculoskeletal:         General: Normal range of motion. Cervical back: Normal range of motion. Skin:     General: Skin is warm and dry. Neurological:      Mental Status: She is alert and oriented to person, place, and time. Lab Results: I have personally reviewed pertinent lab results. Results from last 7 days   Lab Units 10/21/23  0432 10/20/23  0532 10/19/23  0650   WBC Thousand/uL 11.70* 11.99* 7.16   HEMOGLOBIN g/dL 8.1* 8.1* 9.5*   HEMATOCRIT % 27.2* 27.9* 32.7*   PLATELETS Thousands/uL 268 285 375     Results from last 7 days   Lab Units 10/21/23  0432 10/20/23  0532 10/19/23  0650   POTASSIUM mmol/L 3.9 4.1 3.8   CHLORIDE mmol/L 106 110* 106   CO2 mmol/L 25 21 23   BUN mg/dL 20 19 20   CREATININE mg/dL 1.07 1.05 1.24   CALCIUM mg/dL 8.2* 8.8 9.3     Results from last 7 days   Lab Units 10/19/23  0650   INR  1.38*     Results from last 7 days   Lab Units 10/20/23  0532   MAGNESIUM mg/dL 1.9       Imaging: I have personally reviewed pertinent reports.     Results for orders placed during the hospital encounter of 07/30/20    Echo complete with contrast if indicated    Narrative  [address] [place] [address]  [phone]    Transthoracic Echocardiogram  2D, M-mode, and Color Doppler    Study date: 2020    Patient: Sue Jones  MR number: [id number]  Account number: [de-identified]  : 1945  Age: 75 years  Gender: Female  Status: Outpatient  Location: [facility]  Height: 65 in  Weight: 223 lb  BP: 118/ 78 mmHg    Indications: Cardiomyopathy. Diagnoses: I42.9 - Cardiomyopathy, unspecified    Sonographer:  Ferrara RCS  Primary Physician:  Polo Patiño DO  Referring Physician:  Padmini Naqvi MD  Group:  [facility]  Interpreting Physician:  Gilson Zapata MD    SUMMARY    LEFT VENTRICLE:  Ejection fraction was estimated to be 60 %. There were no regional wall motion abnormalities. Concentric hypertrophy was present. RIGHT VENTRICLE:  Estimated peak pressure was at least 50 mmHg. Pacer lead noted. LEFT ATRIUM:  The atrium was mildly to moderately dilated. MITRAL VALVE:  There was moderate regurgitation. AORTIC VALVE:  There was mild regurgitation. TRICUSPID VALVE:  There was moderate to severe regurgitation. PULMONIC VALVE:  There was trace regurgitation. HISTORY: PRIOR HISTORY: TIA. Paroxysmal atrial fibrillation. Bradycardia. Hypertension. Aortic insufficiency. Tricuspid regurgitation. Mitral regurgitation. Pacemaker. Cardiomyopathy. PROCEDURE: The study was performed in the [facility]. This was a routine study. The transthoracic approach was used. The study included complete 2D imaging, M-mode, and color Doppler. The heart rate was 85 bpm, at the  start of the study. Images were obtained from the parasternal, apical, subcostal, and suprasternal notch acoustic windows. Image quality was adequate. LEFT VENTRICLE: Size was normal. Ejection fraction was estimated to be 60 %. There were no regional wall motion abnormalities. Concentric hypertrophy was present.     RIGHT VENTRICLE: The size was normal. Systolic function was normal. Wall thickness was normal. DOPPLER: Estimated peak pressure was at least 50 mmHg. Pacer lead noted. LEFT ATRIUM: The atrium was mildly to moderately dilated. RIGHT ATRIUM: Size was normal.    MITRAL VALVE: There was annular calcification. DOPPLER: There was moderate regurgitation. AORTIC VALVE: The valve was trileaflet. Leaflets exhibited mildly increased thickness and normal cuspal separation. DOPPLER: Transaortic velocity was within the normal range. There was no evidence for stenosis. There was mild  regurgitation. TRICUSPID VALVE: The valve structure was normal. There was normal leaflet separation. DOPPLER: The transtricuspid velocity was within the normal range. There was no evidence for stenosis. There was moderate to severe regurgitation. PULMONIC VALVE: Leaflets exhibited normal thickness, no calcification, and normal cuspal separation. DOPPLER: The transpulmonic velocity was within the normal range. There was trace regurgitation. PERICARDIUM: There was no pericardial effusion. The pericardium was normal in appearance. AORTA: The root exhibited normal size. SYSTEM MEASUREMENT TABLES    2D  %FS: 31.49 %  Ao Diam: 3.15 cm  EDV(Teich): 97.57 ml  EF(Teich): 59.45 %  ESV(Teich): 39.57 ml  IVSd: 1.1 cm  LA Area: 22.84 cm2  LA Diam: 4.2 cm  LVEDV MOD A4C: 82.51 ml  LVEF MOD A4C: 73.31 %  LVESV MOD A4C: 22.02 ml  LVIDd: 4.6 cm  LVIDs: 3.15 cm  LVLd A4C: 7.1 cm  LVLs A4C: 5.81 cm  LVPWd: 1.09 cm  RA Area: 12.88 cm2  RVIDd: 2.71 cm  RWT: 0.47  SV MOD A4C: 60.48 ml  SV(Teich): 58 ml    CW  AR Dec [place]: 2.59 m/s2  AR Dec Time: 1703.32 ms  AR PHT: 493.96 ms  AR Vmax: 4.39 m/s  AR maxP.28 mmHg  AV Env. Ti: 384.4 ms  AV MaxP.3 mmHg  AV VTI: 18.65 cm  AV Vmax: 1.04 m/s  AV Vmean: 0.49 m/s  AV meanP.5 mmHg  MR VTI: 246.71 cm  MR Vmax: 5.77 m/s  MR Vmean: 4.77 m/s  MR maxP.39 mmHg  MR meanP.1 mmHg  TR MaxP.67 mmHg  TR Vmax: 3.45 m/s    MM  TAPSE: 2.04 cm    PW  E': 0.05 m/s  E/E': 20.58  LVOT Vmax: 0.66 m/s  LVOT maxPG: 1.73 mmHg  MV A Gal: 0.46 m/s  MV Dec [place]: 5.79 m/s2  MV DecT: 192.32 ms  MV E Gal: 1.11 m/s  MV E/A Ratio: 2.4  MV PHT: 55.77 ms  MVA By PHT: 3.94 cm2    [facility] Accredited Echocardiography Laboratory    Prepared and electronically signed by    Alvin Obrien MD  Signed 30-Jul-2020 16:01:20      VTE Pharmacologic Prophylaxis: Eliquis  VTE Mechanical Prophylaxis: sequential compression device

## 2023-10-21 NOTE — PLAN OF CARE
Problem: PHYSICAL THERAPY ADULT  Goal: Performs mobility at highest level of function for planned discharge setting. See evaluation for individualized goals. Description: Treatment/Interventions: Functional transfer training, Patient/family training, Equipment eval/education, Bed mobility, Gait training, Spoke to nursing          See flowsheet documentation for full assessment, interventions and recommendations. Outcome: Completed  Note: Prognosis: Good  Problem List: Decreased endurance, Decreased mobility  Assessment: Pt seen for PT evaluation. Pt with active PT eval/treat orders. Pt is a 77 y.o. y.o. female who was admitted to [facility] on 10/19/23. Pt's current dx/ problem list include: persistent atrial fibrillation. Comorbidities affecting pt's physical performance at time of assessment are as follows: has a past medical history of Aphasia, mixed, Atrial fibrillation ([address]), CHF (congestive heart failure) ([address]), Colon polyp, Headache, Hyperlipidemia, Hypertension, Lyme disease, Shortness of breath, TIA (transient ischemic attack), and Vertigo. Personal factors affecting pt at time of initial examination include: steps to enter environment, limited home support, past experience, inability to perform IADLs, inability to perform ADLs, inability to ambulate household distances. Due to acute medical issues, ongoing medical workup for primary dx; decreased activity tolerance compared to baseline, increased assistance needed from caregiver at current time, multiple lines, decline in overall functional mobility status; health management issues. At baseline, pt resides in a [facility] with [facility] and was independent with ADLs/ IADLs. Currently, upon initial examination, pt is requiring mod I for supine to sit and sit to stand and supervision for ambulation. Patient appears to be functioning at or near her baseline. No further acute PT needs at this time.  Patient would benefit from continued mobilization with nursing and restorative staff. At the end of evaluation, pt was left seated in bed side recliner with all needs in reach. The patient's AM-PAC Basic Mobility Inpatient Short Form Raw Score is 22. A Raw score of greater than 16 suggests the patient may benefit from discharge to home. Please also refer to the recommendation of the Physical Therapist for safe discharge planning. Barriers to Discharge: None     PT Discharge Recommendation: No rehabilitation needs    See flowsheet documentation for full assessment.

## 2023-10-21 NOTE — PHYSICAL THERAPY NOTE
Physical Therapy Evaluation     Patient's Name: Jennifer Bond    Admitting Diagnosis  Atrial fibrillation, unspecified type [facility]) [I48.91]    Problem List  Patient Active Problem List   Diagnosis    Essential hypertension    Mixed hyperlipidemia    Obesity (BMI 30-39. 9)    H/O TIA (transient ischemic attack) and stroke    Migraine with aura and without status migrainosus, not intractable    Myocardiopathy (HCC)    Anticoagulant long-term use    Nonrheumatic mitral valve regurgitation    Nonrheumatic aortic valve insufficiency    Nonrheumatic tricuspid valve regurgitation    Persistent atrial fibrillation (HCC)    Other insomnia    Chronic obstructive pulmonary disease (HCC)    BMI 33.0-33.9,adult    Stage 3a chronic kidney disease (HCC)    Acute on chronic systolic congestive heart failure (HCC)    SSS (sick sinus syndrome) ([address])    Diplopia    Lightheadedness       Past Medical History  Past Medical History:   Diagnosis Date    Aphasia, mixed 07/28/2017    Atrial fibrillation (HCC)     CHF (congestive heart failure) (HCC)     Colon polyp     Headache     Hyperlipidemia     Hypertension     Lyme disease     Shortness of breath     TIA (transient ischemic attack)     Vertigo        Past Surgical History  Past Surgical History:   Procedure Laterality Date    CARDIAC ELECTROPHYSIOLOGY PROCEDURE N/A 10/19/2023    Procedure: Cardiac eps/afib ablation PVI/POST WALL;  Surgeon: Osvaldo Hodges MD;  Location:  CARDIAC CATH LAB; Service: Cardiology    CARDIAC PACEMAKER PLACEMENT  12/2019    COLONOSCOPY      NE SIGMOIDOSCOPY FLX DX W/COLLJ SPEC BR/WA IF PFRMD N/A 11/28/2017    Procedure: Layman Pleasure;  Surgeon: Josi Leonardo MD;  Location: [facility];   Service: Gastroenterology    TONSILLECTOMY          10/21/23 0823   PT Last Visit   PT Visit Date 10/21/23   Note Type   Note type Evaluation   Pain Assessment   Pain Assessment Tool 0-10   Pain Score No Pain   Restrictions/Precautions   Weight Bearing Precautions Per Order No   Other Precautions Telemetry;Multiple lines   Home Living   Type of [address] Two level;Bed/bath upstairs;1/2 bath on main level  (1 BEBE)   Bathroom Shower/Tub Walk-in shower   Bathroom Toilet Standard   Bathroom Equipment Commode   Bathroom Accessibility Accessible   Home Equipment Walker   Additional Comments PTA pt denies the use of DME   Prior Function   Level of [place] Independent with ADLs; Independent with functional mobility; Independent with IADLS   Lives With Spouse; Family  (son, dgt in law and grandson)   [address] Help From Family   IADLs Independent with meal prep; Independent with medication management; Family/Friend/Other provides transportation   Falls in the last 6 months 0   Vocational Retired   General   Family/Caregiver Present No   Cognition   Arousal/Participation Cooperative   Orientation Level Oriented X4   Memory Within functional limits   Following Commands Follows all commands and directions without difficulty   RLE Assessment   RLE Assessment WFL   LLE Assessment   LLE Assessment WFL   Bed Mobility   Supine to Sit 6  Modified independent   Additional items HOB elevated; Bedrails; Increased time required   Additional Comments Pt OOB in bedside recliner at end of session   Transfers   Sit to Stand 6  Modified independent   Additional items Increased time required   Stand to Sit 6  Modified independent   Additional items Increased time required   Additional Comments no AD   Ambulation/Elevation   Gait pattern Wide LUISA; Forward Flexion; Step through pattern   Gait Assistance 5  Supervision   Assistive Device None   Distance 40'   Ambulation/Elevation Additional Comments Pt declined stair trial at this time. Reviewed stair safety w/ pt.    Balance   Static Sitting Good   Dynamic Sitting Fair +   Static Standing Fair +   Dynamic Standing Fair +   Ambulatory Fair +   Activity Tolerance   Activity Tolerance Patient tolerated treatment well   Nurse Made Aware RN cleared and updated   Assessment   Prognosis Good   Problem List Decreased endurance;Decreased mobility   Assessment Pt seen for PT evaluation. Pt with active PT eval/treat orders. Pt is a 77 y.o. y.o. female who was admitted to [facility] on 10/19/23. Pt's current dx/ problem list include: persistent atrial fibrillation. Comorbidities affecting pt's physical performance at time of assessment are as follows: has a past medical history of Aphasia, mixed, Atrial fibrillation ([address]), CHF (congestive heart failure) ([address]), Colon polyp, Headache, Hyperlipidemia, Hypertension, Lyme disease, Shortness of breath, TIA (transient ischemic attack), and Vertigo. Personal factors affecting pt at time of initial examination include: steps to enter environment, limited home support, past experience, inability to perform IADLs, inability to perform ADLs, inability to ambulate household distances. Due to acute medical issues, ongoing medical workup for primary dx; decreased activity tolerance compared to baseline, increased assistance needed from caregiver at current time, multiple lines, decline in overall functional mobility status; health management issues. At baseline, pt resides in a 2  with 1 [facility] and was independent with ADLs/ IADLs. Currently, upon initial examination, pt is requiring mod I for supine to sit and sit to stand and supervision for ambulation. Patient appears to be functioning at or near her baseline. No further acute PT needs at this time. Patient would benefit from continued mobilization with nursing and restorative staff. At the end of evaluation, pt was left seated in bed side recliner with all needs in reach. The patient's AM-PAC Basic Mobility Inpatient Short Form Raw Score is 22. A Raw score of greater than 16 suggests the patient may benefit from discharge to home. Please also refer to the recommendation of the Physical Therapist for safe discharge planning.    Barriers to Discharge None   Goals Patient Goals to go home   Plan   Treatment/Interventions Functional transfer training;Patient/family training;Equipment eval/education; Bed mobility;Gait training;Spoke to nursing   PT Frequency Other (Comment)  (PT EVAL ONLY)   Discharge Recommendation   PT Discharge Recommendation No rehabilitation needs   AM-PAC Basic Mobility Inpatient   Turning in Flat Bed Without Bedrails 4   Lying on Back to Sitting on Edge of Flat Bed Without Bedrails 4   Moving Bed to Chair 4   Standing Up From Chair Using Arms 4   Walk in Room 3   Climb 3-5 Stairs With Railing 3   Basic Mobility Inpatient Raw Score 22   Basic Mobility Standardized Score 47.4   Highest Level Of Mobility   JH-HLM Goal 7: Walk 25 feet or more   JH-HLM Achieved 7: Walk 25 feet or more           Jenni Daniels, PT

## 2023-10-22 VITALS
OXYGEN SATURATION: 95 % | HEART RATE: 84 BPM | BODY MASS INDEX: 33.31 KG/M2 | DIASTOLIC BLOOD PRESSURE: 79 MMHG | TEMPERATURE: 97.7 F | SYSTOLIC BLOOD PRESSURE: 133 MMHG | HEIGHT: 65 IN | RESPIRATION RATE: 16 BRPM | WEIGHT: 199.96 LBS

## 2023-10-22 PROBLEM — Z98.890 STATUS POST ABLATION OF ATRIAL FIBRILLATION: Status: ACTIVE | Noted: 2023-10-22

## 2023-10-22 PROBLEM — Z86.79 STATUS POST ABLATION OF ATRIAL FIBRILLATION: Status: ACTIVE | Noted: 2023-10-22

## 2023-10-22 LAB
ANION GAP SERPL CALCULATED.3IONS-SCNC: 9 MMOL/L
ATRIAL RATE: 70 BPM
BUN SERPL-MCNC: 18 MG/DL (ref 5–25)
C DIFF TOX B TCDB STL QL NAA+PROBE: NEGATIVE
CALCIUM SERPL-MCNC: 8.3 MG/DL (ref 8.4–10.2)
CHLORIDE SERPL-SCNC: 106 MMOL/L (ref 96–108)
CO2 SERPL-SCNC: 24 MMOL/L (ref 21–32)
CREAT SERPL-MCNC: 1.03 MG/DL (ref 0.6–1.3)
DIGOXIN SERPL-MCNC: 0.6 NG/ML (ref 0.8–2)
GFR SERPL CREATININE-BSD FRML MDRD: 51 ML/MIN/1.73SQ M
GLUCOSE SERPL-MCNC: 102 MG/DL (ref 65–140)
POTASSIUM SERPL-SCNC: 3.4 MMOL/L (ref 3.5–5.3)
QRS AXIS: 39 DEGREES
QRSD INTERVAL: 88 MS
QT INTERVAL: 398 MS
QTC INTERVAL: 432 MS
SODIUM SERPL-SCNC: 139 MMOL/L (ref 135–147)
T WAVE AXIS: -60 DEGREES
VENTRICULAR RATE: 71 BPM

## 2023-10-22 PROCEDURE — 80162 ASSAY OF DIGOXIN TOTAL: CPT | Performed by: PHYSICIAN ASSISTANT

## 2023-10-22 PROCEDURE — 99238 HOSP IP/OBS DSCHRG MGMT 30/<: CPT | Performed by: STUDENT IN AN ORGANIZED HEALTH CARE EDUCATION/TRAINING PROGRAM

## 2023-10-22 PROCEDURE — 93010 ELECTROCARDIOGRAM REPORT: CPT | Performed by: INTERNAL MEDICINE

## 2023-10-22 PROCEDURE — 93005 ELECTROCARDIOGRAM TRACING: CPT

## 2023-10-22 PROCEDURE — 80048 BASIC METABOLIC PNL TOTAL CA: CPT | Performed by: PHYSICIAN ASSISTANT

## 2023-10-22 RX ORDER — PANTOPRAZOLE SODIUM 40 MG/1
40 TABLET, DELAYED RELEASE ORAL
Qty: 30 TABLET | Refills: 0 | Status: SHIPPED | OUTPATIENT
Start: 2023-10-23 | End: 2023-10-27

## 2023-10-22 RX ORDER — AMIODARONE HYDROCHLORIDE 200 MG/1
200 TABLET ORAL
Status: DISCONTINUED | OUTPATIENT
Start: 2023-10-22 | End: 2023-10-22 | Stop reason: HOSPADM

## 2023-10-22 RX ORDER — METOPROLOL SUCCINATE 25 MG/1
25 TABLET, EXTENDED RELEASE ORAL DAILY
Qty: 30 TABLET | Refills: 1 | Status: SHIPPED | OUTPATIENT
Start: 2023-10-22 | End: 2023-10-27

## 2023-10-22 RX ADMIN — PANTOPRAZOLE SODIUM 40 MG: 40 TABLET, DELAYED RELEASE ORAL at 05:38

## 2023-10-22 RX ADMIN — AMIODARONE HYDROCHLORIDE 200 MG: 200 TABLET ORAL at 08:44

## 2023-10-22 RX ADMIN — MIDODRINE HYDROCHLORIDE 5 MG: 5 TABLET ORAL at 08:48

## 2023-10-22 RX ADMIN — METOPROLOL SUCCINATE 100 MG: 100 TABLET, EXTENDED RELEASE ORAL at 08:44

## 2023-10-22 RX ADMIN — FUROSEMIDE 20 MG: 20 TABLET ORAL at 08:44

## 2023-10-22 RX ADMIN — POTASSIUM CHLORIDE 10 MEQ: 750 TABLET, EXTENDED RELEASE ORAL at 08:44

## 2023-10-22 RX ADMIN — APIXABAN 5 MG: 5 TABLET, FILM COATED ORAL at 08:44

## 2023-10-22 NOTE — QUICK NOTE
Patient did convert on her own yesterday. Unfortunately, has had issues with diarrhea since yesterday around noon. She went every hour on the hour yesterday and did have one watery bowel movement this morning around 6. This is likely due to colchicine that she was placed on empirically given significant burning during ablation. Therefore will stop it. She is not having pericarditis symptoms. She would still like to go home later today, advised that as long as diarrhea starts to improve that she can. Still awaiting BMP results to assess for electrolyte derangement.

## 2023-10-22 NOTE — NURSING NOTE
AVS reviewed with patient. Patient verbalizes understanding of medication changes, post-ablation teaching, and follow up appointments.

## 2023-10-23 ENCOUNTER — TRANSITIONAL CARE MANAGEMENT (OUTPATIENT)
Dept: FAMILY MEDICINE CLINIC | Facility: CLINIC | Age: 78
End: 2023-10-23

## 2023-10-23 NOTE — ANESTHESIA PREPROCEDURE EVALUATION
Procedure:  PRE ABLATION LUIZA  Afib ablation    Home Green is a 65 y.o. female h/o of persistent atrial fibrillation with rapid ventricular rate. 1. Paroxysmal atrial fibrillation  Last took eliquis yesterday morning, taking amiodarone and metoprolol  2. Heart failure, unspecified  EF 60%     3. History of recovered tachycardia mediated cardiomyopathy        4. Sick sinus syndrome s/p PPM       5. Hypertension  Now takes midodrine  6. Mixed hyperlipidemia  On statin     7. Moderate MR, mild AR, moderate to severe TR    Last echo showed Mod pulm htn with PA pressures in 50s  CT chest positive for pulm fibrosis, on RA at home does not use any inhalers. Has been having mild dry cough chronically       Currently being worked up for anemia, last HB- 9.5    Drinks a cocktail everynight. Relevant Problems   CARDIO   (+) Acute on chronic systolic congestive heart failure (HCC)   (+) Essential hypertension   (+) Migraine with aura and without status migrainosus, not intractable   (+) Mixed hyperlipidemia   (+) Nonrheumatic aortic valve insufficiency   (+) Nonrheumatic mitral valve regurgitation   (+) Persistent atrial fibrillation (HCC)   (+) SSS (sick sinus syndrome) ([facility])      /RENAL   (+) Stage 3a chronic kidney disease ([facility])      NEURO/PSYCH   (+) Diplopia   (+) Migraine with aura and without status migrainosus, not intractable      PULMONARY   (+) Chronic obstructive pulmonary disease (HCC)        Physical Exam    Airway    Mallampati score: IV  TM Distance: >3 FB  Neck ROM: full     Dental   Comment: Multiple caps     Cardiovascular  Rhythm: irregular    Pulmonary  Comment: Significant kyphosis, Pulmonary exam normal     Other Findings        Anesthesia Plan  ASA Score- 3     Anesthesia Type- general with ASA Monitors. Additional Monitors: arterial line. Airway Plan: ETT. Plan Factors-Exercise tolerance (METS): <4 METS. Exercise comment: Cannot go up 2 flights of stairs without stopping. .    Chart reviewed. EKG reviewed. Imaging results reviewed. Existing labs reviewed. Patient summary reviewed. Patient is not a current smoker. Induction- intravenous. Postoperative Plan-     Informed Consent- Anesthetic plan and risks discussed with patient and spouse. I personally reviewed this patient with the CRNA. Discussed and agreed on the Anesthesia Plan with the CRNA. Fior Roger

## 2023-10-23 NOTE — ANESTHESIA POSTPROCEDURE EVALUATION
Post-Op Assessment Note    CV Status:  Stable    Pain management: adequate     Mental Status:  Alert and awake   Hydration Status:  Euvolemic   PONV Controlled:  Controlled   Airway Patency:  Patent      Post Op Vitals Reviewed: Yes      Staff: Anesthesiologist, CRNA         No notable events documented.     BP   96/56   Temp      Pulse 70   Resp   16   SpO2   96

## 2023-10-27 ENCOUNTER — OFFICE VISIT (OUTPATIENT)
Dept: CARDIOLOGY CLINIC | Facility: CLINIC | Age: 78
End: 2023-10-27
Payer: MEDICARE

## 2023-10-27 VITALS
OXYGEN SATURATION: 96 % | HEART RATE: 90 BPM | RESPIRATION RATE: 16 BRPM | WEIGHT: 198 LBS | HEIGHT: 65 IN | DIASTOLIC BLOOD PRESSURE: 86 MMHG | BODY MASS INDEX: 32.99 KG/M2 | SYSTOLIC BLOOD PRESSURE: 118 MMHG

## 2023-10-27 DIAGNOSIS — Z79.01 ANTICOAGULANT LONG-TERM USE: ICD-10-CM

## 2023-10-27 DIAGNOSIS — I49.5 SSS (SICK SINUS SYNDROME) (HCC): ICD-10-CM

## 2023-10-27 DIAGNOSIS — I10 ESSENTIAL HYPERTENSION: ICD-10-CM

## 2023-10-27 DIAGNOSIS — I35.1 NONRHEUMATIC AORTIC VALVE INSUFFICIENCY: ICD-10-CM

## 2023-10-27 DIAGNOSIS — Z95.0 PRESENCE OF PERMANENT CARDIAC PACEMAKER: ICD-10-CM

## 2023-10-27 DIAGNOSIS — Z86.79 S/P ABLATION OF ATRIAL FIBRILLATION: ICD-10-CM

## 2023-10-27 DIAGNOSIS — E78.2 MIXED HYPERLIPIDEMIA: ICD-10-CM

## 2023-10-27 DIAGNOSIS — I36.1 NONRHEUMATIC TRICUSPID VALVE REGURGITATION: ICD-10-CM

## 2023-10-27 DIAGNOSIS — I48.19 PERSISTENT ATRIAL FIBRILLATION (HCC): ICD-10-CM

## 2023-10-27 DIAGNOSIS — Z98.890 S/P ABLATION OF ATRIAL FIBRILLATION: ICD-10-CM

## 2023-10-27 DIAGNOSIS — I34.0 NONRHEUMATIC MITRAL VALVE REGURGITATION: ICD-10-CM

## 2023-10-27 DIAGNOSIS — I50.22 CHRONIC SYSTOLIC (CONGESTIVE) HEART FAILURE (HCC): Primary | ICD-10-CM

## 2023-10-27 DIAGNOSIS — I42.8 NONISCHEMIC CARDIOMYOPATHY (HCC): ICD-10-CM

## 2023-10-27 PROCEDURE — 99214 OFFICE O/P EST MOD 30 MIN: CPT | Performed by: INTERNAL MEDICINE

## 2023-10-27 RX ORDER — METOPROLOL SUCCINATE 100 MG/1
100 TABLET, EXTENDED RELEASE ORAL DAILY
Qty: 90 TABLET | Refills: 3
Start: 2023-10-27

## 2023-10-27 NOTE — PROGRESS NOTES
CARDIOLOGY OFFICE VISIT  [facility]  [address], [facility] [address], [address] [place] [address]  Tel: [phone]      NAME: Conner Strauss  AGE: 77 y.o. SEX: female  : 1945  MRN: [id number]      Chief Complaint:  Chief Complaint   Patient presents with    Follow-up         History of Present Illness:   Patient comes for follow up s/p Afib ablation. States she is doing well from cardiac stand point and denies chest pain / pressure, palpitations, lightheadedness, syncope, swelling feet, orthopnea, PND, claudication. SOB is at baseline. Chronic systolic heart failure - currently on furosemide 20 mg OD, potassium, metoprolol succinate. Lisinopril was discontinued for soft BP. States she has been watching her salt intake closely     Nonischemic cardiomyopathy, likely tachycardia mediated -  patient has a history of nonischemic cardiomyopathy with improvement in EF after rate/rhythm control of her atrial fibrillation. Current EF is 55% (2023). On metoprolol succinate. Lisinopril was discontinued for soft BP     PAF - Patient was diagnosed with atrial fibrillation on an EKG done at her PCP office. Patient initially had no symptoms from it. She was cardioverted on 2018 and was in sinus rhythm for a while but then went back into Afib. Then she was started on rhythm control with Sotalol. Did well for a while but again required cardioversion on 11/3/2022. She was placed on amiodarone but started having breakthrough atrial fibrillation. Now status post cryoablation with pulmonary vein isolation, posterior wall isolation and ablation of mitral isthmus dependent flutter (10/19/2023) by Dr. Candace Jesus s/p PPM - last pacemaker interrogation discussed with the patient. Continue regular pacemaker interrogations    Essential hypertension -  Has been hypertensive for many years. Taking medications regularly.   Denies lightheadedness, headache, medication side effects. Mixed hyperlipidemia -  Has had hyperlipidemia for many years. Taking statin regularly along with diet control. Denies myalgia. PCP closely monitoring the blood work. Obesity -  Trying to lose weight. Prior CVA  CKD 3      Past Medical History:  Past Medical History:   Diagnosis Date    Aphasia, mixed 2017    Atrial fibrillation (HCC)     CHF (congestive heart failure) (HCC)     Colon polyp     Headache     Hyperlipidemia     Hypertension     Lyme disease     Shortness of breath     TIA (transient ischemic attack)     Vertigo          Past Surgical History:  Past Surgical History:   Procedure Laterality Date    CARDIAC ELECTROPHYSIOLOGY PROCEDURE N/A 10/19/2023    Procedure: Cardiac eps/afib ablation PVI/POST WALL;  Surgeon: Robert Cote MD;  Location:  CARDIAC CATH LAB; Service: Cardiology    CARDIAC PACEMAKER PLACEMENT  2019    COLONOSCOPY      ID SIGMOIDOSCOPY FLX DX W/COLLJ SPEC BR/WA IF PFRMD N/A 2017    Procedure: Tracy Vega;  Surgeon: Fantasma Torres MD;  Location: [facility];   Service: Gastroenterology    TONSILLECTOMY           Family History:  Family History   Problem Relation Age of Onset    Lung cancer Mother     Dementia Father     Alzheimer's disease Father     Other Father         Cardiac Disorder     Lung cancer Maternal Grandmother     Breast cancer Paternal Aunt 36    No Known Problems Paternal Aunt     No Known Problems Paternal Aunt          Social History:  Social History     Socioeconomic History    Marital status: /Civil Union     Spouse name: None    Number of children: None    Years of education: None    Highest education level: None   Occupational History    Occupation: Retired   Tobacco Use    Smoking status: Former     Types: Cigarettes     Quit date:      Years since quittin.8    Smokeless tobacco: Never    Tobacco comments:     no smoking for 46 years   Vaping Use    Vaping Use: Never used Substance and Sexual Activity    Alcohol use: Yes     Alcohol/week: 10.0 standard drinks of alcohol     Types: 10 Glasses of wine per week     Comment: occ    Drug use: No    Sexual activity: Never     Partners: Male     Birth control/protection: None   Other Topics Concern    None   Social History Narrative    Always uses seat belt     Social Determinants of Health     Financial Resource Strain: Low Risk  (12/5/2022)    Overall Financial Resource Strain (CARDIA)     Difficulty of Paying Living Expenses: Not hard at all   Food Insecurity: No Food Insecurity (12/22/2022)    Hunger Vital Sign     Worried About Running Out of Food in the Last Year: Never true     Ran Out of Food in the Last Year: Never true   Transportation Needs: No Transportation Needs (12/22/2022)    PRAPARE - Transportation     Lack of Transportation (Medical): No     Lack of Transportation (Non-Medical): No   Physical Activity: Not on file   Stress: Not on file   Social Connections: Not on file   Intimate Partner Violence: Not on file   Housing Stability: Low Risk  (12/22/2022)    Housing Stability Vital Sign     Unable to Pay for Housing in the Last Year: No     Number of Places Lived in the Last Year: 1     Unstable Housing in the Last Year: No         Active Problems:  Patient Active Problem List   Diagnosis    Essential hypertension    Mixed hyperlipidemia    Obesity (BMI 30-39. 9)    H/O TIA (transient ischemic attack) and stroke    Migraine with aura and without status migrainosus, not intractable    Myocardiopathy (HCC)    Anticoagulant long-term use    Nonrheumatic mitral valve regurgitation    Nonrheumatic aortic valve insufficiency    Nonrheumatic tricuspid valve regurgitation    Persistent atrial fibrillation (HCC)    Other insomnia    Chronic obstructive pulmonary disease (HCC)    BMI 33.0-33.9,adult    Stage 3a chronic kidney disease (HCC)    Acute on chronic systolic congestive heart failure (HCC)    SSS (sick sinus syndrome) ([address]) Diplopia    Lightheadedness    Status post ablation of atrial fibrillation (PVI, posterior wall, and mitral isthmus dependent flutter line) 10/19/2023         The following portions of the patient's history were reviewed and updated as appropriate: past medical history, past surgical history, past family history,  past social history, current medications, allergies and problem list.      Review of Systems:  Constitutional: Denies fever, chills  Eyes: Denies eye redness, eye discharge  ENT: Denies hearing loss, sneezing, nasal discharge, sore throat   Respiratory: Denies cough, expectoration. +shortness of breath  Cardiovascular: Denies chest pain, palpitations, lower extremity swelling  Gastrointestinal: Denies abdominal pain, nausea, vomiting, diarrhea  Genito-Urinary: Denies dysuria, incontinence  Musculoskeletal: Denies back pain, joint pain, muscle pain  Neurologic: Denies lightheadedness, syncope, headache, seizures  Endocrine: Denies polydipsia, temperature intolerance  Allergy and Immunology: Denies hives, insect bite sensitivity  Hematological and Lymphatic: Denies bleeding problems, swollen glands   Psychological: Denies depression, suicidal ideation, anxiety, panic  Dermatological: Denies pruritus, rash, skin lesion changes      Vitals:  Vitals:    10/27/23 1609   BP: 118/86   Pulse: 90   Resp: 16   SpO2: 96%       Body mass index is 32.95 kg/m². Weight (last 2 days)       Date/Time Weight    10/27/23 1609 89.8 (198)              Physical Examination:  General: Patient is not in acute distress. Awake, alert, oriented in time, place and person. Responding to commands  Head: Normocephalic. Atraumatic  Eyes: Both pupils normal sized, round and reactive to light. Nonicteric  ENT: Normal external ear canals  Neck: Supple. JVP not raised. Trachea central. No thyromegaly  Lungs: Bilateral bronchovascular breath sounds with no crackles or rhonchi  Chest wall: No tenderness  Cardiovascular: RRR.  S1 and S2 normal. No murmur, rub or gallop  Gastrointestinal: Abdomen soft, nontender. No guarding or rigidity. Liver and spleen not palpable. Bowel sounds present  Neurologic: Patient is awake, alert, oriented in time, place and person. Responding to commands. Moving all extremities  Integumentary:  No skin rash  Lymphatic: No cervical lymphadenopathy  Back: Symmetric. No CVA tenderness  Extremities: No clubbing, cyanosis or edema      Laboratory Results:  CBC with diff:   Lab Results   Component Value Date    WBC 11.70 (H) 10/21/2023    RBC 3.70 (L) 10/21/2023    HGB 8.1 (L) 10/21/2023    HCT 27.2 (L) 10/21/2023    MCV 74 (L) 10/21/2023    MCH 21.9 (L) 10/21/2023    RDW 20.1 (H) 10/21/2023     10/21/2023       CMP:  Lab Results   Component Value Date    CREATININE 1.03 10/22/2023    BUN 18 10/22/2023    K 3.4 (L) 10/22/2023     10/22/2023    CO2 24 10/22/2023    ALKPHOS 73 10/12/2023    ALT 15 10/12/2023    AST 20 10/12/2023    BILIDIR 0.13 01/05/2023       Lab Results   Component Value Date    HGBA1C 5.8 (H) 11/01/2022    MG 1.9 10/20/2023    PHOS 3.5 03/16/2023       Lab Results   Component Value Date    TROPONINI 0.05 (H) 02/10/2019    TROPONINI 0.06 (H) 02/09/2019    TROPONINI 0.06 (H) 02/09/2019       Lipid Profile:   No results found for: "CHOL"  Lab Results   Component Value Date    HDL 45 (L) 11/02/2022    HDL 55 11/22/2021    HDL 59 09/17/2020     Lab Results   Component Value Date    LDLCALC 62 11/02/2022    LDLCALC 74 11/22/2021    LDLCALC 80 09/17/2020     Lab Results   Component Value Date    TRIG 75 11/02/2022    TRIG 90 11/22/2021    TRIG 119 09/17/2020       Cardiac testing:   Results for orders placed during the hospital encounter of 11/01/22    Echo complete w/ contrast if indicated    Interpretation Summary    Left Ventricle: Left ventricular cavity size is normal. Systolic function is mildly reduced. LVEF approximately 40 to 45%.   Patient was in atrial fibrillation with periods of rapid ventricular response which interferes with interpretation. There is mild global hypokinesis. Unable to assess diastolic function due to atrial fibrillation. Right Ventricle: Right ventricular cavity size is mildly dilated. Systolic function is mildly to moderately reduced. Left Atrium: The atrium is moderate to markedly dilated. Right Atrium: The atrium is moderately dilated. Aortic Valve: There is mild regurgitation. Mitral Valve: There is moderate annular calcification. There is moderate regurgitation. Tricuspid Valve: There is moderate regurgitation. The right ventricular systolic pressure is mildly elevated. Estimated RVSP 35 mm Hg. Pacer leads noted. Pulmonic Valve: There is mild regurgitation. Results for orders placed during the hospital encounter of 03/07/23    Echo follow up/limited w/ contrast if indicated    Interpretation Summary    Left Ventricle: Left ventricular cavity size is normal. The left ventricular ejection fraction is 55%. Systolic function is normal.    Left Atrium: The atrium is moderately dilated. Right Atrium: The atrium is mildly dilated. Aortic Valve: There is mild to moderate regurgitation. Tricuspid Valve: There is moderate regurgitation. Pulmonary Artery: The estimated pulmonary artery systolic pressure is 52.8 mmHg. The pulmonary artery systolic pressure is moderately increased. This is a limited echocardiogram performed to evaluate LV function. Interpretation is therefore limited. Results for orders placed during the hospital encounter of 03/07/23    NM myocardial perfusion spect (rx stress and/or rest)    Interpretation Summary    Stress ECG: No ST deviation is noted. There were no arrhythmias during recovery. . The ECG was not diagnostic due to pharmacological (vasodilator) stress. Perfusion Defect Conclusion: The stress/rest perfusion ratio is 1.03 . There is no evidence of transient ischemic dilation (TID).     Stress Function: Left ventricular function post-stress is normal. Post-stress ejection fraction is 79 %. Perfusion: There is a left ventricular perfusion defect that is medium in size with moderate reduction in uptake present in the mid to apical anterior and anterolateral location(s) that is partially reversible. Stress Combined Conclusion: Left ventricular perfusion is probably abnormal.      Medications:    Current Outpatient Medications:     amiodarone 200 mg tablet, Take 1 tablet (200 mg total) by mouth daily, Disp: 90 tablet, Rfl: 3    Eliquis 5 MG, TAKE 1 TABLET BY MOUTH  TWICE DAILY, Disp: 180 tablet, Rfl: 3    furosemide (LASIX) 20 mg tablet, Take 1 tablet (20 mg total) by mouth daily, Disp: 90 tablet, Rfl: 3    lovastatin (MEVACOR) 10 MG tablet, TAKE 1 TABLET BY MOUTH  DAILY AT BEDTIME, Disp: 90 tablet, Rfl: 3    metoprolol succinate (TOPROL-XL) 100 mg 24 hr tablet, Take 1 tablet (100 mg total) by mouth daily, Disp: 90 tablet, Rfl: 3    midodrine (PROAMATINE) 5 mg tablet, Take 1 tablet (5 mg total) by mouth 3 (three) times a day before meals, Disp: 90 tablet, Rfl: 5    potassium chloride (K-DUR,KLOR-CON) 10 mEq tablet, Take 1 tablet (10 mEq total) by mouth daily, Disp: 90 tablet, Rfl: 3      Allergies:  No Known Allergies      Assessment and Plan:  1. Chronic systolic (congestive) heart failure (HCC)  Continue furosemide 20 mg, potassium, beta-blocker. No ACE inhibitor/ARB due to soft BP. Low-salt diet. Daily weights. 2. Nonischemic cardiomyopathy (HCC)  Continue beta-blocker. No ACE inhibitor/ARB due to soft BP    3. SSS (sick sinus syndrome) ([address] Presence of permanent cardiac pacemaker  Continue regular pacemaker interrogations. Last interrogation discussed with the patient and spouse    4. PAF (paroxysmal atrial fibrillation). S/P ablation of atrial fibrillation. Anticoagulant long-term use  Currently in sinus rhythm. Continue amiodarone, metoprolol, Eliquis    5. Essential hypertension  BP stable. Continue current medication. Cont to monitor BP at home and call if having    6. Mixed hyperlipidemia  Continue statin and diet control. Her PCP closely monitors her blood work    7. Nonrheumatic mitral valve regurgitation. Nonrheumatic aortic valve insufficiency. Nonrheumatic tricuspid valve regurgitation  Continue to monitor with echocardiograms at recommended intervals    Recommend aggressive risk factor modification and therapeutic lifestyle changes. Low-salt, low-calorie, low-fat, low-cholesterol diet with regular exercise and to optimize weight. I will defer the ordering and monitoring of necessity lab studies to you, but I am available and happy to review and manage any of the data at your request in the future. Discussed concepts of atherosclerosis, including signs and symptoms of cardiac disease. Previous studies were reviewed. Safety measures were reviewed. Questions were entertained and answered. Patient was advised to report any problems requiring medical attention. Follow-up with PCP and appropriate specialist and lab work as discussed. Return for follow up visit as scheduled or earlier, if needed. Thank you for allowing me to participate in the care and evaluation of your patient. Should you have any questions, please feel free to contact me.       Joel Remy MD  10/27/2023,4:29 PM

## 2023-10-30 ENCOUNTER — OFFICE VISIT (OUTPATIENT)
Dept: FAMILY MEDICINE CLINIC | Facility: CLINIC | Age: 78
End: 2023-10-30
Payer: MEDICARE

## 2023-10-30 VITALS
SYSTOLIC BLOOD PRESSURE: 118 MMHG | HEIGHT: 65 IN | HEART RATE: 96 BPM | TEMPERATURE: 97 F | DIASTOLIC BLOOD PRESSURE: 82 MMHG | OXYGEN SATURATION: 96 % | WEIGHT: 195.8 LBS | BODY MASS INDEX: 32.62 KG/M2

## 2023-10-30 DIAGNOSIS — Z23 ENCOUNTER FOR IMMUNIZATION: ICD-10-CM

## 2023-10-30 DIAGNOSIS — I48.19 PERSISTENT ATRIAL FIBRILLATION (HCC): Primary | ICD-10-CM

## 2023-10-30 DIAGNOSIS — N18.31 STAGE 3A CHRONIC KIDNEY DISEASE (HCC): ICD-10-CM

## 2023-10-30 DIAGNOSIS — I10 ESSENTIAL HYPERTENSION: ICD-10-CM

## 2023-10-30 PROCEDURE — G0008 ADMIN INFLUENZA VIRUS VAC: HCPCS

## 2023-10-30 PROCEDURE — 90662 IIV NO PRSV INCREASED AG IM: CPT

## 2023-10-30 PROCEDURE — 99495 TRANSJ CARE MGMT MOD F2F 14D: CPT | Performed by: FAMILY MEDICINE

## 2023-10-30 NOTE — ASSESSMENT & PLAN NOTE
Rate is well controlled at this point, she does have follow-up with cardiac electrophysiology next week. She continues to take her amiodarone, metoprolol and her Eliquis.

## 2023-10-30 NOTE — ASSESSMENT & PLAN NOTE
Lab Results   Component Value Date    EGFR 51 10/22/2023    EGFR 49 10/21/2023    EGFR 50 10/20/2023    CREATININE 1.03 10/22/2023    CREATININE 1.07 10/21/2023    CREATININE 1.05 10/20/2023   Stable, continue to monitor with her routine blood work.

## 2023-10-30 NOTE — PROGRESS NOTES
Assessment & Plan     1. Persistent atrial fibrillation [facility])  Assessment & Plan:  Rate is well controlled at this point, she does have follow-up with cardiac electrophysiology next week. She continues to take her amiodarone, metoprolol and her Eliquis. 2. Essential hypertension  Assessment & Plan:  Controlled, continue the metoprolol      3. Stage 3a chronic kidney disease [facility])  Assessment & Plan:  Lab Results   Component Value Date    EGFR 51 10/22/2023    EGFR 49 10/21/2023    EGFR 50 10/20/2023    CREATININE 1.03 10/22/2023    CREATININE 1.07 10/21/2023    CREATININE 1.05 10/20/2023   Stable, continue to monitor with her routine blood work. 4. Encounter for immunization  -     influenza vaccine, high-dose, PF 0.7 mL (FLUZONE HIGH-DOSE)       Subjective     Transitional Care Management Review:   Araseli Barber is a 77 y.o. female here for TCM follow up. During the TCM phone call patient stated:  TCM Call     Date and time call was made  10/23/2023 10:11 AM    Hospital care reviewed  Records reviewed    Patient was hospitialized at  [facility]    Date of Admission  10/20/23    Date of discharge  10/22/23    Diagnosis  Persistent atrial fibrillation ([address] [facility])    Disposition  Home    Were the patients medications reviewed and updated  Yes    Current Symptoms  None    Fatigue severity  Mild    Arm pain left side severity  Mild    Arm pain, left side, onset  Progressive      TCM Call     Clinical progress swelling  Improving    Post hospital issues  None    Should patient be enrolled in anticoag monitoring? No    Scheduled for follow up? Yes    Patients specialists  Cardiologist    Cardiologist name  DR. Ibanez [address]    Referrals needed  NO    Did you obtain your prescribed medications  Yes    Do you need help managing your prescriptions or medications  No    Is transportation to your appointment needed  No    I have advised the patient to call PCP with any new or worsening symptoms  Adelia Arroyo Shailesh William / [address]  Family; Children    The type of support provided  Emotional; Physical    Do you have social support  Yes, as much as I need    Are you recieving any outpatient services  No    Are you recieving home care services  No    Are you using any community resources  No    Current waiver services  No    Have you fallen in the last 12 months  No    Interperter language line needed  No    Counseling  Patient    Counseling topics  patient and family education; instructions for management    Comments  Spoke with pt on 03/21/23 after her hospital d/c on 03/17/23. She stated that she feels very good and denied SOB, difficulty breathing, chest pain, weakness, dizziness. .etc, she was started on amiodarone infusion while she was admitted. Sotalol was discontinued and she was advised to continue Midrin 400 mg twice daily for 7 days followed by 200 mg twice daily and to continue with all other medications prescribed by PCP. She will follow-up with cardiology and has a TCM appt scheduled for 03/24/23. Patient comes in today for transition care management, she was admitted to [address] [place] [address] for an elective cardiac ablation for persistent atrial fibrillation. She was noted to go back into atrial fibrillation at night. Her amiodarone was increased. Since she was discharged from the hospital she has followed up with cardiology and her amiodarone was decreased back to 200 mg once daily and her Toprol was increased to 100 mg daily. She states that she does have some fatigue with exertion but otherwise is feeling well. Review of Systems   Constitutional:  Positive for fatigue. Negative for chills and fever. HENT:  Negative for congestion, ear pain, hearing loss, postnasal drip, rhinorrhea and sore throat. Eyes:  Negative for pain and visual disturbance. Respiratory:  Negative for chest tightness, shortness of breath and wheezing. Cardiovascular:  Negative for chest pain and leg swelling. Gastrointestinal:  Negative for abdominal distention, abdominal pain, constipation, diarrhea and vomiting. Endocrine: Negative for cold intolerance and heat intolerance. Genitourinary:  Negative for difficulty urinating, frequency and urgency. Musculoskeletal:  Negative for arthralgias and gait problem. Skin:  Negative for color change. Neurological:  Negative for dizziness, tremors, syncope, numbness and headaches. Hematological:  Negative for adenopathy. Psychiatric/Behavioral:  Negative for agitation, confusion and sleep disturbance. The patient is not nervous/anxious. Objective     /82 (BP Location: Left arm, Patient Position: Sitting, Cuff Size: Standard)   Pulse 96   Temp (!) 97 °F (36.1 °C) (Tympanic)   Ht 5' 5" (1.651 m)   Wt 88.8 kg (195 lb 12.8 oz)   SpO2 96%   BMI 32.58 kg/m²      Physical Exam  Constitutional:       Appearance: She is well-developed. HENT:      Head: Normocephalic and atraumatic. Right Ear: External ear normal.      Left Ear: External ear normal.      Nose: Nose normal.      Mouth/Throat:      Pharynx: Oropharynx is clear. Eyes:      Extraocular Movements: Extraocular movements intact. Conjunctiva/sclera: Conjunctivae normal.      Pupils: Pupils are equal, round, and reactive to light. Neck:      Thyroid: No thyromegaly. Cardiovascular:      Rate and Rhythm: Normal rate and regular rhythm. Heart sounds: Normal heart sounds. No murmur heard. Pulmonary:      Effort: Pulmonary effort is normal.   Abdominal:      General: Bowel sounds are normal. There is no distension. Palpations: Abdomen is soft. Musculoskeletal:         General: Normal range of motion. Cervical back: Normal range of motion and neck supple. Skin:     General: Skin is warm. Neurological:      Mental Status: She is alert and oriented to person, place, and time.    Psychiatric:         Mood and Affect: Mood normal.       Medications have been reviewed by provider in current encounter    Juliana Giraldo, DO

## 2023-11-27 ENCOUNTER — OFFICE VISIT (OUTPATIENT)
Dept: CARDIOLOGY CLINIC | Facility: CLINIC | Age: 78
End: 2023-11-27
Payer: MEDICARE

## 2023-11-27 VITALS
SYSTOLIC BLOOD PRESSURE: 114 MMHG | DIASTOLIC BLOOD PRESSURE: 70 MMHG | WEIGHT: 196.2 LBS | BODY MASS INDEX: 32.69 KG/M2 | HEART RATE: 74 BPM | HEIGHT: 65 IN

## 2023-11-27 DIAGNOSIS — I50.23 ACUTE ON CHRONIC SYSTOLIC CONGESTIVE HEART FAILURE (HCC): ICD-10-CM

## 2023-11-27 DIAGNOSIS — Z98.890 STATUS POST ABLATION OF ATRIAL FIBRILLATION: ICD-10-CM

## 2023-11-27 DIAGNOSIS — Z86.79 STATUS POST ABLATION OF ATRIAL FIBRILLATION: ICD-10-CM

## 2023-11-27 DIAGNOSIS — E66.9 OBESITY (BMI 30-39.9): ICD-10-CM

## 2023-11-27 DIAGNOSIS — I48.19 PERSISTENT ATRIAL FIBRILLATION (HCC): Primary | ICD-10-CM

## 2023-11-27 DIAGNOSIS — Z86.73 H/O TIA (TRANSIENT ISCHEMIC ATTACK) AND STROKE: ICD-10-CM

## 2023-11-27 DIAGNOSIS — I10 ESSENTIAL HYPERTENSION: ICD-10-CM

## 2023-11-27 DIAGNOSIS — I49.5 SSS (SICK SINUS SYNDROME) (HCC): ICD-10-CM

## 2023-11-27 DIAGNOSIS — J44.9 CHRONIC OBSTRUCTIVE PULMONARY DISEASE, UNSPECIFIED COPD TYPE (HCC): ICD-10-CM

## 2023-11-27 PROCEDURE — 93000 ELECTROCARDIOGRAM COMPLETE: CPT | Performed by: PHYSICIAN ASSISTANT

## 2023-11-27 PROCEDURE — 99213 OFFICE O/P EST LOW 20 MIN: CPT | Performed by: PHYSICIAN ASSISTANT

## 2023-11-27 NOTE — PROGRESS NOTES
Electrophysiology Follow Up  Heart & Vascular [address]  [facility] SHANTEL Banuelos [place]    Name: Silvia Duncan  :   MRN: [id number]    ASSESSMENT/PLAN:  Symptomatic persistent Afib s/p Afib ablation (cryo PVI, posterior wall, mitral isthmus flutter ablation) 10/19/2023 by Dr. Patrick Membreno  -- breakthrough on sotalol and amiodarone with persistent Afib since 2023  -- [id number] = 7, on Eliquis  -- recurrent Afib in post ablation setting, amiodarone and metoprolol increased, continued on digoxin  Tachy mediated cardiomyopathy, EF 35% in , now 55% 2023  SSS s/p Medtronic dual chamber PPM 2018  HFrecEF  HTN  HLD  CKD3  Prior CVA  Obesity with BMI 35    Madeline is doing well after recent A-fib ablation. The procedure consisted cryo PVI, posterior wall, and mitral isthmus flutter ablation. She had A-fib initially in the post ablation setting, but has now been in sinus rhythm. She has not had recurrent palpitations. She will continue Eliquis 5 mg twice daily given her high [id number] score of 7. She will continue amiodarone 200 mg once daily which I will anticipate continuing for 3-month course. She will continue Toprol- mg once daily. We discussed risk factor modification for atrial fibrillation today. She drinks occasional alcohol and caffeine. She does not have any signs or symptoms of sleep apnea although she does have insomnia. She has returned to her regular activity and is able to do things like grocery shopping. Her blood pressure is well controlled today. Continue looking    Will continue to monitor her pacemaker through our remote monitoring clinic. If she has recurrent atrial fibrillation, we will detect this on device interrogations. She will return to the office in 3 months with Dr. Patrick Membreno. She will call the office with any questions or concerns prior to then.     CC/HPI:   Silvia Duncan is a 77 y.o. female with a history of prior stroke, obesity, SSS s/p Medtronic dual chamber PPM 2018, HTN, CKD3, HLD, HFrEF, and persistent atrial fibrillation refractory to sotalol and amiodarone s/p Afib ablation October 2023 who presents for follow up. She was diagnosed with Afib years ago by her PCP on routine EKG. She was asymptomatic. Her [id number] is 7 and she was started on anticoagulation. She was started on Toprol XL for rate control. She had PAF episodes associated with palpitations and shortness of breath. She underwent cardioversion for persistent A-fib on June 11, 2018. Toprol-XL was increased to 100 mg once daily. Echo showed an EF of 35 to 40% which was thought to be tachy-mediated. She was evaluated by Dr. Viann Sicard in December 2018. She was initiated on sotalol 120 mg once daily and metoprolol was decreased. She required repeat cardioversion. She had bradycardia requiring discontinuation of metoprolol and decreasing sotalol to 80 mg twice daily. She then underwent implantation of a Medtronic dual-chamber permanent pacemaker on 12/31/2018 due to sick sinus syndrome. Sotalol was increased back to 120 mg twice daily. She experienced chest pain and underwent stress test that showed no evidence of ischemia. She continues to have breakthrough episodes of A-fib with RVR. She was hospitalized in March 2023 for A-fib with RVR. Sotalol was discontinued in March 2023 and amiodarone was initiated. Remote transmissions documented persistent A-fib with 100% burden despite treatment with amiodarone. Her heart rates were uncontrolled despite treatment with metoprolol. In October 2023, she was evaluated by Dr. Viann Sicard. Given her Afib refractory to both sotalol and amiodarone, A-fib ablation was recommended. She underwent successful A-fib ablation on 10/19/2023 by Dr. Viann Sicard. The procedure consisted of cryo PVI, posterior wall isolation, and mitral isthmus flutter ablation.   Postprocedure she had recurrent atrial fibrillation. Amiodarone and metoprolol were increased and she received digoxin. She returns today for follow-up and has been feeling well. It took her some time to recover after the ablation, but she is now feeling much better. She is able to grocery shop and do her daily activities. She denies palpitations, lightheadedness, dizziness, orthopnea or PND. She denies chest pain or shortness of breath. The groin sites have healed well. She denies any bleeding on Eliquis. EKG 11/27/2023: sinus rhythm HR 75 bpm    LUIZA 10/19/2023:    Left Ventricle: Left ventricular cavity size is normal. The left ventricular ejection fraction is 50-55%. Systolic function is low normal.    Right Ventricle: Right ventricular cavity size is mildly dilated. Systolic function is low normal.    Left Atrium: The atrium is dilated. Right Atrium: The atrium is dilated. Atrial Septum: No patent foramen ovale detected, confirmed at rest using color doppler. Left Atrial Appendage: There is reduced function. There is no thrombus. There is no spontaneous echo contrast.    Aortic Valve: There is mild regurgitation with a centrally directed jet. Mitral Valve: There is mild regurgitation with a centrally directed jet. Tricuspid Valve: There is mild to moderate regurgitation. Pharmacologic Nuclear Stress Test 3/7/2023:    Stress ECG: No ST deviation is noted. There were no arrhythmias during recovery. . The ECG was not diagnostic due to pharmacological (vasodilator) stress. Perfusion Defect Conclusion: The stress/rest perfusion ratio is 1.03 . There is no evidence of transient ischemic dilation (TID). Stress Function: Left ventricular function post-stress is normal. Post-stress ejection fraction is 79 %.     Perfusion: There is a left ventricular perfusion defect that is medium in size with moderate reduction in uptake present in the mid to apical anterior and anterolateral location(s) that is partially reversible. Stress Combined Conclusion: Left ventricular perfusion is probably abnormal.    Echo 3/7/2023:    Left Ventricle: Left ventricular cavity size is normal. The left ventricular ejection fraction is 55%. Systolic function is normal.    Left Atrium: The atrium is moderately dilated. Right Atrium: The atrium is mildly dilated. Aortic Valve: There is mild to moderate regurgitation. Tricuspid Valve: There is moderate regurgitation. Pulmonary Artery: The estimated pulmonary artery systolic pressure is 43.8 mmHg. The pulmonary artery systolic pressure is moderately increased. This is a limited echocardiogram performed to evaluate LV function. Interpretation is therefore limited. Echo 11/2/2022: LVEF 40-45%     Echo 7/30/2023: EF 60%    Review of Systems   Constitutional:  Negative for fatigue. HENT: Negative. Eyes: Negative. Respiratory:  Negative for chest tightness and shortness of breath. Cardiovascular:  Negative for chest pain and palpitations. Gastrointestinal:  Negative for abdominal pain, nausea and vomiting. Endocrine: Negative. Genitourinary: Negative. Musculoskeletal: Negative. Skin:  Negative for rash. Neurological:  Negative for dizziness, syncope and weakness. Hematological: Negative. OBJECTIVE:   Vitals:   /70 (BP Location: Right arm, Patient Position: Sitting, Cuff Size: Standard)   Pulse 74   Ht 5' 5" (1.651 m)   Wt 89 kg (196 lb 3.2 oz)   BMI 32.65 kg/m²   Body mass index is 32.65 kg/m². Physical Exam:   GEN: NAD  SKIN: warm, dry without significant lesions or rashes  HEENT: NCAT  NECK: supple, no JVD appreciated  CARDIOVASCULAR: RRR, normal S1, S2 without murmurs, rubs, or gallops   LUNGS: CTA bilaterally without wheezes, rhonchi, or rales  ABDOMEN: Soft, nontender, nondistended.    EXTREMITIES/VASCULAR: perfused without clubbing, cyanosis, or LE edema b/l  PSYCH: Normal mood and affect  NEURO: CN ll-Xll grossly intact    Medications:      Current Outpatient Medications:     amiodarone 200 mg tablet, Take 1 tablet (200 mg total) by mouth daily, Disp: 90 tablet, Rfl: 3    Eliquis 5 MG, TAKE 1 TABLET BY MOUTH  TWICE DAILY, Disp: 180 tablet, Rfl: 3    furosemide (LASIX) 20 mg tablet, Take 1 tablet (20 mg total) by mouth daily, Disp: 90 tablet, Rfl: 3    lovastatin (MEVACOR) 10 MG tablet, TAKE 1 TABLET BY MOUTH  DAILY AT BEDTIME, Disp: 90 tablet, Rfl: 3    metoprolol succinate (TOPROL-XL) 100 mg 24 hr tablet, Take 1 tablet (100 mg total) by mouth daily, Disp: 90 tablet, Rfl: 3    midodrine (PROAMATINE) 5 mg tablet, Take 1 tablet (5 mg total) by mouth 3 (three) times a day before meals, Disp: 90 tablet, Rfl: 5    potassium chloride (K-DUR,KLOR-CON) 10 mEq tablet, Take 1 tablet (10 mEq total) by mouth daily, Disp: 90 tablet, Rfl: 3       Historical Information   Past Medical History:   Diagnosis Date    Aphasia, mixed 07/28/2017    Atrial fibrillation (HCC)     CHF (congestive heart failure) (HCC)     Colon polyp     Headache     Hyperlipidemia     Hypertension     Lyme disease     Shortness of breath     TIA (transient ischemic attack)     Vertigo        Past Surgical History:   Procedure Laterality Date    CARDIAC ELECTROPHYSIOLOGY PROCEDURE N/A 10/19/2023    Procedure: Cardiac eps/afib ablation PVI/POST WALL;  Surgeon: Osvaldo Hodges MD;  Location:  CARDIAC CATH LAB; Service: Cardiology    CARDIAC PACEMAKER PLACEMENT  12/2019    COLONOSCOPY      CT SIGMOIDOSCOPY FLX DX W/COLLJ SPEC BR/WA IF PFRMD N/A 11/28/2017    Procedure: Layman Pleasure;  Surgeon: Josi Leonardo MD;  Location: [facility];   Service: Gastroenterology    TONSILLECTOMY         Social History     Substance and Sexual Activity   Alcohol Use Yes    Alcohol/week: 10.0 standard drinks of alcohol    Types: 10 Glasses of wine per week    Comment: occ     Social History     Substance and Sexual Activity   Drug Use No     Social History     Tobacco Use Smoking Status Former    Types: Cigarettes    Quit date:     Years since quittin.9   Smokeless Tobacco Never   Tobacco Comments    no smoking for 55 years       Family History   Problem Relation Age of Onset    Lung cancer Mother     Dementia Father     Alzheimer's disease Father     Other Father         Cardiac Disorder     Lung cancer Maternal Grandmother     Breast cancer Paternal Aunt 36    No Known Problems Paternal Aunt     No Known Problems Paternal Aunt        Labs & Results:  Below is the patient's most recent value for Albumin, ALT, AST, BUN, Calcium, Chloride, Cholesterol, CO2, Creatinine, GFR, Glucose, HDL, Hematocrit, Hemoglobin, Hemoglobin A1C, LDL, Magnesium, Phosphorus, Platelets, Potassium, PSA, Sodium, Triglycerides, and WBC. Lab Results   Component Value Date    ALT 15 10/12/2023    AST 20 10/12/2023    BUN 18 10/22/2023    CALCIUM 8.3 (L) 10/22/2023     10/22/2023    CO2 24 10/22/2023    CREATININE 1.03 10/22/2023    HDL 45 (L) 2022    HCT 27.2 (L) 10/21/2023    HGB 8.1 (L) 10/21/2023    HGBA1C 5.8 (H) 2022    MG 1.9 10/20/2023    PHOS 3.5 2023     10/21/2023    K 3.4 (L) 10/22/2023    TRIG 75 2022    WBC 11.70 (H) 10/21/2023     Note: for a comprehensive list of the patient's lab results, access the Results Review activity.

## 2023-11-27 NOTE — PATIENT INSTRUCTIONS
You are doing well after Afib ablation  Continue Eliquis and metoprolol  Take amiodarone for 3 months total, then stop on 1/19/2023  Follow up with Dr. Zohra Baker in 3 months

## 2023-12-05 ENCOUNTER — RA CDI HCC (OUTPATIENT)
Dept: OTHER | Facility: HOSPITAL | Age: 78
End: 2023-12-05

## 2023-12-05 NOTE — PROGRESS NOTES
[address] [facility] coding opportunities          Chart Reviewed number of suggestions sent to Provider: 1     Patients Insurance   I13.0  Medicare Insurance: [employer]

## 2023-12-08 ENCOUNTER — APPOINTMENT (OUTPATIENT)
Age: 78
End: 2023-12-08
Payer: MEDICARE

## 2023-12-08 DIAGNOSIS — N18.31 STAGE 3A CHRONIC KIDNEY DISEASE (HCC): ICD-10-CM

## 2023-12-08 DIAGNOSIS — D50.9 IRON DEFICIENCY ANEMIA, UNSPECIFIED IRON DEFICIENCY ANEMIA TYPE: ICD-10-CM

## 2023-12-08 DIAGNOSIS — E78.2 MIXED HYPERLIPIDEMIA: ICD-10-CM

## 2023-12-08 DIAGNOSIS — I10 ESSENTIAL HYPERTENSION: ICD-10-CM

## 2023-12-08 LAB
ALBUMIN SERPL BCP-MCNC: 4.1 G/DL (ref 3.5–5)
ALP SERPL-CCNC: 70 U/L (ref 34–104)
ALT SERPL W P-5'-P-CCNC: 14 U/L (ref 7–52)
ANION GAP SERPL CALCULATED.3IONS-SCNC: 8 MMOL/L
AST SERPL W P-5'-P-CCNC: 19 U/L (ref 13–39)
BASOPHILS # BLD AUTO: 0.07 THOUSANDS/ÂΜL (ref 0–0.1)
BASOPHILS NFR BLD AUTO: 1 % (ref 0–1)
BILIRUB SERPL-MCNC: 0.79 MG/DL (ref 0.2–1)
BUN SERPL-MCNC: 17 MG/DL (ref 5–25)
CALCIUM SERPL-MCNC: 8.9 MG/DL (ref 8.4–10.2)
CHLORIDE SERPL-SCNC: 105 MMOL/L (ref 96–108)
CHOLEST SERPL-MCNC: 160 MG/DL
CO2 SERPL-SCNC: 25 MMOL/L (ref 21–32)
CREAT SERPL-MCNC: 1.17 MG/DL (ref 0.6–1.3)
EOSINOPHIL # BLD AUTO: 0.16 THOUSAND/ÂΜL (ref 0–0.61)
EOSINOPHIL NFR BLD AUTO: 2 % (ref 0–6)
ERYTHROCYTE [DISTWIDTH] IN BLOOD BY AUTOMATED COUNT: 22.5 % (ref 11.6–15.1)
GFR SERPL CREATININE-BSD FRML MDRD: 44 ML/MIN/1.73SQ M
GLUCOSE P FAST SERPL-MCNC: 107 MG/DL (ref 65–99)
HCT VFR BLD AUTO: 33.4 % (ref 34.8–46.1)
HDLC SERPL-MCNC: 53 MG/DL
HGB BLD-MCNC: 9.9 G/DL (ref 11.5–15.4)
IMM GRANULOCYTES # BLD AUTO: 0.03 THOUSAND/UL (ref 0–0.2)
IMM GRANULOCYTES NFR BLD AUTO: 0 % (ref 0–2)
LDLC SERPL CALC-MCNC: 91 MG/DL (ref 0–100)
LYMPHOCYTES # BLD AUTO: 0.79 THOUSANDS/ÂΜL (ref 0.6–4.47)
LYMPHOCYTES NFR BLD AUTO: 11 % (ref 14–44)
MCH RBC QN AUTO: 22.2 PG (ref 26.8–34.3)
MCHC RBC AUTO-ENTMCNC: 29.6 G/DL (ref 31.4–37.4)
MCV RBC AUTO: 75 FL (ref 82–98)
MONOCYTES # BLD AUTO: 0.81 THOUSAND/ÂΜL (ref 0.17–1.22)
MONOCYTES NFR BLD AUTO: 11 % (ref 4–12)
NEUTROPHILS # BLD AUTO: 5.47 THOUSANDS/ÂΜL (ref 1.85–7.62)
NEUTS SEG NFR BLD AUTO: 75 % (ref 43–75)
NONHDLC SERPL-MCNC: 107 MG/DL
NRBC BLD AUTO-RTO: 0 /100 WBCS
PLATELET # BLD AUTO: 311 THOUSANDS/UL (ref 149–390)
PMV BLD AUTO: 10.4 FL (ref 8.9–12.7)
POTASSIUM SERPL-SCNC: 4.3 MMOL/L (ref 3.5–5.3)
PROT SERPL-MCNC: 7 G/DL (ref 6.4–8.4)
RBC # BLD AUTO: 4.45 MILLION/UL (ref 3.81–5.12)
SODIUM SERPL-SCNC: 138 MMOL/L (ref 135–147)
TRIGL SERPL-MCNC: 80 MG/DL
TSH SERPL DL<=0.05 MIU/L-ACNC: 5.21 UIU/ML (ref 0.45–4.5)
WBC # BLD AUTO: 7.33 THOUSAND/UL (ref 4.31–10.16)

## 2023-12-08 PROCEDURE — 80061 LIPID PANEL: CPT

## 2023-12-08 PROCEDURE — 85025 COMPLETE CBC W/AUTO DIFF WBC: CPT

## 2023-12-08 PROCEDURE — 80053 COMPREHEN METABOLIC PANEL: CPT

## 2023-12-08 PROCEDURE — 84443 ASSAY THYROID STIM HORMONE: CPT

## 2023-12-08 PROCEDURE — 36415 COLL VENOUS BLD VENIPUNCTURE: CPT

## 2023-12-11 ENCOUNTER — OFFICE VISIT (OUTPATIENT)
Dept: FAMILY MEDICINE CLINIC | Facility: CLINIC | Age: 78
End: 2023-12-11
Payer: MEDICARE

## 2023-12-11 VITALS
DIASTOLIC BLOOD PRESSURE: 80 MMHG | SYSTOLIC BLOOD PRESSURE: 122 MMHG | HEART RATE: 93 BPM | BODY MASS INDEX: 32.74 KG/M2 | OXYGEN SATURATION: 99 % | WEIGHT: 196.5 LBS | HEIGHT: 65 IN | TEMPERATURE: 96.8 F

## 2023-12-11 DIAGNOSIS — J30.0 VASOMOTOR RHINITIS: ICD-10-CM

## 2023-12-11 DIAGNOSIS — E78.2 MIXED HYPERLIPIDEMIA: ICD-10-CM

## 2023-12-11 DIAGNOSIS — E03.9 ACQUIRED HYPOTHYROIDISM: ICD-10-CM

## 2023-12-11 DIAGNOSIS — D50.9 IRON DEFICIENCY ANEMIA, UNSPECIFIED IRON DEFICIENCY ANEMIA TYPE: ICD-10-CM

## 2023-12-11 DIAGNOSIS — Z00.00 MEDICARE ANNUAL WELLNESS VISIT, SUBSEQUENT: Primary | ICD-10-CM

## 2023-12-11 DIAGNOSIS — I10 ESSENTIAL HYPERTENSION: ICD-10-CM

## 2023-12-11 PROCEDURE — G0439 PPPS, SUBSEQ VISIT: HCPCS | Performed by: FAMILY MEDICINE

## 2023-12-11 PROCEDURE — 99214 OFFICE O/P EST MOD 30 MIN: CPT | Performed by: FAMILY MEDICINE

## 2023-12-11 RX ORDER — AZELASTINE 1 MG/ML
2 SPRAY, METERED NASAL DAILY
Qty: 30 ML | Refills: 1 | Status: SHIPPED | OUTPATIENT
Start: 2023-12-11

## 2023-12-11 RX ORDER — LEVOTHYROXINE SODIUM 0.03 MG/1
25 TABLET ORAL
Qty: 90 TABLET | Refills: 0 | Status: SHIPPED | OUTPATIENT
Start: 2023-12-11

## 2023-12-11 NOTE — PATIENT INSTRUCTIONS
Medicare Preventive Visit Patient Instructions  Thank you for completing your Welcome to Medicare Visit or Medicare Annual Wellness Visit today. Your next wellness visit will be due in one year (12/11/2024). The screening/preventive services that you may require over the next 5-10 years are detailed below. Some tests may not apply to you based off risk factors and/or age. Screening tests ordered at today's visit but not completed yet may show as past due. Also, please note that scanned in results may not display below. Preventive Screenings:  Service Recommendations Previous Testing/Comments   Colorectal Cancer Screening  * Colonoscopy    * Fecal Occult Blood Test (FOBT)/Fecal Immunochemical Test (FIT)  * Fecal DNA/Cologuard Test  * Flexible Sigmoidoscopy Age: 43-73 years old   Colonoscopy: every 10 years (may be performed more frequently if at higher risk)  OR  FOBT/FIT: every 1 year  OR  Cologuard: every 3 years  OR  Sigmoidoscopy: every 5 years  Screening may be recommended earlier than age 39 if at higher risk for colorectal cancer. Also, an individualized decision between you and your healthcare provider will decide whether screening between the ages of 77-80 would be appropriate. Colonoscopy: 07/20/2021  FOBT/FIT: Not on file  Cologuard: Not on file  Sigmoidoscopy: 11/28/2017    Screening Current     Breast Cancer Screening Age: 36 years old  Frequency: every 1-2 years  Not required if history of left and right mastectomy Mammogram: 01/12/2023    Screening Current   Cervical Cancer Screening Between the ages of 21-29, pap smear recommended once every 3 years. Between the ages of 32-69, can perform pap smear with HPV co-testing every 5 years.    Recommendations may differ for women with a history of total hysterectomy, cervical cancer, or abnormal pap smears in past. Pap Smear: Not on file    Screening Not Indicated   Hepatitis C Screening Once for adults born between 1945 and 1965  More frequently in patients at high risk for Hepatitis C Hep C Antibody: Not on file        Diabetes Screening 1-2 times per year if you're at risk for diabetes or have pre-diabetes Fasting glucose: 107 mg/dL (12/8/2023)  A1C: 5.8 % (11/1/2022)  Screening Current   Cholesterol Screening Once every 5 years if you don't have a lipid disorder. May order more often based on risk factors. Lipid panel: 12/08/2023    Screening Not Indicated  History Lipid Disorder     Other Preventive Screenings Covered by Medicare:  Abdominal Aortic Aneurysm (AAA) Screening: covered once if your at risk. You're considered to be at risk if you have a family history of AAA. Lung Cancer Screening: covers low dose CT scan once per year if you meet all of the following conditions: (1) Age 48-67; (2) No signs or symptoms of lung cancer; (3) Current smoker or have quit smoking within the last 15 years; (4) You have a tobacco smoking history of at least 20 pack years (packs per day multiplied by number of years you smoked); (5) You get a written order from a healthcare provider. Glaucoma Screening: covered annually if you're considered high risk: (1) You have diabetes OR (2) Family history of glaucoma OR (3)  aged 48 and older OR (3)  American aged 72 and older  Osteoporosis Screening: covered every 2 years if you meet one of the following conditions: (1) You're estrogen deficient and at risk for osteoporosis based off medical history and other findings; (2) Have a vertebral abnormality; (3) On glucocorticoid therapy for more than 3 months; (4) Have primary hyperparathyroidism; (5) On osteoporosis medications and need to assess response to drug therapy. Last bone density test (DXA Scan): 12/14/2022. HIV Screening: covered annually if you're between the age of 14-79. Also covered annually if you are younger than 13 and older than 72 with risk factors for HIV infection.  For pregnant patients, it is covered up to 3 times per pregnancy. Immunizations:  Immunization Recommendations   Influenza Vaccine Annual influenza vaccination during flu season is recommended for all persons aged >= 6 months who do not have contraindications   Pneumococcal Vaccine   * Pneumococcal conjugate vaccine = PCV13 (Prevnar 13), PCV15 (Vaxneuvance), PCV20 (Prevnar 20)  * Pneumococcal polysaccharide vaccine = PPSV23 (Pneumovax) Adults 17-95 yo with certain risk factors or if 69+ yo  If never received any pneumonia vaccine: recommend Prevnar 20 (PCV20)  Give PCV20 if previously received 1 dose of PCV13 or PPSV23   Hepatitis B Vaccine 3 dose series if at intermediate or high risk (ex: diabetes, end stage renal disease, liver disease)   Respiratory syncytial virus (RSV) Vaccine - COVERED BY MEDICARE PART D  * RSVPreF3 (Arexvy) CDC recommends that adults 61years of age and older may receive a single dose of RSV vaccine using shared clinical decision-making (SCDM)   Tetanus (Td) Vaccine - COST NOT COVERED BY MEDICARE PART B Following completion of primary series, a booster dose should be given every 10 years to maintain immunity against tetanus. Td may also be given as tetanus wound prophylaxis. Tdap Vaccine - COST NOT COVERED BY MEDICARE PART B Recommended at least once for all adults. For pregnant patients, recommended with each pregnancy. Shingles Vaccine (Shingrix) - COST NOT COVERED BY MEDICARE PART B  2 shot series recommended in those 19 years and older who have or will have weakened immune systems or those 50 years and older     Health Maintenance Due:      Topic Date Due   • Hepatitis C Screening  Never done   • Breast Cancer Screening: Mammogram  01/12/2025   • Colorectal Cancer Screening  07/19/2026     Immunizations Due:  There are no preventive care reminders to display for this patient. Advance Directives   What are advance directives? Advance directives are legal documents that state your wishes and plans for medical care.  These plans are made ahead of time in case you lose your ability to make decisions for yourself. Advance directives can apply to any medical decision, such as the treatments you want, and if you want to donate organs. What are the types of advance directives? There are many types of advance directives, and each state has rules about how to use them. You may choose a combination of any of the following:  Living will: This is a written record of the treatment you want. You can also choose which treatments you do not want, which to limit, and which to stop at a certain time. This includes surgery, medicine, IV fluid, and tube feedings. Durable power of  for healthcare [facility]): This is a written record that states who you want to make healthcare choices for you when you are unable to make them for yourself. This person, called a proxy, is usually a family member or a friend. You may choose more than 1 proxy. Do not resuscitate (DNR) order:  A DNR order is used in case your heart stops beating or you stop breathing. It is a request not to have certain forms of treatment, such as CPR. A DNR order may be included in other types of advance directives. Medical directive: This covers the care that you want if you are in a coma, near death, or unable to make decisions for yourself. You can list the treatments you want for each condition. Treatment may include pain medicine, surgery, blood transfusions, dialysis, IV or tube feedings, and a ventilator (breathing machine). Values history: This document has questions about your views, beliefs, and how you feel and think about life. This information can help others choose the care that you would choose. Why are advance directives important? An advance directive helps you control your care. Although spoken wishes may be used, it is better to have your wishes written down. Spoken wishes can be misunderstood, or not followed. Treatments may be given even if you do not want them. An advance directive may make it easier for your family to make difficult choices about your care. Weight Management   Why it is important to manage your weight:  Being overweight increases your risk of health conditions such as heart disease, high blood pressure, type 2 diabetes, and certain types of cancer. It can also increase your risk for osteoarthritis, sleep apnea, and other respiratory problems. Aim for a slow, steady weight loss. Even a small amount of weight loss can lower your risk of health problems. How to lose weight safely:  A safe and healthy way to lose weight is to eat fewer calories and get regular exercise. You can lose up about 1 pound a week by decreasing the number of calories you eat by 500 calories each day. Healthy meal plan for weight management:  A healthy meal plan includes a variety of foods, contains fewer calories, and helps you stay healthy. A healthy meal plan includes the following:  Eat whole-grain foods more often. A healthy meal plan should contain fiber. Fiber is the part of grains, fruits, and vegetables that is not broken down by your body. Whole-grain foods are healthy and provide extra fiber in your diet. Some examples of whole-grain foods are whole-wheat breads and pastas, oatmeal, brown rice, and bulgur. Eat a variety of vegetables every day. Include dark, leafy greens such as spinach, kale, cecilia greens, and mustard greens. Eat yellow and orange vegetables such as carrots, sweet potatoes, and winter squash. Eat a variety of fruits every day. Choose fresh or canned fruit (canned in its own juice or light syrup) instead of juice. Fruit juice has very little or no fiber. Eat low-fat dairy foods. Drink fat-free (skim) milk or 1% milk. Eat fat-free yogurt and low-fat cottage cheese. Try low-fat cheeses such as mozzarella and other reduced-fat cheeses. Choose meat and other protein foods that are low in fat.   Choose beans or other legumes such as split peas or lentils. Choose fish, skinless poultry (chicken or turkey), or lean cuts of red meat (beef or pork). Before you cook meat or poultry, cut off any visible fat. Use less fat and oil. Try baking foods instead of frying them. Add less fat, such as margarine, sour cream, regular salad dressing and mayonnaise to foods. Eat fewer high-fat foods. Some examples of high-fat foods include french fries, doughnuts, ice cream, and cakes. Eat fewer sweets. Limit foods and drinks that are high in sugar. This includes candy, cookies, regular soda, and sweetened drinks. Exercise:  Exercise at least 30 minutes per day on most days of the week. Some examples of exercise include walking, biking, dancing, and swimming. You can also fit in more physical activity by taking the stairs instead of the elevator or parking farther away from stores. Ask your healthcare provider about the best exercise plan for you. © Copyright [employer] 2018 Information is for End User's use only and may not be sold, redistributed or otherwise used for commercial purposes.  All illustrations and images included in CareNotes® are the copyrighted property of [employer] Inc[employer] or [address] [place] [address]

## 2023-12-11 NOTE — PROGRESS NOTES
Assessment and Plan:     Problem List Items Addressed This Visit     Essential hypertension     Controlled, continue the metoprolol         Mixed hyperlipidemia     Continue lovastatin, blood work was reviewed with her today. Acquired hypothyroidism     Start levothyroxine 25 mcg, recheck TSH in 6 weeks         Relevant Medications    levothyroxine (Euthyrox) 25 mcg tablet    Other Relevant Orders    TSH, 3rd generation    Iron deficiency anemia     Start iron 325 mg every other day, check CBC in 6 weeks         Relevant Orders    CBC   Other Visit Diagnoses     Medicare annual wellness visit, subsequent    -  Primary    Vasomotor rhinitis        Relevant Medications    azelastine (ASTELIN) 0.1 % nasal spray        BMI Counseling: Body mass index is 32.7 kg/m². The BMI is above normal. Nutrition recommendations include decreasing portion sizes and encouraging healthy choices of fruits and vegetables. Exercise recommendations include moderate physical activity 150 minutes/week. No pharmacotherapy was ordered. Rationale for BMI follow-up plan is due to patient being overweight or obese. Preventive health issues were discussed with patient, and age appropriate screening tests were ordered as noted in patient's After Visit Summary. Personalized health advice and appropriate referrals for health education or preventive services given if needed, as noted in patient's After Visit Summary. History of Present Illness:     Patient presents for a Medicare Wellness Visit    Patient comes in today for checkup, she does complain of persistent fatigue. She states that she did have a cardio ablation and is not having any palpitations but she still is bothered by persistent fatigue. Patient Care Team:  Asberry Cogan, DO as PCP - General (Family Medicine)  MD Esha Griffin MD as Endoscopist     Review of Systems:     Review of Systems   Constitutional:  Positive for fatigue.  Negative for chills and fever. HENT:  Positive for postnasal drip and rhinorrhea. Negative for congestion, ear pain, hearing loss and sore throat. Eyes:  Negative for pain and visual disturbance. Respiratory:  Negative for chest tightness, shortness of breath and wheezing. Cardiovascular:  Negative for chest pain and leg swelling. Gastrointestinal:  Negative for abdominal distention, abdominal pain, constipation, diarrhea and vomiting. Endocrine: Negative for cold intolerance and heat intolerance. Genitourinary:  Negative for difficulty urinating, frequency and urgency. Musculoskeletal:  Negative for arthralgias and gait problem. Skin:  Negative for color change. Neurological:  Negative for dizziness, tremors, syncope, numbness and headaches. Hematological:  Negative for adenopathy. Psychiatric/Behavioral:  Negative for agitation, confusion and sleep disturbance. The patient is not nervous/anxious. Problem List:     Patient Active Problem List   Diagnosis   • Essential hypertension   • Mixed hyperlipidemia   • Obesity (BMI 30-39. 9)   • H/O TIA (transient ischemic attack) and stroke   • Migraine with aura and without status migrainosus, not intractable   • Myocardiopathy ([address])   • Anticoagulant long-term use   • Nonrheumatic mitral valve regurgitation   • Nonrheumatic aortic valve insufficiency   • Nonrheumatic tricuspid valve regurgitation   • Persistent atrial fibrillation (HCC)   • Other insomnia   • Chronic obstructive pulmonary disease (HCC)   • BMI 33.0-33.9,adult   • Stage 3a chronic kidney disease ([facility])   • Acute on chronic systolic congestive heart failure (HCC)   • SSS (sick sinus syndrome) ([facility])   • Diplopia   • Lightheadedness   • Status post ablation of atrial fibrillation (PVI, posterior wall, and mitral isthmus dependent flutter line) 10/19/2023   • Acquired hypothyroidism   • Iron deficiency anemia      Past Medical and Surgical History:     Past Medical History:   Diagnosis Date   • Aphasia, mixed 2017   • Atrial fibrillation (HCC)    • CHF (congestive heart failure) (HCC)    • Colon polyp    • Headache    • Hyperlipidemia    • Hypertension    • Lyme disease    • Shortness of breath    • TIA (transient ischemic attack)    • Vertigo      Past Surgical History:   Procedure Laterality Date   • CARDIAC ELECTROPHYSIOLOGY PROCEDURE N/A 10/19/2023    Procedure: Cardiac eps/afib ablation PVI/POST WALL;  Surgeon: Vlad Lockett MD;  Location:  CARDIAC CATH LAB; Service: Cardiology   • CARDIAC PACEMAKER PLACEMENT  2019   • COLONOSCOPY     • KS SIGMOIDOSCOPY FLX DX W/COLLJ SPEC BR/WA IF PFRMD N/A 2017    Procedure: Jennifer Kenny;  Surgeon: Priscilla Patiño MD;  Location: [facility]; Service: Gastroenterology   • TONSILLECTOMY        Family History:     Family History   Problem Relation Age of Onset   • Lung cancer Mother    • Dementia Father    • Alzheimer's disease Father    • Other Father         Cardiac Disorder    • Lung cancer Maternal Grandmother    • Breast cancer Paternal Aunt 44   • No Known Problems Paternal Aunt    • No Known Problems Paternal Aunt       Social History:     Social History     Socioeconomic History   • Marital status: /Civil Union     Spouse name: None   • Number of children: None   • Years of education: None   • Highest education level: None   Occupational History   • Occupation: Retired   Tobacco Use   • Smoking status: Former     Types: Cigarettes     Quit date:      Years since quittin.9   • Smokeless tobacco: Never   • Tobacco comments:     no smoking for 46 years   Vaping Use   • Vaping Use: Never used   Substance and Sexual Activity   • Alcohol use:  Yes     Alcohol/week: 10.0 standard drinks of alcohol     Types: 10 Glasses of wine per week     Comment: occ   • Drug use: No   • Sexual activity: Never     Partners: Male     Birth control/protection: None   Other Topics Concern   • None   Social History Narrative    Always uses seat belt     Social Determinants of Health     Financial Resource Strain: Low Risk  (12/11/2023)    Overall Financial Resource Strain (CARDIA)    • Difficulty of Paying Living Expenses: Not very hard   Food Insecurity: No Food Insecurity (12/22/2022)    Hunger Vital Sign    • Worried About Running Out of Food in the Last Year: Never true    • Ran Out of Food in the Last Year: Never true   Transportation Needs: No Transportation Needs (12/11/2023)    PRAPARE - Transportation    • Lack of Transportation (Medical): No    • Lack of Transportation (Non-Medical):  No   Physical Activity: Not on file   Stress: Not on file   Social Connections: Not on file   Intimate Partner Violence: Not on file   Housing Stability: Low Risk  (12/22/2022)    Housing Stability Vital Sign    • Unable to Pay for Housing in the Last Year: No    • Number of Places Lived in the Last Year: 1    • Unstable Housing in the Last Year: No      Medications and Allergies:     Current Outpatient Medications   Medication Sig Dispense Refill   • amiodarone 200 mg tablet Take 1 tablet (200 mg total) by mouth daily 90 tablet 3   • azelastine (ASTELIN) 0.1 % nasal spray 2 sprays into each nostril in the morning Use in each nostril as directed 30 mL 1   • Eliquis 5 MG TAKE 1 TABLET BY MOUTH  TWICE DAILY 180 tablet 3   • furosemide (LASIX) 20 mg tablet Take 1 tablet (20 mg total) by mouth daily 90 tablet 3   • levothyroxine (Euthyrox) 25 mcg tablet Take 1 tablet (25 mcg total) by mouth daily in the early morning 90 tablet 0   • lovastatin (MEVACOR) 10 MG tablet TAKE 1 TABLET BY MOUTH  DAILY AT BEDTIME 90 tablet 3   • metoprolol succinate (TOPROL-XL) 100 mg 24 hr tablet Take 1 tablet (100 mg total) by mouth daily 90 tablet 3   • midodrine (PROAMATINE) 5 mg tablet Take 1 tablet (5 mg total) by mouth 3 (three) times a day before meals 90 tablet 5   • potassium chloride (K-DUR,KLOR-CON) 10 mEq tablet Take 1 tablet (10 mEq total) by mouth daily 90 tablet 3 No current facility-administered medications for this visit. No Known Allergies   Immunizations:     Immunization History   Administered Date(s) Administered   • COVID-19 MODERNA VACC 0.25 ML IM BOOSTER 11/17/2021   • COVID-19 MODERNA VACC 0.5 ML IM 02/13/2021, 03/12/2021, 11/17/2021   • COVID-19 Pfizer Vac BIVALENT Brian-sucrose 12 Yr+ IM 11/07/2022, 09/06/2023   • INFLUENZA 11/02/2022   • Influenza, high dose seasonal 0.7 mL 03/02/2020, 09/30/2020, 11/02/2022, 10/30/2023   • Pneumococcal Conjugate Vaccine 20-valent (Pcv20), Polysace 12/05/2022      Health Maintenance:         Topic Date Due   • Hepatitis C Screening  Never done   • Breast Cancer Screening: Mammogram  01/12/2025   • Colorectal Cancer Screening  07/19/2026     There are no preventive care reminders to display for this patient. Medicare Screening Tests and Risk Assessments:     Julissa Kern is here for her Subsequent Wellness visit. Last Medicare Wellness visit information reviewed, patient interviewed and updates made to the record today. Health Risk Assessment:   Patient rates overall health as fair. Patient feels that their physical health rating is slightly better. Patient is satisfied with their life. Eyesight was rated as same. Hearing was rated as same. Patient feels that their emotional and mental health rating is same. Patients states they are sometimes angry. Patient states they are never, rarely unusually tired/fatigued. Pain experienced in the last 7 days has been none. Patient states that she has experienced no weight loss or gain in last 6 months. Fall Risk Screening: In the past year, patient has experienced: no history of falling in past year      Urinary Incontinence Screening:   Patient has not leaked urine accidently in the last six months. Home Safety:  Patient has trouble with stairs inside or outside of their home. Patient has working smoke alarms and has working carbon monoxide detector.  Home safety hazards include: none. Nutrition:   Current diet is Regular. Medications:   Patient is not currently taking any over-the-counter supplements. Patient is able to manage medications. Activities of Daily Living (ADLs)/Instrumental Activities of Daily Living (IADLs):   Walk and transfer into and out of bed and chair?: Yes  Dress and groom yourself?: Yes    Bathe or shower yourself?: Yes    Feed yourself? Yes  Do your laundry/housekeeping?: Yes  Manage your money, pay your bills and track your expenses?: Yes  Make your own meals?: Yes    Do your own shopping?: Yes    Previous Hospitalizations:   Any hospitalizations or ED visits within the last 12 months?: Yes    How many hospitalizations have you had in the last year?: more than 4    Advance Care Planning:   Living will: Yes    Advanced directive: Yes      Cognitive Screening:   Provider or family/friend/caregiver concerned regarding cognition?: No    PREVENTIVE SCREENINGS      Cardiovascular Screening:    General: Screening Not Indicated and History Lipid Disorder      Diabetes Screening:     General: Screening Current      Colorectal Cancer Screening:     General: Screening Current      Breast Cancer Screening:     General: Screening Current      Cervical Cancer Screening:    General: Screening Not Indicated      Osteoporosis Screening:    General: Screening Current      Abdominal Aortic Aneurysm (AAA) Screening:        General: Screening Not Indicated      Lung Cancer Screening:     General: Screening Not Indicated      Hepatitis C Screening:    General: Screening Not Indicated    Screening, Brief Intervention, and Referral to Treatment (SBIRT)    Screening  Typical number of drinks in a day: 1  Typical number of drinks in a week: 1  Interpretation: Low risk drinking behavior.     Single Item Drug Screening:  How often have you used an illegal drug (including marijuana) or a prescription medication for non-medical reasons in the past year? never    Single Item Drug Screen Score: 0  Interpretation: Negative screen for possible drug use disorder    No results found. Physical Exam:     /80 (BP Location: Left arm, Patient Position: Sitting, Cuff Size: Standard)   Pulse 93   Temp (!) 96.8 °F (36 °C) (Tympanic)   Ht 5' 5" (1.651 m)   Wt 89.1 kg (196 lb 8 oz)   SpO2 99%   BMI 32.70 kg/m²     Physical Exam  Constitutional:       Appearance: She is well-developed. HENT:      Head: Normocephalic and atraumatic. Right Ear: External ear normal.      Left Ear: External ear normal.      Nose: Nose normal.      Mouth/Throat:      Pharynx: Oropharynx is clear. Eyes:      Extraocular Movements: Extraocular movements intact. Conjunctiva/sclera: Conjunctivae normal.      Pupils: Pupils are equal, round, and reactive to light. Neck:      Thyroid: No thyromegaly. Cardiovascular:      Rate and Rhythm: Normal rate and regular rhythm. Heart sounds: Normal heart sounds. No murmur heard. Pulmonary:      Effort: Pulmonary effort is normal.   Abdominal:      General: Bowel sounds are normal. There is no distension. Palpations: Abdomen is soft. Musculoskeletal:         General: Normal range of motion. Cervical back: Normal range of motion and neck supple. Skin:     General: Skin is warm. Neurological:      Mental Status: She is alert and oriented to person, place, and time.    Psychiatric:         Mood and Affect: Mood normal.          [place]les, DO

## 2023-12-20 ENCOUNTER — REMOTE DEVICE CLINIC VISIT (OUTPATIENT)
Dept: CARDIOLOGY CLINIC | Facility: CLINIC | Age: 78
End: 2023-12-20
Payer: MEDICARE

## 2023-12-20 DIAGNOSIS — Z95.0 CARDIAC PACEMAKER IN SITU: Primary | ICD-10-CM

## 2023-12-20 PROCEDURE — 93294 REM INTERROG EVL PM/LDLS PM: CPT | Performed by: INTERNAL MEDICINE

## 2023-12-20 PROCEDURE — 93296 REM INTERROG EVL PM/IDS: CPT | Performed by: INTERNAL MEDICINE

## 2023-12-20 NOTE — PROGRESS NOTES
Results for orders placed or performed in visit on 12/20/23   Cardiac EP device report    Narrative    MDT DUAL CHAMBER PM  - ACTIVE SYSTEM IS MRI CONDITIONAL  CARELINK TRANSMISSION: BATTERY VOLTAGE ADEQUATE (8.7 YRS). AP-99%, -0.3%. ALL AVAILABLE LEAD PARAMETERS WITHIN NORMAL LIMITS. 4 AF EPISODES MAX DURATION 38.5 MINS. AF BURDEN-0.4%. S/P AFIB & AFLUTTER ABLATIONS ON 10/19/23. PT ON ELIQUIS, AMIO & METOPROLOL. NORMAL DEVICE FUNCTION. GV

## 2024-01-01 NOTE — TELEPHONE ENCOUNTER
Bmp was order
Patient has been in and out of the hospital for SOB  The last time she was in was last Wednesday  They put her on Lasix which she was feeling better for a few days but now she feels SOB and tired        Transferred call to [facility]
Patient was release from ED ON 11/23/22   Patient state she was given laxis through IV in ED   Patient stated she FELT  better till last night   Patient IS now feeling out of breath   Patient state she didn't gain any weight and Denies any other symptoms   Patient want to know can her Lasix be increased because she think that will help her 
Spoke with patient and she verbally understood   She aware to do lab mid week next week   Patient isn't enroll in lasix clinic Patient is aware to report her weight change if any 
(2) cough or sneeze

## 2024-01-25 ENCOUNTER — APPOINTMENT (OUTPATIENT)
Dept: LAB | Facility: HOSPITAL | Age: 79
End: 2024-01-25
Payer: MEDICARE

## 2024-01-25 DIAGNOSIS — D50.9 IRON DEFICIENCY ANEMIA, UNSPECIFIED IRON DEFICIENCY ANEMIA TYPE: ICD-10-CM

## 2024-01-25 DIAGNOSIS — E03.9 ACQUIRED HYPOTHYROIDISM: ICD-10-CM

## 2024-01-25 LAB
ERYTHROCYTE [DISTWIDTH] IN BLOOD BY AUTOMATED COUNT: 28.1 % (ref 11.6–15.1)
HCT VFR BLD AUTO: 40.2 % (ref 34.8–46.1)
HGB BLD-MCNC: 12.3 G/DL (ref 11.5–15.4)
MCH RBC QN AUTO: 26.5 PG (ref 26.8–34.3)
MCHC RBC AUTO-ENTMCNC: 30.6 G/DL (ref 31.4–37.4)
MCV RBC AUTO: 87 FL (ref 82–98)
PLATELET # BLD AUTO: 224 THOUSANDS/UL (ref 149–390)
PMV BLD AUTO: 10.7 FL (ref 8.9–12.7)
RBC # BLD AUTO: 4.64 MILLION/UL (ref 3.81–5.12)
TSH SERPL DL<=0.05 MIU/L-ACNC: 3.33 UIU/ML (ref 0.45–4.5)
WBC # BLD AUTO: 7.3 THOUSAND/UL (ref 4.31–10.16)

## 2024-01-25 PROCEDURE — 36415 COLL VENOUS BLD VENIPUNCTURE: CPT

## 2024-01-25 PROCEDURE — 84443 ASSAY THYROID STIM HORMONE: CPT

## 2024-01-25 PROCEDURE — 85027 COMPLETE CBC AUTOMATED: CPT

## 2024-02-07 ENCOUNTER — TELEPHONE (OUTPATIENT)
Dept: FAMILY MEDICINE CLINIC | Facility: CLINIC | Age: 79
End: 2024-02-07

## 2024-02-07 NOTE — TELEPHONE ENCOUNTER
Pt stopped at the office -     Pt is scheduled to have some routine dental care exam on 02/29/2024 (Simple or Surgical Extractions / Local Anesthesia) @[facility] in [place]. ).    Medical Clearance Letter attached to this t/c note under Media.   HardCopy of letter put in your folder.     Pts chart reviewed - Pt saw you on 12.11.2023 for awv- Dr. Payton - would you be you be able to clear pt for this please w/o her to be seen?     Ok to write a letter?     Letter needs to be faxed to # [phone]      Please advise, Thank You

## 2024-02-08 ENCOUNTER — TELEPHONE (OUTPATIENT)
Dept: CARDIOLOGY CLINIC | Facility: CLINIC | Age: 79
End: 2024-02-08

## 2024-02-08 ENCOUNTER — OFFICE VISIT (OUTPATIENT)
Dept: CARDIOLOGY CLINIC | Facility: CLINIC | Age: 79
End: 2024-02-08
Payer: MEDICARE

## 2024-02-08 VITALS
BODY MASS INDEX: 32.65 KG/M2 | WEIGHT: 196 LBS | OXYGEN SATURATION: 99 % | HEART RATE: 93 BPM | HEIGHT: 65 IN | DIASTOLIC BLOOD PRESSURE: 68 MMHG | RESPIRATION RATE: 16 BRPM | SYSTOLIC BLOOD PRESSURE: 118 MMHG

## 2024-02-08 DIAGNOSIS — Z01.810 PREOP CARDIOVASCULAR EXAM: Primary | ICD-10-CM

## 2024-02-08 DIAGNOSIS — I42.8 NONISCHEMIC CARDIOMYOPATHY (HCC): ICD-10-CM

## 2024-02-08 DIAGNOSIS — Z95.0 PRESENCE OF PERMANENT CARDIAC PACEMAKER: ICD-10-CM

## 2024-02-08 DIAGNOSIS — Z86.79 S/P ABLATION OF ATRIAL FIBRILLATION: ICD-10-CM

## 2024-02-08 DIAGNOSIS — I50.22 CHRONIC SYSTOLIC (CONGESTIVE) HEART FAILURE (HCC): ICD-10-CM

## 2024-02-08 DIAGNOSIS — Z98.890 S/P ABLATION OF ATRIAL FIBRILLATION: ICD-10-CM

## 2024-02-08 DIAGNOSIS — I48.19 PERSISTENT ATRIAL FIBRILLATION (HCC): ICD-10-CM

## 2024-02-08 DIAGNOSIS — I49.5 SSS (SICK SINUS SYNDROME) (HCC): ICD-10-CM

## 2024-02-08 DIAGNOSIS — E66.9 OBESITY (BMI 30-39.9): ICD-10-CM

## 2024-02-08 DIAGNOSIS — E78.2 MIXED HYPERLIPIDEMIA: ICD-10-CM

## 2024-02-08 PROCEDURE — 99214 OFFICE O/P EST MOD 30 MIN: CPT | Performed by: INTERNAL MEDICINE

## 2024-02-08 RX ORDER — LOVASTATIN 10 MG/1
10 TABLET ORAL
Qty: 90 TABLET | Refills: 3 | Status: SHIPPED | OUTPATIENT
Start: 2024-02-08

## 2024-02-08 RX ORDER — POTASSIUM CHLORIDE 750 MG/1
10 TABLET, EXTENDED RELEASE ORAL EVERY OTHER DAY
Qty: 45 TABLET | Refills: 3 | Status: SHIPPED | OUTPATIENT
Start: 2024-02-08

## 2024-02-08 RX ORDER — LEVOTHYROXINE SODIUM 0.03 MG/1
25 TABLET ORAL
Qty: 90 TABLET | Refills: 3 | Status: CANCELLED | OUTPATIENT
Start: 2024-02-08

## 2024-02-08 RX ORDER — FUROSEMIDE 20 MG/1
20 TABLET ORAL EVERY OTHER DAY
Qty: 45 TABLET | Refills: 3 | Status: SHIPPED | OUTPATIENT
Start: 2024-02-08

## 2024-02-08 RX ORDER — METOPROLOL SUCCINATE 100 MG/1
100 TABLET, EXTENDED RELEASE ORAL DAILY
Qty: 90 TABLET | Refills: 3 | Status: SHIPPED | OUTPATIENT
Start: 2024-02-08

## 2024-02-08 NOTE — PROGRESS NOTES
CARDIOLOGY OFFICE VISIT  [facility]  [address] [place] [address], [place] [id number]  Tel: [phone]      NAME: Madeline Good  AGE: 78 y.o.  SEX: female  : 1945  MRN: [id number]      Chief Complaint:  Chief Complaint   Patient presents with    Follow-up         History of Present Illness:   Patient comes for follow up and clearance for dental local anesthesia and simple and surgical extraction of teeth. States she is doing well from cardiac stand point and denies chest pain / pressure, SOB, palpitations, lightheadedness, syncope, swelling feet, orthopnea, PND, claudication.    Chronic systolic heart failure - currently on furosemide 20 mg OD, potassium, metoprolol succinate. Lisinopril was discontinued for soft BP.  States she has been watching her salt intake closely     Nonischemic cardiomyopathy, likely tachycardia mediated -  patient has a history of nonischemic cardiomyopathy with improvement in EF after rate/rhythm control of her atrial fibrillation. Current EF is 55% (2023).  On metoprolol succinate.  Lisinopril was discontinued for soft BP     Persistent AF - Patient was diagnosed with atrial fibrillation on an EKG done at her PCP office. Patient initially had no symptoms from it. She was cardioverted on 2018 and was in sinus rhythm for a while but then went back into Afib. Then she was started on rhythm control with Sotalol. Did well for a while but again required cardioversion on 11/3/2022. She was placed on amiodarone but started having breakthrough atrial fibrillation. Now status post cryoablation with pulmonary vein isolation, posterior wall isolation and ablation of mitral isthmus dependent flutter (10/19/2023) by Dr. Andrei Lugo. Also on Amiodarone + Metoprolol     SSS s/p PPM - last pacemaker interrogation discussed with the patient.  Continue regular pacemaker interrogations      Mixed hyperlipidemia -  Has  had hyperlipidemia for many years.  Taking statin regularly along with diet control.  Denies myalgia.  PCP closely monitoring the blood work.    Obesity -  Trying to lose weight.      Past Medical History:  Past Medical History:   Diagnosis Date    Aphasia, mixed 2017    Atrial fibrillation (HCC)     CHF (congestive heart failure) (HCC)     Colon polyp     Headache     Hyperlipidemia     Hypertension     Lyme disease     Shortness of breath     TIA (transient ischemic attack)     Vertigo          Past Surgical History:  Past Surgical History:   Procedure Laterality Date    CARDIAC ELECTROPHYSIOLOGY PROCEDURE N/A 10/19/2023    Procedure: Cardiac eps/afib ablation PVI/POST WALL;  Surgeon: Piero Fajardo MD;  Location:  CARDIAC CATH LAB;  Service: Cardiology    CARDIAC PACEMAKER PLACEMENT  2019    COLONOSCOPY      GA SIGMOIDOSCOPY FLX DX W/COLLJ SPEC BR/WA IF PFRMD N/A 2017    Procedure: SIGMOIDOSCOPY FLEXIBLE;  Surgeon: Kavon Fernandez MD;  Location: [facility];  Service: Gastroenterology    TONSILLECTOMY           Family History:  Family History   Problem Relation Age of Onset    Lung cancer Mother     Dementia Father     Alzheimer's disease Father     Other Father         Cardiac Disorder     Lung cancer Maternal Grandmother     Breast cancer Paternal Aunt 40    No Known Problems Paternal Aunt     No Known Problems Paternal Aunt          Social History:  Social History     Socioeconomic History    Marital status: /Civil Union     Spouse name: None    Number of children: None    Years of education: None    Highest education level: None   Occupational History    Occupation: Retired   Tobacco Use    Smoking status: Former     Current packs/day: 0.00     Types: Cigarettes     Quit date:      Years since quittin.1    Smokeless tobacco: Never    Tobacco comments:     no smoking for 46 years   Vaping Use    Vaping status: Never Used   Substance and Sexual Activity    Alcohol use: Yes      Alcohol/week: 10.0 standard drinks of alcohol     Types: 10 Glasses of wine per week     Comment: occ    Drug use: No    Sexual activity: Never     Partners: Male     Birth control/protection: None   Other Topics Concern    None   Social History Narrative    Always uses seat belt     Social Determinants of Health     Financial Resource Strain: Low Risk  (12/11/2023)    Overall Financial Resource Strain (CARDIA)     Difficulty of Paying Living Expenses: Not very hard   Food Insecurity: No Food Insecurity (12/22/2022)    Hunger Vital Sign     Worried About Running Out of Food in the Last Year: Never true     Ran Out of Food in the Last Year: Never true   Transportation Needs: No Transportation Needs (12/11/2023)    PRAPARE - Transportation     Lack of Transportation (Medical): No     Lack of Transportation (Non-Medical): No   Physical Activity: Not on file   Stress: Not on file   Social Connections: Not on file   Intimate Partner Violence: Not on file   Housing Stability: Low Risk  (12/22/2022)    Housing Stability Vital Sign     Unable to Pay for Housing in the Last Year: No     Number of Places Lived in the Last Year: 1     Unstable Housing in the Last Year: No         Active Problems:  Patient Active Problem List   Diagnosis    Essential hypertension    Mixed hyperlipidemia    Obesity (BMI 30-39.9)    H/O TIA (transient ischemic attack) and stroke    Migraine with aura and without status migrainosus, not intractable    Myocardiopathy (HCC)    Anticoagulant long-term use    Nonrheumatic mitral valve regurgitation    Nonrheumatic aortic valve insufficiency    Nonrheumatic tricuspid valve regurgitation    Persistent atrial fibrillation (HCC)    Other insomnia    Chronic obstructive pulmonary disease (HCC)    BMI 33.0-33.9,adult    Stage 3a chronic kidney disease (HCC)    Acute on chronic systolic congestive heart failure (HCC)    SSS (sick sinus syndrome) ([facility])    Diplopia    Lightheadedness    Status post ablation of  atrial fibrillation (PVI, posterior wall, and mitral isthmus dependent flutter line) 10/19/2023    Acquired hypothyroidism    Iron deficiency anemia         The following portions of the patient's history were reviewed and updated as appropriate: past medical history, past surgical history, past family history,  past social history, current medications, allergies and problem list.      Review of Systems:  Constitutional: Denies fever, chills  Eyes: Denies eye redness, eye discharge  ENT: Denies hearing loss, sneezing, nasal discharge, sore throat   Respiratory: Denies cough, expectoration, shortness of breath  Cardiovascular: Denies chest pain, palpitations, lower extremity swelling  Gastrointestinal: Denies abdominal pain, nausea, vomiting, diarrhea  Genito-Urinary: Denies dysuria, incontinence  Musculoskeletal: Denies back pain, joint pain, muscle pain  Neurologic: Denies lightheadedness, syncope, headache, seizures  Endocrine: Denies polydipsia, temperature intolerance  Allergy and Immunology: Denies hives, insect bite sensitivity  Hematological and Lymphatic: Denies bleeding problems, swollen glands   Psychological: Denies depression, suicidal ideation, anxiety, panic  Dermatological: Denies pruritus, rash, skin lesion changes      Vitals:  Vitals:    02/08/24 1611   BP: 118/68   Pulse: 93   Resp: 16   SpO2: 99%       Body mass index is 32.62 kg/m².    Weight (last 2 days)       Date/Time Weight    02/08/24 1611 88.9 (196)              Physical Examination:  General: Patient is not in acute distress. Awake, alert, oriented in time, place and person. Responding to commands  Head: Normocephalic. Atraumatic  Eyes: Both pupils normal sized, round and reactive to light. Nonicteric  ENT: Normal external ear canals  Neck: Supple. JVP not raised. Trachea central. No thyromegaly  Lungs: Bilateral bronchovascular breath sounds with no crackles or rhonchi  Chest wall: No tenderness  Cardiovascular: RRR. S1 and S2 normal.  "No murmur, rub or gallop  Gastrointestinal: Abdomen soft, nontender. No guarding or rigidity. Liver and spleen not palpable. Bowel sounds present  Neurologic: Patient is awake, alert, oriented in time, place and person. Responding to commands. Moving all extremities  Integumentary:  No skin rash  Lymphatic: No cervical lymphadenopathy  Back: Symmetric. No CVA tenderness  Extremities: No clubbing, cyanosis or edema      Laboratory Results:  CBC with diff:   Lab Results   Component Value Date    WBC 7.30 01/25/2024    RBC 4.64 01/25/2024    HGB 12.3 01/25/2024    HCT 40.2 01/25/2024    MCV 87 01/25/2024    MCH 26.5 (L) 01/25/2024    RDW 28.1 (H) 01/25/2024     01/25/2024       CMP:  Lab Results   Component Value Date    CREATININE 1.17 12/08/2023    BUN 17 12/08/2023    K 4.3 12/08/2023     12/08/2023    CO2 25 12/08/2023    ALKPHOS 70 12/08/2023    ALT 14 12/08/2023    AST 19 12/08/2023    BILIDIR 0.13 01/05/2023       Lab Results   Component Value Date    HGBA1C 5.8 (H) 11/01/2022    MG 1.9 10/20/2023    PHOS 3.5 03/16/2023       Lab Results   Component Value Date    TROPONINI 0.05 (H) 02/10/2019    TROPONINI 0.06 (H) 02/09/2019    TROPONINI 0.06 (H) 02/09/2019       Lipid Profile:   No results found for: \"CHOL\"  Lab Results   Component Value Date    HDL 53 12/08/2023    HDL 45 (L) 11/02/2022    HDL 55 11/22/2021     Lab Results   Component Value Date    LDLCALC 91 12/08/2023    LDLCALC 62 11/02/2022    LDLCALC 74 11/22/2021     Lab Results   Component Value Date    TRIG 80 12/08/2023    TRIG 75 11/02/2022    TRIG 90 11/22/2021       Cardiac testing:   Results for orders placed during the hospital encounter of 11/01/22    Echo complete w/ contrast if indicated    Interpretation Summary    Left Ventricle: Left ventricular cavity size is normal. Systolic function is mildly reduced.  LVEF approximately 40 to 45%.  Patient was in atrial fibrillation with periods of rapid ventricular response which " interferes with interpretation. There is mild global hypokinesis. Unable to assess diastolic function due to atrial fibrillation.    Right Ventricle: Right ventricular cavity size is mildly dilated. Systolic function is mildly to moderately reduced.    Left Atrium: The atrium is moderate to markedly dilated.    Right Atrium: The atrium is moderately dilated.    Aortic Valve: There is mild regurgitation.    Mitral Valve: There is moderate annular calcification. There is moderate regurgitation.    Tricuspid Valve: There is moderate regurgitation. The right ventricular systolic pressure is mildly elevated.  Estimated RVSP 35 mm Hg.  Pacer leads noted.    Pulmonic Valve: There is mild regurgitation.    Results for orders placed during the hospital encounter of 03/07/23    Echo follow up/limited w/ contrast if indicated    Interpretation Summary    Left Ventricle: Left ventricular cavity size is normal. The left ventricular ejection fraction is 55%. Systolic function is normal.    Left Atrium: The atrium is moderately dilated.    Right Atrium: The atrium is mildly dilated.    Aortic Valve: There is mild to moderate regurgitation.    Tricuspid Valve: There is moderate regurgitation.    Pulmonary Artery: The estimated pulmonary artery systolic pressure is 52.0 mmHg. The pulmonary artery systolic pressure is moderately increased.    This is a limited echocardiogram performed to evaluate LV function. Interpretation is therefore limited.      Results for orders placed during the hospital encounter of 03/07/23    NM myocardial perfusion spect (rx stress and/or rest)    Interpretation Summary    Stress ECG: No ST deviation is noted. There were no arrhythmias during recovery. . The ECG was not diagnostic due to pharmacological (vasodilator) stress.    Perfusion Defect Conclusion: The stress/rest perfusion ratio is 1.03 . There is no evidence of transient ischemic dilation (TID).    Stress Function: Left ventricular function  post-stress is normal. Post-stress ejection fraction is 79 %.    Perfusion: There is a left ventricular perfusion defect that is medium in size with moderate reduction in uptake present in the mid to apical anterior and anterolateral location(s) that is partially reversible.    Stress Combined Conclusion: Left ventricular perfusion is probably abnormal.      Medications:    Current Outpatient Medications:     amiodarone 200 mg tablet, Take 1 tablet (200 mg total) by mouth daily, Disp: 90 tablet, Rfl: 3    apixaban (Eliquis) 5 mg, Take 1 tablet (5 mg total) by mouth 2 (two) times a day, Disp: 180 tablet, Rfl: 3    azelastine (ASTELIN) 0.1 % nasal spray, 2 sprays into each nostril in the morning Use in each nostril as directed, Disp: 30 mL, Rfl: 1    furosemide (LASIX) 20 mg tablet, Take 1 tablet (20 mg total) by mouth every other day, Disp: 45 tablet, Rfl: 3    levothyroxine (Euthyrox) 25 mcg tablet, Take 1 tablet (25 mcg total) by mouth daily in the early morning, Disp: 90 tablet, Rfl: 0    lovastatin (MEVACOR) 10 MG tablet, Take 1 tablet (10 mg total) by mouth daily at bedtime, Disp: 90 tablet, Rfl: 3    metoprolol succinate (TOPROL-XL) 100 mg 24 hr tablet, Take 1 tablet (100 mg total) by mouth daily, Disp: 90 tablet, Rfl: 3    midodrine (PROAMATINE) 5 mg tablet, Take 1 tablet (5 mg total) by mouth 3 (three) times a day before meals, Disp: 90 tablet, Rfl: 5    potassium chloride (Klor-Con M10) 10 mEq tablet, Take 1 tablet (10 mEq total) by mouth every other day, Disp: 45 tablet, Rfl: 3      Allergies:  No Known Allergies      Assessment and Plan:  1. Preop cardiovascular exam  Patient can have dental local anesthesia and simple and surgical extraction of teeth.  If required by the dentist, she can hold her Eliquis for 2 days prior to the extractions.  She will start Eliquis postprocedure based on dentist's decision    2. Chronic systolic (congestive) heart failure (HCC)  Euvolemic.  Decrease furosemide to 20 mg  every other day, potassium to every other day, metoprolol daily.  Low-salt diet.  Daily weights.    3. Nonischemic cardiomyopathy (HCC)  Continue metoprolol succinate.  Lisinopril was held due to soft BP    4. Persistent atrial fibrillation (HCC)  5. S/P ablation of atrial fibrillation  Continue amiodarone, metoprolol, Eliquis  Continue to follow-up with EP    6. SSS (sick sinus syndrome) (HCC)  7. Presence of permanent cardiac pacemaker  Continue regular pacemaker interrogations    8. Mixed hyperlipidemia  Continue statin and diet control.  Her PCP closely monitors her blood work    Recommend aggressive risk factor modification and therapeutic lifestyle changes.  Low-salt, low-calorie, low-fat, low-cholesterol diet with regular exercise and to optimize weight.  I will defer the ordering and monitoring of necessity lab studies to you, but I am available and happy to review and manage any of the data at your request in the future.    Discussed concepts of atherosclerosis, including signs and symptoms of cardiac disease.    Previous studies were reviewed.    Safety measures were reviewed.  Questions were entertained and answered.  Patient was advised to report any problems requiring medical attention.    Follow-up with PCP and appropriate specialist and lab work as discussed.    Return for follow up visit as scheduled or earlier, if needed.  Thank you for allowing me to participate in the care and evaluation of your patient.  Should you have any questions, please feel free to contact me.      Marycruz Gomez MD  2/8/2024,4:42 PM

## 2024-02-08 NOTE — TELEPHONE ENCOUNTER
Called patient an  LVM  to hold eliquis two days prior to dental tooth extraction  note fax to [facility].       Fax [phone]  Number [phone]

## 2024-02-11 DIAGNOSIS — E03.9 ACQUIRED HYPOTHYROIDISM: ICD-10-CM

## 2024-02-12 RX ORDER — LEVOTHYROXINE SODIUM 0.03 MG/1
25 TABLET ORAL
Qty: 90 TABLET | Refills: 3 | Status: SHIPPED | OUTPATIENT
Start: 2024-02-12

## 2024-02-19 DIAGNOSIS — I48.0 PAROXYSMAL ATRIAL FIBRILLATION (HCC): ICD-10-CM

## 2024-02-19 RX ORDER — AMIODARONE HYDROCHLORIDE 200 MG/1
200 TABLET ORAL DAILY
Qty: 90 TABLET | Refills: 3 | Status: SHIPPED | OUTPATIENT
Start: 2024-02-19 | End: 2024-02-28 | Stop reason: DRUGHIGH

## 2024-02-23 NOTE — PROGRESS NOTES
Cardiology Follow Up    Madeline Good  1945  [id number]  [facility]  [address]  [address]  [place] [address] [phone]    1. Persistent atrial fibrillation (HCC)  POCT ECG      2. Acquired hypothyroidism        3. Chronic obstructive pulmonary disease, unspecified COPD type (HCC)        4. Essential hypertension        5. Nonrheumatic mitral valve regurgitation        6. Nonrheumatic tricuspid valve regurgitation        7. Acute on chronic systolic congestive heart failure (HCC)        8. SSS (sick sinus syndrome) (HCC)        9. Stage 3a chronic kidney disease (HCC)        10. Mixed hyperlipidemia        11. Obesity (BMI 30-39.9)        12. Anticoagulant long-term use        13. Status post ablation of atrial fibrillation (PVI, posterior wall, and mitral isthmus dependent flutter line) 10/19/2023                Summary of my recommendation for the patient  Patient is in atrial flutter with RVR, ventricular rate 120/min  History of tachycardia mediated cardiomyopathy    Increase amiodarone to 200 mg 2 times daily from once daily    Increase metoprolol succinate to 100 mg in the morning and 50 mg at night-the night dose is the new increase    Set up for LUIZA guided cardioversion as soon as possible    Have an EKG done a week after cardioversion and send it to me    Reviewed by me once again in 2 to 3 months time    If atrial flutter is recurrent will need repeat ablation  Plan for vein of Efe injection    Check for amiodarone toxicity          Interval History:     Patient underwent comprehensive ablation in October 2023  Unfortunately she is in flutter with RVR  She has a prior history of tachycardia mediated cardiomyopathy  Hence plan to intervene as soon as possible    She does have a pacemaker with a hiss lead in place      The patient is not complaining of anginal like chest pain or chest pressure  There  "is no worsening orthopnea, paroxysmal nocturnal dyspnea  There is some  leg swelling     No significant palpitation  There is no history of presyncope or syncope  There is rare history of transient  lightheadedness  There has been some improvement in exertional intolerance      Past medical history  The patient is a very pleasant 73 year old lady referred to me by Dr Gomez for management of A fi + RVR + heart failure      She does have significant medical illnesses in the form of  PAF  Hypertension  Hyperlipidemia  CMP - suspected tachy mediated      As for the atrial fibrillation  It has been present for quite some time now  Initially to present as episodes of palpitation  This has progressively increased in frequency and duration         There is history of snoring at night  There is occasional history of morning fatigability  There is occasional history of daytime sleepiness    /84 (BP Location: Left arm, Patient Position: Sitting, Cuff Size: Large)   Resp 16   Ht 5' 5\" (1.651 m)   Wt 88.9 kg (196 lb)   SpO2 97%   BMI 32.62 kg/m²       Patient Active Problem List   Diagnosis    Essential hypertension    Mixed hyperlipidemia    Obesity (BMI 30-39.9)    H/O TIA (transient ischemic attack) and stroke    Migraine with aura and without status migrainosus, not intractable    Myocardiopathy (HCC)    Anticoagulant long-term use    Nonrheumatic mitral valve regurgitation    Nonrheumatic aortic valve insufficiency    Nonrheumatic tricuspid valve regurgitation    Persistent atrial fibrillation (HCC)    Other insomnia    Chronic obstructive pulmonary disease (HCC)    BMI 33.0-33.9,adult    Stage 3a chronic kidney disease (HCC)    Acute on chronic systolic congestive heart failure (HCC)    SSS (sick sinus syndrome) ([facility])    Diplopia    Lightheadedness    Status post ablation of atrial fibrillation (PVI, posterior wall, and mitral isthmus dependent flutter line) 10/19/2023    Acquired hypothyroidism    Iron " deficiency anemia     Past Medical History:   Diagnosis Date    Aphasia, mixed 2017    Atrial fibrillation (HCC)     CHF (congestive heart failure) (HCC)     Colon polyp     Headache     Hyperlipidemia     Hypertension     Lyme disease     Shortness of breath     TIA (transient ischemic attack)     Vertigo      Social History     Socioeconomic History    Marital status: /Civil Union     Spouse name: Not on file    Number of children: Not on file    Years of education: Not on file    Highest education level: Not on file   Occupational History    Occupation: Retired   Tobacco Use    Smoking status: Former     Current packs/day: 0.00     Types: Cigarettes     Quit date:      Years since quittin.1    Smokeless tobacco: Never    Tobacco comments:     no smoking for 46 years   Vaping Use    Vaping status: Never Used   Substance and Sexual Activity    Alcohol use: Yes     Alcohol/week: 10.0 standard drinks of alcohol     Types: 10 Glasses of wine per week     Comment: occ    Drug use: No    Sexual activity: Never     Partners: Male     Birth control/protection: None   Other Topics Concern    Not on file   Social History Narrative    Always uses seat belt     Social Determinants of Health     Financial Resource Strain: Low Risk  (2023)    Overall Financial Resource Strain (CARDIA)     Difficulty of Paying Living Expenses: Not very hard   Food Insecurity: No Food Insecurity (2022)    Hunger Vital Sign     Worried About Running Out of Food in the Last Year: Never true     Ran Out of Food in the Last Year: Never true   Transportation Needs: No Transportation Needs (2023)    PRAPARE - Transportation     Lack of Transportation (Medical): No     Lack of Transportation (Non-Medical): No   Physical Activity: Not on file   Stress: Not on file   Social Connections: Not on file   Intimate Partner Violence: Not on file   Housing Stability: Low Risk  (2022)    Housing Stability Vital Sign      Unable to Pay for Housing in the Last Year: No     Number of Places Lived in the Last Year: 1     Unstable Housing in the Last Year: No      Family History   Problem Relation Age of Onset    Lung cancer Mother     Dementia Father     Alzheimer's disease Father     Other Father         Cardiac Disorder     Lung cancer Maternal Grandmother     Breast cancer Paternal Aunt 40    No Known Problems Paternal Aunt     No Known Problems Paternal Aunt      Past Surgical History:   Procedure Laterality Date    CARDIAC ELECTROPHYSIOLOGY PROCEDURE N/A 10/19/2023    Procedure: Cardiac eps/afib ablation PVI/POST WALL;  Surgeon: Piero Fajardo MD;  Location:  CARDIAC CATH LAB;  Service: Cardiology    CARDIAC PACEMAKER PLACEMENT  12/2019    COLONOSCOPY      SC SIGMOIDOSCOPY FLX DX W/COLLJ SPEC BR/WA IF PFRMD N/A 11/28/2017    Procedure: SIGMOIDOSCOPY FLEXIBLE;  Surgeon: Kavon Fernandez MD;  Location: [facility];  Service: Gastroenterology    TONSILLECTOMY         Current Outpatient Medications:     amiodarone 200 mg tablet, TAKE 1 TABLET BY MOUTH DAILY, Disp: 90 tablet, Rfl: 3    apixaban (Eliquis) 5 mg, Take 1 tablet (5 mg total) by mouth 2 (two) times a day, Disp: 180 tablet, Rfl: 3    azelastine (ASTELIN) 0.1 % nasal spray, 2 sprays into each nostril in the morning Use in each nostril as directed, Disp: 30 mL, Rfl: 1    furosemide (LASIX) 20 mg tablet, Take 1 tablet (20 mg total) by mouth every other day, Disp: 45 tablet, Rfl: 3    levothyroxine 25 mcg tablet, TAKE 1 TABLET BY MOUTH DAILY IN  THE EARLY MORNING, Disp: 90 tablet, Rfl: 3    lovastatin (MEVACOR) 10 MG tablet, Take 1 tablet (10 mg total) by mouth daily at bedtime, Disp: 90 tablet, Rfl: 3    metoprolol succinate (TOPROL-XL) 100 mg 24 hr tablet, Take 1 tablet (100 mg total) by mouth daily, Disp: 90 tablet, Rfl: 3    midodrine (PROAMATINE) 5 mg tablet, Take 1 tablet (5 mg total) by mouth 3 (three) times a day before meals, Disp: 90 tablet, Rfl: 5    potassium chloride  (Klor-Con M10) 10 mEq tablet, Take 1 tablet (10 mEq total) by mouth every other day, Disp: 45 tablet, Rfl: 3  No Known Allergies        LAB RESULTS:    CBC:  WBC   Date Value Ref Range Status   01/25/2024 7.30 4.31 - 10.16 Thousand/uL Final     Hemoglobin   Date Value Ref Range Status   01/25/2024 12.3 11.5 - 15.4 g/dL Final     Hematocrit   Date Value Ref Range Status   01/25/2024 40.2 34.8 - 46.1 % Final     MCV   Date Value Ref Range Status   01/25/2024 87 82 - 98 fL Final     Platelets   Date Value Ref Range Status   01/25/2024 224 149 - 390 Thousands/uL Final     RBC   Date Value Ref Range Status   01/25/2024 4.64 3.81 - 5.12 Million/uL Final     MCH   Date Value Ref Range Status   01/25/2024 26.5 (L) 26.8 - 34.3 pg Final     MCHC   Date Value Ref Range Status   01/25/2024 30.6 (L) 31.4 - 37.4 g/dL Final     RDW   Date Value Ref Range Status   01/25/2024 28.1 (H) 11.6 - 15.1 % Final     MPV   Date Value Ref Range Status   01/25/2024 10.7 8.9 - 12.7 fL Final     nRBC   Date Value Ref Range Status   12/08/2023 0 /100 WBCs Final       CMP:  Potassium   Date Value Ref Range Status   12/08/2023 4.3 3.5 - 5.3 mmol/L Final     Chloride   Date Value Ref Range Status   12/08/2023 105 96 - 108 mmol/L Final     CO2   Date Value Ref Range Status   12/08/2023 25 21 - 32 mmol/L Final     BUN   Date Value Ref Range Status   12/08/2023 17 5 - 25 mg/dL Final     Creatinine   Date Value Ref Range Status   12/08/2023 1.17 0.60 - 1.30 mg/dL Final     Comment:     Standardized to IDMS reference method     Calcium   Date Value Ref Range Status   12/08/2023 8.9 8.4 - 10.2 mg/dL Final     AST   Date Value Ref Range Status   12/08/2023 19 13 - 39 U/L Final     ALT   Date Value Ref Range Status   12/08/2023 14 7 - 52 U/L Final     Comment:     Specimen collection should occur prior to Sulfasalazine administration due to the potential for falsely depressed results.      Alkaline Phosphatase   Date Value Ref Range Status   12/08/2023 70  34 - 104 U/L Final     eGFR   Date Value Ref Range Status   12/08/2023 44 ml/min/1.73sq m Final        Magnesium:   Magnesium   Date Value Ref Range Status   10/20/2023 1.9 1.9 - 2.7 mg/dL Final        A1C:  Hemoglobin A1C   Date Value Ref Range Status   11/01/2022 5.8 (H) Normal 3.8-5.6%; PreDiabetic 5.7-6.4%; Diabetic >=6.5%; Glycemic control for adults with diabetes <7.0% % Final        TSH:  TSH 3RD GENERATON   Date Value Ref Range Status   01/25/2024 3.330 0.450 - 4.500 uIU/mL Final     Comment:     The recommended reference ranges for TSH during pregnancy are as follows:   First trimester 0.100 to 2.500 uIU/mL   Second trimester  0.200 to 3.000 uIU/mL   Third trimester 0.300 to 3.000 uIU/m    Note: Normal ranges may not apply to patients who are transgender, non-binary, or whose legal sex, sex at birth, and gender identity differ.  Adult TSH (3rd generation) reference range follows the recommended guidelines of the American Thy[employer] Association, January, 2020.        PT/INR:  Protime   Date Value Ref Range Status   10/19/2023 16.8 (H) 11.6 - 14.5 seconds Final     INR   Date Value Ref Range Status   10/19/2023 1.38 (H) 0.84 - 1.19 Final       Lipid Panel:  Cholesterol   Date Value Ref Range Status   12/08/2023 160 See Comment mg/dL Final     Comment:     Cholesterol:         Pediatric <18 Years        Desirable          <170 mg/dL      Borderline High    170-199 mg/dL      High               >=200 mg/dL        Adult >=18 Years            Desirable        <200 mg/dL      Borderline High  200-239 mg/dL      High             >239 mg/dL       Triglycerides   Date Value Ref Range Status   12/08/2023 80 See Comment mg/dL Final     Comment:     Triglyceride:     0-9Y            <75mg/dL     10Y-17Y         <90 mg/dL       >=18Y     Normal          <150 mg/dL     Borderline High 150-199 mg/dL     High            200-499 mg/dL        Very High       >499 mg/dL    Specimen collection should occur prior to Metamizole  administration due to the potential for falsely depressed results.     HDL, Direct   Date Value Ref Range Status   2023 53 >=50 mg/dL Final     Non-HDL-Chol (CHOL-HDL)   Date Value Ref Range Status   2023 107 mg/dl Final       Troponin:  Troponin I   Date Value Ref Range Status   02/10/2019 0.05 (H) <=0.04 ng/mL Final     Comment:       [employer] Chemistry analyzer 99% cutoff is > 0.04 ng/mL in network labs     o cTnI 99% cutoff is useful only when applied to patients in the clinical setting of myocardial ischemia   o cTnI 99% cutoff should be interpreted in the context of clinical history, ECG findings and possibly cardiac imaging to establish correct diagnosis.   o cTnI 99% cutoff may be suggestive but clearly not indicative of a coronary event without the clinical setting of myocardial ischemia.           Imaging:    [id number]        LUIZA:  No results found for this or any previous visit.      Echocardiogram:  Results for orders placed during the hospital encounter of 22    Echo complete w/ contrast if indicated    Interpretation Summary    Left Ventricle: Left ventricular cavity size is normal. Systolic function is mildly reduced.  LVEF approximately 40 to 45%.  Patient was in atrial fibrillation with periods of rapid ventricular response which interferes with interpretation. There is mild global hypokinesis. Unable to assess diastolic function due to atrial fibrillation.    Right Ventricle: Right ventricular cavity size is mildly dilated. Systolic function is mildly to moderately reduced.    Left Atrium: The atrium is moderate to markedly dilated.    Right Atrium: The atrium is moderately dilated.    Aortic Valve: There is mild regurgitation.    Mitral Valve: There is moderate annular calcification. There is moderate regurgitation.    Tricuspid Valve: There is moderate regurgitation. The right ventricular systolic pressure is mildly elevated.  Estimated RVSP 35 mm Hg.  Pacer leads  noted.    Pulmonic Valve: There is mild regurgitation.    Results for orders placed during the hospital encounter of 03/07/23    Echo follow up/limited w/ contrast if indicated    Interpretation Summary    Left Ventricle: Left ventricular cavity size is normal. The left ventricular ejection fraction is 55%. Systolic function is normal.    Left Atrium: The atrium is moderately dilated.    Right Atrium: The atrium is mildly dilated.    Aortic Valve: There is mild to moderate regurgitation.    Tricuspid Valve: There is moderate regurgitation.    Pulmonary Artery: The estimated pulmonary artery systolic pressure is 52.0 mmHg. The pulmonary artery systolic pressure is moderately increased.    This is a limited echocardiogram performed to evaluate LV function. Interpretation is therefore limited.      Stress Test:   Results for orders placed during the hospital encounter of 03/07/23    NM myocardial perfusion spect (rx stress and/or rest)    Interpretation Summary    Stress ECG: No ST deviation is noted. There were no arrhythmias during recovery. . The ECG was not diagnostic due to pharmacological (vasodilator) stress.    Perfusion Defect Conclusion: The stress/rest perfusion ratio is 1.03 . There is no evidence of transient ischemic dilation (TID).    Stress Function: Left ventricular function post-stress is normal. Post-stress ejection fraction is 79 %.    Perfusion: There is a left ventricular perfusion defect that is medium in size with moderate reduction in uptake present in the mid to apical anterior and anterolateral location(s) that is partially reversible.    Stress Combined Conclusion: Left ventricular perfusion is probably abnormal.      Cardiac Catheterization:  No results found for this or any previous visit.      HOLTER MONITOR: 24 HOUR/48 HOUR MONITORS  No results found for this or any previous visit.      AMB Extended Holter Monitor: Zio XT/AT or BioTel  No results found for this or any previous  visit.      Cardioversion  11/03/2022    Indication for procedure: Cardioversion  Requesting physician:Dr Laura Goodman  Anticoagulation:  Eliquis     The patient was identified in the OR . A time out procedure was performed.     The patient was sedated by the anesthesia service .     The patient was cardioverted to sinus rhythm with a 200J biphasic shock.       There were no complications.  The patient was monitored for the appropriate time interval in the holding area, and was transferred back to the medical floor in stable condition.    Pre Cardioversion EKG  11/03/2022      Post Cardioversion EKG  11/03/2022        Cardiac CT:    CT Cardiac w Pulmonary Vein Mapping  10/11/2023    FINDINGS:     PULMONARY VEIN ANATOMY: Typical with two right and two left pulmonary veins.     APPROXIMATE MEASUREMENTS OF PULMONARY VEIN OSTIA:     Right superior: 22 mm.  Right inferior: 12 mm.     Left superior: 14 mm.  Left inferior: 16 mm.     CARDIAC STRUCTURES: Top normal heart size. Pacer leads terminate in the right atrium and right ventricle. Mild coronary artery calcifications.     GREAT VESSELS: Visualized thoracic aorta and central pulmonary arterial tree are within normal limits for the patient's age.     EXTRACARDIAC FINDINGS: Lower lobe predominant fibrotic changes in the visualized lungs again seen. Previously noted 2 mm right upper lobe nodule is not definitively visualized on this study, possibly due to motion artifact. Large hiatal hernia containing   the stomach again identified.     IMPRESSION:     Pulmonary venous anatomy as described above.     Large hiatal hernia with intrathoracic stomach, and pulmonary fibrosis again noted.        DEVICE CHECK:     Results for orders placed or performed in visit on 12/20/23   Cardiac EP device report    Narrative    MDT DUAL CHAMBER PM  - ACTIVE SYSTEM IS MRI CONDITIONAL  CARELINK TRANSMISSION: BATTERY VOLTAGE ADEQUATE (8.7 YRS). AP-99%, -0.3%. ALL AVAILABLE LEAD PARAMETERS  WITHIN NORMAL LIMITS. 4 AF EPISODES MAX DURATION 38.5 MINS. AF BURDEN-0.4%. S/P AFIB & AFLUTTER ABLATIONS ON 10/19/23. PT ON ELIQUIS, AMIO & METOPROLOL. NORMAL DEVICE FUNCTION. GV            Review of Systems:  Review of Systems   All other systems reviewed and are negative.  As described in my history of present illness    Physical Exam:  Physical Exam  Vitals reviewed.   Constitutional:       Appearance: Normal appearance. She is well-developed. She is obese. She is not ill-appearing.      Comments: Not in any distress at the current time   HENT:      Head: Normocephalic and atraumatic.      Right Ear: External ear normal.      Left Ear: External ear normal.      Nose: Nose normal.      Mouth/Throat:      Pharynx: Uvula midline.   Eyes:      General: Lids are normal. No scleral icterus.     Extraocular Movements: Extraocular movements intact.      Conjunctiva/sclera: Conjunctivae normal.      Pupils: Pupils are equal, round, and reactive to light.      Comments: No pallor  No cyanosis  No icterus   Neck:      Thyroid: No thyromegaly.      Vascular: No carotid bruit or JVD.      Trachea: Trachea normal.      Comments: No jugular lymphadenopathy  Short thick neck  Cardiovascular:      Rate and Rhythm: Normal rate and regular rhythm.      Chest Wall: PMI is not displaced.      Pulses: Normal pulses.      Heart sounds: Normal heart sounds, S1 normal and S2 normal. No murmur heard.     No friction rub. No gallop. No S3 or S4 sounds.   Pulmonary:      Effort: Pulmonary effort is normal. No accessory muscle usage or respiratory distress.      Breath sounds: Normal breath sounds. No decreased breath sounds, wheezing, rhonchi or rales.   Chest:      Chest wall: No tenderness.   Abdominal:      General: Bowel sounds are normal. There is no distension.      Palpations: Abdomen is soft. There is no hepatomegaly, splenomegaly or mass.      Tenderness: There is no abdominal tenderness.      Comments: Central obesity present    Musculoskeletal:         General: Swelling present. No tenderness or deformity. Normal range of motion.      Cervical back: Normal range of motion.      Right lower leg: No edema.      Left lower leg: Edema present.   Lymphadenopathy:      Cervical: No cervical adenopathy.   Skin:     Findings: No abrasion, erythema, lesion or rash.      Nails: There is no clubbing.   Neurological:      Mental Status: She is alert and oriented to person, place, and time.      Comments: Facial symmetry is retained  Extraocular movements are retained  Head neck tongue and palate movement are retained and symmetric   Psychiatric:         Speech: Speech normal.         Behavior: Behavior normal.         Thought Content: Thought content normal.         Discussion/Summary:      Atrial flutter with RVR  On amiodarone, metoprolol and apixaban  [id number] score is 5  Patient has a long history of atrial fibrillation and flutter  She underwent ablation in October 2023    Today is her follow-up visit  She is relatively asymptomatic    However the Patient is in atrial flutter with RVR, ventricular rate 120/min  History of tachycardia mediated cardiomyopathy    Increase amiodarone to 200 mg 2 times daily from once daily    Increase metoprolol succinate to 100 mg in the morning and 50 mg at night-the night dose is the new increase    Set up for LUIZA guided cardioversion as soon as possible    Have an EKG done a week after cardioversion and send it to me    Reviewed by me once again in 2 to 3 months time    If atrial flutter is recurrent will need repeat ablation  Plan for vein of Efe injection    Check for amiodarone toxicity           Cardiomyopathy , NYHA II, LVEF =35%  Improved currently to 60%  Suspected tachy mediated  Increasing beta blockers for better rate control  On ACEI  EF has improved to 60%           Hypertension  On lisinopril and metoprolol   Blood pressure is 120s/70       Hyperlipidemia  On statin-lovastatin   No  myositis or myalgia         Suspected REYNOLD  Need to be evaluated for CPAP         Obesity  BMI is 33  Patient advised on dietary him restrictions to lose weight        Long-term anticoagulation  Chads Vasc score is 5  To continue with apixaban        Sinus bradycardia, sick sinus syndrome  Patient is post pacemaker  All device parameters a stable

## 2024-02-28 ENCOUNTER — OFFICE VISIT (OUTPATIENT)
Dept: CARDIOLOGY CLINIC | Facility: CLINIC | Age: 79
End: 2024-02-28
Payer: MEDICARE

## 2024-02-28 ENCOUNTER — TELEPHONE (OUTPATIENT)
Dept: CARDIOLOGY CLINIC | Facility: CLINIC | Age: 79
End: 2024-02-28

## 2024-02-28 VITALS
RESPIRATION RATE: 16 BRPM | HEIGHT: 65 IN | WEIGHT: 196 LBS | SYSTOLIC BLOOD PRESSURE: 126 MMHG | DIASTOLIC BLOOD PRESSURE: 84 MMHG | OXYGEN SATURATION: 97 % | BODY MASS INDEX: 32.65 KG/M2

## 2024-02-28 DIAGNOSIS — I50.23 ACUTE ON CHRONIC SYSTOLIC CONGESTIVE HEART FAILURE (HCC): ICD-10-CM

## 2024-02-28 DIAGNOSIS — J44.9 CHRONIC OBSTRUCTIVE PULMONARY DISEASE, UNSPECIFIED COPD TYPE (HCC): ICD-10-CM

## 2024-02-28 DIAGNOSIS — E78.2 MIXED HYPERLIPIDEMIA: ICD-10-CM

## 2024-02-28 DIAGNOSIS — Z79.01 ANTICOAGULANT LONG-TERM USE: ICD-10-CM

## 2024-02-28 DIAGNOSIS — Z98.890 STATUS POST ABLATION OF ATRIAL FIBRILLATION: ICD-10-CM

## 2024-02-28 DIAGNOSIS — Z86.79 STATUS POST ABLATION OF ATRIAL FIBRILLATION: ICD-10-CM

## 2024-02-28 DIAGNOSIS — N18.31 STAGE 3A CHRONIC KIDNEY DISEASE (HCC): ICD-10-CM

## 2024-02-28 DIAGNOSIS — E66.9 OBESITY (BMI 30-39.9): ICD-10-CM

## 2024-02-28 DIAGNOSIS — I34.0 NONRHEUMATIC MITRAL VALVE REGURGITATION: ICD-10-CM

## 2024-02-28 DIAGNOSIS — I10 ESSENTIAL HYPERTENSION: ICD-10-CM

## 2024-02-28 DIAGNOSIS — Z79.899 ON AMIODARONE THERAPY: ICD-10-CM

## 2024-02-28 DIAGNOSIS — I36.1 NONRHEUMATIC TRICUSPID VALVE REGURGITATION: ICD-10-CM

## 2024-02-28 DIAGNOSIS — I49.5 SSS (SICK SINUS SYNDROME) (HCC): ICD-10-CM

## 2024-02-28 DIAGNOSIS — I48.19 PERSISTENT ATRIAL FIBRILLATION (HCC): Primary | ICD-10-CM

## 2024-02-28 DIAGNOSIS — E03.9 ACQUIRED HYPOTHYROIDISM: ICD-10-CM

## 2024-02-28 PROCEDURE — 99215 OFFICE O/P EST HI 40 MIN: CPT | Performed by: INTERNAL MEDICINE

## 2024-02-28 PROCEDURE — 93000 ELECTROCARDIOGRAM COMPLETE: CPT | Performed by: INTERNAL MEDICINE

## 2024-02-28 RX ORDER — AMIODARONE HYDROCHLORIDE 200 MG/1
200 TABLET ORAL 2 TIMES DAILY
Qty: 28 TABLET | Refills: 0 | Status: SHIPPED | OUTPATIENT
Start: 2024-02-28

## 2024-02-28 RX ORDER — METOPROLOL SUCCINATE 50 MG/1
50 TABLET, EXTENDED RELEASE ORAL EVERY EVENING
COMMUNITY
End: 2024-02-28 | Stop reason: SDUPTHER

## 2024-02-28 RX ORDER — METOPROLOL SUCCINATE 50 MG/1
50 TABLET, EXTENDED RELEASE ORAL EVERY EVENING
Qty: 14 TABLET | Refills: 0 | Status: SHIPPED | OUTPATIENT
Start: 2024-02-28

## 2024-02-28 RX ORDER — AMIODARONE HYDROCHLORIDE 200 MG/1
200 TABLET ORAL 2 TIMES DAILY
COMMUNITY
End: 2024-02-28 | Stop reason: SDUPTHER

## 2024-02-28 NOTE — TELEPHONE ENCOUNTER
Pt was discussed with Dr. Fajardo and advised to schedule for Cardioversion ASAP.    Pt also to repeat EKG 1 week post procedure as per Dr. Fajardo

## 2024-02-28 NOTE — LETTER
February 28, 2024     Madeline Good    Patient: Madeline Good   YOB: 1945   Date of Visit: 2/28/2024       Dear Dr. Good:    Thank you for referring Madeline Good to me for evaluation. Below are my notes for this consultation.    If you have questions, please do not hesitate to call me. I look forward to following your patient along with you.         Sincerely,        Piero Fajardo MD        CC: DO Marycruz Mendez MD Rosanna D Laluz Sudip Nanda, MD  2/28/2024  1:32 PM  Sign when Signing Visit                                             Cardiology Follow Up    Madeline Good  1945  [id number]  [facility] [place]  [address]  [address]  [facility] [address]  [phone]  [phone]    1. Persistent atrial fibrillation (HCC)  POCT ECG      2. Acquired hypothyroidism        3. Chronic obstructive pulmonary disease, unspecified COPD type (HCC)        4. Essential hypertension        5. Nonrheumatic mitral valve regurgitation        6. Nonrheumatic tricuspid valve regurgitation        7. Acute on chronic systolic congestive heart failure (HCC)        8. SSS (sick sinus syndrome) (HCC)        9. Stage 3a chronic kidney disease (HCC)        10. Mixed hyperlipidemia        11. Obesity (BMI 30-39.9)        12. Anticoagulant long-term use        13. Status post ablation of atrial fibrillation (PVI, posterior wall, and mitral isthmus dependent flutter line) 10/19/2023                Summary of my recommendation for the patient  Patient is in atrial flutter with RVR, ventricular rate 120/min  History of tachycardia mediated cardiomyopathy    Increase amiodarone to 200 mg 2 times daily from once daily    Increase metoprolol succinate to 100 mg in the morning and 50 mg at night-the night dose is the new increase    Set up for LUIZA guided cardioversion as soon as possible    Have an EKG done a week after cardioversion and send it to me    Reviewed by me once  "again in 2 to 3 months time    If atrial flutter is recurrent will need repeat ablation  Plan for vein of Efe injection    Check for amiodarone toxicity          Interval History:     Patient underwent comprehensive ablation in October 2023  Unfortunately she is in flutter with RVR  She has a prior history of tachycardia mediated cardiomyopathy  Hence plan to intervene as soon as possible    She does have a pacemaker with a hiss lead in place      The patient is not complaining of anginal like chest pain or chest pressure  There is no worsening orthopnea, paroxysmal nocturnal dyspnea  There is some  leg swelling     No significant palpitation  There is no history of presyncope or syncope  There is rare history of transient  lightheadedness  There has been some improvement in exertional intolerance      Past medical history  The patient is a very pleasant 73 year old lady referred to me by Dr Gomez for management of A fi + RVR + heart failure      She does have significant medical illnesses in the form of  PAF  Hypertension  Hyperlipidemia  CMP - suspected tachy mediated      As for the atrial fibrillation  It has been present for quite some time now  Initially to present as episodes of palpitation  This has progressively increased in frequency and duration         There is history of snoring at night  There is occasional history of morning fatigability  There is occasional history of daytime sleepiness    /84 (BP Location: Left arm, Patient Position: Sitting, Cuff Size: Large)   Resp 16   Ht 5' 5\" (1.651 m)   Wt 88.9 kg (196 lb)   SpO2 97%   BMI 32.62 kg/m²       Patient Active Problem List   Diagnosis   • Essential hypertension   • Mixed hyperlipidemia   • Obesity (BMI 30-39.9)   • H/O TIA (transient ischemic attack) and stroke   • Migraine with aura and without status migrainosus, not intractable   • Myocardiopathy (HCC)   • Anticoagulant long-term use   • Nonrheumatic mitral valve regurgitation "   • Nonrheumatic aortic valve insufficiency   • Nonrheumatic tricuspid valve regurgitation   • Persistent atrial fibrillation (HCC)   • Other insomnia   • Chronic obstructive pulmonary disease (HCC)   • BMI 33.0-33.9,adult   • Stage 3a chronic kidney disease (HCC)   • Acute on chronic systolic congestive heart failure (HCC)   • SSS (sick sinus syndrome) (HCC)   • Diplopia   • Lightheadedness   • Status post ablation of atrial fibrillation (PVI, posterior wall, and mitral isthmus dependent flutter line) 10/19/2023   • Acquired hypothyroidism   • Iron deficiency anemia     Past Medical History:   Diagnosis Date   • Aphasia, mixed 2017   • Atrial fibrillation (HCC)    • CHF (congestive heart failure) (HCC)    • Colon polyp    • Headache    • Hyperlipidemia    • Hypertension    • Lyme disease    • Shortness of breath    • TIA (transient ischemic attack)    • Vertigo      Social History     Socioeconomic History   • Marital status: /Civil Union     Spouse name: Not on file   • Number of children: Not on file   • Years of education: Not on file   • Highest education level: Not on file   Occupational History   • Occupation: Retired   Tobacco Use   • Smoking status: Former     Current packs/day: 0.00     Types: Cigarettes     Quit date:      Years since quittin.1   • Smokeless tobacco: Never   • Tobacco comments:     no smoking for 46 years   Vaping Use   • Vaping status: Never Used   Substance and Sexual Activity   • Alcohol use: Yes     Alcohol/week: 10.0 standard drinks of alcohol     Types: 10 Glasses of wine per week     Comment: occ   • Drug use: No   • Sexual activity: Never     Partners: Male     Birth control/protection: None   Other Topics Concern   • Not on file   Social History Narrative    Always uses seat belt     Social Determinants of Health     Financial Resource Strain: Low Risk  (2023)    Overall Financial Resource Strain (CARDIA)    • Difficulty of Paying Living Expenses: Not  very hard   Food Insecurity: No Food Insecurity (12/22/2022)    Hunger Vital Sign    • Worried About Running Out of Food in the Last Year: Never true    • Ran Out of Food in the Last Year: Never true   Transportation Needs: No Transportation Needs (12/11/2023)    PRAPARE - Transportation    • Lack of Transportation (Medical): No    • Lack of Transportation (Non-Medical): No   Physical Activity: Not on file   Stress: Not on file   Social Connections: Not on file   Intimate Partner Violence: Not on file   Housing Stability: Low Risk  (12/22/2022)    Housing Stability Vital Sign    • Unable to Pay for Housing in the Last Year: No    • Number of Places Lived in the Last Year: 1    • Unstable Housing in the Last Year: No      Family History   Problem Relation Age of Onset   • Lung cancer Mother    • Dementia Father    • Alzheimer's disease Father    • Other Father         Cardiac Disorder    • Lung cancer Maternal Grandmother    • Breast cancer Paternal Aunt 40   • No Known Problems Paternal Aunt    • No Known Problems Paternal Aunt      Past Surgical History:   Procedure Laterality Date   • CARDIAC ELECTROPHYSIOLOGY PROCEDURE N/A 10/19/2023    Procedure: Cardiac eps/afib ablation PVI/POST WALL;  Surgeon: Piero Fajardo MD;  Location:  CARDIAC CATH LAB;  Service: Cardiology   • CARDIAC PACEMAKER PLACEMENT  12/2019   • COLONOSCOPY     • UT SIGMOIDOSCOPY FLX DX W/COLLJ SPEC BR/WA IF PFRMD N/A 11/28/2017    Procedure: SIGMOIDOSCOPY FLEXIBLE;  Surgeon: Kavon Fernandez MD;  Location: [facility];  Service: Gastroenterology   • TONSILLECTOMY         Current Outpatient Medications:   •  amiodarone 200 mg tablet, TAKE 1 TABLET BY MOUTH DAILY, Disp: 90 tablet, Rfl: 3  •  apixaban (Eliquis) 5 mg, Take 1 tablet (5 mg total) by mouth 2 (two) times a day, Disp: 180 tablet, Rfl: 3  •  azelastine (ASTELIN) 0.1 % nasal spray, 2 sprays into each nostril in the morning Use in each nostril as directed, Disp: 30 mL, Rfl: 1  •  furosemide  (LASIX) 20 mg tablet, Take 1 tablet (20 mg total) by mouth every other day, Disp: 45 tablet, Rfl: 3  •  levothyroxine 25 mcg tablet, TAKE 1 TABLET BY MOUTH DAILY IN  THE EARLY MORNING, Disp: 90 tablet, Rfl: 3  •  lovastatin (MEVACOR) 10 MG tablet, Take 1 tablet (10 mg total) by mouth daily at bedtime, Disp: 90 tablet, Rfl: 3  •  metoprolol succinate (TOPROL-XL) 100 mg 24 hr tablet, Take 1 tablet (100 mg total) by mouth daily, Disp: 90 tablet, Rfl: 3  •  midodrine (PROAMATINE) 5 mg tablet, Take 1 tablet (5 mg total) by mouth 3 (three) times a day before meals, Disp: 90 tablet, Rfl: 5  •  potassium chloride (Klor-Con M10) 10 mEq tablet, Take 1 tablet (10 mEq total) by mouth every other day, Disp: 45 tablet, Rfl: 3  No Known Allergies        LAB RESULTS:    CBC:  WBC   Date Value Ref Range Status   01/25/2024 7.30 4.31 - 10.16 Thousand/uL Final     Hemoglobin   Date Value Ref Range Status   01/25/2024 12.3 11.5 - 15.4 g/dL Final     Hematocrit   Date Value Ref Range Status   01/25/2024 40.2 34.8 - 46.1 % Final     MCV   Date Value Ref Range Status   01/25/2024 87 82 - 98 fL Final     Platelets   Date Value Ref Range Status   01/25/2024 224 149 - 390 Thousands/uL Final     RBC   Date Value Ref Range Status   01/25/2024 4.64 3.81 - 5.12 Million/uL Final     MCH   Date Value Ref Range Status   01/25/2024 26.5 (L) 26.8 - 34.3 pg Final     MCHC   Date Value Ref Range Status   01/25/2024 30.6 (L) 31.4 - 37.4 g/dL Final     RDW   Date Value Ref Range Status   01/25/2024 28.1 (H) 11.6 - 15.1 % Final     MPV   Date Value Ref Range Status   01/25/2024 10.7 8.9 - 12.7 fL Final     nRBC   Date Value Ref Range Status   12/08/2023 0 /100 WBCs Final       CMP:  Potassium   Date Value Ref Range Status   12/08/2023 4.3 3.5 - 5.3 mmol/L Final     Chloride   Date Value Ref Range Status   12/08/2023 105 96 - 108 mmol/L Final     CO2   Date Value Ref Range Status   12/08/2023 25 21 - 32 mmol/L Final     BUN   Date Value Ref Range Status    12/08/2023 17 5 - 25 mg/dL Final     Creatinine   Date Value Ref Range Status   12/08/2023 1.17 0.60 - 1.30 mg/dL Final     Comment:     Standardized to IDMS reference method     Calcium   Date Value Ref Range Status   12/08/2023 8.9 8.4 - 10.2 mg/dL Final     AST   Date Value Ref Range Status   12/08/2023 19 13 - 39 U/L Final     ALT   Date Value Ref Range Status   12/08/2023 14 7 - 52 U/L Final     Comment:     Specimen collection should occur prior to Sulfasalazine administration due to the potential for falsely depressed results.      Alkaline Phosphatase   Date Value Ref Range Status   12/08/2023 70 34 - 104 U/L Final     eGFR   Date Value Ref Range Status   12/08/2023 44 ml/min/1.73sq m Final        Magnesium:   Magnesium   Date Value Ref Range Status   10/20/2023 1.9 1.9 - 2.7 mg/dL Final        A1C:  Hemoglobin A1C   Date Value Ref Range Status   11/01/2022 5.8 (H) Normal 3.8-5.6%; PreDiabetic 5.7-6.4%; Diabetic >=6.5%; Glycemic control for adults with diabetes <7.0% % Final        TSH:  TSH 3RD GENERATON   Date Value Ref Range Status   01/25/2024 3.330 0.450 - 4.500 uIU/mL Final     Comment:     The recommended reference ranges for TSH during pregnancy are as follows:   First trimester 0.100 to 2.500 uIU/mL   Second trimester  0.200 to 3.000 uIU/mL   Third trimester 0.300 to 3.000 uIU/m    Note: Normal ranges may not apply to patients who are transgender, non-binary, or whose legal sex, sex at birth, and gender identity differ.  Adult TSH (3rd generation) reference range follows the recommended guidelines of the [employer] Association, January, 2020.        PT/INR:  Protime   Date Value Ref Range Status   10/19/2023 16.8 (H) 11.6 - 14.5 seconds Final     INR   Date Value Ref Range Status   10/19/2023 1.38 (H) 0.84 - 1.19 Final       Lipid Panel:  Cholesterol   Date Value Ref Range Status   12/08/2023 160 See Comment mg/dL Final     Comment:     Cholesterol:         Pediatric <18 Years         Desirable          <170 mg/dL      Borderline High    170-199 mg/dL      High               >=200 mg/dL        Adult >=18 Years            Desirable        <200 mg/dL      Borderline High  200-239 mg/dL      High             >239 mg/dL       Triglycerides   Date Value Ref Range Status   2023 80 See Comment mg/dL Final     Comment:     Triglyceride:     0-9Y            <75mg/dL     10Y-17Y         <90 mg/dL       >=18Y     Normal          <150 mg/dL     Borderline High 150-199 mg/dL     High            200-499 mg/dL        Very High       >499 mg/dL    Specimen collection should occur prior to Metamizole administration due to the potential for falsely depressed results.     HDL, Direct   Date Value Ref Range Status   2023 53 >=50 mg/dL Final     Non-HDL-Chol (CHOL-HDL)   Date Value Ref Range Status   2023 107 mg/dl Final       Troponin:  Troponin I   Date Value Ref Range Status   02/10/2019 0.05 (H) <=0.04 ng/mL Final     Comment:       S[employer]ens Chemistry analyzer 99% cutoff is > 0.04 ng/mL in network labs     o cTnI 99% cutoff is useful only when applied to patients in the clinical setting of myocardial ischemia   o cTnI 99% cutoff should be interpreted in the context of clinical history, ECG findings and possibly cardiac imaging to establish correct diagnosis.   o cTnI 99% cutoff may be suggestive but clearly not indicative of a coronary event without the clinical setting of myocardial ischemia.           Imaging:    [id number]        LUIZA:  No results found for this or any previous visit.      Echocardiogram:  Results for orders placed during the hospital encounter of 22    Echo complete w/ contrast if indicated    Interpretation Summary  •  Left Ventricle: Left ventricular cavity size is normal. Systolic function is mildly reduced.  LVEF approximately 40 to 45%.  Patient was in atrial fibrillation with periods of rapid ventricular response which interferes with interpretation. There  is mild global hypokinesis. Unable to assess diastolic function due to atrial fibrillation.  •  Right Ventricle: Right ventricular cavity size is mildly dilated. Systolic function is mildly to moderately reduced.  •  Left Atrium: The atrium is moderate to markedly dilated.  •  Right Atrium: The atrium is moderately dilated.  •  Aortic Valve: There is mild regurgitation.  •  Mitral Valve: There is moderate annular calcification. There is moderate regurgitation.  •  Tricuspid Valve: There is moderate regurgitation. The right ventricular systolic pressure is mildly elevated.  Estimated RVSP 35 mm Hg.  Pacer leads noted.  •  Pulmonic Valve: There is mild regurgitation.    Results for orders placed during the hospital encounter of 03/07/23    Echo follow up/limited w/ contrast if indicated    Interpretation Summary  •  Left Ventricle: Left ventricular cavity size is normal. The left ventricular ejection fraction is 55%. Systolic function is normal.  •  Left Atrium: The atrium is moderately dilated.  •  Right Atrium: The atrium is mildly dilated.  •  Aortic Valve: There is mild to moderate regurgitation.  •  Tricuspid Valve: There is moderate regurgitation.  •  Pulmonary Artery: The estimated pulmonary artery systolic pressure is 52.0 mmHg. The pulmonary artery systolic pressure is moderately increased.  •  This is a limited echocardiogram performed to evaluate LV function. Interpretation is therefore limited.      Stress Test:   Results for orders placed during the hospital encounter of 03/07/23    NM myocardial perfusion spect (rx stress and/or rest)    Interpretation Summary  •  Stress ECG: No ST deviation is noted. There were no arrhythmias during recovery. . The ECG was not diagnostic due to pharmacological (vasodilator) stress.  •  Perfusion Defect Conclusion: The stress/rest perfusion ratio is 1.03 . There is no evidence of transient ischemic dilation (TID).  •  Stress Function: Left ventricular function  post-stress is normal. Post-stress ejection fraction is 79 %.  •  Perfusion: There is a left ventricular perfusion defect that is medium in size with moderate reduction in uptake present in the mid to apical anterior and anterolateral location(s) that is partially reversible.  •  Stress Combined Conclusion: Left ventricular perfusion is probably abnormal.      Cardiac Catheterization:  No results found for this or any previous visit.      HOLTER MONITOR: 24 HOUR/48 HOUR MONITORS  No results found for this or any previous visit.      AMB Extended Holter Monitor: Zio XT/AT or BioTel  No results found for this or any previous visit.      Cardioversion  11/03/2022    Indication for procedure: Cardioversion  Requesting physician:Dr Laura Goodman  Anticoagulation:  Eliquis     The patient was identified in the OR . A time out procedure was performed.     The patient was sedated by the anesthesia service .     The patient was cardioverted to sinus rhythm with a 200J biphasic shock.       There were no complications.  The patient was monitored for the appropriate time interval in the holding area, and was transferred back to the medical floor in stable condition.    Pre Cardioversion EKG  11/03/2022      Post Cardioversion EKG  11/03/2022        Cardiac CT:    CT Cardiac w Pulmonary Vein Mapping  10/11/2023    FINDINGS:     PULMONARY VEIN ANATOMY: Typical with two right and two left pulmonary veins.     APPROXIMATE MEASUREMENTS OF PULMONARY VEIN OSTIA:     Right superior: 22 mm.  Right inferior: 12 mm.     Left superior: 14 mm.  Left inferior: 16 mm.     CARDIAC STRUCTURES: Top normal heart size. Pacer leads terminate in the right atrium and right ventricle. Mild coronary artery calcifications.     GREAT VESSELS: Visualized thoracic aorta and central pulmonary arterial tree are within normal limits for the patient's age.     EXTRACARDIAC FINDINGS: Lower lobe predominant fibrotic changes in the visualized lungs again seen.  Previously noted 2 mm right upper lobe nodule is not definitively visualized on this study, possibly due to motion artifact. Large hiatal hernia containing   the stomach again identified.     IMPRESSION:     Pulmonary venous anatomy as described above.     Large hiatal hernia with intrathoracic stomach, and pulmonary fibrosis again noted.        DEVICE CHECK:     Results for orders placed or performed in visit on 12/20/23   Cardiac EP device report    Narrative    MDT DUAL CHAMBER PM  - ACTIVE SYSTEM IS MRI CONDITIONAL  CARELINK TRANSMISSION: BATTERY VOLTAGE ADEQUATE (8.7 YRS). AP-99%, -0.3%. ALL AVAILABLE LEAD PARAMETERS WITHIN NORMAL LIMITS. 4 AF EPISODES MAX DURATION 38.5 MINS. AF BURDEN-0.4%. S/P AFIB & AFLUTTER ABLATIONS ON 10/19/23. PT ON ELIQUIS, AMIO & METOPROLOL. NORMAL DEVICE FUNCTION. GV            Review of Systems:  Review of Systems   All other systems reviewed and are negative.  As described in my history of present illness    Physical Exam:  Physical Exam  Vitals reviewed.   Constitutional:       Appearance: Normal appearance. She is well-developed. She is obese. She is not ill-appearing.      Comments: Not in any distress at the current time   HENT:      Head: Normocephalic and atraumatic.      Right Ear: External ear normal.      Left Ear: External ear normal.      Nose: Nose normal.      Mouth/Throat:      Pharynx: Uvula midline.   Eyes:      General: Lids are normal. No scleral icterus.     Extraocular Movements: Extraocular movements intact.      Conjunctiva/sclera: Conjunctivae normal.      Pupils: Pupils are equal, round, and reactive to light.      Comments: No pallor  No cyanosis  No icterus   Neck:      Thyroid: No thyromegaly.      Vascular: No carotid bruit or JVD.      Trachea: Trachea normal.      Comments: No jugular lymphadenopathy  Short thick neck  Cardiovascular:      Rate and Rhythm: Normal rate and regular rhythm.      Chest Wall: PMI is not displaced.      Pulses: Normal  pulses.      Heart sounds: Normal heart sounds, S1 normal and S2 normal. No murmur heard.     No friction rub. No gallop. No S3 or S4 sounds.   Pulmonary:      Effort: Pulmonary effort is normal. No accessory muscle usage or respiratory distress.      Breath sounds: Normal breath sounds. No decreased breath sounds, wheezing, rhonchi or rales.   Chest:      Chest wall: No tenderness.   Abdominal:      General: Bowel sounds are normal. There is no distension.      Palpations: Abdomen is soft. There is no hepatomegaly, splenomegaly or mass.      Tenderness: There is no abdominal tenderness.      Comments: Central obesity present   Musculoskeletal:         General: Swelling present. No tenderness or deformity. Normal range of motion.      Cervical back: Normal range of motion.      Right lower leg: No edema.      Left lower leg: Edema present.   Lymphadenopathy:      Cervical: No cervical adenopathy.   Skin:     Findings: No abrasion, erythema, lesion or rash.      Nails: There is no clubbing.   Neurological:      Mental Status: She is alert and oriented to person, place, and time.      Comments: Facial symmetry is retained  Extraocular movements are retained  Head neck tongue and palate movement are retained and symmetric   Psychiatric:         Speech: Speech normal.         Behavior: Behavior normal.         Thought Content: Thought content normal.         Discussion/Summary:      Atrial flutter with RVR  On amiodarone, metoprolol and apixaban  [id number] score is 5  Patient has a long history of atrial fibrillation and flutter  She underwent ablation in October 2023    Today is her follow-up visit  She is relatively asymptomatic    However the Patient is in atrial flutter with RVR, ventricular rate 120/min  History of tachycardia mediated cardiomyopathy    Increase amiodarone to 200 mg 2 times daily from once daily    Increase metoprolol succinate to 100 mg in the morning and 50 mg at night-the night dose is the  new increase    Set up for LUIZA guided cardioversion as soon as possible    Have an EKG done a week after cardioversion and send it to me    Reviewed by me once again in 2 to 3 months time    If atrial flutter is recurrent will need repeat ablation  Plan for vein of Efe injection    Check for amiodarone toxicity           Cardiomyopathy , NYHA II, LVEF =35%  Improved currently to 60%  Suspected tachy mediated  Increasing beta blockers for better rate control  On ACEI  EF has improved to 60%           Hypertension  On lisinopril and metoprolol   Blood pressure is 120s/70       Hyperlipidemia  On statin-lovastatin   No myositis or myalgia         Suspected REYNOLD  Need to be evaluated for CPAP         Obesity  BMI is 33  Patient advised on dietary him restrictions to lose weight        Long-term anticoagulation  Chads Vasc score is 5  To continue with apixaban        Sinus bradycardia, sick sinus syndrome  Patient is post pacemaker  All device parameters a stable

## 2024-02-29 NOTE — PATIENT INSTRUCTIONS
- INCREASE Amiodarone to 200 mg twice daily     - CONTINUE Metoprolol Succinate 100 mg in the morning & INCREASE to 50 mg at night     - Procedure scheduling will call you to set up LUIZA guided cardioversion    **SCHEDULE an EKG 1 week after cardioversion**; order placed     - COMPLETE Amiodarone toxicity screening. This includes:   1) SCHEDULE pulmonary function test; test ordered    - CALL [phone] to schedule   2) SCHEDULE ophthalmology appointment; referral placed  3) COMPLETE labs (TSH, T4 & CMP); orders placed

## 2024-02-29 NOTE — TELEPHONE ENCOUNTER
Sent to procedure  Yoly to set up ([phone]). She will contact the pt to set up the cardioversion.

## 2024-03-01 ENCOUNTER — TELEPHONE (OUTPATIENT)
Dept: NON INVASIVE DIAGNOSTICS | Facility: HOSPITAL | Age: 79
End: 2024-03-01

## 2024-03-01 RX ORDER — SODIUM CHLORIDE 9 MG/ML
75 INJECTION, SOLUTION INTRAVENOUS CONTINUOUS
OUTPATIENT
Start: 2024-03-04

## 2024-03-04 ENCOUNTER — HOSPITAL ENCOUNTER (OUTPATIENT)
Dept: NON INVASIVE DIAGNOSTICS | Facility: HOSPITAL | Age: 79
Discharge: HOME | End: 2024-03-04
Attending: INTERNAL MEDICINE
Payer: MEDICARE

## 2024-03-04 ENCOUNTER — HOSPITAL ENCOUNTER (OUTPATIENT)
Dept: INTERVENTIONAL RADIOLOGY/VASCULAR | Facility: HOSPITAL | Age: 79
Discharge: HOME | End: 2024-03-04
Attending: INTERNAL MEDICINE
Payer: MEDICARE

## 2024-03-04 VITALS
BODY MASS INDEX: 32.69 KG/M2 | SYSTOLIC BLOOD PRESSURE: 139 MMHG | TEMPERATURE: 97 F | WEIGHT: 196.21 LBS | RESPIRATION RATE: 19 BRPM | DIASTOLIC BLOOD PRESSURE: 86 MMHG | OXYGEN SATURATION: 97 % | HEIGHT: 65 IN | HEART RATE: 91 BPM

## 2024-03-04 DIAGNOSIS — I48.19 PERSISTENT ATRIAL FIBRILLATION (HCC): ICD-10-CM

## 2024-03-04 LAB
ANION GAP SERPL CALCULATED.3IONS-SCNC: 8 MMOL/L
BUN SERPL-MCNC: 12 MG/DL (ref 5–25)
CALCIUM SERPL-MCNC: 9.2 MG/DL (ref 8.4–10.2)
CHLORIDE SERPL-SCNC: 108 MMOL/L (ref 96–108)
CO2 SERPL-SCNC: 23 MMOL/L (ref 21–32)
CREAT SERPL-MCNC: 1.11 MG/DL (ref 0.6–1.3)
ERYTHROCYTE [DISTWIDTH] IN BLOOD BY AUTOMATED COUNT: 23.1 % (ref 11.6–15.1)
GFR SERPL CREATININE-BSD FRML MDRD: 47 ML/MIN/1.73SQ M
GLUCOSE P FAST SERPL-MCNC: 120 MG/DL (ref 65–99)
GLUCOSE SERPL-MCNC: 120 MG/DL (ref 65–140)
HCT VFR BLD AUTO: 42 % (ref 34.8–46.1)
HGB BLD-MCNC: 14.1 G/DL (ref 11.5–15.4)
MAGNESIUM SERPL-MCNC: 2.1 MG/DL (ref 1.9–2.7)
MCH RBC QN AUTO: 31.1 PG (ref 26.8–34.3)
MCHC RBC AUTO-ENTMCNC: 33.6 G/DL (ref 31.4–37.4)
MCV RBC AUTO: 93 FL (ref 82–98)
PLATELET # BLD AUTO: 219 THOUSANDS/UL (ref 149–390)
PMV BLD AUTO: 10.5 FL (ref 8.9–12.7)
POTASSIUM SERPL-SCNC: 4.5 MMOL/L (ref 3.5–5.3)
RBC # BLD AUTO: 4.54 MILLION/UL (ref 3.81–5.12)
SODIUM SERPL-SCNC: 139 MMOL/L (ref 135–147)
WBC # BLD AUTO: 7.07 THOUSAND/UL (ref 4.31–10.16)

## 2024-03-04 PROCEDURE — 85027 COMPLETE CBC AUTOMATED: CPT | Performed by: INTERNAL MEDICINE

## 2024-03-04 PROCEDURE — 83735 ASSAY OF MAGNESIUM: CPT | Performed by: INTERNAL MEDICINE

## 2024-03-04 PROCEDURE — 93005 ELECTROCARDIOGRAM TRACING: CPT

## 2024-03-04 PROCEDURE — 80048 BASIC METABOLIC PNL TOTAL CA: CPT | Performed by: INTERNAL MEDICINE

## 2024-03-04 RX ORDER — SODIUM CHLORIDE 9 MG/ML
75 INJECTION, SOLUTION INTRAVENOUS CONTINUOUS
Status: DISCONTINUED | OUTPATIENT
Start: 2024-03-04 | End: 2024-03-05 | Stop reason: HOSPADM

## 2024-03-04 RX ADMIN — SODIUM CHLORIDE 75 ML/HR: 0.9 INJECTION, SOLUTION INTRAVENOUS at 09:14

## 2024-03-06 NOTE — TELEPHONE ENCOUNTER
Spoke with the pt she is back in rhythm kristin and dccv no longer needed. Pt should follow up with DR Laura Fajardo in may 2024. Pt currently has no issues.

## 2024-03-10 LAB
ATRIAL RATE: 86 BPM
PR INTERVAL: 296 MS
QRS AXIS: 79 DEGREES
QRSD INTERVAL: 84 MS
QT INTERVAL: 398 MS
QTC INTERVAL: 476 MS
T WAVE AXIS: 40 DEGREES
VENTRICULAR RATE: 86 BPM

## 2024-03-10 PROCEDURE — 93010 ELECTROCARDIOGRAM REPORT: CPT | Performed by: INTERNAL MEDICINE

## 2024-03-21 ENCOUNTER — REMOTE DEVICE CLINIC VISIT (OUTPATIENT)
Dept: CARDIOLOGY CLINIC | Facility: CLINIC | Age: 79
End: 2024-03-21
Payer: MEDICARE

## 2024-03-21 DIAGNOSIS — I95.0 IDIOPATHIC HYPOTENSION: ICD-10-CM

## 2024-03-21 DIAGNOSIS — J30.0 VASOMOTOR RHINITIS: ICD-10-CM

## 2024-03-21 DIAGNOSIS — Z95.0 PRESENCE OF CARDIAC PACEMAKER: Primary | ICD-10-CM

## 2024-03-21 PROCEDURE — 93294 REM INTERROG EVL PM/LDLS PM: CPT | Performed by: INTERNAL MEDICINE

## 2024-03-21 PROCEDURE — 93296 REM INTERROG EVL PM/IDS: CPT | Performed by: INTERNAL MEDICINE

## 2024-03-21 RX ORDER — AZELASTINE HYDROCHLORIDE 137 UG/1
SPRAY, METERED NASAL
Qty: 60 ML | Refills: 1 | Status: SHIPPED | OUTPATIENT
Start: 2024-03-21

## 2024-03-21 RX ORDER — MIDODRINE HYDROCHLORIDE 5 MG/1
TABLET ORAL
Qty: 270 TABLET | Refills: 1 | Status: SHIPPED | OUTPATIENT
Start: 2024-03-21

## 2024-03-21 NOTE — PROGRESS NOTES
Results for orders placed or performed in visit on 03/21/24   Cardiac EP device report    Narrative    MDT DUAL CHAMBER PM  - ACTIVE SYSTEM IS MRI CONDITIONAL  CARELINK TRANSMISSION: BATTERY VOLTAGE ADEQUATE (8.7 YRS). AP: 91.4%. : 2.5% (MVP-ON). ALL AVAILABLE LEAD PARAMETERS WITHIN NORMAL LIMITS. 1 VT EPISODE W/ EGM SHOWING SVT-ST @ 154 BPM (NO TERMIATION ON STRIP). 156 AT/AF EPISODES W/ AVAIL EGMS SHOWING PAT, AT, AF, AFL, MAX DURATION 25 MINS 06 SECS. AF BURDEN: 6.3%. PT TAKES ELIQUIS, AMIODARONE, METOPROLOL SUCC, EF: 50-55% (LUIZA 10/19/23). S/P AF/AFL ABL 10/19/23. NORMAL DEVICE FUNCTION. CH

## 2024-04-26 NOTE — PROGRESS NOTES
Cardiology Follow Up    Madeline Good  1945  [id number]  [facility] [place]  [address]  [address]  [place] [id number] [phone]    1. Paroxysmal atrial fibrillation (HCC)  POCT ECG      2. Essential hypertension        3. Other cardiomyopathy (HCC)        4. Persistent atrial fibrillation (HCC)        5. Acute on chronic systolic congestive heart failure (HCC)        6. Chronic obstructive pulmonary disease, unspecified COPD type (HCC)        7. Acquired hypothyroidism        8. Stage 3a chronic kidney disease (HCC)        9. Status post ablation of atrial fibrillation (PVI, posterior wall, and mitral isthmus dependent flutter line) 10/19/2023        10. H/O TIA (transient ischemic attack) and stroke        11. Mixed hyperlipidemia        12. Obesity (BMI 30-39.9)        13. Anticoagulant long-term use        14. BMI 33.0-33.9,adult                  Summary of my recommendation for the patient    Patient has prior history of tachycardia mediated cardiomyopathy    Patient had extensive ablation-PVI, posterior wall, mitral isthmus flutter  There was a recurrence and she was in atrial flutter with heart rate of 120/min  Amiodarone and metoprolol were increased to 200 mg 2 times daily and 100 mg 2 times daily  Patient went back to sinus rhythm  Device check shows there are still brief breakthrough episodes    Check for amiodarone toxicity-CMP, TSH, PFT with DLCO, evaluation    Will reduce amiodarone to 200 mg daily in about a month's time    If patient has recurrence of a flutter, will proceed with repeat ablation-will look at breaks in the line for flutter /also plan for vein of Efe injection    If patient continues to have recurrence of tachycardia final option would be an AV node ablation          Interval History:     Patient underwent comprehensive ablation in October 2023  Unfortunately she went back  to flutter  "with RVR  She has a prior history of tachycardia mediated cardiomyopathy  Patient went back to sinus rhythm on doubling the dose of amiodarone and metoprolol  Device check does reveal brief episodes of breakthrough atrial flutter    She does have a pacemaker with a his lead in place    The patient is not complaining of anginal like chest pain or chest pressure  There is no worsening orthopnea, paroxysmal nocturnal dyspnea  There is some  leg swelling     No significant palpitation  There is no history of presyncope or syncope  There is rare history of transient  lightheadedness  There has been some improvement in exertional intolerance      Past medical history  The patient is a very pleasant 73 year old lady referred to me by Dr Gomez for management of A fi + RVR + heart failure      She does have significant medical illnesses in the form of  PAF  Hypertension  Hyperlipidemia  CMP - suspected tachy mediated      As for the atrial fibrillation  It has been present for quite some time now  Initially to present as episodes of palpitation  This has progressively increased in frequency and duration         There is history of snoring at night  There is occasional history of morning fatigability  There is occasional history of daytime sleepiness    /100 (BP Location: Left arm, Patient Position: Sitting, Cuff Size: Standard)   Pulse 76   Ht 5' 5\" (1.651 m)   Wt 87.8 kg (193 lb 9.6 oz)   BMI 32.22 kg/m²       Patient Active Problem List   Diagnosis    Essential hypertension    Mixed hyperlipidemia    Obesity (BMI 30-39.9)    H/O TIA (transient ischemic attack) and stroke    Migraine with aura and without status migrainosus, not intractable    Myocardiopathy (HCC)    Anticoagulant long-term use    Nonrheumatic mitral valve regurgitation    Nonrheumatic aortic valve insufficiency    Nonrheumatic tricuspid valve regurgitation    Persistent atrial fibrillation (HCC)    Other insomnia    Chronic obstructive pulmonary " disease (HCC)    BMI 33.0-33.9,adult    Stage 3a chronic kidney disease (HCC)    Acute on chronic systolic congestive heart failure (HCC)    SSS (sick sinus syndrome) (HCC)    Diplopia    Lightheadedness    Status post ablation of atrial fibrillation (PVI, posterior wall, and mitral isthmus dependent flutter line) 10/19/2023    Acquired hypothyroidism    Iron deficiency anemia     Past Medical History:   Diagnosis Date    Aphasia, mixed 2017    Atrial fibrillation (HCC)     CHF (congestive heart failure) (HCC)     Colon polyp     Headache     Hyperlipidemia     Hypertension     Lyme disease     Shortness of breath     TIA (transient ischemic attack)     Vertigo      Social History     Socioeconomic History    Marital status: /Civil Union     Spouse name: Not on file    Number of children: Not on file    Years of education: Not on file    Highest education level: Not on file   Occupational History    Occupation: Retired   Tobacco Use    Smoking status: Former     Current packs/day: 0.00     Types: Cigarettes     Quit date:      Years since quittin.3    Smokeless tobacco: Never    Tobacco comments:     no smoking for 46 years   Vaping Use    Vaping status: Never Used   Substance and Sexual Activity    Alcohol use: Yes     Alcohol/week: 2.0 standard drinks of alcohol     Types: 2 Glasses of wine per week    Drug use: No    Sexual activity: Never     Partners: Male     Birth control/protection: None   Other Topics Concern    Not on file   Social History Narrative    Always uses seat belt     Social Determinants of Health     Financial Resource Strain: Low Risk  (2023)    Overall Financial Resource Strain (CARDIA)     Difficulty of Paying Living Expenses: Not very hard   Food Insecurity: No Food Insecurity (2022)    Hunger Vital Sign     Worried About Running Out of Food in the Last Year: Never true     Ran Out of Food in the Last Year: Never true   Transportation Needs: No  Transportation Needs (12/11/2023)    PRAPARE - Transportation     Lack of Transportation (Medical): No     Lack of Transportation (Non-Medical): No   Physical Activity: Not on file   Stress: Not on file   Social Connections: Not on file   Intimate Partner Violence: Not on file   Housing Stability: Low Risk  (12/22/2022)    Housing Stability Vital Sign     Unable to Pay for Housing in the Last Year: No     Number of Places Lived in the Last Year: 1     Unstable Housing in the Last Year: No      Family History   Problem Relation Age of Onset    Lung cancer Mother     Dementia Father     Alzheimer's disease Father     Other Father         Cardiac Disorder     Lung cancer Maternal Grandmother     Breast cancer Paternal Aunt 40    No Known Problems Paternal Aunt     No Known Problems Paternal Aunt      Past Surgical History:   Procedure Laterality Date    CARDIAC ELECTROPHYSIOLOGY PROCEDURE N/A 10/19/2023    Procedure: Cardiac eps/afib ablation PVI/POST WALL;  Surgeon: Piero Fajardo MD;  Location:  CARDIAC CATH LAB;  Service: Cardiology    CARDIAC PACEMAKER PLACEMENT  12/2019    COLONOSCOPY      IL SIGMOIDOSCOPY FLX DX W/COLLJ SPEC BR/WA IF PFRMD N/A 11/28/2017    Procedure: SIGMOIDOSCOPY FLEXIBLE;  Surgeon: Kavon Fernandez MD;  Location: [facility];  Service: Gastroenterology    TONSILLECTOMY         Current Outpatient Medications:     amiodarone 200 mg tablet, Take 1 tablet (200 mg total) by mouth 2 (two) times a day For 14 days, Disp: 28 tablet, Rfl: 0    apixaban (Eliquis) 5 mg, Take 1 tablet (5 mg total) by mouth 2 (two) times a day, Disp: 180 tablet, Rfl: 3    Azelastine HCl 137 MCG/SPRAY SOLN, USE 2 SPRAYS IN BOTH NOSTRILS IN THE MORNING AS DIRECTED, Disp: 60 mL, Rfl: 1    furosemide (LASIX) 20 mg tablet, Take 1 tablet (20 mg total) by mouth every other day, Disp: 45 tablet, Rfl: 3    levothyroxine 25 mcg tablet, TAKE 1 TABLET BY MOUTH DAILY IN  THE EARLY MORNING, Disp: 90 tablet, Rfl: 3    lovastatin (MEVACOR)  10 MG tablet, Take 1 tablet (10 mg total) by mouth daily at bedtime, Disp: 90 tablet, Rfl: 3    metoprolol succinate (TOPROL-XL) 100 mg 24 hr tablet, Take 1 tablet (100 mg total) by mouth daily (Patient taking differently: Take 100 mg by mouth 2 (two) times a day), Disp: 90 tablet, Rfl: 3    midodrine (PROAMATINE) 5 mg tablet, TAKE 1 TABLET BY MOUTH 3 TIMES  DAILY BEFORE MEALS, Disp: 270 tablet, Rfl: 1    potassium chloride (Klor-Con M10) 10 mEq tablet, Take 1 tablet (10 mEq total) by mouth every other day, Disp: 45 tablet, Rfl: 3  No Known Allergies        LAB RESULTS:    CBC:  WBC   Date Value Ref Range Status   03/04/2024 7.07 4.31 - 10.16 Thousand/uL Final     Hemoglobin   Date Value Ref Range Status   03/04/2024 14.1 11.5 - 15.4 g/dL Final     Hematocrit   Date Value Ref Range Status   03/04/2024 42.0 34.8 - 46.1 % Final     MCV   Date Value Ref Range Status   03/04/2024 93 82 - 98 fL Final     Platelets   Date Value Ref Range Status   03/04/2024 219 149 - 390 Thousands/uL Final     RBC   Date Value Ref Range Status   03/04/2024 4.54 3.81 - 5.12 Million/uL Final     MCH   Date Value Ref Range Status   03/04/2024 31.1 26.8 - 34.3 pg Final     MCHC   Date Value Ref Range Status   03/04/2024 33.6 31.4 - 37.4 g/dL Final     RDW   Date Value Ref Range Status   03/04/2024 23.1 (H) 11.6 - 15.1 % Final     MPV   Date Value Ref Range Status   03/04/2024 10.5 8.9 - 12.7 fL Final     nRBC   Date Value Ref Range Status   12/08/2023 0 /100 WBCs Final       CMP:  Potassium   Date Value Ref Range Status   03/04/2024 4.5 3.5 - 5.3 mmol/L Final     Chloride   Date Value Ref Range Status   03/04/2024 108 96 - 108 mmol/L Final     CO2   Date Value Ref Range Status   03/04/2024 23 21 - 32 mmol/L Final     BUN   Date Value Ref Range Status   03/04/2024 12 5 - 25 mg/dL Final     Creatinine   Date Value Ref Range Status   03/04/2024 1.11 0.60 - 1.30 mg/dL Final     Comment:     Standardized to IDMS reference method     Calcium   Date  Value Ref Range Status   03/04/2024 9.2 8.4 - 10.2 mg/dL Final     AST   Date Value Ref Range Status   12/08/2023 19 13 - 39 U/L Final     ALT   Date Value Ref Range Status   12/08/2023 14 7 - 52 U/L Final     Comment:     Specimen collection should occur prior to Sulfasalazine administration due to the potential for falsely depressed results.      Alkaline Phosphatase   Date Value Ref Range Status   12/08/2023 70 34 - 104 U/L Final     eGFR   Date Value Ref Range Status   03/04/2024 47 ml/min/1.73sq m Final        Magnesium:   Magnesium   Date Value Ref Range Status   03/04/2024 2.1 1.9 - 2.7 mg/dL Final        A1C:  Hemoglobin A1C   Date Value Ref Range Status   11/01/2022 5.8 (H) Normal 3.8-5.6%; PreDiabetic 5.7-6.4%; Diabetic >=6.5%; Glycemic control for adults with diabetes <7.0% % Final        TSH:  TSH 3RD GENERATON   Date Value Ref Range Status   01/25/2024 3.330 0.450 - 4.500 uIU/mL Final     Comment:     The recommended reference ranges for TSH during pregnancy are as follows:   First trimester 0.100 to 2.500 uIU/mL   Second trimester  0.200 to 3.000 uIU/mL   Third trimester 0.300 to 3.000 uIU/m    Note: Normal ranges may not apply to patients who are transgender, non-binary, or whose legal sex, sex at birth, and gender identity differ.  Adult TSH (3rd generation) reference range follows the recommended guidelines of the [employer] Association, January, 2020.        PT/INR:  Protime   Date Value Ref Range Status   10/19/2023 16.8 (H) 11.6 - 14.5 seconds Final     INR   Date Value Ref Range Status   10/19/2023 1.38 (H) 0.84 - 1.19 Final       Lipid Panel:  Cholesterol   Date Value Ref Range Status   12/08/2023 160 See Comment mg/dL Final     Comment:     Cholesterol:         Pediatric <18 Years        Desirable          <170 mg/dL      Borderline High    170-199 mg/dL      High               >=200 mg/dL        Adult >=18 Years            Desirable        <200 mg/dL      Borderline High  200-239  mg/dL      High             >239 mg/dL       Triglycerides   Date Value Ref Range Status   2023 80 See Comment mg/dL Final     Comment:     Triglyceride:     0-9Y            <75mg/dL     10Y-17Y         <90 mg/dL       >=18Y     Normal          <150 mg/dL     Borderline High 150-199 mg/dL     High            200-499 mg/dL        Very High       >499 mg/dL    Specimen collection should occur prior to Metamizole administration due to the potential for falsely depressed results.     HDL, Direct   Date Value Ref Range Status   2023 53 >=50 mg/dL Final     Non-HDL-Chol (CHOL-HDL)   Date Value Ref Range Status   2023 107 mg/dl Final       Troponin:  Troponin I   Date Value Ref Range Status   02/10/2019 0.05 (H) <=0.04 ng/mL Final     Comment:       [employer] Chemistry analyzer 99% cutoff is > 0.04 ng/mL in network labs     o cTnI 99% cutoff is useful only when applied to patients in the clinical setting of myocardial ischemia   o cTnI 99% cutoff should be interpreted in the context of clinical history, ECG findings and possibly cardiac imaging to establish correct diagnosis.   o cTnI 99% cutoff may be suggestive but clearly not indicative of a coronary event without the clinical setting of myocardial ischemia.           Imaging:    [id number]        LUIZA:  No results found for this or any previous visit.      Echocardiogram:  Results for orders placed during the hospital encounter of 22    Echo complete w/ contrast if indicated    Interpretation Summary    Left Ventricle: Left ventricular cavity size is normal. Systolic function is mildly reduced.  LVEF approximately 40 to 45%.  Patient was in atrial fibrillation with periods of rapid ventricular response which interferes with interpretation. There is mild global hypokinesis. Unable to assess diastolic function due to atrial fibrillation.    Right Ventricle: Right ventricular cavity size is mildly dilated. Systolic function is mildly to  moderately reduced.    Left Atrium: The atrium is moderate to markedly dilated.    Right Atrium: The atrium is moderately dilated.    Aortic Valve: There is mild regurgitation.    Mitral Valve: There is moderate annular calcification. There is moderate regurgitation.    Tricuspid Valve: There is moderate regurgitation. The right ventricular systolic pressure is mildly elevated.  Estimated RVSP 35 mm Hg.  Pacer leads noted.    Pulmonic Valve: There is mild regurgitation.    Results for orders placed during the hospital encounter of 03/07/23    Echo follow up/limited w/ contrast if indicated    Interpretation Summary    Left Ventricle: Left ventricular cavity size is normal. The left ventricular ejection fraction is 55%. Systolic function is normal.    Left Atrium: The atrium is moderately dilated.    Right Atrium: The atrium is mildly dilated.    Aortic Valve: There is mild to moderate regurgitation.    Tricuspid Valve: There is moderate regurgitation.    Pulmonary Artery: The estimated pulmonary artery systolic pressure is 52.0 mmHg. The pulmonary artery systolic pressure is moderately increased.    This is a limited echocardiogram performed to evaluate LV function. Interpretation is therefore limited.      Stress Test:   Results for orders placed during the hospital encounter of 03/07/23    NM myocardial perfusion spect (rx stress and/or rest)    Interpretation Summary    Stress ECG: No ST deviation is noted. There were no arrhythmias during recovery. . The ECG was not diagnostic due to pharmacological (vasodilator) stress.    Perfusion Defect Conclusion: The stress/rest perfusion ratio is 1.03 . There is no evidence of transient ischemic dilation (TID).    Stress Function: Left ventricular function post-stress is normal. Post-stress ejection fraction is 79 %.    Perfusion: There is a left ventricular perfusion defect that is medium in size with moderate reduction in uptake present in the mid to apical anterior and  anterolateral location(s) that is partially reversible.    Stress Combined Conclusion: Left ventricular perfusion is probably abnormal.      Cardiac Catheterization:  No results found for this or any previous visit.      HOLTER MONITOR: 24 HOUR/48 HOUR MONITORS  No results found for this or any previous visit.      AMB Extended Holter Monitor: Zio XT/AT or BioTel  No results found for this or any previous visit.      Cardioversion  11/03/2022    Indication for procedure: Cardioversion  Requesting physician:Dr Laura Goodman  Anticoagulation:  Eliquis     The patient was identified in the OR . A time out procedure was performed.     The patient was sedated by the anesthesia service .     The patient was cardioverted to sinus rhythm with a 200J biphasic shock.       There were no complications.  The patient was monitored for the appropriate time interval in the holding area, and was transferred back to the medical floor in stable condition.    Pre Cardioversion EKG  11/03/2022      Post Cardioversion EKG  11/03/2022        Cardiac CT:    CT Cardiac w Pulmonary Vein Mapping  10/11/2023    FINDINGS:     PULMONARY VEIN ANATOMY: Typical with two right and two left pulmonary veins.     APPROXIMATE MEASUREMENTS OF PULMONARY VEIN OSTIA:     Right superior: 22 mm.  Right inferior: 12 mm.     Left superior: 14 mm.  Left inferior: 16 mm.     CARDIAC STRUCTURES: Top normal heart size. Pacer leads terminate in the right atrium and right ventricle. Mild coronary artery calcifications.     GREAT VESSELS: Visualized thoracic aorta and central pulmonary arterial tree are within normal limits for the patient's age.     EXTRACARDIAC FINDINGS: Lower lobe predominant fibrotic changes in the visualized lungs again seen. Previously noted 2 mm right upper lobe nodule is not definitively visualized on this study, possibly due to motion artifact. Large hiatal hernia containing   the stomach again identified.     IMPRESSION:     Pulmonary venous  anatomy as described above.     Large hiatal hernia with intrathoracic stomach, and pulmonary fibrosis again noted.        DEVICE CHECK:     Results for orders placed or performed in visit on 03/21/24   Cardiac EP device report    Narrative    MDT DUAL CHAMBER PM  - ACTIVE SYSTEM IS MRI CONDITIONAL  CARELINK TRANSMISSION: BATTERY VOLTAGE ADEQUATE (8.7 YRS). AP: 91.4%. : 2.5% (MVP-ON). ALL AVAILABLE LEAD PARAMETERS WITHIN NORMAL LIMITS. 1 VT EPISODE W/ EGM SHOWING SVT-ST @ 154 BPM (NO TERMIATION ON STRIP). 156 AT/AF EPISODES W/ AVAIL EGMS SHOWING PAT, AT, AF, AFL, MAX DURATION 25 MINS 06 SECS. AF BURDEN: 6.3%. PT TAKES ELIQUIS, AMIODARONE, METOPROLOL SUCC, EF: 50-55% (LUIZA 10/19/23). S/P AF/AFL ABL 10/19/23. NORMAL DEVICE FUNCTION. CH            Review of Systems:  Review of Systems   All other systems reviewed and are negative.  As described in my history of present illness    Physical Exam:  Physical Exam  Vitals reviewed.   Constitutional:       Appearance: Normal appearance. She is well-developed. She is obese. She is not ill-appearing.      Comments: Not in any distress at the current time   HENT:      Head: Normocephalic and atraumatic.      Right Ear: External ear normal.      Left Ear: External ear normal.      Nose: Nose normal.      Mouth/Throat:      Pharynx: Uvula midline.   Eyes:      General: Lids are normal. No scleral icterus.     Extraocular Movements: Extraocular movements intact.      Conjunctiva/sclera: Conjunctivae normal.      Pupils: Pupils are equal, round, and reactive to light.      Comments: No pallor  No cyanosis  No icterus   Neck:      Thyroid: No thyromegaly.      Vascular: No carotid bruit or JVD.      Trachea: Trachea normal.      Comments: No jugular lymphadenopathy  Short thick neck  Cardiovascular:      Rate and Rhythm: Normal rate and regular rhythm.      Chest Wall: PMI is not displaced.      Pulses: Normal pulses.      Heart sounds: Normal heart sounds, S1 normal and S2 normal.  No murmur heard.     No friction rub. No gallop. No S3 or S4 sounds.   Pulmonary:      Effort: Pulmonary effort is normal. No accessory muscle usage or respiratory distress.      Breath sounds: Normal breath sounds. No decreased breath sounds, wheezing, rhonchi or rales.   Chest:      Chest wall: No tenderness.   Abdominal:      General: Bowel sounds are normal. There is no distension.      Palpations: Abdomen is soft. There is no hepatomegaly, splenomegaly or mass.      Tenderness: There is no abdominal tenderness.      Comments: Central obesity present   Musculoskeletal:         General: No swelling, tenderness or deformity. Normal range of motion.      Cervical back: Normal range of motion and neck supple. No rigidity.      Right lower leg: No edema.      Left lower leg: No edema.   Lymphadenopathy:      Cervical: No cervical adenopathy.   Skin:     Findings: No abrasion, erythema, lesion or rash.      Nails: There is no clubbing.   Neurological:      Mental Status: She is alert and oriented to person, place, and time. Mental status is at baseline.      Motor: No weakness.      Comments: Facial symmetry is retained  Extraocular movements are retained  Head neck tongue and palate movement are retained and symmetric   Psychiatric:         Mood and Affect: Mood normal.         Speech: Speech normal.         Behavior: Behavior normal.         Thought Content: Thought content normal.         Judgment: Judgment normal.         Discussion/Summary:      Atrial flutter with RVR  On amiodarone, metoprolol and apixaban  [id number] score is 5  Patient has a long history of atrial fibrillation and flutter  She underwent ablation in October 2023    There was a recurrence and she was in atrial flutter with heart rate of 120/min  Amiodarone and metoprolol were increased to 200 mg 2 times daily and 100 mg 2 times daily  Patient went back to sinus rhythm  Device check shows there are still brief breakthrough episodes    Check  for amiodarone toxicity-CMP, TSH, PFT with DLCO, evaluation    Will reduce amiodarone to 200 mg daily in about a month's time    If patient has recurrence of a flutter, will proceed with repeat ablation-will look at breaks in the line for flutter /also plan for vein of Efe injection    If patient continues to have recurrence of tachycardia final option would be an AV node ablation             Cardiomyopathy , NYHA II, LVEF =35%  Improved currently to 60%  Suspected tachy mediated  Increasing beta blockers for better rate control  On ACEI  EF has improved to 60%           Hypertension  On lisinopril and metoprolol   Blood pressure is 132/100       Hyperlipidemia  On statin-lovastatin   No myositis or myalgia         Suspected REYNOLD  Need to be evaluated for CPAP         Obesity  BMI is 33  Patient advised on dietary him restrictions to lose weight        Long-term anticoagulation  Chads Vasc score is 5  To continue with apixaban        Sinus bradycardia, sick sinus syndrome  Patient is post pacemaker  All device parameters a stable

## 2024-05-01 ENCOUNTER — OFFICE VISIT (OUTPATIENT)
Dept: CARDIOLOGY CLINIC | Facility: CLINIC | Age: 79
End: 2024-05-01
Payer: MEDICARE

## 2024-05-01 VITALS
HEART RATE: 76 BPM | WEIGHT: 193.6 LBS | DIASTOLIC BLOOD PRESSURE: 100 MMHG | HEIGHT: 65 IN | BODY MASS INDEX: 32.26 KG/M2 | SYSTOLIC BLOOD PRESSURE: 132 MMHG

## 2024-05-01 DIAGNOSIS — Z98.890 STATUS POST ABLATION OF ATRIAL FIBRILLATION: ICD-10-CM

## 2024-05-01 DIAGNOSIS — Z86.79 STATUS POST ABLATION OF ATRIAL FIBRILLATION: ICD-10-CM

## 2024-05-01 DIAGNOSIS — Z79.01 ANTICOAGULANT LONG-TERM USE: ICD-10-CM

## 2024-05-01 DIAGNOSIS — I48.0 PAROXYSMAL ATRIAL FIBRILLATION (HCC): Primary | ICD-10-CM

## 2024-05-01 DIAGNOSIS — N18.31 STAGE 3A CHRONIC KIDNEY DISEASE (HCC): ICD-10-CM

## 2024-05-01 DIAGNOSIS — E78.2 MIXED HYPERLIPIDEMIA: ICD-10-CM

## 2024-05-01 DIAGNOSIS — Z86.73 H/O TIA (TRANSIENT ISCHEMIC ATTACK) AND STROKE: ICD-10-CM

## 2024-05-01 DIAGNOSIS — E66.9 OBESITY (BMI 30-39.9): ICD-10-CM

## 2024-05-01 DIAGNOSIS — E03.9 ACQUIRED HYPOTHYROIDISM: ICD-10-CM

## 2024-05-01 DIAGNOSIS — J44.9 CHRONIC OBSTRUCTIVE PULMONARY DISEASE, UNSPECIFIED COPD TYPE (HCC): ICD-10-CM

## 2024-05-01 DIAGNOSIS — I50.23 ACUTE ON CHRONIC SYSTOLIC CONGESTIVE HEART FAILURE (HCC): ICD-10-CM

## 2024-05-01 DIAGNOSIS — I42.8 OTHER CARDIOMYOPATHY (HCC): ICD-10-CM

## 2024-05-01 DIAGNOSIS — I10 ESSENTIAL HYPERTENSION: ICD-10-CM

## 2024-05-01 DIAGNOSIS — I48.19 PERSISTENT ATRIAL FIBRILLATION (HCC): ICD-10-CM

## 2024-05-01 PROCEDURE — 93000 ELECTROCARDIOGRAM COMPLETE: CPT | Performed by: INTERNAL MEDICINE

## 2024-05-01 PROCEDURE — 99214 OFFICE O/P EST MOD 30 MIN: CPT | Performed by: INTERNAL MEDICINE

## 2024-05-01 NOTE — LETTER
May 1, 2024     Arnold Payton DO  [address]  [place] [address]    Patient: Madeline Good   YOB: 1945   Date of Visit: 5/1/2024       Dear Dr. Payton:    Thank you for referring Madeline Good to me for evaluation. Below are my notes for this consultation.    If you have questions, please do not hesitate to call me. I look forward to following your patient along with you.         Sincerely,        Piero Fajadro MD        CC: Madeline SANTA Latisha Fajardo MD  5/1/2024 12:25 PM  Sign when Signing Visit                                             Cardiology Follow Up    Mdaeline Good  1945  [id number]  [facility] [place]  [address]  [address]  [facility] [address]  [phone]  [phone]    1. Paroxysmal atrial fibrillation (HCC)  POCT ECG      2. Essential hypertension        3. Other cardiomyopathy (HCC)        4. Persistent atrial fibrillation (HCC)        5. Acute on chronic systolic congestive heart failure (HCC)        6. Chronic obstructive pulmonary disease, unspecified COPD type (HCC)        7. Acquired hypothyroidism        8. Stage 3a chronic kidney disease (HCC)        9. Status post ablation of atrial fibrillation (PVI, posterior wall, and mitral isthmus dependent flutter line) 10/19/2023        10. H/O TIA (transient ischemic attack) and stroke        11. Mixed hyperlipidemia        12. Obesity (BMI 30-39.9)        13. Anticoagulant long-term use        14. BMI 33.0-33.9,adult                  Summary of my recommendation for the patient    Patient has prior history of tachycardia mediated cardiomyopathy    Patient had extensive ablation-PVI, posterior wall, mitral isthmus flutter  There was a recurrence and she was in atrial flutter with heart rate of 120/min  Amiodarone and metoprolol were increased to 200 mg 2 times daily and 100 mg 2 times daily  Patient went back to sinus rhythm  Device check shows there are still brief  "breakthrough episodes    Check for amiodarone toxicity-CMP, TSH, PFT with DLCO, evaluation    Will reduce amiodarone to 200 mg daily in about a month's time    If patient has recurrence of a flutter, will proceed with repeat ablation-will look at breaks in the line for flutter /also plan for vein of Fee injection    If patient continues to have recurrence of tachycardia final option would be an AV node ablation          Interval History:     Patient underwent comprehensive ablation in October 2023  Unfortunately she went back  to flutter with RVR  She has a prior history of tachycardia mediated cardiomyopathy  Patient went back to sinus rhythm on doubling the dose of amiodarone and metoprolol  Device check does reveal brief episodes of breakthrough atrial flutter    She does have a pacemaker with a his lead in place    The patient is not complaining of anginal like chest pain or chest pressure  There is no worsening orthopnea, paroxysmal nocturnal dyspnea  There is some  leg swelling     No significant palpitation  There is no history of presyncope or syncope  There is rare history of transient  lightheadedness  There has been some improvement in exertional intolerance      Past medical history  The patient is a very pleasant 73 year old lady referred to me by Dr Gomez for management of A fi + RVR + heart failure      She does have significant medical illnesses in the form of  PAF  Hypertension  Hyperlipidemia  CMP - suspected tachy mediated      As for the atrial fibrillation  It has been present for quite some time now  Initially to present as episodes of palpitation  This has progressively increased in frequency and duration         There is history of snoring at night  There is occasional history of morning fatigability  There is occasional history of daytime sleepiness    /100 (BP Location: Left arm, Patient Position: Sitting, Cuff Size: Standard)   Pulse 76   Ht 5' 5\" (1.651 m)   Wt 87.8 kg (193 " lb 9.6 oz)   BMI 32.22 kg/m²       Patient Active Problem List   Diagnosis   • Essential hypertension   • Mixed hyperlipidemia   • Obesity (BMI 30-39.9)   • H/O TIA (transient ischemic attack) and stroke   • Migraine with aura and without status migrainosus, not intractable   • Myocardiopathy (HCC)   • Anticoagulant long-term use   • Nonrheumatic mitral valve regurgitation   • Nonrheumatic aortic valve insufficiency   • Nonrheumatic tricuspid valve regurgitation   • Persistent atrial fibrillation (HCC)   • Other insomnia   • Chronic obstructive pulmonary disease (HCC)   • BMI 33.0-33.9,adult   • Stage 3a chronic kidney disease (HCC)   • Acute on chronic systolic congestive heart failure (HCC)   • SSS (sick sinus syndrome) ([facility])   • Diplopia   • Lightheadedness   • Status post ablation of atrial fibrillation (PVI, posterior wall, and mitral isthmus dependent flutter line) 10/19/2023   • Acquired hypothyroidism   • Iron deficiency anemia     Past Medical History:   Diagnosis Date   • Aphasia, mixed 2017   • Atrial fibrillation (HCC)    • CHF (congestive heart failure) ([facility])    • Colon polyp    • Headache    • Hyperlipidemia    • Hypertension    • Lyme disease    • Shortness of breath    • TIA (transient ischemic attack)    • Vertigo      Social History     Socioeconomic History   • Marital status: /Civil Union     Spouse name: Not on file   • Number of children: Not on file   • Years of education: Not on file   • Highest education level: Not on file   Occupational History   • Occupation: Retired   Tobacco Use   • Smoking status: Former     Current packs/day: 0.00     Types: Cigarettes     Quit date:      Years since quittin.3   • Smokeless tobacco: Never   • Tobacco comments:     no smoking for 46 years   Vaping Use   • Vaping status: Never Used   Substance and Sexual Activity   • Alcohol use: Yes     Alcohol/week: 2.0 standard drinks of alcohol     Types: 2 Glasses of wine per week   • Drug  use: No   • Sexual activity: Never     Partners: Male     Birth control/protection: None   Other Topics Concern   • Not on file   Social History Narrative    Always uses seat belt     Social Determinants of Health     Financial Resource Strain: Low Risk  (12/11/2023)    Overall Financial Resource Strain (CARDIA)    • Difficulty of Paying Living Expenses: Not very hard   Food Insecurity: No Food Insecurity (12/22/2022)    Hunger Vital Sign    • Worried About Running Out of Food in the Last Year: Never true    • Ran Out of Food in the Last Year: Never true   Transportation Needs: No Transportation Needs (12/11/2023)    PRAPARE - Transportation    • Lack of Transportation (Medical): No    • Lack of Transportation (Non-Medical): No   Physical Activity: Not on file   Stress: Not on file   Social Connections: Not on file   Intimate Partner Violence: Not on file   Housing Stability: Low Risk  (12/22/2022)    Housing Stability Vital Sign    • Unable to Pay for Housing in the Last Year: No    • Number of Places Lived in the Last Year: 1    • Unstable Housing in the Last Year: No      Family History   Problem Relation Age of Onset   • Lung cancer Mother    • Dementia Father    • Alzheimer's disease Father    • Other Father         Cardiac Disorder    • Lung cancer Maternal Grandmother    • Breast cancer Paternal Aunt 40   • No Known Problems Paternal Aunt    • No Known Problems Paternal Aunt      Past Surgical History:   Procedure Laterality Date   • CARDIAC ELECTROPHYSIOLOGY PROCEDURE N/A 10/19/2023    Procedure: Cardiac eps/afib ablation PVI/POST WALL;  Surgeon: Piero Fajardo MD;  Location:  CARDIAC CATH LAB;  Service: Cardiology   • CARDIAC PACEMAKER PLACEMENT  12/2019   • COLONOSCOPY     • IA SIGMOIDOSCOPY FLX DX W/COLLJ SPEC BR/WA IF PFRMD N/A 11/28/2017    Procedure: SIGMOIDOSCOPY FLEXIBLE;  Surgeon: Kavon Fernandez MD;  Location: [facility];  Service: Gastroenterology   • TONSILLECTOMY         Current Outpatient  Medications:   •  amiodarone 200 mg tablet, Take 1 tablet (200 mg total) by mouth 2 (two) times a day For 14 days, Disp: 28 tablet, Rfl: 0  •  apixaban (Eliquis) 5 mg, Take 1 tablet (5 mg total) by mouth 2 (two) times a day, Disp: 180 tablet, Rfl: 3  •  Azelastine HCl 137 MCG/SPRAY SOLN, USE 2 SPRAYS IN BOTH NOSTRILS IN THE MORNING AS DIRECTED, Disp: 60 mL, Rfl: 1  •  furosemide (LASIX) 20 mg tablet, Take 1 tablet (20 mg total) by mouth every other day, Disp: 45 tablet, Rfl: 3  •  levothyroxine 25 mcg tablet, TAKE 1 TABLET BY MOUTH DAILY IN  THE EARLY MORNING, Disp: 90 tablet, Rfl: 3  •  lovastatin (MEVACOR) 10 MG tablet, Take 1 tablet (10 mg total) by mouth daily at bedtime, Disp: 90 tablet, Rfl: 3  •  metoprolol succinate (TOPROL-XL) 100 mg 24 hr tablet, Take 1 tablet (100 mg total) by mouth daily (Patient taking differently: Take 100 mg by mouth 2 (two) times a day), Disp: 90 tablet, Rfl: 3  •  midodrine (PROAMATINE) 5 mg tablet, TAKE 1 TABLET BY MOUTH 3 TIMES  DAILY BEFORE MEALS, Disp: 270 tablet, Rfl: 1  •  potassium chloride (Klor-Con M10) 10 mEq tablet, Take 1 tablet (10 mEq total) by mouth every other day, Disp: 45 tablet, Rfl: 3  No Known Allergies        LAB RESULTS:    CBC:  WBC   Date Value Ref Range Status   03/04/2024 7.07 4.31 - 10.16 Thousand/uL Final     Hemoglobin   Date Value Ref Range Status   03/04/2024 14.1 11.5 - 15.4 g/dL Final     Hematocrit   Date Value Ref Range Status   03/04/2024 42.0 34.8 - 46.1 % Final     MCV   Date Value Ref Range Status   03/04/2024 93 82 - 98 fL Final     Platelets   Date Value Ref Range Status   03/04/2024 219 149 - 390 Thousands/uL Final     RBC   Date Value Ref Range Status   03/04/2024 4.54 3.81 - 5.12 Million/uL Final     MCH   Date Value Ref Range Status   03/04/2024 31.1 26.8 - 34.3 pg Final     MCHC   Date Value Ref Range Status   03/04/2024 33.6 31.4 - 37.4 g/dL Final     RDW   Date Value Ref Range Status   03/04/2024 23.1 (H) 11.6 - 15.1 % Final     MPV    Date Value Ref Range Status   03/04/2024 10.5 8.9 - 12.7 fL Final     nRBC   Date Value Ref Range Status   12/08/2023 0 /100 WBCs Final       CMP:  Potassium   Date Value Ref Range Status   03/04/2024 4.5 3.5 - 5.3 mmol/L Final     Chloride   Date Value Ref Range Status   03/04/2024 108 96 - 108 mmol/L Final     CO2   Date Value Ref Range Status   03/04/2024 23 21 - 32 mmol/L Final     BUN   Date Value Ref Range Status   03/04/2024 12 5 - 25 mg/dL Final     Creatinine   Date Value Ref Range Status   03/04/2024 1.11 0.60 - 1.30 mg/dL Final     Comment:     Standardized to IDMS reference method     Calcium   Date Value Ref Range Status   03/04/2024 9.2 8.4 - 10.2 mg/dL Final     AST   Date Value Ref Range Status   12/08/2023 19 13 - 39 U/L Final     ALT   Date Value Ref Range Status   12/08/2023 14 7 - 52 U/L Final     Comment:     Specimen collection should occur prior to Sulfasalazine administration due to the potential for falsely depressed results.      Alkaline Phosphatase   Date Value Ref Range Status   12/08/2023 70 34 - 104 U/L Final     eGFR   Date Value Ref Range Status   03/04/2024 47 ml/min/1.73sq m Final        Magnesium:   Magnesium   Date Value Ref Range Status   03/04/2024 2.1 1.9 - 2.7 mg/dL Final        A1C:  Hemoglobin A1C   Date Value Ref Range Status   11/01/2022 5.8 (H) Normal 3.8-5.6%; PreDiabetic 5.7-6.4%; Diabetic >=6.5%; Glycemic control for adults with diabetes <7.0% % Final        TSH:  TSH 3RD GENERATON   Date Value Ref Range Status   01/25/2024 3.330 0.450 - 4.500 uIU/mL Final     Comment:     The recommended reference ranges for TSH during pregnancy are as follows:   First trimester 0.100 to 2.500 uIU/mL   Second trimester  0.200 to 3.000 uIU/mL   Third trimester 0.300 to 3.000 uIU/m    Note: Normal ranges may not apply to patients who are transgender, non-binary, or whose legal sex, sex at birth, and gender identity differ.  Adult TSH (3rd generation) reference range follows the  recommended guidelines of the American Thy[employer] Association, .        PT/INR:  Protime   Date Value Ref Range Status   10/19/2023 16.8 (H) 11.6 - 14.5 seconds Final     INR   Date Value Ref Range Status   10/19/2023 1.38 (H) 0.84 - 1.19 Final       Lipid Panel:  Cholesterol   Date Value Ref Range Status   2023 160 See Comment mg/dL Final     Comment:     Cholesterol:         Pediatric <18 Years        Desirable          <170 mg/dL      Borderline High    170-199 mg/dL      High               >=200 mg/dL        Adult >=18 Years            Desirable        <200 mg/dL      Borderline High  200-239 mg/dL      High             >239 mg/dL       Triglycerides   Date Value Ref Range Status   2023 80 See Comment mg/dL Final     Comment:     Triglyceride:     0-9Y            <75mg/dL     10Y-17Y         <90 mg/dL       >=18Y     Normal          <150 mg/dL     Borderline High 150-199 mg/dL     High            200-499 mg/dL        Very High       >499 mg/dL    Specimen collection should occur prior to Metamizole administration due to the potential for falsely depressed results.     HDL, Direct   Date Value Ref Range Status   2023 53 >=50 mg/dL Final     Non-HDL-Chol (CHOL-HDL)   Date Value Ref Range Status   2023 107 mg/dl Final       Troponin:  Troponin I   Date Value Ref Range Status   02/10/2019 0.05 (H) <=0.04 ng/mL Final     Comment:       S[employer] Chemistry analyzer 99% cutoff is > 0.04 ng/mL in network labs     o cTnI 99% cutoff is useful only when applied to patients in the clinical setting of myocardial ischemia   o cTnI 99% cutoff should be interpreted in the context of clinical history, ECG findings and possibly cardiac imaging to establish correct diagnosis.   o cTnI 99% cutoff may be suggestive but clearly not indicative of a coronary event without the clinical setting of myocardial ischemia.           Imaging:    [id number]        LUIZA:  No results found for this or any  previous visit.      Echocardiogram:  Results for orders placed during the hospital encounter of 11/01/22    Echo complete w/ contrast if indicated    Interpretation Summary  •  Left Ventricle: Left ventricular cavity size is normal. Systolic function is mildly reduced.  LVEF approximately 40 to 45%.  Patient was in atrial fibrillation with periods of rapid ventricular response which interferes with interpretation. There is mild global hypokinesis. Unable to assess diastolic function due to atrial fibrillation.  •  Right Ventricle: Right ventricular cavity size is mildly dilated. Systolic function is mildly to moderately reduced.  •  Left Atrium: The atrium is moderate to markedly dilated.  •  Right Atrium: The atrium is moderately dilated.  •  Aortic Valve: There is mild regurgitation.  •  Mitral Valve: There is moderate annular calcification. There is moderate regurgitation.  •  Tricuspid Valve: There is moderate regurgitation. The right ventricular systolic pressure is mildly elevated.  Estimated RVSP 35 mm Hg.  Pacer leads noted.  •  Pulmonic Valve: There is mild regurgitation.    Results for orders placed during the hospital encounter of 03/07/23    Echo follow up/limited w/ contrast if indicated    Interpretation Summary  •  Left Ventricle: Left ventricular cavity size is normal. The left ventricular ejection fraction is 55%. Systolic function is normal.  •  Left Atrium: The atrium is moderately dilated.  •  Right Atrium: The atrium is mildly dilated.  •  Aortic Valve: There is mild to moderate regurgitation.  •  Tricuspid Valve: There is moderate regurgitation.  •  Pulmonary Artery: The estimated pulmonary artery systolic pressure is 52.0 mmHg. The pulmonary artery systolic pressure is moderately increased.  •  This is a limited echocardiogram performed to evaluate LV function. Interpretation is therefore limited.      Stress Test:   Results for orders placed during the hospital encounter of 03/07/23    NM  myocardial perfusion spect (rx stress and/or rest)    Interpretation Summary  •  Stress ECG: No ST deviation is noted. There were no arrhythmias during recovery. . The ECG was not diagnostic due to pharmacological (vasodilator) stress.  •  Perfusion Defect Conclusion: The stress/rest perfusion ratio is 1.03 . There is no evidence of transient ischemic dilation (TID).  •  Stress Function: Left ventricular function post-stress is normal. Post-stress ejection fraction is 79 %.  •  Perfusion: There is a left ventricular perfusion defect that is medium in size with moderate reduction in uptake present in the mid to apical anterior and anterolateral location(s) that is partially reversible.  •  Stress Combined Conclusion: Left ventricular perfusion is probably abnormal.      Cardiac Catheterization:  No results found for this or any previous visit.      HOLTER MONITOR: 24 HOUR/48 HOUR MONITORS  No results found for this or any previous visit.      AMB Extended Holter Monitor: Zio XT/AT or BioTel  No results found for this or any previous visit.      Cardioversion  11/03/2022    Indication for procedure: Cardioversion  Requesting physician:Dr Laura Goodman  Anticoagulation:  Eliquis     The patient was identified in the OR . A time out procedure was performed.     The patient was sedated by the anesthesia service .     The patient was cardioverted to sinus rhythm with a 200J biphasic shock.       There were no complications.  The patient was monitored for the appropriate time interval in the holding area, and was transferred back to the medical floor in stable condition.    Pre Cardioversion EKG  11/03/2022      Post Cardioversion EKG  11/03/2022        Cardiac CT:    CT Cardiac w Pulmonary Vein Mapping  10/11/2023    FINDINGS:     PULMONARY VEIN ANATOMY: Typical with two right and two left pulmonary veins.     APPROXIMATE MEASUREMENTS OF PULMONARY VEIN OSTIA:     Right superior: 22 mm.  Right inferior: 12 mm.     Left  superior: 14 mm.  Left inferior: 16 mm.     CARDIAC STRUCTURES: Top normal heart size. Pacer leads terminate in the right atrium and right ventricle. Mild coronary artery calcifications.     GREAT VESSELS: Visualized thoracic aorta and central pulmonary arterial tree are within normal limits for the patient's age.     EXTRACARDIAC FINDINGS: Lower lobe predominant fibrotic changes in the visualized lungs again seen. Previously noted 2 mm right upper lobe nodule is not definitively visualized on this study, possibly due to motion artifact. Large hiatal hernia containing   the stomach again identified.     IMPRESSION:     Pulmonary venous anatomy as described above.     Large hiatal hernia with intrathoracic stomach, and pulmonary fibrosis again noted.        DEVICE CHECK:     Results for orders placed or performed in visit on 03/21/24   Cardiac EP device report    Narrative    MDT DUAL CHAMBER PM  - ACTIVE SYSTEM IS MRI CONDITIONAL  CARELINK TRANSMISSION: BATTERY VOLTAGE ADEQUATE (8.7 YRS). AP: 91.4%. : 2.5% (MVP-ON). ALL AVAILABLE LEAD PARAMETERS WITHIN NORMAL LIMITS. 1 VT EPISODE W/ EGM SHOWING SVT-ST @ 154 BPM (NO TERMIATION ON STRIP). 156 AT/AF EPISODES W/ AVAIL EGMS SHOWING PAT, AT, AF, AFL, MAX DURATION 25 MINS 06 SECS. AF BURDEN: 6.3%. PT TAKES ELIQUIS, AMIODARONE, METOPROLOL SUCC, EF: 50-55% (LUIZA 10/19/23). S/P AF/AFL ABL 10/19/23. NORMAL DEVICE FUNCTION.             Review of Systems:  Review of Systems   All other systems reviewed and are negative.  As described in my history of present illness    Physical Exam:  Physical Exam  Vitals reviewed.   Constitutional:       Appearance: Normal appearance. She is well-developed. She is obese. She is not ill-appearing.      Comments: Not in any distress at the current time   HENT:      Head: Normocephalic and atraumatic.      Right Ear: External ear normal.      Left Ear: External ear normal.      Nose: Nose normal.      Mouth/Throat:      Pharynx: Uvula midline.    Eyes:      General: Lids are normal. No scleral icterus.     Extraocular Movements: Extraocular movements intact.      Conjunctiva/sclera: Conjunctivae normal.      Pupils: Pupils are equal, round, and reactive to light.      Comments: No pallor  No cyanosis  No icterus   Neck:      Thyroid: No thyromegaly.      Vascular: No carotid bruit or JVD.      Trachea: Trachea normal.      Comments: No jugular lymphadenopathy  Short thick neck  Cardiovascular:      Rate and Rhythm: Normal rate and regular rhythm.      Chest Wall: PMI is not displaced.      Pulses: Normal pulses.      Heart sounds: Normal heart sounds, S1 normal and S2 normal. No murmur heard.     No friction rub. No gallop. No S3 or S4 sounds.   Pulmonary:      Effort: Pulmonary effort is normal. No accessory muscle usage or respiratory distress.      Breath sounds: Normal breath sounds. No decreased breath sounds, wheezing, rhonchi or rales.   Chest:      Chest wall: No tenderness.   Abdominal:      General: Bowel sounds are normal. There is no distension.      Palpations: Abdomen is soft. There is no hepatomegaly, splenomegaly or mass.      Tenderness: There is no abdominal tenderness.      Comments: Central obesity present   Musculoskeletal:         General: No swelling, tenderness or deformity. Normal range of motion.      Cervical back: Normal range of motion and neck supple. No rigidity.      Right lower leg: No edema.      Left lower leg: No edema.   Lymphadenopathy:      Cervical: No cervical adenopathy.   Skin:     Findings: No abrasion, erythema, lesion or rash.      Nails: There is no clubbing.   Neurological:      Mental Status: She is alert and oriented to person, place, and time. Mental status is at baseline.      Motor: No weakness.      Comments: Facial symmetry is retained  Extraocular movements are retained  Head neck tongue and palate movement are retained and symmetric   Psychiatric:         Mood and Affect: Mood normal.         Speech:  Speech normal.         Behavior: Behavior normal.         Thought Content: Thought content normal.         Judgment: Judgment normal.         Discussion/Summary:      Atrial flutter with RVR  On amiodarone, metoprolol and apixaban  [id number] score is 5  Patient has a long history of atrial fibrillation and flutter  She underwent ablation in October 2023    There was a recurrence and she was in atrial flutter with heart rate of 120/min  Amiodarone and metoprolol were increased to 200 mg 2 times daily and 100 mg 2 times daily  Patient went back to sinus rhythm  Device check shows there are still brief breakthrough episodes    Check for amiodarone toxicity-CMP, TSH, PFT with DLCO, evaluation    Will reduce amiodarone to 200 mg daily in about a month's time    If patient has recurrence of a flutter, will proceed with repeat ablation-will look at breaks in the line for flutter /also plan for vein of Efe injection    If patient continues to have recurrence of tachycardia final option would be an AV node ablation             Cardiomyopathy , NYHA II, LVEF =35%  Improved currently to 60%  Suspected tachy mediated  Increasing beta blockers for better rate control  On ACEI  EF has improved to 60%           Hypertension  On lisinopril and metoprolol   Blood pressure is 132/100       Hyperlipidemia  On statin-lovastatin   No myositis or myalgia         Suspected REYNOLD  Need to be evaluated for CPAP         Obesity  BMI is 33  Patient advised on dietary him restrictions to lose weight        Long-term anticoagulation  Chads Vasc score is 5  To continue with apixaban        Sinus bradycardia, sick sinus syndrome  Patient is post pacemaker  All device parameters a stable

## 2024-05-07 DIAGNOSIS — I48.19 PERSISTENT ATRIAL FIBRILLATION (HCC): ICD-10-CM

## 2024-05-07 NOTE — TELEPHONE ENCOUNTER
Reason for call:   [x] Refill   [] Prior Auth  [] Other:     Office:   [] PCP/Provider -   [x] Specialty/Provider -Marycruz Gomez/ Kulwant Calabrese     Medication: Amiodarone    Dose/Frequency: 200 mg    Quantity: #28    Pharmacy: [facility]    Does the patient have enough for 3 days?   [] Yes   [x] No - Send as HP to POD

## 2024-05-14 RX ORDER — AMIODARONE HYDROCHLORIDE 200 MG/1
200 TABLET ORAL 2 TIMES DAILY
Qty: 28 TABLET | Refills: 0 | Status: SHIPPED | OUTPATIENT
Start: 2024-05-14

## 2024-05-14 NOTE — TELEPHONE ENCOUNTER
Patient called to request a refill for their  Amiodarone  advised a refill was requested on 05- and is pending approval. Patient verbalized understanding and is in agreement.      Patient stated she has been waiting for this refill for a week and it has not been called in. She would like to know if she should continue this medication and she is now out. Please review and advise. If appropriate please send script as soon as possible.    Call patient and confirm medication.  [phone]

## 2024-05-17 ENCOUNTER — NURSE TRIAGE (OUTPATIENT)
Age: 79
End: 2024-05-17

## 2024-05-17 ENCOUNTER — HOSPITAL ENCOUNTER (EMERGENCY)
Facility: HOSPITAL | Age: 79
Discharge: HOME | End: 2024-05-17
Attending: EMERGENCY MEDICINE
Payer: MEDICARE

## 2024-05-17 ENCOUNTER — APPOINTMENT (EMERGENCY)
Dept: RADIOLOGY | Facility: HOSPITAL | Age: 79
End: 2024-05-17
Payer: MEDICARE

## 2024-05-17 VITALS
RESPIRATION RATE: 18 BRPM | OXYGEN SATURATION: 93 % | DIASTOLIC BLOOD PRESSURE: 86 MMHG | TEMPERATURE: 98.4 F | SYSTOLIC BLOOD PRESSURE: 153 MMHG | HEART RATE: 70 BPM

## 2024-05-17 DIAGNOSIS — R06.00 DYSPNEA: ICD-10-CM

## 2024-05-17 DIAGNOSIS — I50.22 CHRONIC SYSTOLIC (CONGESTIVE) HEART FAILURE (HCC): ICD-10-CM

## 2024-05-17 DIAGNOSIS — I50.9 CHF (CONGESTIVE HEART FAILURE) (HCC): Primary | ICD-10-CM

## 2024-05-17 LAB
ALBUMIN SERPL BCP-MCNC: 4.2 G/DL (ref 3.5–5)
ALP SERPL-CCNC: 105 U/L (ref 34–104)
ALT SERPL W P-5'-P-CCNC: 15 U/L (ref 7–52)
ANION GAP SERPL CALCULATED.3IONS-SCNC: 10 MMOL/L (ref 4–13)
AST SERPL W P-5'-P-CCNC: 24 U/L (ref 13–39)
BASOPHILS # BLD AUTO: 0.06 THOUSANDS/ÂΜL (ref 0–0.1)
BASOPHILS NFR BLD AUTO: 1 % (ref 0–1)
BILIRUB SERPL-MCNC: 1.26 MG/DL (ref 0.2–1)
BNP SERPL-MCNC: 248 PG/ML (ref 0–100)
BUN SERPL-MCNC: 19 MG/DL (ref 5–25)
CALCIUM SERPL-MCNC: 9.4 MG/DL (ref 8.4–10.2)
CARDIAC TROPONIN I PNL SERPL HS: 7 NG/L
CHLORIDE SERPL-SCNC: 103 MMOL/L (ref 96–108)
CO2 SERPL-SCNC: 24 MMOL/L (ref 21–32)
CREAT SERPL-MCNC: 1.04 MG/DL (ref 0.6–1.3)
EOSINOPHIL # BLD AUTO: 0.31 THOUSAND/ÂΜL (ref 0–0.61)
EOSINOPHIL NFR BLD AUTO: 3 % (ref 0–6)
ERYTHROCYTE [DISTWIDTH] IN BLOOD BY AUTOMATED COUNT: 14.6 % (ref 11.6–15.1)
GFR SERPL CREATININE-BSD FRML MDRD: 51 ML/MIN/1.73SQ M
GLUCOSE SERPL-MCNC: 107 MG/DL (ref 65–140)
HCT VFR BLD AUTO: 46.1 % (ref 34.8–46.1)
HGB BLD-MCNC: 15.6 G/DL (ref 11.5–15.4)
IMM GRANULOCYTES # BLD AUTO: 0.05 THOUSAND/UL (ref 0–0.2)
IMM GRANULOCYTES NFR BLD AUTO: 1 % (ref 0–2)
LYMPHOCYTES # BLD AUTO: 1 THOUSANDS/ÂΜL (ref 0.6–4.47)
LYMPHOCYTES NFR BLD AUTO: 10 % (ref 14–44)
MCH RBC QN AUTO: 33.7 PG (ref 26.8–34.3)
MCHC RBC AUTO-ENTMCNC: 33.8 G/DL (ref 31.4–37.4)
MCV RBC AUTO: 100 FL (ref 82–98)
MONOCYTES # BLD AUTO: 1.26 THOUSAND/ÂΜL (ref 0.17–1.22)
MONOCYTES NFR BLD AUTO: 12 % (ref 4–12)
NEUTROPHILS # BLD AUTO: 7.8 THOUSANDS/ÂΜL (ref 1.85–7.62)
NEUTS SEG NFR BLD AUTO: 73 % (ref 43–75)
NRBC BLD AUTO-RTO: 0 /100 WBCS
PLATELET # BLD AUTO: 286 THOUSANDS/UL (ref 149–390)
PMV BLD AUTO: 10.3 FL (ref 8.9–12.7)
POTASSIUM SERPL-SCNC: 4.1 MMOL/L (ref 3.5–5.3)
PROT SERPL-MCNC: 8.7 G/DL (ref 6.4–8.4)
RBC # BLD AUTO: 4.63 MILLION/UL (ref 3.81–5.12)
SODIUM SERPL-SCNC: 137 MMOL/L (ref 135–147)
WBC # BLD AUTO: 10.48 THOUSAND/UL (ref 4.31–10.16)

## 2024-05-17 PROCEDURE — 99285 EMERGENCY DEPT VISIT HI MDM: CPT

## 2024-05-17 PROCEDURE — 85025 COMPLETE CBC W/AUTO DIFF WBC: CPT

## 2024-05-17 PROCEDURE — 96374 THER/PROPH/DIAG INJ IV PUSH: CPT

## 2024-05-17 PROCEDURE — 71046 X-RAY EXAM CHEST 2 VIEWS: CPT

## 2024-05-17 PROCEDURE — 84484 ASSAY OF TROPONIN QUANT: CPT

## 2024-05-17 PROCEDURE — 80053 COMPREHEN METABOLIC PANEL: CPT

## 2024-05-17 PROCEDURE — 93005 ELECTROCARDIOGRAM TRACING: CPT

## 2024-05-17 PROCEDURE — 36415 COLL VENOUS BLD VENIPUNCTURE: CPT

## 2024-05-17 PROCEDURE — 83880 ASSAY OF NATRIURETIC PEPTIDE: CPT

## 2024-05-17 RX ORDER — FUROSEMIDE 10 MG/ML
20 INJECTION INTRAMUSCULAR; INTRAVENOUS ONCE
Status: COMPLETED | OUTPATIENT
Start: 2024-05-17 | End: 2024-05-17

## 2024-05-17 RX ORDER — FUROSEMIDE 20 MG/1
20 TABLET ORAL EVERY OTHER DAY
Qty: 45 TABLET | Refills: 1 | Status: SHIPPED | OUTPATIENT
Start: 2024-05-17 | End: 2024-05-17

## 2024-05-17 RX ORDER — FUROSEMIDE 20 MG/1
20 TABLET ORAL EVERY OTHER DAY
Qty: 45 TABLET | Refills: 0 | Status: SHIPPED | OUTPATIENT
Start: 2024-05-17 | End: 2024-05-24 | Stop reason: SDUPTHER

## 2024-05-17 RX ADMIN — FUROSEMIDE 20 MG: 10 INJECTION, SOLUTION INTRAMUSCULAR; INTRAVENOUS at 19:09

## 2024-05-17 NOTE — ED PROVIDER NOTES
"History  Chief Complaint   Patient presents with    Shortness of Breath     Pt has CHF and ran out of her \"water pills\" pt states she has been getting progressively more sob with time and has gained 3 lbs.      77 y/o female patient presents to the ER for evaluation of shortness of breath.  Patient states that she ran out of her Lasix approximately 3 weeks ago.  Patient states that she did not realize that the prescription was to take 20 mg of Lasix every other day instead of daily.  Patient called her primary care doctor and primary care did not refill her medications and she has been progressively more short of breath.  She gained approximately 3 pounds in the last 3 weeks.  She has noted leg swelling.  No noted chest pain, dizziness, syncope.  No noted fever, chills, nausea or vomiting.  Talking in full complete sentences without respiratory distress, tripoding or retractions.      History provided by:  Patient      Prior to Admission Medications   Prescriptions Last Dose Informant Patient Reported? Taking?   Azelastine HCl 137 MCG/SPRAY SOLN  Self No No   Sig: USE 2 SPRAYS IN BOTH NOSTRILS IN THE MORNING AS DIRECTED   amiodarone 200 mg tablet   No No   Sig: Take 1 tablet (200 mg total) by mouth 2 (two) times a day For 14 days   apixaban (Eliquis) 5 mg  Self No No   Sig: Take 1 tablet (5 mg total) by mouth 2 (two) times a day   furosemide (LASIX) 20 mg tablet   No No   Sig: Take 1 tablet (20 mg total) by mouth every other day   furosemide (LASIX) 20 mg tablet   No Yes   Sig: Take 1 tablet (20 mg total) by mouth every other day   levothyroxine 25 mcg tablet  Self No No   Sig: TAKE 1 TABLET BY MOUTH DAILY IN  THE EARLY MORNING   lovastatin (MEVACOR) 10 MG tablet  Self No No   Sig: Take 1 tablet (10 mg total) by mouth daily at bedtime   metoprolol succinate (TOPROL-XL) 100 mg 24 hr tablet  Self No No   Sig: Take 1 tablet (100 mg total) by mouth daily   Patient taking differently: Take 100 mg by mouth 2 (two) times a " day   midodrine (PROAMATINE) 5 mg tablet  Self No No   Sig: TAKE 1 TABLET BY MOUTH 3 TIMES  DAILY BEFORE MEALS   potassium chloride (Klor-Con M10) 10 mEq tablet  Self No No   Sig: Take 1 tablet (10 mEq total) by mouth every other day      Facility-Administered Medications: None       Past Medical History:   Diagnosis Date    Aphasia, mixed 2017    Atrial fibrillation (HCC)     CHF (congestive heart failure) (HCC)     Colon polyp     Headache     Hyperlipidemia     Hypertension     Lyme disease     Shortness of breath     TIA (transient ischemic attack)     Vertigo        Past Surgical History:   Procedure Laterality Date    CARDIAC ELECTROPHYSIOLOGY PROCEDURE N/A 10/19/2023    Procedure: Cardiac eps/afib ablation PVI/POST WALL;  Surgeon: Piero Fajardo MD;  Location:  CARDI[facility] CATH LAB;  Service: Cardiology    CARDIAC PACEMAKER PLACEMENT  2019    COLONOSCOPY      [place] SIGMOIDOSCOPY FLX DX W/COLLJ SPEC BR/WA IF PFRMD N/A 2017    Procedure: SIGMOIDOSCOPY FLEXIBLE;  Surgeon: Kavon Fernandez MD;  Location: [facility];  Service: Gastroenterology    TONSILLECTOMY         Family History   Problem Relation Age of Onset    Lung cancer Mother     Dementia Father     Alzheimer's disease Father     Other Father         Cardiac Disorder     Lung cancer Maternal Grandmother     Breast cancer Paternal Aunt 40    No Known Problems Paternal Aunt     No Known Problems Paternal Aunt      I have reviewed and agree with the history as documented.    E-Cigarette/Vaping    E-Cigarette Use Never User      E-Cigarette/Vaping Substances    Nicotine No     THC No     CBD No     Flavoring No     Other No     Unknown No      Social History     Tobacco Use    Smoking status: Former     Current packs/day: 0.00     Types: Cigarettes     Quit date:      Years since quittin.4    Smokeless tobacco: Never    Tobacco comments:     no smoking for 46 years   Vaping Use    Vaping status: Never Used   Substance Use Topics    Alcohol  use: Yes     Alcohol/week: 2.0 standard drinks of alcohol     Types: 2 Glasses of wine per week    Drug use: No       Review of Systems   Constitutional:  Negative for chills and fever.   HENT:  Negative for ear pain and sore throat.    Eyes:  Negative for pain and visual disturbance.   Respiratory:  Positive for cough and shortness of breath.    Cardiovascular:  Negative for chest pain and palpitations.   Gastrointestinal:  Negative for abdominal pain and vomiting.   Genitourinary:  Negative for dysuria and hematuria.   Musculoskeletal:  Negative for arthralgias and back pain.   Skin:  Negative for color change and rash.   Neurological:  Negative for seizures and syncope.   All other systems reviewed and are negative.      Physical Exam  Physical Exam  Vitals and nursing note reviewed.   Constitutional:       General: She is not in acute distress.     Appearance: She is well-developed.   HENT:      Head: Normocephalic and atraumatic.   Eyes:      Conjunctiva/sclera: Conjunctivae normal.   Cardiovascular:      Rate and Rhythm: Normal rate and regular rhythm.      Heart sounds: No murmur heard.  Pulmonary:      Effort: Pulmonary effort is normal. No respiratory distress.      Breath sounds: Normal breath sounds.   Abdominal:      Palpations: Abdomen is soft.      Tenderness: There is no abdominal tenderness.   Musculoskeletal:         General: No swelling.      Cervical back: Neck supple.      Right lower leg: Edema present.      Left lower leg: Edema present.   Skin:     General: Skin is warm and dry.      Capillary Refill: Capillary refill takes less than 2 seconds.   Neurological:      Mental Status: She is alert and oriented to person, place, and time.   Psychiatric:         Mood and Affect: Mood normal.         Behavior: Behavior normal.         Vital Signs  ED Triage Vitals [05/17/24 1758]   Temperature Pulse Respirations Blood Pressure SpO2   98.4 °F (36.9 °C) (!) 109 18 (!) 182/120 92 %      Temp Source Heart  Rate Source Patient Position - Orthostatic VS BP Location FiO2 (%)   Temporal Monitor Sitting Left arm --      Pain Score       --           Vitals:    05/17/24 1758 05/17/24 1845 05/17/24 1909   BP: (!) 182/120 138/93 153/86   Pulse: (!) 109 (!) 112 70   Patient Position - Orthostatic VS: Sitting Sitting Sitting         Visual Acuity      ED Medications  Medications   furosemide (LASIX) injection 20 mg (20 mg Intravenous Given 5/17/24 1909)       Diagnostic Studies  Results Reviewed       Procedure Component Value Units Date/Time    HS Troponin I 4hr [[id number]]     Lab Status: No result Specimen: Blood     B-Type Natriuretic Peptide(BNP) [[id number]]  (Abnormal) Collected: 05/17/24 1836    Lab Status: Final result Specimen: Blood from Arm, Right Updated: 05/17/24 1934      pg/mL     HS Troponin 0hr (reflex protocol) [[id number]]  (Normal) Collected: 05/17/24 1836    Lab Status: Final result Specimen: Blood from Arm, Right Updated: 05/17/24 1904     hs TnI 0hr 7 ng/L     Comprehensive metabolic panel [[id number]]  (Abnormal) Collected: 05/17/24 1836    Lab Status: Final result Specimen: Blood from Arm, Right Updated: 05/17/24 1904     Sodium 137 mmol/L      Potassium 4.1 mmol/L      Chloride 103 mmol/L      CO2 24 mmol/L      ANION GAP 10 mmol/L      BUN 19 mg/dL      Creatinine 1.04 mg/dL      Glucose 107 mg/dL      Calcium 9.4 mg/dL      AST 24 U/L      ALT 15 U/L      Alkaline Phosphatase 105 U/L      Total Protein 8.7 g/dL      Albumin 4.2 g/dL      Total Bilirubin 1.26 mg/dL      eGFR 51 ml/min/1.73sq m     Narrative:      National Kidney Disease Foundation guidelines for Chronic Kidney Disease (CKD):     Stage 1 with normal or high GFR (GFR > 90 mL/min/1.73 square meters)    Stage 2 Mild CKD (GFR = 60-89 mL/min/1.73 square meters)    Stage 3A Moderate CKD (GFR = 45-59 mL/min/1.73 square meters)    Stage 3B Moderate CKD (GFR = 30-44 mL/min/1.73 square meters)    Stage 4 Severe CKD (GFR = 15-29 mL/min/1.73  square meters)    Stage 5 End Stage CKD (GFR <15 mL/min/1.73 square meters)  Note: GFR calculation is accurate only with a steady state creatinine    HS Troponin I 2hr [[id number]]     Lab Status: No result Specimen: Blood     CBC and differential [[id number]]  (Abnormal) Collected: 05/17/24 1836    Lab Status: Final result Specimen: Blood from Arm, Right Updated: 05/17/24 1841     WBC 10.48 Thousand/uL      RBC 4.63 Million/uL      Hemoglobin 15.6 g/dL      Hematocrit 46.1 %       fL      MCH 33.7 pg      MCHC 33.8 g/dL      RDW 14.6 %      MPV 10.3 fL      Platelets 286 Thousands/uL      nRBC 0 /100 WBCs      Segmented % 73 %      Immature Grans % 1 %      Lymphocytes % 10 %      Monocytes % 12 %      Eosinophils Relative 3 %      Basophils Relative 1 %      Absolute Neutrophils 7.80 Thousands/µL      Absolute Immature Grans 0.05 Thousand/uL      Absolute Lymphocytes 1.00 Thousands/µL      Absolute Monocytes 1.26 Thousand/µL      Eosinophils Absolute 0.31 Thousand/µL      Basophils Absolute 0.06 Thousands/µL                    X-ray chest 2 views    (Results Pending)              Procedures  Procedures         ED Course  ED Course as of 05/17/24 2019   Fri May 17, 2024   1905 X-ray chest 2 views  Vascular congestion   1937 B-Type Natriuretic Peptide(BNP)(!)               HEART Risk Score      Flowsheet Row Most Recent Value   Heart Score Risk Calculator    History 0 Filed at: 05/17/2024 1915   ECG 0 Filed at: 05/17/2024 1915   Age 2 Filed at: 05/17/2024 1915   Risk Factors 2 Filed at: 05/17/2024 1915   Troponin 0 Filed at: 05/17/2024 1915   HEART Score 4 Filed at: 05/17/2024 1915          Identification of Seniors at Risk      Flowsheet Row Most Recent Value   (ISAR) Identification of Seniors at Risk    Before the illness or injury that brought you to the Emergency, did you need someone to help you on a regular basis? 0 Filed at: 05/17/2024 1801   In the last 24 hours, have you needed more help than  usual? 0 Filed at: 05/17/2024 1801   Have you been hospitalized for one or more nights during the past 6 months? 0 Filed at: 05/17/2024 1801   In general, do you see well? 0 Filed at: 05/17/2024 1801   In general, do you have serious problems with your memory? 0 Filed at: 05/17/2024 1801   Do you take more than three different medications every day? 0 Filed at: 05/17/2024 1801   ISAR Score 0 Filed at: 05/17/2024 1801                        SBIRT 20yo+      Flowsheet Row Most Recent Value   Initial Alcohol Screen: US AUDIT-C     1. How often do you have a drink containing alcohol? 0 Filed at: 05/17/2024 1801   2. How many drinks containing alcohol do you have on a typical day you are drinking?  0 Filed at: 05/17/2024 1801   3b. FEMALE Any Age, or MALE 65+: How often do you have 4 or more drinks on one occassion? 0 Filed at: 05/17/2024 1801   Audit-C Score 0 Filed at: 05/17/2024 1801   MARTHA: How many times in the past year have you...    Used an illegal drug or used a prescription medication for non-medical reasons? Never Filed at: 05/17/2024 1801                      Medical Decision Making  This patient presents with dyspnea.  Presentation not consistent with acute cardiac etiologies to include ACS-nonischemic EKG, unremarkable troponin.  Heart score 4. Presentation not consistent presentation not consistent with pericardial effusion/tamponade, acute PE, asthma/COPD exacerbation or infectious etiology such as pneumonia.  Presentation also not consistent with benign cardiopulmonary causes to include toxidromes, metabolic etiologies such as acidemia or electrolyte derangements.  Chest x-ray was read and interpreted by myself for no acute cardiopulmonary abnormalities.  Does have vascular congestion.  Most likely from running out of her Lasix.  Represcribed medication to pharmacy.  Official read pending.  Patient CBC resulted no leukocytosis, anemia.  CMP negative for metabolic derangements.  Advised to follow-up with  cardiology and or primary care provider.  Return to the ER symptoms worsens or questions or concerns arise at home.      Amount and/or Complexity of Data Reviewed  Labs: ordered. Decision-making details documented in ED Course.  Radiology: ordered. Decision-making details documented in ED Course.    Risk  Prescription drug management.             Disposition  Final diagnoses:   CHF (congestive heart failure) (HCC)   Dyspnea     Time reflects when diagnosis was documented in both MDM as applicable and the Disposition within this note       Time User Action Codes Description Comment    5/17/2024  7:37 PM Leticia aHhn Add [I50.9] CHF (congestive heart failure) (HCC)     5/17/2024  7:37 PM Leticia Hahn Add [R06.00] Dyspnea     5/17/2024  7:37 PM Leticia Hahn Add [I50.22] Chronic systolic (congestive) heart failure (HCC)           ED Disposition       ED Disposition   Discharge    Condition   Stable    Date/Time   Fri May 17, 2024 1937    Comment   Madeline Good discharge to home/self care.                   Follow-up Information       Follow up With Specialties Details Why Contact Info Additional Information    Arnold Payton DO Family Medicine   [address]  [facility] [address]  [phone]       [facility] Emergency Medicine   [address] [facility] [address] [phone] [facility], [address] [place], [place], [address]            Discharge Medication List as of 5/17/2024  7:42 PM        CONTINUE these medications which have CHANGED    Details   furosemide (LASIX) 20 mg tablet Take 1 tablet (20 mg total) by mouth every other day, Starting Fri 5/17/2024, Normal           CONTINUE these medications which have NOT CHANGED    Details   amiodarone 200 mg tablet Take 1 tablet (200 mg total) by mouth 2 (two) times a day For 14 days, Starting Tue 5/14/2024, Normal      apixaban (Eliquis) 5  mg Take 1 tablet (5 mg total) by mouth 2 (two) times a day, Starting Thu 2/8/2024, Normal      Azelastine HCl 137 MCG/SPRAY SOLN USE 2 SPRAYS IN BOTH NOSTRILS IN THE MORNING AS DIRECTED, Normal      levothyroxine 25 mcg tablet TAKE 1 TABLET BY MOUTH DAILY IN  THE EARLY MORNING, Starting Mon 2/12/2024, Normal      lovastatin (MEVACOR) 10 MG tablet Take 1 tablet (10 mg total) by mouth daily at bedtime, Starting Thu 2/8/2024, Normal      metoprolol succinate (TOPROL-XL) 100 mg 24 hr tablet Take 1 tablet (100 mg total) by mouth daily, Starting Thu 2/8/2024, Normal      midodrine (PROAMATINE) 5 mg tablet TAKE 1 TABLET BY MOUTH 3 TIMES  DAILY BEFORE MEALS, Normal      potassium chloride (Klor-Con M10) 10 mEq tablet Take 1 tablet (10 mEq total) by mouth every other day, Starting Thu 2/8/2024, Normal             No discharge procedures on file.    PDMP Review       None            ED Provider  Electronically Signed by             CHRYSTAL Hernandez  05/17/24 2019

## 2024-05-17 NOTE — TELEPHONE ENCOUNTER
"Pt states for years she has been taking lasix every day. Script was changed back in February and pt did not realize that she was to take it every other day. Out of habit was taking Lasix daily along with potassium. She usually gets [facility] day supply [facility]. But last refill was sent over for only 3 months given that she was to take it every other day.     She said she now has SOB this week with hand swelling. Wt up 3lbs- [id number].     Pt would like to know if a new script for Lasix daily can be sent to [facility].     Please advise     Answer Assessment - Initial Assessment Questions  1. RESPIRATORY STATUS: \"Describe your breathing?\" (e.g., wheezing, shortness of breath, unable to speak, severe coughing)       SOB has not had any refills on lasix x 2 weeks  2. ONSET: \"When did this breathing problem begin?\"       Past week   3. PATTERN \"Does the difficult breathing come and go, or has it been constant since it started?\"       Constant SOB  4. SEVERITY: \"How bad is your breathing?\" (e.g., mild, moderate, severe)     - MILD: No SOB at rest, mild SOB with walking, speaks normally in sentences, can lay down, no retractions, pulse < 100.     - MODERATE: SOB at rest, SOB with minimal exertion and prefers to sit, cannot lie down flat, speaks in phrases, mild retractions, audible wheezing, pulse 100-120.     - SEVERE: Very SOB at rest, speaks in single words, struggling to breathe, sitting hunched forward, retractions, pulse > 120       moderate  5. RECURRENT SYMPTOM: \"Have you had difficulty breathing before?\" If Yes, ask: \"When was the last time?\" and \"What happened that time?\"       no  6. CARDIAC HISTORY: \"Do you have any history of heart disease?\" (e.g., heart attack, angina, bypass surgery, angioplasty)       CHF  7. LUNG HISTORY: \"Do you have any history of lung disease?\"  (e.g., pulmonary embolus, asthma, emphysema)      no  8. CAUSE: \"What do you think is causing the breathing problem?\"       Out of diuretic   9. OTHER " "SYMPTOMS: \"Do you have any other symptoms? (e.g., dizziness, runny nose, cough, chest pain, fever)      On going dizziness, no chest pain    Protocols used: Breathing Difficulty-ADULT-[place]    "

## 2024-05-17 NOTE — ED NOTES
Pt ambulatory to bathroom, pt denies any complaints at this time.      Nora Manzano, RN  05/17/24 1942

## 2024-05-17 NOTE — TELEPHONE ENCOUNTER
Called pt, gave provider's full message. Pt states she will go to ED but she asked if the lasix could be sent to [facility].     Please advise    Statement Selected

## 2024-05-17 NOTE — TELEPHONE ENCOUNTER
Please advise patient to proceed to the ER for further evaluation of her symptoms.    A refill of her lasix has been sent to her pharmacy.    Upper Respiratory Infection in Children   WHAT YOU NEED TO KNOW:   An upper respiratory infection is also called a cold  It can affect your child's nose, throat, ears, and sinuses  The common cold is usually not serious and does not need special treatment  A cold is caused by a virus and will not get better with antibiotics  Most children get about 5 to 8 colds each year  Your child's cold symptoms will be worst for the first 3 to 5 days  His or her cold should be gone in 7 to 14 days  Your child may continue to cough for 2 to 3 weeks  DISCHARGE INSTRUCTIONS:   Return to the emergency department if:   · Your child's temperature reaches 105°F (40 6°C)  · Your child has trouble breathing or is breathing faster than usual      · Your child's lips or nails turn blue  · Your child's nostrils flare when he or she takes a breath  · The skin above or below your child's ribs is sucked in with each breath  · Your child's heart is beating much faster than usual      · You see pinpoint or larger reddish-purple dots on your child's skin  · Your child stops urinating or urinates less than usual      · Your baby's soft spot on his or her head is bulging outward or sunken inward  · Your child has a severe headache or stiff neck  · Your child has chest or stomach pain  · Your baby is too weak to eat  Contact your child's healthcare provider if:   · Your child has a rectal, ear, or forehead temperature higher than 100 4°F (38°C)  · Your child has an oral or pacifier temperature higher than 100°F (37 8°C)  · Your child has an armpit temperature higher than 99°F (37 2°C)  · Your child is younger than 2 years and has a fever for more than 24 hours  · Your child is 2 years or older and has a fever for more than 72 hours  · Your child has had thick nasal drainage for more than 2 days  · Your child has ear pain  · Your child has white spots on his or her tonsils       · Your child coughs up a lot of thick, yellow, or green mucus  · Your child is unable to eat, has nausea, or is vomiting  · Your child has increased tiredness and weakness  · Your child's symptoms do not improve or get worse within 3 days  · You have questions or concerns about your child's condition or care  Medicines:  Do not give over-the-counter cough or cold medicines to children younger than 4 years  Your healthcare provider may tell you not to give these medicines to children younger than 6 years  OTC cough and cold medicines can cause side effects that may harm your child  Your child may need any of the following:  · Decongestants  help reduce nasal congestion in older children and help make breathing easier  If your child takes decongestant pills, they may make him or her feel restless or cause problems with sleep  Do not give your child decongestant sprays for more than a few days  · Cough suppressants  help reduce coughing in older children  Ask your child's healthcare provider which type of cough medicine is best for him or her  · Acetaminophen  decreases pain and fever  It is available without a doctor's order  Ask how much to give your child and how often to give it  Follow directions  Read the labels of all other medicines your child uses to see if they also contain acetaminophen, or ask your child's doctor or pharmacist  Acetaminophen can cause liver damage if not taken correctly  · NSAIDs , such as ibuprofen, help decrease swelling, pain, and fever  This medicine is available with or without a doctor's order  NSAIDs can cause stomach bleeding or kidney problems in certain people  If you take blood thinner medicine, always ask if NSAIDs are safe for you  Always read the medicine label and follow directions  Do not give these medicines to children under 10months of age without direction from your child's healthcare provider       · Do not give aspirin to children under 18 years of age   Your child could develop Reye syndrome if he takes aspirin  Reye syndrome can cause life-threatening brain and liver damage  Check your child's medicine labels for aspirin, salicylates, or oil of wintergreen  · Give your child's medicine as directed  Contact your child's healthcare provider if you think the medicine is not working as expected  Tell him or her if your child is allergic to any medicine  Keep a current list of the medicines, vitamins, and herbs your child takes  Include the amounts, and when, how, and why they are taken  Bring the list or the medicines in their containers to follow-up visits  Carry your child's medicine list with you in case of an emergency  Follow up with your child's healthcare provider as directed:  Write down your questions so you remember to ask them during your child's visits  Care for your child:   · Have your child rest   Rest will help his or her body get better  · Give your child more liquids as directed  Liquids will help thin and loosen mucus so your child can cough it up  Liquids will also help prevent dehydration  Liquids that help prevent dehydration include water, fruit juice, and broth  Do not give your child liquids that contain caffeine  Caffeine can increase your child's risk for dehydration  Ask your child's healthcare provider how much liquid to give your child each day  · Clear mucus from your child's nose  Use a bulb syringe to remove mucus from a baby's nose  Squeeze the bulb and put the tip into one of your baby's nostrils  Gently close the other nostril with your finger  Slowly release the bulb to suck up the mucus  Empty the bulb syringe onto a tissue  Repeat the steps if needed  Do the same thing in the other nostril  Make sure your baby's nose is clear before he or she feeds or sleeps  Your child's healthcare provider may recommend you put saline drops into your baby's nose if the mucus is very thick             · Soothe your child's throat  If your child is 8 years or older, have him or her gargle with salt water  Make salt water by dissolving ¼ teaspoon salt in 1 cup warm water  · Soothe your child's cough  You can give honey to children older than 1 year  Give ½ teaspoon of honey to children 1 to 5 years  Give 1 teaspoon of honey to children 6 to 11 years  Give 2 teaspoons of honey to children 12 or older  · Use a cool-mist humidifier  This will add moisture to the air and help your child breathe easier  Make sure the humidifier is out of your child's reach  · Apply petroleum-based jelly around the outside of your child's nostrils  This can decrease irritation from blowing his or her nose  · Keep your child away from smoke  Do not smoke near your child  Do not let your older child smoke  Nicotine and other chemicals in cigarettes and cigars can make your child's symptoms worse  They can also cause infections such as bronchitis or pneumonia  Ask your child's healthcare provider for information if you or your child currently smoke and need help to quit  E-cigarettes or smokeless tobacco still contain nicotine  Talk to your healthcare provider before you or your child use these products  Prevent the spread of a cold:   · Keep your child away from other people during the first 3 to 5 days of his or her cold  The virus is spread most easily during this time  · Wash your hands and your child's hands often  Teach your child to cover his or her nose and mouth when he or she sneezes, coughs, and blows his or her nose  Show your child how to cough and sneeze into the crook of the elbow instead of the hands  · Do not let your child share toys, pacifiers, or towels with others while he or she is sick  · Do not let your child share foods, eating utensils, cups, or drinks with others while he or she is sick    © 2017 [employer] Information is for End User's use only and may not be sold, redistributed or otherwise used for commercial purposes  All illustrations and images included in CareNotes® are the copyrighted property of [employer]  or Devan Hutchinson  The above information is an  only  It is not intended as medical advice for individual conditions or treatments  Talk to your doctor, nurse or pharmacist before following any medical regimen to see if it is safe and effective for you

## 2024-05-18 LAB
ATRIAL RATE: 105 BPM
ATRIAL RATE: 227 BPM
P AXIS: 213 DEGREES
QRS AXIS: 73 DEGREES
QRS AXIS: 78 DEGREES
QRSD INTERVAL: 78 MS
QRSD INTERVAL: 80 MS
QT INTERVAL: 340 MS
QT INTERVAL: 354 MS
QTC INTERVAL: 461 MS
QTC INTERVAL: 462 MS
T WAVE AXIS: -3 DEGREES
T WAVE AXIS: -69 DEGREES
VENTRICULAR RATE: 102 BPM
VENTRICULAR RATE: 111 BPM

## 2024-05-18 PROCEDURE — 93010 ELECTROCARDIOGRAM REPORT: CPT | Performed by: INTERNAL MEDICINE

## 2024-05-20 ENCOUNTER — VBI (OUTPATIENT)
Dept: FAMILY MEDICINE CLINIC | Facility: CLINIC | Age: 79
End: 2024-05-20

## 2024-05-20 NOTE — TELEPHONE ENCOUNTER
05/20/24 11:02 AM    Patient contacted post ED visit, [facility] department spoke with patient/caregiver and outreach was successful.    Thank you.  Lovely Rodriguez  [facility]

## 2024-05-24 ENCOUNTER — OFFICE VISIT (OUTPATIENT)
Dept: FAMILY MEDICINE CLINIC | Facility: CLINIC | Age: 79
End: 2024-05-24
Payer: MEDICARE

## 2024-05-24 VITALS
WEIGHT: 189 LBS | TEMPERATURE: 97.1 F | DIASTOLIC BLOOD PRESSURE: 70 MMHG | BODY MASS INDEX: 31.49 KG/M2 | OXYGEN SATURATION: 96 % | HEART RATE: 116 BPM | HEIGHT: 65 IN | SYSTOLIC BLOOD PRESSURE: 112 MMHG

## 2024-05-24 DIAGNOSIS — I50.23 ACUTE ON CHRONIC SYSTOLIC CONGESTIVE HEART FAILURE (HCC): Primary | ICD-10-CM

## 2024-05-24 DIAGNOSIS — I50.22 CHRONIC SYSTOLIC (CONGESTIVE) HEART FAILURE (HCC): ICD-10-CM

## 2024-05-24 PROCEDURE — G2211 COMPLEX E/M VISIT ADD ON: HCPCS | Performed by: FAMILY MEDICINE

## 2024-05-24 PROCEDURE — 99213 OFFICE O/P EST LOW 20 MIN: CPT | Performed by: FAMILY MEDICINE

## 2024-05-24 RX ORDER — FUROSEMIDE 20 MG/1
20 TABLET ORAL DAILY
Qty: 90 TABLET | Refills: 1 | Status: SHIPPED | OUTPATIENT
Start: 2024-05-24

## 2024-05-24 NOTE — PROGRESS NOTES
Ambulatory Visit  Name: Madeline Good      : 1945      MRN: [id number]  Encounter Provider: Arnold Payton DO  Encounter Date: 2024   Encounter department: [facility] [address]    Assessment & Plan   1. Acute on chronic systolic congestive heart failure (HCC)  Increase her furosemide to 20 mg daily, she is to check her weights daily.  Follow-up with her cardiologist as scheduled.     History of Present Illness     Patient comes in today for ER follow-up.  She states that she ran out of her Lasix and started to gain weight and become more short of breath.  She was seen in the emergency room and restarted on her Lasix.  Apparently she was taking it every day instead of every other day.  She states she felt fine when she was taking it every day.        Review of Systems   Constitutional:  Negative for chills, fatigue and fever.   HENT:  Negative for congestion, ear pain, hearing loss, postnasal drip, rhinorrhea and sore throat.    Eyes:  Negative for pain and visual disturbance.   Respiratory:  Positive for shortness of breath (Improved). Negative for chest tightness and wheezing.    Cardiovascular:  Negative for chest pain and leg swelling.   Gastrointestinal:  Negative for abdominal distention, abdominal pain, constipation, diarrhea and vomiting.   Endocrine: Negative for cold intolerance and heat intolerance.   Genitourinary:  Negative for difficulty urinating, frequency and urgency.   Musculoskeletal:  Negative for arthralgias and gait problem.   Skin:  Negative for color change.   Neurological:  Negative for dizziness, tremors, syncope, numbness and headaches.   Hematological:  Negative for adenopathy.   Psychiatric/Behavioral:  Negative for agitation, confusion and sleep disturbance. The patient is not nervous/anxious.        Objective     /70 (BP Location: Left arm, Patient Position: Sitting, Cuff Size: Standard)   Pulse (!) 116   Temp (!) 97.1 °F  "(36.2 °C) (Tympanic)   Ht 5' 5\" (1.651 m)   Wt 85.7 kg (189 lb)   SpO2 96%   BMI 31.45 kg/m²     Physical Exam  Constitutional:       Appearance: She is well-developed.   HENT:      Head: Normocephalic and atraumatic.      Right Ear: External ear normal.      Left Ear: External ear normal.      Nose: Nose normal.      Mouth/Throat:      Pharynx: Oropharynx is clear.   Eyes:      Extraocular Movements: Extraocular movements intact.      Conjunctiva/sclera: Conjunctivae normal.      Pupils: Pupils are equal, round, and reactive to light.   Neck:      Thyroid: No thyromegaly.   Cardiovascular:      Rate and Rhythm: Normal rate and regular rhythm.      Heart sounds: Normal heart sounds. No murmur heard.  Pulmonary:      Effort: Pulmonary effort is normal.      Breath sounds: Rales (Mild, bibasilar) present.   Abdominal:      General: Bowel sounds are normal. There is no distension.      Palpations: Abdomen is soft.   Musculoskeletal:         General: Normal range of motion.      Cervical back: Normal range of motion and neck supple.   Skin:     General: Skin is warm.   Neurological:      Mental Status: She is alert and oriented to person, place, and time.   Psychiatric:         Mood and Affect: Mood normal.       Administrative Statements       "

## 2024-05-28 ENCOUNTER — OFFICE VISIT (OUTPATIENT)
Dept: CARDIOLOGY CLINIC | Facility: CLINIC | Age: 79
End: 2024-05-28
Payer: MEDICARE

## 2024-05-28 VITALS
WEIGHT: 187 LBS | OXYGEN SATURATION: 96 % | DIASTOLIC BLOOD PRESSURE: 80 MMHG | HEIGHT: 65 IN | RESPIRATION RATE: 16 BRPM | SYSTOLIC BLOOD PRESSURE: 142 MMHG | BODY MASS INDEX: 31.16 KG/M2 | HEART RATE: 80 BPM

## 2024-05-28 DIAGNOSIS — I50.32 CHRONIC HEART FAILURE WITH PRESERVED EJECTION FRACTION (HFPEF) (HCC): ICD-10-CM

## 2024-05-28 DIAGNOSIS — I10 ESSENTIAL HYPERTENSION: ICD-10-CM

## 2024-05-28 DIAGNOSIS — I42.8 NICM (NONISCHEMIC CARDIOMYOPATHY) (HCC): ICD-10-CM

## 2024-05-28 DIAGNOSIS — R06.09 DYSPNEA ON EXERTION: Primary | ICD-10-CM

## 2024-05-28 DIAGNOSIS — I25.10 CORONARY ARTERY CALCIFICATION SEEN ON CT SCAN: ICD-10-CM

## 2024-05-28 PROCEDURE — 99214 OFFICE O/P EST MOD 30 MIN: CPT

## 2024-05-28 NOTE — PATIENT INSTRUCTIONS
Increase Lasix 3-5 days to 20mg twice daily. Take extra dose of K on the days you take extra dose of Lasix.    Obtain bloodwork in 1 week

## 2024-05-28 NOTE — PROGRESS NOTES
CARDIOLOGY OFFICE VISIT  [facility]  [address], [place] [address]  Tel: [phone]      NAME: Madeline Good  AGE: 78 y.o.  SEX: female  : 1945  MRN: [id number]      Chief Complaint:  Chief Complaint   Patient presents with    Follow-up       Assessment and Plan:    Dyspnea on exertion  Likely related to recent heart failure exacerbation.  However, discussed with patient that her symptoms could be an anginal equivalent as she was experiencing dyspnea on exertion prior to heart failure exacerbation and has history of abnormal stress test and coronary artery calcifications on CT scan.  Discussed with patient further evaluation with cardiac CTA versus cardiac catheterization, versus diuresing and reassessing symptoms.  Patient would prefer to trial increase diuresis and reevaluate symptoms at her next follow-up appointment.  She was advised to report any worsening of her symptoms to our office, or report to the ER for chest pain or shortness of breath of concern.     2. Chronic HFpEF  Patient continues to endorse dyspnea on exertion.  Medication Regiment Includes:   Diuretic: Increase Lasix to 20 mg twice daily for 3-5 days depending on symptom improvement.  Patient was advised to take an extra dose of potassium with the extra dose of Lasix.  Strongly encourage low sodium DASH diet.   Recommend checking weight daily. Please call our office if you notice a 3lb gain in one day or 5lb gain over 1 week or lower extremity edema.  Patient was advised to call our office or proceed to the ER for worsening shortness of breath.  BMP ordered to be obtained in 1 week to assess kidney function and electrolytes.    3.  NICM with EF improvement-EF 50-55%  Etiology: Likely tachycardia mediated given improvement of AF with rate/rhythm control of atrial fibrillation  Medication Regiment Includes:   GDMT:   Beta Blocker: Continue Toprol 100  mg daily  ACEI/ARB/ARNI: Previously discontinued in the setting of soft BPs    4.  ASCVD/coronary artery calcifications on CT  Continue lovastatin    5.Persistent atrial fibrillation/flutter s/p ablation (10/2023)  Continue management per EP.  Currently managed with amiodarone, anticoagulation with Eliquis, Toprol    6.  SSS s/p PPM (12/2018)  Continue to follow with the device clinic    7.  Hypertension  BP initially elevated today, however decreased to SBP in the 130s upon recheck.  She reports ambulatory BPs in the 120s-130s.  BP well-controlled at recent outpatient appointments  Continue midodrine, Toprol, Lasix per above.  Ambulatory BP monitoring and low-sodium diet advised    8.  Hyperlipidemia  Continue statin    9.  History of TIA    10.  CKD          Please return for follow-up in 2 weeks or sooner as needed.     1. Dyspnea on exertion        2. Chronic heart failure with preserved ejection fraction (HFpEF) ([facility])  Basic metabolic panel      3. NICM (nonischemic cardiomyopathy) ([facility])        4. Coronary artery calcification seen on CT scan        5. Essential hypertension            History of Present Illness:     Madeline Good is a 78 y.o. female with PMHx of chronic HFpEF, NICM with EF improvement after rate/rhythm control of atrial fibrillation, persistent atrial fibrillation s/p multiple LUIZA/DCCV and ablation, sick sinus syndrome s/p PPM, hypertension, hyperlipidemia, history of TIA, pulmonary hypertension, COPD, hypothyroidism, PHILIPPE, CKD who presents for CHF follow-up.  This patient is known to Dr. Gomez.  He additionally follows with Dr. Fajardo from .    She presented to the ER 5/17/2024 with shortness of breath, 3 pound weight gain, lower extremity edema.  She had ran out of her Lasix at that time.  BNP was elevated to 248.  Trops negative x 1.  Chest x-ray with increased interstitial lung markings bilaterally, right greater than left, suggestive of pulmonary edema.  She was given one-time dose of IV  Lasix 20 mg and discharged home.    Patient states she is continuing to have dyspnea on exertion.  States she had dyspnea on exertion prior to running out of fluid and becoming volume overloaded.  States states her shortness of breath has gradually been improving since being seen in the ER but is still worse compared to her baseline.  She denies chest pain/pressure.  She states she does not lay flat ever.  She denies PND, but states she overall does not sleep well at night which is not related to problems with breathing.  She denies lower extremity edema.  She denies weight gain.  She does endorse occasional positional related lightheadedness which has been improving over the past several weeks.  She was advised to closely monitor the symptoms and advise our office of any failure to further improve or worsening of the symptoms.  She states she monitors her blood pressure at home and her systolic BP is typically in the 120s-130s.        Recent Cardiac Work-Up:  LUIZA 10/2023: EF 50-55%, mild aortic regurgitation, mild mitral regurgitation, mild to moderate tricuspid regurgitation  CT cardiac with pulmonary vein mapping 10/2023 revealed mild coronary artery calcifications  Patient did undergo pharmacologic MPI 3/2023 which revealed partially reversible defect in the mid to apical anterior and anterolateral locations.  She was seen in office and further evaluation was not deemed necessary at that time.  TTE 3/2023: EF 55%, left atrium moderately dilated, right atrium mildly dilated, mild to moderate aortic regurgitation, moderate tricuspid regurgitation, PASP moderately increased at 52 mmHg  Patient was diagnosed with atrial fibrillation on EKG done at PCP office.  Initially asymptomatic with it.  Was cardioverted 12/28/2018 and was in sinus rhythm for a while but then converted back into atrial fibrillation.  Subsequently started on rhythm control with sotalol, did well for a while but required cardioversion again on  "11/3/2022.  Subsequently placed on amiodarone but started having breakthrough atrial fibrillation.  She is now s/p cryoablation with pulmonary vein isolation, posterior wall isolation, and ablation of mitral isthmus dependent flutter (10/19/2023).  Patient was seen by Dr. Fajardo 2/28/2024 and was found to be in atrial flutter with RVR.  Her amiodarone and Toprol were increased, and she subsequently converted to sinus rhythm.  TTE 11/2022: EF 40-45%.  Patient was in atrial fibrillation with periods of RVR which interfered with interpretation.  Mild global hypokinesis.  TTE 7/2020: EF 60%  TTE 2/2019: EF 60%  TTE 1/11/2019: EF 30%  TTE 1/1/2019: EF 50%  TTE 10/2018: EF 35-40%  TTE 2/2018: EF 40-45%        Review of Systems:   Review of Systems   Constitutional:  Negative for chills, diaphoresis, fever and unexpected weight change.   HENT:  Negative for ear pain and sore throat.    Eyes:  Negative for pain and redness.   Respiratory:  Positive for shortness of breath. Negative for cough.    Cardiovascular:  Negative for chest pain, palpitations and leg swelling.   Gastrointestinal:  Negative for abdominal pain, nausea and vomiting.   Genitourinary:  Negative for dysuria.   Skin:  Negative for color change and rash.   Neurological:  Positive for light-headedness. Negative for syncope.   Hematological:  Does not bruise/bleed easily.   Psychiatric/Behavioral:  Negative for agitation and behavioral problems.    All other systems reviewed and are negative.        Vitals:  Vitals:    05/28/24 1313   BP: 142/80   BP Location: Left arm   Patient Position: Sitting   Cuff Size: Standard   Pulse: 80   Resp: 16   SpO2: 96%   Weight: 84.8 kg (187 lb)   Height: 5' 5\" (1.651 m)        Body mass index is 31.12 kg/m².    Weight (last 2 days)       Date/Time Weight    05/28/24 1313 84.8 (187)              Physical Exam:   GEN: Alert and oriented x 3, in no acute distress.  Well appearing and well nourished.   HEENT: Sclera anicteric, " conjunctivae pink, mucous membranes moist. Oropharynx clear.   NECK: Supple, no carotid bruits, no significant JVD. Trachea midline, no thyromegaly.   HEART: Regular rhythm, normal S1 and S2, no murmurs, clicks, gallops or rubs. PMI nondisplaced, no thrills.   LUNGS: Clear to auscultation bilaterally; no wheezes, rales, or rhonchi. No increased work of breathing or signs of respiratory distress.   ABDOMEN: Soft, nontender, nondistended.   EXTREMITIES: Skin warm and well perfused, no clubbing, cyanosis, or edema.  NEURO: No focal findings. Normal speech. Mood and affect normal.   SKIN: Normal without suspicious lesions on exposed skin.          Active Problems:  Patient Active Problem List   Diagnosis    Essential hypertension    Mixed hyperlipidemia    Obesity (BMI 30-39.9)    H/O TIA (transient ischemic attack) and stroke    Migraine with aura and without status migrainosus, not intractable    Myocardiopathy (HCC)    Anticoagulant long-term use    Nonrheumatic mitral valve regurgitation    Nonrheumatic aortic valve insufficiency    Nonrheumatic tricuspid valve regurgitation    Persistent atrial fibrillation (HCC)    Other insomnia    Chronic obstructive pulmonary disease (HCC)    BMI 33.0-33.9,adult    Stage 3a chronic kidney disease (HCC)    Acute on chronic systolic congestive heart failure (HCC)    SSS (sick sinus syndrome) (HCC)    Diplopia    Lightheadedness    Status post ablation of atrial fibrillation (PVI, posterior wall, and mitral isthmus dependent flutter line) 10/19/2023    Acquired hypothyroidism    Iron deficiency anemia       Past Medical History:  Past Medical History:   Diagnosis Date    Aphasia, mixed 07/28/2017    Atrial fibrillation (HCC)     CHF (congestive heart failure) (HCC)     Colon polyp     Headache     Hyperlipidemia     Hypertension     Lyme disease     Shortness of breath     TIA (transient ischemic attack)     Vertigo          Past Surgical History:  Past Surgical History:    Procedure Laterality Date    CARDIAC ELECTROPHYSIOLOGY PROCEDURE N/A 10/19/2023    Procedure: Cardiac eps/afib ablation PVI/POST WALL;  Surgeon: Piero Fajardo MD;  Location: [facility];  Service: Cardiology    CARDIAC PACEMAKER PLACEMENT  12/2019    COLONOSCOPY      VT SIGMOIDOSCOPY FLX DX W/COLLJ SPEC BR/WA IF PFRMD N/A 11/28/2017    Procedure: SIGMOIDOSCOPY FLEXIBLE;  Surgeon: Kavon Fernandez MD;  Location: [facility];  Service: Gastroenterology    TONSILLECTOMY           Family History:  Family History   Problem Relation Age of Onset    Lung cancer Mother     Dementia Father     Alzheimer's disease Father     Other Father         Cardiac Disorder     Lung cancer Maternal Grandmother     Breast cancer Paternal Aunt 40    No Known Problems Paternal Aunt     No Known Problems Paternal Aunt          Social History:  Social History     Socioeconomic History    Marital status: /Civil Union     Spouse name: None    Number of children: None    Years of education: None    Highest education level: None   Occupational History    Occupation: Retired   Tobacco Use    Smoking status: Never    Smokeless tobacco: Never    Tobacco comments:     no smoking for 46 years   Vaping Use    Vaping status: Never Used   Substance and Sexual Activity    Alcohol use: Yes     Alcohol/week: 7.0 standard drinks of alcohol     Types: 7 Glasses of wine per week    Drug use: No    Sexual activity: Never     Partners: Male     Birth control/protection: None   Other Topics Concern    None   Social History Narrative    Always uses seat belt     Social Determinants of Health     Financial Resource Strain: Low Risk  (12/11/2023)    Overall Financial Resource Strain (CARDIA)     Difficulty of Paying Living Expenses: Not very hard   Food Insecurity: No Food Insecurity (12/22/2022)    Hunger Vital Sign     Worried About Running Out of Food in the Last Year: Never true     Ran Out of Food in the Last Year: Never true   Transportation  "Needs: No Transportation Needs (12/11/2023)    PRAPARE - Transportation     Lack of Transportation (Medical): No     Lack of Transportation (Non-Medical): No   Physical Activity: Not on file   Stress: Not on file   Social Connections: Not on file   Intimate Partner Violence: Not on file   Housing Stability: Low Risk  (12/22/2022)    Housing Stability Vital Sign     Unable to Pay for Housing in the Last Year: No     Number of Places Lived in the Last Year: 1     Unstable Housing in the Last Year: No           The following portions of the patient's history were reviewed and updated as appropriate: past medical history, past surgical history, past family history,  past social history, current medications, allergies and problem list.      Imaging:   No results found.      Laboratory Results:  CBC with diff:   Lab Results   Component Value Date    WBC 10.48 (H) 05/17/2024    RBC 4.63 05/17/2024    HGB 15.6 (H) 05/17/2024    HCT 46.1 05/17/2024     (H) 05/17/2024    MCH 33.7 05/17/2024    RDW 14.6 05/17/2024     05/17/2024       CMP:  Lab Results   Component Value Date    CREATININE 1.04 05/17/2024    BUN 19 05/17/2024    K 4.1 05/17/2024     05/17/2024    CO2 24 05/17/2024    ALKPHOS 105 (H) 05/17/2024    ALT 15 05/17/2024    AST 24 05/17/2024    BILIDIR 0.13 01/05/2023       Lab Results   Component Value Date    HGBA1C 5.8 (H) 11/01/2022    MG 2.1 03/04/2024    PHOS 3.5 03/16/2023       Lab Results   Component Value Date    TROPONINI 0.05 (H) 02/10/2019    TROPONINI 0.06 (H) 02/09/2019    TROPONINI 0.06 (H) 02/09/2019       Lipid Profile:   No results found for: \"CHOL\"  Lab Results   Component Value Date    HDL 53 12/08/2023    HDL 45 (L) 11/02/2022    HDL 55 11/22/2021     Lab Results   Component Value Date    LDLCALC 91 12/08/2023    LDLCALC 62 11/02/2022    LDLCALC 74 11/22/2021     Lab Results   Component Value Date    TRIG 80 12/08/2023    TRIG 75 11/02/2022    TRIG 90 11/22/2021       Cardiac " testing:   Results for orders placed during the hospital encounter of 11/01/22    Echo complete w/ contrast if indicated    Interpretation Summary    Left Ventricle: Left ventricular cavity size is normal. Systolic function is mildly reduced.  LVEF approximately 40 to 45%.  Patient was in atrial fibrillation with periods of rapid ventricular response which interferes with interpretation. There is mild global hypokinesis. Unable to assess diastolic function due to atrial fibrillation.    Right Ventricle: Right ventricular cavity size is mildly dilated. Systolic function is mildly to moderately reduced.    Left Atrium: The atrium is moderate to markedly dilated.    Right Atrium: The atrium is moderately dilated.    Aortic Valve: There is mild regurgitation.    Mitral Valve: There is moderate annular calcification. There is moderate regurgitation.    Tricuspid Valve: There is moderate regurgitation. The right ventricular systolic pressure is mildly elevated.  Estimated RVSP 35 mm Hg.  Pacer leads noted.    Pulmonic Valve: There is mild regurgitation.    Results for orders placed during the hospital encounter of 03/07/23    Echo follow up/limited w/ contrast if indicated    Interpretation Summary    Left Ventricle: Left ventricular cavity size is normal. The left ventricular ejection fraction is 55%. Systolic function is normal.    Left Atrium: The atrium is moderately dilated.    Right Atrium: The atrium is mildly dilated.    Aortic Valve: There is mild to moderate regurgitation.    Tricuspid Valve: There is moderate regurgitation.    Pulmonary Artery: The estimated pulmonary artery systolic pressure is 52.0 mmHg. The pulmonary artery systolic pressure is moderately increased.    This is a limited echocardiogram performed to evaluate LV function. Interpretation is therefore limited.    No results found for this or any previous visit.    No results found for this or any previous visit.    Results for orders placed  during the hospital encounter of 03/07/23    NM myocardial perfusion spect (rx stress and/or rest)    Interpretation Summary    Stress ECG: No ST deviation is noted. There were no arrhythmias during recovery. . The ECG was not diagnostic due to pharmacological (vasodilator) stress.    Perfusion Defect Conclusion: The stress/rest perfusion ratio is 1.03 . There is no evidence of transient ischemic dilation (TID).    Stress Function: Left ventricular function post-stress is normal. Post-stress ejection fraction is 79 %.    Perfusion: There is a left ventricular perfusion defect that is medium in size with moderate reduction in uptake present in the mid to apical anterior and anterolateral location(s) that is partially reversible.    Stress Combined Conclusion: Left ventricular perfusion is probably abnormal.    No results found for this or any previous visit.    No results found for this or any previous visit.    No results found for this or any previous visit.        Medications:    Current Outpatient Medications:     amiodarone 200 mg tablet, Take 1 tablet (200 mg total) by mouth 2 (two) times a day For 14 days, Disp: 28 tablet, Rfl: 0    apixaban (Eliquis) 5 mg, Take 1 tablet (5 mg total) by mouth 2 (two) times a day, Disp: 180 tablet, Rfl: 3    Azelastine HCl 137 MCG/SPRAY SOLN, USE 2 SPRAYS IN BOTH NOSTRILS IN THE MORNING AS DIRECTED, Disp: 60 mL, Rfl: 1    furosemide (LASIX) 20 mg tablet, Take 1 tablet (20 mg total) by mouth daily, Disp: 90 tablet, Rfl: 1    levothyroxine 25 mcg tablet, TAKE 1 TABLET BY MOUTH DAILY IN  THE EARLY MORNING, Disp: 90 tablet, Rfl: 3    lovastatin (MEVACOR) 10 MG tablet, Take 1 tablet (10 mg total) by mouth daily at bedtime, Disp: 90 tablet, Rfl: 3    metoprolol succinate (TOPROL-XL) 100 mg 24 hr tablet, Take 1 tablet (100 mg total) by mouth daily (Patient taking differently: Take 100 mg by mouth 2 (two) times a day), Disp: 90 tablet, Rfl: 3    midodrine (PROAMATINE) 5 mg tablet, TAKE  1 TABLET BY MOUTH 3 TIMES  DAILY BEFORE MEALS, Disp: 270 tablet, Rfl: 1    potassium chloride (Klor-Con M10) 10 mEq tablet, Take 1 tablet (10 mEq total) by mouth every other day, Disp: 45 tablet, Rfl: 3      Allergies:  No Known Allergies        Recommend aggressive risk factor modification and therapeutic lifestyle changes.  Low-salt, low-calorie, low-fat, low-cholesterol diet with regular exercise and to optimize weight.    Discussed concepts of cardiovascular disease, including signs and symptoms of heart disease.    Previous studies were reviewed.    Safety measures were reviewed.  Questions were entertained and answered.  Patient was advised to report any problems requiring medical attention.    Follow-up with PCP and appropriate specialist and lab work/testing as discussed.    Return for follow up visit as scheduled or earlier, if needed.  Thank you for allowing me to participate in the care and evaluation of your patient.  Should you have any questions, please feel free to contact me.    Crystal Webb PA-C  5/28/2024,2:30 PM

## 2024-06-11 ENCOUNTER — APPOINTMENT (OUTPATIENT)
Dept: LAB | Facility: HOSPITAL | Age: 79
End: 2024-06-11
Payer: MEDICARE

## 2024-06-11 DIAGNOSIS — I50.32 CHRONIC HEART FAILURE WITH PRESERVED EJECTION FRACTION (HFPEF) (HCC): ICD-10-CM

## 2024-06-11 LAB
ANION GAP SERPL CALCULATED.3IONS-SCNC: 8 MMOL/L (ref 4–13)
BUN SERPL-MCNC: 14 MG/DL (ref 5–25)
CALCIUM SERPL-MCNC: 9.3 MG/DL (ref 8.4–10.2)
CHLORIDE SERPL-SCNC: 103 MMOL/L (ref 96–108)
CO2 SERPL-SCNC: 29 MMOL/L (ref 21–32)
CREAT SERPL-MCNC: 1.13 MG/DL (ref 0.6–1.3)
GFR SERPL CREATININE-BSD FRML MDRD: 46 ML/MIN/1.73SQ M
GLUCOSE SERPL-MCNC: 97 MG/DL (ref 65–140)
POTASSIUM SERPL-SCNC: 4.1 MMOL/L (ref 3.5–5.3)
SODIUM SERPL-SCNC: 140 MMOL/L (ref 135–147)

## 2024-06-11 PROCEDURE — 36415 COLL VENOUS BLD VENIPUNCTURE: CPT

## 2024-06-11 PROCEDURE — 80048 BASIC METABOLIC PNL TOTAL CA: CPT

## 2024-06-12 ENCOUNTER — TELEPHONE (OUTPATIENT)
Dept: CARDIOLOGY CLINIC | Facility: CLINIC | Age: 79
End: 2024-06-12

## 2024-06-12 ENCOUNTER — OFFICE VISIT (OUTPATIENT)
Dept: CARDIOLOGY CLINIC | Facility: CLINIC | Age: 79
End: 2024-06-12
Payer: MEDICARE

## 2024-06-12 VITALS
WEIGHT: 187 LBS | DIASTOLIC BLOOD PRESSURE: 90 MMHG | OXYGEN SATURATION: 92 % | BODY MASS INDEX: 31.16 KG/M2 | HEART RATE: 101 BPM | HEIGHT: 65 IN | RESPIRATION RATE: 16 BRPM | SYSTOLIC BLOOD PRESSURE: 138 MMHG

## 2024-06-12 DIAGNOSIS — Z79.01 ANTICOAGULANT LONG-TERM USE: ICD-10-CM

## 2024-06-12 DIAGNOSIS — Z98.890 STATUS POST ABLATION OF ATRIAL FIBRILLATION: ICD-10-CM

## 2024-06-12 DIAGNOSIS — I25.84 CORONARY ARTERY CALCIFICATION: ICD-10-CM

## 2024-06-12 DIAGNOSIS — I48.19 PERSISTENT ATRIAL FIBRILLATION (HCC): ICD-10-CM

## 2024-06-12 DIAGNOSIS — E78.2 MIXED HYPERLIPIDEMIA: ICD-10-CM

## 2024-06-12 DIAGNOSIS — I49.5 SSS (SICK SINUS SYNDROME) (HCC): ICD-10-CM

## 2024-06-12 DIAGNOSIS — Z86.79 STATUS POST ABLATION OF ATRIAL FIBRILLATION: ICD-10-CM

## 2024-06-12 DIAGNOSIS — I50.32 CHRONIC HEART FAILURE WITH PRESERVED EJECTION FRACTION (HFPEF) (HCC): Primary | ICD-10-CM

## 2024-06-12 DIAGNOSIS — I35.1 NONRHEUMATIC AORTIC VALVE INSUFFICIENCY: ICD-10-CM

## 2024-06-12 DIAGNOSIS — Z95.0 PACEMAKER: ICD-10-CM

## 2024-06-12 DIAGNOSIS — I25.10 CORONARY ARTERY CALCIFICATION: ICD-10-CM

## 2024-06-12 DIAGNOSIS — I36.1 NONRHEUMATIC TRICUSPID VALVE REGURGITATION: ICD-10-CM

## 2024-06-12 DIAGNOSIS — I42.8 OTHER CARDIOMYOPATHY (HCC): ICD-10-CM

## 2024-06-12 PROCEDURE — 99214 OFFICE O/P EST MOD 30 MIN: CPT | Performed by: INTERNAL MEDICINE

## 2024-06-12 RX ORDER — AMIODARONE HYDROCHLORIDE 200 MG/1
200 TABLET ORAL 2 TIMES DAILY
Qty: 90 TABLET | Refills: 2 | Status: SHIPPED | OUTPATIENT
Start: 2024-06-12

## 2024-06-12 NOTE — TELEPHONE ENCOUNTER
As per Dr Gomez patient to stop midodrine     continue amiodarone 200 mg daily       Spoke with patient and she verbally understood

## 2024-06-12 NOTE — PROGRESS NOTES
CARDIOLOGY OFFICE VISIT  [facility]  [address], [place] [id number]  Tel: [phone]      NAME: Madeline Good  AGE: 78 y.o.  SEX: female  : 1945  MRN: [id number]      Chief Complaint:  Chief Complaint   Patient presents with    Follow-up         History of Present Illness:   Patient comes for follow up. States her shortness of breath has improved following the diuretic use but it has not completely resolved.  She has a dry cough and some wheezing still ongoing. Pt denies chest pain / pressure, palpitations, lightheadedness, syncope, swelling feet, orthopnea, PND, claudication.    Chronic HFpEF - currently on furosemide 20 mg OD, potassium, metoprolol succinate. Lisinopril was discontinued for soft BP.  States she has been watching her salt intake closely     Nonischemic cardiomyopathy, likely tachycardia mediated -  patient has a history of nonischemic cardiomyopathy with improvement in EF after rate/rhythm control of her atrial fibrillation. Current EF is 55% (2023).  On metoprolol succinate.  Lisinopril was discontinued for soft BP     Persistent AF / flutter s/p ablation - Patient was diagnosed with atrial fibrillation on an EKG done at her PCP office. Patient initially had no symptoms from it. She was cardioverted on 2018 and was in sinus rhythm for a while but then went back into Afib. Then she was started on rhythm control with Sotalol. Did well for a while but again required cardioversion on 11/3/2022. She was placed on amiodarone but started having breakthrough atrial fibrillation. Now status post cryoablation with pulmonary vein isolation, posterior wall isolation and ablation of mitral isthmus dependent flutter (10/19/2023) by Dr. Andrei Lugo. Also on Amiodarone + Metoprolol     SSS s/p PPM - last pacemaker interrogation discussed with the patient.  Continue regular pacemaker interrogations       Coronary artery calcification on CT chest -  States is doing well cardiac wise with no cardiac symptoms.  Taking all medications regularly.     Essential hypertension -  Has been hypertensive for many years.  Taking medications regularly.  Denies lightheadedness, headache, medication side effects.      Mixed hyperlipidemia -  Has had hyperlipidemia for many years.  Taking statin regularly along with diet control.  Denies myalgia.  PCP closely monitoring the blood work.    Moderate aortic insufficiency, moderate tricuspid regurgitation.    H/O TIA  CKD      Past Medical History:  Past Medical History:   Diagnosis Date    Aphasia, mixed 07/28/2017    Atrial fibrillation (HCC)     CHF (congestive heart failure) (HCC)     Colon polyp     Headache     Hyperlipidemia     Hypertension     Lyme disease     Shortness of breath     TIA (transient ischemic attack)     Vertigo          Past Surgical History:  Past Surgical History:   Procedure Laterality Date    CARDIAC ELECTROPHYSIOLOGY PROCEDURE N/A 10/19/2023    Procedure: Cardiac eps/afib ablation PVI/POST WALL;  Surgeon: Piero Fajardo MD;  Location:  CARDIAC CATH LAB;  Service: Cardiology    CARDIAC PACEMAKER PLACEMENT  12/2019    COLONOSCOPY      WY SIGMOIDOSCOPY FLX DX W/COLLJ SPEC BR/WA IF PFRMD N/A 11/28/2017    Procedure: SIGMOIDOSCOPY FLEXIBLE;  Surgeon: Kavon Fernandez MD;  Location: [facility];  Service: Gastroenterology    TONSILLECTOMY           Family History:  Family History   Problem Relation Age of Onset    Lung cancer Mother     Dementia Father     Alzheimer's disease Father     Other Father         Cardiac Disorder     Lung cancer Maternal Grandmother     Breast cancer Paternal Aunt 40    No Known Problems Paternal Aunt     No Known Problems Paternal Aunt          Social History:  Social History     Socioeconomic History    Marital status: /Civil Union     Spouse name: None    Number of children: None    Years of education: None    Highest  education level: None   Occupational History    Occupation: Retired   Tobacco Use    Smoking status: Never    Smokeless tobacco: Never    Tobacco comments:     no smoking for 46 years   Vaping Use    Vaping status: Never Used   Substance and Sexual Activity    Alcohol use: Yes     Alcohol/week: 7.0 standard drinks of alcohol     Types: 7 Glasses of wine per week    Drug use: No    Sexual activity: Never     Partners: Male     Birth control/protection: None   Other Topics Concern    None   Social History Narrative    Always uses seat belt     Social Determinants of Health     Financial Resource Strain: Low Risk  (12/11/2023)    Overall Financial Resource Strain (CARDIA)     Difficulty of Paying Living Expenses: Not very hard   Food Insecurity: No Food Insecurity (12/22/2022)    Hunger Vital Sign     Worried About Running Out of Food in the Last Year: Never true     Ran Out of Food in the Last Year: Never true   Transportation Needs: No Transportation Needs (12/11/2023)    PRAPARE - Transportation     Lack of Transportation (Medical): No     Lack of Transportation (Non-Medical): No   Physical Activity: Not on file   Stress: Not on file   Social Connections: Not on file   Intimate Partner Violence: Not on file   Housing Stability: Low Risk  (12/22/2022)    Housing Stability Vital Sign     Unable to Pay for Housing in the Last Year: No     Number of Places Lived in the Last Year: 1     Unstable Housing in the Last Year: No         Active Problems:  Patient Active Problem List   Diagnosis    Essential hypertension    Mixed hyperlipidemia    Obesity (BMI 30-39.9)    H/O TIA (transient ischemic attack) and stroke    Migraine with aura and without status migrainosus, not intractable    Myocardiopathy (HCC)    Anticoagulant long-term use    Nonrheumatic mitral valve regurgitation    Nonrheumatic aortic valve insufficiency    Nonrheumatic tricuspid valve regurgitation    Persistent atrial fibrillation (HCC)    Other insomnia     Chronic obstructive pulmonary disease (HCC)    BMI 33.0-33.9,adult    Stage 3a chronic kidney disease (HCC)    Acute on chronic systolic congestive heart failure (HCC)    SSS (sick sinus syndrome) ([facility])    Diplopia    Lightheadedness    Status post ablation of atrial fibrillation (PVI, posterior wall, and mitral isthmus dependent flutter line) 10/19/2023    Acquired hypothyroidism    Iron deficiency anemia         The following portions of the patient's history were reviewed and updated as appropriate: past medical history, past surgical history, past family history,  past social history, current medications, allergies and problem list.      Review of Systems:  Constitutional: Denies fever, chills  Eyes: Denies eye redness, eye discharge  ENT: Denies hearing loss, sneezing, nasal discharge, sore throat   Respiratory: +cough, +shortness of breath, +wheezing  Cardiovascular: Denies chest pain, palpitations, lower extremity swelling  Gastrointestinal: Denies abdominal pain, nausea, vomiting, diarrhea  Genito-Urinary: Denies dysuria, incontinence  Musculoskeletal: Denies back pain, joint pain, muscle pain  Neurologic: Denies lightheadedness, syncope, headache, seizures  Endocrine: Denies polydipsia, temperature intolerance  Allergy and Immunology: Denies hives, insect bite sensitivity  Hematological and Lymphatic: Denies bleeding problems, swollen glands   Psychological: Denies depression, suicidal ideation, anxiety, panic  Dermatological: Denies pruritus, rash, skin lesion changes      Vitals:  Vitals:    06/12/24 1334   BP: 138/90   Pulse: 101   Resp: 16   SpO2: 92%       Body mass index is 31.12 kg/m².    Weight (last 2 days)       Date/Time Weight    06/12/24 1334 84.8 (187)              Physical Examination:  General: Patient is not in acute distress. Awake, alert, oriented in time, place and person. Responding to commands  Head: Normocephalic. Atraumatic  Eyes: Both pupils normal sized, round and reactive to  "light. Nonicteric  ENT: Normal external ear canals  Neck: Supple. JVP not raised. Trachea central. No thyromegaly  Lungs: Bilateral bronchovascular breath sounds with no crackles or rhonchi  Chest wall: No tenderness  Cardiovascular: RRR. S1 and S2 normal. No murmur, rub or gallop  Gastrointestinal: Abdomen soft, nontender. No guarding or rigidity. Liver and spleen not palpable. Bowel sounds present  Neurologic: Patient is awake, alert, oriented in time, place and person. Responding to commands. Moving all extremities  Integumentary:  No skin rash  Lymphatic: No cervical lymphadenopathy  Back: Symmetric. No CVA tenderness  Extremities: No clubbing, cyanosis or edema      Laboratory Results:  CBC with diff:   Lab Results   Component Value Date    WBC 10.48 (H) 05/17/2024    RBC 4.63 05/17/2024    HGB 15.6 (H) 05/17/2024    HCT 46.1 05/17/2024     (H) 05/17/2024    MCH 33.7 05/17/2024    RDW 14.6 05/17/2024     05/17/2024       CMP:  Lab Results   Component Value Date    CREATININE 1.13 06/11/2024    BUN 14 06/11/2024    K 4.1 06/11/2024     06/11/2024    CO2 29 06/11/2024    ALKPHOS 105 (H) 05/17/2024    ALT 15 05/17/2024    AST 24 05/17/2024    BILIDIR 0.13 01/05/2023       Lab Results   Component Value Date    HGBA1C 5.8 (H) 11/01/2022    MG 2.1 03/04/2024    PHOS 3.5 03/16/2023       Lab Results   Component Value Date    TROPONINI 0.05 (H) 02/10/2019    TROPONINI 0.06 (H) 02/09/2019    TROPONINI 0.06 (H) 02/09/2019       Lipid Profile:   No results found for: \"CHOL\"  Lab Results   Component Value Date    HDL 53 12/08/2023    HDL 45 (L) 11/02/2022    HDL 55 11/22/2021     Lab Results   Component Value Date    LDLCALC 91 12/08/2023    LDLCALC 62 11/02/2022    LDLCALC 74 11/22/2021     Lab Results   Component Value Date    TRIG 80 12/08/2023    TRIG 75 11/02/2022    TRIG 90 11/22/2021       Cardiac testing:   Results for orders placed during the hospital encounter of 11/01/22    Echo complete w/ " contrast if indicated    Interpretation Summary    Left Ventricle: Left ventricular cavity size is normal. Systolic function is mildly reduced.  LVEF approximately 40 to 45%.  Patient was in atrial fibrillation with periods of rapid ventricular response which interferes with interpretation. There is mild global hypokinesis. Unable to assess diastolic function due to atrial fibrillation.    Right Ventricle: Right ventricular cavity size is mildly dilated. Systolic function is mildly to moderately reduced.    Left Atrium: The atrium is moderate to markedly dilated.    Right Atrium: The atrium is moderately dilated.    Aortic Valve: There is mild regurgitation.    Mitral Valve: There is moderate annular calcification. There is moderate regurgitation.    Tricuspid Valve: There is moderate regurgitation. The right ventricular systolic pressure is mildly elevated.  Estimated RVSP 35 mm Hg.  Pacer leads noted.    Pulmonic Valve: There is mild regurgitation.    Results for orders placed during the hospital encounter of 03/07/23    Echo follow up/limited w/ contrast if indicated    Interpretation Summary    Left Ventricle: Left ventricular cavity size is normal. The left ventricular ejection fraction is 55%. Systolic function is normal.    Left Atrium: The atrium is moderately dilated.    Right Atrium: The atrium is mildly dilated.    Aortic Valve: There is mild to moderate regurgitation.    Tricuspid Valve: There is moderate regurgitation.    Pulmonary Artery: The estimated pulmonary artery systolic pressure is 52.0 mmHg. The pulmonary artery systolic pressure is moderately increased.    This is a limited echocardiogram performed to evaluate LV function. Interpretation is therefore limited.    Results for orders placed during the hospital encounter of 03/07/23    NM myocardial perfusion spect (rx stress and/or rest)    Interpretation Summary    Stress ECG: No ST deviation is noted. There were no arrhythmias during recovery.  . The ECG was not diagnostic due to pharmacological (vasodilator) stress.    Perfusion Defect Conclusion: The stress/rest perfusion ratio is 1.03 . There is no evidence of transient ischemic dilation (TID).    Stress Function: Left ventricular function post-stress is normal. Post-stress ejection fraction is 79 %.    Perfusion: There is a left ventricular perfusion defect that is medium in size with moderate reduction in uptake present in the mid to apical anterior and anterolateral location(s) that is partially reversible.    Stress Combined Conclusion: Left ventricular perfusion is probably abnormal.      Medications:    Current Outpatient Medications:     amiodarone 200 mg tablet, Take 1 tablet (200 mg total) by mouth 2 (two) times a day For 14 days, Disp: 28 tablet, Rfl: 0    apixaban (Eliquis) 5 mg, Take 1 tablet (5 mg total) by mouth 2 (two) times a day, Disp: 180 tablet, Rfl: 3    Azelastine HCl 137 MCG/SPRAY SOLN, USE 2 SPRAYS IN BOTH NOSTRILS IN THE MORNING AS DIRECTED, Disp: 60 mL, Rfl: 1    furosemide (LASIX) 20 mg tablet, Take 1 tablet (20 mg total) by mouth daily, Disp: 90 tablet, Rfl: 1    levothyroxine 25 mcg tablet, TAKE 1 TABLET BY MOUTH DAILY IN  THE EARLY MORNING, Disp: 90 tablet, Rfl: 3    lovastatin (MEVACOR) 10 MG tablet, Take 1 tablet (10 mg total) by mouth daily at bedtime, Disp: 90 tablet, Rfl: 3    metoprolol succinate (TOPROL-XL) 100 mg 24 hr tablet, Take 1 tablet (100 mg total) by mouth daily, Disp: 90 tablet, Rfl: 3    potassium chloride (Klor-Con M10) 10 mEq tablet, Take 1 tablet (10 mEq total) by mouth every other day (Patient taking differently: Take 10 mEq by mouth daily), Disp: 45 tablet, Rfl: 3      Allergies:  No Known Allergies      Assessment and Plan:  1. Chronic systolic congestive heart failure (HCC)  Continue furosemide 20 mg daily, KCl, metoprolol succinate 100 mg daily.  Lisinopril was discontinued due to soft BP    2. Other cardiomyopathy (HCC)  EF has recovered to 55%.   Continue metoprolol succinate 100 mg daily.  Lisinopril was previously discontinued due to soft BP    3. Persistent atrial fibrillation (HCC)  4. Status post ablation of atrial fibrillation (PVI, posterior wall, and mitral isthmus dependent flutter line) 10/19/2023  5. Anticoagulant long-term use  Per Dr. Fajardo, amiodarone 200 mg twice daily for a month and then down to 200 mg daily.  Eliquis 5 mg twice daily.  Metoprolol succinate 100 mg daily.    6. SSS (sick sinus syndrome) (HCC)  7. Pacemaker  Continue regular pacemaker interrogations    8. Coronary artery calcification  No aspirin due to high bleeding risk along with Eliquis.  Continue lovastatin    9. Mixed hyperlipidemia  On lovastatin and diet control.  Her PCP closely monitors her blood work    10. Nonrheumatic aortic valve insufficiency  11. Nonrheumatic tricuspid valve regurgitation  To be followed up with serial echocardiograms at regular intervals      Recommend aggressive risk factor modification and therapeutic lifestyle changes.  Low-salt, low-calorie, low-fat, low-cholesterol diet with regular exercise and to optimize weight.  I will defer the ordering and monitoring of necessity lab studies to you, but I am available and happy to review and manage any of the data at your request in the future.    Discussed concepts of atherosclerosis, including signs and symptoms of cardiac disease.    Previous studies were reviewed.    Safety measures were reviewed.  Questions were entertained and answered.  Patient was advised to report any problems requiring medical attention.    Follow-up with PCP and appropriate specialist and lab work as discussed.    Return for follow up visit as scheduled or earlier, if needed.  Thank you for allowing me to participate in the care and evaluation of your patient.  Should you have any questions, please feel free to contact me.    Marycruz Gomez MD  6/12/2024,2:21 PM

## 2024-06-18 ENCOUNTER — RA CDI HCC (OUTPATIENT)
Dept: OTHER | Facility: HOSPITAL | Age: 79
End: 2024-06-18

## 2024-06-19 ENCOUNTER — REMOTE DEVICE CLINIC VISIT (OUTPATIENT)
Dept: CARDIOLOGY CLINIC | Facility: CLINIC | Age: 79
End: 2024-06-19
Payer: MEDICARE

## 2024-06-19 DIAGNOSIS — Z95.0 PACEMAKER: Primary | ICD-10-CM

## 2024-06-19 PROCEDURE — 93294 REM INTERROG EVL PM/LDLS PM: CPT | Performed by: INTERNAL MEDICINE

## 2024-06-19 PROCEDURE — 93296 REM INTERROG EVL PM/IDS: CPT | Performed by: INTERNAL MEDICINE

## 2024-06-19 NOTE — PROGRESS NOTES
Results for orders placed or performed in visit on 06/19/24   Cardiac EP device report    Narrative    MDT DUAL CHAMBER PM  - ACTIVE SYSTEM IS MRI CONDITIONAL  CARELINK TRANSMISSION: BATTERY VOLTAGE ADEQUATE (8.1 YRS). AP 54.1%   5.3% (AAIR-DDR 70PPM). ALL AVAILABLE LEAD PARAMETERS WITHIN NORMAL LIMITS. 3 DEVICE CLASSIFIED VT EPISODE W/ EGM'S SUGGESTIVE FOR RVR, AVG RATE 162-167 BPM. 475 AT/AF EPISODES W/ AVAIL EGMS SHOWING PAT, AT, AF, AFL, MAX DURATION >99 HRS. AT/AF BURDEN 45.3%. EF: 50-55% (10/19/23 LUIZA).  AP/VS @ AVG 76 BPM ON CURRENT EGM. S/P AF/AFL ABL 10/19/23 & PT TAKES ELIQUIS, AMIODARONE, METOPROLOL SUCC. NORMAL DEVICE FUNCTION.  ES

## 2024-06-24 ENCOUNTER — OFFICE VISIT (OUTPATIENT)
Dept: FAMILY MEDICINE CLINIC | Facility: CLINIC | Age: 79
End: 2024-06-24
Payer: MEDICARE

## 2024-06-24 VITALS
OXYGEN SATURATION: 94 % | SYSTOLIC BLOOD PRESSURE: 120 MMHG | WEIGHT: 189.4 LBS | DIASTOLIC BLOOD PRESSURE: 80 MMHG | RESPIRATION RATE: 16 BRPM | HEART RATE: 90 BPM | HEIGHT: 65 IN | BODY MASS INDEX: 31.56 KG/M2

## 2024-06-24 DIAGNOSIS — E78.2 MIXED HYPERLIPIDEMIA: ICD-10-CM

## 2024-06-24 DIAGNOSIS — E03.9 ACQUIRED HYPOTHYROIDISM: Primary | ICD-10-CM

## 2024-06-24 DIAGNOSIS — I10 ESSENTIAL HYPERTENSION: ICD-10-CM

## 2024-06-24 DIAGNOSIS — N18.31 STAGE 3A CHRONIC KIDNEY DISEASE (HCC): ICD-10-CM

## 2024-06-24 DIAGNOSIS — G47.09 OTHER INSOMNIA: ICD-10-CM

## 2024-06-24 DIAGNOSIS — J30.0 VASOMOTOR RHINITIS: ICD-10-CM

## 2024-06-24 DIAGNOSIS — I48.19 PERSISTENT ATRIAL FIBRILLATION (HCC): ICD-10-CM

## 2024-06-24 DIAGNOSIS — I50.23 ACUTE ON CHRONIC SYSTOLIC CONGESTIVE HEART FAILURE (HCC): ICD-10-CM

## 2024-06-24 PROBLEM — Z86.79 STATUS POST ABLATION OF ATRIAL FIBRILLATION: Status: RESOLVED | Noted: 2023-10-22 | Resolved: 2024-06-24

## 2024-06-24 PROBLEM — H53.2 DIPLOPIA: Status: RESOLVED | Noted: 2022-12-21 | Resolved: 2024-06-24

## 2024-06-24 PROBLEM — Z86.73 H/O TIA (TRANSIENT ISCHEMIC ATTACK) AND STROKE: Status: RESOLVED | Noted: 2018-02-23 | Resolved: 2024-06-24

## 2024-06-24 PROBLEM — R42 LIGHTHEADEDNESS: Status: RESOLVED | Noted: 2023-07-19 | Resolved: 2024-06-24

## 2024-06-24 PROBLEM — Z98.890 STATUS POST ABLATION OF ATRIAL FIBRILLATION: Status: RESOLVED | Noted: 2023-10-22 | Resolved: 2024-06-24

## 2024-06-24 PROCEDURE — 99214 OFFICE O/P EST MOD 30 MIN: CPT | Performed by: FAMILY MEDICINE

## 2024-06-24 PROCEDURE — G2211 COMPLEX E/M VISIT ADD ON: HCPCS | Performed by: FAMILY MEDICINE

## 2024-06-24 RX ORDER — ESZOPICLONE 1 MG/1
1 TABLET, FILM COATED ORAL
Qty: 10 TABLET | Refills: 0 | Status: SHIPPED | OUTPATIENT
Start: 2024-06-24

## 2024-06-24 RX ORDER — AZELASTINE HYDROCHLORIDE 137 UG/1
2 SPRAY, METERED NASAL 2 TIMES DAILY
Qty: 90 ML | Refills: 1 | Status: SHIPPED | OUTPATIENT
Start: 2024-06-24

## 2024-06-24 NOTE — ASSESSMENT & PLAN NOTE
Orders:    eszopiclone (LUNESTA) 1 mg tablet; Take 1 tablet (1 mg total) by mouth daily at bedtime as needed for sleep Take immediately before bedtime

## 2024-06-24 NOTE — PROGRESS NOTES
Ambulatory Visit  Name: Madeline Good      : 1945      MRN: [id number]  Encounter Provider: Arnold Payton DO  Encounter Date: 2024   Encounter department: [facility] [address]    Assessment & Plan  Acquired hypothyroidism  Stable, continue levothyroxine, check a TSH in 6 months  Orders:    TSH, 3rd generation; Future    Essential hypertension  Well-controlled, continue her metoprolol  Orders:    CBC; Future    Comprehensive metabolic panel; Future    Acute on chronic systolic congestive heart failure (HCC)  Wt Readings from Last 3 Encounters:   24 85.9 kg (189 lb 6.4 oz)   24 84.8 kg (187 lb)   24 84.8 kg (187 lb)     Well compensated, follow-up with her cardiologist as scheduled, continue her metoprolol and her furosemide               Persistent atrial fibrillation (HCC)  Rate controlled, continue her amiodarone, metoprolol, continue Eliquis for anticoagulation       Stage 3a chronic kidney disease (HCC)  Lab Results   Component Value Date    EGFR 46 2024    EGFR 51 2024    EGFR 47 2024    CREATININE 1.13 2024    CREATININE 1.04 2024    CREATININE 1.11 2024     Stable, continue monitor with routine blood work       Mixed hyperlipidemia  Continue lovastatin, check a CMP, lipid profile in 6 months  Orders:    Lipid panel; Future    Vasomotor rhinitis    Orders:    Azelastine HCl 137 MCG/SPRAY SOLN; 2 sprays into each nostril 2 (two) times a day    Other insomnia    Orders:    eszopiclone (LUNESTA) 1 mg tablet; Take 1 tablet (1 mg total) by mouth daily at bedtime as needed for sleep Take immediately before bedtime            History of Present Illness     Patient comes in today for checkup, states she is doing well, no specific complaints        Review of Systems   Constitutional:  Negative for chills, fatigue and fever.   HENT:  Negative for congestion, ear pain, hearing loss, postnasal drip, rhinorrhea and  "sore throat.    Eyes:  Negative for pain and visual disturbance.   Respiratory:  Negative for chest tightness, shortness of breath and wheezing.    Cardiovascular:  Negative for chest pain and leg swelling.   Gastrointestinal:  Negative for abdominal distention, abdominal pain, constipation, diarrhea and vomiting.   Endocrine: Negative for cold intolerance and heat intolerance.   Genitourinary:  Negative for difficulty urinating, frequency and urgency.   Musculoskeletal:  Negative for arthralgias and gait problem.   Skin:  Negative for color change.   Neurological:  Negative for dizziness, tremors, syncope, numbness and headaches.   Hematological:  Negative for adenopathy.   Psychiatric/Behavioral:  Negative for agitation, confusion and sleep disturbance. The patient is not nervous/anxious.      Objective     /80   Pulse 90   Resp 16   Ht 5' 5\" (1.651 m)   Wt 85.9 kg (189 lb 6.4 oz)   SpO2 94%   BMI 31.52 kg/m²     Physical Exam  Constitutional:       Appearance: She is well-developed.   HENT:      Head: Normocephalic and atraumatic.      Right Ear: External ear normal.      Left Ear: External ear normal.      Nose: Nose normal.      Mouth/Throat:      Pharynx: Oropharynx is clear.   Eyes:      Extraocular Movements: Extraocular movements intact.      Conjunctiva/sclera: Conjunctivae normal.      Pupils: Pupils are equal, round, and reactive to light.   Neck:      Thyroid: No thyromegaly.   Cardiovascular:      Rate and Rhythm: Normal rate and regular rhythm.      Heart sounds: Normal heart sounds. No murmur heard.  Pulmonary:      Effort: Pulmonary effort is normal.   Abdominal:      General: Bowel sounds are normal. There is no distension.      Palpations: Abdomen is soft.   Musculoskeletal:         General: Normal range of motion.      Cervical back: Normal range of motion and neck supple.   Skin:     General: Skin is warm.   Neurological:      Mental Status: She is alert and oriented to person, " place, and time.   Psychiatric:         Mood and Affect: Mood normal.       Administrative Statements

## 2024-06-24 NOTE — ASSESSMENT & PLAN NOTE
Well-controlled, continue her metoprolol  Orders:    CBC; Future    Comprehensive metabolic panel; Future

## 2024-06-24 NOTE — ASSESSMENT & PLAN NOTE
Wt Readings from Last 3 Encounters:   06/24/24 85.9 kg (189 lb 6.4 oz)   06/12/24 84.8 kg (187 lb)   05/28/24 84.8 kg (187 lb)     Well compensated, follow-up with her cardiologist as scheduled, continue her metoprolol and her furosemide

## 2024-06-24 NOTE — ASSESSMENT & PLAN NOTE
Lab Results   Component Value Date    EGFR 46 06/11/2024    EGFR 51 05/17/2024    EGFR 47 03/04/2024    CREATININE 1.13 06/11/2024    CREATININE 1.04 05/17/2024    CREATININE 1.11 03/04/2024     Stable, continue monitor with routine blood work

## 2024-06-25 ENCOUNTER — TELEPHONE (OUTPATIENT)
Dept: FAMILY MEDICINE CLINIC | Facility: CLINIC | Age: 79
End: 2024-06-25

## 2024-06-25 DIAGNOSIS — E03.9 ACQUIRED HYPOTHYROIDISM: ICD-10-CM

## 2024-06-25 RX ORDER — LEVOTHYROXINE SODIUM 0.03 MG/1
25 TABLET ORAL
Qty: 90 TABLET | Refills: 1 | Status: SHIPPED | OUTPATIENT
Start: 2024-06-25

## 2024-06-25 NOTE — TELEPHONE ENCOUNTER
Reason for call:   [x] Refill   [] Prior Auth  [] Other:     Office:   [x] PCP/Provider - Arnold Payton DO   [] Specialty/Provider -     Medication: levothyroxine 25 mcg tablet     Dose/Frequency: 25 mcg, Oral, Daily     Quantity:  90 tablet     Pharmacy: [facility]     Does the patient have enough for 3 days?   [] Yes   [x] No - Send as HP to POD

## 2024-06-25 NOTE — TELEPHONE ENCOUNTER
----- Message from Arnold Payton DO sent at 6/24/2024  3:57 PM EDT -----  Please call patient, let her know the below from Dr. Gomez.  She should be taking 1 tablet daily.  ----- Message -----  From: Marycruz Gomez MD  Sent: 6/24/2024   3:17 PM EDT  To: DO Dave Mendez Dr. Nanda wanted her to be on it for a long time (200 mg daily)  ----- Message -----  From: Arnold Payton DO  Sent: 6/24/2024   2:51 PM EDT  To: MD Marycruz Kitchen, I saw Madeline for a check-up, she wanted to clarify the directions on the amiodarone, she thought you told her to continue it and not stop it after 14 days.  It looks like it was sent in to continue long term.

## 2024-08-05 ENCOUNTER — PREP FOR PROCEDURE (OUTPATIENT)
Age: 79
End: 2024-08-05

## 2024-08-05 ENCOUNTER — OFFICE VISIT (OUTPATIENT)
Age: 79
End: 2024-08-05
Payer: MEDICARE

## 2024-08-05 VITALS
SYSTOLIC BLOOD PRESSURE: 139 MMHG | DIASTOLIC BLOOD PRESSURE: 80 MMHG | HEIGHT: 65 IN | BODY MASS INDEX: 31.49 KG/M2 | WEIGHT: 189 LBS

## 2024-08-05 DIAGNOSIS — K62.5 RECTAL BLEEDING: Primary | ICD-10-CM

## 2024-08-05 PROCEDURE — 99214 OFFICE O/P EST MOD 30 MIN: CPT | Performed by: PHYSICIAN ASSISTANT

## 2024-08-05 NOTE — H&P (VIEW-ONLY)
[facility] - Outpatient Follow-up Note  Madeline Good 78 y.o. female MRN: [id number]  Encounter: [id number]          ASSESSMENT AND PLAN:      1. Rectal bleeding    Patient presents for an evaluation of intermittent rectal bleeding that began about 6 weeks ago.  She has a history of colon polyps and a history of colitis.  Her last colonoscopy in 7/2021 was normal except for diverticulosis.    Will plan for colonoscopy to investigate.  Recommend a fiber supplement like Benefiber or Metamucil to regulate her bowel movements.    Note: She is on Eliquis for PAF and will hold 2 days prior to the procedure.    ______________________________________________________________________    SUBJECTIVE:  Patient is a pleasant 78 year old female with a PMH of PAF on Eliquis, CHF, HTN, hyperlipidemia, TIA who presents to the office for an evaluation of rectal bleeding.  She reports it began about 6 weeks ago.  She reports bright red bleeding.  She does have some intermittent BM irregularities/constipation.  She denies abdominal pain.  No family history of colon cancer.  She has a history of colon polyps.  Her last colonoscopy in July of 2021 was normal except for diverticulosis.  She had a prior colonoscopy in 2017 that showed colitis.    I obtained informed consent from the patient for the colonoscopy. The risks/benefits/alternatives of the procedure were discussed with the patient. Risks included, but not limited to, infection, bleeding, perforation, missed lesion and incomplete procedure were discussed. Patient was agreeable and electronic consent was signed.       REVIEW OF SYSTEMS IS OTHERWISE NEGATIVE.      Historical Information   Past Medical History:   Diagnosis Date    Aphasia, mixed 07/28/2017    Atrial fibrillation (HCC)     CHF (congestive heart failure) (HCC)     Colon polyp     Headache     Hyperlipidemia     Hypertension     Lyme disease     Shortness of breath     TIA (transient ischemic  "attack)     Vertigo      Past Surgical History:   Procedure Laterality Date    CARDIAC ELECTROPHYSIOLOGY PROCEDURE N/A 10/19/2023    Procedure: Cardiac eps/afib ablation PVI/POST WALL;  Surgeon: Piero Fajardo MD;  Location:  CARDIAC CATH LAB;  Service: Cardiology    CARDIAC PACEMAKER PLACEMENT  12/2019    COLONOSCOPY      [place] SIGMOIDOSCOPY FLX DX W/COLLJ SPEC BR/WA IF PFRMD N/A 11/28/2017    Procedure: SIGMOIDOSCOPY FLEXIBLE;  Surgeon: Kavon Fernandez MD;  Location: [facility];  Service: Gastroenterology    TONSILLECTOMY       Social History   Social History     Substance and Sexual Activity   Alcohol Use Yes    Alcohol/week: 7.0 standard drinks of alcohol    Types: 7 Glasses of wine per week     Social History     Substance and Sexual Activity   Drug Use No     Social History     Tobacco Use   Smoking Status Never   Smokeless Tobacco Never   Tobacco Comments    no smoking for 46 years     Family History   Problem Relation Age of Onset    Lung cancer Mother     Dementia Father     Alzheimer's disease Father     Other Father         Cardiac Disorder     Lung cancer Maternal Grandmother     Breast cancer Paternal Aunt 40    No Known Problems Paternal Aunt     No Known Problems Paternal Aunt        Meds/Allergies       Current Outpatient Medications:     amiodarone 200 mg tablet    apixaban (Eliquis) 5 mg    Azelastine HCl 137 MCG/SPRAY SOLN    eszopiclone (LUNESTA) 1 mg tablet    furosemide (LASIX) 20 mg tablet    levothyroxine 25 mcg tablet    lovastatin (MEVACOR) 10 MG tablet    metoprolol succinate (TOPROL-XL) 100 mg 24 hr tablet    potassium chloride (Klor-Con M10) 10 mEq tablet    No Known Allergies        Objective     Blood pressure 139/80, height 5' 5\" (1.651 m), weight 85.7 kg (189 lb). Body mass index is 31.45 kg/m².      PHYSICAL EXAM:      General Appearance:   Alert, cooperative, no distress   HEENT:   Normocephalic, atraumatic, anicteric     Neck:  Supple, symmetrical, trachea midline   Lungs:   Clear " to auscultation bilaterally; no rales, rhonchi or wheezing; respirations unlabored    Heart::   Regular rate and rhythm; no murmur, rub, or gallop.   Abdomen:   Soft, non-tender, non-distended; normal bowel sounds; no masses, no organomegaly    Genitalia:   Deferred    Rectal:   Deferred    Extremities:  No cyanosis, clubbing or edema    Pulses:  2+ and symmetric    Skin:  No jaundice, rashes, or lesions    Lymph nodes:  No palpable cervical lymphadenopathy        Lab Results:   No visits with results within 1 Day(s) from this visit.   Latest known visit with results is:   Appointment on 06/11/2024   Component Date Value    Sodium 06/11/2024 140     Potassium 06/11/2024 4.1     Chloride 06/11/2024 103     CO2 06/11/2024 29     ANION GAP 06/11/2024 8     BUN 06/11/2024 14     Creatinine 06/11/2024 1.13     Glucose 06/11/2024 97     Calcium 06/11/2024 9.3     eGFR 06/11/2024 46          Radiology Results:   No results found.

## 2024-08-05 NOTE — PROGRESS NOTES
Cardiology Follow Up    Madeline Good  1945  [id number]  [facility] [place]  [address]  [address]  [place] [id number] [phone]    1. Persistent atrial fibrillation (HCC)  POCT ECG    Echo follow up/limited w/ contrast if indicated    amiodarone 200 mg tablet      2. Acute on chronic systolic congestive heart failure (HCC)  Echo follow up/limited w/ contrast if indicated      3. Essential hypertension        4. Other cardiomyopathy (HCC)        5. Chronic obstructive pulmonary disease, unspecified COPD type (HCC)        6. Acquired hypothyroidism        7. Stage 3a chronic kidney disease (HCC)        8. Mixed hyperlipidemia        9. Obesity (BMI 30-39.9)        10. Anticoagulant long-term use                    Summary of my recommendation for the patient  Patient has prior history of tachycardia mediated cardiomyopathy    Patient had extensive ablation-PVI, posterior wall, mitral isthmus flutter  There was a recurrence and she was in atrial flutter with heart rate of 120/min  Amiodarone and metoprolol were increased to 200 mg 2 times daily and 100 mg 2 times daily  Patient went back to sinus rhythm  Device check shows there are still brief breakthrough episodes    Reduce amiodarone to 200 mg once daily  Check for amiodarone toxicity-TSH, PFT with DLCO, CMP, iron evaluation    If patient has any recurrence we will repeat ablation with PFA before considering AV node ablation + consider VOM injection    Get an echocardiogram to assess EF    If patient continues to have recurrence of tachycardia final option would be an AV node ablation          Interval History:     The patient is doing reasonably well  Plan to reduce amiodarone    Patient underwent comprehensive ablation in October 2023  Unfortunately she went back  to flutter with RVR  She has a prior history of tachycardia mediated cardiomyopathy  Patient went back to  "sinus rhythm on doubling the dose of amiodarone and metoprolol  Device check does reveal brief episodes of breakthrough atrial flutter    She does have a pacemaker with a his lead in place    The patient is not complaining of anginal like chest pain or chest pressure  There is no worsening orthopnea, paroxysmal nocturnal dyspnea  There is some  leg swelling     No significant palpitation  There is no history of presyncope or syncope  There is rare history of transient  lightheadedness  There has been some improvement in exertional intolerance      Past medical history  The patient is a very pleasant 73 year old lady referred to me by Dr Gomez for management of A fi + RVR + heart failure      She does have significant medical illnesses in the form of  PAF  Hypertension  Hyperlipidemia  CMP - suspected tachy mediated      As for the atrial fibrillation  It has been present for quite some time now  Initially to present as episodes of palpitation  This has progressively increased in frequency and duration         There is history of snoring at night  There is occasional history of morning fatigability  There is occasional history of daytime sleepiness    Ht 5' 5\" (1.651 m)   BMI 31.45 kg/m²       Patient Active Problem List   Diagnosis    Essential hypertension    Mixed hyperlipidemia    Obesity (BMI 30-39.9)    Migraine with aura and without status migrainosus, not intractable    Myocardiopathy (HCC)    Anticoagulant long-term use    Nonrheumatic mitral valve regurgitation    Nonrheumatic aortic valve insufficiency    Nonrheumatic tricuspid valve regurgitation    Persistent atrial fibrillation (HCC)    Other insomnia    Chronic obstructive pulmonary disease (HCC)    Stage 3a chronic kidney disease (HCC)    Acute on chronic systolic congestive heart failure (HCC)    SSS (sick sinus syndrome) (HCC)    Acquired hypothyroidism    Iron deficiency anemia     Past Medical History:   Diagnosis Date    Aphasia, mixed " 07/28/2017    Atrial fibrillation (HCC)     CHF (congestive heart failure) (HCC)     Colon polyp     Headache     Hyperlipidemia     Hypertension     Lyme disease     Shortness of breath     TIA (transient ischemic attack)     Vertigo      Social History     Socioeconomic History    Marital status: /Civil Union     Spouse name: Not on file    Number of children: Not on file    Years of education: Not on file    Highest education level: Not on file   Occupational History    Occupation: Retired   Tobacco Use    Smoking status: Never    Smokeless tobacco: Never    Tobacco comments:     no smoking for 46 years   Vaping Use    Vaping status: Never Used   Substance and Sexual Activity    Alcohol use: Yes     Alcohol/week: 7.0 standard drinks of alcohol     Types: 7 Glasses of wine per week    Drug use: No    Sexual activity: Never     Partners: Male     Birth control/protection: None   Other Topics Concern    Not on file   Social History Narrative    Always uses seat belt     Social Determinants of Health     Financial Resource Strain: Low Risk  (12/11/2023)    Overall Financial Resource Strain (CARDIA)     Difficulty of Paying Living Expenses: Not very hard   Food Insecurity: No Food Insecurity (12/22/2022)    Hunger Vital Sign     Worried About Running Out of Food in the Last Year: Never true     Ran Out of Food in the Last Year: Never true   Transportation Needs: No Transportation Needs (12/11/2023)    PRAPARE - Transportation     Lack of Transportation (Medical): No     Lack of Transportation (Non-Medical): No   Physical Activity: Not on file   Stress: Not on file   Social Connections: Unknown (6/18/2024)    Received from [facility]    Social Connections     How often do you feel lonely or isolated from those around you? (Adult - for ages 18 years and over): Not on file   Intimate Partner Violence: Not on file   Housing Stability: Low Risk  (12/22/2022)    Housing Stability Vital Sign     Unable to Pay for  Housing in the Last Year: No     Number of Places Lived in the Last Year: 1     Unstable Housing in the Last Year: No      Family History   Problem Relation Age of Onset    Lung cancer Mother     Dementia Father     Alzheimer's disease Father     Other Father         Cardiac Disorder     Lung cancer Maternal Grandmother     Breast cancer Paternal Aunt 40    No Known Problems Paternal Aunt     No Known Problems Paternal Aunt      Past Surgical History:   Procedure Laterality Date    CARDIAC ELECTROPHYSIOLOGY PROCEDURE N/A 10/19/2023    Procedure: Cardiac eps/afib ablation PVI/POST WALL;  Surgeon: Piero Fajardo MD;  Location:  CARDIAC CATH LAB;  Service: Cardiology    CARDIAC PACEMAKER PLACEMENT  12/2019    COLONOSCOPY      ND SIGMOIDOSCOPY FLX DX W/COLLJ SPEC BR/WA IF PFRMD N/A 11/28/2017    Procedure: SIGMOIDOSCOPY FLEXIBLE;  Surgeon: Kavon Fernandez MD;  Location: [facility];  Service: Gastroenterology    TONSILLECTOMY         Current Outpatient Medications:     amiodarone 200 mg tablet, Take 1 tablet (200 mg total) by mouth daily, Disp: 90 tablet, Rfl: 1    apixaban (Eliquis) 5 mg, Take 1 tablet (5 mg total) by mouth 2 (two) times a day, Disp: 180 tablet, Rfl: 3    Azelastine HCl 137 MCG/SPRAY SOLN, 2 sprays into each nostril 2 (two) times a day, Disp: 90 mL, Rfl: 1    eszopiclone (LUNESTA) 1 mg tablet, Take 1 tablet (1 mg total) by mouth daily at bedtime as needed for sleep Take immediately before bedtime, Disp: 10 tablet, Rfl: 0    furosemide (LASIX) 20 mg tablet, Take 1 tablet (20 mg total) by mouth daily, Disp: 90 tablet, Rfl: 1    levothyroxine 25 mcg tablet, Take 1 tablet (25 mcg total) by mouth daily in the early morning, Disp: 90 tablet, Rfl: 1    lovastatin (MEVACOR) 10 MG tablet, Take 1 tablet (10 mg total) by mouth daily at bedtime, Disp: 90 tablet, Rfl: 3    metoprolol succinate (TOPROL-XL) 100 mg 24 hr tablet, Take 1 tablet (100 mg total) by mouth daily, Disp: 90 tablet, Rfl: 3    potassium  chloride (Klor-Con M10) 10 mEq tablet, Take 1 tablet (10 mEq total) by mouth every other day (Patient taking differently: Take 10 mEq by mouth daily), Disp: 45 tablet, Rfl: 3  No Known Allergies        LAB RESULTS:    CBC:  WBC   Date Value Ref Range Status   05/17/2024 10.48 (H) 4.31 - 10.16 Thousand/uL Final     Hemoglobin   Date Value Ref Range Status   05/17/2024 15.6 (H) 11.5 - 15.4 g/dL Final     Hematocrit   Date Value Ref Range Status   05/17/2024 46.1 34.8 - 46.1 % Final     MCV   Date Value Ref Range Status   05/17/2024 100 (H) 82 - 98 fL Final     Platelets   Date Value Ref Range Status   05/17/2024 286 149 - 390 Thousands/uL Final     RBC   Date Value Ref Range Status   05/17/2024 4.63 3.81 - 5.12 Million/uL Final     MCH   Date Value Ref Range Status   05/17/2024 33.7 26.8 - 34.3 pg Final     MCHC   Date Value Ref Range Status   05/17/2024 33.8 31.4 - 37.4 g/dL Final     RDW   Date Value Ref Range Status   05/17/2024 14.6 11.6 - 15.1 % Final     MPV   Date Value Ref Range Status   05/17/2024 10.3 8.9 - 12.7 fL Final     nRBC   Date Value Ref Range Status   05/17/2024 0 /100 WBCs Final       CMP:  Potassium   Date Value Ref Range Status   06/11/2024 4.1 3.5 - 5.3 mmol/L Final     Chloride   Date Value Ref Range Status   06/11/2024 103 96 - 108 mmol/L Final     CO2   Date Value Ref Range Status   06/11/2024 29 21 - 32 mmol/L Final     BUN   Date Value Ref Range Status   06/11/2024 14 5 - 25 mg/dL Final     Creatinine   Date Value Ref Range Status   06/11/2024 1.13 0.60 - 1.30 mg/dL Final     Comment:     Standardized to IDMS reference method     Calcium   Date Value Ref Range Status   06/11/2024 9.3 8.4 - 10.2 mg/dL Final     AST   Date Value Ref Range Status   05/17/2024 24 13 - 39 U/L Final     ALT   Date Value Ref Range Status   05/17/2024 15 7 - 52 U/L Final     Comment:     Specimen collection should occur prior to Sulfasalazine administration due to the potential for falsely depressed results.       Alkaline Phosphatase   Date Value Ref Range Status   05/17/2024 105 (H) 34 - 104 U/L Final     eGFR   Date Value Ref Range Status   06/11/2024 46 ml/min/1.73sq m Final        Magnesium:   Magnesium   Date Value Ref Range Status   03/04/2024 2.1 1.9 - 2.7 mg/dL Final        A1C:  Hemoglobin A1C   Date Value Ref Range Status   11/01/2022 5.8 (H) Normal 3.8-5.6%; PreDiabetic 5.7-6.4%; Diabetic >=6.5%; Glycemic control for adults with diabetes <7.0% % Final        TSH:  TSH 3RD GENERATON   Date Value Ref Range Status   01/25/2024 3.330 0.450 - 4.500 uIU/mL Final     Comment:     The recommended reference ranges for TSH during pregnancy are as follows:   First trimester 0.100 to 2.500 uIU/mL   Second trimester  0.200 to 3.000 uIU/mL   Third trimester 0.300 to 3.000 uIU/m    Note: Normal ranges may not apply to patients who are transgender, non-binary, or whose legal sex, sex at birth, and gender identity differ.  Adult TSH (3rd generation) reference range follows the recommended guidelines of the [employer] Association, January, 2020.        PT/INR:  Protime   Date Value Ref Range Status   10/19/2023 16.8 (H) 11.6 - 14.5 seconds Final     INR   Date Value Ref Range Status   10/19/2023 1.38 (H) 0.84 - 1.19 Final       Lipid Panel:  Cholesterol   Date Value Ref Range Status   12/08/2023 160 See Comment mg/dL Final     Comment:     Cholesterol:         Pediatric <18 Years        Desirable          <170 mg/dL      Borderline High    170-199 mg/dL      High               >=200 mg/dL        Adult >=18 Years            Desirable        <200 mg/dL      Borderline High  200-239 mg/dL      High             >239 mg/dL       Triglycerides   Date Value Ref Range Status   12/08/2023 80 See Comment mg/dL Final     Comment:     Triglyceride:     0-9Y            <75mg/dL     10Y-17Y         <90 mg/dL       >=18Y     Normal          <150 mg/dL     Borderline High 150-199 mg/dL     High            200-499 mg/dL        Very High        >499 mg/dL    Specimen collection should occur prior to Metamizole administration due to the potential for falsely depressed results.     HDL, Direct   Date Value Ref Range Status   2023 53 >=50 mg/dL Final     Non-HDL-Chol (CHOL-HDL)   Date Value Ref Range Status   2023 107 mg/dl Final       Troponin:  Troponin I   Date Value Ref Range Status   02/10/2019 0.05 (H) <=0.04 ng/mL Final     Comment:       [employer] Chemistry analyzer 99% cutoff is > 0.04 ng/mL in network labs     o cTnI 99% cutoff is useful only when applied to patients in the clinical setting of myocardial ischemia   o cTnI 99% cutoff should be interpreted in the context of clinical history, ECG findings and possibly cardiac imaging to establish correct diagnosis.   o cTnI 99% cutoff may be suggestive but clearly not indicative of a coronary event without the clinical setting of myocardial ischemia.           Imaging:    [id number]        LUIZA:  No results found for this or any previous visit.      Echocardiogram:  Results for orders placed during the hospital encounter of 22    Echo complete w/ contrast if indicated    Interpretation Summary    Left Ventricle: Left ventricular cavity size is normal. Systolic function is mildly reduced.  LVEF approximately 40 to 45%.  Patient was in atrial fibrillation with periods of rapid ventricular response which interferes with interpretation. There is mild global hypokinesis. Unable to assess diastolic function due to atrial fibrillation.    Right Ventricle: Right ventricular cavity size is mildly dilated. Systolic function is mildly to moderately reduced.    Left Atrium: The atrium is moderate to markedly dilated.    Right Atrium: The atrium is moderately dilated.    Aortic Valve: There is mild regurgitation.    Mitral Valve: There is moderate annular calcification. There is moderate regurgitation.    Tricuspid Valve: There is moderate regurgitation. The right ventricular systolic  pressure is mildly elevated.  Estimated RVSP 35 mm Hg.  Pacer leads noted.    Pulmonic Valve: There is mild regurgitation.    Results for orders placed during the hospital encounter of 03/07/23    Echo follow up/limited w/ contrast if indicated    Interpretation Summary    Left Ventricle: Left ventricular cavity size is normal. The left ventricular ejection fraction is 55%. Systolic function is normal.    Left Atrium: The atrium is moderately dilated.    Right Atrium: The atrium is mildly dilated.    Aortic Valve: There is mild to moderate regurgitation.    Tricuspid Valve: There is moderate regurgitation.    Pulmonary Artery: The estimated pulmonary artery systolic pressure is 52.0 mmHg. The pulmonary artery systolic pressure is moderately increased.    This is a limited echocardiogram performed to evaluate LV function. Interpretation is therefore limited.      Stress Test:   Results for orders placed during the hospital encounter of 03/07/23    NM myocardial perfusion spect (rx stress and/or rest)    Interpretation Summary    Stress ECG: No ST deviation is noted. There were no arrhythmias during recovery. . The ECG was not diagnostic due to pharmacological (vasodilator) stress.    Perfusion Defect Conclusion: The stress/rest perfusion ratio is 1.03 . There is no evidence of transient ischemic dilation (TID).    Stress Function: Left ventricular function post-stress is normal. Post-stress ejection fraction is 79 %.    Perfusion: There is a left ventricular perfusion defect that is medium in size with moderate reduction in uptake present in the mid to apical anterior and anterolateral location(s) that is partially reversible.    Stress Combined Conclusion: Left ventricular perfusion is probably abnormal.      Cardiac Catheterization:  No results found for this or any previous visit.      HOLTER MONITOR: 24 HOUR/48 HOUR MONITORS  No results found for this or any previous visit.      AMB Extended Holter Monitor: [place]  XT/AT or [employer]  No results found for this or any previous visit.      Cardiac CT:    CT Cardiac w Pulmonary Vein Mapping  10/11/2023    FINDINGS:     PULMONARY VEIN ANATOMY: Typical with two right and two left pulmonary veins.     APPROXIMATE MEASUREMENTS OF PULMONARY VEIN OSTIA:     Right superior: 22 mm.  Right inferior: 12 mm.     Left superior: 14 mm.  Left inferior: 16 mm.     CARDIAC STRUCTURES: Top normal heart size. Pacer leads terminate in the right atrium and right ventricle. Mild coronary artery calcifications.     GREAT VESSELS: Visualized thoracic aorta and central pulmonary arterial tree are within normal limits for the patient's age.     EXTRACARDIAC FINDINGS: Lower lobe predominant fibrotic changes in the visualized lungs again seen. Previously noted 2 mm right upper lobe nodule is not definitively visualized on this study, possibly due to motion artifact. Large hiatal hernia containing   the stomach again identified.     IMPRESSION:     Pulmonary venous anatomy as described above.     Large hiatal hernia with intrathoracic stomach, and pulmonary fibrosis again noted.      Cardioversion  11/03/2022    Indication for procedure: Cardioversion  Requesting physician:Dr Laura Goodman  Anticoagulation:  Eliquis     The patient was identified in the OR . A time out procedure was performed.     The patient was sedated by the anesthesia service .     The patient was cardioverted to sinus rhythm with a 200J biphasic shock.       There were no complications.  The patient was monitored for the appropriate time interval in the holding area, and was transferred back to the medical floor in stable condition.    Pre Cardioversion EKG  11/03/2022      Post Cardioversion EKG  11/03/2022        Cardiac CT:    CT Cardiac w Pulmonary Vein Mapping  10/11/2023    FINDINGS:     PULMONARY VEIN ANATOMY: Typical with two right and two left pulmonary veins.     APPROXIMATE MEASUREMENTS OF PULMONARY VEIN OSTIA:     Right  superior: 22 mm.  Right inferior: 12 mm.     Left superior: 14 mm.  Left inferior: 16 mm.     CARDIAC STRUCTURES: Top normal heart size. Pacer leads terminate in the right atrium and right ventricle. Mild coronary artery calcifications.     GREAT VESSELS: Visualized thoracic aorta and central pulmonary arterial tree are within normal limits for the patient's age.     EXTRACARDIAC FINDINGS: Lower lobe predominant fibrotic changes in the visualized lungs again seen. Previously noted 2 mm right upper lobe nodule is not definitively visualized on this study, possibly due to motion artifact. Large hiatal hernia containing   the stomach again identified.     IMPRESSION:     Pulmonary venous anatomy as described above.     Large hiatal hernia with intrathoracic stomach, and pulmonary fibrosis again noted.        DEVICE CHECK:     Results for orders placed or performed in visit on 06/19/24   Cardiac EP device report    Narrative    MDT DUAL CHAMBER PM  - ACTIVE SYSTEM IS MRI CONDITIONAL  CARELINK TRANSMISSION: BATTERY VOLTAGE ADEQUATE (8.1 YRS). AP 54.1%   5.3% (AAIR-DDR 70PPM). ALL AVAILABLE LEAD PARAMETERS WITHIN NORMAL LIMITS. 3 DEVICE CLASSIFIED VT EPISODE W/ EGM'S SUGGESTIVE FOR RVR, AVG RATE 162-167 BPM. 475 AT/AF EPISODES W/ AVAIL EGMS SHOWING PAT, AT, AF, AFL, MAX DURATION >99 HRS. AT/AF BURDEN 45.3%. EF: 50-55% (10/19/23 LUIZA).  AP/VS @ AVG 76 BPM ON CURRENT EGM. S/P AF/AFL ABL 10/19/23 & PT TAKES ELIQUIS, AMIODARONE, METOPROLOL SUCC. NORMAL DEVICE FUNCTION.  ES            Review of Systems:  Review of Systems   All other systems reviewed and are negative.  As described in my history of present illness    Physical Exam:  Physical Exam  Vitals reviewed.   Constitutional:       Appearance: Normal appearance. She is well-developed. She is obese. She is not ill-appearing.      Comments: Not in any distress at the current time   HENT:      Head: Normocephalic and atraumatic.      Right Ear: External ear normal.       Left Ear: External ear normal.      Nose: Nose normal.      Mouth/Throat:      Pharynx: Uvula midline.   Eyes:      General: Lids are normal. No scleral icterus.     Extraocular Movements: Extraocular movements intact.      Conjunctiva/sclera: Conjunctivae normal.      Pupils: Pupils are equal, round, and reactive to light.      Comments: No pallor  No cyanosis  No icterus   Neck:      Thyroid: No thyromegaly.      Vascular: No carotid bruit or JVD.      Trachea: Trachea normal.      Comments: No jugular lymphadenopathy  Short thick neck  Cardiovascular:      Rate and Rhythm: Normal rate and regular rhythm.      Chest Wall: PMI is not displaced.      Pulses: Normal pulses.      Heart sounds: Normal heart sounds, S1 normal and S2 normal. No murmur heard.     No friction rub. No gallop. No S3 or S4 sounds.   Pulmonary:      Effort: Pulmonary effort is normal. No accessory muscle usage or respiratory distress.      Breath sounds: Normal breath sounds. No decreased breath sounds, wheezing, rhonchi or rales.   Chest:      Chest wall: No tenderness.   Abdominal:      General: Bowel sounds are normal. There is no distension.      Palpations: Abdomen is soft. There is no hepatomegaly, splenomegaly or mass.      Tenderness: There is no abdominal tenderness.      Comments: Central obesity present   Musculoskeletal:         General: No swelling, tenderness or deformity. Normal range of motion.      Cervical back: Normal range of motion and neck supple. No rigidity.      Right lower leg: No edema.      Left lower leg: No edema.   Lymphadenopathy:      Cervical: No cervical adenopathy.   Skin:     Findings: No abrasion, erythema, lesion or rash.      Nails: There is no clubbing.   Neurological:      Mental Status: She is alert and oriented to person, place, and time. Mental status is at baseline.      Motor: No weakness.      Comments: Facial symmetry is retained  Extraocular movements are retained  Head neck tongue and palate  movement are retained and symmetric   Psychiatric:         Mood and Affect: Mood normal.         Speech: Speech normal.         Behavior: Behavior normal.         Thought Content: Thought content normal.         Judgment: Judgment normal.         Discussion/Summary:      Atrial flutter with RVR  On amiodarone, metoprolol and apixaban  [id number] score is 5  Patient has a long history of atrial fibrillation and flutter  She underwent ablation in October 2023    There was a recurrence and she was in atrial flutter with heart rate of 120/min  Amiodarone and metoprolol were increased to 200 mg 2 times daily and 100 mg 2 times daily  Patient went back to sinus rhythm  Device check shows there are still brief breakthrough episodes    Check for amiodarone toxicity-CMP, TSH, PFT with DLCO, evaluation    Will reduce amiodarone to 200 mg daily in about a month's time    If patient has recurrence of a flutter, will proceed with repeat PFA ablation-will look at breaks in the line for flutter /also plan for vein of Efe injection    If patient continues to have recurrence of tachycardia final option would be an AV node ablation             Cardiomyopathy , NYHA II, LVEF =35%  Improved currently to 60%  Suspected tachy mediated  Increasing beta blockers for better rate control  On ACEI  EF has improved to 60%           Hypertension  On lisinopril and metoprolol   Blood pressure is 132/100       Hyperlipidemia  On statin-lovastatin   No myositis or myalgia         Suspected REYNOLD  Need to be evaluated for CPAP         Obesity  BMI is 33  Patient advised on dietary him restrictions to lose weight        Long-term anticoagulation  Chads Vasc score is 5  To continue with apixaban        Sinus bradycardia, sick sinus syndrome  Patient is post pacemaker  All device parameters a stable

## 2024-08-05 NOTE — PROGRESS NOTES
[facility] - Outpatient Follow-up Note  Madeline Good 78 y.o. female MRN: [id number]  Encounter: [id number]          ASSESSMENT AND PLAN:      1. Rectal bleeding    Patient presents for an evaluation of intermittent rectal bleeding that began about 6 weeks ago.  She has a history of colon polyps and a history of colitis.  Her last colonoscopy in 7/2021 was normal except for diverticulosis.    Will plan for colonoscopy to investigate.  Recommend a fiber supplement like Benefiber or Metamucil to regulate her bowel movements.    Note: She is on Eliquis for PAF and will hold 2 days prior to the procedure.    ______________________________________________________________________    SUBJECTIVE:  Patient is a pleasant 78 year old female with a PMH of PAF on Eliquis, CHF, HTN, hyperlipidemia, TIA who presents to the office for an evaluation of rectal bleeding.  She reports it began about 6 weeks ago.  She reports bright red bleeding.  She does have some intermittent BM irregularities/constipation.  She denies abdominal pain.  No family history of colon cancer.  She has a history of colon polyps.  Her last colonoscopy in July of 2021 was normal except for diverticulosis.  She had a prior colonoscopy in 2017 that showed colitis.    I obtained informed consent from the patient for the colonoscopy. The risks/benefits/alternatives of the procedure were discussed with the patient. Risks included, but not limited to, infection, bleeding, perforation, missed lesion and incomplete procedure were discussed. Patient was agreeable and electronic consent was signed.       REVIEW OF SYSTEMS IS OTHERWISE NEGATIVE.      Historical Information   Past Medical History:   Diagnosis Date    Aphasia, mixed 07/28/2017    Atrial fibrillation (HCC)     CHF (congestive heart failure) (HCC)     Colon polyp     Headache     Hyperlipidemia     Hypertension     Lyme disease     Shortness of breath     TIA (transient ischemic  "attack)     Vertigo      Past Surgical History:   Procedure Laterality Date    CARDIAC ELECTROPHYSIOLOGY PROCEDURE N/A 10/19/2023    Procedure: Cardiac eps/afib ablation PVI/POST WALL;  Surgeon: Piero Fajardo MD;  Location:  CARDIAC CATH LAB;  Service: Cardiology    CARDIAC PACEMAKER PLACEMENT  12/2019    COLONOSCOPY      ME SIGMOIDOSCOPY FLX DX W/COLLJ SPEC BR/WA IF PFRMD N/A 11/28/2017    Procedure: SIGMOIDOSCOPY FLEXIBLE;  Surgeon: Kavon Fernandez MD;  Location: [facility];  Service: Gastroenterology    TONSILLECTOMY       Social History   Social History     Substance and Sexual Activity   Alcohol Use Yes    Alcohol/week: 7.0 standard drinks of alcohol    Types: 7 Glasses of wine per week     Social History     Substance and Sexual Activity   Drug Use No     Social History     Tobacco Use   Smoking Status Never   Smokeless Tobacco Never   Tobacco Comments    no smoking for 46 years     Family History   Problem Relation Age of Onset    Lung cancer Mother     Dementia Father     Alzheimer's disease Father     Other Father         Cardiac Disorder     Lung cancer Maternal Grandmother     Breast cancer Paternal Aunt 40    No Known Problems Paternal Aunt     No Known Problems Paternal Aunt        Meds/Allergies       Current Outpatient Medications:     amiodarone 200 mg tablet    apixaban (Eliquis) 5 mg    Azelastine HCl 137 MCG/SPRAY SOLN    eszopiclone (LUNESTA) 1 mg tablet    furosemide (LASIX) 20 mg tablet    levothyroxine 25 mcg tablet    lovastatin (MEVACOR) 10 MG tablet    metoprolol succinate (TOPROL-XL) 100 mg 24 hr tablet    potassium chloride (Klor-Con M10) 10 mEq tablet    No Known Allergies        Objective     Blood pressure 139/80, height 5' 5\" (1.651 m), weight 85.7 kg (189 lb). Body mass index is 31.45 kg/m².      PHYSICAL EXAM:      General Appearance:   Alert, cooperative, no distress   HEENT:   Normocephalic, atraumatic, anicteric     Neck:  Supple, symmetrical, trachea midline   Lungs:   Clear " to auscultation bilaterally; no rales, rhonchi or wheezing; respirations unlabored    Heart::   Regular rate and rhythm; no murmur, rub, or gallop.   Abdomen:   Soft, non-tender, non-distended; normal bowel sounds; no masses, no organomegaly    Genitalia:   Deferred    Rectal:   Deferred    Extremities:  No cyanosis, clubbing or edema    Pulses:  2+ and symmetric    Skin:  No jaundice, rashes, or lesions    Lymph nodes:  No palpable cervical lymphadenopathy        Lab Results:   No visits with results within 1 Day(s) from this visit.   Latest known visit with results is:   Appointment on 06/11/2024   Component Date Value    Sodium 06/11/2024 140     Potassium 06/11/2024 4.1     Chloride 06/11/2024 103     CO2 06/11/2024 29     ANION GAP 06/11/2024 8     BUN 06/11/2024 14     Creatinine 06/11/2024 1.13     Glucose 06/11/2024 97     Calcium 06/11/2024 9.3     eGFR 06/11/2024 46          Radiology Results:   No results found.

## 2024-08-05 NOTE — PATIENT INSTRUCTIONS
Scheduled date of colonoscopy (as of today): 8/21/24  Physician performing colonoscopy: Jim  Location of colonoscopy: [place]  Bowel prep reviewed with patient: [email or web address]  Instructions reviewed with patient by: Archana DAWN  Clearances:

## 2024-08-06 ENCOUNTER — OFFICE VISIT (OUTPATIENT)
Dept: CARDIOLOGY CLINIC | Facility: CLINIC | Age: 79
End: 2024-08-06
Payer: MEDICARE

## 2024-08-06 VITALS — HEIGHT: 65 IN | BODY MASS INDEX: 31.45 KG/M2

## 2024-08-06 DIAGNOSIS — E66.9 OBESITY (BMI 30-39.9): ICD-10-CM

## 2024-08-06 DIAGNOSIS — E78.2 MIXED HYPERLIPIDEMIA: ICD-10-CM

## 2024-08-06 DIAGNOSIS — Z79.01 ANTICOAGULANT LONG-TERM USE: ICD-10-CM

## 2024-08-06 DIAGNOSIS — I10 ESSENTIAL HYPERTENSION: ICD-10-CM

## 2024-08-06 DIAGNOSIS — J44.9 CHRONIC OBSTRUCTIVE PULMONARY DISEASE, UNSPECIFIED COPD TYPE (HCC): ICD-10-CM

## 2024-08-06 DIAGNOSIS — I42.8 OTHER CARDIOMYOPATHY (HCC): ICD-10-CM

## 2024-08-06 DIAGNOSIS — I48.19 PERSISTENT ATRIAL FIBRILLATION (HCC): Primary | ICD-10-CM

## 2024-08-06 DIAGNOSIS — E03.9 ACQUIRED HYPOTHYROIDISM: ICD-10-CM

## 2024-08-06 DIAGNOSIS — N18.31 STAGE 3A CHRONIC KIDNEY DISEASE (HCC): ICD-10-CM

## 2024-08-06 DIAGNOSIS — I50.23 ACUTE ON CHRONIC SYSTOLIC CONGESTIVE HEART FAILURE (HCC): ICD-10-CM

## 2024-08-06 PROCEDURE — 93000 ELECTROCARDIOGRAM COMPLETE: CPT | Performed by: INTERNAL MEDICINE

## 2024-08-06 PROCEDURE — 99214 OFFICE O/P EST MOD 30 MIN: CPT | Performed by: INTERNAL MEDICINE

## 2024-08-06 RX ORDER — AMIODARONE HYDROCHLORIDE 200 MG/1
200 TABLET ORAL DAILY
Qty: 90 TABLET | Refills: 1 | Status: SHIPPED | OUTPATIENT
Start: 2024-08-06 | End: 2024-08-09 | Stop reason: SDUPTHER

## 2024-08-06 NOTE — PATIENT INSTRUCTIONS
- REDUCE Amiodarone to 200 mg, daily     - Schedule limited echo prior to your next appointment with me   - Call [phone] to schedule    - Schedule pulmonary function test previously ordered also before your next appointment with me   - Called [phone] to schedule    - Schedule next available follow up with me in [facility]

## 2024-08-06 NOTE — LETTER
August 8, 2024     Arnold Payton DO  [address]  [place] [address]    Patient: Madeline Good   YOB: 1945   Date of Visit: 8/6/2024       Dear Dr. Payton:    Thank you for referring Madeline Good to me for evaluation. Below are my notes for this consultation.    If you have questions, please do not hesitate to call me. I look forward to following your patient along with you.         Sincerely,        Piero Fajardo MD        CC: Madeline SANTA Latisha Fajardo MD  8/8/2024  6:43 AM  Sign when Signing Visit                                             Cardiology Follow Up    Madeline Good  1945  [id number]  [facility]  [address]  [address]  [facility] [address]  [phone]  [phone]    1. Persistent atrial fibrillation (HCC)  POCT ECG    Echo follow up/limited w/ contrast if indicated    amiodarone 200 mg tablet      2. Acute on chronic systolic congestive heart failure (HCC)  Echo follow up/limited w/ contrast if indicated      3. Essential hypertension        4. Other cardiomyopathy (HCC)        5. Chronic obstructive pulmonary disease, unspecified COPD type (HCC)        6. Acquired hypothyroidism        7. Stage 3a chronic kidney disease (HCC)        8. Mixed hyperlipidemia        9. Obesity (BMI 30-39.9)        10. Anticoagulant long-term use                    Summary of my recommendation for the patient  Patient has prior history of tachycardia mediated cardiomyopathy    Patient had extensive ablation-PVI, posterior wall, mitral isthmus flutter  There was a recurrence and she was in atrial flutter with heart rate of 120/min  Amiodarone and metoprolol were increased to 200 mg 2 times daily and 100 mg 2 times daily  Patient went back to sinus rhythm  Device check shows there are still brief breakthrough episodes    Reduce amiodarone to 200 mg once daily  Check for amiodarone toxicity-TSH, PFT with DLCO, CMP, iron evaluation    If  "patient has any recurrence we will repeat ablation with PFA before considering AV node ablation + consider VOM injection    Get an echocardiogram to assess EF    If patient continues to have recurrence of tachycardia final option would be an AV node ablation          Interval History:     The patient is doing reasonably well  Plan to reduce amiodarone    Patient underwent comprehensive ablation in October 2023  Unfortunately she went back  to flutter with RVR  She has a prior history of tachycardia mediated cardiomyopathy  Patient went back to sinus rhythm on doubling the dose of amiodarone and metoprolol  Device check does reveal brief episodes of breakthrough atrial flutter    She does have a pacemaker with a his lead in place    The patient is not complaining of anginal like chest pain or chest pressure  There is no worsening orthopnea, paroxysmal nocturnal dyspnea  There is some  leg swelling     No significant palpitation  There is no history of presyncope or syncope  There is rare history of transient  lightheadedness  There has been some improvement in exertional intolerance      Past medical history  The patient is a very pleasant 73 year old lady referred to me by Dr Gomez for management of A fi + RVR + heart failure      She does have significant medical illnesses in the form of  PAF  Hypertension  Hyperlipidemia  CMP - suspected tachy mediated      As for the atrial fibrillation  It has been present for quite some time now  Initially to present as episodes of palpitation  This has progressively increased in frequency and duration         There is history of snoring at night  There is occasional history of morning fatigability  There is occasional history of daytime sleepiness    Ht 5' 5\" (1.651 m)   BMI 31.45 kg/m²       Patient Active Problem List   Diagnosis   • Essential hypertension   • Mixed hyperlipidemia   • Obesity (BMI 30-39.9)   • Migraine with aura and without status migrainosus, not " intractable   • Myocardiopathy (HCC)   • Anticoagulant long-term use   • Nonrheumatic mitral valve regurgitation   • Nonrheumatic aortic valve insufficiency   • Nonrheumatic tricuspid valve regurgitation   • Persistent atrial fibrillation (HCC)   • Other insomnia   • Chronic obstructive pulmonary disease (HCC)   • Stage 3a chronic kidney disease (HCC)   • Acute on chronic systolic congestive heart failure (HCC)   • SSS (sick sinus syndrome) (HCC)   • Acquired hypothyroidism   • Iron deficiency anemia     Past Medical History:   Diagnosis Date   • Aphasia, mixed 07/28/2017   • Atrial fibrillation (HCC)    • CHF (congestive heart failure) (HCC)    • Colon polyp    • Headache    • Hyperlipidemia    • Hypertension    • Lyme disease    • Shortness of breath    • TIA (transient ischemic attack)    • Vertigo      Social History     Socioeconomic History   • Marital status: /Civil Union     Spouse name: Not on file   • Number of children: Not on file   • Years of education: Not on file   • Highest education level: Not on file   Occupational History   • Occupation: Retired   Tobacco Use   • Smoking status: Never   • Smokeless tobacco: Never   • Tobacco comments:     no smoking for 46 years   Vaping Use   • Vaping status: Never Used   Substance and Sexual Activity   • Alcohol use: Yes     Alcohol/week: 7.0 standard drinks of alcohol     Types: 7 Glasses of wine per week   • Drug use: No   • Sexual activity: Never     Partners: Male     Birth control/protection: None   Other Topics Concern   • Not on file   Social History Narrative    Always uses seat belt     Social Determinants of Health     Financial Resource Strain: Low Risk  (12/11/2023)    Overall Financial Resource Strain (CARDIA)    • Difficulty of Paying Living Expenses: Not very hard   Food Insecurity: No Food Insecurity (12/22/2022)    Hunger Vital Sign    • Worried About Running Out of Food in the Last Year: Never true    • Ran Out of Food in the Last Year:  Never true   Transportation Needs: No Transportation Needs (12/11/2023)    PRAPARE - Transportation    • Lack of Transportation (Medical): No    • Lack of Transportation (Non-Medical): No   Physical Activity: Not on file   Stress: Not on file   Social Connections: Unknown (6/18/2024)    Received from [employer]    Social Connections    • How often do you feel lonely or isolated from those around you? (Adult - for ages 18 years and over): Not on file   Intimate Partner Violence: Not on file   Housing Stability: Low Risk  (12/22/2022)    Housing Stability Vital Sign    • Unable to Pay for Housing in the Last Year: No    • Number of Places Lived in the Last Year: 1    • Unstable Housing in the Last Year: No      Family History   Problem Relation Age of Onset   • Lung cancer Mother    • Dementia Father    • Alzheimer's disease Father    • Other Father         Cardiac Disorder    • Lung cancer Maternal Grandmother    • Breast cancer Paternal Aunt 40   • No Known Problems Paternal Aunt    • No Known Problems Paternal Aunt      Past Surgical History:   Procedure Laterality Date   • CARDIAC ELECTROPHYSIOLOGY PROCEDURE N/A 10/19/2023    Procedure: Cardiac eps/afib ablation PVI/POST WALL;  Surgeon: Piero Fajardo MD;  Location:  CARDIAC CATH LAB;  Service: Cardiology   • CARDIAC PACEMAKER PLACEMENT  12/2019   • COLONOSCOPY     • HI SIGMOIDOSCOPY FLX DX W/COLLJ SPEC BR/WA IF PFRMD N/A 11/28/2017    Procedure: SIGMOIDOSCOPY FLEXIBLE;  Surgeon: Kavon Fernandez MD;  Location: [facility];  Service: Gastroenterology   • TONSILLECTOMY         Current Outpatient Medications:   •  amiodarone 200 mg tablet, Take 1 tablet (200 mg total) by mouth daily, Disp: 90 tablet, Rfl: 1  •  apixaban (Eliquis) 5 mg, Take 1 tablet (5 mg total) by mouth 2 (two) times a day, Disp: 180 tablet, Rfl: 3  •  Azelastine HCl 137 MCG/SPRAY SOLN, 2 sprays into each nostril 2 (two) times a day, Disp: 90 mL, Rfl: 1  •  eszopiclone (LUNESTA) 1 mg tablet, Take 1  tablet (1 mg total) by mouth daily at bedtime as needed for sleep Take immediately before bedtime, Disp: 10 tablet, Rfl: 0  •  furosemide (LASIX) 20 mg tablet, Take 1 tablet (20 mg total) by mouth daily, Disp: 90 tablet, Rfl: 1  •  levothyroxine 25 mcg tablet, Take 1 tablet (25 mcg total) by mouth daily in the early morning, Disp: 90 tablet, Rfl: 1  •  lovastatin (MEVACOR) 10 MG tablet, Take 1 tablet (10 mg total) by mouth daily at bedtime, Disp: 90 tablet, Rfl: 3  •  metoprolol succinate (TOPROL-XL) 100 mg 24 hr tablet, Take 1 tablet (100 mg total) by mouth daily, Disp: 90 tablet, Rfl: 3  •  potassium chloride (Klor-Con M10) 10 mEq tablet, Take 1 tablet (10 mEq total) by mouth every other day (Patient taking differently: Take 10 mEq by mouth daily), Disp: 45 tablet, Rfl: 3  No Known Allergies        LAB RESULTS:    CBC:  WBC   Date Value Ref Range Status   05/17/2024 10.48 (H) 4.31 - 10.16 Thousand/uL Final     Hemoglobin   Date Value Ref Range Status   05/17/2024 15.6 (H) 11.5 - 15.4 g/dL Final     Hematocrit   Date Value Ref Range Status   05/17/2024 46.1 34.8 - 46.1 % Final     MCV   Date Value Ref Range Status   05/17/2024 100 (H) 82 - 98 fL Final     Platelets   Date Value Ref Range Status   05/17/2024 286 149 - 390 Thousands/uL Final     RBC   Date Value Ref Range Status   05/17/2024 4.63 3.81 - 5.12 Million/uL Final     MCH   Date Value Ref Range Status   05/17/2024 33.7 26.8 - 34.3 pg Final     MCHC   Date Value Ref Range Status   05/17/2024 33.8 31.4 - 37.4 g/dL Final     RDW   Date Value Ref Range Status   05/17/2024 14.6 11.6 - 15.1 % Final     MPV   Date Value Ref Range Status   05/17/2024 10.3 8.9 - 12.7 fL Final     nRBC   Date Value Ref Range Status   05/17/2024 0 /100 WBCs Final       CMP:  Potassium   Date Value Ref Range Status   06/11/2024 4.1 3.5 - 5.3 mmol/L Final     Chloride   Date Value Ref Range Status   06/11/2024 103 96 - 108 mmol/L Final     CO2   Date Value Ref Range Status   06/11/2024  29 21 - 32 mmol/L Final     BUN   Date Value Ref Range Status   06/11/2024 14 5 - 25 mg/dL Final     Creatinine   Date Value Ref Range Status   06/11/2024 1.13 0.60 - 1.30 mg/dL Final     Comment:     Standardized to IDMS reference method     Calcium   Date Value Ref Range Status   06/11/2024 9.3 8.4 - 10.2 mg/dL Final     AST   Date Value Ref Range Status   05/17/2024 24 13 - 39 U/L Final     ALT   Date Value Ref Range Status   05/17/2024 15 7 - 52 U/L Final     Comment:     Specimen collection should occur prior to Sulfasalazine administration due to the potential for falsely depressed results.      Alkaline Phosphatase   Date Value Ref Range Status   05/17/2024 105 (H) 34 - 104 U/L Final     eGFR   Date Value Ref Range Status   06/11/2024 46 ml/min/1.73sq m Final        Magnesium:   Magnesium   Date Value Ref Range Status   03/04/2024 2.1 1.9 - 2.7 mg/dL Final        A1C:  Hemoglobin A1C   Date Value Ref Range Status   11/01/2022 5.8 (H) Normal 3.8-5.6%; PreDiabetic 5.7-6.4%; Diabetic >=6.5%; Glycemic control for adults with diabetes <7.0% % Final        TSH:  TSH 3RD GENERATON   Date Value Ref Range Status   01/25/2024 3.330 0.450 - 4.500 uIU/mL Final     Comment:     The recommended reference ranges for TSH during pregnancy are as follows:   First trimester 0.100 to 2.500 uIU/mL   Second trimester  0.200 to 3.000 uIU/mL   Third trimester 0.300 to 3.000 uIU/m    Note: Normal ranges may not apply to patients who are transgender, non-binary, or whose legal sex, sex at birth, and gender identity differ.  Adult TSH (3rd generation) reference range follows the recommended guidelines of the American [employer] Association, January, 2020.        PT/INR:  Protime   Date Value Ref Range Status   10/19/2023 16.8 (H) 11.6 - 14.5 seconds Final     INR   Date Value Ref Range Status   10/19/2023 1.38 (H) 0.84 - 1.19 Final       Lipid Panel:  Cholesterol   Date Value Ref Range Status   12/08/2023 160 See Comment mg/dL Final      Comment:     Cholesterol:         Pediatric <18 Years        Desirable          <170 mg/dL      Borderline High    170-199 mg/dL      High               >=200 mg/dL        Adult >=18 Years            Desirable        <200 mg/dL      Borderline High  200-239 mg/dL      High             >239 mg/dL       Triglycerides   Date Value Ref Range Status   2023 80 See Comment mg/dL Final     Comment:     Triglyceride:     0-9Y            <75mg/dL     10Y-17Y         <90 mg/dL       >=18Y     Normal          <150 mg/dL     Borderline High 150-199 mg/dL     High            200-499 mg/dL        Very High       >499 mg/dL    Specimen collection should occur prior to Metamizole administration due to the potential for falsely depressed results.     HDL, Direct   Date Value Ref Range Status   2023 53 >=50 mg/dL Final     Non-HDL-Chol (CHOL-HDL)   Date Value Ref Range Status   2023 107 mg/dl Final       Troponin:  Troponin I   Date Value Ref Range Status   02/10/2019 0.05 (H) <=0.04 ng/mL Final     Comment:       [employer] Chemistry analyzer 99% cutoff is > 0.04 ng/mL in network labs     o cTnI 99% cutoff is useful only when applied to patients in the clinical setting of myocardial ischemia   o cTnI 99% cutoff should be interpreted in the context of clinical history, ECG findings and possibly cardiac imaging to establish correct diagnosis.   o cTnI 99% cutoff may be suggestive but clearly not indicative of a coronary event without the clinical setting of myocardial ischemia.           Imaging:    [id number]        LUIZA:  No results found for this or any previous visit.      Echocardiogram:  Results for orders placed during the hospital encounter of 22    Echo complete w/ contrast if indicated    Interpretation Summary  •  Left Ventricle: Left ventricular cavity size is normal. Systolic function is mildly reduced.  LVEF approximately 40 to 45%.  Patient was in atrial fibrillation with periods of rapid  ventricular response which interferes with interpretation. There is mild global hypokinesis. Unable to assess diastolic function due to atrial fibrillation.  •  Right Ventricle: Right ventricular cavity size is mildly dilated. Systolic function is mildly to moderately reduced.  •  Left Atrium: The atrium is moderate to markedly dilated.  •  Right Atrium: The atrium is moderately dilated.  •  Aortic Valve: There is mild regurgitation.  •  Mitral Valve: There is moderate annular calcification. There is moderate regurgitation.  •  Tricuspid Valve: There is moderate regurgitation. The right ventricular systolic pressure is mildly elevated.  Estimated RVSP 35 mm Hg.  Pacer leads noted.  •  Pulmonic Valve: There is mild regurgitation.    Results for orders placed during the hospital encounter of 03/07/23    Echo follow up/limited w/ contrast if indicated    Interpretation Summary  •  Left Ventricle: Left ventricular cavity size is normal. The left ventricular ejection fraction is 55%. Systolic function is normal.  •  Left Atrium: The atrium is moderately dilated.  •  Right Atrium: The atrium is mildly dilated.  •  Aortic Valve: There is mild to moderate regurgitation.  •  Tricuspid Valve: There is moderate regurgitation.  •  Pulmonary Artery: The estimated pulmonary artery systolic pressure is 52.0 mmHg. The pulmonary artery systolic pressure is moderately increased.  •  This is a limited echocardiogram performed to evaluate LV function. Interpretation is therefore limited.      Stress Test:   Results for orders placed during the hospital encounter of 03/07/23    NM myocardial perfusion spect (rx stress and/or rest)    Interpretation Summary  •  Stress ECG: No ST deviation is noted. There were no arrhythmias during recovery. . The ECG was not diagnostic due to pharmacological (vasodilator) stress.  •  Perfusion Defect Conclusion: The stress/rest perfusion ratio is 1.03 . There is no evidence of transient ischemic dilation  (TID).  •  Stress Function: Left ventricular function post-stress is normal. Post-stress ejection fraction is 79 %.  •  Perfusion: There is a left ventricular perfusion defect that is medium in size with moderate reduction in uptake present in the mid to apical anterior and anterolateral location(s) that is partially reversible.  •  Stress Combined Conclusion: Left ventricular perfusion is probably abnormal.      Cardiac Catheterization:  No results found for this or any previous visit.      HOLTER MONITOR: 24 HOUR/48 HOUR MONITORS  No results found for this or any previous visit.      AMB Extended Holter Monitor: Zio XT/AT or BioTel  No results found for this or any previous visit.      Cardiac CT:    CT Cardiac w Pulmonary Vein Mapping  10/11/2023    FINDINGS:     PULMONARY VEIN ANATOMY: Typical with two right and two left pulmonary veins.     APPROXIMATE MEASUREMENTS OF PULMONARY VEIN OSTIA:     Right superior: 22 mm.  Right inferior: 12 mm.     Left superior: 14 mm.  Left inferior: 16 mm.     CARDIAC STRUCTURES: Top normal heart size. Pacer leads terminate in the right atrium and right ventricle. Mild coronary artery calcifications.     GREAT VESSELS: Visualized thoracic aorta and central pulmonary arterial tree are within normal limits for the patient's age.     EXTRACARDIAC FINDINGS: Lower lobe predominant fibrotic changes in the visualized lungs again seen. Previously noted 2 mm right upper lobe nodule is not definitively visualized on this study, possibly due to motion artifact. Large hiatal hernia containing   the stomach again identified.     IMPRESSION:     Pulmonary venous anatomy as described above.     Large hiatal hernia with intrathoracic stomach, and pulmonary fibrosis again noted.      Cardioversion  11/03/2022    Indication for procedure: Cardioversion  Requesting physician:Dr Laura Goodman  Anticoagulation:  Eliquis     The patient was identified in the OR . A time out procedure was performed.      The patient was sedated by the anesthesia service .     The patient was cardioverted to sinus rhythm with a 200J biphasic shock.       There were no complications.  The patient was monitored for the appropriate time interval in the holding area, and was transferred back to the medical floor in stable condition.    Pre Cardioversion EKG  11/03/2022      Post Cardioversion EKG  11/03/2022        Cardiac CT:    CT Cardiac w Pulmonary Vein Mapping  10/11/2023    FINDINGS:     PULMONARY VEIN ANATOMY: Typical with two right and two left pulmonary veins.     APPROXIMATE MEASUREMENTS OF PULMONARY VEIN OSTIA:     Right superior: 22 mm.  Right inferior: 12 mm.     Left superior: 14 mm.  Left inferior: 16 mm.     CARDIAC STRUCTURES: Top normal heart size. Pacer leads terminate in the right atrium and right ventricle. Mild coronary artery calcifications.     GREAT VESSELS: Visualized thoracic aorta and central pulmonary arterial tree are within normal limits for the patient's age.     EXTRACARDIAC FINDINGS: Lower lobe predominant fibrotic changes in the visualized lungs again seen. Previously noted 2 mm right upper lobe nodule is not definitively visualized on this study, possibly due to motion artifact. Large hiatal hernia containing   the stomach again identified.     IMPRESSION:     Pulmonary venous anatomy as described above.     Large hiatal hernia with intrathoracic stomach, and pulmonary fibrosis again noted.        DEVICE CHECK:     Results for orders placed or performed in visit on 06/19/24   Cardiac EP device report    Narrative    MDT DUAL CHAMBER PM  - ACTIVE SYSTEM IS MRI CONDITIONAL  CARELINK TRANSMISSION: BATTERY VOLTAGE ADEQUATE (8.1 YRS). AP 54.1%   5.3% (AAIR-DDR 70PPM). ALL AVAILABLE LEAD PARAMETERS WITHIN NORMAL LIMITS. 3 DEVICE CLASSIFIED VT EPISODE W/ EGM'S SUGGESTIVE FOR RVR, AVG RATE 162-167 BPM. 475 AT/AF EPISODES W/ AVAIL EGMS SHOWING PAT, AT, AF, AFL, MAX DURATION >99 HRS. AT/AF BURDEN 45.3%. EF:  50-55% (10/19/23 LUIZA).  AP/VS @ AVG 76 BPM ON CURRENT EGM. S/P AF/AFL ABL 10/19/23 & PT TAKES ELIQUIS, AMIODARONE, METOPROLOL SUCC. NORMAL DEVICE FUNCTION.  ES            Review of Systems:  Review of Systems   All other systems reviewed and are negative.  As described in my history of present illness    Physical Exam:  Physical Exam  Vitals reviewed.   Constitutional:       Appearance: Normal appearance. She is well-developed. She is obese. She is not ill-appearing.      Comments: Not in any distress at the current time   HENT:      Head: Normocephalic and atraumatic.      Right Ear: External ear normal.      Left Ear: External ear normal.      Nose: Nose normal.      Mouth/Throat:      Pharynx: Uvula midline.   Eyes:      General: Lids are normal. No scleral icterus.     Extraocular Movements: Extraocular movements intact.      Conjunctiva/sclera: Conjunctivae normal.      Pupils: Pupils are equal, round, and reactive to light.      Comments: No pallor  No cyanosis  No icterus   Neck:      Thyroid: No thyromegaly.      Vascular: No carotid bruit or JVD.      Trachea: Trachea normal.      Comments: No jugular lymphadenopathy  Short thick neck  Cardiovascular:      Rate and Rhythm: Normal rate and regular rhythm.      Chest Wall: PMI is not displaced.      Pulses: Normal pulses.      Heart sounds: Normal heart sounds, S1 normal and S2 normal. No murmur heard.     No friction rub. No gallop. No S3 or S4 sounds.   Pulmonary:      Effort: Pulmonary effort is normal. No accessory muscle usage or respiratory distress.      Breath sounds: Normal breath sounds. No decreased breath sounds, wheezing, rhonchi or rales.   Chest:      Chest wall: No tenderness.   Abdominal:      General: Bowel sounds are normal. There is no distension.      Palpations: Abdomen is soft. There is no hepatomegaly, splenomegaly or mass.      Tenderness: There is no abdominal tenderness.      Comments: Central obesity present   Musculoskeletal:          General: No swelling, tenderness or deformity. Normal range of motion.      Cervical back: Normal range of motion and neck supple. No rigidity.      Right lower leg: No edema.      Left lower leg: No edema.   Lymphadenopathy:      Cervical: No cervical adenopathy.   Skin:     Findings: No abrasion, erythema, lesion or rash.      Nails: There is no clubbing.   Neurological:      Mental Status: She is alert and oriented to person, place, and time. Mental status is at baseline.      Motor: No weakness.      Comments: Facial symmetry is retained  Extraocular movements are retained  Head neck tongue and palate movement are retained and symmetric   Psychiatric:         Mood and Affect: Mood normal.         Speech: Speech normal.         Behavior: Behavior normal.         Thought Content: Thought content normal.         Judgment: Judgment normal.         Discussion/Summary:      Atrial flutter with RVR  On amiodarone, metoprolol and apixaban  [id number] score is 5  Patient has a long history of atrial fibrillation and flutter  She underwent ablation in October 2023    There was a recurrence and she was in atrial flutter with heart rate of 120/min  Amiodarone and metoprolol were increased to 200 mg 2 times daily and 100 mg 2 times daily  Patient went back to sinus rhythm  Device check shows there are still brief breakthrough episodes    Check for amiodarone toxicity-CMP, TSH, PFT with DLCO, evaluation    Will reduce amiodarone to 200 mg daily in about a month's time    If patient has recurrence of a flutter, will proceed with repeat PFA ablation-will look at breaks in the line for flutter /also plan for vein of Efe injection    If patient continues to have recurrence of tachycardia final option would be an AV node ablation             Cardiomyopathy , NYHA II, LVEF =35%  Improved currently to 60%  Suspected tachy mediated  Increasing beta blockers for better rate control  On ACEI  EF has improved to 60%            Hypertension  On lisinopril and metoprolol   Blood pressure is 132/100       Hyperlipidemia  On statin-lovastatin   No myositis or myalgia         Suspected REYNOLD  Need to be evaluated for CPAP         Obesity  BMI is 33  Patient advised on dietary him restrictions to lose weight        Long-term anticoagulation  Chads Vasc score is 5  To continue with apixaban        Sinus bradycardia, sick sinus syndrome  Patient is post pacemaker  All device parameters a stable

## 2024-08-09 ENCOUNTER — OFFICE VISIT (OUTPATIENT)
Dept: CARDIOLOGY CLINIC | Facility: CLINIC | Age: 79
End: 2024-08-09
Payer: MEDICARE

## 2024-08-09 VITALS
DIASTOLIC BLOOD PRESSURE: 80 MMHG | WEIGHT: 185 LBS | HEIGHT: 65 IN | BODY MASS INDEX: 30.82 KG/M2 | OXYGEN SATURATION: 94 % | RESPIRATION RATE: 16 BRPM | HEART RATE: 83 BPM | SYSTOLIC BLOOD PRESSURE: 139 MMHG

## 2024-08-09 DIAGNOSIS — I36.1 NONRHEUMATIC TRICUSPID VALVE REGURGITATION: ICD-10-CM

## 2024-08-09 DIAGNOSIS — I25.10 CORONARY ARTERY CALCIFICATION OF NATIVE ARTERY: ICD-10-CM

## 2024-08-09 DIAGNOSIS — I49.5 SSS (SICK SINUS SYNDROME) (HCC): ICD-10-CM

## 2024-08-09 DIAGNOSIS — Z79.01 ANTICOAGULANT LONG-TERM USE: ICD-10-CM

## 2024-08-09 DIAGNOSIS — I48.19 PERSISTENT ATRIAL FIBRILLATION (HCC): ICD-10-CM

## 2024-08-09 DIAGNOSIS — I50.32 CHRONIC DIASTOLIC CONGESTIVE HEART FAILURE (HCC): Primary | ICD-10-CM

## 2024-08-09 DIAGNOSIS — I42.8 NONISCHEMIC CARDIOMYOPATHY (HCC): ICD-10-CM

## 2024-08-09 DIAGNOSIS — E78.2 MIXED HYPERLIPIDEMIA: ICD-10-CM

## 2024-08-09 DIAGNOSIS — I25.84 CORONARY ARTERY CALCIFICATION OF NATIVE ARTERY: ICD-10-CM

## 2024-08-09 DIAGNOSIS — I35.1 NONRHEUMATIC AORTIC VALVE INSUFFICIENCY: ICD-10-CM

## 2024-08-09 DIAGNOSIS — Z95.0 PACEMAKER: ICD-10-CM

## 2024-08-09 DIAGNOSIS — I10 PRIMARY HYPERTENSION: ICD-10-CM

## 2024-08-09 PROCEDURE — 99214 OFFICE O/P EST MOD 30 MIN: CPT | Performed by: INTERNAL MEDICINE

## 2024-08-09 RX ORDER — POTASSIUM CHLORIDE 750 MG/1
10 TABLET, EXTENDED RELEASE ORAL DAILY
Qty: 90 TABLET | Refills: 3 | Status: SHIPPED | OUTPATIENT
Start: 2024-08-09 | End: 2024-08-09 | Stop reason: SDUPTHER

## 2024-08-09 RX ORDER — METOPROLOL SUCCINATE 100 MG/1
100 TABLET, EXTENDED RELEASE ORAL DAILY
Qty: 90 TABLET | Refills: 3 | Status: SHIPPED | OUTPATIENT
Start: 2024-08-09

## 2024-08-09 RX ORDER — POTASSIUM CHLORIDE 750 MG/1
10 TABLET, EXTENDED RELEASE ORAL DAILY
Qty: 90 TABLET | Refills: 3 | Status: SHIPPED | OUTPATIENT
Start: 2024-08-09

## 2024-08-09 RX ORDER — AMIODARONE HYDROCHLORIDE 200 MG/1
200 TABLET ORAL DAILY
Qty: 90 TABLET | Refills: 1 | Status: SHIPPED | OUTPATIENT
Start: 2024-08-09

## 2024-08-09 NOTE — PROGRESS NOTES
CARDIOLOGY OFFICE VISIT  [facility]  [address] [place] [address], [place] [address]  Tel: [phone]      NAME: Madeline Good  AGE: 78 y.o.  SEX: female  : 1945  MRN: [id number]      Chief Complaint:  Chief Complaint   Patient presents with    Follow-up         History of Present Illness:   Patient comes for follow up. States she still feels short of breath climbing steps but definitely less than before. Pt denies chest pain / pressure, palpitations, lightheadedness, syncope, swelling feet, orthopnea, PND, claudication.    Chronic HFpEF - currently on furosemide 20 mg OD, potassium, metoprolol succinate. Lisinopril was discontinued for soft BP.  States she has been watching her salt intake closely     Nonischemic cardiomyopathy, likely tachycardia mediated -  patient has a history of nonischemic cardiomyopathy with improvement in EF after rate/rhythm control of her atrial fibrillation. Current EF is 55% (2023).  On metoprolol succinate.  Lisinopril was discontinued for soft BP     Persistent AF / flutter s/p ablation - Patient was diagnosed with atrial fibrillation on an EKG done at her PCP office. S/P DCCV on 2018 and was in sinus rhythm for a while but then went back into Afib. Then she was started on rhythm control with Sotalol. Did well for a while but again required DCCV on 11/3/2022. She was placed on amiodarone but started having breakthrough atrial fibrillation. Now status post cryoablation with pulmonary vein isolation, posterior wall isolation and ablation of mitral isthmus dependent flutter (10/19/2023) by Dr. Andrei Lugo. There was a recurrence and she was in atrial flutter. Currently on Amiodarone + Metoprolol     SSS s/p PPM - last pacemaker interrogation discussed with the patient.  Continue regular pacemaker interrogations       Coronary artery calcification on CT chest -  States is doing well cardiac wise  with no cardiac symptoms.  Taking all medications regularly.     Primary hypertension -  Has been hypertensive for many years.  Taking medications regularly.  Denies lightheadedness, headache, medication side effects.      Mixed hyperlipidemia -  Has had hyperlipidemia for many years.  Taking statin regularly along with diet control.  Denies myalgia.  PCP closely monitoring the blood work.    Moderate aortic insufficiency, moderate tricuspid regurgitation.    H/O TIA  CKD    Past Medical History:  Past Medical History:   Diagnosis Date    Aphasia, mixed 07/28/2017    Atrial fibrillation (HCC)     CHF (congestive heart failure) (HCC)     Colon polyp     Headache     Hyperlipidemia     Hypertension     Lyme disease     Shortness of breath     TIA (transient ischemic attack)     Vertigo          Past Surgical History:  Past Surgical History:   Procedure Laterality Date    CARDIAC ELECTROPHYSIOLOGY PROCEDURE N/A 10/19/2023    Procedure: Cardiac eps/afib ablation PVI/POST WALL;  Surgeon: Piero Fajardo MD;  Location:  CARDIAC CATH LAB;  Service: Cardiology    CARDIAC PACEMAKER PLACEMENT  12/2019    COLONOSCOPY      IA SIGMOIDOSCOPY FLX DX W/COLLJ SPEC BR/WA IF PFRMD N/A 11/28/2017    Procedure: SIGMOIDOSCOPY FLEXIBLE;  Surgeon: Kavon Fernandez MD;  Location: [facility];  Service: Gastroenterology    TONSILLECTOMY           Family History:  Family History   Problem Relation Age of Onset    Lung cancer Mother     Dementia Father     Alzheimer's disease Father     Other Father         Cardiac Disorder     Lung cancer Maternal Grandmother     Breast cancer Paternal Aunt 40    No Known Problems Paternal Aunt     No Known Problems Paternal Aunt          Social History:  Social History     Socioeconomic History    Marital status: /Civil Union     Spouse name: None    Number of children: None    Years of education: None    Highest education level: None   Occupational History    Occupation: Retired   Tobacco Use     Smoking status: Never    Smokeless tobacco: Never    Tobacco comments:     no smoking for 46 years   Vaping Use    Vaping status: Never Used   Substance and Sexual Activity    Alcohol use: Yes     Alcohol/week: 7.0 standard drinks of alcohol     Types: 7 Glasses of wine per week    Drug use: No    Sexual activity: Never     Partners: Male     Birth control/protection: None   Other Topics Concern    None   Social History Narrative    Always uses seat belt     Social Determinants of Health     Financial Resource Strain: Low Risk  (12/11/2023)    Overall Financial Resource Strain (CARDIA)     Difficulty of Paying Living Expenses: Not very hard   Food Insecurity: No Food Insecurity (12/22/2022)    Hunger Vital Sign     Worried About Running Out of Food in the Last Year: Never true     Ran Out of Food in the Last Year: Never true   Transportation Needs: No Transportation Needs (12/11/2023)    PRAPARE - Transportation     Lack of Transportation (Medical): No     Lack of Transportation (Non-Medical): No   Physical Activity: Not on file   Stress: Not on file   Social Connections: Unknown (6/18/2024)    Received from [employer]    Social Connections     How often do you feel lonely or isolated from those around you? (Adult - for ages 18 years and over): Not on file   Intimate Partner Violence: Not on file   Housing Stability: Low Risk  (12/22/2022)    Housing Stability Vital Sign     Unable to Pay for Housing in the Last Year: No     Number of Places Lived in the Last Year: 1     Unstable Housing in the Last Year: No         Active Problems:  Patient Active Problem List   Diagnosis    Essential hypertension    Mixed hyperlipidemia    Obesity (BMI 30-39.9)    Migraine with aura and without status migrainosus, not intractable    Myocardiopathy (HCC)    Anticoagulant long-term use    Nonrheumatic mitral valve regurgitation    Nonrheumatic aortic valve insufficiency    Nonrheumatic tricuspid valve regurgitation    Persistent  atrial fibrillation (HCC)    Other insomnia    Chronic obstructive pulmonary disease (HCC)    Stage 3a chronic kidney disease (HCC)    Acute on chronic systolic congestive heart failure (HCC)    SSS (sick sinus syndrome) (HCC)    Acquired hypothyroidism    Iron deficiency anemia         The following portions of the patient's history were reviewed and updated as appropriate: past medical history, past surgical history, past family history,  past social history, current medications, allergies and problem list.      Review of Systems:  Constitutional: Denies fever, chills  Eyes: Denies eye redness, eye discharge  ENT: Denies hearing loss, sneezing, nasal discharge, sore throat   Respiratory: Denies cough, expectoration. +shortness of breath  Cardiovascular: Denies chest pain, palpitations, lower extremity swelling  Gastrointestinal: Denies abdominal pain, nausea, vomiting, diarrhea  Genito-Urinary: Denies dysuria, incontinence  Musculoskeletal: Denies back pain, joint pain, muscle pain  Neurologic: Denies lightheadedness, syncope, headache, seizures  Endocrine: Denies polydipsia, temperature intolerance  Allergy and Immunology: Denies hives, insect bite sensitivity  Hematological and Lymphatic: Denies bleeding problems, swollen glands   Psychological: Denies depression, suicidal ideation, anxiety, panic  Dermatological: Denies pruritus, rash, skin lesion changes      Vitals:  Vitals:    08/09/24 0807   BP: 139/80   Pulse: 83   Resp: 16   SpO2: 94%       Body mass index is 30.79 kg/m².    Weight (last 2 days)       Date/Time Weight    08/09/24 0807 83.9 (185)              Physical Examination:  General: Patient is not in acute distress. Awake, alert, oriented in time, place and person. Responding to commands  Head: Normocephalic. Atraumatic  Eyes: Both pupils normal sized, round and reactive to light. Nonicteric  ENT: Normal external ear canals  Neck: Supple. JVP not raised. Trachea central. No thyromegaly  Lungs:  "Bilateral bronchovascular breath sounds with no crackles or rhonchi  Chest wall: No tenderness  Cardiovascular: RRR. S1 and S2 normal. No murmur, rub or gallop  Gastrointestinal: Abdomen soft, nontender. No guarding or rigidity. Liver and spleen not palpable. Bowel sounds present  Neurologic: Patient is awake, alert, oriented in time, place and person. Responding to commands. Moving all extremities  Integumentary:  No skin rash  Lymphatic: No cervical lymphadenopathy  Back: Symmetric. No CVA tenderness  Extremities: No clubbing, cyanosis or edema      Laboratory Results:  CBC with diff:   Lab Results   Component Value Date    WBC 10.48 (H) 05/17/2024    RBC 4.63 05/17/2024    HGB 15.6 (H) 05/17/2024    HCT 46.1 05/17/2024     (H) 05/17/2024    MCH 33.7 05/17/2024    RDW 14.6 05/17/2024     05/17/2024       CMP:  Lab Results   Component Value Date    CREATININE 1.13 06/11/2024    BUN 14 06/11/2024    K 4.1 06/11/2024     06/11/2024    CO2 29 06/11/2024    ALKPHOS 105 (H) 05/17/2024    ALT 15 05/17/2024    AST 24 05/17/2024    BILIDIR 0.13 01/05/2023       Lab Results   Component Value Date    HGBA1C 5.8 (H) 11/01/2022    MG 2.1 03/04/2024    PHOS 3.5 03/16/2023       Lab Results   Component Value Date    TROPONINI 0.05 (H) 02/10/2019    TROPONINI 0.06 (H) 02/09/2019    TROPONINI 0.06 (H) 02/09/2019       Lipid Profile:   No results found for: \"CHOL\"  Lab Results   Component Value Date    HDL 53 12/08/2023    HDL 45 (L) 11/02/2022    HDL 55 11/22/2021     Lab Results   Component Value Date    LDLCALC 91 12/08/2023    LDLCALC 62 11/02/2022    LDLCALC 74 11/22/2021     Lab Results   Component Value Date    TRIG 80 12/08/2023    TRIG 75 11/02/2022    TRIG 90 11/22/2021       Cardiac testing:   Results for orders placed during the hospital encounter of 11/01/22    Echo complete w/ contrast if indicated    Interpretation Summary    Left Ventricle: Left ventricular cavity size is normal. Systolic " function is mildly reduced.  LVEF approximately 40 to 45%.  Patient was in atrial fibrillation with periods of rapid ventricular response which interferes with interpretation. There is mild global hypokinesis. Unable to assess diastolic function due to atrial fibrillation.    Right Ventricle: Right ventricular cavity size is mildly dilated. Systolic function is mildly to moderately reduced.    Left Atrium: The atrium is moderate to markedly dilated.    Right Atrium: The atrium is moderately dilated.    Aortic Valve: There is mild regurgitation.    Mitral Valve: There is moderate annular calcification. There is moderate regurgitation.    Tricuspid Valve: There is moderate regurgitation. The right ventricular systolic pressure is mildly elevated.  Estimated RVSP 35 mm Hg.  Pacer leads noted.    Pulmonic Valve: There is mild regurgitation.    Results for orders placed during the hospital encounter of 03/07/23    Echo follow up/limited w/ contrast if indicated    Interpretation Summary    Left Ventricle: Left ventricular cavity size is normal. The left ventricular ejection fraction is 55%. Systolic function is normal.    Left Atrium: The atrium is moderately dilated.    Right Atrium: The atrium is mildly dilated.    Aortic Valve: There is mild to moderate regurgitation.    Tricuspid Valve: There is moderate regurgitation.    Pulmonary Artery: The estimated pulmonary artery systolic pressure is 52.0 mmHg. The pulmonary artery systolic pressure is moderately increased.    This is a limited echocardiogram performed to evaluate LV function. Interpretation is therefore limited.      Results for orders placed during the hospital encounter of 03/07/23    NM myocardial perfusion spect (rx stress and/or rest)    Interpretation Summary    Stress ECG: No ST deviation is noted. There were no arrhythmias during recovery. . The ECG was not diagnostic due to pharmacological (vasodilator) stress.    Perfusion Defect Conclusion: The  stress/rest perfusion ratio is 1.03 . There is no evidence of transient ischemic dilation (TID).    Stress Function: Left ventricular function post-stress is normal. Post-stress ejection fraction is 79 %.    Perfusion: There is a left ventricular perfusion defect that is medium in size with moderate reduction in uptake present in the mid to apical anterior and anterolateral location(s) that is partially reversible.    Stress Combined Conclusion: Left ventricular perfusion is probably abnormal.    Medications:    Current Outpatient Medications:     amiodarone 200 mg tablet, Take 1 tablet (200 mg total) by mouth daily, Disp: 90 tablet, Rfl: 1    apixaban (Eliquis) 5 mg, Take 1 tablet (5 mg total) by mouth 2 (two) times a day, Disp: 180 tablet, Rfl: 3    Azelastine HCl 137 MCG/SPRAY SOLN, 2 sprays into each nostril 2 (two) times a day, Disp: 90 mL, Rfl: 1    eszopiclone (LUNESTA) 1 mg tablet, Take 1 tablet (1 mg total) by mouth daily at bedtime as needed for sleep Take immediately before bedtime, Disp: 10 tablet, Rfl: 0    furosemide (LASIX) 20 mg tablet, Take 1 tablet (20 mg total) by mouth daily, Disp: 90 tablet, Rfl: 1    levothyroxine 25 mcg tablet, Take 1 tablet (25 mcg total) by mouth daily in the early morning, Disp: 90 tablet, Rfl: 1    lovastatin (MEVACOR) 10 MG tablet, Take 1 tablet (10 mg total) by mouth daily at bedtime, Disp: 90 tablet, Rfl: 3    metoprolol succinate (TOPROL-XL) 100 mg 24 hr tablet, Take 1 tablet (100 mg total) by mouth daily, Disp: 90 tablet, Rfl: 3    potassium chloride (Klor-Con M10) 10 mEq tablet, Take 1 tablet (10 mEq total) by mouth daily, Disp: 90 tablet, Rfl: 3      Allergies:  No Known Allergies      Assessment and Plan:  1. Chronic diastolic congestive heart failure (HCC)  Try furosemide 40 mg daily for a week and see if that improves your shortness of breath.  If it does not, go back to furosemide 20 mg daily along with KCl, metoprolol succinate.  Low-salt diet.  Daily  weights.    Also for her SOB - TTE and Spirometry with DLCO pending. Stress test in 2023 showed no reversible perfusion defect    2. Persistent atrial fibrillation (HCC) s/p ablation  3. Anticoagulant long-term use  Continue amiodarone 200 mg daily, metoprolol succinate 100 mg daily, Eliquis 5 mg twice daily  Device check shows there are still brief breakthrough episodes  Per Dr Fajardo - If patient has any recurrence plan is to repeat ablation with PFA before considering AV node ablation + consider VOM injection    4. SSS (sick sinus syndrome) (HCC)  5. Pacemaker  Continue regular pacemaker interrogations.  Last pacemaker interrogation reviewed with the patient    6. Coronary artery calcification of native artery  Continue statin and diet control.  No aspirin given increased bleeding risk along with Eliquis    7. Nonischemic cardiomyopathy (HCC)  Continue metoprolol succinate.  Lisinopril was previously held due to soft BP    8. Primary hypertension  BP stable.  Continue current medications.  Continue to monitor vitals    9. Mixed hyperlipidemia  Continue lovastatin and diet control.  Her PCP closely monitor the blood work    10. Nonrheumatic aortic valve insufficiency  11. Nonrheumatic tricuspid valve regurgitation  To be followed up with serial echocardiograms at recommended intervals    Recommend aggressive risk factor modification and therapeutic lifestyle changes.  Low-salt, low-calorie, low-fat, low-cholesterol diet with regular exercise and to optimize weight.  I will defer the ordering and monitoring of necessity lab studies to you, but I am available and happy to review and manage any of the data at your request in the future.    Discussed concepts of atherosclerosis, including signs and symptoms of cardiac disease.    Previous studies were reviewed.    Safety measures were reviewed.  Questions were entertained and answered.  Patient was advised to report any problems requiring medical attention.     Follow-up with PCP and appropriate specialist and lab work as discussed.    Return for follow up visit as scheduled or earlier, if needed.  Thank you for allowing me to participate in the care and evaluation of your patient.  Should you have any questions, please feel free to contact me.    Marycruz Gomez MD  8/9/2024,8:47 AM

## 2024-08-21 ENCOUNTER — HOSPITAL ENCOUNTER (OUTPATIENT)
Dept: GASTROENTEROLOGY | Facility: HOSPITAL | Age: 79
Setting detail: OUTPATIENT SURGERY
Discharge: HOME | End: 2024-08-21
Payer: MEDICARE

## 2024-08-21 ENCOUNTER — ANESTHESIA (OUTPATIENT)
Dept: GASTROENTEROLOGY | Facility: HOSPITAL | Age: 79
End: 2024-08-21

## 2024-08-21 ENCOUNTER — ANESTHESIA EVENT (OUTPATIENT)
Dept: GASTROENTEROLOGY | Facility: HOSPITAL | Age: 79
End: 2024-08-21

## 2024-08-21 VITALS
OXYGEN SATURATION: 97 % | SYSTOLIC BLOOD PRESSURE: 112 MMHG | HEART RATE: 72 BPM | RESPIRATION RATE: 16 BRPM | BODY MASS INDEX: 30.71 KG/M2 | HEIGHT: 65 IN | WEIGHT: 184.3 LBS | DIASTOLIC BLOOD PRESSURE: 80 MMHG | TEMPERATURE: 97.5 F

## 2024-08-21 DIAGNOSIS — K62.89 PROCTITIS: Primary | ICD-10-CM

## 2024-08-21 DIAGNOSIS — K62.5 RECTAL BLEEDING: ICD-10-CM

## 2024-08-21 PROCEDURE — 45380 COLONOSCOPY AND BIOPSY: CPT | Performed by: INTERNAL MEDICINE

## 2024-08-21 PROCEDURE — 88305 TISSUE EXAM BY PATHOLOGIST: CPT | Performed by: SPECIALIST

## 2024-08-21 PROCEDURE — 88342 IMHCHEM/IMCYTCHM 1ST ANTB: CPT | Performed by: SPECIALIST

## 2024-08-21 RX ORDER — SODIUM CHLORIDE, SODIUM LACTATE, POTASSIUM CHLORIDE, CALCIUM CHLORIDE 600; 310; 30; 20 MG/100ML; MG/100ML; MG/100ML; MG/100ML
INJECTION, SOLUTION INTRAVENOUS CONTINUOUS PRN
Status: DISCONTINUED | OUTPATIENT
Start: 2024-08-21 | End: 2024-08-21

## 2024-08-21 RX ORDER — LIDOCAINE HYDROCHLORIDE 10 MG/ML
INJECTION, SOLUTION EPIDURAL; INFILTRATION; INTRACAUDAL; PERINEURAL AS NEEDED
Status: DISCONTINUED | OUTPATIENT
Start: 2024-08-21 | End: 2024-08-21

## 2024-08-21 RX ORDER — MESALAMINE 1000 MG/1
1000 SUPPOSITORY RECTAL
Qty: 28 SUPPOSITORY | Refills: 0 | Status: SHIPPED | OUTPATIENT
Start: 2024-08-21 | End: 2024-09-18

## 2024-08-21 RX ORDER — PROPOFOL 10 MG/ML
INJECTION, EMULSION INTRAVENOUS AS NEEDED
Status: DISCONTINUED | OUTPATIENT
Start: 2024-08-21 | End: 2024-08-21

## 2024-08-21 RX ADMIN — PROPOFOL 100 MG: 10 INJECTION, EMULSION INTRAVENOUS at 08:18

## 2024-08-21 RX ADMIN — LIDOCAINE HYDROCHLORIDE 50 MG: 10 INJECTION, SOLUTION EPIDURAL; INFILTRATION; INTRACAUDAL; PERINEURAL at 08:18

## 2024-08-21 RX ADMIN — PROPOFOL 20 MG: 10 INJECTION, EMULSION INTRAVENOUS at 08:26

## 2024-08-21 RX ADMIN — PROPOFOL 30 MG: 10 INJECTION, EMULSION INTRAVENOUS at 08:23

## 2024-08-21 RX ADMIN — SODIUM CHLORIDE, SODIUM LACTATE, POTASSIUM CHLORIDE, AND CALCIUM CHLORIDE: .6; .31; .03; .02 INJECTION, SOLUTION INTRAVENOUS at 08:13

## 2024-08-21 NOTE — INTERVAL H&P NOTE
H&P reviewed. After examining the patient I find no changes in the patients condition since the H&P had been written.    Vitals:    08/21/24 0740   BP: 120/74   Pulse: 77   Resp: 18   Temp: (!) 97.3 °F (36.3 °C)   SpO2: 97%

## 2024-08-21 NOTE — ANESTHESIA PREPROCEDURE EVALUATION
Procedure:  COLONOSCOPY    Relevant Problems   CARDIO   (+) Acute on chronic systolic congestive heart failure (HCC)   (+) Essential hypertension   (+) Migraine with aura and without status migrainosus, not intractable   (+) Mixed hyperlipidemia   (+) Nonrheumatic aortic valve insufficiency   (+) Nonrheumatic mitral valve regurgitation   (+) Persistent atrial fibrillation (HCC)   (+) SSS (sick sinus syndrome) (HCC)      ENDO   (+) Acquired hypothyroidism      /RENAL   (+) Stage 3a chronic kidney disease (HCC)      HEMATOLOGY   (+) Iron deficiency anemia      NEURO/PSYCH   (+) Migraine with aura and without status migrainosus, not intractable      PULMONARY   (+) Chronic obstructive pulmonary disease (HCC)        Left Ventricle: Left ventricular cavity size is normal. The left ventricular ejection fraction is 50-55%. Systolic function is low normal.    Right Ventricle: Right ventricular cavity size is mildly dilated. Systolic function is low normal.    Left Atrium: The atrium is dilated.    Right Atrium: The atrium is dilated.    Atrial Septum: No patent foramen ovale detected, confirmed at rest using color doppler.    Left Atrial Appendage: There is reduced function. There is no thrombus. There is no spontaneous echo contrast.    Aortic Valve: There is mild regurgitation with a centrally directed jet.    Mitral Valve: There is mild regurgitation with a centrally directed jet.    Tricuspid Valve: There is mild to moderate regurgitation.       Last echo showed Mod pulm htn with PA pressures in 50s  CT chest positive for pulm fibrosis, on RA at home does not use any inhalers. Has been having mild dry cough chronically         Currently being worked up for anemia, last HB- 9.5     Drinks a cocktail everynight.     Physical Exam    Airway    Mallampati score: II  TM Distance: >3 FB  Neck ROM: full     Dental       Cardiovascular  Cardiovascular exam normal    Pulmonary  Pulmonary exam normal     Other  Findings  post-pubertal.      Anesthesia Plan  ASA Score- 3     Anesthesia Type- IV sedation with anesthesia with ASA Monitors.         Additional Monitors:     Airway Plan:            Plan Factors-Exercise tolerance (METS): >4 METS.    Chart reviewed. EKG reviewed. Imaging results reviewed. Existing labs reviewed. Patient summary reviewed.                  Induction- intravenous.    Postoperative Plan-         Informed Consent- Anesthetic plan and risks discussed with patient.  I personally reviewed this patient with the CRNA. Discussed and agreed on the Anesthesia Plan with the CRNA..

## 2024-08-21 NOTE — ANESTHESIA POSTPROCEDURE EVALUATION
Post-Op Assessment Note    CV Status:  Stable  Pain Score: 0    Pain management: adequate       Mental Status:  Sleepy and arousable   Hydration Status:  Euvolemic   PONV Controlled:  Controlled   Airway Patency:  Patent     Post Op Vitals Reviewed: Yes    No anethesia notable event occurred.    Staff: YULIET           BP   89/54   Temp      Pulse  75   Resp   16   SpO2   95% RA

## 2024-08-26 PROCEDURE — 88342 IMHCHEM/IMCYTCHM 1ST ANTB: CPT | Performed by: SPECIALIST

## 2024-08-26 PROCEDURE — 88305 TISSUE EXAM BY PATHOLOGIST: CPT | Performed by: SPECIALIST

## 2024-09-10 DIAGNOSIS — K62.89 PROCTITIS: ICD-10-CM

## 2024-09-11 ENCOUNTER — TELEPHONE (OUTPATIENT)
Age: 79
End: 2024-09-11

## 2024-09-11 NOTE — TELEPHONE ENCOUNTER
Patients GI provider:  Dr. Fernandez    Number to return call: [phone]     Reason for call: Pt called and requested a call back to go over biopsy results from her colonoscopy please review and reach out thank you     Scheduled procedure/appointment date if applicable: n/a

## 2024-09-16 RX ORDER — MESALAMINE 1000 MG/1
SUPPOSITORY RECTAL
Qty: 90 SUPPOSITORY | Refills: 0 | Status: SHIPPED | OUTPATIENT
Start: 2024-09-16

## 2024-09-20 ENCOUNTER — TELEPHONE (OUTPATIENT)
Age: 79
End: 2024-09-20

## 2024-09-26 ENCOUNTER — IN-CLINIC DEVICE VISIT (OUTPATIENT)
Dept: CARDIOLOGY CLINIC | Facility: CLINIC | Age: 79
End: 2024-09-26
Payer: MEDICARE

## 2024-09-26 DIAGNOSIS — Z95.0 PRESENCE OF PERMANENT CARDIAC PACEMAKER: Primary | ICD-10-CM

## 2024-09-26 PROCEDURE — 93280 PM DEVICE PROGR EVAL DUAL: CPT | Performed by: INTERNAL MEDICINE

## 2024-09-26 NOTE — PROGRESS NOTES
MDT DC PM/ACTIVE SYSTEM IS MRI CONDITIONAL   DEVICE INTERROGATED IN THE [facility] OFFICE:  BATTERY VOLTAGE ADEQUATE (7.7 YR.).  AP 79.1%  4.3%.  ALL LEAD PARAMETERS WITHIN NORMAL LIMITS.  49 AT/AF EPISODES SINCE 6/19/24 WITH LONGEST EPISODE 1.5 H, AND AT/AF BURDEN 18.3% SINCE 5/1/24.  PATIENT TAKES ELIQUIS, AMIODARONE, AND METOPROLOL.  NO PROGRAMMING CHANGES MADE TO DEVICE PARAMETERS.  NORMAL DEVICE FUNCTION.  RG

## 2024-09-30 NOTE — TELEPHONE ENCOUNTER
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       Pain        ROS              Objective:       General: Mehdi is well-developed, well-nourished, appears stated age, in no acute distress, alert and oriented to time, place and person.     AT Session          Assessment:     Status: F - Full Participation    Date Seen:  9.16.2024    Date of Injury:  N/A    Date Out:  N/A    Date Cleared:  N/A        Treatment/Rehab/Maintenance:     IFC with heat 10'  Shoulder IR/ER 3X10 RTB  Shoulder Flex 3X10 YTB  Shoulder Ext 3X10 YTB  Soft tissue     Plan:                      T/c from pt -- needs TCM appt with Dr Brendan Nava - was discharged today  Please call pt back to schedule

## 2024-10-01 ENCOUNTER — IMMUNIZATIONS (OUTPATIENT)
Dept: FAMILY MEDICINE CLINIC | Facility: CLINIC | Age: 79
End: 2024-10-01
Payer: MEDICARE

## 2024-10-01 DIAGNOSIS — E03.9 ACQUIRED HYPOTHYROIDISM: ICD-10-CM

## 2024-10-01 DIAGNOSIS — I50.22 CHRONIC SYSTOLIC (CONGESTIVE) HEART FAILURE (HCC): ICD-10-CM

## 2024-10-01 DIAGNOSIS — Z23 ENCOUNTER FOR IMMUNIZATION: Primary | ICD-10-CM

## 2024-10-01 PROCEDURE — G0008 ADMIN INFLUENZA VIRUS VAC: HCPCS

## 2024-10-01 PROCEDURE — 90662 IIV NO PRSV INCREASED AG IM: CPT

## 2024-10-01 RX ORDER — LEVOTHYROXINE SODIUM 25 UG/1
25 TABLET ORAL
Qty: 90 TABLET | Refills: 3 | Status: SHIPPED | OUTPATIENT
Start: 2024-10-01

## 2024-10-01 RX ORDER — FUROSEMIDE 20 MG
20 TABLET ORAL DAILY
Qty: 90 TABLET | Refills: 3 | Status: SHIPPED | OUTPATIENT
Start: 2024-10-01

## 2024-10-04 ENCOUNTER — HOSPITAL ENCOUNTER (OUTPATIENT)
Dept: NON INVASIVE DIAGNOSTICS | Facility: HOSPITAL | Age: 79
Discharge: HOME | End: 2024-10-04
Attending: INTERNAL MEDICINE
Payer: MEDICARE

## 2024-10-04 ENCOUNTER — HOSPITAL ENCOUNTER (OUTPATIENT)
Dept: PULMONOLOGY | Facility: HOSPITAL | Age: 79
End: 2024-10-04
Attending: INTERNAL MEDICINE
Payer: MEDICARE

## 2024-10-04 DIAGNOSIS — I48.19 PERSISTENT ATRIAL FIBRILLATION (HCC): ICD-10-CM

## 2024-10-04 DIAGNOSIS — I50.23 ACUTE ON CHRONIC SYSTOLIC CONGESTIVE HEART FAILURE (HCC): ICD-10-CM

## 2024-10-04 DIAGNOSIS — Z79.899 ON AMIODARONE THERAPY: ICD-10-CM

## 2024-10-04 PROCEDURE — 94760 N-INVAS EAR/PLS OXIMETRY 1: CPT

## 2024-10-04 PROCEDURE — 94729 DIFFUSING CAPACITY: CPT | Performed by: INTERNAL MEDICINE

## 2024-10-04 PROCEDURE — 94010 BREATHING CAPACITY TEST: CPT

## 2024-10-04 PROCEDURE — 94729 DIFFUSING CAPACITY: CPT

## 2024-10-04 PROCEDURE — 94010 BREATHING CAPACITY TEST: CPT | Performed by: INTERNAL MEDICINE

## 2024-10-10 ENCOUNTER — HOSPITAL ENCOUNTER (OUTPATIENT)
Dept: NON INVASIVE DIAGNOSTICS | Facility: CLINIC | Age: 79
Discharge: HOME | End: 2024-10-10
Attending: INTERNAL MEDICINE
Payer: MEDICARE

## 2024-10-10 VITALS
SYSTOLIC BLOOD PRESSURE: 112 MMHG | BODY MASS INDEX: 30.71 KG/M2 | DIASTOLIC BLOOD PRESSURE: 80 MMHG | HEIGHT: 65 IN | WEIGHT: 184.31 LBS | HEART RATE: 82 BPM

## 2024-10-10 LAB
AORTIC ROOT: 3.5 CM
APICAL FOUR CHAMBER EJECTION FRACTION: 54 %
BSA FOR ECHO PROCEDURE: 1.91 M2
FRACTIONAL SHORTENING: 30 (ref 28–44)
INTERVENTRICULAR SEPTUM IN DIASTOLE (PARASTERNAL SHORT AXIS VIEW): 1.4 CM
INTERVENTRICULAR SEPTUM: 1.4 CM (ref 0.6–1.1)
LEFT ATRIUM SIZE: 3.5 CM
LEFT INTERNAL DIMENSION IN SYSTOLE: 3 CM (ref 2.1–4)
LEFT VENTRICULAR INTERNAL DIMENSION IN DIASTOLE: 4.3 CM (ref 3.5–6)
LEFT VENTRICULAR POSTERIOR WALL IN END DIASTOLE: 1.4 CM
LEFT VENTRICULAR STROKE VOLUME: 46 ML
LVSV (TEICH): 46 ML
SL CV LV EF: 55
SL CV PED ECHO LEFT VENTRICLE DIASTOLIC VOLUME (MOD BIPLANE) 2D: 82 ML
SL CV PED ECHO LEFT VENTRICLE SYSTOLIC VOLUME (MOD BIPLANE) 2D: 36 ML
TR MAX PG: 45 MMHG
TR PEAK VELOCITY: 3.4 M/S
TRICUSPID VALVE PEAK REGURGITATION VELOCITY: 3.37 M/S

## 2024-10-10 PROCEDURE — 93325 DOPPLER ECHO COLOR FLOW MAPG: CPT | Performed by: INTERNAL MEDICINE

## 2024-10-10 PROCEDURE — 93308 TTE F-UP OR LMTD: CPT

## 2024-10-10 PROCEDURE — 93321 DOPPLER ECHO F-UP/LMTD STD: CPT | Performed by: INTERNAL MEDICINE

## 2024-10-10 PROCEDURE — 93308 TTE F-UP OR LMTD: CPT | Performed by: INTERNAL MEDICINE

## 2024-11-06 ENCOUNTER — TELEPHONE (OUTPATIENT)
Dept: CARDIOLOGY CLINIC | Facility: CLINIC | Age: 79
End: 2024-11-06

## 2024-11-06 NOTE — TELEPHONE ENCOUNTER
----- Message from Piero Fajardo MD sent at 11/6/2024  2:13 PM EST -----  Reduce amiodarone to 100 mg a day  If recurrence proceed with PFA  ----- Message -----  From: Emely Browning MD  Sent: 10/4/2024   3:36 PM EST  To: Piero Fajardo MD

## 2024-11-07 ENCOUNTER — HOSPITAL ENCOUNTER (EMERGENCY)
Facility: HOSPITAL | Age: 79
Discharge: HOME | End: 2024-11-07
Attending: EMERGENCY MEDICINE
Payer: MEDICARE

## 2024-11-07 ENCOUNTER — APPOINTMENT (EMERGENCY)
Dept: CT IMAGING | Facility: HOSPITAL | Age: 79
End: 2024-11-07
Payer: MEDICARE

## 2024-11-07 ENCOUNTER — APPOINTMENT (EMERGENCY)
Dept: RADIOLOGY | Facility: HOSPITAL | Age: 79
End: 2024-11-07
Payer: MEDICARE

## 2024-11-07 VITALS
HEART RATE: 102 BPM | OXYGEN SATURATION: 95 % | SYSTOLIC BLOOD PRESSURE: 137 MMHG | TEMPERATURE: 97.5 F | DIASTOLIC BLOOD PRESSURE: 101 MMHG | RESPIRATION RATE: 20 BRPM

## 2024-11-07 DIAGNOSIS — J00 ACUTE RHINITIS: ICD-10-CM

## 2024-11-07 DIAGNOSIS — J01.00 SINUSITIS, ACUTE MAXILLARY: Primary | ICD-10-CM

## 2024-11-07 DIAGNOSIS — S20.219A CHEST WALL CONTUSION: ICD-10-CM

## 2024-11-07 LAB
ATRIAL RATE: 73 BPM
P AXIS: 47 DEGREES
PR INTERVAL: 278 MS
QRS AXIS: 81 DEGREES
QRSD INTERVAL: 92 MS
QT INTERVAL: 400 MS
QTC INTERVAL: 440 MS
T WAVE AXIS: 6 DEGREES
VENTRICULAR RATE: 73 BPM

## 2024-11-07 PROCEDURE — 70450 CT HEAD/BRAIN W/O DYE: CPT

## 2024-11-07 PROCEDURE — 93005 ELECTROCARDIOGRAM TRACING: CPT

## 2024-11-07 PROCEDURE — 99285 EMERGENCY DEPT VISIT HI MDM: CPT

## 2024-11-07 PROCEDURE — 71101 X-RAY EXAM UNILAT RIBS/CHEST: CPT

## 2024-11-07 PROCEDURE — 93010 ELECTROCARDIOGRAM REPORT: CPT | Performed by: INTERNAL MEDICINE

## 2024-11-07 PROCEDURE — 99285 EMERGENCY DEPT VISIT HI MDM: CPT | Performed by: PHYSICIAN ASSISTANT

## 2024-11-07 RX ORDER — PREDNISONE 20 MG/1
40 TABLET ORAL DAILY
Qty: 6 TABLET | Refills: 0 | Status: SHIPPED | OUTPATIENT
Start: 2024-11-07 | End: 2024-11-10

## 2024-11-07 RX ORDER — FLUTICASONE PROPIONATE 50 MCG
1 SPRAY, SUSPENSION (ML) NASAL DAILY
Qty: 16 G | Refills: 0 | Status: SHIPPED | OUTPATIENT
Start: 2024-11-07

## 2024-11-07 NOTE — DISCHARGE INSTRUCTIONS
Please take Tylenol 650 mg every 6 hours as needed for chest wall pain or headache.      Please be sure to check your over-the-counter migraine medication to make sure that it does not contain aspirin as you are on Eliquis which is a blood thinner and the aspirin can make that worse.    You may use the saline nasal spray as often as necessary for your congestion.  The sprays in both nostrils    Take Augmentin 1 twice a day for 10 days.    Take prednisone 40 mg daily for 3 days.    After you finish the prednisone you may start Flonase 1 spray daily in each nostril.

## 2024-11-07 NOTE — ED PROVIDER NOTES
Time reflects when diagnosis was documented in both MDM as applicable and the Disposition within this note       Time User Action Codes Description Comment    11/7/2024 10:17 AM Gutzweiler, Julie Add [Z88.1] Allergic reaction to Augmentin     11/7/2024 10:17 AM Gutzweiler, Julie Remove [Z88.1] Allergic reaction to Augmentin     11/7/2024 10:17 AM Gutzweiler, Julie Add [J01.00] Sinusitis, acute maxillary     11/7/2024 10:17 AM Gutzweiler, Julie Modify [J01.00] Sinusitis, acute maxillary left    11/7/2024 10:18 AM Gutzweiler, Julie Add [J00] Acute rhinitis     11/7/2024 10:23 AM Gutzweiler, Julie Add [S20.219A] Chest wall contusion     11/7/2024 10:23 AM Gutzweiler, Julie Modify [S20.219A] Chest wall contusion sp fall          ED Disposition       ED Disposition   Discharge    Condition   Stable    Date/Time   Thu Nov 7, 2024 10:16 AM    Comment   Madeline Good discharge to home/self care.                   Assessment & Plan       Medical Decision Making  This 79-year-old female presents emergency room with a past medical history that is positive for aphasia, atrial fibrillation, CHF, colon polyps, headaches, hyperlipidemia, hypertension, Lyme disease, shortness of breath, TIA, vertigo.  After falling 2 days ago and injuring her right anterior chest wall.  She complains of local pain at the site and some ecchymosis.  She denies any head strike.  She does complain of a generalized headache.  She has been complaining of nasal congestion and when she blows her nose she has yellow to brown mucus that is blood-tinged.  She complains of some sinus pressure over her left side of her nose as well as her left maxillary area.  She has been taking migraine over-the-counter medications with relief of her headache.  She denies any neck pain.  She denies any abdominal pain.  She is able to move her upper and lower extremities with conversation.  She has no difficulty walking.  She also complains of a 1 minute episode of her heart  skipping beats.  She has a history of atrial fibrillation and just felt like she was in it for maybe half a minute and then resolved.    Physical exam this 79-year-old female is alert and oriented x 3.  She is in no acute distress.  Her head is atraumatic upon inspection.  There is no palpable tenderness.  Her neck is nontender with full range of motion.  She has a contusion over her right upper to mid midclavicular line ecchymosis.  It is tender upon palpation.  There is no crepitance or flail segment.  Her lungs are clear to auscultation.  Her heart is regular rate and rhythm without murmur.  Her abdomen is soft nondistended nontender without mass or hepatosplenomegaly.  She has a negative pronator drift sign.  She can perform finger-to-nose with no problem.  Heel-to-shin is normal.  Cranial nerves II through XII are intact.    Differential diagnosis includes but is not limited to acute sinusitis, allergic rhinitis, migraine, intracranial bleed, rib fractures, pulmonary contusion, hemothorax.    Hospital course will include a CT of her head to rule out any intracranial pathology.  I am also going to visualize her sinuses to make sure that she does not have any opacification of her sinuses to suggest sinusitis.  I do not need formal CAT scan films of the sinuses at this time.  Patient will also have a x-ray of her right ribs and chest.  To rule out a pulmonary contusion or fracture.    A CAT scan of your brain demonstrates some inflammation of her left maxillary sinus.  There is a thickening of her nasal mucosa on the left as well.  A chest x-ray with right ribs was negative for any acute fracture or pulmonary contusion.    Impression  Acute rhinitis  Acute right chest wall contusion  Left maxillary sinusitis    Plan  Patient was given saline nasal spray to use as needed.  She is placed on Augmentin 875 mg twice a day for 10 days.  She will take this with a probiotic.  She was given prednisone 40 mg daily for 3  days.  After she is finished with the prednisone she may use Flonase 1 spray in each nostril daily.  She is to follow-up with her family physician for repeat exam next week.  She was given reasons to return to the emergency room should her symptoms worsen.      Amount and/or Complexity of Data Reviewed  Radiology: ordered and independent interpretation performed.     Details: Left maxillary sinus inflammation.  Chest x-ray with right ribs does not demonstrate any acute fracture or pulmonary abnormality.  Patient has cardiomegaly with a pacemaker.  Patient is aware that we will call her if there is any discrepancies with this report.    Risk  OTC drugs.  Prescription drug management.             Medications - No data to display    ED Risk Strat Scores                                               History of Present Illness       Chief Complaint   Patient presents with    Facial Swelling     Left sided facial swelling with sinus pressure and drainage x 2 days. Also c/o heart fluttering, hx a-fib. Also has a bruise on chest from falling off bed 1 week ago. Denies head strike +BT       Past Medical History:   Diagnosis Date    Aphasia, mixed 07/28/2017    Atrial fibrillation (HCC)     CHF (congestive heart failure) (HCC)     Colon polyp     Headache     Hyperlipidemia     Hypertension     Lyme disease     Shortness of breath     TIA (transient ischemic attack)     Vertigo       Past Surgical History:   Procedure Laterality Date    CARDIAC ELECTROPHYSIOLOGY PROCEDURE N/A 10/19/2023    Procedure: Cardiac eps/afib ablation PVI/POST WALL;  Surgeon: Piero Fajardo MD;  Location: [facility];  Service: Cardiology    CARDIAC PACEMAKER PLACEMENT  12/2019    COLONOSCOPY      IA SIGMOIDOSCOPY FLX DX W/COLLJ SPEC BR/WA IF PFRMD N/A 11/28/2017    Procedure: SIGMOIDOSCOPY FLEXIBLE;  Surgeon: Kavon Fernandez MD;  Location: [facility];  Service: Gastroenterology    TONSILLECTOMY        Family History   Problem Relation Age of  Onset    Lung cancer Mother     Dementia Father     Alzheimer's disease Father     Other Father         Cardiac Disorder     Lung cancer Maternal Grandmother     Breast cancer Paternal Aunt 40    No Known Problems Paternal Aunt     No Known Problems Paternal Aunt       Social History     Tobacco Use    Smoking status: Never    Smokeless tobacco: Never    Tobacco comments:     no smoking for 46 years   Vaping Use    Vaping status: Never Used   Substance Use Topics    Alcohol use: Yes     Alcohol/week: 7.0 standard drinks of alcohol     Types: 7 Glasses of wine per week     Comment: occassional    Drug use: No      E-Cigarette/Vaping    E-Cigarette Use Never User       E-Cigarette/Vaping Substances    Nicotine No     THC No     CBD No     Flavoring No     Other No     Unknown No       I have reviewed and agree with the history as documented.       History provided by:  Patient  Fall  Mechanism of injury: fall    Injury location:  Torso  Torso injury location:  R chest  Incident location:  Home  Time since incident:  2 days  Arrived directly from scene: no    Fall:     Fall occurred:  Standing    Height of fall:  Standing    Impact surface:  Hard floor    Point of impact: right chest wall.  Suspicion of alcohol use: no    Suspicion of drug use: no    Prior to arrival data:     Bystander interventions:  None    Patient ambulatory at scene: yes      Loss of consciousness: no      Amnesic to event: no    Associated symptoms: chest pain and headaches    Associated symptoms: no abdominal pain, no back pain, no blindness, no difficulty breathing, no hearing loss, no loss of consciousness, no nausea, no neck pain, no seizures and no vomiting    Chest pain:     Quality comment:  Reproducible tenderness right anterior chest    Severity:  Mild    Onset quality:  Sudden    Duration:  2 days    Progression:  Improving    Chronicity:  New  Headaches:     Severity:  Moderate    Onset quality:  Gradual    Duration:  1 hour (1)     Timing:  Intermittent    Progression:  Resolved    Chronicity:  Recurrent  Risk factors: anticoagulation therapy and beta blocker therapy    Risk factors: no AICD, no asthma, no CABG, no CAD, no CHF, no COPD, no diabetes, no dialysis, no hemophilia, no kidney disease, no pacemaker, no past MI and no steroid use        Review of Systems   Constitutional:  Positive for activity change. Negative for appetite change, chills, diaphoresis, fatigue and fever.   HENT:  Positive for rhinorrhea. Negative for congestion, ear discharge, ear pain, hearing loss, postnasal drip and sore throat.    Eyes:  Negative for blindness, photophobia, pain, discharge, itching and visual disturbance.   Respiratory:  Negative for cough, chest tightness and shortness of breath.    Cardiovascular:  Positive for chest pain and palpitations.   Gastrointestinal:  Negative for abdominal pain, nausea and vomiting.   Musculoskeletal:  Negative for back pain and neck pain.   Skin:  Negative for color change, pallor, rash and wound.   Neurological:  Positive for headaches. Negative for seizures, loss of consciousness, syncope, speech difficulty and weakness.   Psychiatric/Behavioral:  Negative for confusion.    All other systems reviewed and are negative.          Objective       ED Triage Vitals [11/07/24 0819]   Temperature Pulse Blood Pressure Respirations SpO2 Patient Position - Orthostatic VS   97.5 °F (36.4 °C) 102 (!) 137/101 20 95 % Sitting      Temp Source Heart Rate Source BP Location FiO2 (%) Pain Score    Temporal Monitor Left arm -- --      Vitals      Date and Time Temp Pulse SpO2 Resp BP Pain Score FACES Pain Rating User   11/07/24 0819 97.5 °F (36.4 °C) 102 95 % 20 137/101 -- -- GP            Physical Exam  Vitals and nursing note reviewed.   Constitutional:       General: She is not in acute distress.     Appearance: Normal appearance. She is normal weight. She is not ill-appearing, toxic-appearing or diaphoretic.   HENT:      Head:  Normocephalic and atraumatic.      Right Ear: Tympanic membrane, ear canal and external ear normal.      Left Ear: Tympanic membrane, ear canal and external ear normal.      Nose: Congestion and rhinorrhea present.      Mouth/Throat:      Pharynx: No oropharyngeal exudate or posterior oropharyngeal erythema.   Eyes:      Extraocular Movements: Extraocular movements intact.      Conjunctiva/sclera: Conjunctivae normal.      Pupils: Pupils are equal, round, and reactive to light.   Cardiovascular:      Rate and Rhythm: Normal rate and regular rhythm.      Heart sounds: Normal heart sounds.   Pulmonary:      Effort: Pulmonary effort is normal.      Breath sounds: Normal breath sounds.   Abdominal:      General: There is no distension.      Palpations: Abdomen is soft.      Tenderness: There is no abdominal tenderness. There is no guarding or rebound.   Musculoskeletal:      Cervical back: Neck supple. No rigidity or tenderness.   Lymphadenopathy:      Cervical: No cervical adenopathy.   Skin:     Capillary Refill: Capillary refill takes less than 2 seconds.      Comments: 10 cm x 6 cm contusion present over the right upper to mid clavicular ribs.  This area is tender upon palpation.  She has no pain at rest.  There is no crepitance or evidence of a flail segment.   Neurological:      Mental Status: She is alert and oriented to person, place, and time.   Psychiatric:         Mood and Affect: Mood normal.         Behavior: Behavior normal.         Thought Content: Thought content normal.         Judgment: Judgment normal.         Results Reviewed       None            XR ribs with pa chest min 3 views RIGHT   ED Interpretation by Julie Lynn Gutzweiler, PA-C (11/07 1016)   Scoliosis, no acute rib fracture, cardiomegaly with a pacemaker seen.      Final Interpretation by Uriah Le MD (11/07 1125)      No evidence of a rib fracture.      No acute cardiopulmonary disease.      Large hiatal hernia.         Computerized  Assisted Algorithm (CAA) may have been used to analyze all applicable images.         Workstation performed: [id number]         CT head without contrast   ED Interpretation by Julie Lynn Gutzweiler, PA-C (11/07 1014)   CT head without contrast  Status: Final result    PACS Images     Show images for CT head without contrast  Study Result    Narrative & Impression  CT BRAIN - WITHOUT CONTRAST     INDICATION:   recent fall, 2 days ago,  post traumatic headache. Patient has sinus congestion and she is on Eliquis..     COMPARISON: CTA head and neck 12/21/2022     TECHNIQUE:  CT examination of the brain was performed.  Multiplanar 2D reformatted images were created from the source data.     Radiation dose length product (DLP) for this visit:  845 mGy-cm .  This examination, like all CT scans performed in the [facility], was performed utilizing techniques to minimize radiation dose exposure, including the use of iterative   reconstruction and automated exposure control.     IMAGE QUALITY:  Diagnostic.     FINDINGS:     PARENCHYMA: Decreased attenuation is noted in periventricular and subcortical white matter demonstrating an appearance that is statistically most likely to represent mild m   icroangiopathic change; this appearance is similar when compared to most recent   prior examination.     No CT signs of acute infarction.  No intracranial mass, mass effect or midline shift.  No acute parenchymal hemorrhage.     VENTRICLES AND EXTRA-AXIAL SPACES: Stable size and configuration of the ventricles. A few locules of gas are noted in the right cavernous sinus, an incidental finding possibly related to intravenous access.     VISUALIZED ORBITS:  Post bilateral ocular lens replacements.     PARANASAL SINUSES:  Trace fluid within the left maxillary sinus. The visualized paranasal sinuses are otherwise clear.     CALVARIUM AND EXTRACRANIAL SOFT TISSUES:  No acute calvarial fracture. Hyperostosis frontalis  interna.     IMPRESSION:     No acute intracranial abnormality.                          Workstation performed: [id number]               Final Interpretation by Shon Haley MD ([id number])      No acute intracranial abnormality.                           Workstation performed: [id number]             ECG 12 Lead Documentation Only    Date/Time: 2024 8:35 AM    Performed by: Julie Lynn Gutzweiler, PA-C  Authorized by: Julie Lynn Gutzweiler, PA-C    Indications / Diagnosis:  Hx of [place] Fib  ECG reviewed by me, the ED Provider: yes    Patient location:  ED  Previous ECG:     Previous ECG:  Compared to current    Comparison ECG info:  24    Similarity:  Changes noted  Interpretation:     Interpretation: non-specific    Rate:     ECG rate:  73    ECG rate assessment: normal    Rhythm:     Rhythm: sinus rhythm    Ectopy:     Ectopy: PVCs    QRS:     QRS axis:  Normal    QRS intervals:  Normal  Conduction:     Conduction: abnormal      Abnormal conduction: 1st degree    ST segments:     ST segments:  Normal  T waves:     T waves: normal    Comments:      Patient has a first-degree block with PVCs that are new from her previous tracing of atrial fibrillation.  There is no acute signs of ischemia.    His EKG was independently interpreted by me      ED Medication and Procedure Management   Prior to Admission Medications   Prescriptions Last Dose Informant Patient Reported? Taking?   Azelastine HCl 137 MCG/SPRAY SOLN  Self No No   Si sprays into each nostril 2 (two) times a day   amiodarone 200 mg tablet   No No   Sig: Take 1 tablet (200 mg total) by mouth daily   apixaban (Eliquis) 5 mg  Self No No   Sig: Take 1 tablet (5 mg total) by mouth 2 (two) times a day   eszopiclone (LUNESTA) 1 mg tablet  Self No No   Sig: Take 1 tablet (1 mg total) by mouth daily at bedtime as needed for sleep Take immediately before bedtime   furosemide (LASIX) 20 mg tablet   No No   Sig: TAKE 1 TABLET BY MOUTH DAILY   levothyroxine 25  mcg tablet   No No   Sig: TAKE 1 TABLET BY MOUTH DAILY IN  THE EARLY MORNING   lovastatin (MEVACOR) 10 MG tablet  Self No No   Sig: Take 1 tablet (10 mg total) by mouth daily at bedtime   mesalamine (CANASA) 1,000 mg suppository   No No   Sig: UNWRAP AND INSERT 1 SUPPOSITORY  INTO THE RECTUM DAILY AT BEDTIME FOR 28 DAYS   metoprolol succinate (TOPROL-XL) 100 mg 24 hr tablet   No No   Sig: Take 1 tablet (100 mg total) by mouth daily   potassium chloride (Klor-Con M10) 10 mEq tablet   No No   Sig: Take 1 tablet (10 mEq total) by mouth daily      Facility-Administered Medications: None     Discharge Medication List as of 11/7/2024 10:24 AM        START taking these medications    Details   amoxicillin-clavulanate (AUGMENTIN) 875-125 mg per tablet Take 1 tablet by mouth every 12 (twelve) hours for 10 days, Starting Thu 11/7/2024, Until Sun 11/17/2024, Print      fluticasone (FLONASE) 50 mcg/act nasal spray 1 spray into each nostril daily Start after finishing the 3 days of Prednsione, Starting Thu 11/7/2024, Print      predniSONE 20 mg tablet Take 2 tablets (40 mg total) by mouth daily for 3 days, Starting Thu 11/7/2024, Until Sun 11/10/2024, Print      sodium chloride (OCEAN) 0.65 % nasal spray 1 spray into each nostril as needed for rhinitis or congestion, Starting Thu 11/7/2024, Print           CONTINUE these medications which have NOT CHANGED    Details   amiodarone 200 mg tablet Take 1 tablet (200 mg total) by mouth daily, Starting Fri 8/9/2024, Normal      apixaban (Eliquis) 5 mg Take 1 tablet (5 mg total) by mouth 2 (two) times a day, Starting Thu 2/8/2024, Normal      Azelastine HCl 137 MCG/SPRAY SOLN 2 sprays into each nostril 2 (two) times a day, Starting Mon 6/24/2024, Normal      eszopiclone (LUNESTA) 1 mg tablet Take 1 tablet (1 mg total) by mouth daily at bedtime as needed for sleep Take immediately before bedtime, Starting Mon 6/24/2024, Normal      furosemide (LASIX) 20 mg tablet TAKE 1 TABLET BY MOUTH  DAILY, Starting Tue 10/1/2024, Normal      levothyroxine 25 mcg tablet TAKE 1 TABLET BY MOUTH DAILY IN  THE EARLY MORNING, Starting Tue 10/1/2024, Normal      lovastatin (MEVACOR) 10 MG tablet Take 1 tablet (10 mg total) by mouth daily at bedtime, Starting Thu 2/8/2024, Normal      mesalamine (CANASA) 1,000 mg suppository UNWRAP AND INSERT 1 SUPPOSITORY  INTO THE RECTUM DAILY AT BEDTIME FOR 28 DAYS, Normal      metoprolol succinate (TOPROL-XL) 100 mg 24 hr tablet Take 1 tablet (100 mg total) by mouth daily, Starting Fri 8/9/2024, Normal      potassium chloride (Klor-Con M10) 10 mEq tablet Take 1 tablet (10 mEq total) by mouth daily, Starting Fri 8/9/2024, Normal           No discharge procedures on file.  ED SEPSIS DOCUMENTATION   Time reflects when diagnosis was documented in both MDM as applicable and the Disposition within this note       Time User Action Codes Description Comment    11/7/2024 10:17 AM Gutzweiler, Julie Add [Z88.1] Allergic reaction to Augmentin     11/7/2024 10:17 AM Gutzweiler, Julie Remove [Z88.1] Allergic reaction to Augmentin     11/7/2024 10:17 AM Gutzweiler, Julie Add [J01.00] Sinusitis, acute maxillary     11/7/2024 10:17 AM Gutzweiler, Julie Modify [J01.00] Sinusitis, acute maxillary left    11/7/2024 10:18 AM Gutzweiler, Julie Add [J00] Acute rhinitis     11/7/2024 10:23 AM Gutzweiler, Julie Add [S20.219A] Chest wall contusion     11/7/2024 10:23 AM Gutzweiler, Julie Modify [S20.219A] Chest wall contusion sp fall                 Julie Lynn Gutzweiler, PA-C  11/07/24 7214

## 2024-11-08 ENCOUNTER — VBI (OUTPATIENT)
Dept: FAMILY MEDICINE CLINIC | Facility: CLINIC | Age: 79
End: 2024-11-08

## 2024-11-08 NOTE — TELEPHONE ENCOUNTER
11/08/24 10:42 AM    Patient contacted post ED visit, [facility] department spoke with patient/caregiver and outreach was successful.    Thank you.  Luh Beckwith MA  [facility]

## 2024-11-11 ENCOUNTER — OFFICE VISIT (OUTPATIENT)
Dept: FAMILY MEDICINE CLINIC | Facility: CLINIC | Age: 79
End: 2024-11-11
Payer: MEDICARE

## 2024-11-11 VITALS
WEIGHT: 181.6 LBS | DIASTOLIC BLOOD PRESSURE: 80 MMHG | HEART RATE: 77 BPM | HEIGHT: 65 IN | SYSTOLIC BLOOD PRESSURE: 122 MMHG | OXYGEN SATURATION: 95 % | BODY MASS INDEX: 30.26 KG/M2

## 2024-11-11 DIAGNOSIS — E78.2 MIXED HYPERLIPIDEMIA: ICD-10-CM

## 2024-11-11 DIAGNOSIS — I48.19 PERSISTENT ATRIAL FIBRILLATION (HCC): Primary | ICD-10-CM

## 2024-11-11 DIAGNOSIS — J01.00 ACUTE MAXILLARY SINUSITIS, RECURRENCE NOT SPECIFIED: ICD-10-CM

## 2024-11-11 DIAGNOSIS — I10 ESSENTIAL HYPERTENSION: ICD-10-CM

## 2024-11-11 DIAGNOSIS — S20.211D CONTUSION OF RIGHT CHEST WALL, SUBSEQUENT ENCOUNTER: ICD-10-CM

## 2024-11-11 PROCEDURE — G2211 COMPLEX E/M VISIT ADD ON: HCPCS

## 2024-11-11 PROCEDURE — 99214 OFFICE O/P EST MOD 30 MIN: CPT

## 2024-11-11 NOTE — PROGRESS NOTES
Ambulatory Visit  Name: Madeline Good      : 1945      MRN: [id number]  Encounter Provider: Norma Pollock PA-C  Encounter Date: 2024   Encounter department: [facility] [address]    Assessment & Plan  Acute maxillary sinusitis, recurrence not specified  -Was seen in ED 2024 and CT demonstrated inflammation of left maxillary sinus.   -Was given Augmentin, Prednisone, and Flonase  -Finished Prednisone. Continue taking Augmentin until . Continue Flonase as needed  -Her symptoms have resolved and she states she is feeling well today   -Physical exam was unremarkable        Persistent atrial fibrillation (HCC)  -States she felt some heart fluttering on 2024  -EKG in ED on 2024 showed 1 degree block with PVCs, otherwise unremarkable   -Following with cardiology - next appt 2024  -Has not had any palpitations or fluttering sx since  -Continue taking Amiodarone, Eliquis, and Metoprolol        Contusion of right chest wall, subsequent encounter  -Notes she fell about 2.5 weeks ago and landed on her right side  -X ray of ribs demonstrated no evidence of rib fracture  -She states pain has slowly been improving  -Continue symptomatic relief, recommended warm compresses        Mixed hyperlipidemia  -Continue taking Lovastatin       Essential hypertension  -Continue current regimen  -BP in office 122/80           History of Present Illness     HPI  Patient presents to office for ED follow up from Thursday. She was given Augmentin, Prednisone, and Flonase in the ED. She completed the prednisone. She is still taking Augmentin and Flonase.     She states her symptoms are improved. She states she feels good. She fell 2.5 weeks ago and landed on her right side. X rays in the ED show no fracture. She has been taking aspirin.     Review of Systems   Constitutional:  Negative for activity change, appetite change, fatigue and fever.   HENT:  Negative for  "congestion, ear pain, rhinorrhea and sore throat.    Eyes:  Negative for pain.   Respiratory:  Negative for cough and shortness of breath.    Cardiovascular:  Negative for chest pain and leg swelling.   Gastrointestinal:  Negative for abdominal distention, abdominal pain, constipation, diarrhea, nausea and vomiting.   Genitourinary:  Negative for dysuria, frequency and urgency.   Musculoskeletal:  Negative for gait problem.   Skin:  Negative for rash.   Neurological:  Negative for dizziness, light-headedness and headaches.           Objective     /80   Pulse 77   Ht 5' 5\" (1.651 m)   Wt 82.4 kg (181 lb 9.6 oz)   SpO2 95%   BMI 30.22 kg/m²     Physical Exam  Vitals reviewed.   Constitutional:       General: She is not in acute distress.     Appearance: Normal appearance.   HENT:      Head: Normocephalic and atraumatic.      Right Ear: Tympanic membrane, ear canal and external ear normal.      Left Ear: Tympanic membrane, ear canal and external ear normal.      Nose: Nose normal.      Right Sinus: No maxillary sinus tenderness or frontal sinus tenderness.      Left Sinus: No maxillary sinus tenderness or frontal sinus tenderness.      Mouth/Throat:      Mouth: Mucous membranes are moist.      Pharynx: Oropharynx is clear. No posterior oropharyngeal erythema or postnasal drip.   Eyes:      Extraocular Movements: Extraocular movements intact.      Conjunctiva/sclera: Conjunctivae normal.   Cardiovascular:      Rate and Rhythm: Normal rate and regular rhythm.      Heart sounds: Normal heart sounds.   Pulmonary:      Effort: Pulmonary effort is normal.      Breath sounds: Normal breath sounds.   Abdominal:      General: Bowel sounds are normal. There is no distension.      Palpations: Abdomen is soft.      Tenderness: There is no abdominal tenderness.   Musculoskeletal:        Arms:       Cervical back: Neck supple.      Right lower leg: No edema.      Left lower leg: No edema.   Lymphadenopathy:      Cervical: " No cervical adenopathy.   Skin:     General: Skin is warm.      Capillary Refill: Capillary refill takes less than 2 seconds.      Findings: No rash.   Neurological:      Mental Status: She is alert. Mental status is at baseline.           Norma Pollock PA-C  [facility]  11/11/2024 3:39 PM

## 2024-11-11 NOTE — ASSESSMENT & PLAN NOTE
-States she felt some heart fluttering on 11/7/2024  -EKG in ED on 11/7/2024 showed 1 degree block with PVCs, otherwise unremarkable   -Following with cardiology - next appt 12/6/2024  -Has not had any palpitations or fluttering sx since  -Continue taking Amiodarone, Eliquis, and Metoprolol

## 2024-12-04 ENCOUNTER — OFFICE VISIT (OUTPATIENT)
Age: 79
End: 2024-12-04
Payer: MEDICARE

## 2024-12-04 VITALS
SYSTOLIC BLOOD PRESSURE: 128 MMHG | HEIGHT: 65 IN | HEART RATE: 65 BPM | WEIGHT: 181 LBS | DIASTOLIC BLOOD PRESSURE: 80 MMHG | BODY MASS INDEX: 30.16 KG/M2 | OXYGEN SATURATION: 98 %

## 2024-12-04 DIAGNOSIS — K51.219 ULCERATIVE PROCTITIS WITH COMPLICATION (HCC): Primary | ICD-10-CM

## 2024-12-04 DIAGNOSIS — K62.89 PROCTITIS: ICD-10-CM

## 2024-12-04 PROCEDURE — 99214 OFFICE O/P EST MOD 30 MIN: CPT | Performed by: PHYSICIAN ASSISTANT

## 2024-12-04 RX ORDER — MESALAMINE 1000 MG/1
1000 SUPPOSITORY RECTAL
Qty: 90 SUPPOSITORY | Refills: 3 | Status: SHIPPED | OUTPATIENT
Start: 2024-12-04

## 2024-12-04 NOTE — PROGRESS NOTES
[facility] - Outpatient Follow-up Note  Madeline Good 79 y.o. female MRN: [id number]  Encounter: [id number]          ASSESSMENT AND PLAN:      1. Ulcerative proctitis    Patient presents to the office for follow up of her ulcerative proctitis.    Patient underwent a colonoscopy in August for an evaluation of rectal bleeding which showed evidence of a distal proctitis and diverticulosis of the descending and sigmoid colon; biopsies of the rectum showed acute and chronic mucosal injury/colitis.  She was given a Canasa suppository course x 1 month by Dr. Fernandez after the procedure and her rectal bleeding resolved.  However, she has had a return of rectal bleeding again over the past couple of weeks.    Will resume Canasa suppositories nightly and continue long term for maintenance.  She reports she does not mind doing a nightly suppository.  If symptoms do not improve over the next few weeks, she was instructed to call us and we can increase the Canasa suppository to BID and add an oral mesalamine as well.  Will check a CBC, CMP, and CRP.  Follow up in 6 weeks.  ______________________________________________________________________    SUBJECTIVE:  Patient is a pleasant 79 year old female with a PMH of atrial fibrillation, HTN, hyperlipidemia who presents to the office for follow up.  Patient underwent a colonoscopy in August for an evaluation of rectal bleeding which showed evidence of a distal proctitis and diverticulosis of the descending and sigmoid colon; biopsies of the rectum showed acute and chronic mucosal injury/colitis. She was given a Canasa suppository course x 1 month by Dr. Fernandez after the procedure and her rectal bleeding resolved.  However, she has had a return of rectal bleeding again over the past couple of weeks. She reports bright red blood when she has a bowel movement noted on the stool and toilet tissue. No abdominal pain.  No diarrhea. No fecal urgency. No  unintentional weight loss.  No frequent NSAID use.  No family history of UC/crohn's.      REVIEW OF SYSTEMS IS OTHERWISE NEGATIVE.      Historical Information   Past Medical History:   Diagnosis Date    Aphasia, mixed 07/28/2017    Atrial fibrillation (HCC)     CHF (congestive heart failure) (HCC)     Colon polyp     Headache     Hyperlipidemia     Hypertension     Lyme disease     Shortness of breath     TIA (transient ischemic attack)     Vertigo      Past Surgical History:   Procedure Laterality Date    CARDIAC ELECTROPHYSIOLOGY PROCEDURE N/A 10/19/2023    Procedure: Cardiac eps/afib ablation PVI/POST WALL;  Surgeon: Piero Fajardo MD;  Location:  CARDIAC CATH LAB;  Service: Cardiology    CARDIAC PACEMAKER PLACEMENT  12/2019    COLONOSCOPY      NM SIGMOIDOSCOPY FLX DX W/COLLJ SPEC BR/WA IF PFRMD N/A 11/28/2017    Procedure: SIGMOIDOSCOPY FLEXIBLE;  Surgeon: Kavon Fernandez MD;  Location: [facility];  Service: Gastroenterology    TONSILLECTOMY       Social History   Social History     Substance and Sexual Activity   Alcohol Use Yes    Alcohol/week: 7.0 standard drinks of alcohol    Types: 7 Glasses of wine per week    Comment: occassional     Social History     Substance and Sexual Activity   Drug Use No     Social History     Tobacco Use   Smoking Status Never   Smokeless Tobacco Never   Tobacco Comments    no smoking for 46 years     Family History   Problem Relation Age of Onset    Lung cancer Mother     Dementia Father     Alzheimer's disease Father     Other Father         Cardiac Disorder     Lung cancer Maternal Grandmother     Breast cancer Paternal Aunt 40    No Known Problems Paternal Aunt     No Known Problems Paternal Aunt        Meds/Allergies       Current Outpatient Medications:     amiodarone 200 mg tablet    apixaban (Eliquis) 5 mg    fluticasone (FLONASE) 50 mcg/act nasal spray    furosemide (LASIX) 20 mg tablet    levothyroxine 25 mcg tablet    lovastatin (MEVACOR) 10 MG tablet    mesalamine  "(CANASA) 1,000 mg suppository    metoprolol succinate (TOPROL-XL) 100 mg 24 hr tablet    potassium chloride (Klor-Con M10) 10 mEq tablet    sodium chloride (OCEAN) 0.65 % nasal spray    Azelastine HCl 137 MCG/SPRAY SOLN    eszopiclone (LUNESTA) 1 mg tablet    No Known Allergies        Objective     Blood pressure 128/80, pulse 65, height 5' 5\" (1.651 m), weight 82.1 kg (181 lb), SpO2 98%. Body mass index is 30.12 kg/m².      PHYSICAL EXAM:      General Appearance:   Alert, cooperative, no distress   HEENT:   Normocephalic, atraumatic, anicteric.     Neck:  Supple, symmetrical, trachea midline   Lungs:   Clear to auscultation bilaterally; no rales, rhonchi or wheezing; respirations unlabored    Heart::   Regular rate and rhythm; no murmur, rub, or gallop.   Abdomen:   Soft, non-tender, non-distended; normal bowel sounds; no masses, no organomegaly    Genitalia:   Deferred    Rectal:   Deferred    Extremities:  No cyanosis, clubbing or edema    Pulses:  2+ and symmetric    Skin:  No jaundice, rashes, or lesions    Lymph nodes:  No palpable cervical lymphadenopathy        Lab Results:   No visits with results within 1 Day(s) from this visit.   Latest known visit with results is:   Admission on 11/07/2024, Discharged on 11/07/2024   Component Date Value    Ventricular Rate 11/07/2024 73     Atrial Rate 11/07/2024 73     AL Interval 11/07/2024 278     QRSD Interval 11/07/2024 92     QT Interval 11/07/2024 400     QTC Interval 11/07/2024 440     P Axis 11/07/2024 47     QRS [place] 11/07/2024 81     T Wave [place] 11/07/2024 6          Radiology Results:   XR ribs with pa chest min 3 views RIGHT  Result Date: 11/7/2024  Narrative: XR RIBS RIGHT W PA CHEST MIN 3 VIEWS INDICATION: sp fall, contusion mid upper to mid ribs geovanna clavicular area, r/o fracture, pulmonary contusion. COMPARISON: Chest x-ray from 5/17/2024. FINDINGS: There is a pacemaker. No evidence of a rib fracture. Severe thoracolumbar scoliosis. No pneumothorax or " pleural effusion. Clear lungs. Normal cardiomediastinal silhouette. Large hiatal hernia.     Impression: No evidence of a rib fracture. No acute cardiopulmonary disease. Large hiatal hernia. Computerized Assisted Algorithm (CAA) may have been used to analyze all applicable images. Workstation performed: [id number]     CT head without contrast  Result Date: 11/7/2024  Narrative: CT BRAIN - WITHOUT CONTRAST INDICATION:   recent fall, 2 days ago,  post traumatic headache. Patient has sinus congestion and she is on Eliquis.. COMPARISON: CTA head and neck 12/21/2022 TECHNIQUE:  CT examination of the brain was performed.  Multiplanar 2D reformatted images were created from the source data. Radiation dose length product (DLP) for this visit:  845 mGy-cm .  This examination, like all CT scans performed in the [facility], was performed utilizing techniques to minimize radiation dose exposure, including the use of iterative reconstruction and automated exposure control. IMAGE QUALITY:  Diagnostic. FINDINGS: PARENCHYMA: Decreased attenuation is noted in periventricular and subcortical white matter demonstrating an appearance that is statistically most likely to represent mild microangiopathic change; this appearance is similar when compared to most recent prior examination. No CT signs of acute infarction.  No intracranial mass, mass effect or midline shift.  No acute parenchymal hemorrhage. VENTRICLES AND EXTRA-AXIAL SPACES: Stable size and configuration of the ventricles. A few locules of gas are noted in the right cavernous sinus, an incidental finding possibly related to intravenous access. VISUALIZED ORBITS: Post bilateral ocular lens replacements. PARANASAL SINUSES: Trace fluid within the left maxillary sinus. The visualized paranasal sinuses are otherwise clear. CALVARIUM AND EXTRACRANIAL SOFT TISSUES: No acute calvarial fracture. Hyperostosis frontalis interna.     Impression: No acute intracranial  abnormality. Workstation performed: [id number]

## 2024-12-06 ENCOUNTER — TELEPHONE (OUTPATIENT)
Age: 79
End: 2024-12-06

## 2024-12-10 ENCOUNTER — OFFICE VISIT (OUTPATIENT)
Dept: CARDIOLOGY CLINIC | Facility: CLINIC | Age: 79
End: 2024-12-10
Payer: MEDICARE

## 2024-12-10 VITALS
DIASTOLIC BLOOD PRESSURE: 86 MMHG | HEIGHT: 65 IN | SYSTOLIC BLOOD PRESSURE: 136 MMHG | HEART RATE: 73 BPM | BODY MASS INDEX: 30.12 KG/M2

## 2024-12-10 DIAGNOSIS — I10 ESSENTIAL HYPERTENSION: ICD-10-CM

## 2024-12-10 DIAGNOSIS — I49.5 SSS (SICK SINUS SYNDROME) (HCC): ICD-10-CM

## 2024-12-10 DIAGNOSIS — E03.9 ACQUIRED HYPOTHYROIDISM: ICD-10-CM

## 2024-12-10 DIAGNOSIS — I42.8 OTHER CARDIOMYOPATHY (HCC): ICD-10-CM

## 2024-12-10 DIAGNOSIS — Z79.899 ON AMIODARONE THERAPY: ICD-10-CM

## 2024-12-10 DIAGNOSIS — I48.19 PERSISTENT ATRIAL FIBRILLATION (HCC): Primary | ICD-10-CM

## 2024-12-10 DIAGNOSIS — I50.23 ACUTE ON CHRONIC SYSTOLIC CONGESTIVE HEART FAILURE (HCC): ICD-10-CM

## 2024-12-10 DIAGNOSIS — N18.31 STAGE 3A CHRONIC KIDNEY DISEASE (HCC): ICD-10-CM

## 2024-12-10 DIAGNOSIS — E66.9 OBESITY (BMI 30-39.9): ICD-10-CM

## 2024-12-10 DIAGNOSIS — Z79.01 ANTICOAGULANT LONG-TERM USE: ICD-10-CM

## 2024-12-10 DIAGNOSIS — I35.1 NONRHEUMATIC AORTIC VALVE INSUFFICIENCY: ICD-10-CM

## 2024-12-10 DIAGNOSIS — E78.2 MIXED HYPERLIPIDEMIA: ICD-10-CM

## 2024-12-10 DIAGNOSIS — D50.9 IRON DEFICIENCY ANEMIA, UNSPECIFIED IRON DEFICIENCY ANEMIA TYPE: ICD-10-CM

## 2024-12-10 DIAGNOSIS — I34.0 NONRHEUMATIC MITRAL VALVE REGURGITATION: ICD-10-CM

## 2024-12-10 PROCEDURE — 99214 OFFICE O/P EST MOD 30 MIN: CPT | Performed by: INTERNAL MEDICINE

## 2024-12-10 PROCEDURE — 93000 ELECTROCARDIOGRAM COMPLETE: CPT | Performed by: INTERNAL MEDICINE

## 2024-12-10 NOTE — PROGRESS NOTES
Cardiology Follow Up    Madeline Good  1945  [id number]  [facility] [place]  [address]  [address]  [place] [id number] [phone]    1. Persistent atrial fibrillation (HCC)  POCT ECG      2. Essential hypertension        3. Other cardiomyopathy (HCC)        4. Nonrheumatic mitral valve regurgitation        5. Nonrheumatic aortic valve insufficiency        6. Acute on chronic systolic congestive heart failure (HCC)        7. SSS (sick sinus syndrome) (HCC)        8. Acquired hypothyroidism        9. Stage 3a chronic kidney disease (HCC)        10. Iron deficiency anemia, unspecified iron deficiency anemia type        11. Mixed hyperlipidemia        12. Obesity (BMI 30-39.9)        13. Anticoagulant long-term use        14. On amiodarone therapy                    Summary of my recommendation for the patient    Patient feels well in sinus rhythm  Has A-fib about 14.5%   Device check monthly for the next 3 months  If A-fib burden remains low, just continue with current therapy  If A-fib burden increases we will proceed with PFA ablation      Patient has prior history of tachycardia mediated cardiomyopathy    Patient had extensive ablation-PVI, posterior wall, mitral isthmus flutter  There was a recurrence and she was in atrial flutter with heart rate of 120/min  Amiodarone and metoprolol were increased to 200 mg 2 times daily and 100 mg 2 times daily  Patient went back to sinus rhythm  Device check shows there are still brief breakthrough episodes    Amiodarone 100 mg once daily  Check for amiodarone toxicity-TSH, PFT with DLCO, CMP, iron evaluation    If patient has any recurrence we will repeat ablation with PFA before considering AV node ablation + consider VOM injection    Get an echocardiogram to assess EF    If patient continues to have recurrence of tachycardia final option would be an AV node ablation          Interval  "History:     The patient is doing reasonably well  Plan to reduce amiodarone    Patient underwent comprehensive ablation in October 2023  Unfortunately she went back  to flutter with RVR  She has a prior history of tachycardia mediated cardiomyopathy  Patient went back to sinus rhythm on doubling the dose of amiodarone and metoprolol  Device check does reveal brief episodes of breakthrough atrial flutter    She does have a pacemaker with a his lead in place    The patient is not complaining of anginal like chest pain or chest pressure  There is no worsening orthopnea, paroxysmal nocturnal dyspnea  There is some  leg swelling     No significant palpitation  There is no history of presyncope or syncope  There is rare history of transient  lightheadedness  There has been some improvement in exertional intolerance      Past medical history  The patient is a very pleasant 73 year old lady referred to me by Dr Gomez for management of A fi + RVR + heart failure      She does have significant medical illnesses in the form of  PAF  Hypertension  Hyperlipidemia  CMP - suspected tachy mediated      As for the atrial fibrillation  It has been present for quite some time now  Initially to present as episodes of palpitation  This has progressively increased in frequency and duration         There is history of snoring at night  There is occasional history of morning fatigability  There is occasional history of daytime sleepiness    /86 (BP Location: Right arm, Patient Position: Sitting, Cuff Size: Standard)   Pulse 73   Ht 5' 5\" (1.651 m)   BMI 30.12 kg/m²       Patient Active Problem List   Diagnosis    Essential hypertension    Mixed hyperlipidemia    Obesity (BMI 30-39.9)    Migraine with aura and without status migrainosus, not intractable    Myocardiopathy (HCC)    Anticoagulant long-term use    Nonrheumatic mitral valve regurgitation    Nonrheumatic aortic valve insufficiency    Nonrheumatic tricuspid valve " regurgitation    Persistent atrial fibrillation (HCC)    Other insomnia    Chronic obstructive pulmonary disease (HCC)    Stage 3a chronic kidney disease (HCC)    Acute on chronic systolic congestive heart failure (HCC)    SSS (sick sinus syndrome) (HCC)    Acquired hypothyroidism    Iron deficiency anemia    On amiodarone therapy     Past Medical History:   Diagnosis Date    Aphasia, mixed 07/28/2017    Atrial fibrillation (HCC)     CHF (congestive heart failure) (HCC)     Colon polyp     Headache     Hyperlipidemia     Hypertension     Lyme disease     Shortness of breath     TIA (transient ischemic attack)     Vertigo      Social History     Socioeconomic History    Marital status: /Civil Union     Spouse name: Not on file    Number of children: Not on file    Years of education: Not on file    Highest education level: Not on file   Occupational History    Occupation: Retired   Tobacco Use    Smoking status: Never    Smokeless tobacco: Never    Tobacco comments:     no smoking for 46 years   Vaping Use    Vaping status: Never Used   Substance and Sexual Activity    Alcohol use: Yes     Alcohol/week: 7.0 standard drinks of alcohol     Types: 7 Glasses of wine per week     Comment: occassional    Drug use: No    Sexual activity: Never     Partners: Male     Birth control/protection: None   Other Topics Concern    Not on file   Social History Narrative    Always uses seat belt     Social Drivers of Health     Financial Resource Strain: Low Risk  (12/11/2023)    Overall Financial Resource Strain (CARDIA)     Difficulty of Paying Living Expenses: Not very hard   Food Insecurity: No Food Insecurity (12/22/2022)    Hunger Vital Sign     Worried About Running Out of Food in the Last Year: Never true     Ran Out of Food in the Last Year: Never true   Transportation Needs: No Transportation Needs (12/11/2023)    PRAPARE - Transportation     Lack of Transportation (Medical): No     Lack of Transportation  (Non-Medical): No   Physical Activity: Not on file   Stress: Not on file   Social Connections: Unknown (6/18/2024)    Received from [employer]     How often do you feel lonely or isolated from those around you? (Adult - for ages 18 years and over): Not on file   Intimate Partner Violence: Not on file   Housing Stability: Low Risk  (12/22/2022)    Housing Stability Vital Sign     Unable to Pay for Housing in the Last Year: No     Number of Places Lived in the Last Year: 1     Unstable Housing in the Last Year: No      Family History   Problem Relation Age of Onset    Lung cancer Mother     Dementia Father     Alzheimer's disease Father     Other Father         Cardiac Disorder     Lung cancer Maternal Grandmother     Breast cancer Paternal Aunt 40    No Known Problems Paternal Aunt     No Known Problems Paternal Aunt      Past Surgical History:   Procedure Laterality Date    CARDIAC ELECTROPHYSIOLOGY PROCEDURE N/A 10/19/2023    Procedure: Cardiac eps/afib ablation PVI/POST WALL;  Surgeon: Piero Fajardo MD;  Location:  CARDIAC CATH LAB;  Service: Cardiology    CARDIAC PACEMAKER PLACEMENT  12/2019    COLONOSCOPY      [place] SIGMOIDOSCOPY FLX DX W/COLLJ SPEC BR/WA IF PFRMD N/A 11/28/2017    Procedure: SIGMOIDOSCOPY FLEXIBLE;  Surgeon: Kavon Fernandez MD;  Location: [facility];  Service: Gastroenterology    TONSILLECTOMY         Current Outpatient Medications:     amiodarone 200 mg tablet, Take 1 tablet (200 mg total) by mouth daily, Disp: 90 tablet, Rfl: 1    apixaban (Eliquis) 5 mg, Take 1 tablet (5 mg total) by mouth 2 (two) times a day, Disp: 180 tablet, Rfl: 3    fluticasone (FLONASE) 50 mcg/act nasal spray, 1 spray into each nostril daily Start after finishing the 3 days of Prednsione, Disp: 16 g, Rfl: 0    furosemide (LASIX) 20 mg tablet, TAKE 1 TABLET BY MOUTH DAILY, Disp: 90 tablet, Rfl: 3    levothyroxine 25 mcg tablet, TAKE 1 TABLET BY MOUTH DAILY IN  THE EARLY MORNING, Disp: 90 tablet,  Rfl: 3    lovastatin (MEVACOR) 10 MG tablet, Take 1 tablet (10 mg total) by mouth daily at bedtime, Disp: 90 tablet, Rfl: 3    mesalamine (CANASA) 1,000 mg suppository, Insert 1 suppository (1,000 mg total) into the rectum daily at bedtime [facility] - SEND ASAP for patient, Disp: 90 suppository, Rfl: 3    metoprolol succinate (TOPROL-XL) 100 mg 24 hr tablet, Take 1 tablet (100 mg total) by mouth daily, Disp: 90 tablet, Rfl: 3    potassium chloride (Klor-Con M10) 10 mEq tablet, Take 1 tablet (10 mEq total) by mouth daily, Disp: 90 tablet, Rfl: 3    sodium chloride (OCEAN) 0.65 % nasal spray, 1 spray into each nostril as needed for rhinitis or congestion, Disp: 60 mL, Rfl: 0    Azelastine HCl 137 MCG/SPRAY SOLN, 2 sprays into each nostril 2 (two) times a day (Patient not taking: Reported on 11/11/2024), Disp: 90 mL, Rfl: 1    eszopiclone (LUNESTA) 1 mg tablet, Take 1 tablet (1 mg total) by mouth daily at bedtime as needed for sleep Take immediately before bedtime (Patient not taking: Reported on 12/10/2024), Disp: 10 tablet, Rfl: 0  No Known Allergies        LAB RESULTS:    CBC:  WBC   Date Value Ref Range Status   05/17/2024 10.48 (H) 4.31 - 10.16 Thousand/uL Final     Hemoglobin   Date Value Ref Range Status   05/17/2024 15.6 (H) 11.5 - 15.4 g/dL Final     Hematocrit   Date Value Ref Range Status   05/17/2024 46.1 34.8 - 46.1 % Final     MCV   Date Value Ref Range Status   05/17/2024 100 (H) 82 - 98 fL Final     Platelets   Date Value Ref Range Status   05/17/2024 286 149 - 390 Thousands/uL Final     RBC   Date Value Ref Range Status   05/17/2024 4.63 3.81 - 5.12 Million/uL Final     MCH   Date Value Ref Range Status   05/17/2024 33.7 26.8 - 34.3 pg Final     MCHC   Date Value Ref Range Status   05/17/2024 33.8 31.4 - 37.4 g/dL Final     RDW   Date Value Ref Range Status   05/17/2024 14.6 11.6 - 15.1 % Final     MPV   Date Value Ref Range Status   05/17/2024 10.3 8.9 - 12.7 fL Final     nRBC   Date Value Ref Range Status    05/17/2024 0 /100 WBCs Final       CMP:  Potassium   Date Value Ref Range Status   06/11/2024 4.1 3.5 - 5.3 mmol/L Final     Chloride   Date Value Ref Range Status   06/11/2024 103 96 - 108 mmol/L Final     CO2   Date Value Ref Range Status   06/11/2024 29 21 - 32 mmol/L Final     BUN   Date Value Ref Range Status   06/11/2024 14 5 - 25 mg/dL Final     Creatinine   Date Value Ref Range Status   06/11/2024 1.13 0.60 - 1.30 mg/dL Final     Comment:     Standardized to IDMS reference method     Calcium   Date Value Ref Range Status   06/11/2024 9.3 8.4 - 10.2 mg/dL Final     AST   Date Value Ref Range Status   05/17/2024 24 13 - 39 U/L Final     ALT   Date Value Ref Range Status   05/17/2024 15 7 - 52 U/L Final     Comment:     Specimen collection should occur prior to Sulfasalazine administration due to the potential for falsely depressed results.      Alkaline Phosphatase   Date Value Ref Range Status   05/17/2024 105 (H) 34 - 104 U/L Final     eGFR   Date Value Ref Range Status   06/11/2024 46 ml/min/1.73sq m Final        Magnesium:   Magnesium   Date Value Ref Range Status   03/04/2024 2.1 1.9 - 2.7 mg/dL Final        A1C:  Hemoglobin A1C   Date Value Ref Range Status   11/01/2022 5.8 (H) Normal 3.8-5.6%; PreDiabetic 5.7-6.4%; Diabetic >=6.5%; Glycemic control for adults with diabetes <7.0% % Final        TSH:  TSH 3RD GENERATON   Date Value Ref Range Status   01/25/2024 3.330 0.450 - 4.500 uIU/mL Final     Comment:     The recommended reference ranges for TSH during pregnancy are as follows:   First trimester 0.100 to 2.500 uIU/mL   Second trimester  0.200 to 3.000 uIU/mL   Third trimester 0.300 to 3.000 uIU/m    Note: Normal ranges may not apply to patients who are transgender, non-binary, or whose legal sex, sex at birth, and gender identity differ.  Adult TSH (3rd generation) reference range follows the recommended guidelines of the American Thy[employer] Association, January, 2020.        PT/INR:  Protime   Date  Value Ref Range Status   10/19/2023 16.8 (H) 11.6 - 14.5 seconds Final     INR   Date Value Ref Range Status   10/19/2023 1.38 (H) 0.84 - 1.19 Final       Lipid Panel:  Cholesterol   Date Value Ref Range Status   2023 160 See Comment mg/dL Final     Comment:     Cholesterol:         Pediatric <18 Years        Desirable          <170 mg/dL      Borderline High    170-199 mg/dL      High               >=200 mg/dL        Adult >=18 Years            Desirable        <200 mg/dL      Borderline High  200-239 mg/dL      High             >239 mg/dL       Triglycerides   Date Value Ref Range Status   2023 80 See Comment mg/dL Final     Comment:     Triglyceride:     0-9Y            <75mg/dL     10Y-17Y         <90 mg/dL       >=18Y     Normal          <150 mg/dL     Borderline High 150-199 mg/dL     High            200-499 mg/dL        Very High       >499 mg/dL    Specimen collection should occur prior to Metamizole administration due to the potential for falsely depressed results.     HDL, Direct   Date Value Ref Range Status   2023 53 >=50 mg/dL Final     Non-HDL-Chol (CHOL-HDL)   Date Value Ref Range Status   2023 107 mg/dl Final       Troponin:  Troponin I   Date Value Ref Range Status   02/10/2019 0.05 (H) <=0.04 ng/mL Final     Comment:       [employer] Chemistry analyzer 99% cutoff is > 0.04 ng/mL in network labs     o cTnI 99% cutoff is useful only when applied to patients in the clinical setting of myocardial ischemia   o cTnI 99% cutoff should be interpreted in the context of clinical history, ECG findings and possibly cardiac imaging to establish correct diagnosis.   o cTnI 99% cutoff may be suggestive but clearly not indicative of a coronary event without the clinical setting of myocardial ischemia.           Imaging:    [id number]        LUIZA:  No results found for this or any previous visit.      Echocardiogram:  Results for orders placed during the hospital encounter of  11/01/22    Echo complete w/ contrast if indicated    Interpretation Summary    Left Ventricle: Left ventricular cavity size is normal. Systolic function is mildly reduced.  LVEF approximately 40 to 45%.  Patient was in atrial fibrillation with periods of rapid ventricular response which interferes with interpretation. There is mild global hypokinesis. Unable to assess diastolic function due to atrial fibrillation.    Right Ventricle: Right ventricular cavity size is mildly dilated. Systolic function is mildly to moderately reduced.    Left Atrium: The atrium is moderate to markedly dilated.    Right Atrium: The atrium is moderately dilated.    Aortic Valve: There is mild regurgitation.    Mitral Valve: There is moderate annular calcification. There is moderate regurgitation.    Tricuspid Valve: There is moderate regurgitation. The right ventricular systolic pressure is mildly elevated.  Estimated RVSP 35 mm Hg.  Pacer leads noted.    Pulmonic Valve: There is mild regurgitation.    Results for orders placed during the hospital encounter of 03/07/23    Echo follow up/limited w/ contrast if indicated    Interpretation Summary    Left Ventricle: Left ventricular cavity size is normal. The left ventricular ejection fraction is 55%. Systolic function is normal.    Left Atrium: The atrium is moderately dilated.    Right Atrium: The atrium is mildly dilated.    Aortic Valve: There is mild to moderate regurgitation.    Tricuspid Valve: There is moderate regurgitation.    Pulmonary Artery: The estimated pulmonary artery systolic pressure is 52.0 mmHg. The pulmonary artery systolic pressure is moderately increased.    This is a limited echocardiogram performed to evaluate LV function. Interpretation is therefore limited.      Stress Test:   Results for orders placed during the hospital encounter of 03/07/23    NM myocardial perfusion spect (rx stress and/or rest)    Interpretation Summary    Stress ECG: No ST deviation is  noted. There were no arrhythmias during recovery. . The ECG was not diagnostic due to pharmacological (vasodilator) stress.    Perfusion Defect Conclusion: The stress/rest perfusion ratio is 1.03 . There is no evidence of transient ischemic dilation (TID).    Stress Function: Left ventricular function post-stress is normal. Post-stress ejection fraction is 79 %.    Perfusion: There is a left ventricular perfusion defect that is medium in size with moderate reduction in uptake present in the mid to apical anterior and anterolateral location(s) that is partially reversible.    Stress Combined Conclusion: Left ventricular perfusion is probably abnormal.      Cardiac Catheterization:  No results found for this or any previous visit.      HOLTER MONITOR: 24 HOUR/48 HOUR MONITORS  No results found for this or any previous visit.      AMB Extended Holter Monitor: Zio XT/AT or BioTel  No results found for this or any previous visit.      Cardiac CT:    CT Cardiac w Pulmonary Vein Mapping  10/11/2023    FINDINGS:     PULMONARY VEIN ANATOMY: Typical with two right and two left pulmonary veins.     APPROXIMATE MEASUREMENTS OF PULMONARY VEIN OSTIA:     Right superior: 22 mm.  Right inferior: 12 mm.     Left superior: 14 mm.  Left inferior: 16 mm.     CARDIAC STRUCTURES: Top normal heart size. Pacer leads terminate in the right atrium and right ventricle. Mild coronary artery calcifications.     GREAT VESSELS: Visualized thoracic aorta and central pulmonary arterial tree are within normal limits for the patient's age.     EXTRACARDIAC FINDINGS: Lower lobe predominant fibrotic changes in the visualized lungs again seen. Previously noted 2 mm right upper lobe nodule is not definitively visualized on this study, possibly due to motion artifact. Large hiatal hernia containing   the stomach again identified.     IMPRESSION:     Pulmonary venous anatomy as described above.     Large hiatal hernia with intrathoracic stomach, and  pulmonary fibrosis again noted.      Cardioversion  11/03/2022    Indication for procedure: Cardioversion  Requesting physician:Dr Laura Goodman  Anticoagulation:  Eliquis     The patient was identified in the OR . A time out procedure was performed.     The patient was sedated by the anesthesia service .     The patient was cardioverted to sinus rhythm with a 200J biphasic shock.       There were no complications.  The patient was monitored for the appropriate time interval in the holding area, and was transferred back to the medical floor in stable condition.    Pre Cardioversion EKG  11/03/2022      Post Cardioversion EKG  11/03/2022        Cardiac CT:    CT Cardiac w Pulmonary Vein Mapping  10/11/2023    FINDINGS:     PULMONARY VEIN ANATOMY: Typical with two right and two left pulmonary veins.     APPROXIMATE MEASUREMENTS OF PULMONARY VEIN OSTIA:     Right superior: 22 mm.  Right inferior: 12 mm.     Left superior: 14 mm.  Left inferior: 16 mm.     CARDIAC STRUCTURES: Top normal heart size. Pacer leads terminate in the right atrium and right ventricle. Mild coronary artery calcifications.     GREAT VESSELS: Visualized thoracic aorta and central pulmonary arterial tree are within normal limits for the patient's age.     EXTRACARDIAC FINDINGS: Lower lobe predominant fibrotic changes in the visualized lungs again seen. Previously noted 2 mm right upper lobe nodule is not definitively visualized on this study, possibly due to motion artifact. Large hiatal hernia containing   the stomach again identified.     IMPRESSION:     Pulmonary venous anatomy as described above.     Large hiatal hernia with intrathoracic stomach, and pulmonary fibrosis again noted.        DEVICE CHECK:     Results for orders placed or performed in visit on 12/10/24   Cardiac EP device report    Narrative    MDT DC PM/ACTIVE SYSTEM IS MRI CONDITIONAL  NON-BILLABLE  DEVICE INTERROGATED IN THE [place] OFFICE PER DR RIVERA FOR AF EPISODES.   "BATTERY VOLTAGE ADEQUATE (7.4 YRS).  AP 71.2%  13.5%.  ALL LEAD PARAMETERS WITHIN NORMAL LIMITS. TIME IN AT/AF 14.8%.  DR RIVERA REVIEWED INTERRO-\"DEVICE CHECK DOES REVEAL BRIEF EPISODES OF BREAKTHROUGH ATRIL FLUTTER\".  PER DR CARABALLO,  *MONTHLY REMOTES X 3- WATCH AF BURDEN- REPORTS TO DR CARABALLO PLS. NO PROGRAMMING CHANGES MADE TO DEVICE PARAMETERS.  PAS            Review of Systems:  Review of Systems   All other systems reviewed and are negative.  As described in my history of present illness    Physical Exam:  Physical Exam  Vitals reviewed.   Constitutional:       Appearance: Normal appearance. She is well-developed. She is obese. She is not ill-appearing.      Comments: Not in any distress at the current time   HENT:      Head: Normocephalic and atraumatic.      Right Ear: External ear normal.      Left Ear: External ear normal.      Nose: Nose normal.      Mouth/Throat:      Pharynx: Uvula midline.   Eyes:      General: Lids are normal. No scleral icterus.     Extraocular Movements: Extraocular movements intact.      Conjunctiva/sclera: Conjunctivae normal.      Pupils: Pupils are equal, round, and reactive to light.      Comments: No pallor  No cyanosis  No icterus   Neck:      Thyroid: No thyromegaly.      Vascular: No carotid bruit or JVD.      Trachea: Trachea normal.      Comments: No jugular lymphadenopathy  Short thick neck  Cardiovascular:      Rate and Rhythm: Normal rate and regular rhythm.      Chest Wall: PMI is not displaced.      Pulses: Normal pulses.      Heart sounds: Normal heart sounds, S1 normal and S2 normal. No murmur heard.     No friction rub. No gallop. No S3 or S4 sounds.   Pulmonary:      Effort: Pulmonary effort is normal. No accessory muscle usage or respiratory distress.      Breath sounds: Normal breath sounds. No decreased breath sounds, wheezing, rhonchi or rales.   Chest:      Chest wall: No tenderness.   Abdominal:      General: Bowel sounds are normal. There is no distension.      " Palpations: Abdomen is soft. There is no hepatomegaly, splenomegaly or mass.      Tenderness: There is no abdominal tenderness.      Comments: Central obesity present   Musculoskeletal:         General: No swelling, tenderness or deformity. Normal range of motion.      Cervical back: Normal range of motion and neck supple. No rigidity.      Right lower leg: No edema.      Left lower leg: No edema.   Lymphadenopathy:      Cervical: No cervical adenopathy.   Skin:     Findings: No abrasion, erythema, lesion or rash.      Nails: There is no clubbing.   Neurological:      Mental Status: She is alert and oriented to person, place, and time. Mental status is at baseline.      Motor: No weakness.      Comments: Facial symmetry is retained  Extraocular movements are retained  Head neck tongue and palate movement are retained and symmetric   Psychiatric:         Mood and Affect: Mood normal.         Speech: Speech normal.         Behavior: Behavior normal.         Thought Content: Thought content normal.         Judgment: Judgment normal.         Discussion/Summary:      Atrial flutter with RVR  On amiodarone, metoprolol and apixaban  [id number] score is 5  Patient has a long history of atrial fibrillation and flutter  She underwent ablation in October 2023    There was a recurrence and she was in atrial flutter with heart rate of 120/min  Amiodarone and metoprolol were increased to 200 mg 2 times daily and 100 mg 2 times daily  Patient went back to sinus rhythm  Device check shows there are still brief breakthrough episodes    Check for amiodarone toxicity-CMP, TSH, PFT with DLCO, evaluation    Will reduce amiodarone to 200 mg daily in about a month's time    If patient has recurrence of a flutter, will proceed with repeat PFA ablation-will look at breaks in the line for flutter /also plan for vein of Efe injection    If patient continues to have recurrence of tachycardia final option would be an AV node  ablation             Cardiomyopathy , NYHA II, LVEF =35%  Improved currently to 60%  Suspected tachy mediated  Increasing beta blockers for better rate control  On ACEI  EF has improved to 60%           Hypertension  On lisinopril and metoprolol   Blood pressure is 132/100       Hyperlipidemia  On statin-lovastatin   No myositis or myalgia         Suspected REYNOLD  Need to be evaluated for CPAP         Obesity  BMI is 33  Patient advised on dietary him restrictions to lose weight        Long-term anticoagulation  Chads Vasc score is 5  To continue with apixaban        Sinus bradycardia, sick sinus syndrome  Patient is post pacemaker  All device parameters a stable

## 2024-12-10 NOTE — LETTER
December 16, 2024     Arnold Payton DO  [address]  [place] [address]    Patient: Madeline Good   YOB: 1945   Date of Visit: 12/10/2024       Dear Dr. Payton:    Thank you for referring Madeline Good to me for evaluation. Below are my notes for this consultation.    If you have questions, please do not hesitate to call me. I look forward to following your patient along with you.         Sincerely,        Piero Fajardo MD        CC: Madeline SANTA Latisha Fajardo MD  12/16/2024 11:32 PM  Sign when Signing Visit                                             Cardiology Follow Up    Madeline Good  1945  [id number]  [facility] [place]  [address]  [address]  [facility] [address]  [phone]  [phone]    1. Persistent atrial fibrillation (HCC)  POCT ECG      2. Essential hypertension        3. Other cardiomyopathy (HCC)        4. Nonrheumatic mitral valve regurgitation        5. Nonrheumatic aortic valve insufficiency        6. Acute on chronic systolic congestive heart failure (HCC)        7. SSS (sick sinus syndrome) (HCC)        8. Acquired hypothyroidism        9. Stage 3a chronic kidney disease (HCC)        10. Iron deficiency anemia, unspecified iron deficiency anemia type        11. Mixed hyperlipidemia        12. Obesity (BMI 30-39.9)        13. Anticoagulant long-term use        14. On amiodarone therapy                    Summary of my recommendation for the patient    Patient feels well in sinus rhythm  Has A-fib about 14.5%   Device check monthly for the next 3 months  If A-fib burden remains low, just continue with current therapy  If A-fib burden increases we will proceed with PFA ablation      Patient has prior history of tachycardia mediated cardiomyopathy    Patient had extensive ablation-PVI, posterior wall, mitral isthmus flutter  There was a recurrence and she was in atrial flutter with heart rate of 120/min  Amiodarone and  metoprolol were increased to 200 mg 2 times daily and 100 mg 2 times daily  Patient went back to sinus rhythm  Device check shows there are still brief breakthrough episodes    Amiodarone 100 mg once daily  Check for amiodarone toxicity-TSH, PFT with DLCO, CMP, iron evaluation    If patient has any recurrence we will repeat ablation with PFA before considering AV node ablation + consider VOM injection    Get an echocardiogram to assess EF    If patient continues to have recurrence of tachycardia final option would be an AV node ablation          Interval History:     The patient is doing reasonably well  Plan to reduce amiodarone    Patient underwent comprehensive ablation in October 2023  Unfortunately she went back  to flutter with RVR  She has a prior history of tachycardia mediated cardiomyopathy  Patient went back to sinus rhythm on doubling the dose of amiodarone and metoprolol  Device check does reveal brief episodes of breakthrough atrial flutter    She does have a pacemaker with a his lead in place    The patient is not complaining of anginal like chest pain or chest pressure  There is no worsening orthopnea, paroxysmal nocturnal dyspnea  There is some  leg swelling     No significant palpitation  There is no history of presyncope or syncope  There is rare history of transient  lightheadedness  There has been some improvement in exertional intolerance      Past medical history  The patient is a very pleasant 73 year old lady referred to me by Dr Gomez for management of A fi + RVR + heart failure      She does have significant medical illnesses in the form of  PAF  Hypertension  Hyperlipidemia  CMP - suspected tachy mediated      As for the atrial fibrillation  It has been present for quite some time now  Initially to present as episodes of palpitation  This has progressively increased in frequency and duration         There is history of snoring at night  There is occasional history of morning  "fatigability  There is occasional history of daytime sleepiness    /86 (BP Location: Right arm, Patient Position: Sitting, Cuff Size: Standard)   Pulse 73   Ht 5' 5\" (1.651 m)   BMI 30.12 kg/m²       Patient Active Problem List   Diagnosis   • Essential hypertension   • Mixed hyperlipidemia   • Obesity (BMI 30-39.9)   • Migraine with aura and without status migrainosus, not intractable   • Myocardiopathy (HCC)   • Anticoagulant long-term use   • Nonrheumatic mitral valve regurgitation   • Nonrheumatic aortic valve insufficiency   • Nonrheumatic tricuspid valve regurgitation   • Persistent atrial fibrillation (HCC)   • Other insomnia   • Chronic obstructive pulmonary disease (HCC)   • Stage 3a chronic kidney disease (HCC)   • Acute on chronic systolic congestive heart failure (HCC)   • SSS (sick sinus syndrome) (HCC)   • Acquired hypothyroidism   • Iron deficiency anemia   • On amiodarone therapy     Past Medical History:   Diagnosis Date   • Aphasia, mixed 07/28/2017   • Atrial fibrillation (HCC)    • CHF (congestive heart failure) (HCC)    • Colon polyp    • Headache    • Hyperlipidemia    • Hypertension    • Lyme disease    • Shortness of breath    • TIA (transient ischemic attack)    • Vertigo      Social History     Socioeconomic History   • Marital status: /Civil Union     Spouse name: Not on file   • Number of children: Not on file   • Years of education: Not on file   • Highest education level: Not on file   Occupational History   • Occupation: Retired   Tobacco Use   • Smoking status: Never   • Smokeless tobacco: Never   • Tobacco comments:     no smoking for 46 years   Vaping Use   • Vaping status: Never Used   Substance and Sexual Activity   • Alcohol use: Yes     Alcohol/week: 7.0 standard drinks of alcohol     Types: 7 Glasses of wine per week     Comment: occassional   • Drug use: No   • Sexual activity: Never     Partners: Male     Birth control/protection: None   Other Topics Concern "   • Not on file   Social History Narrative    Always uses seat belt     Social Drivers of Health     Financial Resource Strain: Low Risk  (12/11/2023)    Overall Financial Resource Strain (CARDIA)    • Difficulty of Paying Living Expenses: Not very hard   Food Insecurity: No Food Insecurity (12/22/2022)    Hunger Vital Sign    • Worried About Running Out of Food in the Last Year: Never true    • Ran Out of Food in the Last Year: Never true   Transportation Needs: No Transportation Needs (12/11/2023)    PRAPARE - Transportation    • Lack of Transportation (Medical): No    • Lack of Transportation (Non-Medical): No   Physical Activity: Not on file   Stress: Not on file   Social Connections: Unknown (6/18/2024)    Received from [email or web address]    Social [place]    • How often do you feel lonely or isolated from those around you? (Adult - for ages 18 years and over): Not on file   Intimate Partner Violence: Not on file   Housing Stability: Low Risk  (12/22/2022)    Housing Stability Vital Sign    • Unable to Pay for Housing in the Last Year: No    • Number of Places Lived in the Last Year: 1    • Unstable Housing in the Last Year: No      Family History   Problem Relation Age of Onset   • Lung cancer Mother    • Dementia Father    • Alzheimer's disease Father    • Other Father         Cardiac Disorder    • Lung cancer Maternal Grandmother    • Breast cancer Paternal Aunt 40   • No Known Problems Paternal Aunt    • No Known Problems Paternal Aunt      Past Surgical History:   Procedure Laterality Date   • CARDIAC ELECTROPHYSIOLOGY PROCEDURE N/A 10/19/2023    Procedure: Cardiac eps/afib ablation PVI/POST WALL;  Surgeon: Piero Fajardo MD;  Location: [facility];  Service: Cardiology   • CARDIAC PACEMAKER PLACEMENT  12/2019   • COLONOSCOPY     • GA SIGMOIDOSCOPY FLX DX W/COLLJ SPEC BR/WA IF PFRMD N/A 11/28/2017    Procedure: SIGMOIDOSCOPY FLEXIBLE;  Surgeon: Kavon Fernandez MD;  Location: [facility];  Service:  Gastroenterology   • TONSILLECTOMY         Current Outpatient Medications:   •  amiodarone 200 mg tablet, Take 1 tablet (200 mg total) by mouth daily, Disp: 90 tablet, Rfl: 1  •  apixaban (Eliquis) 5 mg, Take 1 tablet (5 mg total) by mouth 2 (two) times a day, Disp: 180 tablet, Rfl: 3  •  fluticasone (FLONASE) 50 mcg/act nasal spray, 1 spray into each nostril daily Start after finishing the 3 days of Prednsione, Disp: 16 g, Rfl: 0  •  furosemide (LASIX) 20 mg tablet, TAKE 1 TABLET BY MOUTH DAILY, Disp: 90 tablet, Rfl: 3  •  levothyroxine 25 mcg tablet, TAKE 1 TABLET BY MOUTH DAILY IN  THE EARLY MORNING, Disp: 90 tablet, Rfl: 3  •  lovastatin (MEVACOR) 10 MG tablet, Take 1 tablet (10 mg total) by mouth daily at bedtime, Disp: 90 tablet, Rfl: 3  •  mesalamine (CANASA) 1,000 mg suppository, Insert 1 suppository (1,000 mg total) into the rectum daily at bedtime [facility] - SEND ASAP for patient, Disp: 90 suppository, Rfl: 3  •  metoprolol succinate (TOPROL-XL) 100 mg 24 hr tablet, Take 1 tablet (100 mg total) by mouth daily, Disp: 90 tablet, Rfl: 3  •  potassium chloride (Klor-Con M10) 10 mEq tablet, Take 1 tablet (10 mEq total) by mouth daily, Disp: 90 tablet, Rfl: 3  •  sodium chloride (OCEAN) 0.65 % nasal spray, 1 spray into each nostril as needed for rhinitis or congestion, Disp: 60 mL, Rfl: 0  •  Azelastine HCl 137 MCG/SPRAY SOLN, 2 sprays into each nostril 2 (two) times a day (Patient not taking: Reported on 11/11/2024), Disp: 90 mL, Rfl: 1  •  eszopiclone (LUNESTA) 1 mg tablet, Take 1 tablet (1 mg total) by mouth daily at bedtime as needed for sleep Take immediately before bedtime (Patient not taking: Reported on 12/10/2024), Disp: 10 tablet, Rfl: 0  No Known Allergies        LAB RESULTS:    CBC:  WBC   Date Value Ref Range Status   05/17/2024 10.48 (H) 4.31 - 10.16 Thousand/uL Final     Hemoglobin   Date Value Ref Range Status   05/17/2024 15.6 (H) 11.5 - 15.4 g/dL Final     Hematocrit   Date Value Ref Range Status    05/17/2024 46.1 34.8 - 46.1 % Final     MCV   Date Value Ref Range Status   05/17/2024 100 (H) 82 - 98 fL Final     Platelets   Date Value Ref Range Status   05/17/2024 286 149 - 390 Thousands/uL Final     RBC   Date Value Ref Range Status   05/17/2024 4.63 3.81 - 5.12 Million/uL Final     MCH   Date Value Ref Range Status   05/17/2024 33.7 26.8 - 34.3 pg Final     MCHC   Date Value Ref Range Status   05/17/2024 33.8 31.4 - 37.4 g/dL Final     RDW   Date Value Ref Range Status   05/17/2024 14.6 11.6 - 15.1 % Final     MPV   Date Value Ref Range Status   05/17/2024 10.3 8.9 - 12.7 fL Final     nRBC   Date Value Ref Range Status   05/17/2024 0 /100 WBCs Final       CMP:  Potassium   Date Value Ref Range Status   06/11/2024 4.1 3.5 - 5.3 mmol/L Final     Chloride   Date Value Ref Range Status   06/11/2024 103 96 - 108 mmol/L Final     CO2   Date Value Ref Range Status   06/11/2024 29 21 - 32 mmol/L Final     BUN   Date Value Ref Range Status   06/11/2024 14 5 - 25 mg/dL Final     Creatinine   Date Value Ref Range Status   06/11/2024 1.13 0.60 - 1.30 mg/dL Final     Comment:     Standardized to IDMS reference method     Calcium   Date Value Ref Range Status   06/11/2024 9.3 8.4 - 10.2 mg/dL Final     AST   Date Value Ref Range Status   05/17/2024 24 13 - 39 U/L Final     ALT   Date Value Ref Range Status   05/17/2024 15 7 - 52 U/L Final     Comment:     Specimen collection should occur prior to Sulfasalazine administration due to the potential for falsely depressed results.      Alkaline Phosphatase   Date Value Ref Range Status   05/17/2024 105 (H) 34 - 104 U/L Final     eGFR   Date Value Ref Range Status   06/11/2024 46 ml/min/1.73sq m Final        Magnesium:   Magnesium   Date Value Ref Range Status   03/04/2024 2.1 1.9 - 2.7 mg/dL Final        A1C:  Hemoglobin A1C   Date Value Ref Range Status   11/01/2022 5.8 (H) Normal 3.8-5.6%; PreDiabetic 5.7-6.4%; Diabetic >=6.5%; Glycemic control for adults with diabetes  <7.0% % Final        TSH:  TSH 3RD GENERATON   Date Value Ref Range Status   01/25/2024 3.330 0.450 - 4.500 uIU/mL Final     Comment:     The recommended reference ranges for TSH during pregnancy are as follows:   First trimester 0.100 to 2.500 uIU/mL   Second trimester  0.200 to 3.000 uIU/mL   Third trimester 0.300 to 3.000 uIU/m    Note: Normal ranges may not apply to patients who are transgender, non-binary, or whose legal sex, sex at birth, and gender identity differ.  Adult TSH (3rd generation) reference range follows the recommended guidelines of the American Thyroid Association, January, 2020.        PT/INR:  Protime   Date Value Ref Range Status   10/19/2023 16.8 (H) 11.6 - 14.5 seconds Final     INR   Date Value Ref Range Status   10/19/2023 1.38 (H) 0.84 - 1.19 Final       Lipid Panel:  Cholesterol   Date Value Ref Range Status   12/08/2023 160 See Comment mg/dL Final     Comment:     Cholesterol:         Pediatric <18 Years        Desirable          <170 mg/dL      Borderline High    170-199 mg/dL      High               >=200 mg/dL        Adult >=18 Years            Desirable        <200 mg/dL      Borderline High  200-239 mg/dL      High             >239 mg/dL       Triglycerides   Date Value Ref Range Status   12/08/2023 80 See Comment mg/dL Final     Comment:     Triglyceride:     0-9Y            <75mg/dL     10Y-17Y         <90 mg/dL       >=18Y     Normal          <150 mg/dL     Borderline High 150-199 mg/dL     High            200-499 mg/dL        Very High       >499 mg/dL    Specimen collection should occur prior to Metamizole administration due to the potential for falsely depressed results.     HDL, Direct   Date Value Ref Range Status   12/08/2023 53 >=50 mg/dL Final     Non-HDL-Chol (CHOL-HDL)   Date Value Ref Range Status   12/08/2023 107 mg/dl Final       Troponin:  Troponin I   Date Value Ref Range Status   02/10/2019 0.05 (H) <=0.04 ng/mL Final     Comment:       [employer]  analyzer 99% cutoff is > 0.04 ng/mL in network labs     o cTnI 99% cutoff is useful only when applied to patients in the clinical setting of myocardial ischemia   o cTnI 99% cutoff should be interpreted in the context of clinical history, ECG findings and possibly cardiac imaging to establish correct diagnosis.   o cTnI 99% cutoff may be suggestive but clearly not indicative of a coronary event without the clinical setting of myocardial ischemia.           Imaging:    [id number]        LUIZA:  No results found for this or any previous visit.      Echocardiogram:  Results for orders placed during the hospital encounter of 22    Echo complete w/ contrast if indicated    Interpretation Summary  •  Left Ventricle: Left ventricular cavity size is normal. Systolic function is mildly reduced.  LVEF approximately 40 to 45%.  Patient was in atrial fibrillation with periods of rapid ventricular response which interferes with interpretation. There is mild global hypokinesis. Unable to assess diastolic function due to atrial fibrillation.  •  Right Ventricle: Right ventricular cavity size is mildly dilated. Systolic function is mildly to moderately reduced.  •  Left Atrium: The atrium is moderate to markedly dilated.  •  Right Atrium: The atrium is moderately dilated.  •  Aortic Valve: There is mild regurgitation.  •  Mitral Valve: There is moderate annular calcification. There is moderate regurgitation.  •  Tricuspid Valve: There is moderate regurgitation. The right ventricular systolic pressure is mildly elevated.  Estimated RVSP 35 mm Hg.  Pacer leads noted.  •  Pulmonic Valve: There is mild regurgitation.    Results for orders placed during the hospital encounter of 23    Echo follow up/limited w/ contrast if indicated    Interpretation Summary  •  Left Ventricle: Left ventricular cavity size is normal. The left ventricular ejection fraction is 55%. Systolic function is normal.  •  Left Atrium: The atrium  is moderately dilated.  •  Right Atrium: The atrium is mildly dilated.  •  Aortic Valve: There is mild to moderate regurgitation.  •  Tricuspid Valve: There is moderate regurgitation.  •  Pulmonary Artery: The estimated pulmonary artery systolic pressure is 52.0 mmHg. The pulmonary artery systolic pressure is moderately increased.  •  This is a limited echocardiogram performed to evaluate LV function. Interpretation is therefore limited.      Stress Test:   Results for orders placed during the hospital encounter of 03/07/23    NM myocardial perfusion spect (rx stress and/or rest)    Interpretation Summary  •  Stress ECG: No ST deviation is noted. There were no arrhythmias during recovery. . The ECG was not diagnostic due to pharmacological (vasodilator) stress.  •  Perfusion Defect Conclusion: The stress/rest perfusion ratio is 1.03 . There is no evidence of transient ischemic dilation (TID).  •  Stress Function: Left ventricular function post-stress is normal. Post-stress ejection fraction is 79 %.  •  Perfusion: There is a left ventricular perfusion defect that is medium in size with moderate reduction in uptake present in the mid to apical anterior and anterolateral location(s) that is partially reversible.  •  Stress Combined Conclusion: Left ventricular perfusion is probably abnormal.      Cardiac Catheterization:  No results found for this or any previous visit.      HOLTER MONITOR: 24 HOUR/48 HOUR MONITORS  No results found for this or any previous visit.      AMB Extended Holter Monitor: Zio XT/AT or BioTel  No results found for this or any previous visit.      Cardiac CT:    CT Cardiac w Pulmonary Vein Mapping  10/11/2023    FINDINGS:     PULMONARY VEIN ANATOMY: Typical with two right and two left pulmonary veins.     APPROXIMATE MEASUREMENTS OF PULMONARY VEIN OSTIA:     Right superior: 22 mm.  Right inferior: 12 mm.     Left superior: 14 mm.  Left inferior: 16 mm.     CARDIAC STRUCTURES: Top normal heart  size. Pacer leads terminate in the right atrium and right ventricle. Mild coronary artery calcifications.     GREAT VESSELS: Visualized thoracic aorta and central pulmonary arterial tree are within normal limits for the patient's age.     EXTRACARDIAC FINDINGS: Lower lobe predominant fibrotic changes in the visualized lungs again seen. Previously noted 2 mm right upper lobe nodule is not definitively visualized on this study, possibly due to motion artifact. Large hiatal hernia containing   the stomach again identified.     IMPRESSION:     Pulmonary venous anatomy as described above.     Large hiatal hernia with intrathoracic stomach, and pulmonary fibrosis again noted.      Cardioversion  11/03/2022    Indication for procedure: Cardioversion  Requesting physician:Dr Laura Goodman  Anticoagulation:  Eliquis     The patient was identified in the OR . A time out procedure was performed.     The patient was sedated by the anesthesia service .     The patient was cardioverted to sinus rhythm with a 200J biphasic shock.       There were no complications.  The patient was monitored for the appropriate time interval in the holding area, and was transferred back to the medical floor in stable condition.    Pre Cardioversion EKG  11/03/2022      Post Cardioversion EKG  11/03/2022        Cardiac CT:    CT Cardiac w Pulmonary Vein Mapping  10/11/2023    FINDINGS:     PULMONARY VEIN ANATOMY: Typical with two right and two left pulmonary veins.     APPROXIMATE MEASUREMENTS OF PULMONARY VEIN OSTIA:     Right superior: 22 mm.  Right inferior: 12 mm.     Left superior: 14 mm.  Left inferior: 16 mm.     CARDIAC STRUCTURES: Top normal heart size. Pacer leads terminate in the right atrium and right ventricle. Mild coronary artery calcifications.     GREAT VESSELS: Visualized thoracic aorta and central pulmonary arterial tree are within normal limits for the patient's age.     EXTRACARDIAC FINDINGS: Lower lobe predominant fibrotic  "changes in the visualized lungs again seen. Previously noted 2 mm right upper lobe nodule is not definitively visualized on this study, possibly due to motion artifact. Large hiatal hernia containing   the stomach again identified.     IMPRESSION:     Pulmonary venous anatomy as described above.     Large hiatal hernia with intrathoracic stomach, and pulmonary fibrosis again noted.        DEVICE CHECK:     Results for orders placed or performed in visit on 12/10/24   Cardiac EP device report    Narrative    MDT DC PM/ACTIVE SYSTEM IS MRI CONDITIONAL  NON-BILLABLE  [facility]IVER[facility] FOR AF EPISODES.  BATTERY VOLTAGE ADEQUATE (7.4 YRS).  AP 71.2%  13.5%.  ALL LEAD PARAMETERS WITHIN NORMAL LIMITS. TIME IN AT/AF 14.8%.  DR RIVERA REVIEWED [facility]-\"DEVICE CHECK DOES REVEAL BRIEF EPISODES OF BREAKTHROUGH ATRIL FLUTTER\".  PER DR CARABALLO,  *MONTHLY REMOTES X 3- WATCH AF BURDEN- REPORTS TO DR CARABALLO PLS. NO PROGRAMMING CHANGES MADE TO DEVICE PARAMETERS.  PAS            Review of Systems:  Review of Systems   All other systems reviewed and are negative.  As described in my history of present illness    Physical Exam:  Physical Exam  Vitals reviewed.   Constitutional:       Appearance: Normal appearance. She is well-developed. She is obese. She is not ill-appearing.      Comments: Not in any distress at the current time   HENT:      Head: Normocephalic and atraumatic.      Right Ear: External ear normal.      Left Ear: External ear normal.      Nose: Nose normal.      Mouth/Throat:      Pharynx: Uvula midline.   Eyes:      General: Lids are normal. No scleral icterus.     Extraocular Movements: Extraocular movements intact.      Conjunctiva/sclera: Conjunctivae normal.      Pupils: Pupils are equal, round, and reactive to light.      Comments: No pallor  No cyanosis  No icterus   Neck:      Thyroid: No thyromegaly.      Vascular: No carotid bruit or JVD.      Trachea: Trachea normal.      " Comments: No jugular lymphadenopathy  Short thick neck  Cardiovascular:      Rate and Rhythm: Normal rate and regular rhythm.      Chest Wall: PMI is not displaced.      Pulses: Normal pulses.      Heart sounds: Normal heart sounds, S1 normal and S2 normal. No murmur heard.     No friction rub. No gallop. No S3 or S4 sounds.   Pulmonary:      Effort: Pulmonary effort is normal. No accessory muscle usage or respiratory distress.      Breath sounds: Normal breath sounds. No decreased breath sounds, wheezing, rhonchi or rales.   Chest:      Chest wall: No tenderness.   Abdominal:      General: Bowel sounds are normal. There is no distension.      Palpations: Abdomen is soft. There is no hepatomegaly, splenomegaly or mass.      Tenderness: There is no abdominal tenderness.      Comments: Central obesity present   Musculoskeletal:         General: No swelling, tenderness or deformity. Normal range of motion.      Cervical back: Normal range of motion and neck supple. No rigidity.      Right lower leg: No edema.      Left lower leg: No edema.   Lymphadenopathy:      Cervical: No cervical adenopathy.   Skin:     Findings: No abrasion, erythema, lesion or rash.      Nails: There is no clubbing.   Neurological:      Mental Status: She is alert and oriented to person, place, and time. Mental status is at baseline.      Motor: No weakness.      Comments: Facial symmetry is retained  Extraocular movements are retained  Head neck tongue and palate movement are retained and symmetric   Psychiatric:         Mood and Affect: Mood normal.         Speech: Speech normal.         Behavior: Behavior normal.         Thought Content: Thought content normal.         Judgment: Judgment normal.         Discussion/Summary:      Atrial flutter with RVR  On amiodarone, metoprolol and apixaban  [id number] score is 5  Patient has a long history of atrial fibrillation and flutter  She underwent ablation in October 2023    There was a recurrence  and she was in atrial flutter with heart rate of 120/min  Amiodarone and metoprolol were increased to 200 mg 2 times daily and 100 mg 2 times daily  Patient went back to sinus rhythm  Device check shows there are still brief breakthrough episodes    Check for amiodarone toxicity-CMP, TSH, PFT with DLCO, evaluation    Will reduce amiodarone to 200 mg daily in about a month's time    If patient has recurrence of a flutter, will proceed with repeat PFA ablation-will look at breaks in the line for flutter /also plan for vein of Efe injection    If patient continues to have recurrence of tachycardia final option would be an AV node ablation             Cardiomyopathy , NYHA II, LVEF =35%  Improved currently to 60%  Suspected tachy mediated  Increasing beta blockers for better rate control  On ACEI  EF has improved to 60%           Hypertension  On lisinopril and metoprolol   Blood pressure is 132/100       Hyperlipidemia  On statin-lovastatin   No myositis or myalgia         Suspected REYNOLD  Need to be evaluated for CPAP         Obesity  BMI is 33  Patient advised on dietary him restrictions to lose weight        Long-term anticoagulation  Chads Vasc score is 5  To continue with apixaban        Sinus bradycardia, sick sinus syndrome  Patient is post pacemaker  All device parameters a stable

## 2024-12-14 ENCOUNTER — RESULTS FOLLOW-UP (OUTPATIENT)
Dept: CARDIOLOGY CLINIC | Facility: CLINIC | Age: 79
End: 2024-12-14

## 2024-12-18 NOTE — DISCHARGE INSTRUCTIONS
[id number]: previous_biopsy_has_been_previously_biopsied Lasix every other day   follow-up with primary care    Body Location Override (Optional): Left ear

## 2024-12-23 ENCOUNTER — APPOINTMENT (OUTPATIENT)
Age: 79
End: 2024-12-23
Payer: MEDICARE

## 2024-12-23 ENCOUNTER — RA CDI HCC (OUTPATIENT)
Dept: OTHER | Facility: HOSPITAL | Age: 79
End: 2024-12-23

## 2024-12-23 DIAGNOSIS — E78.2 MIXED HYPERLIPIDEMIA: ICD-10-CM

## 2024-12-23 DIAGNOSIS — I10 ESSENTIAL HYPERTENSION: ICD-10-CM

## 2024-12-23 DIAGNOSIS — K51.219 ULCERATIVE PROCTITIS WITH COMPLICATION (HCC): ICD-10-CM

## 2024-12-23 DIAGNOSIS — E03.9 ACQUIRED HYPOTHYROIDISM: ICD-10-CM

## 2024-12-23 LAB
ALBUMIN SERPL BCG-MCNC: 4.1 G/DL (ref 3.5–5)
ALP SERPL-CCNC: 174 U/L (ref 34–104)
ALT SERPL W P-5'-P-CCNC: 19 U/L (ref 7–52)
ANION GAP SERPL CALCULATED.3IONS-SCNC: 8 MMOL/L (ref 4–13)
AST SERPL W P-5'-P-CCNC: 30 U/L (ref 13–39)
BILIRUB SERPL-MCNC: 1.21 MG/DL (ref 0.2–1)
BUN SERPL-MCNC: 11 MG/DL (ref 5–25)
CALCIUM SERPL-MCNC: 9.6 MG/DL (ref 8.4–10.2)
CHLORIDE SERPL-SCNC: 102 MMOL/L (ref 96–108)
CHOLEST SERPL-MCNC: 166 MG/DL (ref ?–200)
CO2 SERPL-SCNC: 33 MMOL/L (ref 21–32)
CREAT SERPL-MCNC: 1.05 MG/DL (ref 0.6–1.3)
CRP SERPL QL: 6 MG/L
ERYTHROCYTE [DISTWIDTH] IN BLOOD BY AUTOMATED COUNT: 14.7 % (ref 11.6–15.1)
GFR SERPL CREATININE-BSD FRML MDRD: 50 ML/MIN/1.73SQ M
GLUCOSE P FAST SERPL-MCNC: 107 MG/DL (ref 65–99)
HCT VFR BLD AUTO: 48.7 % (ref 34.8–46.1)
HDLC SERPL-MCNC: 46 MG/DL
HGB BLD-MCNC: 16.3 G/DL (ref 11.5–15.4)
LDLC SERPL CALC-MCNC: 96 MG/DL (ref 0–100)
MCH RBC QN AUTO: 35.5 PG (ref 26.8–34.3)
MCHC RBC AUTO-ENTMCNC: 33.5 G/DL (ref 31.4–37.4)
MCV RBC AUTO: 106 FL (ref 82–98)
NONHDLC SERPL-MCNC: 120 MG/DL
PLATELET # BLD AUTO: 213 THOUSANDS/UL (ref 149–390)
PMV BLD AUTO: 11.6 FL (ref 8.9–12.7)
POTASSIUM SERPL-SCNC: 4.5 MMOL/L (ref 3.5–5.3)
PROT SERPL-MCNC: 7.4 G/DL (ref 6.4–8.4)
RBC # BLD AUTO: 4.59 MILLION/UL (ref 3.81–5.12)
SODIUM SERPL-SCNC: 143 MMOL/L (ref 135–147)
TRIGL SERPL-MCNC: 121 MG/DL (ref ?–150)
TSH SERPL DL<=0.05 MIU/L-ACNC: 5.48 UIU/ML (ref 0.45–4.5)
WBC # BLD AUTO: 6.5 THOUSAND/UL (ref 4.31–10.16)

## 2024-12-23 PROCEDURE — 80061 LIPID PANEL: CPT

## 2024-12-23 PROCEDURE — 86140 C-REACTIVE PROTEIN: CPT

## 2024-12-23 PROCEDURE — 84443 ASSAY THYROID STIM HORMONE: CPT

## 2024-12-23 PROCEDURE — 80053 COMPREHEN METABOLIC PANEL: CPT

## 2024-12-23 PROCEDURE — 36415 COLL VENOUS BLD VENIPUNCTURE: CPT

## 2024-12-23 PROCEDURE — 85027 COMPLETE CBC AUTOMATED: CPT

## 2024-12-28 ENCOUNTER — RESULTS FOLLOW-UP (OUTPATIENT)
Dept: CARDIOLOGY CLINIC | Facility: CLINIC | Age: 79
End: 2024-12-28

## 2024-12-28 NOTE — RESULT ENCOUNTER NOTE
Normal Device Function    Please reach out for symptoms     Set up with me with results       Patient feels well in sinus rhythm  Has A-fib about 14.5%   Device check monthly for the next 3 months  If A-fib burden remains low, just continue with current therapy  If A-fib burden increases we will proceed with PFA ablation      Patient has prior history of tachycardia mediated cardiomyopathy    Patient had extensive ablation-PVI, posterior wall, mitral isthmus flutter  There was a recurrence and she was in atrial flutter with heart rate of 120/min  Amiodarone and metoprolol were increased to 200 mg 2 times daily and 100 mg 2 times daily  Patient went back to sinus rhythm  Device check shows there are still brief breakthrough episodes    Amiodarone 100 mg once daily  Check for amiodarone toxicity-TSH, PFT with DLCO, CMP, iron evaluation    If patient has any recurrence we will repeat ablation with PFA before considering AV node ablation + consider VOM injection    Get an echocardiogram to assess EF    If patient continues to have recurrence of tachycardia final option would be an AV node ablation

## 2024-12-30 ENCOUNTER — OFFICE VISIT (OUTPATIENT)
Dept: FAMILY MEDICINE CLINIC | Facility: CLINIC | Age: 79
End: 2024-12-30
Payer: MEDICARE

## 2024-12-30 VITALS
WEIGHT: 178 LBS | SYSTOLIC BLOOD PRESSURE: 126 MMHG | HEART RATE: 84 BPM | OXYGEN SATURATION: 97 % | HEIGHT: 65 IN | RESPIRATION RATE: 14 BRPM | DIASTOLIC BLOOD PRESSURE: 82 MMHG | BODY MASS INDEX: 29.66 KG/M2

## 2024-12-30 DIAGNOSIS — Z23 NEED FOR COVID-19 VACCINE: ICD-10-CM

## 2024-12-30 DIAGNOSIS — I48.19 PERSISTENT ATRIAL FIBRILLATION (HCC): ICD-10-CM

## 2024-12-30 DIAGNOSIS — Z00.00 MEDICARE ANNUAL WELLNESS VISIT, SUBSEQUENT: Primary | ICD-10-CM

## 2024-12-30 DIAGNOSIS — E28.39 MENOPAUSE OVARIAN FAILURE: ICD-10-CM

## 2024-12-30 DIAGNOSIS — G47.09 OTHER INSOMNIA: ICD-10-CM

## 2024-12-30 DIAGNOSIS — E03.9 ACQUIRED HYPOTHYROIDISM: ICD-10-CM

## 2024-12-30 DIAGNOSIS — E78.2 MIXED HYPERLIPIDEMIA: ICD-10-CM

## 2024-12-30 PROCEDURE — 91320 SARSCV2 VAC 30MCG TRS-SUC IM: CPT

## 2024-12-30 PROCEDURE — G0439 PPPS, SUBSEQ VISIT: HCPCS | Performed by: FAMILY MEDICINE

## 2024-12-30 PROCEDURE — 99214 OFFICE O/P EST MOD 30 MIN: CPT | Performed by: FAMILY MEDICINE

## 2024-12-30 PROCEDURE — 90480 ADMN SARSCOV2 VAC 1/ONLY CMP: CPT

## 2024-12-30 RX ORDER — HYDROXYZINE HYDROCHLORIDE 25 MG/1
25 TABLET, FILM COATED ORAL
Qty: 10 TABLET | Refills: 0 | Status: SHIPPED | OUTPATIENT
Start: 2024-12-30

## 2024-12-30 NOTE — PROGRESS NOTES
Name: Madeline Good      : 1945      MRN: [id number]  Encounter Provider: Arnold Payton DO  Encounter Date: 2024   Encounter department: [facility] [address]    Assessment & Plan  Medicare annual wellness visit, subsequent         Persistent atrial fibrillation (HCC)  Rate controlled, continue the amiodarone, metoprolol as well as the Eliquis for anticoagulation.       Acquired hypothyroidism  Continue levothyroxine, will recheck a TSH in 6 months.  Her TSH was slightly increased but she does not have any symptoms of hypothyroidism.  Orders:  •  TSH, 3rd generation; Future    Other insomnia  Trial of hydroxyzine at bedtime as needed  Orders:  •  hydrOXYzine HCL (ATARAX) 25 mg tablet; Take 1 tablet (25 mg total) by mouth daily at bedtime as needed (sleep)    Mixed hyperlipidemia  Well-controlled, continue lovastatin  Orders:  •  CBC; Future    Menopause ovarian failure    Orders:  •  DXA bone density spine hip and pelvis; Future    Need for COVID-19 vaccine    Orders:  •  COVID-19 Pfizer mRNA vaccine 12 yr and older (Comirnaty pre-filled syringe)      Depression Screening and Follow-up Plan: Patient was screened for depression during today's encounter. They screened negative with a PHQ-2 score of 0.      Preventive health issues were discussed with patient, and age appropriate screening tests were ordered as noted in patient's After Visit Summary. Personalized health advice and appropriate referrals for health education or preventive services given if needed, as noted in patient's After Visit Summary.    History of Present Illness     Patient comes in today for checkup, she states she is feeling better since her amiodarone dose was decreased.  She did have her blood work done, and this was reviewed with her today.       Patient Care Team:  Arnold Payton DO as PCP - General (Family Medicine)  MD Kavon Cruz MD as Endoscopist    Review of  Systems   Constitutional:  Negative for chills, fatigue and fever.   HENT:  Negative for congestion, ear pain, hearing loss, postnasal drip, rhinorrhea and sore throat.    Eyes:  Negative for pain and visual disturbance.   Respiratory:  Negative for chest tightness, shortness of breath and wheezing.    Cardiovascular:  Negative for chest pain and leg swelling.   Gastrointestinal:  Negative for abdominal distention, abdominal pain, constipation, diarrhea and vomiting.   Endocrine: Negative for cold intolerance and heat intolerance.   Genitourinary:  Negative for difficulty urinating, frequency and urgency.   Musculoskeletal:  Negative for arthralgias and gait problem.   Skin:  Negative for color change.   Neurological:  Negative for dizziness, tremors, syncope, numbness and headaches.   Hematological:  Negative for adenopathy.   Psychiatric/Behavioral:  Positive for sleep disturbance. Negative for agitation and confusion. The patient is not nervous/anxious.      Medical History Reviewed by provider this encounter:  Tobacco  Allergies  Meds  Problems  Med Hx  Surg Hx  Fam Hx       Annual Wellness Visit Questionnaire   Madeline is here for her Subsequent Wellness visit. Last Medicare Wellness visit information reviewed, patient interviewed and updates made to the record today.      Health Risk Assessment:   Patient rates overall health as good. Patient feels that their physical health rating is slightly worse. Patient is very satisfied with their life. Eyesight was rated as same. Hearing was rated as same. Patient feels that their emotional and mental health rating is same. Patients states they are never, rarely angry. Patient states they are always unusually tired/fatigued. Pain experienced in the last 7 days has been none. Patient states that she has experienced no weight loss or gain in last 6 months.     Depression Screening:   PHQ-2 Score: 0      Fall Risk Screening:   In the past year, patient has  experienced: history of falling in past year    Number of falls: 1  Injured during fall?: No    Feels unsteady when standing or walking?: Yes    Worried about falling?: No      Home Safety:  Patient does not have trouble with stairs inside or outside of their home. Patient has working smoke alarms and has working carbon monoxide detector. Home safety hazards include: none.     Nutrition:   Current diet is Regular.     Medications:   Patient is currently taking over-the-counter supplements. OTC medications include: see medication list. Patient is able to manage medications.     Activities of Daily Living (ADLs)/Instrumental Activities of Daily Living (IADLs):   Walk and transfer into and out of bed and chair?: Yes  Dress and groom yourself?: Yes    Bathe or shower yourself?: Yes    Feed yourself? Yes  Do your laundry/housekeeping?: Yes  Manage your money, pay your bills and track your expenses?: Yes  Make your own meals?: Yes    Do your own shopping?: Yes    Advance Care Planning:   Living will: Yes    Durable POA for healthcare: Yes    Advanced directive: Yes      Cognitive Screening:   Provider or family/friend/caregiver concerned regarding cognition?: No    PREVENTIVE SCREENINGS      Cardiovascular Screening:    General: Screening Not Indicated and History Lipid Disorder      Diabetes Screening:     General: Screening Current      Colorectal Cancer Screening:     General: Screening Current      Breast Cancer Screening:     General: Screening Current      Cervical Cancer Screening:    General: Screening Not Indicated      Osteoporosis Screening:      Due for: DXA Axial      Abdominal Aortic Aneurysm (AAA) Screening:        General: Screening Not Indicated      Lung Cancer Screening:     General: Screening Not Indicated      Hepatitis C Screening:    General: Screening Not Indicated    Screening, Brief Intervention, and Referral to Treatment (SBIRT)    Screening      Single Item Drug Screening:  How often have you  "used an illegal drug (including marijuana) or a prescription medication for non-medical reasons in the past year? never    Single Item Drug Screen Score: 0  Interpretation: Negative screen for possible drug use disorder    Social Drivers of Health     Financial Resource Strain: Low Risk  (12/11/2023)    Overall Financial Resource Strain (CARDIA)    • Difficulty of Paying Living Expenses: Not very hard   Food Insecurity: No Food Insecurity (12/30/2024)    Hunger Vital Sign    • Worried About Running Out of Food in the Last Year: Never true    • Ran Out of Food in the Last Year: Never true   Transportation Needs: No Transportation Needs (12/30/2024)    PRAPARE - Transportation    • Lack of Transportation (Medical): No    • Lack of Transportation (Non-Medical): No   Housing Stability: Unknown (12/30/2024)    Housing Stability Vital Sign    • Unable to Pay for Housing in the Last Year: No    • Homeless in the Last Year: No   Utilities: Not At Risk (12/30/2024)    [facility] Utilities    • Threatened with loss of utilities: No     No results found.    Objective   /82   Pulse 84   Resp 14   Ht 5' 5\" (1.651 m)   Wt 80.7 kg (178 lb)   SpO2 97%   BMI 29.62 kg/m²     Physical Exam  Constitutional:       Appearance: She is well-developed.   HENT:      Head: Normocephalic and atraumatic.      Right Ear: External ear normal.      Left Ear: External ear normal.      Nose: Nose normal.      Mouth/Throat:      Pharynx: Oropharynx is clear.   Eyes:      Extraocular Movements: Extraocular movements intact.      Conjunctiva/sclera: Conjunctivae normal.      Pupils: Pupils are equal, round, and reactive to light.   Neck:      Thyroid: No thyromegaly.   Cardiovascular:      Rate and Rhythm: Normal rate and regular rhythm.      Heart sounds: Normal heart sounds. No murmur heard.  Pulmonary:      Effort: Pulmonary effort is normal.   Abdominal:      General: Bowel sounds are normal. There is no distension.      Palpations: Abdomen is " soft.   Musculoskeletal:         General: Normal range of motion.      Cervical back: Normal range of motion and neck supple.   Skin:     General: Skin is warm.   Neurological:      Mental Status: She is alert and oriented to person, place, and time.   Psychiatric:         Mood and Affect: Mood normal.

## 2024-12-30 NOTE — ASSESSMENT & PLAN NOTE
Trial of hydroxyzine at bedtime as needed  Orders:  •  hydrOXYzine HCL (ATARAX) 25 mg tablet; Take 1 tablet (25 mg total) by mouth daily at bedtime as needed (sleep)

## 2024-12-30 NOTE — ASSESSMENT & PLAN NOTE
Continue levothyroxine, will recheck a TSH in 6 months.  Her TSH was slightly increased but she does not have any symptoms of hypothyroidism.  Orders:  •  TSH, 3rd generation; Future

## 2024-12-30 NOTE — PATIENT INSTRUCTIONS
Medicare Preventive Visit Patient Instructions  Thank you for completing your Welcome to Medicare Visit or Medicare Annual Wellness Visit today. Your next wellness visit will be due in one year (12/31/2025).  The screening/preventive services that you may require over the next 5-10 years are detailed below. Some tests may not apply to you based off risk factors and/or age. Screening tests ordered at today's visit but not completed yet may show as past due. Also, please note that scanned in results may not display below.  Preventive Screenings:  Service Recommendations Previous Testing/Comments   Colorectal Cancer Screening  * Colonoscopy    * Fecal Occult Blood Test (FOBT)/Fecal Immunochemical Test (FIT)  * Fecal DNA/Cologuard Test  * Flexible Sigmoidoscopy Age: 45-75 years old   Colonoscopy: every 10 years (may be performed more frequently if at higher risk)  OR  FOBT/FIT: every 1 year  OR  Cologuard: every 3 years  OR  Sigmoidoscopy: every 5 years  Screening may be recommended earlier than age 45 if at higher risk for colorectal cancer. Also, an individualized decision between you and your healthcare provider will decide whether screening between the ages of 76-85 would be appropriate. Colonoscopy: 08/21/2024  FOBT/FIT: Not on file  Cologuard: Not on file  Sigmoidoscopy: 11/28/2017    Screening Current     Breast Cancer Screening Age: 40+ years old  Frequency: every 1-2 years  Not required if history of left and right mastectomy Mammogram: 01/12/2023    Screening Current   Cervical Cancer Screening Between the ages of 21-29, pap smear recommended once every 3 years.   Between the ages of 30-65, can perform pap smear with HPV co-testing every 5 years.   Recommendations may differ for women with a history of total hysterectomy, cervical cancer, or abnormal pap smears in past. Pap Smear: Not on file    Screening Not Indicated   Hepatitis C Screening Once for adults born between 1945 and 1965  More frequently in  patients at high risk for Hepatitis C Hep C Antibody: Not on file        Diabetes Screening 1-2 times per year if you're at risk for diabetes or have pre-diabetes Fasting glucose: 107 mg/dL (12/23/2024)  A1C: 5.8 % (11/1/2022)  Screening Current   Cholesterol Screening Once every 5 years if you don't have a lipid disorder. May order more often based on risk factors. Lipid panel: 12/23/2024    Screening Not Indicated  History Lipid Disorder     Other Preventive Screenings Covered by Medicare:  Abdominal Aortic Aneurysm (AAA) Screening: covered once if your at risk. You're considered to be at risk if you have a family history of AAA.  Lung Cancer Screening: covers low dose CT scan once per year if you meet all of the following conditions: (1) Age 55-77; (2) No signs or symptoms of lung cancer; (3) Current smoker or have quit smoking within the last 15 years; (4) You have a tobacco smoking history of at least 20 pack years (packs per day multiplied by number of years you smoked); (5) You get a written order from a healthcare provider.  Glaucoma Screening: covered annually if you're considered high risk: (1) You have diabetes OR (2) Family history of glaucoma OR (3)  aged 50 and older OR (4)  American aged 65 and older  Osteoporosis Screening: covered every 2 years if you meet one of the following conditions: (1) You're estrogen deficient and at risk for osteoporosis based off medical history and other findings; (2) Have a vertebral abnormality; (3) On glucocorticoid therapy for more than 3 months; (4) Have primary hyperparathyroidism; (5) On osteoporosis medications and need to assess response to drug therapy.   Last bone density test (DXA Scan): 12/14/2022.  HIV Screening: covered annually if you're between the age of 15-65. Also covered annually if you are younger than 15 and older than 65 with risk factors for HIV infection. For pregnant patients, it is covered up to 3 times per  pregnancy.    Immunizations:  Immunization Recommendations   Influenza Vaccine Annual influenza vaccination during flu season is recommended for all persons aged >= 6 months who do not have contraindications   Pneumococcal Vaccine   * Pneumococcal conjugate vaccine = PCV13 (Prevnar 13), PCV15 (Vaxneuvance), PCV20 (Prevnar 20)  * Pneumococcal polysaccharide vaccine = PPSV23 (Pneumovax) Adults 19-63 yo with certain risk factors or if 65+ yo  If never received any pneumonia vaccine: recommend Prevnar 20 (PCV20)  Give PCV20 if previously received 1 dose of PCV13 or PPSV23   Hepatitis B Vaccine 3 dose series if at intermediate or high risk (ex: diabetes, end stage renal disease, liver disease)   Respiratory syncytial virus (RSV) Vaccine - COVERED BY MEDICARE PART D  * RSVPreF3 (Arexvy) CDC recommends that adults 60 years of age and older may receive a single dose of RSV vaccine using shared clinical decision-making (SCDM)   Tetanus (Td) Vaccine - COST NOT COVERED BY MEDICARE PART B Following completion of primary series, a booster dose should be given every 10 years to maintain immunity against tetanus. Td may also be given as tetanus wound prophylaxis.   Tdap Vaccine - COST NOT COVERED BY MEDICARE PART B Recommended at least once for all adults. For pregnant patients, recommended with each pregnancy.   Shingles Vaccine (Shingrix) - COST NOT COVERED BY MEDICARE PART B  2 shot series recommended in those 19 years and older who have or will have weakened immune systems or those 50 years and older     Health Maintenance Due:      Topic Date Due   • Breast Cancer Screening: Mammogram  01/12/2025   • Colorectal Cancer Screening  08/21/2027     Immunizations Due:      Topic Date Due   • COVID-19 Vaccine (7 - 2024-25 season) 09/01/2024     Advance Directives   What are advance directives?  Advance directives are legal documents that state your wishes and plans for medical care. These plans are made ahead of time in case you  lose your ability to make decisions for yourself. Advance directives can apply to any medical decision, such as the treatments you want, and if you want to donate organs.   What are the types of advance directives?  There are many types of advance directives, and each state has rules about how to use them. You may choose a combination of any of the following:  Living will:  This is a written record of the treatment you want. You can also choose which treatments you do not want, which to limit, and which to stop at a certain time. This includes surgery, medicine, IV fluid, and tube feedings.   Durable power of  for healthcare (DPAHC):  This is a written record that states who you want to make healthcare choices for you when you are unable to make them for yourself. This person, called a proxy, is usually a family member or a friend. You may choose more than 1 proxy.  Do not resuscitate (DNR) order:  A DNR order is used in case your heart stops beating or you stop breathing. It is a request not to have certain forms of treatment, such as CPR. A DNR order may be included in other types of advance directives.  Medical directive:  This covers the care that you want if you are in a coma, near death, or unable to make decisions for yourself. You can list the treatments you want for each condition. Treatment may include pain medicine, surgery, blood transfusions, dialysis, IV or tube feedings, and a ventilator (breathing machine).  Values history:  This document has questions about your views, beliefs, and how you feel and think about life. This information can help others choose the care that you would choose.  Why are advance directives important?  An advance directive helps you control your care. Although spoken wishes may be used, it is better to have your wishes written down. Spoken wishes can be misunderstood, or not followed. Treatments may be given even if you do not want them. An advance directive may make  it easier for your family to make difficult choices about your care.   Fall Prevention    Fall prevention  includes ways to make your home and other areas safer. It also includes ways you can move more carefully to prevent a fall. Health conditions that cause changes in your blood pressure, vision, or muscle strength and coordination may increase your risk for falls. Medicines may also increase your risk for falls if they make you dizzy, weak, or sleepy.   Fall prevention tips:   Stand or sit up slowly.    Use assistive devices as directed.    Wear shoes that fit well and have soles that .    Wear a personal alarm.    Stay active.    Manage your medical conditions.    Home Safety Tips:  Add items to prevent falls in the bathroom.    Keep paths clear.    Install bright lights in your home.    Keep items you use often on shelves within reach.    Paint or place reflective tape on the edges of your stairs.    Weight Management   Why it is important to manage your weight:  Being overweight increases your risk of health conditions such as heart disease, high blood pressure, type 2 diabetes, and certain types of cancer. It can also increase your risk for osteoarthritis, sleep apnea, and other respiratory problems. Aim for a slow, steady weight loss. Even a small amount of weight loss can lower your risk of health problems.  How to lose weight safely:  A safe and healthy way to lose weight is to eat fewer calories and get regular exercise. You can lose up about 1 pound a week by decreasing the number of calories you eat by 500 calories each day.   Healthy meal plan for weight management:  A healthy meal plan includes a variety of foods, contains fewer calories, and helps you stay healthy. A healthy meal plan includes the following:  Eat whole-grain foods more often.  A healthy meal plan should contain fiber. Fiber is the part of grains, fruits, and vegetables that is not broken down by your body. Whole-grain foods are  healthy and provide extra fiber in your diet. Some examples of whole-grain foods are whole-wheat breads and pastas, oatmeal, brown rice, and bulgur.  Eat a variety of vegetables every day.  Include dark, leafy greens such as spinach, kale, cecilia greens, and mustard greens. Eat yellow and orange vegetables such as carrots, sweet potatoes, and winter squash.   Eat a variety of fruits every day.  Choose fresh or canned fruit (canned in its own juice or light syrup) instead of juice. Fruit juice has very little or no fiber.  Eat low-fat dairy foods.  Drink fat-free (skim) milk or 1% milk. Eat fat-free yogurt and low-fat cottage cheese. Try low-fat cheeses such as mozzarella and other reduced-fat cheeses.  Choose meat and other protein foods that are low in fat.  Choose beans or other legumes such as split peas or lentils. Choose fish, skinless poultry (chicken or turkey), or lean cuts of red meat (beef or pork). Before you cook meat or poultry, cut off any visible fat.   Use less fat and oil.  Try baking foods instead of frying them. Add less fat, such as margarine, sour cream, regular salad dressing and mayonnaise to foods. Eat fewer high-fat foods. Some examples of high-fat foods include french fries, doughnuts, ice cream, and cakes.  Eat fewer sweets.  Limit foods and drinks that are high in sugar. This includes candy, cookies, regular soda, and sweetened drinks.  Exercise:  Exercise at least 30 minutes per day on most days of the week. Some examples of exercise include walking, biking, dancing, and swimming. You can also fit in more physical activity by taking the stairs instead of the elevator or parking farther away from stores. Ask your healthcare provider about the best exercise plan for you.      © Copyright [employer] 2018 Information is for End User's use only and may not be sold, redistributed or otherwise used for commercial purposes. All illustrations and images included in CareNotes® are the  copyrighted property of [email or web address] or [employer]

## 2024-12-30 NOTE — ASSESSMENT & PLAN NOTE
Rate controlled, continue the amiodarone, metoprolol as well as the Eliquis for anticoagulation.

## 2025-01-10 ENCOUNTER — OFFICE VISIT (OUTPATIENT)
Dept: CARDIOLOGY CLINIC | Facility: CLINIC | Age: 80
End: 2025-01-10
Payer: MEDICARE

## 2025-01-10 ENCOUNTER — REMOTE DEVICE CLINIC VISIT (OUTPATIENT)
Dept: CARDIOLOGY CLINIC | Facility: CLINIC | Age: 80
End: 2025-01-10

## 2025-01-10 VITALS — SYSTOLIC BLOOD PRESSURE: 122 MMHG | HEART RATE: 75 BPM | DIASTOLIC BLOOD PRESSURE: 84 MMHG

## 2025-01-10 DIAGNOSIS — I35.1 NONRHEUMATIC AORTIC VALVE INSUFFICIENCY: ICD-10-CM

## 2025-01-10 DIAGNOSIS — I49.5 SSS (SICK SINUS SYNDROME) (HCC): ICD-10-CM

## 2025-01-10 DIAGNOSIS — E78.2 MIXED HYPERLIPIDEMIA: ICD-10-CM

## 2025-01-10 DIAGNOSIS — I48.19 PERSISTENT ATRIAL FIBRILLATION (HCC): Primary | ICD-10-CM

## 2025-01-10 DIAGNOSIS — Z79.899 ON AMIODARONE THERAPY: ICD-10-CM

## 2025-01-10 DIAGNOSIS — E66.9 OBESITY (BMI 30-39.9): ICD-10-CM

## 2025-01-10 DIAGNOSIS — E03.9 ACQUIRED HYPOTHYROIDISM: ICD-10-CM

## 2025-01-10 DIAGNOSIS — N18.31 STAGE 3A CHRONIC KIDNEY DISEASE (HCC): ICD-10-CM

## 2025-01-10 DIAGNOSIS — J44.9 CHRONIC OBSTRUCTIVE PULMONARY DISEASE, UNSPECIFIED COPD TYPE (HCC): ICD-10-CM

## 2025-01-10 DIAGNOSIS — I34.0 NONRHEUMATIC MITRAL VALVE REGURGITATION: ICD-10-CM

## 2025-01-10 DIAGNOSIS — I50.23 ACUTE ON CHRONIC SYSTOLIC CONGESTIVE HEART FAILURE (HCC): ICD-10-CM

## 2025-01-10 DIAGNOSIS — Z95.0 PACEMAKER: Primary | ICD-10-CM

## 2025-01-10 DIAGNOSIS — I10 ESSENTIAL HYPERTENSION: ICD-10-CM

## 2025-01-10 DIAGNOSIS — Z79.01 ANTICOAGULANT LONG-TERM USE: ICD-10-CM

## 2025-01-10 PROCEDURE — RECHECK

## 2025-01-10 PROCEDURE — 93000 ELECTROCARDIOGRAM COMPLETE: CPT | Performed by: INTERNAL MEDICINE

## 2025-01-10 PROCEDURE — 99214 OFFICE O/P EST MOD 30 MIN: CPT | Performed by: INTERNAL MEDICINE

## 2025-01-10 NOTE — LETTER
January 11, 2025     Arnold Payton DO  [address]  [place] [address]    Patient: Madeline Good   YOB: 1945   Date of Visit: 1/10/2025       Dear Dr. Payton:    Thank you for referring Madeline Good to me for evaluation. Below are my notes for this consultation.    If you have questions, please do not hesitate to call me. I look forward to following your patient along with you.         Sincerely,        Piero Fajardo MD        CC: Madeline SANTA Latisha Fajardo MD  1/11/2025  9:27 AM  Sign when Signing Visit                                             Cardiology Follow Up    Madeline Good  1945  [id number]  [facility] [place]  [address]  [address]  [facility] [address]  [phone]  [phone]    1. Persistent atrial fibrillation (HCC)  POCT ECG      2. Essential hypertension        3. Nonrheumatic mitral valve regurgitation        4. Nonrheumatic aortic valve insufficiency        5. Acute on chronic systolic congestive heart failure (HCC)        6. SSS (sick sinus syndrome) (HCC)        7. Chronic obstructive pulmonary disease, unspecified COPD type (HCC)        8. Acquired hypothyroidism        9. Stage 3a chronic kidney disease (HCC)        10. Mixed hyperlipidemia        11. Obesity (BMI 30-39.9)        12. Anticoagulant long-term use        13. On amiodarone therapy                Summary of my recommendation for the patient  Patient is currently in sinus rhythm  Amiodarone dose has been reduced to 100 mg a day and she is feeling well    Patient feels well in sinus rhythm  Has A-fib about 25%   Device check monthly for the next 3 months  If A-fib burden remains low, just continue with current therapy  If A-fib burden increases we will proceed with PFA ablation    Patient has prior history of tachycardia mediated cardiomyopathy    Patient had extensive ablation-PVI, posterior wall, mitral isthmus flutter  There was a recurrence and she was  in atrial flutter with heart rate of 120/min  Amiodarone and metoprolol were increased to 200 mg 2 times daily and 100 mg 2 times daily  Patient went back to sinus rhythm  Device check shows there are still brief breakthrough episodes    Amiodarone 100 mg once daily  Check for amiodarone toxicity-TSH, PFT with DLCO, CMP, iron evaluation    If patient has any recurrence we will repeat ablation with PFA before considering AV node ablation + consider VOM injection    Get an echocardiogram to assess EF    If patient continues to have recurrence of tachycardia final option would be an AV node ablation    Follow-up in 6 months              Interval History:     The patient is doing reasonably well  Currently on amiodarone 100 mg a day    Patient underwent comprehensive ablation in October 2023  Unfortunately she went back  to flutter with RVR  She has a prior history of tachycardia mediated cardiomyopathy  Patient went back to sinus rhythm on doubling the dose of amiodarone and metoprolol  Device check does reveal brief episodes of breakthrough atrial flutter    She does have a pacemaker with a his lead in place    The patient is not complaining of anginal like chest pain or chest pressure  There is no worsening orthopnea, paroxysmal nocturnal dyspnea  There is some  leg swelling     No significant palpitation  There is no history of presyncope or syncope  There is rare history of transient  lightheadedness  There has been some improvement in exertional intolerance      Past medical history  The patient is a very pleasant 73 year old lady referred to me by Dr Gomez for management of A fi + RVR + heart failure      She does have significant medical illnesses in the form of  PAF  Hypertension  Hyperlipidemia  CMP - suspected tachy mediated      As for the atrial fibrillation  It has been present for quite some time now  Initially to present as episodes of palpitation  This has progressively increased in frequency and  duration         There is history of snoring at night  There is occasional history of morning fatigability  There is occasional history of daytime sleepiness    /84 (BP Location: Right arm, Patient Position: Sitting, Cuff Size: Standard)   Pulse 75       Patient Active Problem List   Diagnosis   • Essential hypertension   • Mixed hyperlipidemia   • Obesity (BMI 30-39.9)   • Migraine with aura and without status migrainosus, not intractable   • Myocardiopathy (HCC)   • Anticoagulant long-term use   • Nonrheumatic mitral valve regurgitation   • Nonrheumatic aortic valve insufficiency   • Nonrheumatic tricuspid valve regurgitation   • Persistent atrial fibrillation (HCC)   • Other insomnia   • Chronic obstructive pulmonary disease (HCC)   • Stage 3a chronic kidney disease (HCC)   • Acute on chronic systolic congestive heart failure (HCC)   • SSS (sick sinus syndrome) (HCC)   • Acquired hypothyroidism   • Iron deficiency anemia   • On amiodarone therapy     Past Medical History:   Diagnosis Date   • Aphasia, mixed 07/28/2017   • Atrial fibrillation (HCC)    • CHF (congestive heart failure) (HCC)    • Colon polyp    • Headache    • Hyperlipidemia    • Hypertension    • Lyme disease    • Shortness of breath    • TIA (transient ischemic attack)    • Vertigo      Social History     Socioeconomic History   • Marital status: /Civil Union     Spouse name: Not on file   • Number of children: Not on file   • Years of education: Not on file   • Highest education level: Not on file   Occupational History   • Occupation: Retired   Tobacco Use   • Smoking status: Never   • Smokeless tobacco: Never   • Tobacco comments:     no smoking for 46 years   Vaping Use   • Vaping status: Never Used   Substance and Sexual Activity   • Alcohol use: Yes     Alcohol/week: 7.0 standard drinks of alcohol     Types: 7 Glasses of wine per week     Comment: occassional   • Drug use: No   • Sexual activity: Never     Partners: Male      Birth control/protection: None   Other Topics Concern   • Not on file   Social History Narrative    Always uses seat belt     Social Drivers of Health     Financial Resource Strain: Low Risk  (12/11/2023)    Overall Financial Resource Strain (CARDIA)    • Difficulty of Paying Living Expenses: Not very hard   Food Insecurity: No Food Insecurity (12/30/2024)    Hunger Vital Sign    • Worried About Running Out of Food in the Last Year: Never true    • Ran Out of Food in the Last Year: Never true   Transportation Needs: No Transportation Needs (12/30/2024)    PRAPARE - Transportation    • Lack of Transportation (Medical): No    • Lack of Transportation (Non-Medical): No   Physical Activity: Not on file   Stress: Not on file   Social Connections: Unknown (6/18/2024)    Received from [employer]    Social Connections    • How often do you feel lonely or isolated from those around you? (Adult - for ages 18 years and over): Not on file   Intimate Partner Violence: Not on file   Housing Stability: Unknown (12/30/2024)    Housing Stability Vital Sign    • Unable to Pay for Housing in the Last Year: No    • Number of Times Moved in the Last Year: Not on file    • Homeless in the Last Year: No      Family History   Problem Relation Age of Onset   • Lung cancer Mother    • Dementia Father    • Alzheimer's disease Father    • Other Father         Cardiac Disorder    • Lung cancer Maternal Grandmother    • Breast cancer Paternal Aunt 40   • No Known Problems Paternal Aunt    • No Known Problems Paternal Aunt      Past Surgical History:   Procedure Laterality Date   • CARDIAC ELECTROPHYSIOLOGY PROCEDURE N/A 10/19/2023    Procedure: Cardiac eps/afib ablation PVI/POST WALL;  Surgeon: Piero Fajardo MD;  Location: [facility];  Service: Cardiology   • CARDIAC PACEMAKER PLACEMENT  12/2019   • COLONOSCOPY     • ND SIGMOIDOSCOPY FLX DX W/COLLJ SPEC BR/WA IF PFRMD N/A 11/28/2017    Procedure: SIGMOIDOSCOPY FLEXIBLE;  Surgeon: Kavon  MD Jim;  Location: [facility];  Service: Gastroenterology   • TONSILLECTOMY         Current Outpatient Medications:   •  amiodarone 200 mg tablet, Take 1 tablet (200 mg total) by mouth daily (Patient taking differently: Take 100 mg by mouth daily), Disp: 90 tablet, Rfl: 1  •  apixaban (Eliquis) 5 mg, Take 1 tablet (5 mg total) by mouth 2 (two) times a day, Disp: 180 tablet, Rfl: 3  •  fluticasone (FLONASE) 50 mcg/act nasal spray, 1 spray into each nostril daily Start after finishing the 3 days of Prednsione, Disp: 16 g, Rfl: 0  •  furosemide (LASIX) 20 mg tablet, TAKE 1 TABLET BY MOUTH DAILY, Disp: 90 tablet, Rfl: 3  •  hydrOXYzine HCL (ATARAX) 25 mg tablet, Take 1 tablet (25 mg total) by mouth daily at bedtime as needed (sleep), Disp: 10 tablet, Rfl: 0  •  levothyroxine 25 mcg tablet, TAKE 1 TABLET BY MOUTH DAILY IN  THE EARLY MORNING, Disp: 90 tablet, Rfl: 3  •  lovastatin (MEVACOR) 10 MG tablet, Take 1 tablet (10 mg total) by mouth daily at bedtime, Disp: 90 tablet, Rfl: 3  •  mesalamine (CANASA) 1,000 mg suppository, Insert 1 suppository (1,000 mg total) into the rectum daily at bedtime [facility] - SEND ASAP for patient, Disp: 90 suppository, Rfl: 3  •  metoprolol succinate (TOPROL-XL) 100 mg 24 hr tablet, Take 1 tablet (100 mg total) by mouth daily, Disp: 90 tablet, Rfl: 3  •  potassium chloride (Klor-Con M10) 10 mEq tablet, Take 1 tablet (10 mEq total) by mouth daily, Disp: 90 tablet, Rfl: 3  •  sodium chloride (OCEAN) 0.65 % nasal spray, 1 spray into each nostril as needed for rhinitis or congestion, Disp: 60 mL, Rfl: 0  No Known Allergies        LAB RESULTS:    CBC:  WBC   Date Value Ref Range Status   12/23/2024 6.50 4.31 - 10.16 Thousand/uL Final     Hemoglobin   Date Value Ref Range Status   12/23/2024 16.3 (H) 11.5 - 15.4 g/dL Final     Hematocrit   Date Value Ref Range Status   12/23/2024 48.7 (H) 34.8 - 46.1 % Final     MCV   Date Value Ref Range Status   12/23/2024 106 (H) 82 - 98 fL Final      Platelets   Date Value Ref Range Status   12/23/2024 213 149 - 390 Thousands/uL Final     RBC   Date Value Ref Range Status   12/23/2024 4.59 3.81 - 5.12 Million/uL Final     MCH   Date Value Ref Range Status   12/23/2024 35.5 (H) 26.8 - 34.3 pg Final     MCHC   Date Value Ref Range Status   12/23/2024 33.5 31.4 - 37.4 g/dL Final     RDW   Date Value Ref Range Status   12/23/2024 14.7 11.6 - 15.1 % Final     MPV   Date Value Ref Range Status   12/23/2024 11.6 8.9 - 12.7 fL Final     nRBC   Date Value Ref Range Status   05/17/2024 0 /100 WBCs Final       CMP:  Potassium   Date Value Ref Range Status   12/23/2024 4.5 3.5 - 5.3 mmol/L Final     Chloride   Date Value Ref Range Status   12/23/2024 102 96 - 108 mmol/L Final     CO2   Date Value Ref Range Status   12/23/2024 33 (H) 21 - 32 mmol/L Final     BUN   Date Value Ref Range Status   12/23/2024 11 5 - 25 mg/dL Final     Creatinine   Date Value Ref Range Status   12/23/2024 1.05 0.60 - 1.30 mg/dL Final     Comment:     Standardized to IDMS reference method     Calcium   Date Value Ref Range Status   12/23/2024 9.6 8.4 - 10.2 mg/dL Final     AST   Date Value Ref Range Status   12/23/2024 30 13 - 39 U/L Final     ALT   Date Value Ref Range Status   12/23/2024 19 7 - 52 U/L Final     Comment:     Specimen collection should occur prior to Sulfasalazine administration due to the potential for falsely depressed results.      Alkaline Phosphatase   Date Value Ref Range Status   12/23/2024 174 (H) 34 - 104 U/L Final     eGFR   Date Value Ref Range Status   12/23/2024 50 ml/min/1.73sq m Final        Magnesium:   Magnesium   Date Value Ref Range Status   03/04/2024 2.1 1.9 - 2.7 mg/dL Final        A1C:  Hemoglobin A1C   Date Value Ref Range Status   11/01/2022 5.8 (H) Normal 3.8-5.6%; PreDiabetic 5.7-6.4%; Diabetic >=6.5%; Glycemic control for adults with diabetes <7.0% % Final        TSH:  TSH 3RD GENERATON   Date Value Ref Range Status   12/23/2024 5.477 (H) 0.450 -  4.500 uIU/mL Final     Comment:     The recommended reference ranges for TSH during pregnancy are as follows:   First trimester 0.100 to 2.500 uIU/mL   Second trimester  0.200 to 3.000 uIU/mL   Third trimester 0.300 to 3.000 uIU/m    Note: Normal ranges may not apply to patients who are transgender, non-binary, or whose legal sex, sex at birth, and gender identity differ.  Adult TSH (3rd generation) reference range follows the recommended guidelines of the American Thyroid Association, January, 2020.        PT/INR:  Protime   Date Value Ref Range Status   10/19/2023 16.8 (H) 11.6 - 14.5 seconds Final     INR   Date Value Ref Range Status   10/19/2023 1.38 (H) 0.84 - 1.19 Final       Lipid Panel:  Cholesterol   Date Value Ref Range Status   12/23/2024 166 See Comment mg/dL Final     Comment:     Cholesterol:         Pediatric <18 Years        Desirable          <170 mg/dL      Borderline High    170-199 mg/dL      High               >=200 mg/dL        Adult >=18 Years            Desirable        <200 mg/dL      Borderline High  200-239 mg/dL      High             >239 mg/dL       Triglycerides   Date Value Ref Range Status   12/23/2024 121 See Comment mg/dL Final     Comment:     Triglyceride:     0-9Y            <75mg/dL     10Y-17Y         <90 mg/dL       >=18Y     Normal          <150 mg/dL     Borderline High 150-199 mg/dL     High            200-499 mg/dL        Very High       >499 mg/dL    Specimen collection should occur prior to Metamizole administration due to the potential for falsely depressed results.     HDL, Direct   Date Value Ref Range Status   12/23/2024 46 (L) >=50 mg/dL Final     Non-HDL-Chol (CHOL-HDL)   Date Value Ref Range Status   12/23/2024 120 mg/dl Final       Troponin:  Troponin I   Date Value Ref Range Status   02/10/2019 0.05 (H) <=0.04 ng/mL Final     Comment:       [employer] Chemistry analyzer 99% cutoff is > 0.04 ng/mL in network labs     o cTnI 99% cutoff is useful only when applied to  patients in the clinical setting of myocardial ischemia   o cTnI 99% cutoff should be interpreted in the context of clinical history, ECG findings and possibly cardiac imaging to establish correct diagnosis.   o cTnI 99% cutoff may be suggestive but clearly not indicative of a coronary event without the clinical setting of myocardial ischemia.           Imaging:    EK/10/2024        LUIZA:  10/19/2023  Interpretation Summary  •  Left Ventricle: Left ventricular cavity size is normal. The left ventricular ejection fraction is 50-55%. Systolic function is low normal.  •  Right Ventricle: Right ventricular cavity size is mildly dilated. Systolic function is low normal.  •  Left Atrium: The atrium is dilated.  •  Right Atrium: The atrium is dilated.  •  Atrial Septum: No patent foramen ovale detected, confirmed at rest using color doppler.  •  Left Atrial Appendage: There is reduced function. There is no thrombus. There is no spontaneous echo contrast.  •  Aortic Valve: There is mild regurgitation with a centrally directed jet.  •  Mitral Valve: There is mild regurgitation with a centrally directed jet.  •  Tricuspid Valve: There is mild to moderate regurgitation.    Echocardiogram:  Results for orders placed during the hospital encounter of 22    Echo complete w/ contrast if indicated    Interpretation Summary  •  Left Ventricle: Left ventricular cavity size is normal. Systolic function is mildly reduced.  LVEF approximately 40 to 45%.  Patient was in atrial fibrillation with periods of rapid ventricular response which interferes with interpretation. There is mild global hypokinesis. Unable to assess diastolic function due to atrial fibrillation.  •  Right Ventricle: Right ventricular cavity size is mildly dilated. Systolic function is mildly to moderately reduced.  •  Left Atrium: The atrium is moderate to markedly dilated.  •  Right Atrium: The atrium is moderately dilated.  •  Aortic Valve: There is mild  regurgitation.  •  Mitral Valve: There is moderate annular calcification. There is moderate regurgitation.  •  Tricuspid Valve: There is moderate regurgitation. The right ventricular systolic pressure is mildly elevated.  Estimated RVSP 35 mm Hg.  Pacer leads noted.  •  Pulmonic Valve: There is mild regurgitation.      03/07/2023-Echo follow up/limited w/ contrast if indicated  Interpretation Summary  •  Left Ventricle: Left ventricular cavity size is normal. The left ventricular ejection fraction is 55%. Systolic function is normal.  •  Left Atrium: The atrium is moderately dilated.  •  Right Atrium: The atrium is mildly dilated.  •  Aortic Valve: There is mild to moderate regurgitation.  •  Tricuspid Valve: There is moderate regurgitation.  •  Pulmonary Artery: The estimated pulmonary artery systolic pressure is 52.0 mmHg. The pulmonary artery systolic pressure is moderately increased.  •  This is a limited echocardiogram performed to evaluate LV function. Interpretation is therefore limited.    10/10/2024-ECHO FOLLOW UP LIMITED W/ CONTRAST  Interpretation Summary  •  Left Ventricle: Left ventricular cavity size is normal. The left ventricular ejection fraction is 55%. Systolic function is normal. Although no diagnostic regional wall motion abnormality was identified, this possibility cannot be completely excluded on the basis of this study. This is a limited study to assess left ventricle ejection fraction.  •  Tricuspid Valve: Estimated RVSP 45 mmHg.  Pacer lead noted.      03/07/2023-Stress Test:   Results for orders placed during the hospital encounter of 03/07/23    NM myocardial perfusion spect (rx stress and/or rest)    Interpretation Summary  •  Stress ECG: No ST deviation is noted. There were no arrhythmias during recovery. . The ECG was not diagnostic due to pharmacological (vasodilator) stress.  •  Perfusion Defect Conclusion: The stress/rest perfusion ratio is 1.03 . There is no evidence of transient  ischemic dilation (TID).  •  Stress Function: Left ventricular function post-stress is normal. Post-stress ejection fraction is 79 %.  •  Perfusion: There is a left ventricular perfusion defect that is medium in size with moderate reduction in uptake present in the mid to apical anterior and anterolateral location(s) that is partially reversible.  •  Stress Combined Conclusion: Left ventricular perfusion is probably abnormal.      Cardiac Catheterization:  No results found for this or any previous visit.      HOLTER MONITOR: 24 HOUR/48 HOUR MONITORS  No results found for this or any previous visit.      AMB Extended Holter Monitor: Zio XT/AT or BioTel  No results found for this or any previous visit.      Cardiac CT:  10/11/2023-CT Cardiac w Pulmonary Vein Mapping  FINDINGS:     PULMONARY VEIN ANATOMY: Typical with two right and two left pulmonary veins.     APPROXIMATE MEASUREMENTS OF PULMONARY VEIN OSTIA:     Right superior: 22 mm.  Right inferior: 12 mm.     Left superior: 14 mm.  Left inferior: 16 mm.     CARDIAC STRUCTURES: Top normal heart size. Pacer leads terminate in the right atrium and right ventricle. Mild coronary artery calcifications.     GREAT VESSELS: Visualized thoracic aorta and central pulmonary arterial tree are within normal limits for the patient's age.     EXTRACARDIAC FINDINGS: Lower lobe predominant fibrotic changes in the visualized lungs again seen. Previously noted 2 mm right upper lobe nodule is not definitively visualized on this study, possibly due to motion artifact. Large hiatal hernia containing   the stomach again identified.     IMPRESSION:     Pulmonary venous anatomy as described above.     Large hiatal hernia with intrathoracic stomach, and pulmonary fibrosis again noted.      11/03/2023-Cardioversion    Indication for procedure: Cardioversion  Requesting physician:Dr Laura Goodman  Anticoagulation:  Eliquis     The patient was identified in the OR . A time out procedure was  performed.     The patient was sedated by the anesthesia service .     The patient was cardioverted to sinus rhythm with a 200J biphasic shock.       There were no complications.  The patient was monitored for the appropriate time interval in the holding area, and was transferred back to the medical floor in stable condition.    Pre Cardioversion EKG  11/03/2022      Post Cardioversion EKG  11/03/2022        Cardiac CT:    CT Cardiac w Pulmonary Vein Mapping  10/11/2023    FINDINGS:     PULMONARY VEIN ANATOMY: Typical with two right and two left pulmonary veins.     APPROXIMATE MEASUREMENTS OF PULMONARY VEIN OSTIA:     Right superior: 22 mm.  Right inferior: 12 mm.     Left superior: 14 mm.  Left inferior: 16 mm.     CARDIAC STRUCTURES: Top normal heart size. Pacer leads terminate in the right atrium and right ventricle. Mild coronary artery calcifications.     GREAT VESSELS: Visualized thoracic aorta and central pulmonary arterial tree are within normal limits for the patient's age.     EXTRACARDIAC FINDINGS: Lower lobe predominant fibrotic changes in the visualized lungs again seen. Previously noted 2 mm right upper lobe nodule is not definitively visualized on this study, possibly due to motion artifact. Large hiatal hernia containing   the stomach again identified.     IMPRESSION:     Pulmonary venous anatomy as described above.     Large hiatal hernia with intrathoracic stomach, and pulmonary fibrosis again noted.        DEVICE CHECK:     Results for orders placed or performed in visit on 01/09/25   Cardiac EP device report    Narrative    MDT DC PM/ACTIVE SYSTEM IS MRI CONDITIONAL  NON-BILLABLE CARELINK TRANSMISSION - AF EPISODE/BURDEN CK PER SN: BATTERY VOLTAGE ADEQUATE (7.2 YRS). AP 54%   8.4% (AAIR-DDDR 70 PPM). ALL AVAILABLE LEAD PARAMETERS WITHIN NORMAL LIMITS. SINCE 12/27/24: 197 AT/AF EPISODES W/AVAIL EGMS SHOWING AF/AFL, MULTIPLE SAME EVENT/CONSECUTIVE - MAX SINGLE EPISODE 20 HRS, 35 MINS. AT/AF  39.7%. EF 55% (10/10/24 ECHO). PT TAKES ELIQUIS, AMIODARONE, METOPROLOL SUCC. NORMAL DEVICE FUNCTION.   ES            Review of Systems:  Review of Systems   All other systems reviewed and are negative.  As described in my history of present illness    Physical Exam:  Physical Exam  Vitals reviewed.   Constitutional:       Appearance: Normal appearance. She is well-developed and normal weight. She is not ill-appearing.      Comments: Not in any distress at the current time   HENT:      Head: Normocephalic and atraumatic.      Right Ear: External ear normal.      Left Ear: External ear normal.      Nose: Nose normal.      Mouth/Throat:      Pharynx: Uvula midline.   Eyes:      General: Lids are normal. No scleral icterus.     Extraocular Movements: Extraocular movements intact.      Conjunctiva/sclera: Conjunctivae normal.      Pupils: Pupils are equal, round, and reactive to light.      Comments: No pallor  No cyanosis  No icterus   Neck:      Thyroid: No thyromegaly.      Vascular: No carotid bruit or JVD.      Trachea: Trachea normal.      Comments: No jugular lymphadenopathy  Short thick neck  Cardiovascular:      Rate and Rhythm: Normal rate and regular rhythm.      Chest Wall: PMI is not displaced.      Pulses: Normal pulses.      Heart sounds: Normal heart sounds, S1 normal and S2 normal. No murmur heard.     No friction rub. No gallop. No S3 or S4 sounds.   Pulmonary:      Effort: Pulmonary effort is normal. No accessory muscle usage or respiratory distress.      Breath sounds: Normal breath sounds. No decreased breath sounds, wheezing, rhonchi or rales.   Chest:      Chest wall: No tenderness.   Abdominal:      General: Bowel sounds are normal. There is no distension.      Palpations: Abdomen is soft. There is no hepatomegaly, splenomegaly or mass.      Tenderness: There is no abdominal tenderness.      Comments: Central obesity present   Musculoskeletal:         General: No swelling, tenderness or  deformity. Normal range of motion.      Cervical back: Normal range of motion and neck supple. No rigidity.      Right lower leg: No edema.      Left lower leg: No edema.   Lymphadenopathy:      Cervical: No cervical adenopathy.   Skin:     Findings: No abrasion, erythema, lesion or rash.      Nails: There is no clubbing.   Neurological:      Mental Status: She is alert and oriented to person, place, and time. Mental status is at baseline.      Motor: No weakness.      Comments: Facial symmetry is retained  Extraocular movements are retained  Head neck tongue and palate movement are retained and symmetric   Psychiatric:         Mood and Affect: Mood normal.         Speech: Speech normal.         Behavior: Behavior normal.         Thought Content: Thought content normal.         Judgment: Judgment normal.         Discussion/Summary:      Atrial flutter with RVR  On amiodarone, metoprolol and apixaban  [id number] score is 5  Patient has a long history of atrial fibrillation and flutter  She underwent ablation in October 2023    There was a recurrence and she was in atrial flutter with heart rate of 120/min  Amiodarone and metoprolol were increased to 200 mg 2 times daily and 100 mg 2 times daily  Patient went back to sinus rhythm  Device check shows there are still brief breakthrough episodes    Check for amiodarone toxicity-CMP, TSH, PFT with DLCO, evaluation    On amiodarone 100 mg daily     If patient has recurrence of a flutter, will proceed with repeat PFA ablation-will look at breaks in the line for flutter /also plan for vein of Efe injection    If patient continues to have recurrence of tachycardia final option would be an AV node ablation             Cardiomyopathy , NYHA II, LVEF =35%  Improved currently to 60%  Suspected tachy mediated  Increasing beta blockers for better rate control  On ACEI  EF has improved to 60%           Hypertension  On lisinopril and metoprolol   Blood pressure is 122/84        Hyperlipidemia  On statin-lovastatin   No myositis or myalgia         Suspected REYNOLD  Need to be evaluated for CPAP         Obesity  BMI is 29  Patient advised on dietary him restrictions to lose weight        Long-term anticoagulation  Chads Vasc score is 5  To continue with apixaban        Sinus bradycardia, sick sinus syndrome  Patient is post pacemaker  All device parameters a stable

## 2025-01-10 NOTE — PROGRESS NOTES
MDT DC PM/ACTIVE SYSTEM IS MRI CONDITIONAL   NON-BILLABLE [employer] TRANSMISSION - AF EPISODE/BURDEN CK PER SN: BATTERY VOLTAGE ADEQUATE (7.2 YRS). AP 54%   8.4% (AAIR-DDDR 70 PPM). ALL AVAILABLE LEAD PARAMETERS WITHIN NORMAL LIMITS. SINCE 12/27/24: 197 AT/AF EPISODES W/AVAIL EGMS SHOWING AF/AFL, MULTIPLE SAME EVENT/CONSECUTIVE - MAX SINGLE EPISODE 20 HRS, 35 MINS. AT/AF 39.7%. EF 55% (10/10/24 ECHO). PT TAKES ELIQUIS, AMIODARONE, METOPROLOL SUCC. NORMAL DEVICE FUNCTION.   ES

## 2025-01-10 NOTE — PROGRESS NOTES
Cardiology Follow Up    Madeline Good  1945  [id number]  [facility] [place]  [address]  [address]  [place] [id number] [phone]    1. Persistent atrial fibrillation (HCC)  POCT ECG      2. Essential hypertension        3. Nonrheumatic mitral valve regurgitation        4. Nonrheumatic aortic valve insufficiency        5. Acute on chronic systolic congestive heart failure (HCC)        6. SSS (sick sinus syndrome) (HCC)        7. Chronic obstructive pulmonary disease, unspecified COPD type (HCC)        8. Acquired hypothyroidism        9. Stage 3a chronic kidney disease (HCC)        10. Mixed hyperlipidemia        11. Obesity (BMI 30-39.9)        12. Anticoagulant long-term use        13. On amiodarone therapy                Summary of my recommendation for the patient  Patient is currently in sinus rhythm  Amiodarone dose has been reduced to 100 mg a day and she is feeling well    Patient feels well in sinus rhythm  Has A-fib about 25%   Device check monthly for the next 3 months  If A-fib burden remains low, just continue with current therapy  If A-fib burden increases we will proceed with PFA ablation    Patient has prior history of tachycardia mediated cardiomyopathy    Patient had extensive ablation-PVI, posterior wall, mitral isthmus flutter  There was a recurrence and she was in atrial flutter with heart rate of 120/min  Amiodarone and metoprolol were increased to 200 mg 2 times daily and 100 mg 2 times daily  Patient went back to sinus rhythm  Device check shows there are still brief breakthrough episodes    Amiodarone 100 mg once daily  Check for amiodarone toxicity-TSH, PFT with DLCO, CMP, iron evaluation    If patient has any recurrence we will repeat ablation with PFA before considering AV node ablation + consider VOM injection    Get an echocardiogram to assess EF    If patient continues to have recurrence of  tachycardia final option would be an AV node ablation    Follow-up in 6 months              Interval History:     The patient is doing reasonably well  Currently on amiodarone 100 mg a day    Patient underwent comprehensive ablation in October 2023  Unfortunately she went back  to flutter with RVR  She has a prior history of tachycardia mediated cardiomyopathy  Patient went back to sinus rhythm on doubling the dose of amiodarone and metoprolol  Device check does reveal brief episodes of breakthrough atrial flutter    She does have a pacemaker with a his lead in place    The patient is not complaining of anginal like chest pain or chest pressure  There is no worsening orthopnea, paroxysmal nocturnal dyspnea  There is some  leg swelling     No significant palpitation  There is no history of presyncope or syncope  There is rare history of transient  lightheadedness  There has been some improvement in exertional intolerance      Past medical history  The patient is a very pleasant 73 year old lady referred to me by Dr Gomez for management of A fi + RVR + heart failure      She does have significant medical illnesses in the form of  PAF  Hypertension  Hyperlipidemia  CMP - suspected tachy mediated      As for the atrial fibrillation  It has been present for quite some time now  Initially to present as episodes of palpitation  This has progressively increased in frequency and duration         There is history of snoring at night  There is occasional history of morning fatigability  There is occasional history of daytime sleepiness    /84 (BP Location: Right arm, Patient Position: Sitting, Cuff Size: Standard)   Pulse 75       Patient Active Problem List   Diagnosis    Essential hypertension    Mixed hyperlipidemia    Obesity (BMI 30-39.9)    Migraine with aura and without status migrainosus, not intractable    Myocardiopathy (HCC)    Anticoagulant long-term use    Nonrheumatic mitral valve regurgitation     Nonrheumatic aortic valve insufficiency    Nonrheumatic tricuspid valve regurgitation    Persistent atrial fibrillation (HCC)    Other insomnia    Chronic obstructive pulmonary disease (HCC)    Stage 3a chronic kidney disease (HCC)    Acute on chronic systolic congestive heart failure (HCC)    SSS (sick sinus syndrome) (HCC)    Acquired hypothyroidism    Iron deficiency anemia    On amiodarone therapy     Past Medical History:   Diagnosis Date    Aphasia, mixed 07/28/2017    Atrial fibrillation (HCC)     CHF (congestive heart failure) (HCC)     Colon polyp     Headache     Hyperlipidemia     Hypertension     Lyme disease     Shortness of breath     TIA (transient ischemic attack)     Vertigo      Social History     Socioeconomic History    Marital status: /Civil Union     Spouse name: Not on file    Number of children: Not on file    Years of education: Not on file    Highest education level: Not on file   Occupational History    Occupation: Retired   Tobacco Use    Smoking status: Never    Smokeless tobacco: Never    Tobacco comments:     no smoking for 46 years   Vaping Use    Vaping status: Never Used   Substance and Sexual Activity    Alcohol use: Yes     Alcohol/week: 7.0 standard drinks of alcohol     Types: 7 Glasses of wine per week     Comment: occassional    Drug use: No    Sexual activity: Never     Partners: Male     Birth control/protection: None   Other Topics Concern    Not on file   Social History Narrative    Always uses seat belt     Social Drivers of Health     Financial Resource Strain: Low Risk  (12/11/2023)    Overall Financial Resource Strain (CARDIA)     Difficulty of Paying Living Expenses: Not very hard   Food Insecurity: No Food Insecurity (12/30/2024)    Hunger Vital Sign     Worried About Running Out of Food in the Last Year: Never true     Ran Out of Food in the Last Year: Never true   Transportation Needs: No Transportation Needs (12/30/2024)    PRAPARE - Transportation     Lack  of Transportation (Medical): No     Lack of Transportation (Non-Medical): No   Physical Activity: Not on file   Stress: Not on file   Social Connections: Unknown (6/18/2024)    Received from [facility]    Social Fly [place]     How often do you feel lonely or isolated from those around you? (Adult - for ages 18 years and over): Not on file   Intimate Partner Violence: Not on file   Housing Stability: Unknown (12/30/2024)    Housing Stability Vital Sign     Unable to Pay for Housing in the Last Year: No     Number of Times Moved in the Last Year: Not on file     Homeless in the Last Year: No      Family History   Problem Relation Age of Onset    Lung cancer Mother     Dementia Father     Alzheimer's disease Father     Other Father         Cardiac Disorder     Lung cancer Maternal Grandmother     Breast cancer Paternal Aunt 40    No Known Problems Paternal Aunt     No Known Problems Paternal Aunt      Past Surgical History:   Procedure Laterality Date    CARDIAC ELECTROPHYSIOLOGY PROCEDURE N/A 10/19/2023    Procedure: Cardiac eps/afib ablation PVI/POST WALL;  Surgeon: Piero Fajardo MD;  Location:  CARDIAC CATH LAB;  Service: Cardiology    CARDIAC PACEMAKER PLACEMENT  12/2019    COLONOSCOPY      MI SIGMOIDOSCOPY FLX DX W/COLLJ SPEC BR/WA IF PFRMD N/A 11/28/2017    Procedure: SIGMOIDOSCOPY FLEXIBLE;  Surgeon: Kavon Fernandez MD;  Location: [facility];  Service: Gastroenterology    TONSILLECTOMY         Current Outpatient Medications:     amiodarone 200 mg tablet, Take 1 tablet (200 mg total) by mouth daily (Patient taking differently: Take 100 mg by mouth daily), Disp: 90 tablet, Rfl: 1    apixaban (Eliquis) 5 mg, Take 1 tablet (5 mg total) by mouth 2 (two) times a day, Disp: 180 tablet, Rfl: 3    fluticasone (FLONASE) 50 mcg/act nasal spray, 1 spray into each nostril daily Start after finishing the 3 days of Prednsione, Disp: 16 g, Rfl: 0    furosemide (LASIX) 20 mg tablet, TAKE 1 TABLET BY MOUTH DAILY, Disp: 90  tablet, Rfl: 3    hydrOXYzine HCL (ATARAX) 25 mg tablet, Take 1 tablet (25 mg total) by mouth daily at bedtime as needed (sleep), Disp: 10 tablet, Rfl: 0    levothyroxine 25 mcg tablet, TAKE 1 TABLET BY MOUTH DAILY IN  THE EARLY MORNING, Disp: 90 tablet, Rfl: 3    lovastatin (MEVACOR) 10 MG tablet, Take 1 tablet (10 mg total) by mouth daily at bedtime, Disp: 90 tablet, Rfl: 3    mesalamine (CANASA) 1,000 mg suppository, Insert 1 suppository (1,000 mg total) into the rectum daily at bedtime [facility] - SEND ASAP for patient, Disp: 90 suppository, Rfl: 3    metoprolol succinate (TOPROL-XL) 100 mg 24 hr tablet, Take 1 tablet (100 mg total) by mouth daily, Disp: 90 tablet, Rfl: 3    potassium chloride (Klor-Con M10) 10 mEq tablet, Take 1 tablet (10 mEq total) by mouth daily, Disp: 90 tablet, Rfl: 3    sodium chloride (OCEAN) 0.65 % nasal spray, 1 spray into each nostril as needed for rhinitis or congestion, Disp: 60 mL, Rfl: 0  No Known Allergies        LAB RESULTS:    CBC:  WBC   Date Value Ref Range Status   12/23/2024 6.50 4.31 - 10.16 Thousand/uL Final     Hemoglobin   Date Value Ref Range Status   12/23/2024 16.3 (H) 11.5 - 15.4 g/dL Final     Hematocrit   Date Value Ref Range Status   12/23/2024 48.7 (H) 34.8 - 46.1 % Final     MCV   Date Value Ref Range Status   12/23/2024 106 (H) 82 - 98 fL Final     Platelets   Date Value Ref Range Status   12/23/2024 213 149 - 390 Thousands/uL Final     RBC   Date Value Ref Range Status   12/23/2024 4.59 3.81 - 5.12 Million/uL Final     MCH   Date Value Ref Range Status   12/23/2024 35.5 (H) 26.8 - 34.3 pg Final     MCHC   Date Value Ref Range Status   12/23/2024 33.5 31.4 - 37.4 g/dL Final     RDW   Date Value Ref Range Status   12/23/2024 14.7 11.6 - 15.1 % Final     MPV   Date Value Ref Range Status   12/23/2024 11.6 8.9 - 12.7 fL Final     nRBC   Date Value Ref Range Status   05/17/2024 0 /100 WBCs Final       CMP:  Potassium   Date Value Ref Range Status   12/23/2024 4.5 3.5 -  5.3 mmol/L Final     Chloride   Date Value Ref Range Status   12/23/2024 102 96 - 108 mmol/L Final     CO2   Date Value Ref Range Status   12/23/2024 33 (H) 21 - 32 mmol/L Final     BUN   Date Value Ref Range Status   12/23/2024 11 5 - 25 mg/dL Final     Creatinine   Date Value Ref Range Status   12/23/2024 1.05 0.60 - 1.30 mg/dL Final     Comment:     Standardized to IDMS reference method     Calcium   Date Value Ref Range Status   12/23/2024 9.6 8.4 - 10.2 mg/dL Final     AST   Date Value Ref Range Status   12/23/2024 30 13 - 39 U/L Final     ALT   Date Value Ref Range Status   12/23/2024 19 7 - 52 U/L Final     Comment:     Specimen collection should occur prior to Sulfasalazine administration due to the potential for falsely depressed results.      Alkaline Phosphatase   Date Value Ref Range Status   12/23/2024 174 (H) 34 - 104 U/L Final     eGFR   Date Value Ref Range Status   12/23/2024 50 ml/min/1.73sq m Final        Magnesium:   Magnesium   Date Value Ref Range Status   03/04/2024 2.1 1.9 - 2.7 mg/dL Final        A1C:  Hemoglobin A1C   Date Value Ref Range Status   11/01/2022 5.8 (H) Normal 3.8-5.6%; PreDiabetic 5.7-6.4%; Diabetic >=6.5%; Glycemic control for adults with diabetes <7.0% % Final        TSH:  TSH 3RD GENERATON   Date Value Ref Range Status   12/23/2024 5.477 (H) 0.450 - 4.500 uIU/mL Final     Comment:     The recommended reference ranges for TSH during pregnancy are as follows:   First trimester 0.100 to 2.500 uIU/mL   Second trimester  0.200 to 3.000 uIU/mL   Third trimester 0.300 to 3.000 uIU/m    Note: Normal ranges may not apply to patients who are transgender, non-binary, or whose legal sex, sex at birth, and gender identity differ.  Adult TSH (3rd generation) reference range follows the recommended guidelines of the American Thy[employer] Association, January, 2020.        PT/INR:  Protime   Date Value Ref Range Status   10/19/2023 16.8 (H) 11.6 - 14.5 seconds Final     INR   Date Value Ref  Range Status   10/19/2023 1.38 (H) 0.84 - 1.19 Final       Lipid Panel:  Cholesterol   Date Value Ref Range Status   2024 166 See Comment mg/dL Final     Comment:     Cholesterol:         Pediatric <18 Years        Desirable          <170 mg/dL      Borderline High    170-199 mg/dL      High               >=200 mg/dL        Adult >=18 Years            Desirable        <200 mg/dL      Borderline High  200-239 mg/dL      High             >239 mg/dL       Triglycerides   Date Value Ref Range Status   2024 121 See Comment mg/dL Final     Comment:     Triglyceride:     0-9Y            <75mg/dL     10Y-17Y         <90 mg/dL       >=18Y     Normal          <150 mg/dL     Borderline High 150-199 mg/dL     High            200-499 mg/dL        Very High       >499 mg/dL    Specimen collection should occur prior to Metamizole administration due to the potential for falsely depressed results.     HDL, Direct   Date Value Ref Range Status   2024 46 (L) >=50 mg/dL Final     Non-HDL-Chol (CHOL-HDL)   Date Value Ref Range Status   2024 120 mg/dl Final       Troponin:  Troponin I   Date Value Ref Range Status   02/10/2019 0.05 (H) <=0.04 ng/mL Final     Comment:       [employer] Chemistry analyzer 99% cutoff is > 0.04 ng/mL in network labs     o cTnI 99% cutoff is useful only when applied to patients in the clinical setting of myocardial ischemia   o cTnI 99% cutoff should be interpreted in the context of clinical history, ECG findings and possibly cardiac imaging to establish correct diagnosis.   o cTnI 99% cutoff may be suggestive but clearly not indicative of a coronary event without the clinical setting of myocardial ischemia.           Imaging:    EK/10/2024        LUIZA:  10/19/2023  Interpretation Summary    Left Ventricle: Left ventricular cavity size is normal. The left ventricular ejection fraction is 50-55%. Systolic function is low normal.    Right Ventricle: Right ventricular cavity size is  mildly dilated. Systolic function is low normal.    Left Atrium: The atrium is dilated.    Right Atrium: The atrium is dilated.    Atrial Septum: No patent foramen ovale detected, confirmed at rest using color doppler.    Left Atrial Appendage: There is reduced function. There is no thrombus. There is no spontaneous echo contrast.    Aortic Valve: There is mild regurgitation with a centrally directed jet.    Mitral Valve: There is mild regurgitation with a centrally directed jet.    Tricuspid Valve: There is mild to moderate regurgitation.    Echocardiogram:  Results for orders placed during the hospital encounter of 11/01/22    Echo complete w/ contrast if indicated    Interpretation Summary    Left Ventricle: Left ventricular cavity size is normal. Systolic function is mildly reduced.  LVEF approximately 40 to 45%.  Patient was in atrial fibrillation with periods of rapid ventricular response which interferes with interpretation. There is mild global hypokinesis. Unable to assess diastolic function due to atrial fibrillation.    Right Ventricle: Right ventricular cavity size is mildly dilated. Systolic function is mildly to moderately reduced.    Left Atrium: The atrium is moderate to markedly dilated.    Right Atrium: The atrium is moderately dilated.    Aortic Valve: There is mild regurgitation.    Mitral Valve: There is moderate annular calcification. There is moderate regurgitation.    Tricuspid Valve: There is moderate regurgitation. The right ventricular systolic pressure is mildly elevated.  Estimated RVSP 35 mm Hg.  Pacer leads noted.    Pulmonic Valve: There is mild regurgitation.      03/07/2023-Echo follow up/limited w/ contrast if indicated  Interpretation Summary    Left Ventricle: Left ventricular cavity size is normal. The left ventricular ejection fraction is 55%. Systolic function is normal.    Left Atrium: The atrium is moderately dilated.    Right Atrium: The atrium is mildly dilated.    Aortic  Valve: There is mild to moderate regurgitation.    Tricuspid Valve: There is moderate regurgitation.    Pulmonary Artery: The estimated pulmonary artery systolic pressure is 52.0 mmHg. The pulmonary artery systolic pressure is moderately increased.    This is a limited echocardiogram performed to evaluate LV function. Interpretation is therefore limited.    10/10/2024-ECHO FOLLOW UP LIMITED W/ CONTRAST  Interpretation Summary    Left Ventricle: Left ventricular cavity size is normal. The left ventricular ejection fraction is 55%. Systolic function is normal. Although no diagnostic regional wall motion abnormality was identified, this possibility cannot be completely excluded on the basis of this study. This is a limited study to assess left ventricle ejection fraction.    Tricuspid Valve: Estimated RVSP 45 mmHg.  Pacer lead noted.      03/07/2023-Stress Test:   Results for orders placed during the hospital encounter of 03/07/23    NM myocardial perfusion spect (rx stress and/or rest)    Interpretation Summary    Stress ECG: No ST deviation is noted. There were no arrhythmias during recovery. . The ECG was not diagnostic due to pharmacological (vasodilator) stress.    Perfusion Defect Conclusion: The stress/rest perfusion ratio is 1.03 . There is no evidence of transient ischemic dilation (TID).    Stress Function: Left ventricular function post-stress is normal. Post-stress ejection fraction is 79 %.    Perfusion: There is a left ventricular perfusion defect that is medium in size with moderate reduction in uptake present in the mid to apical anterior and anterolateral location(s) that is partially reversible.    Stress Combined Conclusion: Left ventricular perfusion is probably abnormal.      Cardiac Catheterization:  No results found for this or any previous visit.      HOLTER MONITOR: 24 HOUR/48 HOUR MONITORS  No results found for this or any previous visit.      AMB Extended Holter Monitor: Zio XT/AT or  Keshia  No results found for this or any previous visit.      Cardiac CT:  10/11/2023-CT Cardiac w Pulmonary Vein Mapping  FINDINGS:     PULMONARY VEIN ANATOMY: Typical with two right and two left pulmonary veins.     APPROXIMATE MEASUREMENTS OF PULMONARY VEIN OSTIA:     Right superior: 22 mm.  Right inferior: 12 mm.     Left superior: 14 mm.  Left inferior: 16 mm.     CARDIAC STRUCTURES: Top normal heart size. Pacer leads terminate in the right atrium and right ventricle. Mild coronary artery calcifications.     GREAT VESSELS: Visualized thoracic aorta and central pulmonary arterial tree are within normal limits for the patient's age.     EXTRACARDIAC FINDINGS: Lower lobe predominant fibrotic changes in the visualized lungs again seen. Previously noted 2 mm right upper lobe nodule is not definitively visualized on this study, possibly due to motion artifact. Large hiatal hernia containing   the stomach again identified.     IMPRESSION:     Pulmonary venous anatomy as described above.     Large hiatal hernia with intrathoracic stomach, and pulmonary fibrosis again noted.      11/03/2023-Cardioversion    Indication for procedure: Cardioversion  Requesting physician:Dr Laura Goodman  Anticoagulation:  Eliquis     The patient was identified in the OR . A time out procedure was performed.     The patient was sedated by the anesthesia service .     The patient was cardioverted to sinus rhythm with a 200J biphasic shock.       There were no complications.  The patient was monitored for the appropriate time interval in the holding area, and was transferred back to the medical floor in stable condition.    Pre Cardioversion EKG  11/03/2022      Post Cardioversion EKG  11/03/2022        Cardiac CT:    CT Cardiac w Pulmonary Vein Mapping  10/11/2023    FINDINGS:     PULMONARY VEIN ANATOMY: Typical with two right and two left pulmonary veins.     APPROXIMATE MEASUREMENTS OF PULMONARY VEIN OSTIA:     Right superior: 22  mm.  Right inferior: 12 mm.     Left superior: 14 mm.  Left inferior: 16 mm.     CARDIAC STRUCTURES: Top normal heart size. Pacer leads terminate in the right atrium and right ventricle. Mild coronary artery calcifications.     GREAT VESSELS: Visualized thoracic aorta and central pulmonary arterial tree are within normal limits for the patient's age.     EXTRACARDIAC FINDINGS: Lower lobe predominant fibrotic changes in the visualized lungs again seen. Previously noted 2 mm right upper lobe nodule is not definitively visualized on this study, possibly due to motion artifact. Large hiatal hernia containing   the stomach again identified.     IMPRESSION:     Pulmonary venous anatomy as described above.     Large hiatal hernia with intrathoracic stomach, and pulmonary fibrosis again noted.        DEVICE CHECK:     Results for orders placed or performed in visit on 01/09/25   Cardiac EP device report    Narrative    MDT DC PM/ACTIVE SYSTEM IS MRI CONDITIONAL  NON-BILLABLE CARELINK TRANSMISSION - AF EPISODE/BURDEN CK PER SN: BATTERY VOLTAGE ADEQUATE (7.2 YRS). AP 54%   8.4% (AAIR-DDDR 70 PPM). ALL AVAILABLE LEAD PARAMETERS WITHIN NORMAL LIMITS. SINCE 12/27/24: 197 AT/AF EPISODES W/AVAIL EGMS SHOWING AF/AFL, MULTIPLE SAME EVENT/CONSECUTIVE - MAX SINGLE EPISODE 20 HRS, 35 MINS. AT/AF 39.7%. EF 55% (10/10/24 ECHO). PT TAKES ELIQUIS, AMIODARONE, METOPROLOL SUCC. NORMAL DEVICE FUNCTION.   ES            Review of Systems:  Review of Systems   All other systems reviewed and are negative.  As described in my history of present illness    Physical Exam:  Physical Exam  Vitals reviewed.   Constitutional:       Appearance: Normal appearance. She is well-developed and normal weight. She is not ill-appearing.      Comments: Not in any distress at the current time   HENT:      Head: Normocephalic and atraumatic.      Right Ear: External ear normal.      Left Ear: External ear normal.      Nose: Nose normal.      Mouth/Throat:       Pharynx: Uvula midline.   Eyes:      General: Lids are normal. No scleral icterus.     Extraocular Movements: Extraocular movements intact.      Conjunctiva/sclera: Conjunctivae normal.      Pupils: Pupils are equal, round, and reactive to light.      Comments: No pallor  No cyanosis  No icterus   Neck:      Thyroid: No thyromegaly.      Vascular: No carotid bruit or JVD.      Trachea: Trachea normal.      Comments: No jugular lymphadenopathy  Short thick neck  Cardiovascular:      Rate and Rhythm: Normal rate and regular rhythm.      Chest Wall: PMI is not displaced.      Pulses: Normal pulses.      Heart sounds: Normal heart sounds, S1 normal and S2 normal. No murmur heard.     No friction rub. No gallop. No S3 or S4 sounds.   Pulmonary:      Effort: Pulmonary effort is normal. No accessory muscle usage or respiratory distress.      Breath sounds: Normal breath sounds. No decreased breath sounds, wheezing, rhonchi or rales.   Chest:      Chest wall: No tenderness.   Abdominal:      General: Bowel sounds are normal. There is no distension.      Palpations: Abdomen is soft. There is no hepatomegaly, splenomegaly or mass.      Tenderness: There is no abdominal tenderness.      Comments: Central obesity present   Musculoskeletal:         General: No swelling, tenderness or deformity. Normal range of motion.      Cervical back: Normal range of motion and neck supple. No rigidity.      Right lower leg: No edema.      Left lower leg: No edema.   Lymphadenopathy:      Cervical: No cervical adenopathy.   Skin:     Findings: No abrasion, erythema, lesion or rash.      Nails: There is no clubbing.   Neurological:      Mental Status: She is alert and oriented to person, place, and time. Mental status is at baseline.      Motor: No weakness.      Comments: Facial symmetry is retained  Extraocular movements are retained  Head neck tongue and palate movement are retained and symmetric   Psychiatric:         Mood and Affect: Mood  normal.         Speech: Speech normal.         Behavior: Behavior normal.         Thought Content: Thought content normal.         Judgment: Judgment normal.         Discussion/Summary:      Atrial flutter with RVR  On amiodarone, metoprolol and apixaban  [id number] score is 5  Patient has a long history of atrial fibrillation and flutter  She underwent ablation in October 2023    There was a recurrence and she was in atrial flutter with heart rate of 120/min  Amiodarone and metoprolol were increased to 200 mg 2 times daily and 100 mg 2 times daily  Patient went back to sinus rhythm  Device check shows there are still brief breakthrough episodes    Check for amiodarone toxicity-CMP, TSH, PFT with DLCO, evaluation    On amiodarone 100 mg daily     If patient has recurrence of a flutter, will proceed with repeat PFA ablation-will look at breaks in the line for flutter /also plan for vein of Efe injection    If patient continues to have recurrence of tachycardia final option would be an AV node ablation             Cardiomyopathy , NYHA II, LVEF =35%  Improved currently to 60%  Suspected tachy mediated  Increasing beta blockers for better rate control  On ACEI  EF has improved to 60%           Hypertension  On lisinopril and metoprolol   Blood pressure is 122/84       Hyperlipidemia  On statin-lovastatin   No myositis or myalgia         Suspected REYNOLD  Need to be evaluated for CPAP         Obesity  BMI is 29  Patient advised on dietary him restrictions to lose weight        Long-term anticoagulation  Chads Vasc score is 5  To continue with apixaban        Sinus bradycardia, sick sinus syndrome  Patient is post pacemaker  All device parameters a stable

## 2025-01-15 DIAGNOSIS — I48.19 PERSISTENT ATRIAL FIBRILLATION (HCC): ICD-10-CM

## 2025-01-15 RX ORDER — AMIODARONE HYDROCHLORIDE 200 MG/1
200 TABLET ORAL DAILY
Qty: 90 TABLET | Refills: 3 | Status: SHIPPED | OUTPATIENT
Start: 2025-01-15

## 2025-01-15 NOTE — TELEPHONE ENCOUNTER
Name from pharmacy: Amiodarone HCl 200 MG Oral Tablet         Will file in chart as: amiodarone 200 mg tablet    Sig: TAKE 1 TABLET BY MOUTH DAILY    Disp: 90 tablet    Refills: 3    Start: 1/15/2025    Class: Normal    Non-formulary For: Persistent atrial fibrillation (HCC)    To pharmacy: Please send a replace/new response with 90-Day Supply if appropriate to maximize member benefit. Requesting 1 year supply.    Last ordered: 5 months ago (8/9/2024) by Marycruz Gomez MD    Last refill: 10/31/2024    Rx #: [id number]    Cardiovascular: Antiarrhythmic Agents - amiodarone Xfylsq59/15/2025 04:16 AM   Protocol Details This refill cannot be delegated    TSH in normal range and within 180 days    Mg Level in normal range and within 180 days    PFT completed within the last 12 months    K in normal range and within 180 days    Ca in normal range and within 180 days    AST in normal range and within 180 days    ALT in normal range and within 180 days    Valid encounter within last 6 months    Last BP in normal range and within 180 days    Last Heart Rate in normal range and within 180 days    ECG completed within the last 6 months      To be filled at: [facility] [address] [place] [address]

## 2025-01-22 ENCOUNTER — OFFICE VISIT (OUTPATIENT)
Age: 80
End: 2025-01-22
Payer: MEDICARE

## 2025-01-22 VITALS
SYSTOLIC BLOOD PRESSURE: 122 MMHG | HEIGHT: 65 IN | BODY MASS INDEX: 29.16 KG/M2 | DIASTOLIC BLOOD PRESSURE: 80 MMHG | HEART RATE: 74 BPM | WEIGHT: 175 LBS | OXYGEN SATURATION: 96 %

## 2025-01-22 DIAGNOSIS — E78.2 MIXED HYPERLIPIDEMIA: ICD-10-CM

## 2025-01-22 DIAGNOSIS — R74.8 ELEVATED ALKALINE PHOSPHATASE LEVEL: ICD-10-CM

## 2025-01-22 DIAGNOSIS — K51.20 ULCERATIVE PROCTITIS WITHOUT COMPLICATION (HCC): Primary | ICD-10-CM

## 2025-01-22 DIAGNOSIS — I48.19 PERSISTENT ATRIAL FIBRILLATION (HCC): ICD-10-CM

## 2025-01-22 PROCEDURE — 99213 OFFICE O/P EST LOW 20 MIN: CPT | Performed by: PHYSICIAN ASSISTANT

## 2025-01-22 NOTE — PROGRESS NOTES
Name: Madeline Good      : 1945      MRN: [id number]  Encounter Provider: Yoly Pena PA-C  Encounter Date: 2025   Encounter department: 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    Patient underwent a colonoscopy in August for an evaluation of rectal bleeding which showed evidence of a distal proctitis and diverticulosis of the descending and sigmoid colon; biopsies of the rectum showed acute and chronic mucosal injury/colitis.  She was given a Canasa suppository course x 1 month by Dr. Fernandez after the procedure and her rectal bleeding resolved.  However, she had a return of rectal bleeding again in November. She was started back on a nightly Canasa suppository.  Her rectal bleeding has now resolved.    Will continue the Canasa suppository q hs.  She will contact if symptoms return in the interim    Elevated alkaline phosphatase level    Recent labs last month showed an elevated ALP of 174 and TB of 1.21.  AST, ALT normal. ALB and PLTs normal.  Previously in May of 2024,  and TB 1.26.    Will check an abdominal ultrasound to evaluate the liver and biliary tree.  Will check liver labs to further evaluate as well.    Orders:    US abdomen complete; Future    Hepatic function panel; Future    Alkaline phosphatase, isoenzymes; Future    Gamma GT; Future    Acute Hepatitis Panel (Refl); Future    Iron Panel (Includes Ferritin, Iron Sat%, Iron, and TIBC); Future    Antimitochondrial antibody; Future    Anti-smooth muscle antibody, IgG; Future    DHARMESH Screen w/Reflex Cascade; Future    Alpha-1-antitrypsin; Future      Follow  up in the office in 4 months or sooner if needed.    History of Present Illness   HPI  Madeline Good is a 79 y.o. female who presents to the office for follow up of her ulcerative proctitis.  She reports since  starting the Canasa suppository again her rectal bleeding has resolved.  No abdominal pain.  No issues with her bowel movements.  Her blood work did reveal an elevated ALP.  She denies any new medications.  She denies any family history of liver disease.  History obtained from: patient.      Medical History Reviewed by provider this encounter:     .  Past Medical History   Past Medical History:   Diagnosis Date    Aphasia, mixed 07/28/2017    Atrial fibrillation (HCC)     CHF (congestive heart failure) (HCC)     Colon polyp     Headache     Hyperlipidemia     Hypertension     Lyme disease     Shortness of breath     TIA (transient ischemic attack)     Vertigo      Past Surgical History:   Procedure Laterality Date    CARDIAC ELECTROPHYSIOLOGY PROCEDURE N/A 10/19/2023    Procedure: Cardiac eps/afib ablation PVI/POST WALL;  Surgeon: Piero Fajardo MD;  Location:  CARDIAC CATH LAB;  Service: Cardiology    CARDIAC PACEMAKER PLACEMENT  12/2019    COLONOSCOPY      IN SIGMOIDOSCOPY FLX DX W/COLLJ SPEC BR/WA IF PFRMD N/A 11/28/2017    Procedure: SIGMOIDOSCOPY FLEXIBLE;  Surgeon: Kavon Fernandez MD;  Location: [facility];  Service: Gastroenterology    TONSILLECTOMY       Family History   Problem Relation Age of Onset    Lung cancer Mother     Dementia Father     Alzheimer's disease Father     Other Father         Cardiac Disorder     Lung cancer Maternal Grandmother     Breast cancer Paternal Aunt 40    No Known Problems Paternal Aunt     No Known Problems Paternal Aunt       reports that she has never smoked. She has never used smokeless tobacco. She reports current alcohol use of about 7.0 standard drinks of alcohol per week. She reports that she does not use drugs.  Current Outpatient Medications on File Prior to Visit   Medication Sig Dispense Refill    amiodarone 200 mg tablet TAKE 1 TABLET BY MOUTH DAILY 90 tablet 3    apixaban (Eliquis) 5 mg Take 1 tablet (5 mg total) by mouth 2 (two) times a day 180 tablet 3     fluticasone (FLONASE) 50 mcg/act nasal spray 1 spray into each nostril daily Start after finishing the 3 days of Prednsione 16 g 0    furosemide (LASIX) 20 mg tablet TAKE 1 TABLET BY MOUTH DAILY 90 tablet 3    hydrOXYzine HCL (ATARAX) 25 mg tablet Take 1 tablet (25 mg total) by mouth daily at bedtime as needed (sleep) 10 tablet 0    levothyroxine 25 mcg tablet TAKE 1 TABLET BY MOUTH DAILY IN  THE EARLY MORNING 90 tablet 3    lovastatin (MEVACOR) 10 MG tablet Take 1 tablet (10 mg total) by mouth daily at bedtime 90 tablet 3    mesalamine (CANASA) 1,000 mg suppository Insert 1 suppository (1,000 mg total) into the rectum daily at bedtime RUSH - SEND ASAP for patient 90 suppository 3    metoprolol succinate (TOPROL-XL) 100 mg 24 hr tablet Take 1 tablet (100 mg total) by mouth daily 90 tablet 3    potassium chloride (Klor-Con M10) 10 mEq tablet Take 1 tablet (10 mEq total) by mouth daily 90 tablet 3    sodium chloride (OCEAN) 0.65 % nasal spray 1 spray into each nostril as needed for rhinitis or congestion 60 mL 0     No current facility-administered medications on file prior to visit.   No Known Allergies   Current Outpatient Medications on File Prior to Visit   Medication Sig Dispense Refill    amiodarone 200 mg tablet TAKE 1 TABLET BY MOUTH DAILY 90 tablet 3    apixaban (Eliquis) 5 mg Take 1 tablet (5 mg total) by mouth 2 (two) times a day 180 tablet 3    fluticasone (FLONASE) 50 mcg/act nasal spray 1 spray into each nostril daily Start after finishing the 3 days of Prednsione 16 g 0    furosemide (LASIX) 20 mg tablet TAKE 1 TABLET BY MOUTH DAILY 90 tablet 3    hydrOXYzine HCL (ATARAX) 25 mg tablet Take 1 tablet (25 mg total) by mouth daily at bedtime as needed (sleep) 10 tablet 0    levothyroxine 25 mcg tablet TAKE 1 TABLET BY MOUTH DAILY IN  THE EARLY MORNING 90 tablet 3    lovastatin (MEVACOR) 10 MG tablet Take 1 tablet (10 mg total) by mouth daily at bedtime 90 tablet 3    mesalamine (CANASA) 1,000 mg suppository  Insert 1 suppository (1,000 mg total) into the rectum daily at bedtime RUSH - SEND ASAP for patient 90 suppository 3    metoprolol succinate (TOPROL-XL) 100 mg 24 hr tablet Take 1 tablet (100 mg total) by mouth daily 90 tablet 3    potassium chloride (Klor-Con M10) 10 mEq tablet Take 1 tablet (10 mEq total) by mouth daily 90 tablet 3    sodium chloride (OCEAN) 0.65 % nasal spray 1 spray into each nostril as needed for rhinitis or congestion 60 mL 0     No current facility-administered medications on file prior to visit.      Social History     Tobacco Use    Smoking status: Never    Smokeless tobacco: Never    Tobacco comments:     no smoking for 46 years   Vaping Use    Vaping status: Never Used   Substance and Sexual Activity    Alcohol use: Yes     Alcohol/week: 7.0 standard drinks of alcohol     Types: 7 Glasses of wine per week     Comment: occassional    Drug use: No    Sexual activity: Never     Partners: Male     Birth control/protection: None        Objective   There were no vitals taken for this visit.     Physical Exam  Constitutional:       General: She is not in acute distress.     Appearance: Normal appearance. She is not ill-appearing.   HENT:      Head: Normocephalic and atraumatic.   Cardiovascular:      Rate and Rhythm: Normal rate and regular rhythm.   Pulmonary:      Effort: Pulmonary effort is normal. No respiratory distress.      Breath sounds: Normal breath sounds.   Skin:     General: Skin is warm and dry.   Neurological:      Mental Status: She is alert and oriented to person, place, and time.   Psychiatric:         Mood and Affect: Mood normal.         Behavior: Behavior normal.

## 2025-01-23 RX ORDER — LOVASTATIN 10 MG/1
10 TABLET ORAL
Qty: 90 TABLET | Refills: 1 | Status: SHIPPED | OUTPATIENT
Start: 2025-01-23

## 2025-01-23 RX ORDER — APIXABAN 5 MG/1
5 TABLET, FILM COATED ORAL 2 TIMES DAILY
Qty: 180 TABLET | Refills: 1 | Status: SHIPPED | OUTPATIENT
Start: 2025-01-23

## 2025-02-04 ENCOUNTER — HOSPITAL ENCOUNTER (OUTPATIENT)
Dept: ULTRASOUND IMAGING | Facility: HOSPITAL | Age: 80
Discharge: HOME | End: 2025-02-04
Payer: MEDICARE

## 2025-02-04 DIAGNOSIS — R74.8 ELEVATED ALKALINE PHOSPHATASE LEVEL: ICD-10-CM

## 2025-02-04 PROCEDURE — 76705 ECHO EXAM OF ABDOMEN: CPT

## 2025-02-06 ENCOUNTER — RESULTS FOLLOW-UP (OUTPATIENT)
Age: 80
End: 2025-02-06

## 2025-02-06 NOTE — TELEPHONE ENCOUNTER
----- Message from Myesha MORA sent at 2/6/2025 12:31 PM EST -----  Regarding: FW:    ----- Message -----  From: Yoly Pena PA-C  Sent: 2/5/2025   6:24 PM EST  To: [facility]  Subject: FW:                                              Please inform patient that the ultrasound was normal.  ----- Message -----  From: Interface, Radiology Results In  Sent: 2/5/2025   5:03 PM EST  To: Yoly Pena PA-C

## 2025-02-17 ENCOUNTER — OFFICE VISIT (OUTPATIENT)
Dept: CARDIOLOGY CLINIC | Facility: CLINIC | Age: 80
End: 2025-02-17
Payer: MEDICARE

## 2025-02-17 VITALS
HEIGHT: 65 IN | HEART RATE: 93 BPM | RESPIRATION RATE: 16 BRPM | WEIGHT: 175 LBS | SYSTOLIC BLOOD PRESSURE: 122 MMHG | BODY MASS INDEX: 29.16 KG/M2 | OXYGEN SATURATION: 93 % | DIASTOLIC BLOOD PRESSURE: 80 MMHG

## 2025-02-17 DIAGNOSIS — I42.8 NONISCHEMIC CARDIOMYOPATHY (HCC): ICD-10-CM

## 2025-02-17 DIAGNOSIS — I25.10 CORONARY ARTERY CALCIFICATION: ICD-10-CM

## 2025-02-17 DIAGNOSIS — Z95.0 PACEMAKER: ICD-10-CM

## 2025-02-17 DIAGNOSIS — Z79.899 ON AMIODARONE THERAPY: ICD-10-CM

## 2025-02-17 DIAGNOSIS — Z79.01 ANTICOAGULANT LONG-TERM USE: ICD-10-CM

## 2025-02-17 DIAGNOSIS — I48.0 PAROXYSMAL ATRIAL FIBRILLATION (HCC): ICD-10-CM

## 2025-02-17 DIAGNOSIS — I10 PRIMARY HYPERTENSION: ICD-10-CM

## 2025-02-17 DIAGNOSIS — I50.32 CHRONIC HEART FAILURE WITH PRESERVED EJECTION FRACTION (HFPEF) (HCC): ICD-10-CM

## 2025-02-17 DIAGNOSIS — Z01.810 PREOP CARDIOVASCULAR EXAM: Primary | ICD-10-CM

## 2025-02-17 DIAGNOSIS — I49.5 SSS (SICK SINUS SYNDROME) (HCC): ICD-10-CM

## 2025-02-17 DIAGNOSIS — E78.2 MIXED HYPERLIPIDEMIA: ICD-10-CM

## 2025-02-17 PROCEDURE — 99214 OFFICE O/P EST MOD 30 MIN: CPT | Performed by: INTERNAL MEDICINE

## 2025-02-17 NOTE — PROGRESS NOTES
CARDIOLOGY OFFICE VISIT  [facility]  [address] [id number]  Tel: [phone]      NAME: Madeline Good  AGE: 79 y.o.  SEX: female  : 1945  MRN: [id number]      Chief Complaint:  Chief Complaint   Patient presents with    Pre-op Exam         History of Present Illness:   Patient comes for preop cardiac risk assessment prior to extraction of tooth #27 with biopsy and bone graft. Pt states she is doing okay from cardiac stand point and denies chest pain / pressure, SOB, palpitations, lightheadedness, syncope, swelling feet, orthopnea, PND, claudication.    Chronic HFimpEF - currently on furosemide 20 mg OD, potassium, metoprolol succinate. Lisinopril was discontinued for soft BP.  States she has been watching her salt intake closely and weighing herself frequently.     Nonischemic cardiomyopathy, likely tachycardia mediated -  patient has a history of nonischemic cardiomyopathy with improvement in EF after rate/rhythm control of her atrial fibrillation. Current EF is 55% (2023; Oct 2024).  On metoprolol succinate. Lisinopril was discontinued for soft BP     Persistent AF / flutter s/p ablation - Patient was diagnosed with atrial fibrillation on an EKG done at her PCP office. S/P DCCV on 2018 and was in sinus rhythm for a while but then went back into Afib. Then she was started on rhythm control with Sotalol. Did well for a while but again required DCCV on 11/3/2022. She was placed on amiodarone but started having breakthrough atrial fibrillation. Now status post cryoablation with pulmonary vein isolation, posterior wall isolation and ablation of mitral isthmus dependent flutter (10/19/2023) by Dr. Andrei Lugo. There was a recurrence and she was in atrial flutter. Currently on Amiodarone + Metoprolol and maintaining sinus rhythm     SSS s/p PPM - last pacemaker interrogation discussed with the patient.   Continue regular pacemaker interrogations       Coronary artery calcification on CT chest -  States is doing well cardiac wise with no cardiac symptoms.  Taking all medications regularly.     Primary hypertension -  Has been hypertensive for many years.  Taking medications regularly.  Denies lightheadedness, headache, medication side effects.      Mixed hyperlipidemia -  Has had hyperlipidemia for many years.  Taking statin regularly along with diet control.  Denies myalgia.  PCP closely monitoring the blood work.    Moderate aortic insufficiency, moderate tricuspid regurgitation.     H/O TIA    CKD      Past Medical History:  Past Medical History:   Diagnosis Date    Aphasia, mixed 07/28/2017    Atrial fibrillation (HCC)     CHF (congestive heart failure) (HCC)     Colon polyp     Headache     Hyperlipidemia     Hypertension     Lyme disease     Shortness of breath     TIA (transient ischemic attack)     Vertigo          Past Surgical History:  Past Surgical History:   Procedure Laterality Date    CARDIAC ELECTROPHYSIOLOGY PROCEDURE N/A 10/19/2023    Procedure: Cardiac eps/afib ablation PVI/POST WALL;  Surgeon: Piero Fajardo MD;  Location:  CARDIAC CATH LAB;  Service: Cardiology    CARDIAC PACEMAKER PLACEMENT  12/2019    COLONOSCOPY      PA SIGMOIDOSCOPY FLX DX W/COLLJ SPEC BR/WA IF PFRMD N/A 11/28/2017    Procedure: SIGMOIDOSCOPY FLEXIBLE;  Surgeon: Kavon Fernandez MD;  Location: [facility];  Service: Gastroenterology    TONSILLECTOMY           Family History:  Family History   Problem Relation Age of Onset    Lung cancer Mother     Dementia Father     Alzheimer's disease Father     Other Father         Cardiac Disorder     Lung cancer Maternal Grandmother     Breast cancer Paternal Aunt 40    No Known Problems Paternal Aunt     No Known Problems Paternal Aunt          Social History:  Social History     Socioeconomic History    Marital status: /Civil Union     Spouse name: None    Number of children:  None    Years of education: None    Highest education level: None   Occupational History    Occupation: Retired   Tobacco Use    Smoking status: Never    Smokeless tobacco: Never    Tobacco comments:     no smoking for 46 years   Vaping Use    Vaping status: Never Used   Substance and Sexual Activity    Alcohol use: Yes     Alcohol/week: 7.0 standard drinks of alcohol     Types: 7 Glasses of wine per week     Comment: occassional    Drug use: No    Sexual activity: Never     Partners: Male     Birth control/protection: None   Other Topics Concern    None   Social History Narrative    Always uses seat belt     Social Drivers of Health     Financial Resource Strain: Low Risk  (12/11/2023)    Overall Financial Resource Strain (CARDIA)     Difficulty of Paying Living Expenses: Not very hard   Food Insecurity: No Food Insecurity (12/30/2024)    Hunger Vital Sign     Worried About Running Out of Food in the Last Year: Never true     Ran Out of Food in the Last Year: Never true   Transportation Needs: No Transportation Needs (12/30/2024)    PRAPARE - Transportation     Lack of Transportation (Medical): No     Lack of Transportation (Non-Medical): No   Physical Activity: Not on file   Stress: Not on file   Social Connections: Unknown (6/18/2024)    Received from [facility]    Social [place]     How often do you feel lonely or isolated from those around you? (Adult - for ages 18 years and over): Not on file   Intimate Partner Violence: Not on file   Housing Stability: Unknown (12/30/2024)    Housing Stability Vital Sign     Unable to Pay for Housing in the Last Year: No     Number of Times Moved in the Last Year: Not on file     Homeless in the Last Year: No         Active Problems:  Patient Active Problem List   Diagnosis    Essential hypertension    Mixed hyperlipidemia    Obesity (BMI 30-39.9)    Migraine with aura and without status migrainosus, not intractable    Myocardiopathy (HCC)    Anticoagulant long-term use     Nonrheumatic mitral valve regurgitation    Nonrheumatic aortic valve insufficiency    Nonrheumatic tricuspid valve regurgitation    Persistent atrial fibrillation (HCC)    Other insomnia    Chronic obstructive pulmonary disease (HCC)    Stage 3a chronic kidney disease (HCC)    Acute on chronic systolic congestive heart failure (HCC)    SSS (sick sinus syndrome) (HCC)    Acquired hypothyroidism    Iron deficiency anemia    On amiodarone therapy    Chronic heart failure with preserved ejection fraction (HFpEF) (HCC)         The following portions of the patient's history were reviewed and updated as appropriate: past medical history, past surgical history, past family history,  past social history, current medications, allergies and problem list.      Review of Systems:  Constitutional: Denies fever, chills  Eyes: Denies eye redness, eye discharge  ENT: Denies hearing loss, sneezing, nasal discharge, sore throat   Respiratory: Denies cough, expectoration, shortness of breath  Cardiovascular: Denies chest pain, palpitations, lower extremity swelling  Gastrointestinal: Denies abdominal pain, nausea, vomiting, diarrhea  Genito-Urinary: Denies dysuria, incontinence  Musculoskeletal: Denies back pain, joint pain, muscle pain  Neurologic: Denies lightheadedness, syncope, headache, seizures  Endocrine: Denies polydipsia, temperature intolerance  Allergy and Immunology: Denies hives, insect bite sensitivity  Hematological and Lymphatic: Denies bleeding problems, swollen glands   Psychological: Denies depression, suicidal ideation, anxiety, panic  Dermatological: Denies pruritus, rash, skin lesion changes      Vitals:  Vitals:    02/17/25 1011   BP: 122/80   Pulse: 93   Resp: 16   SpO2: 93%       Body mass index is 29.12 kg/m².    Weight (last 2 days)       Date/Time Weight    02/17/25 1011 79.4 (175)              Physical Examination:  General: Patient is not in acute distress. Awake, alert, oriented in time, place and  "person. Responding to commands  Head: Normocephalic. Atraumatic  Eyes: Both pupils normal sized, round and reactive to light. Nonicteric  ENT: Normal external ear canals  Neck: Supple. JVP not raised. Trachea central. No thyromegaly  Lungs: Bilateral bronchovascular breath sounds with no crackles or rhonchi  Chest wall: No tenderness  Cardiovascular: RRR. S1 and S2 normal. No murmur, rub or gallop  Gastrointestinal: Abdomen soft, nontender. No guarding or rigidity. Liver and spleen not palpable. Bowel sounds present  Neurologic: Patient is awake, alert, oriented in time, place and person. Responding to commands. Moving all extremities  Integumentary:  No skin rash  Lymphatic: No cervical lymphadenopathy  Back: Symmetric. No CVA tenderness  Extremities: No clubbing, cyanosis or edema      Laboratory Results:  CBC with diff:   Lab Results   Component Value Date    WBC 6.50 12/23/2024    RBC 4.59 12/23/2024    HGB 16.3 (H) 12/23/2024    HCT 48.7 (H) 12/23/2024     (H) 12/23/2024    MCH 35.5 (H) 12/23/2024    RDW 14.7 12/23/2024     12/23/2024       CMP:  Lab Results   Component Value Date    CREATININE 1.05 12/23/2024    BUN 11 12/23/2024    K 4.5 12/23/2024     12/23/2024    CO2 33 (H) 12/23/2024    ALKPHOS 174 (H) 12/23/2024    ALT 19 12/23/2024    AST 30 12/23/2024    BILIDIR 0.13 01/05/2023       Lab Results   Component Value Date    HGBA1C 5.8 (H) 11/01/2022    MG 2.1 03/04/2024    PHOS 3.5 03/16/2023       Lab Results   Component Value Date    TROPONINI 0.05 (H) 02/10/2019    TROPONINI 0.06 (H) 02/09/2019    TROPONINI 0.06 (H) 02/09/2019       Lipid Profile:   No results found for: \"CHOL\"  Lab Results   Component Value Date    HDL 46 (L) 12/23/2024    HDL 53 12/08/2023    HDL 45 (L) 11/02/2022     Lab Results   Component Value Date    LDLCALC 96 12/23/2024    LDLCALC 91 12/08/2023    LDLCALC 62 11/02/2022     Lab Results   Component Value Date    TRIG 121 12/23/2024    TRIG 80 12/08/2023    " TRIG 75 11/02/2022       Cardiac testing:   Results for orders placed during the hospital encounter of 11/01/22    Echo complete w/ contrast if indicated    Interpretation Summary    Left Ventricle: Left ventricular cavity size is normal. Systolic function is mildly reduced.  LVEF approximately 40 to 45%.  Patient was in atrial fibrillation with periods of rapid ventricular response which interferes with interpretation. There is mild global hypokinesis. Unable to assess diastolic function due to atrial fibrillation.    Right Ventricle: Right ventricular cavity size is mildly dilated. Systolic function is mildly to moderately reduced.    Left Atrium: The atrium is moderate to markedly dilated.    Right Atrium: The atrium is moderately dilated.    Aortic Valve: There is mild regurgitation.    Mitral Valve: There is moderate annular calcification. There is moderate regurgitation.    Tricuspid Valve: There is moderate regurgitation. The right ventricular systolic pressure is mildly elevated.  Estimated RVSP 35 mm Hg.  Pacer leads noted.    Pulmonic Valve: There is mild regurgitation.    Results for orders placed during the hospital encounter of 10/04/24    Echo follow up/limited w/ contrast if indicated    Interpretation Summary    Left Ventricle: Left ventricular cavity size is normal. The left ventricular ejection fraction is 55%. Systolic function is normal. Although no diagnostic regional wall motion abnormality was identified, this possibility cannot be completely excluded on the basis of this study. This is a limited study to assess left ventricle ejection fraction.    Tricuspid Valve: Estimated RVSP 45 mmHg.  Pacer lead noted.    Results for orders placed during the hospital encounter of 03/07/23    NM myocardial perfusion spect (rx stress and/or rest)    Interpretation Summary    Stress ECG: No ST deviation is noted. There were no arrhythmias during recovery. . The ECG was not diagnostic due to pharmacological  (vasodilator) stress.    Perfusion Defect Conclusion: The stress/rest perfusion ratio is 1.03 . There is no evidence of transient ischemic dilation (TID).    Stress Function: Left ventricular function post-stress is normal. Post-stress ejection fraction is 79 %.    Perfusion: There is a left ventricular perfusion defect that is medium in size with moderate reduction in uptake present in the mid to apical anterior and anterolateral location(s) that is partially reversible.    Stress Combined Conclusion: Left ventricular perfusion is probably abnormal.      Medications:    Current Outpatient Medications:     amiodarone 200 mg tablet, TAKE 1 TABLET BY MOUTH DAILY, Disp: 90 tablet, Rfl: 3    apixaban (Eliquis) 5 mg, TAKE 1 TABLET BY MOUTH TWICE  DAILY, Disp: 180 tablet, Rfl: 1    fluticasone (FLONASE) 50 mcg/act nasal spray, 1 spray into each nostril daily Start after finishing the 3 days of Prednsione, Disp: 16 g, Rfl: 0    furosemide (LASIX) 20 mg tablet, TAKE 1 TABLET BY MOUTH DAILY, Disp: 90 tablet, Rfl: 3    hydrOXYzine HCL (ATARAX) 25 mg tablet, Take 1 tablet (25 mg total) by mouth daily at bedtime as needed (sleep), Disp: 10 tablet, Rfl: 0    levothyroxine 25 mcg tablet, TAKE 1 TABLET BY MOUTH DAILY IN  THE EARLY MORNING, Disp: 90 tablet, Rfl: 3    lovastatin (MEVACOR) 10 MG tablet, TAKE 1 TABLET BY MOUTH DAILY AT  BEDTIME, Disp: 90 tablet, Rfl: 1    mesalamine (CANASA) 1,000 mg suppository, Insert 1 suppository (1,000 mg total) into the rectum daily at bedtime R[facility] - SEND ASAP for patient, Disp: 90 suppository, Rfl: 3    metoprolol succinate (TOPROL-XL) 100 mg 24 hr tablet, Take 1 tablet (100 mg total) by mouth daily, Disp: 90 tablet, Rfl: 3    potassium chloride (Klor-Con M10) 10 mEq tablet, Take 1 tablet (10 mEq total) by mouth daily, Disp: 90 tablet, Rfl: 3    sodium chloride (OCEAN) 0.65 % nasal spray, 1 spray into each nostril as needed for rhinitis or congestion, Disp: 60 mL, Rfl: 0      Allergies:  No  Known Allergies      Assessment and Plan:  1. Preop cardiovascular exam (Primary)  Patient has no active cardiac conditions (such as unstable cardiac syndrome, decompensated heart failure, significant arrhythmias, severe valvular disease). Patient can comfortably go up and down one flight of steps which is 4 METS.  Patient is a moderate cardiac risk patient going in for a low risk surgery.  No further cardiac workup is needed at this time.  No cardiac contraindications for surgery.  Perioperative use of beta-blocker is highly recommended.      Patient can hold Eliquis for 2 days prior to the extraction and resume the next morning  after the procedure.    2. Chronic heart failure with preserved ejection fraction (HFpEF) ([facility])  Clinically euvolemic.  Continue furosemide 20 mg daily, potassium, metoprolol succinate.  Low-salt diet.  Daily weight    3. Nonischemic cardiomyopathy ([facility])  EF stable at 55%.  Continue metoprolol succinate    4. Paroxysmal atrial fibrillation ([facility])  5. Anticoagulant long-term use  Currently in sinus rhythm.  Continue amiodarone, metoprolol succinate, Eliquis    6. On amiodarone therapy  Dose was decreased to 100 mg daily by Dr. Fajardo.. According to him -  Patient feels well in sinus rhythm  Has A-fib about 25%   Device check monthly for the next 3 months  If A-fib burden remains low, just continue with current therapy  If A-fib burden increases we will proceed with PFA ablation before considering AV node ablation + consider VOM injection     7. Coronary artery calcification  Continue aspirin, statin, beta-blocker    8. SSS (sick sinus syndrome) ([facility])  9. Pacemaker  Continue regular pacemaker interrogations.  Last pacemaker interrogation reviewed with the patient    10. Primary hypertension  Blood pressure stable.  Continue current medication.  Continue to monitor BP at home and call if abnormal    11. Mixed hyperlipidemia  Continue statin and diet control.  Her PCP closely monitors her blood  work    Recommend aggressive risk factor modification and therapeutic lifestyle changes.  Low-salt, low-calorie, low-fat, low-cholesterol diet with regular exercise and to optimize weight.  I will defer the ordering and monitoring of necessity lab studies to you, but I am available and happy to review and manage any of the data at your request in the future.    Discussed concepts of atherosclerosis, including signs and symptoms of cardiac disease.    Previous studies were reviewed.    Safety measures were reviewed.  Questions were entertained and answered.  Patient was advised to report any problems requiring medical attention.    Follow-up with PCP and appropriate specialist and lab work as discussed.    Return for follow up visit as scheduled or earlier, if needed.  Thank you for allowing me to participate in the care and evaluation of your patient.  Should you have any questions, please feel free to contact me.    Marycruz Gomez MD  2/17/2025,10:37 AM

## 2025-02-18 ENCOUNTER — TELEPHONE (OUTPATIENT)
Dept: CARDIOLOGY CLINIC | Facility: CLINIC | Age: 80
End: 2025-02-18

## 2025-03-10 ENCOUNTER — LAB (OUTPATIENT)
Age: 80
End: 2025-03-10
Payer: MEDICARE

## 2025-03-10 DIAGNOSIS — R74.8 ELEVATED ALKALINE PHOSPHATASE LEVEL: ICD-10-CM

## 2025-03-10 LAB
ALBUMIN SERPL BCG-MCNC: 4.3 G/DL (ref 3.5–5)
ALP SERPL-CCNC: 124 U/L (ref 34–104)
ALT SERPL W P-5'-P-CCNC: 19 U/L (ref 7–52)
AST SERPL W P-5'-P-CCNC: 26 U/L (ref 13–39)
BILIRUB DIRECT SERPL-MCNC: 0.21 MG/DL (ref 0–0.2)
BILIRUB SERPL-MCNC: 1.03 MG/DL (ref 0.2–1)
FERRITIN SERPL-MCNC: 56 NG/ML (ref 11–307)
GGT SERPL-CCNC: 26 U/L (ref 9–64)
IRON SATN MFR SERPL: 32 % (ref 15–50)
IRON SERPL-MCNC: 128 UG/DL (ref 50–212)
PROT SERPL-MCNC: 7.5 G/DL (ref 6.4–8.4)
TIBC SERPL-MCNC: 404.6 UG/DL (ref 250–450)
TRANSFERRIN SERPL-MCNC: 289 MG/DL (ref 203–362)
UIBC SERPL-MCNC: 277 UG/DL (ref 155–355)

## 2025-03-10 PROCEDURE — 86038 ANTINUCLEAR ANTIBODIES: CPT

## 2025-03-10 PROCEDURE — 82728 ASSAY OF FERRITIN: CPT

## 2025-03-10 PROCEDURE — 80074 ACUTE HEPATITIS PANEL: CPT

## 2025-03-10 PROCEDURE — 86225 DNA ANTIBODY NATIVE: CPT

## 2025-03-10 PROCEDURE — 36415 COLL VENOUS BLD VENIPUNCTURE: CPT

## 2025-03-10 PROCEDURE — 86015 ACTIN ANTIBODY EACH: CPT

## 2025-03-10 PROCEDURE — 84075 ASSAY ALKALINE PHOSPHATASE: CPT

## 2025-03-10 PROCEDURE — 82103 ALPHA-1-ANTITRYPSIN TOTAL: CPT

## 2025-03-10 PROCEDURE — 83550 IRON BINDING TEST: CPT

## 2025-03-10 PROCEDURE — 84080 ASSAY ALKALINE PHOSPHATASES: CPT

## 2025-03-10 PROCEDURE — 86381 MITOCHONDRIAL ANTIBODY EACH: CPT

## 2025-03-10 PROCEDURE — 83540 ASSAY OF IRON: CPT

## 2025-03-10 PROCEDURE — 82977 ASSAY OF GGT: CPT

## 2025-03-10 PROCEDURE — 80076 HEPATIC FUNCTION PANEL: CPT

## 2025-03-11 ENCOUNTER — TELEPHONE (OUTPATIENT)
Dept: CARDIOLOGY CLINIC | Facility: CLINIC | Age: 80
End: 2025-03-11

## 2025-03-11 DIAGNOSIS — Z79.899 ON AMIODARONE THERAPY: Primary | ICD-10-CM

## 2025-03-11 LAB
A1AT SERPL-MCNC: 173 MG/DL (ref 101–187)
ACTIN IGG SERPL-ACNC: 11 UNITS (ref 0–19)
HAV IGM SER QL: NORMAL
HBV CORE IGM SER QL: NORMAL
HBV SURFACE AG SER QL: NORMAL
HCV AB SER QL: NORMAL
MITOCHONDRIA M2 IGG SER-ACNC: <20 UNITS (ref 0–20)

## 2025-03-11 NOTE — TELEPHONE ENCOUNTER
----- Message from Marycruz Gomez MD sent at 3/11/2025  8:37 AM EDT -----  Please inform patient that I have ordered a PFT as she is on amiodarone therapy.  Please ask her to get it done    Thanks

## 2025-03-13 ENCOUNTER — REMOTE DEVICE CLINIC VISIT (OUTPATIENT)
Dept: CARDIOLOGY CLINIC | Facility: CLINIC | Age: 80
End: 2025-03-13

## 2025-03-13 DIAGNOSIS — Z95.0 CARDIAC PACEMAKER IN SITU: Primary | ICD-10-CM

## 2025-03-13 PROCEDURE — RECHECK: Performed by: INTERNAL MEDICINE

## 2025-03-13 NOTE — PROGRESS NOTES
Results for orders placed or performed in visit on 03/13/25   Cardiac EP device report    Narrative    MDT DC PM/ACTIVE SYSTEM IS MRI CONDITIONAL  1 MONTH CARELINK TRANSMISSION TO CHECK AF BURDEN PER DR. RACHEL CRISTINA: BATTERY VOLTAGE ADEQUATE (7 YRS). AP-81%, -5%. ALL AVAILABLE LEAD PARAMETERS WITHIN NORMAL LIMITS. 744 AT/AF EPISODES MAX DURATION 9.6 HRS. AT/AF BURDEN SINCE 2/12/25-11.5%. S/P AFIB & AFLUTTER ABLATIONS ON 10/19/23. PT ON ELIQUIS, AMIO & METOPROLOL. NORMAL DEVICE FUNCTION. GV

## 2025-03-14 ENCOUNTER — RESULTS FOLLOW-UP (OUTPATIENT)
Dept: OTHER | Facility: HOSPITAL | Age: 80
End: 2025-03-14

## 2025-03-14 LAB
ALP BONE CFR SERPL: 71 % (ref 14–68)
ALP INTEST CFR SERPL: 0 % (ref 0–18)
ALP LIVER CFR SERPL: 29 % (ref 18–85)
ALP SERPL-CCNC: 140 IU/L (ref 44–121)
DSDNA IGG SERPL IA-ACNC: 8.4 IU/ML (ref ?–15)
NUCLEAR IGG SER IA-RTO: 0.3 RATIO (ref ?–1)

## 2025-03-16 ENCOUNTER — RESULTS FOLLOW-UP (OUTPATIENT)
Dept: NON INVASIVE DIAGNOSTICS | Facility: HOSPITAL | Age: 80
End: 2025-03-16

## 2025-03-19 ENCOUNTER — OFFICE VISIT (OUTPATIENT)
Age: 80
End: 2025-03-19
Payer: MEDICARE

## 2025-03-19 VITALS
OXYGEN SATURATION: 97 % | DIASTOLIC BLOOD PRESSURE: 80 MMHG | BODY MASS INDEX: 29.32 KG/M2 | SYSTOLIC BLOOD PRESSURE: 128 MMHG | WEIGHT: 176 LBS | HEART RATE: 65 BPM | HEIGHT: 65 IN

## 2025-03-19 DIAGNOSIS — K51.20 ULCERATIVE PROCTITIS WITHOUT COMPLICATION (HCC): Primary | ICD-10-CM

## 2025-03-19 PROCEDURE — 99214 OFFICE O/P EST MOD 30 MIN: CPT | Performed by: PHYSICIAN ASSISTANT

## 2025-03-19 NOTE — PROGRESS NOTES
Name: Madeline Good      : 1945      MRN: [id number]  Encounter Provider: Yoly Pena PA-C  Encounter Date: 3/19/2025   Encounter department: 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    Patient underwent a colonoscopy in August for an evaluation of rectal bleeding which showed evidence of a distal proctitis and diverticulosis of the descending and sigmoid colon; biopsies of the rectum showed acute and chronic mucosal injury/colitis.  She was given a Canasa suppository course x 1 month by Dr. Fernandez after the procedure and her rectal bleeding resolved.  However, she had a return of rectal bleeding again not to long after in November. She was started back on a nightly Canasa suppository.  Her rectal bleeding has now resolved.    Will continue the regimen with a Canasa suppository q hs.  She also has a mildly elevated ALP (liver labs including autoimmune labs/AMA negative, GGT negative, RUQ US negative) - fractionated isoenzymes were elevated for a bone etiology. Recommend she follow up with her PCP for further evaluation PTH level, vitamin D level, etc.  She will contact us in the interim if any symptoms return otherwise follow up in 6 months.      History of Present Illness   HPI  Madeline Good is a 79 y.o. female who presents who presents to the office for follow up of her UC. Her rectal bleeding resolved with the Canasa suppositories.  No abdominal pain.  No diarrhea.    History obtained from: patient      Medical History Reviewed by provider this encounter:  Meds     .  Past Medical History   Past Medical History:   Diagnosis Date    Aphasia, mixed 2017    Atrial fibrillation (HCC)     CHF (congestive heart failure) (HCC)     Colon polyp     Headache     Hyperlipidemia     Hypertension     Lyme disease     Shortness of breath      TIA (transient ischemic attack)     Vertigo      Past Surgical History:   Procedure Laterality Date    CARDIAC ELECTROPHYSIOLOGY PROCEDURE N/A 10/19/2023    Procedure: Cardiac eps/afib ablation PVI/POST WALL;  Surgeon: Piero Fajardo MD;  Location:  CARDIAC CATH LAB;  Service: Cardiology    CARDIAC PACEMAKER PLACEMENT  12/2019    COLONOSCOPY      MS SIGMOIDOSCOPY FLX DX W/COLLJ SPEC BR/WA IF PFRMD N/A 11/28/2017    Procedure: SIGMOIDOSCOPY FLEXIBLE;  Surgeon: Kavon Fernandez MD;  Location: [facility];  Service: Gastroenterology    TONSILLECTOMY       Family History   Problem Relation Age of Onset    Lung cancer Mother     Dementia Father     Alzheimer's disease Father     Other Father         Cardiac Disorder     Lung cancer Maternal Grandmother     Breast cancer Paternal Aunt 40    No Known Problems Paternal Aunt     No Known Problems Paternal Aunt       reports that she has never smoked. She has never used smokeless tobacco. She reports current alcohol use of about 7.0 standard drinks of alcohol per week. She reports that she does not use drugs.  Current Outpatient Medications   Medication Instructions    amiodarone 200 mg, Oral, Daily    Eliquis 5 mg, Oral, 2 times daily    fluticasone (FLONASE) 50 mcg/act nasal spray 1 spray, Nasal, Daily, Start after finishing the 3 days of Prednsione    furosemide (LASIX) 20 mg, Oral, Daily    hydrOXYzine HCL (ATARAX) 25 mg, Oral, Daily at bedtime PRN    levothyroxine 25 mcg, Oral, Daily (early morning)    lovastatin (MEVACOR) 10 mg, Oral, Daily at bedtime    mesalamine (CANASA) 1,000 mg, Rectal, Daily at bedtime, [facility] - SEND ASAP for patient    metoprolol succinate (TOPROL-XL) 100 mg, Oral, Daily    potassium chloride (Klor-Con M10) 10 mEq tablet 10 mEq, Oral, Daily    sodium chloride (OCEAN) 0.65 % nasal spray 1 spray, Nasal, As needed   No Known Allergies   Current Outpatient Medications on File Prior to Visit   Medication Sig Dispense Refill    amiodarone 200 mg  "tablet TAKE 1 TABLET BY MOUTH DAILY 90 tablet 3    apixaban (Eliquis) 5 mg TAKE 1 TABLET BY MOUTH TWICE  DAILY 180 tablet 1    fluticasone (FLONASE) 50 mcg/act nasal spray 1 spray into each nostril daily Start after finishing the 3 days of Prednsione 16 g 0    furosemide (LASIX) 20 mg tablet TAKE 1 TABLET BY MOUTH DAILY 90 tablet 3    hydrOXYzine HCL (ATARAX) 25 mg tablet Take 1 tablet (25 mg total) by mouth daily at bedtime as needed (sleep) 10 tablet 0    levothyroxine 25 mcg tablet TAKE 1 TABLET BY MOUTH DAILY IN  THE EARLY MORNING 90 tablet 3    lovastatin (MEVACOR) 10 MG tablet TAKE 1 TABLET BY MOUTH DAILY AT  BEDTIME 90 tablet 1    mesalamine (CANASA) 1,000 mg suppository Insert 1 suppository (1,000 mg total) into the rectum daily at bedtime R[facility] - SEND ASAP for patient 90 suppository 3    metoprolol succinate (TOPROL-XL) 100 mg 24 hr tablet Take 1 tablet (100 mg total) by mouth daily 90 tablet 3    potassium chloride (Klor-Con M10) 10 mEq tablet Take 1 tablet (10 mEq total) by mouth daily 90 tablet 3    sodium chloride (OCEAN) 0.65 % nasal spray 1 spray into each nostril as needed for rhinitis or congestion 60 mL 0     No current facility-administered medications on file prior to visit.      Social History     Tobacco Use    Smoking status: Never    Smokeless tobacco: Never    Tobacco comments:     no smoking for 46 years   Vaping Use    Vaping status: Never Used   Substance and Sexual Activity    Alcohol use: Yes     Alcohol/week: 7.0 standard drinks of alcohol     Types: 7 Glasses of wine per week     Comment: occassional    Drug use: No    Sexual activity: Never     Partners: Male     Birth control/protection: None        Objective   /80   Pulse 65   Ht 5' 5\" (1.651 m)   Wt 79.8 kg (176 lb)   SpO2 97%   BMI 29.29 kg/m²      "

## 2025-03-25 DIAGNOSIS — K62.89 PROCTITIS: ICD-10-CM

## 2025-03-25 RX ORDER — MESALAMINE 1000 MG/1
1000 SUPPOSITORY RECTAL
Qty: 90 SUPPOSITORY | Refills: 1 | Status: SHIPPED | OUTPATIENT
Start: 2025-03-25

## 2025-03-25 NOTE — TELEPHONE ENCOUNTER
Not a duplicate  [facility] told patient they needed a new script sent in.    Reason for call:   [x] Refill   [] Prior Auth  [] Other:     Office:   [] PCP/Provider -   [x] Specialty/Provider - gastro    Medication: mesalamine (CANASA) 1,000 mg suppository    Dose/Frequency:  Insert 1 suppository (1,000 mg total) into the rectum daily at bedtime     Quantity: 90    Pharmacy: [facility] - [address] [facility]   Does the patient have enough for 3 days?   [] Yes   [] No - Send as HP to POD    Mail Away Pharmacy   Does the patient have enough for 10 days?   [x] Yes   [] No - Send as HP to POD

## 2025-04-10 ENCOUNTER — REMOTE DEVICE CLINIC VISIT (OUTPATIENT)
Dept: CARDIOLOGY CLINIC | Facility: CLINIC | Age: 80
End: 2025-04-10

## 2025-04-10 ENCOUNTER — OFFICE VISIT (OUTPATIENT)
Dept: CARDIOLOGY CLINIC | Facility: CLINIC | Age: 80
End: 2025-04-10
Payer: MEDICARE

## 2025-04-10 VITALS
WEIGHT: 171 LBS | BODY MASS INDEX: 28.46 KG/M2 | OXYGEN SATURATION: 96 % | HEART RATE: 85 BPM | SYSTOLIC BLOOD PRESSURE: 126 MMHG | DIASTOLIC BLOOD PRESSURE: 88 MMHG

## 2025-04-10 DIAGNOSIS — E78.2 MIXED HYPERLIPIDEMIA: ICD-10-CM

## 2025-04-10 DIAGNOSIS — I10 ESSENTIAL HYPERTENSION: ICD-10-CM

## 2025-04-10 DIAGNOSIS — I48.19 PERSISTENT ATRIAL FIBRILLATION (HCC): ICD-10-CM

## 2025-04-10 DIAGNOSIS — I36.1 NONRHEUMATIC TRICUSPID VALVE REGURGITATION: ICD-10-CM

## 2025-04-10 DIAGNOSIS — I49.5 SSS (SICK SINUS SYNDROME) (HCC): ICD-10-CM

## 2025-04-10 DIAGNOSIS — I50.23 ACUTE ON CHRONIC SYSTOLIC CONGESTIVE HEART FAILURE (HCC): ICD-10-CM

## 2025-04-10 DIAGNOSIS — Z79.01 ANTICOAGULANT LONG-TERM USE: ICD-10-CM

## 2025-04-10 DIAGNOSIS — I34.0 NONRHEUMATIC MITRAL VALVE REGURGITATION: ICD-10-CM

## 2025-04-10 DIAGNOSIS — Z79.899 ON AMIODARONE THERAPY: ICD-10-CM

## 2025-04-10 DIAGNOSIS — Z95.0 PRESENCE OF PERMANENT CARDIAC PACEMAKER: Primary | ICD-10-CM

## 2025-04-10 DIAGNOSIS — N18.31 STAGE 3A CHRONIC KIDNEY DISEASE (HCC): ICD-10-CM

## 2025-04-10 DIAGNOSIS — E03.9 ACQUIRED HYPOTHYROIDISM: ICD-10-CM

## 2025-04-10 DIAGNOSIS — J44.9 CHRONIC OBSTRUCTIVE PULMONARY DISEASE, UNSPECIFIED COPD TYPE (HCC): ICD-10-CM

## 2025-04-10 DIAGNOSIS — I42.8 OTHER CARDIOMYOPATHY (HCC): ICD-10-CM

## 2025-04-10 PROBLEM — E66.9 OBESITY (BMI 30-39.9): Status: RESOLVED | Noted: 2018-02-23 | Resolved: 2025-04-10

## 2025-04-10 PROCEDURE — 93000 ELECTROCARDIOGRAM COMPLETE: CPT | Performed by: INTERNAL MEDICINE

## 2025-04-10 PROCEDURE — 99214 OFFICE O/P EST MOD 30 MIN: CPT | Performed by: INTERNAL MEDICINE

## 2025-04-10 NOTE — PROGRESS NOTES
Cardiology Follow Up    Madeline Good  1945  [id number]  [facility] [place]  [address]  [address]  [place] [id number] [phone]    1. Persistent atrial fibrillation (HCC)        2. Mixed hyperlipidemia  POCT ECG      3. Essential hypertension        4. Other cardiomyopathy (HCC)        5. Nonrheumatic mitral valve regurgitation        6. Nonrheumatic tricuspid valve regurgitation        7. Acute on chronic systolic congestive heart failure (HCC)        8. SSS (sick sinus syndrome) (HCC)        9. Chronic obstructive pulmonary disease, unspecified COPD type (HCC)        10. Acquired hypothyroidism        11. Stage 3a chronic kidney disease (HCC)        12. Anticoagulant long-term use        13. On amiodarone therapy                Summary of my recommendation for the patient  Patient is currently in sinus rhythm  Amiodarone dose has been reduced to 100 mg a day and she is feeling well    Patient feels well in sinus rhythm  Has A-fib about 11%     If A-fib burden remains low, just continue with current therapy  If A-fib burden increases we will proceed with PFA ablation    Patient has prior history of tachycardia mediated cardiomyopathy    Patient had extensive ablation-PVI, posterior wall, mitral isthmus flutter  There was a recurrence and she was in atrial flutter with heart rate of 120/min  Amiodarone 200 bid, and metoprolol 100 bid   Patient went back to sinus rhythm  Device check shows there are still brief breakthrough episodes    Now on amiodarone 100 mg once daily  Check for amiodarone toxicity-TSH, PFT with DLCO, CMP, iron evaluation    If patient has any recurrence we will repeat ablation with PFA ( consider ablation in VOM region)  before considering AV node ablation     Follow-up in 6 months              Interval History:     The patient is doing reasonably well  Currently on amiodarone 100 mg a day    Patient  underwent comprehensive ablation in October 2023  Unfortunately she went back  to flutter with RVR  She has a prior history of tachycardia mediated cardiomyopathy  Patient went back to sinus rhythm on doubling the dose of amiodarone and metoprolol  Device check does reveal brief episodes of breakthrough atrial flutter    She does have a pacemaker with a his lead in place    The patient is not complaining of anginal like chest pain or chest pressure  There is no worsening orthopnea, paroxysmal nocturnal dyspnea  There is some  leg swelling     No significant palpitation  There is no history of presyncope or syncope  There is rare history of transient  lightheadedness  There has been some improvement in exertional intolerance      Past medical history  The patient is a very pleasant 73 year old lady referred to me by Dr Gomez for management of A fi + RVR + heart failure      She does have significant medical illnesses in the form of  PAF  Hypertension  Hyperlipidemia  CMP - suspected tachy mediated      As for the atrial fibrillation  It has been present for quite some time now  Initially to present as episodes of palpitation  This has progressively increased in frequency and duration         There is history of snoring at night  There is occasional history of morning fatigability  There is occasional history of daytime sleepiness    /88 (BP Location: Left arm, Patient Position: Sitting, Cuff Size: Large)   Pulse 85   Wt 77.6 kg (171 lb)   SpO2 96%   BMI 28.46 kg/m²       Patient Active Problem List   Diagnosis    Essential hypertension    Mixed hyperlipidemia    Migraine with aura and without status migrainosus, not intractable    Myocardiopathy (HCC)    Anticoagulant long-term use    Nonrheumatic mitral valve regurgitation    Nonrheumatic aortic valve insufficiency    Nonrheumatic tricuspid valve regurgitation    Persistent atrial fibrillation (HCC)    Other insomnia    Chronic obstructive pulmonary  disease (HCC)    Stage 3a chronic kidney disease (HCC)    Acute on chronic systolic congestive heart failure (HCC)    SSS (sick sinus syndrome) (HCC)    Acquired hypothyroidism    Iron deficiency anemia    On amiodarone therapy    Chronic heart failure with preserved ejection fraction (HFpEF) (HCC)     Past Medical History:   Diagnosis Date    Aphasia, mixed 07/28/2017    Atrial fibrillation (HCC)     CHF (congestive heart failure) (HCC)     Colon polyp     Headache     Hyperlipidemia     Hypertension     Lyme disease     Shortness of breath     TIA (transient ischemic attack)     Vertigo      Social History     Socioeconomic History    Marital status: /Civil Union     Spouse name: Not on file    Number of children: Not on file    Years of education: Not on file    Highest education level: Not on file   Occupational History    Occupation: Retired   Tobacco Use    Smoking status: Never    Smokeless tobacco: Never    Tobacco comments:     no smoking for 46 years   Vaping Use    Vaping status: Never Used   Substance and Sexual Activity    Alcohol use: Yes     Alcohol/week: 7.0 standard drinks of alcohol     Types: 7 Glasses of wine per week     Comment: occassional    Drug use: No    Sexual activity: Never     Partners: Male     Birth control/protection: None   Other Topics Concern    Not on file   Social History Narrative    Always uses seat belt     Social Drivers of Health     Financial Resource Strain: Low Risk  (12/11/2023)    Overall Financial Resource Strain (CARDIA)     Difficulty of Paying Living Expenses: Not very hard   Food Insecurity: No Food Insecurity (12/30/2024)    Hunger Vital Sign     Worried About Running Out of Food in the Last Year: Never true     Ran Out of Food in the Last Year: Never true   Transportation Needs: No Transportation Needs (12/30/2024)    PRAPARE - Transportation     Lack of Transportation (Medical): No     Lack of Transportation (Non-Medical): No   Physical Activity: Not on  file   Stress: Not on file   Social Connections: Unknown (6/18/2024)    Received from [facility]    Social EndorphMe     How often do you feel lonely or isolated from those around you? (Adult - for ages 18 years and over): Not on file   Intimate Partner Violence: Not on file   Housing Stability: Unknown (12/30/2024)    Housing Stability Vital Sign     Unable to Pay for Housing in the Last Year: No     Number of Times Moved in the Last Year: Not on file     Homeless in the Last Year: No      Family History   Problem Relation Age of Onset    Lung cancer Mother     Dementia Father     Alzheimer's disease Father     Other Father         Cardiac Disorder     Lung cancer Maternal Grandmother     Breast cancer Paternal Aunt 40    No Known Problems Paternal Aunt     No Known Problems Paternal Aunt      Past Surgical History:   Procedure Laterality Date    CARDIAC ELECTROPHYSIOLOGY PROCEDURE N/A 10/19/2023    Procedure: Cardiac eps/afib ablation PVI/POST WALL;  Surgeon: Piero Fajardo MD;  Location:  CARDIAC CATH LAB;  Service: Cardiology    CARDIAC PACEMAKER PLACEMENT  12/2019    COLONOSCOPY      RI SIGMOIDOSCOPY FLX DX W/COLLJ SPEC BR/WA IF PFRMD N/A 11/28/2017    Procedure: SIGMOIDOSCOPY FLEXIBLE;  Surgeon: Kavon Fernandez MD;  Location: [facility];  Service: Gastroenterology    TONSILLECTOMY         Current Outpatient Medications:     amiodarone 200 mg tablet, TAKE 1 TABLET BY MOUTH DAILY, Disp: 90 tablet, Rfl: 3    apixaban (Eliquis) 5 mg, TAKE 1 TABLET BY MOUTH TWICE  DAILY, Disp: 180 tablet, Rfl: 1    furosemide (LASIX) 20 mg tablet, TAKE 1 TABLET BY MOUTH DAILY, Disp: 90 tablet, Rfl: 3    hydrOXYzine HCL (ATARAX) 25 mg tablet, Take 1 tablet (25 mg total) by mouth daily at bedtime as needed (sleep), Disp: 10 tablet, Rfl: 0    levothyroxine 25 mcg tablet, TAKE 1 TABLET BY MOUTH DAILY IN  THE EARLY MORNING, Disp: 90 tablet, Rfl: 3    lovastatin (MEVACOR) 10 MG tablet, TAKE 1 TABLET BY MOUTH DAILY AT  BEDTIME, Disp:  90 tablet, Rfl: 1    mesalamine (CANASA) 1,000 mg suppository, Insert 1 suppository (1,000 mg total) into the rectum daily at bedtime [facility] - SEND ASAP for patient, Disp: 90 suppository, Rfl: 1    metoprolol succinate (TOPROL-XL) 100 mg 24 hr tablet, Take 1 tablet (100 mg total) by mouth daily, Disp: 90 tablet, Rfl: 3    potassium chloride (Klor-Con M10) 10 mEq tablet, Take 1 tablet (10 mEq total) by mouth daily, Disp: 90 tablet, Rfl: 3    fluticasone (FLONASE) 50 mcg/act nasal spray, 1 spray into each nostril daily Start after finishing the 3 days of Prednsione (Patient not taking: Reported on 4/10/2025), Disp: 16 g, Rfl: 0    sodium chloride (OCEAN) 0.65 % nasal spray, 1 spray into each nostril as needed for rhinitis or congestion (Patient not taking: Reported on 4/10/2025), Disp: 60 mL, Rfl: 0  No Known Allergies        LAB RESULTS:    CBC:  WBC   Date Value Ref Range Status   12/23/2024 6.50 4.31 - 10.16 Thousand/uL Final     Hemoglobin   Date Value Ref Range Status   12/23/2024 16.3 (H) 11.5 - 15.4 g/dL Final     Hematocrit   Date Value Ref Range Status   12/23/2024 48.7 (H) 34.8 - 46.1 % Final     MCV   Date Value Ref Range Status   12/23/2024 106 (H) 82 - 98 fL Final     Platelets   Date Value Ref Range Status   12/23/2024 213 149 - 390 Thousands/uL Final     RBC   Date Value Ref Range Status   12/23/2024 4.59 3.81 - 5.12 Million/uL Final     MCH   Date Value Ref Range Status   12/23/2024 35.5 (H) 26.8 - 34.3 pg Final     MCHC   Date Value Ref Range Status   12/23/2024 33.5 31.4 - 37.4 g/dL Final     RDW   Date Value Ref Range Status   12/23/2024 14.7 11.6 - 15.1 % Final     MPV   Date Value Ref Range Status   12/23/2024 11.6 8.9 - 12.7 fL Final     nRBC   Date Value Ref Range Status   05/17/2024 0 /100 WBCs Final       CMP:  Potassium   Date Value Ref Range Status   12/23/2024 4.5 3.5 - 5.3 mmol/L Final     Chloride   Date Value Ref Range Status   12/23/2024 102 96 - 108 mmol/L Final     CO2   Date Value Ref  Range Status   12/23/2024 33 (H) 21 - 32 mmol/L Final     BUN   Date Value Ref Range Status   12/23/2024 11 5 - 25 mg/dL Final     Creatinine   Date Value Ref Range Status   12/23/2024 1.05 0.60 - 1.30 mg/dL Final     Comment:     Standardized to IDMS reference method     Calcium   Date Value Ref Range Status   12/23/2024 9.6 8.4 - 10.2 mg/dL Final     AST   Date Value Ref Range Status   03/10/2025 26 13 - 39 U/L Final     ALT   Date Value Ref Range Status   03/10/2025 19 7 - 52 U/L Final     Comment:     Specimen collection should occur prior to Sulfasalazine administration due to the potential for falsely depressed results.      Alkaline Phosphatase   Date Value Ref Range Status   03/10/2025 124 (H) 34 - 104 U/L Final     eGFR   Date Value Ref Range Status   12/23/2024 50 ml/min/1.73sq m Final        Magnesium:   Magnesium   Date Value Ref Range Status   03/04/2024 2.1 1.9 - 2.7 mg/dL Final        A1C:  Hemoglobin A1C   Date Value Ref Range Status   11/01/2022 5.8 (H) Normal 3.8-5.6%; PreDiabetic 5.7-6.4%; Diabetic >=6.5%; Glycemic control for adults with diabetes <7.0% % Final        TSH:  TSH 3RD GENERATON   Date Value Ref Range Status   12/23/2024 5.477 (H) 0.450 - 4.500 uIU/mL Final     Comment:     The recommended reference ranges for TSH during pregnancy are as follows:   First trimester 0.100 to 2.500 uIU/mL   Second trimester  0.200 to 3.000 uIU/mL   Third trimester 0.300 to 3.000 uIU/m    Note: Normal ranges may not apply to patients who are transgender, non-binary, or whose legal sex, sex at birth, and gender identity differ.  Adult TSH (3rd generation) reference range follows the recommended guidelines of the American Thy[employer] Association, January, 2020.        PT/INR:  Protime   Date Value Ref Range Status   10/19/2023 16.8 (H) 11.6 - 14.5 seconds Final     INR   Date Value Ref Range Status   10/19/2023 1.38 (H) 0.84 - 1.19 Final       Lipid Panel:  Cholesterol   Date Value Ref Range Status   12/23/2024  166 See Comment mg/dL Final     Comment:     Cholesterol:         Pediatric <18 Years        Desirable          <170 mg/dL      Borderline High    170-199 mg/dL      High               >=200 mg/dL        Adult >=18 Years            Desirable        <200 mg/dL      Borderline High  200-239 mg/dL      High             >239 mg/dL       Triglycerides   Date Value Ref Range Status   2024 121 See Comment mg/dL Final     Comment:     Triglyceride:     0-9Y            <75mg/dL     10Y-17Y         <90 mg/dL       >=18Y     Normal          <150 mg/dL     Borderline High 150-199 mg/dL     High            200-499 mg/dL        Very High       >499 mg/dL    Specimen collection should occur prior to Metamizole administration due to the potential for falsely depressed results.     HDL, Direct   Date Value Ref Range Status   2024 46 (L) >=50 mg/dL Final     Non-HDL-Chol (CHOL-HDL)   Date Value Ref Range Status   2024 120 mg/dl Final       Troponin:  Troponin I   Date Value Ref Range Status   02/10/2019 0.05 (H) <=0.04 ng/mL Final     Comment:       [employer] Chemistry analyzer 99% cutoff is > 0.04 ng/mL in network labs     o cTnI 99% cutoff is useful only when applied to patients in the clinical setting of myocardial ischemia   o cTnI 99% cutoff should be interpreted in the context of clinical history, ECG findings and possibly cardiac imaging to establish correct diagnosis.   o cTnI 99% cutoff may be suggestive but clearly not indicative of a coronary event without the clinical setting of myocardial ischemia.           Imaging:    EK/10/2024        LUIZA:  10/19/2023  Interpretation Summary    Left Ventricle: Left ventricular cavity size is normal. The left ventricular ejection fraction is 50-55%. Systolic function is low normal.    Right Ventricle: Right ventricular cavity size is mildly dilated. Systolic function is low normal.    Left Atrium: The atrium is dilated.    Right Atrium: The atrium is dilated.     Atrial Septum: No patent foramen ovale detected, confirmed at rest using color doppler.    Left Atrial Appendage: There is reduced function. There is no thrombus. There is no spontaneous echo contrast.    Aortic Valve: There is mild regurgitation with a centrally directed jet.    Mitral Valve: There is mild regurgitation with a centrally directed jet.    Tricuspid Valve: There is mild to moderate regurgitation.    Echocardiogram:  Results for orders placed during the hospital encounter of 11/01/22    Echo complete w/ contrast if indicated    Interpretation Summary    Left Ventricle: Left ventricular cavity size is normal. Systolic function is mildly reduced.  LVEF approximately 40 to 45%.  Patient was in atrial fibrillation with periods of rapid ventricular response which interferes with interpretation. There is mild global hypokinesis. Unable to assess diastolic function due to atrial fibrillation.    Right Ventricle: Right ventricular cavity size is mildly dilated. Systolic function is mildly to moderately reduced.    Left Atrium: The atrium is moderate to markedly dilated.    Right Atrium: The atrium is moderately dilated.    Aortic Valve: There is mild regurgitation.    Mitral Valve: There is moderate annular calcification. There is moderate regurgitation.    Tricuspid Valve: There is moderate regurgitation. The right ventricular systolic pressure is mildly elevated.  Estimated RVSP 35 mm Hg.  Pacer leads noted.    Pulmonic Valve: There is mild regurgitation.      03/07/2023-Echo follow up/limited w/ contrast if indicated  Interpretation Summary    Left Ventricle: Left ventricular cavity size is normal. The left ventricular ejection fraction is 55%. Systolic function is normal.    Left Atrium: The atrium is moderately dilated.    Right Atrium: The atrium is mildly dilated.    Aortic Valve: There is mild to moderate regurgitation.    Tricuspid Valve: There is moderate regurgitation.    Pulmonary Artery: The  estimated pulmonary artery systolic pressure is 52.0 mmHg. The pulmonary artery systolic pressure is moderately increased.    This is a limited echocardiogram performed to evaluate LV function. Interpretation is therefore limited.    10/10/2024-ECHO FOLLOW UP LIMITED W/ CONTRAST  Interpretation Summary    Left Ventricle: Left ventricular cavity size is normal. The left ventricular ejection fraction is 55%. Systolic function is normal. Although no diagnostic regional wall motion abnormality was identified, this possibility cannot be completely excluded on the basis of this study. This is a limited study to assess left ventricle ejection fraction.    Tricuspid Valve: Estimated RVSP 45 mmHg.  Pacer lead noted.      03/07/2023-Stress Test:   Results for orders placed during the hospital encounter of 03/07/23    NM myocardial perfusion spect (rx stress and/or rest)    Interpretation Summary    Stress ECG: No ST deviation is noted. There were no arrhythmias during recovery. . The ECG was not diagnostic due to pharmacological (vasodilator) stress.    Perfusion Defect Conclusion: The stress/rest perfusion ratio is 1.03 . There is no evidence of transient ischemic dilation (TID).    Stress Function: Left ventricular function post-stress is normal. Post-stress ejection fraction is 79 %.    Perfusion: There is a left ventricular perfusion defect that is medium in size with moderate reduction in uptake present in the mid to apical anterior and anterolateral location(s) that is partially reversible.    Stress Combined Conclusion: Left ventricular perfusion is probably abnormal.      Cardiac Catheterization:  No results found for this or any previous visit.      HOLTER MONITOR: 24 HOUR/48 HOUR MONITORS  No results found for this or any previous visit.      AMB Extended Holter Monitor: Zio XT/AT or BioTel  No results found for this or any previous visit.      Cardiac CT:  10/11/2023-CT Cardiac w Pulmonary Vein Mapping  FINDINGS:      PULMONARY VEIN ANATOMY: Typical with two right and two left pulmonary veins.     APPROXIMATE MEASUREMENTS OF PULMONARY VEIN OSTIA:     Right superior: 22 mm.  Right inferior: 12 mm.     Left superior: 14 mm.  Left inferior: 16 mm.     CARDIAC STRUCTURES: Top normal heart size. Pacer leads terminate in the right atrium and right ventricle. Mild coronary artery calcifications.     GREAT VESSELS: Visualized thoracic aorta and central pulmonary arterial tree are within normal limits for the patient's age.     EXTRACARDIAC FINDINGS: Lower lobe predominant fibrotic changes in the visualized lungs again seen. Previously noted 2 mm right upper lobe nodule is not definitively visualized on this study, possibly due to motion artifact. Large hiatal hernia containing   the stomach again identified.     IMPRESSION:     Pulmonary venous anatomy as described above.     Large hiatal hernia with intrathoracic stomach, and pulmonary fibrosis again noted.      11/03/2023-Cardioversion    Indication for procedure: Cardioversion  Requesting physician:Dr Laura Goodman  Anticoagulation:  Eliquis     The patient was identified in the OR . A time out procedure was performed.     The patient was sedated by the anesthesia service .     The patient was cardioverted to sinus rhythm with a 200J biphasic shock.       There were no complications.  The patient was monitored for the appropriate time interval in the holding area, and was transferred back to the medical floor in stable condition.    Pre Cardioversion EKG  11/03/2022      Post Cardioversion EKG  11/03/2022        Cardiac CT:    CT Cardiac w Pulmonary Vein Mapping  10/11/2023    FINDINGS:     PULMONARY VEIN ANATOMY: Typical with two right and two left pulmonary veins.     APPROXIMATE MEASUREMENTS OF PULMONARY VEIN OSTIA:     Right superior: 22 mm.  Right inferior: 12 mm.     Left superior: 14 mm.  Left inferior: 16 mm.     CARDIAC STRUCTURES: Top normal heart size. Pacer leads  terminate in the right atrium and right ventricle. Mild coronary artery calcifications.     GREAT VESSELS: Visualized thoracic aorta and central pulmonary arterial tree are within normal limits for the patient's age.     EXTRACARDIAC FINDINGS: Lower lobe predominant fibrotic changes in the visualized lungs again seen. Previously noted 2 mm right upper lobe nodule is not definitively visualized on this study, possibly due to motion artifact. Large hiatal hernia containing   the stomach again identified.     IMPRESSION:     Pulmonary venous anatomy as described above.     Large hiatal hernia with intrathoracic stomach, and pulmonary fibrosis again noted.        DEVICE CHECK:     Results for orders placed or performed in visit on 04/10/25   Cardiac EP device report    Narrative    MDT DC PM/ACTIVE SYSTEM IS MRI CONDITIONAL  CARELINK TRANSMISSION: BATTERY VOLTAGE ADEQUATE. (6.8 YRS) AP 69%  7%. ALL AVAILABLE LEAD PARAMETERS WITHIN NORMAL LIMITS. NO SIGNIFICANT HIGH RATE EPISODES. 383 AT/AF EPISODES DETECTED (26.7% OF TIME) LONGEST 11 HOURS. PATIENT IS ON ELIQUIS, AMIO AND METOPROLOL SUCC; AF ABL DONE 10/19/23. SINGLE PVC COUNT. NORMAL DEVICE FUNCTION.---KUMAR            Review of Systems:  Review of Systems   All other systems reviewed and are negative.  As described in my history of present illness    Physical Exam:  Physical Exam  Vitals reviewed.   Constitutional:       Appearance: Normal appearance. She is well-developed and normal weight. She is not ill-appearing.      Comments: Not in any distress at the current time   HENT:      Head: Normocephalic and atraumatic.      Right Ear: External ear normal.      Left Ear: External ear normal.      Nose: Nose normal.      Mouth/Throat:      Pharynx: Uvula midline.   Eyes:      General: Lids are normal. No scleral icterus.     Extraocular Movements: Extraocular movements intact.      Conjunctiva/sclera: Conjunctivae normal.      Pupils: Pupils are equal, round, and  reactive to light.      Comments: No pallor  No cyanosis  No icterus   Neck:      Thyroid: No thyromegaly.      Vascular: No carotid bruit or JVD.      Trachea: Trachea normal.      Comments: No jugular lymphadenopathy  Short thick neck  Cardiovascular:      Rate and Rhythm: Normal rate and regular rhythm.      Chest Wall: PMI is not displaced.      Pulses: Normal pulses.      Heart sounds: Normal heart sounds, S1 normal and S2 normal. No murmur heard.     No friction rub. No gallop. No S3 or S4 sounds.   Pulmonary:      Effort: Pulmonary effort is normal. No accessory muscle usage or respiratory distress.      Breath sounds: Normal breath sounds. No decreased breath sounds, wheezing, rhonchi or rales.   Chest:      Chest wall: No tenderness.   Abdominal:      General: Bowel sounds are normal. There is no distension.      Palpations: Abdomen is soft. There is no hepatomegaly, splenomegaly or mass.      Tenderness: There is no abdominal tenderness.      Comments: Central obesity present   Musculoskeletal:         General: No swelling, tenderness or deformity. Normal range of motion.      Cervical back: Normal range of motion and neck supple. No rigidity.      Right lower leg: No edema.      Left lower leg: No edema.   Lymphadenopathy:      Cervical: No cervical adenopathy.   Skin:     Findings: No abrasion, erythema, lesion or rash.      Nails: There is no clubbing.   Neurological:      Mental Status: She is alert and oriented to person, place, and time. Mental status is at baseline.      Motor: No weakness.      Comments: Facial symmetry is retained  Extraocular movements are retained  Head neck tongue and palate movement are retained and symmetric   Psychiatric:         Mood and Affect: Mood normal.         Speech: Speech normal.         Behavior: Behavior normal.         Thought Content: Thought content normal.         Judgment: Judgment normal.         Discussion/Summary:      Atrial flutter with RVR  On  amiodarone, metoprolol and apixaban  [id number] score is 5  Patient has a long history of atrial fibrillation and flutter  She underwent ablation in October 2023    There was a recurrence and she was in atrial flutter with heart rate of 120/min  Amiodarone and metoprolol were increased to 200 mg 2 times daily and 100 mg 2 times daily  Patient went back to sinus rhythm  Device check shows there are still brief breakthrough episodes    Check for amiodarone toxicity-CMP, TSH, PFT with DLCO, evaluation    On amiodarone 100 mg daily     If patient has recurrence of a flutter, will proceed with repeat PFA ablation-will look at breaks in the line for flutter /also plan for vein of Efe injection    If patient continues to have recurrence of tachycardia final option would be an AV node ablation             Cardiomyopathy , NYHA II, LVEF =35%  Improved currently to 60%  Suspected tachy mediated  Increasing beta blockers for better rate control  On ACEI  EF has improved to 60%           Hypertension  On lisinopril and metoprolol   Blood pressure is 122/84       Hyperlipidemia  On statin-lovastatin   No myositis or myalgia         Suspected REYNOLD  Need to be evaluated for CPAP         Obesity  BMI is 29  Patient advised on dietary him restrictions to lose weight        Long-term anticoagulation  Chads Vasc score is 5  To continue with apixaban        Sinus bradycardia, sick sinus syndrome  Patient is post pacemaker  All device parameters a stable

## 2025-04-10 NOTE — LETTER
April 10, 2025     Arnold Payton DO  [address]  [facility], operated by [facility] [id number]    Patient: Madeline Good   YOB: 1945   Date of Visit: 4/10/2025       Dear Dr. Arnold Payton,   Madeline Good:    Thank you for referring Madeline Good to me for evaluation. Below are my notes for this consultation.    If you have questions, please do not hesitate to call me. I look forward to following your patient along with you.         Sincerely,        Piero Fajardo MD        CC: Madeline SANTA Latisha Fajardo MD  4/10/2025  4:32 PM  Sign when Signing Visit                                             Cardiology Follow Up    Madeline Good  1945  [id number]  [facility] [place]  [address]  [address]  [facility] [address]  [phone]  [phone]    1. Persistent atrial fibrillation (HCC)        2. Mixed hyperlipidemia  POCT ECG      3. Essential hypertension        4. Other cardiomyopathy ([facility])        5. Nonrheumatic mitral valve regurgitation        6. Nonrheumatic tricuspid valve regurgitation        7. Acute on chronic systolic congestive heart failure (HCC)        8. SSS (sick sinus syndrome) (HCC)        9. Chronic obstructive pulmonary disease, unspecified COPD type (HCC)        10. Acquired hypothyroidism        11. Stage 3a chronic kidney disease (HCC)        12. Anticoagulant long-term use        13. On amiodarone therapy                Summary of my recommendation for the patient  Patient is currently in sinus rhythm  Amiodarone dose has been reduced to 100 mg a day and she is feeling well    Patient feels well in sinus rhythm  Has A-fib about 11%     If A-fib burden remains low, just continue with current therapy  If A-fib burden increases we will proceed with PFA ablation    Patient has prior history of tachycardia mediated cardiomyopathy    Patient had extensive ablation-PVI, posterior wall, mitral isthmus flutter  There was a recurrence and she was in atrial  flutter with heart rate of 120/min  Amiodarone 200 bid, and metoprolol 100 bid   Patient went back to sinus rhythm  Device check shows there are still brief breakthrough episodes    Now on amiodarone 100 mg once daily  Check for amiodarone toxicity-TSH, PFT with DLCO, CMP, iron evaluation    If patient has any recurrence we will repeat ablation with PFA ( consider ablation in VOM region)  before considering AV node ablation     Follow-up in 6 months              Interval History:     The patient is doing reasonably well  Currently on amiodarone 100 mg a day    Patient underwent comprehensive ablation in October 2023  Unfortunately she went back  to flutter with RVR  She has a prior history of tachycardia mediated cardiomyopathy  Patient went back to sinus rhythm on doubling the dose of amiodarone and metoprolol  Device check does reveal brief episodes of breakthrough atrial flutter    She does have a pacemaker with a his lead in place    The patient is not complaining of anginal like chest pain or chest pressure  There is no worsening orthopnea, paroxysmal nocturnal dyspnea  There is some  leg swelling     No significant palpitation  There is no history of presyncope or syncope  There is rare history of transient  lightheadedness  There has been some improvement in exertional intolerance      Past medical history  The patient is a very pleasant 73 year old lady referred to me by Dr Gomez for management of A fi + RVR + heart failure      She does have significant medical illnesses in the form of  PAF  Hypertension  Hyperlipidemia  CMP - suspected tachy mediated      As for the atrial fibrillation  It has been present for quite some time now  Initially to present as episodes of palpitation  This has progressively increased in frequency and duration         There is history of snoring at night  There is occasional history of morning fatigability  There is occasional history of daytime sleepiness    /88 (BP  Location: Left arm, Patient Position: Sitting, Cuff Size: Large)   Pulse 85   Wt 77.6 kg (171 lb)   SpO2 96%   BMI 28.46 kg/m²       Patient Active Problem List   Diagnosis   • Essential hypertension   • Mixed hyperlipidemia   • Migraine with aura and without status migrainosus, not intractable   • Myocardiopathy (HCC)   • Anticoagulant long-term use   • Nonrheumatic mitral valve regurgitation   • Nonrheumatic aortic valve insufficiency   • Nonrheumatic tricuspid valve regurgitation   • Persistent atrial fibrillation (HCC)   • Other insomnia   • Chronic obstructive pulmonary disease (HCC)   • Stage 3a chronic kidney disease (HCC)   • Acute on chronic systolic congestive heart failure (HCC)   • SSS (sick sinus syndrome) (HCC)   • Acquired hypothyroidism   • Iron deficiency anemia   • On amiodarone therapy   • Chronic heart failure with preserved ejection fraction (HFpEF) ([facility])     Past Medical History:   Diagnosis Date   • Aphasia, mixed 07/28/2017   • Atrial fibrillation (HCC)    • CHF (congestive heart failure) ([facility])    • Colon polyp    • Headache    • Hyperlipidemia    • Hypertension    • Lyme disease    • Shortness of breath    • TIA (transient ischemic attack)    • Vertigo      Social History     Socioeconomic History   • Marital status: /Civil Union     Spouse name: Not on file   • Number of children: Not on file   • Years of education: Not on file   • Highest education level: Not on file   Occupational History   • Occupation: Retired   Tobacco Use   • Smoking status: Never   • Smokeless tobacco: Never   • Tobacco comments:     no smoking for 46 years   Vaping Use   • Vaping status: Never Used   Substance and Sexual Activity   • Alcohol use: Yes     Alcohol/week: 7.0 standard drinks of alcohol     Types: 7 Glasses of wine per week     Comment: occassional   • Drug use: No   • Sexual activity: Never     Partners: Male     Birth control/protection: None   Other Topics Concern   • Not on file   Social  History Narrative    Always uses seat belt     Social Drivers of Health     Financial Resource Strain: Low Risk  (12/11/2023)    Overall Financial Resource Strain (CARDIA)    • Difficulty of Paying Living Expenses: Not very hard   Food Insecurity: No Food Insecurity (12/30/2024)    Hunger Vital Sign    • Worried About Running Out of Food in the Last Year: Never true    • Ran Out of Food in the Last Year: Never true   Transportation Needs: No Transportation Needs (12/30/2024)    PRAPARE - Transportation    • Lack of Transportation (Medical): No    • Lack of Transportation (Non-Medical): No   Physical Activity: Not on file   Stress: Not on file   Social Connections: Unknown (6/18/2024)    Received from [employer]    Social Connections    • How often do you feel lonely or isolated from those around you? (Adult - for ages 18 years and over): Not on file   Intimate Partner Violence: Not on file   Housing Stability: Unknown (12/30/2024)    Housing Stability Vital Sign    • Unable to Pay for Housing in the Last Year: No    • Number of Times Moved in the Last Year: Not on file    • Homeless in the Last Year: No      Family History   Problem Relation Age of Onset   • Lung cancer Mother    • Dementia Father    • Alzheimer's disease Father    • Other Father         Cardiac Disorder    • Lung cancer Maternal Grandmother    • Breast cancer Paternal Aunt 40   • No Known Problems Paternal Aunt    • No Known Problems Paternal Aunt      Past Surgical History:   Procedure Laterality Date   • CARDIAC ELECTROPHYSIOLOGY PROCEDURE N/A 10/19/2023    Procedure: Cardiac eps/afib ablation PVI/POST WALL;  Surgeon: Piero Fajardo MD;  Location:  CARDIAC CATH LAB;  Service: Cardiology   • CARDIAC PACEMAKER PLACEMENT  12/2019   • COLONOSCOPY     • WV SIGMOIDOSCOPY FLX DX W/COLLJ SPEC BR/WA IF PFRMD N/A 11/28/2017    Procedure: SIGMOIDOSCOPY FLEXIBLE;  Surgeon: Kavon Fernandez MD;  Location: [facility];  Service: Gastroenterology   •  TONSILLECTOMY         Current Outpatient Medications:   •  amiodarone 200 mg tablet, TAKE 1 TABLET BY MOUTH DAILY, Disp: 90 tablet, Rfl: 3  •  apixaban (Eliquis) 5 mg, TAKE 1 TABLET BY MOUTH TWICE  DAILY, Disp: 180 tablet, Rfl: 1  •  furosemide (LASIX) 20 mg tablet, TAKE 1 TABLET BY MOUTH DAILY, Disp: 90 tablet, Rfl: 3  •  hydrOXYzine HCL (ATARAX) 25 mg tablet, Take 1 tablet (25 mg total) by mouth daily at bedtime as needed (sleep), Disp: 10 tablet, Rfl: 0  •  levothyroxine 25 mcg tablet, TAKE 1 TABLET BY MOUTH DAILY IN  THE EARLY MORNING, Disp: 90 tablet, Rfl: 3  •  lovastatin (MEVACOR) 10 MG tablet, TAKE 1 TABLET BY MOUTH DAILY AT  BEDTIME, Disp: 90 tablet, Rfl: 1  •  mesalamine (CANASA) 1,000 mg suppository, Insert 1 suppository (1,000 mg total) into the rectum daily at bedtime [facility] - SEND ASAP for patient, Disp: 90 suppository, Rfl: 1  •  metoprolol succinate (TOPROL-XL) 100 mg 24 hr tablet, Take 1 tablet (100 mg total) by mouth daily, Disp: 90 tablet, Rfl: 3  •  potassium chloride (Klor-Con M10) 10 mEq tablet, Take 1 tablet (10 mEq total) by mouth daily, Disp: 90 tablet, Rfl: 3  •  fluticasone (FLONASE) 50 mcg/act nasal spray, 1 spray into each nostril daily Start after finishing the 3 days of Prednsione (Patient not taking: Reported on 4/10/2025), Disp: 16 g, Rfl: 0  •  sodium chloride (OCEAN) 0.65 % nasal spray, 1 spray into each nostril as needed for rhinitis or congestion (Patient not taking: Reported on 4/10/2025), Disp: 60 mL, Rfl: 0  No Known Allergies        LAB RESULTS:    CBC:  WBC   Date Value Ref Range Status   12/23/2024 6.50 4.31 - 10.16 Thousand/uL Final     Hemoglobin   Date Value Ref Range Status   12/23/2024 16.3 (H) 11.5 - 15.4 g/dL Final     Hematocrit   Date Value Ref Range Status   12/23/2024 48.7 (H) 34.8 - 46.1 % Final     MCV   Date Value Ref Range Status   12/23/2024 106 (H) 82 - 98 fL Final     Platelets   Date Value Ref Range Status   12/23/2024 213 149 - 390 Thousands/uL Final      RBC   Date Value Ref Range Status   12/23/2024 4.59 3.81 - 5.12 Million/uL Final     MCH   Date Value Ref Range Status   12/23/2024 35.5 (H) 26.8 - 34.3 pg Final     MCHC   Date Value Ref Range Status   12/23/2024 33.5 31.4 - 37.4 g/dL Final     RDW   Date Value Ref Range Status   12/23/2024 14.7 11.6 - 15.1 % Final     MPV   Date Value Ref Range Status   12/23/2024 11.6 8.9 - 12.7 fL Final     nRBC   Date Value Ref Range Status   05/17/2024 0 /100 WBCs Final       CMP:  Potassium   Date Value Ref Range Status   12/23/2024 4.5 3.5 - 5.3 mmol/L Final     Chloride   Date Value Ref Range Status   12/23/2024 102 96 - 108 mmol/L Final     CO2   Date Value Ref Range Status   12/23/2024 33 (H) 21 - 32 mmol/L Final     BUN   Date Value Ref Range Status   12/23/2024 11 5 - 25 mg/dL Final     Creatinine   Date Value Ref Range Status   12/23/2024 1.05 0.60 - 1.30 mg/dL Final     Comment:     Standardized to IDMS reference method     Calcium   Date Value Ref Range Status   12/23/2024 9.6 8.4 - 10.2 mg/dL Final     AST   Date Value Ref Range Status   03/10/2025 26 13 - 39 U/L Final     ALT   Date Value Ref Range Status   03/10/2025 19 7 - 52 U/L Final     Comment:     Specimen collection should occur prior to Sulfasalazine administration due to the potential for falsely depressed results.      Alkaline Phosphatase   Date Value Ref Range Status   03/10/2025 124 (H) 34 - 104 U/L Final     eGFR   Date Value Ref Range Status   12/23/2024 50 ml/min/1.73sq m Final        Magnesium:   Magnesium   Date Value Ref Range Status   03/04/2024 2.1 1.9 - 2.7 mg/dL Final        A1C:  Hemoglobin A1C   Date Value Ref Range Status   11/01/2022 5.8 (H) Normal 3.8-5.6%; PreDiabetic 5.7-6.4%; Diabetic >=6.5%; Glycemic control for adults with diabetes <7.0% % Final        TSH:  TSH 3RD GENERATON   Date Value Ref Range Status   12/23/2024 5.477 (H) 0.450 - 4.500 uIU/mL Final     Comment:     The recommended reference ranges for TSH during pregnancy  are as follows:   First trimester 0.100 to 2.500 uIU/mL   Second trimester  0.200 to 3.000 uIU/mL   Third trimester 0.300 to 3.000 uIU/m    Note: Normal ranges may not apply to patients who are transgender, non-binary, or whose legal sex, sex at birth, and gender identity differ.  Adult TSH (3rd generation) reference range follows the recommended guidelines of the American Thyroid Association, January, 2020.        PT/INR:  Protime   Date Value Ref Range Status   10/19/2023 16.8 (H) 11.6 - 14.5 seconds Final     INR   Date Value Ref Range Status   10/19/2023 1.38 (H) 0.84 - 1.19 Final       Lipid Panel:  Cholesterol   Date Value Ref Range Status   12/23/2024 166 See Comment mg/dL Final     Comment:     Cholesterol:         Pediatric <18 Years        Desirable          <170 mg/dL      Borderline High    170-199 mg/dL      High               >=200 mg/dL        Adult >=18 Years            Desirable        <200 mg/dL      Borderline High  200-239 mg/dL      High             >239 mg/dL       Triglycerides   Date Value Ref Range Status   12/23/2024 121 See Comment mg/dL Final     Comment:     Triglyceride:     0-9Y            <75mg/dL     10Y-17Y         <90 mg/dL       >=18Y     Normal          <150 mg/dL     Borderline High 150-199 mg/dL     High            200-499 mg/dL        Very High       >499 mg/dL    Specimen collection should occur prior to Metamizole administration due to the potential for falsely depressed results.     HDL, Direct   Date Value Ref Range Status   12/23/2024 46 (L) >=50 mg/dL Final     Non-HDL-Chol (CHOL-HDL)   Date Value Ref Range Status   12/23/2024 120 mg/dl Final       Troponin:  Troponin I   Date Value Ref Range Status   02/10/2019 0.05 (H) <=0.04 ng/mL Final     Comment:       [employer] Chemistry analyzer 99% cutoff is > 0.04 ng/mL in network labs     o cTnI 99% cutoff is useful only when applied to patients in the clinical setting of myocardial ischemia   o cTnI 99% cutoff should be  interpreted in the context of clinical history, ECG findings and possibly cardiac imaging to establish correct diagnosis.   o cTnI 99% cutoff may be suggestive but clearly not indicative of a coronary event without the clinical setting of myocardial ischemia.           Imaging:    EK/10/2024        LUIZA:  10/19/2023  Interpretation Summary  •  Left Ventricle: Left ventricular cavity size is normal. The left ventricular ejection fraction is 50-55%. Systolic function is low normal.  •  Right Ventricle: Right ventricular cavity size is mildly dilated. Systolic function is low normal.  •  Left Atrium: The atrium is dilated.  •  Right Atrium: The atrium is dilated.  •  Atrial Septum: No patent foramen ovale detected, confirmed at rest using color doppler.  •  Left Atrial Appendage: There is reduced function. There is no thrombus. There is no spontaneous echo contrast.  •  Aortic Valve: There is mild regurgitation with a centrally directed jet.  •  Mitral Valve: There is mild regurgitation with a centrally directed jet.  •  Tricuspid Valve: There is mild to moderate regurgitation.    Echocardiogram:  Results for orders placed during the hospital encounter of 22    Echo complete w/ contrast if indicated    Interpretation Summary  •  Left Ventricle: Left ventricular cavity size is normal. Systolic function is mildly reduced.  LVEF approximately 40 to 45%.  Patient was in atrial fibrillation with periods of rapid ventricular response which interferes with interpretation. There is mild global hypokinesis. Unable to assess diastolic function due to atrial fibrillation.  •  Right Ventricle: Right ventricular cavity size is mildly dilated. Systolic function is mildly to moderately reduced.  •  Left Atrium: The atrium is moderate to markedly dilated.  •  Right Atrium: The atrium is moderately dilated.  •  Aortic Valve: There is mild regurgitation.  •  Mitral Valve: There is moderate annular calcification. There is  moderate regurgitation.  •  Tricuspid Valve: There is moderate regurgitation. The right ventricular systolic pressure is mildly elevated.  Estimated RVSP 35 mm Hg.  Pacer leads noted.  •  Pulmonic Valve: There is mild regurgitation.      03/07/2023-Echo follow up/limited w/ contrast if indicated  Interpretation Summary  •  Left Ventricle: Left ventricular cavity size is normal. The left ventricular ejection fraction is 55%. Systolic function is normal.  •  Left Atrium: The atrium is moderately dilated.  •  Right Atrium: The atrium is mildly dilated.  •  Aortic Valve: There is mild to moderate regurgitation.  •  Tricuspid Valve: There is moderate regurgitation.  •  Pulmonary Artery: The estimated pulmonary artery systolic pressure is 52.0 mmHg. The pulmonary artery systolic pressure is moderately increased.  •  This is a limited echocardiogram performed to evaluate LV function. Interpretation is therefore limited.    10/10/2024-ECHO FOLLOW UP LIMITED W/ CONTRAST  Interpretation Summary  •  Left Ventricle: Left ventricular cavity size is normal. The left ventricular ejection fraction is 55%. Systolic function is normal. Although no diagnostic regional wall motion abnormality was identified, this possibility cannot be completely excluded on the basis of this study. This is a limited study to assess left ventricle ejection fraction.  •  Tricuspid Valve: Estimated RVSP 45 mmHg.  Pacer lead noted.      03/07/2023-Stress Test:   Results for orders placed during the hospital encounter of 03/07/23    NM myocardial perfusion spect (rx stress and/or rest)    Interpretation Summary  •  Stress ECG: No ST deviation is noted. There were no arrhythmias during recovery. . The ECG was not diagnostic due to pharmacological (vasodilator) stress.  •  Perfusion Defect Conclusion: The stress/rest perfusion ratio is 1.03 . There is no evidence of transient ischemic dilation (TID).  •  Stress Function: Left ventricular function post-stress  is normal. Post-stress ejection fraction is 79 %.  •  Perfusion: There is a left ventricular perfusion defect that is medium in size with moderate reduction in uptake present in the mid to apical anterior and anterolateral location(s) that is partially reversible.  •  Stress Combined Conclusion: Left ventricular perfusion is probably abnormal.      Cardiac Catheterization:  No results found for this or any previous visit.      HOLTER MONITOR: 24 HOUR/48 HOUR MONITORS  No results found for this or any previous visit.      AMB Extended Holter Monitor: Zio XT/AT or BioTel  No results found for this or any previous visit.      Cardiac CT:  10/11/2023-CT Cardiac w Pulmonary Vein Mapping  FINDINGS:     PULMONARY VEIN ANATOMY: Typical with two right and two left pulmonary veins.     APPROXIMATE MEASUREMENTS OF PULMONARY VEIN OSTIA:     Right superior: 22 mm.  Right inferior: 12 mm.     Left superior: 14 mm.  Left inferior: 16 mm.     CARDIAC STRUCTURES: Top normal heart size. Pacer leads terminate in the right atrium and right ventricle. Mild coronary artery calcifications.     GREAT VESSELS: Visualized thoracic aorta and central pulmonary arterial tree are within normal limits for the patient's age.     EXTRACARDIAC FINDINGS: Lower lobe predominant fibrotic changes in the visualized lungs again seen. Previously noted 2 mm right upper lobe nodule is not definitively visualized on this study, possibly due to motion artifact. Large hiatal hernia containing   the stomach again identified.     IMPRESSION:     Pulmonary venous anatomy as described above.     Large hiatal hernia with intrathoracic stomach, and pulmonary fibrosis again noted.      11/03/2023-Cardioversion    Indication for procedure: Cardioversion  Requesting physician:Dr Laura Goodman  Anticoagulation:  Eliquis     The patient was identified in the OR . A time out procedure was performed.     The patient was sedated by the anesthesia service .     The patient was  cardioverted to sinus rhythm with a 200J biphasic shock.       There were no complications.  The patient was monitored for the appropriate time interval in the holding area, and was transferred back to the medical floor in stable condition.    Pre Cardioversion EKG  11/03/2022      Post Cardioversion EKG  11/03/2022        Cardiac CT:    CT Cardiac w Pulmonary Vein Mapping  10/11/2023    FINDINGS:     PULMONARY VEIN ANATOMY: Typical with two right and two left pulmonary veins.     APPROXIMATE MEASUREMENTS OF PULMONARY VEIN OSTIA:     Right superior: 22 mm.  Right inferior: 12 mm.     Left superior: 14 mm.  Left inferior: 16 mm.     CARDIAC STRUCTURES: Top normal heart size. Pacer leads terminate in the right atrium and right ventricle. Mild coronary artery calcifications.     GREAT VESSELS: Visualized thoracic aorta and central pulmonary arterial tree are within normal limits for the patient's age.     EXTRACARDIAC FINDINGS: Lower lobe predominant fibrotic changes in the visualized lungs again seen. Previously noted 2 mm right upper lobe nodule is not definitively visualized on this study, possibly due to motion artifact. Large hiatal hernia containing   the stomach again identified.     IMPRESSION:     Pulmonary venous anatomy as described above.     Large hiatal hernia with intrathoracic stomach, and pulmonary fibrosis again noted.        DEVICE CHECK:     Results for orders placed or performed in visit on 04/10/25   Cardiac EP device report    Narrative    MDT DC PM/ACTIVE SYSTEM IS MRI CONDITIONAL  CARELINK TRANSMISSION: BATTERY VOLTAGE ADEQUATE. (6.8 YRS) AP 69%  7%. ALL AVAILABLE LEAD PARAMETERS WITHIN NORMAL LIMITS. NO SIGNIFICANT HIGH RATE EPISODES. 383 AT/AF EPISODES DETECTED (26.7% OF TIME) LONGEST 11 HOURS. PATIENT IS ON ELIQUIS, AMIO AND METOPROLOL SUCC; AF ABL DONE 10/19/23. SINGLE PVC COUNT. NORMAL DEVICE FUNCTION.---KUMAR            Review of Systems:  Review of Systems   All other systems reviewed  and are negative.  As described in my history of present illness    Physical Exam:  Physical Exam  Vitals reviewed.   Constitutional:       Appearance: Normal appearance. She is well-developed and normal weight. She is not ill-appearing.      Comments: Not in any distress at the current time   HENT:      Head: Normocephalic and atraumatic.      Right Ear: External ear normal.      Left Ear: External ear normal.      Nose: Nose normal.      Mouth/Throat:      Pharynx: Uvula midline.   Eyes:      General: Lids are normal. No scleral icterus.     Extraocular Movements: Extraocular movements intact.      Conjunctiva/sclera: Conjunctivae normal.      Pupils: Pupils are equal, round, and reactive to light.      Comments: No pallor  No cyanosis  No icterus   Neck:      Thyroid: No thyromegaly.      Vascular: No carotid bruit or JVD.      Trachea: Trachea normal.      Comments: No jugular lymphadenopathy  Short thick neck  Cardiovascular:      Rate and Rhythm: Normal rate and regular rhythm.      Chest Wall: PMI is not displaced.      Pulses: Normal pulses.      Heart sounds: Normal heart sounds, S1 normal and S2 normal. No murmur heard.     No friction rub. No gallop. No S3 or S4 sounds.   Pulmonary:      Effort: Pulmonary effort is normal. No accessory muscle usage or respiratory distress.      Breath sounds: Normal breath sounds. No decreased breath sounds, wheezing, rhonchi or rales.   Chest:      Chest wall: No tenderness.   Abdominal:      General: Bowel sounds are normal. There is no distension.      Palpations: Abdomen is soft. There is no hepatomegaly, splenomegaly or mass.      Tenderness: There is no abdominal tenderness.      Comments: Central obesity present   Musculoskeletal:         General: No swelling, tenderness or deformity. Normal range of motion.      Cervical back: Normal range of motion and neck supple. No rigidity.      Right lower leg: No edema.      Left lower leg: No edema.   Lymphadenopathy:       Cervical: No cervical adenopathy.   Skin:     Findings: No abrasion, erythema, lesion or rash.      Nails: There is no clubbing.   Neurological:      Mental Status: She is alert and oriented to person, place, and time. Mental status is at baseline.      Motor: No weakness.      Comments: Facial symmetry is retained  Extraocular movements are retained  Head neck tongue and palate movement are retained and symmetric   Psychiatric:         Mood and Affect: Mood normal.         Speech: Speech normal.         Behavior: Behavior normal.         Thought Content: Thought content normal.         Judgment: Judgment normal.         Discussion/Summary:      Atrial flutter with RVR  On amiodarone, metoprolol and apixaban  [id number] score is 5  Patient has a long history of atrial fibrillation and flutter  She underwent ablation in October 2023    There was a recurrence and she was in atrial flutter with heart rate of 120/min  Amiodarone and metoprolol were increased to 200 mg 2 times daily and 100 mg 2 times daily  Patient went back to sinus rhythm  Device check shows there are still brief breakthrough episodes    Check for amiodarone toxicity-CMP, TSH, PFT with DLCO, evaluation    On amiodarone 100 mg daily     If patient has recurrence of a flutter, will proceed with repeat PFA ablation-will look at breaks in the line for flutter /also plan for vein of Efe injection    If patient continues to have recurrence of tachycardia final option would be an AV node ablation             Cardiomyopathy , NYHA II, LVEF =35%  Improved currently to 60%  Suspected tachy mediated  Increasing beta blockers for better rate control  On ACEI  EF has improved to 60%           Hypertension  On lisinopril and metoprolol   Blood pressure is 122/84       Hyperlipidemia  On statin-lovastatin   No myositis or myalgia         Suspected REYNOLD  Need to be evaluated for CPAP         Obesity  BMI is 29  Patient advised on dietary him restrictions to  lose weight        Long-term anticoagulation  Chads Vasc score is 5  To continue with apixaban        Sinus bradycardia, sick sinus syndrome  Patient is post pacemaker  All device parameters a stable

## 2025-04-10 NOTE — PROGRESS NOTES
Results for orders placed or performed in visit on 04/10/25   Cardiac EP device report    Narrative    MDT DC PM/ACTIVE SYSTEM IS MRI CONDITIONAL  CARELINK TRANSMISSION: BATTERY VOLTAGE ADEQUATE. (6.8 YRS) AP 69%  7%. ALL AVAILABLE LEAD PARAMETERS WITHIN NORMAL LIMITS. NO SIGNIFICANT HIGH RATE EPISODES. 383 AT/AF EPISODES DETECTED (26.7% OF TIME) LONGEST 11 HOURS. PATIENT IS ON ELIQUIS, AMIO AND METOPROLOL SUCC; AF ABL DONE 10/19/23. SINGLE PVC COUNT. NORMAL DEVICE FUNCTION.---KUMAR

## 2025-04-11 ENCOUNTER — TELEPHONE (OUTPATIENT)
Age: 80
End: 2025-04-11

## 2025-04-11 DIAGNOSIS — I48.19 PERSISTENT ATRIAL FIBRILLATION (HCC): Primary | ICD-10-CM

## 2025-04-11 NOTE — TELEPHONE ENCOUNTER
Spoke with patient, seen in office yesterday and was asked to call today with correct dose of Amiodarone she is currently taking.    Medication list states 200mg daily; she is currently taking 100mg daily.

## 2025-04-14 ENCOUNTER — HOSPITAL ENCOUNTER (OUTPATIENT)
Age: 80
Discharge: HOME | End: 2025-04-14
Attending: FAMILY MEDICINE
Payer: MEDICARE

## 2025-04-14 VITALS — HEIGHT: 63 IN | BODY MASS INDEX: 30.78 KG/M2

## 2025-04-14 DIAGNOSIS — E28.39 MENOPAUSE OVARIAN FAILURE: ICD-10-CM

## 2025-04-14 PROCEDURE — 77080 DXA BONE DENSITY AXIAL: CPT

## 2025-04-15 ENCOUNTER — RESULTS FOLLOW-UP (OUTPATIENT)
Dept: FAMILY MEDICINE CLINIC | Facility: CLINIC | Age: 80
End: 2025-04-15

## 2025-04-17 RX ORDER — AMIODARONE HYDROCHLORIDE 100 MG/1
100 TABLET ORAL DAILY
Qty: 90 TABLET | Refills: 3 | OUTPATIENT
Start: 2025-04-17

## 2025-05-16 ENCOUNTER — HOSPITAL ENCOUNTER (OUTPATIENT)
Dept: PULMONOLOGY | Facility: HOSPITAL | Age: 80
End: 2025-05-16
Attending: INTERNAL MEDICINE
Payer: MEDICARE

## 2025-05-16 DIAGNOSIS — Z79.899 ON AMIODARONE THERAPY: ICD-10-CM

## 2025-05-16 PROCEDURE — 94729 DIFFUSING CAPACITY: CPT | Performed by: INTERNAL MEDICINE

## 2025-05-16 PROCEDURE — 94729 DIFFUSING CAPACITY: CPT

## 2025-05-16 PROCEDURE — 94726 PLETHYSMOGRAPHY LUNG VOLUMES: CPT

## 2025-05-16 PROCEDURE — 94060 EVALUATION OF WHEEZING: CPT

## 2025-05-16 PROCEDURE — 94726 PLETHYSMOGRAPHY LUNG VOLUMES: CPT | Performed by: INTERNAL MEDICINE

## 2025-05-16 PROCEDURE — 94060 EVALUATION OF WHEEZING: CPT | Performed by: INTERNAL MEDICINE

## 2025-05-16 PROCEDURE — 94760 N-INVAS EAR/PLS OXIMETRY 1: CPT

## 2025-05-16 RX ORDER — ALBUTEROL SULFATE 0.83 MG/ML
2.5 SOLUTION RESPIRATORY (INHALATION) ONCE
Status: COMPLETED | OUTPATIENT
Start: 2025-05-16 | End: 2025-05-16

## 2025-05-16 RX ADMIN — ALBUTEROL SULFATE 2.5 MG: 2.5 SOLUTION RESPIRATORY (INHALATION) at 07:42

## 2025-05-29 NOTE — PLAN OF CARE
Diagnosis: prostate  Regimen: etop  Cycle/Day: C1/D3  Is this a C1D1 appt?  No    John is supervising clinician today.    ECOG:   ECOG [05/29/25 1356]   ECOG Performance Status 0       Review and verified Advanced Directives: No: Patient declined to create/provide document at this time     Verified if patient has state DNR bracelet on: unknown    Nursing Assessment:   A focused nursing assessment addressing the toxicity of chemotherapy was performed and the patient reports the following:      Toxicity Assessment    Auditory/Ear  Assessment: Yes (Within Defined Limits)    Cardiac General  Assessment: Yes (w/ Exceptions to WDL)  Localized Edema: Grade 2    Constitutional  Assessment: Yes (Within Defined Limits)    Dermatology/Skin  Assessment: Yes (Within Defined Limits)    Endocrine  Assessment: Yes (Within Defined Limits)    Gastrointestinal  Assessment: Yes (w/ Exceptions to WDL)  Diarrhea: Grade 2    Hemorrhage/Bleeding  Assessment: Yes (Within Defined Limits)    Infection  Assessment: Yes (Within Defined Limits)    Lymphatics  Assessment: Yes (Within Defined Limits)    Musculoskeletal  Assessment: Yes (Within Defined Limits)    Neurology  Assessment: Yes (Within Defined Limits)    Ocular  Assessment: Yes (Within Defined Limits)    Pain  Assessment: Yes (Within Defined Limits)  Pain: Grade 1    Pulmonary/Upper Respiratory  Assessment: Yes (Within Defined Limits)    Genitourinary  Assessment: Yes (Within Defined Limits)          Pre-Treatment: Treatment consent signed  Patient has valid pre-authorization  VS completed  Height and weight verified  BSA independently double checked & verified by two practitioners  Premed orders, including hydration, are verified prior to administration  Treatment parameters verified in patient protocol  Lab results reviewed: Treatment conditions met, ok to proceed with treatment   Chemotherapy doses independently doubled checked & verified by two practitioners    Treatment: I have  Problem: INFECTION - ADULT  Goal: Absence or prevention of progression during hospitalization  Description: INTERVENTIONS:  - Assess and monitor for signs and symptoms of infection  - Monitor lab/diagnostic results  - Monitor all insertion sites, i e  indwelling lines, tubes, and drains  - Monitor endotracheal if appropriate and nasal secretions for changes in amount and color  - Conner appropriate cooling/warming therapies per order  - Administer medications as ordered  - Instruct and encourage patient and family to use good hand hygiene technique  - Identify and instruct in appropriate isolation precautions for identified infection/condition  Outcome: Progressing  Goal: Absence of fever/infection during neutropenic period  Description: INTERVENTIONS:  - Monitor WBC    Outcome: Progressing reviewed the following with the patient:  Name of chemo drug, duration and route of infusion, and reportable infusion-related symptoms.  Chemotherapy has not ; double checked & verified by two practitioners  Appearance and physical integrity of drugs meets standard of drug monograph; double checked & verified by two practitioners  Rate set on infusion pump is in alignment with ordered rate; double checked & verified by two practitioners  Blood return confirmed before, during and after treatment administered  Infusion pump used for non-vesicant drugs  Drugs were administered in proper sequencing  Refer to LDA and MAR for line assessment and medication administration  Extravasation/Infiltration: No    Post Treatment: Treatment tolerated well; no adverse reaction    Transfusion: Not needed    Integrative Medicine: No    Oral Chemotherapy: No    Education: Instructed on next appt    Next appointment scheduled:   Patient instructed to call the office with any questions or concerns.      Patient Discharged: patient discharged to home per self, ambulatory    Per Dr Lopez's LOS    Please obtain ultrasound of bilateral lower extremities to rule out clots -scheduled today after treatment

## 2025-06-06 ENCOUNTER — OFFICE VISIT (OUTPATIENT)
Age: 80
End: 2025-06-06
Payer: MEDICARE

## 2025-06-06 ENCOUNTER — PREP FOR PROCEDURE (OUTPATIENT)
Age: 80
End: 2025-06-06

## 2025-06-06 VITALS
BODY MASS INDEX: 29.88 KG/M2 | SYSTOLIC BLOOD PRESSURE: 130 MMHG | OXYGEN SATURATION: 93 % | DIASTOLIC BLOOD PRESSURE: 98 MMHG | HEIGHT: 63 IN | WEIGHT: 168.6 LBS | HEART RATE: 114 BPM

## 2025-06-06 DIAGNOSIS — K62.5 RECTAL BLEEDING: Primary | ICD-10-CM

## 2025-06-06 DIAGNOSIS — Z87.19 HISTORY OF PROCTITIS: ICD-10-CM

## 2025-06-06 PROCEDURE — 99213 OFFICE O/P EST LOW 20 MIN: CPT | Performed by: INTERNAL MEDICINE

## 2025-06-06 RX ORDER — METHYLPREDNISOLONE 4 MG/1
TABLET ORAL
COMMUNITY
Start: 2025-04-28 | End: 2025-06-13 | Stop reason: ALTCHOICE

## 2025-06-06 RX ORDER — CHLORHEXIDINE GLUCONATE ORAL RINSE 1.2 MG/ML
SOLUTION DENTAL
COMMUNITY
Start: 2025-04-28

## 2025-06-06 NOTE — PROGRESS NOTES
"Name: Madeline Good      : 1945      MRN: [id number]  Encounter Provider: Kavon Fernandez MD  Encounter Date: 2025   Encounter department: [facility] [place]  :  Assessment & Plan  Rectal bleeding    Orders:  •  Flexible Sigmoidoscopy; Future    History of proctitis       Patient is having intermittent rectal bleeding despite taking the Canasa suppositories.  We will repeat flexible sigmoidoscopy.  Further recommendations will depend on study results      History of Present Illness   HPI  Madeline Good is a 79 y.o. female who presents with rectal bleeding.  Patient has a past history of proctitis seen on colonoscopy 2024.  Diverticulosis was also present.  Patient did well on Canasa suppositories.  She has been taking these for some months.  She is having recurrent bleeding but it is not as much.  There is been no definitive causative factor for this.  She has no alarm symptomatology of any kind.      Review of Systems   Constitutional: Negative.    HENT: Negative.     Eyes: Negative.    Respiratory:  Positive for shortness of breath.    Cardiovascular: Negative.    Gastrointestinal:         As per HPI   Endocrine: Negative.    Genitourinary: Negative.    Musculoskeletal: Negative.    Skin: Negative.    Allergic/Immunologic: Negative.    Neurological:  Positive for dizziness.   Hematological: Negative.    Psychiatric/Behavioral: Negative.          Objective   /98   Pulse (!) 114   Ht 5' 2.5\" (1.588 m)   Wt 76.5 kg (168 lb 9.6 oz)   SpO2 93%   BMI 30.35 kg/m²      Physical Exam  Constitutional:       Appearance: Normal appearance. She is normal weight.   HENT:      Head: Normocephalic.     Eyes:      Pupils: Pupils are equal, round, and reactive to light.       Cardiovascular:      Rate and Rhythm: Normal rate and regular rhythm.      Pulses: Normal pulses.      Heart sounds: Normal heart sounds.   Pulmonary:      Effort: Pulmonary effort is " normal.      Breath sounds: Normal breath sounds.   Abdominal:      General: Abdomen is flat.      Palpations: Abdomen is soft.     Skin:     General: Skin is warm and dry.     Neurological:      General: No focal deficit present.      Mental Status: She is alert and oriented to person, place, and time.     Psychiatric:         Mood and Affect: Mood normal.         Behavior: Behavior normal.

## 2025-06-06 NOTE — H&P (VIEW-ONLY)
"Name: Madeline Good      : 1945      MRN: [id number]  Encounter Provider: Kavon Fernandez MD  Encounter Date: 2025   Encounter department: [facility] [place]  :  Assessment & Plan  Rectal bleeding    Orders:  •  Flexible Sigmoidoscopy; Future    History of proctitis       Patient is having intermittent rectal bleeding despite taking the Canasa suppositories.  We will repeat flexible sigmoidoscopy.  Further recommendations will depend on study results      History of Present Illness   HPI  Madeline Good is a 79 y.o. female who presents with rectal bleeding.  Patient has a past history of proctitis seen on colonoscopy 2024.  Diverticulosis was also present.  Patient did well on Canasa suppositories.  She has been taking these for some months.  She is having recurrent bleeding but it is not as much.  There is been no definitive causative factor for this.  She has no alarm symptomatology of any kind.      Review of Systems   Constitutional: Negative.    HENT: Negative.     Eyes: Negative.    Respiratory:  Positive for shortness of breath.    Cardiovascular: Negative.    Gastrointestinal:         As per HPI   Endocrine: Negative.    Genitourinary: Negative.    Musculoskeletal: Negative.    Skin: Negative.    Allergic/Immunologic: Negative.    Neurological:  Positive for dizziness.   Hematological: Negative.    Psychiatric/Behavioral: Negative.          Objective   /98   Pulse (!) 114   Ht 5' 2.5\" (1.588 m)   Wt 76.5 kg (168 lb 9.6 oz)   SpO2 93%   BMI 30.35 kg/m²      Physical Exam  Constitutional:       Appearance: Normal appearance. She is normal weight.   HENT:      Head: Normocephalic.     Eyes:      Pupils: Pupils are equal, round, and reactive to light.       Cardiovascular:      Rate and Rhythm: Normal rate and regular rhythm.      Pulses: Normal pulses.      Heart sounds: Normal heart sounds.   Pulmonary:      Effort: Pulmonary effort is " normal.      Breath sounds: Normal breath sounds.   Abdominal:      General: Abdomen is flat.      Palpations: Abdomen is soft.     Skin:     General: Skin is warm and dry.     Neurological:      General: No focal deficit present.      Mental Status: She is alert and oriented to person, place, and time.     Psychiatric:         Mood and Affect: Mood normal.         Behavior: Behavior normal.

## 2025-06-06 NOTE — ASSESSMENT & PLAN NOTE
Patient is having intermittent rectal bleeding despite taking the Canasa suppositories.  We will repeat flexible sigmoidoscopy.  Further recommendations will depend on study results

## 2025-06-06 NOTE — PATIENT INSTRUCTIONS
Scheduled date of Flex Sig (as of today): 6/17/25  Physician performing Flex Sig: Jim  Location of Flex Sig: [facility]  Instructions reviewed with patient by: Archana DAWN  Clearances:

## 2025-06-16 DIAGNOSIS — I48.19 PERSISTENT ATRIAL FIBRILLATION (HCC): ICD-10-CM

## 2025-06-16 DIAGNOSIS — E78.2 MIXED HYPERLIPIDEMIA: ICD-10-CM

## 2025-06-17 ENCOUNTER — HOSPITAL ENCOUNTER (OUTPATIENT)
Dept: GASTROENTEROLOGY | Facility: HOSPITAL | Age: 80
Setting detail: OUTPATIENT SURGERY
Discharge: HOME | End: 2025-06-17
Attending: INTERNAL MEDICINE
Payer: MEDICARE

## 2025-06-17 ENCOUNTER — ANESTHESIA (OUTPATIENT)
Dept: GASTROENTEROLOGY | Facility: HOSPITAL | Age: 80
End: 2025-06-17
Payer: MEDICARE

## 2025-06-17 ENCOUNTER — ANESTHESIA EVENT (OUTPATIENT)
Dept: GASTROENTEROLOGY | Facility: HOSPITAL | Age: 80
End: 2025-06-17
Payer: MEDICARE

## 2025-06-17 VITALS
RESPIRATION RATE: 16 BRPM | HEART RATE: 118 BPM | TEMPERATURE: 97.2 F | BODY MASS INDEX: 29.57 KG/M2 | SYSTOLIC BLOOD PRESSURE: 109 MMHG | WEIGHT: 166.89 LBS | OXYGEN SATURATION: 96 % | DIASTOLIC BLOOD PRESSURE: 73 MMHG | HEIGHT: 63 IN

## 2025-06-17 DIAGNOSIS — K62.5 RECTAL BLEEDING: ICD-10-CM

## 2025-06-17 RX ORDER — SODIUM CHLORIDE, SODIUM LACTATE, POTASSIUM CHLORIDE, CALCIUM CHLORIDE 600; 310; 30; 20 MG/100ML; MG/100ML; MG/100ML; MG/100ML
75 INJECTION, SOLUTION INTRAVENOUS CONTINUOUS
Status: DISCONTINUED | OUTPATIENT
Start: 2025-06-17 | End: 2025-06-21 | Stop reason: HOSPADM

## 2025-06-17 RX ORDER — PROPOFOL 10 MG/ML
INJECTION, EMULSION INTRAVENOUS AS NEEDED
Status: DISCONTINUED | OUTPATIENT
Start: 2025-06-17 | End: 2025-06-17

## 2025-06-17 RX ORDER — LOVASTATIN 10 MG/1
10 TABLET ORAL
Qty: 90 TABLET | Refills: 1 | Status: SHIPPED | OUTPATIENT
Start: 2025-06-17

## 2025-06-17 RX ORDER — PHENYLEPHRINE HCL IN 0.9% NACL 1 MG/10 ML
SYRINGE (ML) INTRAVENOUS AS NEEDED
Status: DISCONTINUED | OUTPATIENT
Start: 2025-06-17 | End: 2025-06-17

## 2025-06-17 RX ORDER — APIXABAN 5 MG/1
5 TABLET, FILM COATED ORAL 2 TIMES DAILY
Qty: 180 TABLET | Refills: 1 | Status: SHIPPED | OUTPATIENT
Start: 2025-06-17

## 2025-06-17 RX ADMIN — SODIUM CHLORIDE, SODIUM LACTATE, POTASSIUM CHLORIDE, AND CALCIUM CHLORIDE 75 ML/HR: .6; .31; .03; .02 INJECTION, SOLUTION INTRAVENOUS at 07:01

## 2025-06-17 RX ADMIN — PROPOFOL 100 MG: 10 INJECTION, EMULSION INTRAVENOUS at 08:02

## 2025-06-17 RX ADMIN — Medication 150 MCG: at 08:02

## 2025-06-17 NOTE — ANESTHESIA POSTPROCEDURE EVALUATION
Post-Op Assessment Note    CV Status:  Stable    Pain management: adequate       Mental Status:  Sleepy   Hydration Status:  Euvolemic   PONV Controlled:  Controlled   Airway Patency:  Patent     Post Op Vitals Reviewed: Yes    No anethesia notable event occurred.    Staff: Anesthesiologist, CRNA           Last Filed PACU Vitals:  Vitals Value Taken Time   Temp     Pulse     BP     Resp     SpO2         Modified Jessy:     Vitals Value Taken Time   Activity 2 06/17/25 08:31   Respiration 2 06/17/25 08:31   Circulation 2 06/17/25 08:31   Consciousness 2 06/17/25 08:31   Oxygen Saturation 2 06/17/25 08:31     Modified Jessy Score: 10

## 2025-06-17 NOTE — INTERVAL H&P NOTE
H&P reviewed. After examining the patient I find no changes in the patients condition since the H&P had been written.    Vitals:    06/17/25 0656   BP: 130/94   Pulse: (!) 116   Resp: 18   Temp: 97.5 °F (36.4 °C)   SpO2: 96%

## 2025-06-17 NOTE — ANESTHESIA PREPROCEDURE EVALUATION
Procedure:  FLEXIBLE SIGMOIDOSCOPY    Relevant Problems   ANESTHESIA (within normal limits)      CARDIO   (+) Essential hypertension   (+) Migraine with aura and without status migrainosus, not intractable   (+) Mixed hyperlipidemia   (+) Nonrheumatic aortic valve insufficiency   (+) Nonrheumatic mitral valve regurgitation   (+) Nonrheumatic tricuspid valve regurgitation   (+) Persistent atrial fibrillation (HCC)   (+) SSS (sick sinus syndrome) ([facility])   (-) MI (myocardial infarction) (HCC)      ENDO   (+) Acquired hypothyroidism      GI/HEPATIC  NPO confirmed, prep finished ~2000  BMI 29.6   (+) Rectal bleeding      /RENAL   (+) Stage 3a chronic kidney disease (HCC)      HEMATOLOGY   (+) Iron deficiency anemia      NEURO/PSYCH   (+) Migraine with aura and without status migrainosus, not intractable   (-) CVA (cerebral vascular accident) ([facility])   (-) Seizures (HCC)      PULMONARY   (+) Chronic obstructive pulmonary disease (HCC)   (-) Sleep apnea   (-) URI (upper respiratory infection)   -metoprolol 2 days prior, PPM for SSS    No Known Allergies    Social History[1]    Current Outpatient Medications   Medication Instructions    amiodarone 100 mg, Oral, Daily    chlorhexidine (PERIDEX) 0.12 % solution USE AS DIRECTED . DO NOT SWALLOW    Eliquis 5 mg, Oral, 2 times daily    fluticasone (FLONASE) 50 mcg/act nasal spray 1 spray, Nasal, Daily, Start after finishing the 3 days of Prednsione    furosemide (LASIX) 20 mg, Oral, Daily    levothyroxine 25 mcg, Oral, Daily (early morning)    lovastatin (MEVACOR) 10 mg, Oral, Daily at bedtime    mesalamine (CANASA) 1,000 mg, Rectal, Daily at bedtime, [facility] - SEND ASAP for patient    metoprolol succinate (TOPROL-XL) 100 mg, Oral, Daily    potassium chloride (Klor-Con M10) 10 mEq tablet 10 mEq, Oral, Daily    sodium chloride (OCEAN) 0.65 % nasal spray 1 spray, Nasal, As needed     Lab Results   Component Value Date    WBC 6.50 12/23/2024    HGB 16.3 (H) 12/23/2024    HCT 48.7 (H)  12/23/2024     12/23/2024    SODIUM 143 12/23/2024    K 4.5 12/23/2024     12/23/2024    CO2 33 (H) 12/23/2024    BUN 11 12/23/2024    CREATININE 1.05 12/23/2024    GLUC 97 06/11/2024    HGBA1C 5.8 (H) 11/01/2022    AST 26 03/10/2025    ALT 19 03/10/2025    GGT 26 03/10/2025    ALKPHOS 124 (H) 03/10/2025    TBILI 1.03 (H) 03/10/2025    ALB 4.3 03/10/2025    PROTIME 16.8 (H) 10/19/2023    PTT 33 03/15/2023    INR 1.38 (H) 10/19/2023     Vitals:    06/17/25 0656   BP: 130/94   Pulse: (!) 116   Resp: 18   Temp: 97.5 °F (36.4 °C)   SpO2: 96%     EKG 4/10/25  EKG reviewed by myself, atrial paced and ventricular sensed, prolonged AV conduction time of 310 ms, QRS 80 ms, QTc 470 ms    EP report 4/10/25  MDT DC PM/ACTIVE SYSTEM IS MRI CONDITIONAL   CARELINK TRANSMISSION: BATTERY VOLTAGE ADEQUATE. (6.8 YRS) AP 69%  7%. ALL AVAILABLE LEAD PARAMETERS WITHIN NORMAL LIMITS. NO SIGNIFICANT HIGH RATE EPISODES. 383 AT/AF EPISODES DETECTED (26.7% OF TIME) LONGEST 11 HOURS. PATIENT IS ON ELIQUIS, AMIO AND METOPROLOL SUCC; AF ABL DONE 10/19/23. SINGLE PVC COUNT. NORMAL DEVICE FUNCTION.---KUMAR      Echo 10/10/24    Left Ventricle: Left ventricular cavity size is normal. The left ventricular ejection fraction is 55%. Systolic function is normal. Although no diagnostic regional wall motion abnormality was identified, this possibility cannot be completely excluded on the basis of this study. This is a limited study to assess left ventricle ejection fraction.    Tricuspid Valve: Estimated RVSP 45 mmHg.  Pacer lead noted.    Physical Exam    Airway     Mallampati score: II  TM Distance: >3 FB  Neck ROM: full  Mouth opening: >= 4 cm      Cardiovascular  Rhythm: regular, Rate: normal, MurmurCardiovascular exam normal    Dental   No notable dental hx     Pulmonary  Pulmonary exam normal Breath sounds clear to auscultation, No wheezes    Neurological    She appears awake, alert and oriented x3.      Other  Findings  post-pubertal.      Anesthesia Plan  ASA Score- 3     Anesthesia Type- IV sedation with anesthesia with ASA Monitors.         Additional Monitors:     Airway Plan: natural airway.    Comment: O2 mask, natural airway, EtCO2 monitor. Risks discussed including awareness, aspiration, drug reactions and conversion to GA..       Plan Factors-Exercise tolerance (METS): <4 METS.    Chart reviewed. EKG reviewed.  Existing labs reviewed. Patient summary reviewed.    Patient is not a current smoker.              Induction- intravenous.    Postoperative Plan- .   Monitoring Plan - Monitoring plan - standard ASA monitoring      Perioperative Resuscitation Plan - Level 1 - Full Code.       Informed Consent- Anesthetic plan and risks discussed with patient.  I personally reviewed this patient with the CRNA. Discussed and agreed on the Anesthesia Plan with the CRNA..    NPO Status:  Vitals Value Taken Time   Date of last liquid 06/16/25 06/17/25 06:55   Time of last liquid 2000 06/17/25 06:55   Date of last solid 06/16/25 06/17/25 06:55   Time of last solid 0800 06/17/25 06:55          [1]   Social History  Tobacco Use    Smoking status: Never    Smokeless tobacco: Never    Tobacco comments:     no smoking for 46 years   Vaping Use    Vaping status: Never Used   Substance Use Topics    Alcohol use: Yes     Alcohol/week: 7.0 standard drinks of alcohol     Types: 7 Glasses of wine per week     Comment: occassional    Drug use: No

## 2025-07-07 ENCOUNTER — HOSPITAL ENCOUNTER (INPATIENT)
Facility: HOSPITAL | Age: 80
LOS: 3 days | Discharge: HOME | End: 2025-07-11
Attending: EMERGENCY MEDICINE | Admitting: INTERNAL MEDICINE
Payer: MEDICARE

## 2025-07-07 DIAGNOSIS — R04.0 RIGHT-SIDED EPISTAXIS: Primary | ICD-10-CM

## 2025-07-07 DIAGNOSIS — I48.19 PERSISTENT ATRIAL FIBRILLATION (HCC): ICD-10-CM

## 2025-07-07 DIAGNOSIS — Z79.01 ANTICOAGULATED: ICD-10-CM

## 2025-07-07 LAB
ANION GAP SERPL CALCULATED.3IONS-SCNC: 10 MMOL/L (ref 4–13)
BASOPHILS # BLD AUTO: 0.06 THOUSANDS/ÂΜL (ref 0–0.1)
BASOPHILS NFR BLD AUTO: 1 % (ref 0–1)
BUN SERPL-MCNC: 16 MG/DL (ref 5–25)
CALCIUM SERPL-MCNC: 9.1 MG/DL (ref 8.4–10.2)
CHLORIDE SERPL-SCNC: 104 MMOL/L (ref 96–108)
CO2 SERPL-SCNC: 25 MMOL/L (ref 21–32)
CREAT SERPL-MCNC: 1.03 MG/DL (ref 0.6–1.3)
EOSINOPHIL # BLD AUTO: 0.32 THOUSAND/ÂΜL (ref 0–0.61)
EOSINOPHIL NFR BLD AUTO: 3 % (ref 0–6)
ERYTHROCYTE [DISTWIDTH] IN BLOOD BY AUTOMATED COUNT: 13.2 % (ref 11.6–15.1)
ERYTHROCYTE [DISTWIDTH] IN BLOOD BY AUTOMATED COUNT: 13.2 % (ref 11.6–15.1)
GFR SERPL CREATININE-BSD FRML MDRD: 51 ML/MIN/1.73SQ M
GLUCOSE SERPL-MCNC: 114 MG/DL (ref 65–140)
HCT VFR BLD AUTO: 47 % (ref 34.8–46.1)
HCT VFR BLD AUTO: 47.8 % (ref 34.8–46.1)
HGB BLD-MCNC: 16.4 G/DL (ref 11.5–15.4)
HGB BLD-MCNC: 16.5 G/DL (ref 11.5–15.4)
IMM GRANULOCYTES # BLD AUTO: 0.04 THOUSAND/UL (ref 0–0.2)
IMM GRANULOCYTES NFR BLD AUTO: 0 % (ref 0–2)
LYMPHOCYTES # BLD AUTO: 0.93 THOUSANDS/ÂΜL (ref 0.6–4.47)
LYMPHOCYTES NFR BLD AUTO: 9 % (ref 14–44)
MCH RBC QN AUTO: 35.7 PG (ref 26.8–34.3)
MCH RBC QN AUTO: 36 PG (ref 26.8–34.3)
MCHC RBC AUTO-ENTMCNC: 34.5 G/DL (ref 31.4–37.4)
MCHC RBC AUTO-ENTMCNC: 34.9 G/DL (ref 31.4–37.4)
MCV RBC AUTO: 103 FL (ref 82–98)
MCV RBC AUTO: 104 FL (ref 82–98)
MONOCYTES # BLD AUTO: 0.98 THOUSAND/ÂΜL (ref 0.17–1.22)
MONOCYTES NFR BLD AUTO: 9 % (ref 4–12)
NEUTROPHILS # BLD AUTO: 8.29 THOUSANDS/ÂΜL (ref 1.85–7.62)
NEUTS SEG NFR BLD AUTO: 78 % (ref 43–75)
NRBC BLD AUTO-RTO: 0 /100 WBCS
PLATELET # BLD AUTO: 263 THOUSANDS/UL (ref 149–390)
PLATELET # BLD AUTO: 282 THOUSANDS/UL (ref 149–390)
PMV BLD AUTO: 10.7 FL (ref 8.9–12.7)
PMV BLD AUTO: 11.2 FL (ref 8.9–12.7)
POTASSIUM SERPL-SCNC: 3.5 MMOL/L (ref 3.5–5.3)
RBC # BLD AUTO: 4.55 MILLION/UL (ref 3.81–5.12)
RBC # BLD AUTO: 4.62 MILLION/UL (ref 3.81–5.12)
SODIUM SERPL-SCNC: 139 MMOL/L (ref 135–147)
WBC # BLD AUTO: 10.62 THOUSAND/UL (ref 4.31–10.16)
WBC # BLD AUTO: 10.77 THOUSAND/UL (ref 4.31–10.16)

## 2025-07-07 PROCEDURE — 80048 BASIC METABOLIC PNL TOTAL CA: CPT | Performed by: EMERGENCY MEDICINE

## 2025-07-07 PROCEDURE — 36415 COLL VENOUS BLD VENIPUNCTURE: CPT | Performed by: EMERGENCY MEDICINE

## 2025-07-07 PROCEDURE — 85025 COMPLETE CBC W/AUTO DIFF WBC: CPT | Performed by: EMERGENCY MEDICINE

## 2025-07-07 PROCEDURE — 85027 COMPLETE CBC AUTOMATED: CPT | Performed by: STUDENT IN AN ORGANIZED HEALTH CARE EDUCATION/TRAINING PROGRAM

## 2025-07-07 PROCEDURE — 99285 EMERGENCY DEPT VISIT HI MDM: CPT | Performed by: EMERGENCY MEDICINE

## 2025-07-07 PROCEDURE — 99283 EMERGENCY DEPT VISIT LOW MDM: CPT

## 2025-07-07 PROCEDURE — 99223 1ST HOSP IP/OBS HIGH 75: CPT | Performed by: STUDENT IN AN ORGANIZED HEALTH CARE EDUCATION/TRAINING PROGRAM

## 2025-07-07 PROCEDURE — 30901 CONTROL OF NOSEBLEED: CPT | Performed by: EMERGENCY MEDICINE

## 2025-07-07 RX ORDER — POTASSIUM CHLORIDE 750 MG/1
10 TABLET, EXTENDED RELEASE ORAL DAILY
Status: DISCONTINUED | OUTPATIENT
Start: 2025-07-08 | End: 2025-07-11 | Stop reason: HOSPADM

## 2025-07-07 RX ORDER — OXYMETAZOLINE HYDROCHLORIDE 0.05 G/100ML
2 SPRAY NASAL ONCE
Status: COMPLETED | OUTPATIENT
Start: 2025-07-07 | End: 2025-07-07

## 2025-07-07 RX ORDER — SODIUM CHLORIDE 9 MG/ML
75 INJECTION, SOLUTION INTRAVENOUS CONTINUOUS
Status: DISCONTINUED | OUTPATIENT
Start: 2025-07-07 | End: 2025-07-07

## 2025-07-07 RX ORDER — AMIODARONE HYDROCHLORIDE 200 MG/1
100 TABLET ORAL DAILY
Status: DISCONTINUED | OUTPATIENT
Start: 2025-07-08 | End: 2025-07-11 | Stop reason: HOSPADM

## 2025-07-07 RX ORDER — MESALAMINE 1000 MG/1
1000 SUPPOSITORY RECTAL
Status: DISCONTINUED | OUTPATIENT
Start: 2025-07-07 | End: 2025-07-11 | Stop reason: HOSPADM

## 2025-07-07 RX ORDER — LEVOTHYROXINE SODIUM 25 UG/1
25 TABLET ORAL
Status: DISCONTINUED | OUTPATIENT
Start: 2025-07-08 | End: 2025-07-11 | Stop reason: HOSPADM

## 2025-07-07 RX ORDER — GINSENG 100 MG
1 CAPSULE ORAL ONCE
Status: COMPLETED | OUTPATIENT
Start: 2025-07-07 | End: 2025-07-07

## 2025-07-07 RX ORDER — HYDROXYZINE HYDROCHLORIDE 25 MG/1
25 TABLET, FILM COATED ORAL ONCE
Status: COMPLETED | OUTPATIENT
Start: 2025-07-08 | End: 2025-07-08

## 2025-07-07 RX ORDER — FUROSEMIDE 20 MG/1
20 TABLET ORAL DAILY
Status: DISCONTINUED | OUTPATIENT
Start: 2025-07-08 | End: 2025-07-11 | Stop reason: HOSPADM

## 2025-07-07 RX ORDER — METOPROLOL SUCCINATE 100 MG/1
100 TABLET, EXTENDED RELEASE ORAL DAILY
Status: DISCONTINUED | OUTPATIENT
Start: 2025-07-08 | End: 2025-07-09

## 2025-07-07 RX ORDER — OXYMETAZOLINE HYDROCHLORIDE 0.05 G/100ML
2 SPRAY NASAL ONCE
Status: COMPLETED | OUTPATIENT
Start: 2025-07-07 | End: 2025-07-08

## 2025-07-07 RX ORDER — PRAVASTATIN SODIUM 20 MG
10 TABLET ORAL
Status: DISCONTINUED | OUTPATIENT
Start: 2025-07-08 | End: 2025-07-11 | Stop reason: HOSPADM

## 2025-07-07 RX ORDER — GINSENG 100 MG
2 CAPSULE ORAL ONCE
Status: COMPLETED | OUTPATIENT
Start: 2025-07-07 | End: 2025-07-07

## 2025-07-07 RX ADMIN — OXYMETAZOLINE HYDROCHLORIDE 2 SPRAY: 0.5 SPRAY NASAL at 15:13

## 2025-07-07 RX ADMIN — OXYMETAZOLINE HYDROCHLORIDE 2 SPRAY: 0.05 SPRAY NASAL at 21:42

## 2025-07-07 RX ADMIN — OXYMETAZOLINE HYDROCHLORIDE 2 SPRAY: 0.5 SPRAY NASAL at 14:54

## 2025-07-07 RX ADMIN — OXYMETAZOLINE HYDROCHLORIDE 2 SPRAY: 0.5 SPRAY NASAL at 17:44

## 2025-07-07 RX ADMIN — BACITRACIN 1 SMALL APPLICATION: 500 OINTMENT TOPICAL at 17:44

## 2025-07-07 RX ADMIN — BACITRACIN 2 SMALL APPLICATION: 500 OINTMENT TOPICAL at 14:54

## 2025-07-07 RX ADMIN — OXYMETAZOLINE HYDROCHLORIDE 2 SPRAY: 0.5 SPRAY NASAL at 21:44

## 2025-07-07 RX ADMIN — CEPHALEXIN 500 MG: 250 CAPSULE ORAL at 16:34

## 2025-07-07 NOTE — H&P
H&P - Hospitalist   Name: Madeline Good 79 y.o. female I MRN: [id number]  Unit/Bed#: -01 I Date of Admission: 7/7/2025   Date of Service: 7/7/2025 I Hospital Day: 0     Assessment & Plan  Severe epistaxis  Ongoing intermittently since Friday, worsened today   Hgb stable at 16.5, will recheck in AM  Bilateral packing placed in ED followed by rapid rhino  Hold eliquis  ENT consult, will evaluate patient in AM  Update: patient started bleeding again after reaching to the floor, discussed case with ENT Dr Abrams recommended to place rapid rhino in left nare as well for tamponade effect which helped in achieving hemostasis.   Essential hypertension  Continue metoprolol  Mixed hyperlipidemia  Continue statin  Persistent atrial fibrillation (HCC)  Rates controlled  On amiodarone and metoprolol  Holding eliquis  Stage 3a chronic kidney disease (HCC)  Lab Results   Component Value Date    EGFR 51 07/07/2025    EGFR 50 12/23/2024    EGFR 46 06/11/2024    CREATININE 1.03 07/07/2025    CREATININE 1.05 12/23/2024    CREATININE 1.13 06/11/2024   Cr stable at baseline  Avoid nephrotoxic agents  Monitor with AM labs  Acquired hypothyroidism  Continue synthroid  Chronic heart failure with preserved ejection fraction (HFpEF) ([facility])  Wt Readings from Last 3 Encounters:   06/17/25 75.7 kg (166 lb 14.2 oz)   06/13/25 76.2 kg (168 lb)   06/06/25 76.5 kg (168 lb 9.6 oz)   Not in exacerbation at this time  Euvolemic on exam  Continue lasix 20 mg daily            VTE Pharmacologic Prophylaxis:   Moderate Risk (Score 3-4) - Pharmacological DVT Prophylaxis Contraindicated. Sequential Compression Devices Ordered.  Code Status: Level 1 - Full Code     Anticipated Length of Stay: Patient will be admitted on an observation basis with an anticipated length of stay of less than 2 midnights secondary to persistent nosebleed, ENT eval.    History of Present Illness   Chief Complaint: nosebleed    Madeline Good is a 79 y.o. female with a PMH of  COPD, TIA, CKD stage III yea, essential hypertension, hyperlipidemia, atrial fibrillation who presents with complaints of intermittent nosebleeds that started 2 days ago Friday.  For the past hour prior to arrival states it has been constant and not stopping. She feels it dripping down her throat. She is on Eliquis.   She reports losing a large amount of blood at home and feeling dizzy which prompted her to come to the ED.  Denies any trauma.  Denies any fevers, chills, chest pain, palpitations, shortness of breath, nausea vomiting or diarrhea.  In the ED, afebrile and hemodynamically stable.  Lab work largely unremarkable with hemoglobin of 16.5.  She required bilateral nasal packing but continued to have bleeding and eventually a rapid Rhino was placed.  ENT was consulted.      Historical Information   Past Medical History[1]  Past Surgical History[2]  Social History[3]  E-Cigarette/Vaping    E-Cigarette Use Never User      E-Cigarette/Vaping Substances    Nicotine No     THC No     CBD No     Flavoring No     Other No     Unknown No      Family history non-contributory  Social History:  Marital Status: /Civil Union       Meds/Allergies   I have reviewed home medications with patient personally.  Prior to Admission medications    Medication Sig Start Date End Date Taking? Authorizing Provider   amiodarone 100 mg tablet Take 1 tablet (100 mg total) by mouth daily 4/21/25   Oneil Herbert PA-C   apixaban (Eliquis) 5 mg TAKE 1 TABLET BY MOUTH TWICE  DAILY 6/17/25   Piero Fajardo MD   chlorhexidine (PERIDEX) 0.12 % solution USE AS DIRECTED . DO NOT SWALLOW 4/28/25   Historical Provider, MD   fluticasone (FLONASE) 50 mcg/act nasal spray 1 spray into each nostril daily Start after finishing the 3 days of Prednsione  Patient not taking: Reported on 4/10/2025 11/7/24   Julie Lynn Gutzweiler, PA-C   furosemide (LASIX) 20 mg tablet TAKE 1 TABLET BY MOUTH DAILY 10/1/24   Arnold Payton DO   levothyroxine 25 mcg  tablet TAKE 1 TABLET BY MOUTH DAILY IN  THE EARLY MORNING 10/1/24   Arnold Payton DO   lovastatin (MEVACOR) 10 MG tablet TAKE 1 TABLET BY MOUTH DAILY AT  BEDTIME 6/17/25   Piero Fajardo MD   mesalamine (CANASA) 1,000 mg suppository Insert 1 suppository (1,000 mg total) into the rectum daily at bedtime [facility] - SEND ASAP for patient 3/25/25   Yoly Pena PA-C   metoprolol succinate (TOPROL-XL) 100 mg 24 hr tablet Take 1 tablet (100 mg total) by mouth daily 8/9/24   Marycruz Gomez MD   potassium chloride (Klor-Con M10) 10 mEq tablet Take 1 tablet (10 mEq total) by mouth daily 8/9/24   Marycruz Gomez MD   sodium chloride (OCEAN) 0.65 % nasal spray 1 spray into each nostril as needed for rhinitis or congestion  Patient not taking: Reported on 4/10/2025 11/7/24   Julie Lynn Gutzweiler, PA-C     No Known Allergies    Objective :  Temp:  [97.1 °F (36.2 °C)-98.1 °F (36.7 °C)] 97.1 °F (36.2 °C)  HR:  [108-125] 125  BP: (122-133)/(71-96) 133/92  Resp:  [18-20] 20  SpO2:  [93 %-98 %] 93 %  O2 Device: None (Room air)    Physical Exam  Constitutional:       Appearance: Normal appearance.   HENT:      Head: Normocephalic and atraumatic.      Nose:      Comments: Rhino rocket in place, bleeding controlled at this time, dried blood noted around.     Mouth/Throat:      Mouth: Mucous membranes are moist.     Eyes:      Extraocular Movements: Extraocular movements intact.       Cardiovascular:      Rate and Rhythm: Normal rate.   Pulmonary:      Effort: Pulmonary effort is normal.   Abdominal:      General: Abdomen is flat.      Palpations: Abdomen is soft.     Musculoskeletal:         General: Normal range of motion.     Skin:     General: Skin is warm.      Capillary Refill: Capillary refill takes less than 2 seconds.     Neurological:      General: No focal deficit present.      Mental Status: She is alert and oriented to person, place, and time.              Lab Results: I have reviewed the following results:  Results from last  7 days   Lab Units 07/07/25  1621   WBC Thousand/uL 10.62*   HEMOGLOBIN g/dL 16.5*   HEMATOCRIT % 47.8*   PLATELETS Thousands/uL 263   SEGS PCT % 78*   LYMPHO PCT % 9*   MONO PCT % 9   EOS PCT % 3     Results from last 7 days   Lab Units 07/07/25  1621   SODIUM mmol/L 139   POTASSIUM mmol/L 3.5   CHLORIDE mmol/L 104   CO2 mmol/L 25   BUN mg/dL 16   CREATININE mg/dL 1.03   ANION GAP mmol/L 10   CALCIUM mg/dL 9.1   GLUCOSE RANDOM mg/dL 114             Lab Results   Component Value Date    HGBA1C 5.8 (H) 11/01/2022           Imaging Results Review: No pertinent imaging studies reviewed.  Other Study Results Review: No additional pertinent studies reviewed.    Administrative Statements       ** Please Note: This note has been constructed using a voice recognition system. **         [1]   Past Medical History:  Diagnosis Date    Aphasia, mixed 07/28/2017    Atrial fibrillation (HCC)     CHF (congestive heart failure) (HCC)     Colon polyp     Headache     Hyperlipidemia     Hypertension     Lyme disease     Shortness of breath     TIA (transient ischemic attack)     Vertigo    [2]   Past Surgical History:  Procedure Laterality Date    CARDIAC ELECTROPHYSIOLOGY PROCEDURE N/A 10/19/2023    Procedure: Cardiac eps/afib ablation PVI/POST WALL;  Surgeon: Piero Fajardo MD;  Location: [facility];  Service: Cardiology    CARDIAC PACEMAKER PLACEMENT  12/2019    COLONOSCOPY      NE SIGMOIDOSCOPY FLX DX W/COLLJ SPEC BR/WA IF PFRMD N/A 11/28/2017    Procedure: SIGMOIDOSCOPY FLEXIBLE;  Surgeon: Kavon Fernandez MD;  Location: [facility];  Service: Gastroenterology    TONSILLECTOMY     [3]   Social History  Tobacco Use    Smoking status: Never    Smokeless tobacco: Never    Tobacco comments:     no smoking for 46 years   Vaping Use    Vaping status: Never Used   Substance and Sexual Activity    Alcohol use: Yes     Alcohol/week: 7.0 standard drinks of alcohol     Types: 7 Glasses of wine per week     Comment: occassional     Drug use: No    Sexual activity: Never     Partners: Male     Birth control/protection: None

## 2025-07-07 NOTE — ED PROVIDER NOTES
Time reflects when diagnosis was documented in both MDM as applicable and the Disposition within this note       Time User Action Codes Description Comment    7/7/2025  6:17 PM Shen Chicas Add [R04.0] Right-sided epistaxis     7/7/2025  6:17 PM Shen Chicas Add [Z79.01] Anticoagulated     7/9/2025  9:30 AM Dave Pedersen Add [I48.19] Persistent atrial fibrillation (HCC)           ED Disposition       ED Disposition   Admit    Condition   Stable    Date/Time   Mon Jul 7, 2025  6:40 PM    Comment   Case was discussed with Dr. Tijerina and the patient's admission status was agreed to be Admission Status: observation status to the service of Dr. Tijerina .               Assessment & Plan       Medical Decision Making        Initial ED assessment:    79-year-old female, right-sided epistaxis.  Large amount of bleeding.,  Feels dizzy.  On Eliquis.    Pathology at risk for includes but is not limited to:    Anterior versus posterior epistaxis.    Initial ED plan:    During initial examination, blood clots were expressed out, Afrin was sprayed and direct pressure was held for 5 minutes.  Patient continued to bleed so Merocel packing was placed in the right side of the naris.        Final ED summary/disposition:   After evaluation and workup in the emergency department, pts epistaxis was very difficult to control.  Bleed through first packing, then bilateral packing, then Rapid Rhino placed witch controlled bleeding.  Admitted due to discomfort and severity of bleeding     Amount and/or Complexity of Data Reviewed  Labs: ordered.    Risk  OTC drugs.  Prescription drug management.  Decision regarding hospitalization.        ED Course as of 07/10/25 0920   Mon Jul 07, 2025   1539 Repeat evaluation still no further bleeding.   1610 Called to patient's room, patient bled through her packing.  After getting up and ambulating also felt slightly dizzy.    1626 Bilateral Merocel sponges now placed.  Patient tolerated well.    1730 Patient again stood up walked around now bleeding again from her Merisel.,  Will consult ENT, will arrange for admission.   1742 Multiple pages to ENT no response patient still bleeding.  Will reattempt packing   1803 Rapid Rhino device placed.,  At this time hemostasis seems to be achieved.  Patient very uncomfortable.  Repaged ENT priority message labs are has been no response to the previous messages.   1839 See message back from ENT, unfortunately he was involved in  complicated case in the operating room and was unable to reply because of this.  Agrees with plan, offered for patient to be discharged and they will follow closely in the office did discuss this with patient and family members they are very uncomfortable with this and want to be admitted to the hospital.  Will place under observation admission.  Internal medicine accepted the admission.       Medications   oxymetazoline (AFRIN) 0.05 % nasal spray 2 spray (2 sprays Each Nare Given by Other 7/7/25 1454)   bacitracin topical ointment 2 small application (2 small application Topical Given by Other 7/7/25 1454)   oxymetazoline (AFRIN) 0.05 % nasal spray 2 spray (2 sprays Each Nare Given by Other 7/7/25 1513)   cephalexin (KEFLEX) capsule 500 mg (500 mg Oral Given 7/7/25 1634)   oxymetazoline (AFRIN) 0.05 % nasal spray 2 spray (2 sprays Each Nare Given by Other 7/7/25 1744)   bacitracin topical ointment 1 small application (1 small application Topical Given by Other 7/7/25 1744)       ED Risk Strat Scores                    No data recorded                            History of Present Illness       Chief Complaint   Patient presents with    Nose Bleed     Pt arrives ems from home, states intermittent nose bleeds since Friday but today it has been constant for the past hour, +eliquis pt states feeling it drip into throat but not coughing it up        Past Medical History[1]   Past Surgical History[2]   Family History[3]   Social History[4]    E-Cigarette/Vaping    E-Cigarette Use Never User       E-Cigarette/Vaping Substances    Nicotine No     THC No     CBD No     Flavoring No     Other No     Unknown No       I have reviewed and agree with the history as documented.     Pleasant 79-year-old female presents with right-sided epistaxis.,  Has been bleeding for about the past hour.  States she lost a large amount of blood at home.  Feels slightly dizzy.,  Patient is on Eliquis.  No trauma to the nose.      Nose Bleed  Associated symptoms: no dizziness, no fever and no headaches        Review of Systems   Constitutional:  Negative for fever.   HENT:  Positive for nosebleeds.    Respiratory:  Negative for chest tightness and shortness of breath.    Cardiovascular:  Negative for chest pain.   Skin:  Negative for rash.   Neurological:  Negative for dizziness, light-headedness and headaches.           Objective       ED Triage Vitals   Temperature Pulse Blood Pressure Respirations SpO2 Patient Position - Orthostatic VS   07/07/25 1848 07/07/25 1431 07/07/25 1431 07/07/25 1431 07/07/25 1431 07/07/25 1431   98.1 °F (36.7 °C) (!) 118 130/82 20 98 % Lying      Temp Source Heart Rate Source BP Location FiO2 (%) Pain Score    07/07/25 1848 07/07/25 1431 07/07/25 1431 -- 07/07/25 1931    Temporal Monitor Right arm  No Pain      Vitals      Date and Time Temp Pulse SpO2 Resp BP Pain Score FACES Pain Rating User   07/10/25 0912 -- 124 90 % -- 104/74 -- -- DII   07/10/25 0901 -- 118 -- -- 105/65 -- -- SS   07/10/25 0803 97.3 °F (36.3 °C) 117 90 % -- 105/65 -- -- DII   07/10/25 0558 -- 110 89 % 16 111/69 -- -- DII   07/10/25 0318 -- 85 91 % 17 112/72 -- -- DII   07/10/25 0020 -- 112 92 % -- 119/81 -- -- DII   07/09/25 2324 99 °F (37.2 °C) 120 92 % 17 118/80 -- -- GM   07/09/25 2318 99 °F (37.2 °C) 124 92 % -- 118/80 -- -- DII   07/09/25 2317 99 °F (37.2 °C) 123 91 % 17 106/74 -- -- DII   07/09/25 2227 98.2 °F (36.8 °C) 103 94 % 16 121/80 -- -- DII   07/09/25 2224 98.2  °F (36.8 °C) 103 94 % 16 121/80 -- --    07/09/25 2147 99 °F (37.2 °C) -- -- -- 117/87 -- -- DII   07/09/25 2126 98.3 °F (36.8 °C) 117 92 % 18 119/89 -- -- DII   07/09/25 2000 -- -- -- -- -- No Pain --    07/09/25 1527 97.3 °F (36.3 °C) 119 87 % 19 117/80 -- -- DII   07/09/25 1137 97.4 °F (36.3 °C) 122 89 % -- 117/85 -- -- DII   07/09/25 1035 -- 119 -- -- 119/85 -- --    07/09/25 0828 -- -- -- -- -- 2 --    07/09/25 0807 97.6 °F (36.4 °C) 124 91 % -- 123/87 -- -- DII   07/08/25 2121 97.5 °F (36.4 °C) -- -- -- -- -- -- ZP   07/08/25 2121 -- 116 88 % -- 131/91 -- -- DII   07/08/25 2032 -- -- -- 18 -- No Pain --    07/08/25 2032 97.3 °F (36.3 °C) 119 93 % -- 133/91 -- -- DII   07/08/25 1900 -- 122 92 % -- -- -- -- JK   07/08/25 1751 -- -- -- 20 -- -- -- JK   07/08/25 1751 97.7 °F (36.5 °C) 138 91 % -- 139/99 -- -- DII   07/08/25 1747 98.8 °F (37.1 °C) 137 91 % -- -- -- -- JK   07/08/25 1549 96.4 °F (35.8 °C) 140 93 % 18 142/103 -- -- DII   07/08/25 1512 -- 124 91 % -- -- -- -- JK   07/08/25 1447 -- 138 92 % -- 143/104 -- -- JK   07/08/25 1446 -- 138 92 % -- 143/104 -- -- DII   07/08/25 1430 -- 136 93 % -- -- -- -- JK   07/08/25 1400 -- 135 90 % -- -- -- -- JK   07/08/25 1308 -- 128 92 % -- -- -- -- JK   07/08/25 1251 -- 132 91 % -- 132/95 -- -- JK   07/08/25 1246 -- 128 91 % -- 132/95 -- -- DII   07/08/25 1235 -- 121 93 % -- -- -- -- JK   07/08/25 1226 -- 141 89 % -- 132/96 -- -- JK   07/08/25 0802 -- -- -- -- -- No Pain -- JK   07/08/25 0713 -- -- -- -- -- No Pain -- RG   07/08/25 0713 97.6 °F (36.4 °C) 143 94 % 16 134/96 -- -- DII   07/08/25 0005 -- 134 99 % -- 132/94 -- -- DII   07/07/25 2034 97.1 °F (36.2 °C) 126 91 % -- 132/92 -- -- DII   07/07/25 2033 -- 126 94 % -- 132/92 -- -- DII   07/07/25 1931 -- -- -- -- -- No Pain -- FM   07/07/25 1931 97.1 °F (36.2 °C) 125 93 % -- 133/92 -- -- DII   07/07/25 1931 97.1 °F (36.2 °C) 120 95 % -- 133/92 -- -- DII   07/07/25 1848 98.1 °F (36.7 °C) -- -- -- -- -- --  AM   07/07/25 1700 -- 108 96 % 20 122/71 -- -- AM   07/07/25 1530 -- 110 94 % 18 126/96 -- -- AM   07/07/25 1431 -- 118 98 % 20 130/82 -- -- TE            Physical Exam  Vitals reviewed.   Constitutional:       General: She is in acute distress (Actively bleeding a large amount of blood at the right naris.).      Appearance: She is well-developed. She is not diaphoretic.   HENT:      Head: Normocephalic and atraumatic.      Right Ear: External ear normal.      Left Ear: External ear normal.      Nose:      Comments: Large amount of blood coming out of the right side of the nose.,  Patient clothing covered in blood.  Expressed out blood clots, was unable to visualize exact source of bleeding as  naris filled up with blood again.    Eyes:      General:         Right eye: No discharge.         Left eye: No discharge.      Pupils: Pupils are equal, round, and reactive to light.     Neck:      Trachea: No tracheal deviation.     Cardiovascular:      Rate and Rhythm: Normal rate and regular rhythm.      Heart sounds: Normal heart sounds. No murmur heard.  Pulmonary:      Effort: Pulmonary effort is normal. No respiratory distress.      Breath sounds: Normal breath sounds. No stridor.   Abdominal:      General: There is no distension.      Palpations: Abdomen is soft.      Tenderness: There is no abdominal tenderness. There is no guarding or rebound.     Musculoskeletal:         General: Normal range of motion.      Cervical back: Normal range of motion and neck supple.     Skin:     General: Skin is warm and dry.      Coloration: Skin is not pale.      Findings: No erythema.     Neurological:      General: No focal deficit present.      Mental Status: She is alert and oriented to person, place, and time.         Results Reviewed       Procedure Component Value Units Date/Time    Basic metabolic panel [[id number]] Collected: 07/07/25 1621    Lab Status: Final result Specimen: Blood from Arm, Right Updated: 07/07/25 1701      Sodium 139 mmol/L      Potassium 3.5 mmol/L      Chloride 104 mmol/L      CO2 25 mmol/L      ANION GAP 10 mmol/L      BUN 16 mg/dL      Creatinine 1.03 mg/dL      Glucose 114 mg/dL      Calcium 9.1 mg/dL      eGFR 51 ml/min/1.73sq m     Narrative:      [employer] guidelines for Chronic Kidney Disease (CKD):     Stage 1 with normal or high GFR (GFR > 90 mL/min/1.73 square meters)    Stage 2 Mild CKD (GFR = 60-89 mL/min/1.73 square meters)    Stage 3A Moderate CKD (GFR = 45-59 mL/min/1.73 square meters)    Stage 3B Moderate CKD (GFR = 30-44 mL/min/1.73 square meters)    Stage 4 Severe CKD (GFR = 15-29 mL/min/1.73 square meters)    Stage 5 End Stage CKD (GFR <15 mL/min/1.73 square meters)  Note: GFR calculation is accurate only with a steady state creatinine    CBC and differential [[id number]]  (Abnormal) Collected: 07/07/25 1621    Lab Status: Final result Specimen: Blood from Arm, Right Updated: 07/07/25 1630     WBC 10.62 Thousand/uL      RBC 4.62 Million/uL      Hemoglobin 16.5 g/dL      Hematocrit 47.8 %       fL      MCH 35.7 pg      MCHC 34.5 g/dL      RDW 13.2 %      MPV 10.7 fL      Platelets 263 Thousands/uL      nRBC 0 /100 WBCs      Segmented % 78 %      Immature Grans % 0 %      Lymphocytes % 9 %      Monocytes % 9 %      Eosinophils Relative 3 %      Basophils Relative 1 %      Absolute Neutrophils 8.29 Thousands/µL      Absolute Immature Grans 0.04 Thousand/uL      Absolute Lymphocytes 0.93 Thousands/µL      Absolute Monocytes 0.98 Thousand/µL      Eosinophils Absolute 0.32 Thousand/µL      Basophils Absolute 0.06 Thousands/µL             XR chest portable    (Results Pending)       Epistaxis management    Date/Time: 7/7/2025 4:25 PM    Performed by: Shen Chicas DO  Authorized by: Shen Chicas DO    Universal Protocol:  Consent: Verbal consent obtained    Patient location:  ED  Anesthesia (see MAR for exact dosages):     Anesthesia method:  None  Procedure  details:     Treatment site:  R anterior    Hemostasis method:  Merocel sponge (Bilateral Merocel sponges placed)    Treatment complexity:  Limited    Treatment episode: recurring    Post-procedure details:     Assessment:  Bleeding decreased  Epistaxis management    Date/Time: 2025 4:05 PM    Performed by: Shen Chicas DO  Authorized by: Shen Chicas DO    Universal Protocol:  Consent: Verbal consent obtained    Patient location:  ED  Procedure details:     Treatment site:  R anterior    Hemostasis method:  Merocel sponge (Right sided Merocel sponge)    Treatment complexity:  Limited    Treatment episode: initial    Post-procedure details:     Assessment:  Bleeding decreased    Patient tolerance of procedure:  Tolerated well, no immediate complications      ED Medication and Procedure Management   Prior to Admission Medications   Prescriptions Last Dose Informant Patient Reported? Taking?   amiodarone 100 mg tablet   No No   Sig: Take 1 tablet (100 mg total) by mouth daily   apixaban (Eliquis) 5 mg   No No   Sig: TAKE 1 TABLET BY MOUTH TWICE  DAILY   chlorhexidine (PERIDEX) 0.12 % solution   Yes No   Sig: USE AS DIRECTED . DO NOT SWALLOW   fluticasone (FLONASE) 50 mcg/act nasal spray  Self No No   Si spray into each nostril daily Start after finishing the 3 days of Prednsione   Patient not taking: Reported on 4/10/2025   furosemide (LASIX) 20 mg tablet  Self No No   Sig: TAKE 1 TABLET BY MOUTH DAILY   levothyroxine 25 mcg tablet  Self No No   Sig: TAKE 1 TABLET BY MOUTH DAILY IN  THE EARLY MORNING   lovastatin (MEVACOR) 10 MG tablet   No No   Sig: TAKE 1 TABLET BY MOUTH DAILY AT  BEDTIME   mesalamine (CANASA) 1,000 mg suppository   No No   Sig: Insert 1 suppository (1,000 mg total) into the rectum daily at bedtime [facility] - SEND ASAP for patient   metoprolol succinate (TOPROL-XL) 100 mg 24 hr tablet  Self No No   Sig: Take 1 tablet (100 mg total) by mouth daily   potassium chloride (Klor-Con M10)  10 mEq tablet  Self No No   Sig: Take 1 tablet (10 mEq total) by mouth daily   sodium chloride (OCEAN) 0.65 % nasal spray  Self No No   Si spray into each nostril as needed for rhinitis or congestion   Patient not taking: Reported on 4/10/2025      Facility-Administered Medications: None     Current Discharge Medication List        CONTINUE these medications which have NOT CHANGED    Details   amiodarone 100 mg tablet Take 1 tablet (100 mg total) by mouth daily  Qty: 90 tablet, Refills: 3    Associated Diagnoses: Persistent atrial fibrillation (HCC)      apixaban (Eliquis) 5 mg TAKE 1 TABLET BY MOUTH TWICE  DAILY  Qty: 180 tablet, Refills: 1    Comments: Please send a replace/new response with 90-Day Supply if appropriate to maximize member benefit. Requesting 1 year supply.  Associated Diagnoses: Persistent atrial fibrillation (HCC)      chlorhexidine (PERIDEX) 0.12 % solution USE AS DIRECTED . DO NOT SWALLOW      fluticasone (FLONASE) 50 mcg/act nasal spray 1 spray into each nostril daily Start after finishing the 3 days of Prednsione  Qty: 16 g, Refills: 0    Associated Diagnoses: Sinusitis, acute maxillary; Acute rhinitis      furosemide (LASIX) 20 mg tablet TAKE 1 TABLET BY MOUTH DAILY  Qty: 90 tablet, Refills: 3    Comments: Please send a replace/new response with 90-Day Supply if appropriate to maximize member benefit. Requesting 1 year supply.  Associated Diagnoses: Chronic systolic (congestive) heart failure (HCC)      levothyroxine 25 mcg tablet TAKE 1 TABLET BY MOUTH DAILY IN  THE EARLY MORNING  Qty: 90 tablet, Refills: 3    Comments: Please send a replace/new response with 90-Day Supply if appropriate to maximize member benefit. Requesting 1 year supply.  Associated Diagnoses: Acquired hypothyroidism      lovastatin (MEVACOR) 10 MG tablet TAKE 1 TABLET BY MOUTH DAILY AT  BEDTIME  Qty: 90 tablet, Refills: 1    Comments: Please send a replace/new response with 90-Day Supply if appropriate to maximize  member benefit. Requesting 1 year supply.  Associated Diagnoses: Mixed hyperlipidemia      mesalamine (CANASA) 1,000 mg suppository Insert 1 suppository (1,000 mg total) into the rectum daily at bedtime [facility] - SEND ASAP for patient  Qty: 90 suppository, Refills: 1    Comments: Requesting 1 year supply  Associated Diagnoses: Proctitis      metoprolol succinate (TOPROL-XL) 100 mg 24 hr tablet Take 1 tablet (100 mg total) by mouth daily  Qty: 90 tablet, Refills: 3    Associated Diagnoses: Persistent atrial fibrillation (HCC)      potassium chloride (Klor-Con M10) 10 mEq tablet Take 1 tablet (10 mEq total) by mouth daily  Qty: 90 tablet, Refills: 3    Comments: Requesting 1 year supply  Associated Diagnoses: Chronic diastolic congestive heart failure (HCC)      sodium chloride (OCEAN) 0.65 % nasal spray 1 spray into each nostril as needed for rhinitis or congestion  Qty: 60 mL, Refills: 0    Associated Diagnoses: Sinusitis, acute maxillary; Acute rhinitis           No discharge procedures on file.  ED SEPSIS DOCUMENTATION   Time reflects when diagnosis was documented in both MDM as applicable and the Disposition within this note       Time User Action Codes Description Comment    7/7/2025  6:17 PM Shen Chicas Add [R04.0] Right-sided epistaxis     7/7/2025  6:17 PM Shen Chicas Add [Z79.01] Anticoagulated     7/9/2025  9:30 AM Dave Pedersen Add [I48.19] Persistent atrial fibrillation (HCC)                    [1]   Past Medical History:  Diagnosis Date    Aphasia, mixed 07/28/2017    Atrial fibrillation (HCC)     CHF (congestive heart failure) (HCC)     Colon polyp     Headache     Hyperlipidemia     Hypertension     Lyme disease     Shortness of breath     TIA (transient ischemic attack)     Vertigo    [2]   Past Surgical History:  Procedure Laterality Date    CARDIAC ELECTROPHYSIOLOGY PROCEDURE N/A 10/19/2023    Procedure: Cardiac eps/afib ablation PVI/POST WALL;  Surgeon: Piero Fajardo MD;  Location: [facility];  Service: Cardiology    CARDIAC PACEMAKER PLACEMENT  12/2019    COLONOSCOPY      PA SIGMOIDOSCOPY FLX DX W/COLLJ SPEC BR/WA IF PFRMD N/A 11/28/2017    Procedure: SIGMOIDOSCOPY FLEXIBLE;  Surgeon: Kavon Fernandez MD;  Location: [facility];  Service: Gastroenterology    TONSILLECTOMY     [3]   Family History  Problem Relation Name Age of Onset    Lung cancer Mother      Dementia Father Dad     Alzheimer's disease Father Dad     Other Father Dad         Cardiac Disorder     Lung cancer Maternal Grandmother      Breast cancer Paternal Aunt Auntie J 40    No Known Problems Paternal Aunt      No Known Problems Paternal Aunt     [4]   Social History  Tobacco Use    Smoking status: Never    Smokeless tobacco: Never    Tobacco comments:     no smoking for 46 years   Vaping Use    Vaping status: Never Used   Substance Use Topics    Alcohol use: Yes     Alcohol/week: 7.0 standard drinks of alcohol     Types: 7 Glasses of wine per week     Comment: occassional    Drug use: No        Shen Chicas DO  07/10/25 0921

## 2025-07-07 NOTE — Clinical Note
Case was discussed with Dr. Salgado and the patient's admission status was agreed to be Admission Status: observation status to the service of Dr. ABIDA Sanchez

## 2025-07-08 ENCOUNTER — TELEPHONE (OUTPATIENT)
Dept: FAMILY MEDICINE CLINIC | Facility: CLINIC | Age: 80
End: 2025-07-08

## 2025-07-08 ENCOUNTER — TELEPHONE (OUTPATIENT)
Age: 80
End: 2025-07-08

## 2025-07-08 ENCOUNTER — RA CDI HCC (OUTPATIENT)
Dept: OTHER | Facility: HOSPITAL | Age: 80
End: 2025-07-08

## 2025-07-08 LAB
ANION GAP SERPL CALCULATED.3IONS-SCNC: 8 MMOL/L (ref 4–13)
BASOPHILS # BLD AUTO: 0.08 THOUSANDS/ÂΜL (ref 0–0.1)
BASOPHILS NFR BLD AUTO: 1 % (ref 0–1)
BUN SERPL-MCNC: 23 MG/DL (ref 5–25)
CALCIUM SERPL-MCNC: 9.3 MG/DL (ref 8.4–10.2)
CHLORIDE SERPL-SCNC: 104 MMOL/L (ref 96–108)
CO2 SERPL-SCNC: 27 MMOL/L (ref 21–32)
CREAT SERPL-MCNC: 0.92 MG/DL (ref 0.6–1.3)
EOSINOPHIL # BLD AUTO: 0.23 THOUSAND/ÂΜL (ref 0–0.61)
EOSINOPHIL NFR BLD AUTO: 2 % (ref 0–6)
ERYTHROCYTE [DISTWIDTH] IN BLOOD BY AUTOMATED COUNT: 13.3 % (ref 11.6–15.1)
GFR SERPL CREATININE-BSD FRML MDRD: 58 ML/MIN/1.73SQ M
GLUCOSE SERPL-MCNC: 114 MG/DL (ref 65–140)
HCT VFR BLD AUTO: 47.5 % (ref 34.8–46.1)
HGB BLD-MCNC: 16.1 G/DL (ref 11.5–15.4)
IMM GRANULOCYTES # BLD AUTO: 0.03 THOUSAND/UL (ref 0–0.2)
IMM GRANULOCYTES NFR BLD AUTO: 0 % (ref 0–2)
LYMPHOCYTES # BLD AUTO: 1.01 THOUSANDS/ÂΜL (ref 0.6–4.47)
LYMPHOCYTES NFR BLD AUTO: 9 % (ref 14–44)
MCH RBC QN AUTO: 35.2 PG (ref 26.8–34.3)
MCHC RBC AUTO-ENTMCNC: 33.9 G/DL (ref 31.4–37.4)
MCV RBC AUTO: 104 FL (ref 82–98)
MONOCYTES # BLD AUTO: 1.08 THOUSAND/ÂΜL (ref 0.17–1.22)
MONOCYTES NFR BLD AUTO: 10 % (ref 4–12)
NEUTROPHILS # BLD AUTO: 8.66 THOUSANDS/ÂΜL (ref 1.85–7.62)
NEUTS SEG NFR BLD AUTO: 78 % (ref 43–75)
NRBC BLD AUTO-RTO: 0 /100 WBCS
PLATELET # BLD AUTO: 256 THOUSANDS/UL (ref 149–390)
PMV BLD AUTO: 11.2 FL (ref 8.9–12.7)
POTASSIUM SERPL-SCNC: 3.7 MMOL/L (ref 3.5–5.3)
RBC # BLD AUTO: 4.57 MILLION/UL (ref 3.81–5.12)
SODIUM SERPL-SCNC: 139 MMOL/L (ref 135–147)
WBC # BLD AUTO: 11.09 THOUSAND/UL (ref 4.31–10.16)

## 2025-07-08 PROCEDURE — 99232 SBSQ HOSP IP/OBS MODERATE 35: CPT | Performed by: INTERNAL MEDICINE

## 2025-07-08 PROCEDURE — 80048 BASIC METABOLIC PNL TOTAL CA: CPT | Performed by: STUDENT IN AN ORGANIZED HEALTH CARE EDUCATION/TRAINING PROGRAM

## 2025-07-08 PROCEDURE — 85025 COMPLETE CBC W/AUTO DIFF WBC: CPT | Performed by: STUDENT IN AN ORGANIZED HEALTH CARE EDUCATION/TRAINING PROGRAM

## 2025-07-08 PROCEDURE — 99221 1ST HOSP IP/OBS SF/LOW 40: CPT

## 2025-07-08 RX ORDER — METOPROLOL TARTRATE 1 MG/ML
5 INJECTION, SOLUTION INTRAVENOUS ONCE
Status: COMPLETED | OUTPATIENT
Start: 2025-07-08 | End: 2025-07-08

## 2025-07-08 RX ORDER — METOPROLOL SUCCINATE 50 MG/1
50 TABLET, EXTENDED RELEASE ORAL ONCE
Status: COMPLETED | OUTPATIENT
Start: 2025-07-08 | End: 2025-07-08

## 2025-07-08 RX ORDER — MAGNESIUM SULFATE HEPTAHYDRATE 40 MG/ML
2 INJECTION, SOLUTION INTRAVENOUS ONCE
Status: COMPLETED | OUTPATIENT
Start: 2025-07-08 | End: 2025-07-09

## 2025-07-08 RX ADMIN — METOPROLOL SUCCINATE 100 MG: 100 TABLET, EXTENDED RELEASE ORAL at 09:17

## 2025-07-08 RX ADMIN — MAGNESIUM SULFATE HEPTAHYDRATE 2 G: 40 INJECTION, SOLUTION INTRAVENOUS at 12:28

## 2025-07-08 RX ADMIN — HYDROXYZINE HYDROCHLORIDE 25 MG: 25 TABLET, FILM COATED ORAL at 00:06

## 2025-07-08 RX ADMIN — METOPROLOL TARTRATE 5 MG: 1 INJECTION, SOLUTION INTRAVENOUS at 12:26

## 2025-07-08 RX ADMIN — PRAVASTATIN SODIUM 10 MG: 20 TABLET ORAL at 16:22

## 2025-07-08 RX ADMIN — METOPROLOL TARTRATE 5 MG: 1 INJECTION, SOLUTION INTRAVENOUS at 14:41

## 2025-07-08 RX ADMIN — AMIODARONE HYDROCHLORIDE 100 MG: 200 TABLET ORAL at 09:17

## 2025-07-08 RX ADMIN — METOPROLOL SUCCINATE 50 MG: 50 TABLET, EXTENDED RELEASE ORAL at 16:22

## 2025-07-08 RX ADMIN — POTASSIUM CHLORIDE 10 MEQ: 750 TABLET, EXTENDED RELEASE ORAL at 09:25

## 2025-07-08 RX ADMIN — LEVOTHYROXINE SODIUM 25 MCG: 0.03 TABLET ORAL at 05:35

## 2025-07-08 RX ADMIN — FUROSEMIDE 20 MG: 20 TABLET ORAL at 09:17

## 2025-07-08 RX ADMIN — OXYMETAZOLINE HYDROCHLORIDE 2 SPRAY: 0.05 SPRAY NASAL at 03:56

## 2025-07-08 NOTE — TELEPHONE ENCOUNTER
Pt is still currently admitted to the hospital. Please advise if she should follow up with a provider there. Thank you!

## 2025-07-08 NOTE — CONSULTS
Consultation - [facility]/ENT   Madeline Good 79 y.o. female MRN: [id number]  Unit/Bed#: -01 Encounter: [id number]        Assessment/ Plan:   Bilateral anterior epistaxis:  - No additional bleeding from left nare - likely collateral flow from right epistaxis   - Right nasal packing in place for discharge   - Clear for discharge from ENT standpoint   - Outpatient follow up Thursday 10:15 in [place] office     Halle Frye PA-C   Otolaryngology - Head and Neck Surgery   Please contact ENT Secure Chat [place] Call Role or any questions or concerns.          History of Present Illness   Physician Requesting Consult: Dave Pedersen DO  Reason for Consult / Principal Problem: Epistaxis   HPI: Madeline Good is a 79 y.o. year old female who presents with epistaxis. Presented to ED yesterday with 2 days of intermittent RIGHT sided epistaxis. ED placed right packing and had subsequent LEFT sided bleeding and admitted to [facility] due to bilateral nasal packing. She had a replacement of the LEFT sided packing on the floor due to persistent bleeding. Had LEFT sided packing removed earlier today with no additional bleeding.     Review of systems:  10 Point ROS was performed and negative except as above or otherwise noted in the medical record.    Historical Information   Past Medical History[1]  Past Surgical History[2]  Social History   Social History     Substance and Sexual Activity   Alcohol Use Yes    Alcohol/week: 7.0 standard drinks of alcohol    Types: 7 Glasses of wine per week    Comment: occassional     Social History     Substance and Sexual Activity   Drug Use No     Tobacco Use History[3]  Family History: Family History[4]    Meds/Allergies   all current active meds have been reviewed    Allergies[5]    Objective     Vitals:    07/08/25 1549   BP: (!) 142/103   Pulse: (!) 140   Resp: 18   Temp: (!) 96.4 °F (35.8 °C)   SpO2: 93%         Physical Exam   Constitutional: Oriented to person, place, and time. Well-developed and  well-nourished, no apparent distress, non-toxic appearance. Cooperative, able to hear and answer questions without difficulty.    Voice: Normal voice quality.  Head: Normocephalic, atraumatic.  No scars, masses or lesions.  Face: Symmetric, no edema, no sinus tenderness.  Eyes: Vision grossly intact, extra-ocular movement intact.  Ears: External ears normal.  No post-auricular erythema or tenderness.  Nose: Septum intact, nares clear.  Mucosa moist, turbinates well appearing.  No crusting, polyps or discharge evident.  Oral cavity: Dentition intact.  Mucosa moist, lips without lesions or masses.  Tongue mobile, floor of mouth soft and flat.  Hard palate intact.  No masses or lesions.   Oropharynx: Uvula is midline, soft palate intact without lesion or mass.  Oropharyngeal inlet without obstruction.  Tonsils unremarkable.  Posterior pharyngeal wall clear. No masses or lesions.  Salivary glands:  Parotid glands and submandibular glands symmetric, no enlargement or tenderness.  Neck: Normal laryngeal elevation with swallow.  Trachea midline.  No masses or lesions. No palpable adenopathy.  Thyroid: Without tenderness or palpable nodules.  Pulmonary/Chest: Normal effort and rate. No respiratory distress. No stertor or stridor  Musculoskeletal: Normal range of motion.   Neurological: Cranial nerves 2-12 intact.  Skin: Skin is warm and dry.   Psychiatric: Normal mood and affect.      RIGHT rhino rocket in place. No vessels on left anterior septum       Intake/Output Summary (Last 24 hours) at 7/8/2025 1655  Last data filed at 7/8/2025 1300  Gross per 24 hour   Intake 240 ml   Output --   Net 240 ml       Invasive Devices       Peripheral Intravenous Line  Duration             Peripheral IV 07/08/25 Left Wrist <1 day                    Lab Results: I have personally reviewed pertinent lab results.    Imaging Studies: Results Review Statement: No pertinent imaging studies reviewed.  EKG, Pathology, and Other Studies: Results  Review Statement: No pertinent imaging studies reviewed.    Code Status: Level 1 - Full Code  Advance Directive and Living Will:      Power of :    POLST:      Counseling/Coordination of Care: Total floor / unit time spent today 30 minutes. Greater than 50% of total time was spent with the patient and / or family counseling and / or coordination of care. A description of the counseling / coordination of care: History and physical, chart review         [1]   Past Medical History:  Diagnosis Date    Aphasia, mixed 07/28/2017    Atrial fibrillation (HCC)     CHF (congestive heart failure) (HCC)     Colon polyp     Headache     Hyperlipidemia     Hypertension     Lyme disease     Shortness of breath     TIA (transient ischemic attack)     Vertigo    [2]   Past Surgical History:  Procedure Laterality Date    CARDIAC ELECTROPHYSIOLOGY PROCEDURE N/A 10/19/2023    Procedure: Cardiac eps/afib ablation PVI/POST WALL;  Surgeon: Piero Fajardo MD;  Location:  CARDIAC CATH LAB;  Service: Cardiology    CARDIAC PACEMAKER PLACEMENT  12/2019    COLONOSCOPY      NV SIGMOIDOSCOPY FLX DX W/COLLJ SPEC BR/WA IF PFRMD N/A 11/28/2017    Procedure: SIGMOIDOSCOPY FLEXIBLE;  Surgeon: Kavon Fernandez MD;  Location: [facility];  Service: Gastroenterology    TONSILLECTOMY     [3]   Social History  Tobacco Use   Smoking Status Never   Smokeless Tobacco Never   Tobacco Comments    no smoking for 46 years   [4]   Family History  Problem Relation Name Age of Onset    Lung cancer Mother      Dementia Father Dad     Alzheimer's disease Father Dad     Other Father Dad         Cardiac Disorder     Lung cancer Maternal Grandmother      Breast cancer Paternal Aunt Auntie J 40    No Known Problems Paternal Aunt      No Known Problems Paternal Aunt     [5] No Known Allergies

## 2025-07-08 NOTE — ASSESSMENT & PLAN NOTE
Wt Readings from Last 3 Encounters:   06/17/25 75.7 kg (166 lb 14.2 oz)   06/13/25 76.2 kg (168 lb)   06/06/25 76.5 kg (168 lb 9.6 oz)   Not in exacerbation at this time  Euvolemic on exam  Continue lasix 20 mg daily

## 2025-07-08 NOTE — TELEPHONE ENCOUNTER
Caller: VIVIANA Ramos    Doctor: Dr. Gomez    Reason for call: Pt is in Hosp since last night with a nose bleed.  No ENT has arrived yet but with constant bleeding, the DIL has concerns and questions.    Call back#: [phone]

## 2025-07-08 NOTE — ASSESSMENT & PLAN NOTE
Ongoing intermittently since Friday, worsened today   Hgb stable at 16.5, will recheck in AM  Bilateral packing placed in ED followed by rapid rhino  Hold eliquis  Patient currently without further bleeding, will remove left nasal packing due topatients request due to discomfrt, will maintain right sided packing  Discussed with ent

## 2025-07-08 NOTE — TELEPHONE ENCOUNTER
Called pt VIVIANA Ramos and relayed Dr. Gomez's advice to consult with physicians in hospital re: nosebleed. She verbally understood.

## 2025-07-08 NOTE — ASSESSMENT & PLAN NOTE
Lab Results   Component Value Date    EGFR 51 07/07/2025    EGFR 50 12/23/2024    EGFR 46 06/11/2024    CREATININE 1.03 07/07/2025    CREATININE 1.05 12/23/2024    CREATININE 1.13 06/11/2024   Cr stable at baseline  Avoid nephrotoxic agents  Monitor with AM labs

## 2025-07-08 NOTE — ASSESSMENT & PLAN NOTE
Wt Readings from Last 3 Encounters:   07/08/25 75.3 kg (166 lb)   06/17/25 75.7 kg (166 lb 14.2 oz)   06/13/25 76.2 kg (168 lb)   Not in exacerbation at this time  Euvolemic on exam  Continue lasix 20 mg daily

## 2025-07-08 NOTE — ASSESSMENT & PLAN NOTE
Lab Results   Component Value Date    EGFR 58 07/08/2025    EGFR 51 07/07/2025    EGFR 50 12/23/2024    CREATININE 0.92 07/08/2025    CREATININE 1.03 07/07/2025    CREATININE 1.05 12/23/2024   Cr stable at baseline  Avoid nephrotoxic agents  Monitor with AM labs

## 2025-07-08 NOTE — NURSING NOTE
Large amount of blood bilateral nostril and coughing up clots SLIM notify(Carlito Juan) instructed to ice bridge of nose and sit up pt. Afrin order, left nostril pack. Right nostril was packed in ED.

## 2025-07-08 NOTE — ASSESSMENT & PLAN NOTE
Ongoing intermittently since Friday, worsened today   Hgb stable at 16.5, will recheck in AM  Bilateral packing placed in ED followed by rapid rhino  Hold eliquis  ENT consult, will evaluate patient in AM  Update: patient started bleeding again after reaching to the floor, discussed case with ENT Dr Abrams recommended to place rapid rhino in left nare as well for tamponade effect which helped in achieving hemostasis.

## 2025-07-08 NOTE — PROGRESS NOTES
Progress Note - Hospitalist   Name: Madeline Good 79 y.o. female I MRN: [id number]  Unit/Bed#: -01 I Date of Admission: 7/7/2025   Date of Service: 7/8/2025 I Hospital Day: 0    Assessment & Plan  Severe epistaxis  Ongoing intermittently since Friday, worsened today   Hgb stable at 16.5, will recheck in AM  Bilateral packing placed in ED followed by rapid rhino  Hold eliquis  Patient currently without further bleeding, will remove left nasal packing due topatients request due to discomfrt, will maintain right sided packing  Discussed with ent  Essential hypertension  Continue metoprolol  Mixed hyperlipidemia  Continue statin  Persistent atrial fibrillation (HCC)  Rates controlled  On amiodarone and metoprolol  Holding eliquis  Stage 3a chronic kidney disease (HCC)  Lab Results   Component Value Date    EGFR 58 07/08/2025    EGFR 51 07/07/2025    EGFR 50 12/23/2024    CREATININE 0.92 07/08/2025    CREATININE 1.03 07/07/2025    CREATININE 1.05 12/23/2024   Cr stable at baseline  Avoid nephrotoxic agents  Monitor with AM labs  Acquired hypothyroidism  Continue synthroid  Chronic heart failure with preserved ejection fraction (HFpEF) ([facility])  Wt Readings from Last 3 Encounters:   07/08/25 75.3 kg (166 lb)   06/17/25 75.7 kg (166 lb 14.2 oz)   06/13/25 76.2 kg (168 lb)   Not in exacerbation at this time  Euvolemic on exam  Continue lasix 20 mg daily          VTE Pharmacologic Prophylaxis:   Low Risk (Score 0-2) - Encourage Ambulation.    Mobility:   Basic Mobility Inpatient Raw Score: 20  JH-HLM Goal: 6: Walk 10 steps or more  JH-HLM Achieved: 5: Stand (1 or more minutes)  JH-HLM Goal achieved. Continue to encourage appropriate mobility.    Patient Centered Rounds: I performed bedside rounds with nursing staff today.   Discussions with Specialists or Other Care Team Provider:     Education and Discussions with Family / Patient: Updated  (significant other) at bedside.    Current Length of Stay: 0  day(s)  Current Patient Status: Inpatient   Certification Statement: The patient will continue to require additional inpatient hospital stay due to epistasxis  Discharge Plan: Anticipate discharge in 24-48 hrs to home.    Code Status: Level 1 - Full Code    Subjective   Patietn denies any acute complaints    Objective :  Temp:  [97.1 °F (36.2 °C)-98.1 °F (36.7 °C)] 97.6 °F (36.4 °C)  HR:  [108-143] 132  BP: (122-134)/(71-96) 132/95  Resp:  [16-20] 16  SpO2:  [89 %-99 %] 91 %  O2 Device: None (Room air)    Body mass index is 29.41 kg/m².     Input and Output Summary (last 24 hours):   No intake or output data in the 24 hours ending 07/08/25 1414    Physical Exam  Vitals and nursing note reviewed.   Constitutional:       General: She is not in acute distress.     Appearance: She is well-developed.      Comments: Nasal packing in place bilaterally   HENT:      Head: Normocephalic and atraumatic.     Eyes:      General: No scleral icterus.     Conjunctiva/sclera: Conjunctivae normal.       Cardiovascular:      Rate and Rhythm: Normal rate and regular rhythm.      Heart sounds: No murmur heard.     No friction rub. No gallop.   Pulmonary:      Effort: Pulmonary effort is normal. No respiratory distress.      Breath sounds: Normal breath sounds. No stridor. No wheezing, rhonchi or rales.   Chest:      Chest wall: No tenderness.   Abdominal:      General: There is no distension.      Palpations: Abdomen is soft. There is no mass.      Tenderness: There is no abdominal tenderness. There is no guarding or rebound.      Hernia: No hernia is present.     Musculoskeletal:         General: No swelling or tenderness.      Cervical back: Neck supple.     Skin:     General: Skin is warm and dry.      Capillary Refill: Capillary refill takes less than 2 seconds.     Neurological:      Mental Status: She is alert and oriented to person, place, and time.     Psychiatric:         Mood and Affect: Mood normal.            Lines/Drains:              Lab Results: I have reviewed the following results:   Results from last 7 days   Lab Units 07/08/25  0456   WBC Thousand/uL 11.09*   HEMOGLOBIN g/dL 16.1*   HEMATOCRIT % 47.5*   PLATELETS Thousands/uL 256   SEGS PCT % 78*   LYMPHO PCT % 9*   MONO PCT % 10   EOS PCT % 2     Results from last 7 days   Lab Units 07/08/25  0456   SODIUM mmol/L 139   POTASSIUM mmol/L 3.7   CHLORIDE mmol/L 104   CO2 mmol/L 27   BUN mg/dL 23   CREATININE mg/dL 0.92   ANION GAP mmol/L 8   CALCIUM mg/dL 9.3   GLUCOSE RANDOM mg/dL 114                       Recent Cultures (last 7 days):         Imaging Results Review: No pertinent imaging studies reviewed.  Other Study Results Review: No additional pertinent studies reviewed.    Last 24 Hours Medication List:     Current Facility-Administered Medications:     amiodarone tablet 100 mg, Daily    furosemide (LASIX) tablet 20 mg, Daily    levothyroxine tablet 25 mcg, Early Morning    mesalamine (CANASA) rectal suppository 1,000 mg, HS    metoprolol succinate (TOPROL-XL) 24 hr tablet 100 mg, Daily    potassium chloride (Klor-Con M10) CR tablet 10 mEq, Daily    pravastatin (PRAVACHOL) tablet 10 mg, Daily With Dinner    Administrative Statements   Today, Patient Was Seen By: Dave Pedersen DO      **Please Note: This note may have been constructed using a voice recognition system.**

## 2025-07-08 NOTE — TELEPHONE ENCOUNTER
Patient is currently admitted for continuous nose bleeding - ENT in hospital wants nose packed - they feel it is not working - ENT has not been around to see her and they want to let Dr. Payton know so that he can be aware and assist     Rachel daughter in law [phone]

## 2025-07-09 ENCOUNTER — APPOINTMENT (INPATIENT)
Dept: RADIOLOGY | Facility: HOSPITAL | Age: 80
End: 2025-07-09
Payer: MEDICARE

## 2025-07-09 LAB
BASOPHILS # BLD AUTO: 0.07 THOUSANDS/ÂΜL (ref 0–0.1)
BASOPHILS NFR BLD AUTO: 1 % (ref 0–1)
EOSINOPHIL # BLD AUTO: 0.27 THOUSAND/ÂΜL (ref 0–0.61)
EOSINOPHIL NFR BLD AUTO: 3 % (ref 0–6)
ERYTHROCYTE [DISTWIDTH] IN BLOOD BY AUTOMATED COUNT: 13.3 % (ref 11.6–15.1)
HCT VFR BLD AUTO: 42.6 % (ref 34.8–46.1)
HGB BLD-MCNC: 14.8 G/DL (ref 11.5–15.4)
IMM GRANULOCYTES # BLD AUTO: 0.04 THOUSAND/UL (ref 0–0.2)
IMM GRANULOCYTES NFR BLD AUTO: 0 % (ref 0–2)
LYMPHOCYTES # BLD AUTO: 1.17 THOUSANDS/ÂΜL (ref 0.6–4.47)
LYMPHOCYTES NFR BLD AUTO: 11 % (ref 14–44)
MCH RBC QN AUTO: 36 PG (ref 26.8–34.3)
MCHC RBC AUTO-ENTMCNC: 34.7 G/DL (ref 31.4–37.4)
MCV RBC AUTO: 104 FL (ref 82–98)
MONOCYTES # BLD AUTO: 1.27 THOUSAND/ÂΜL (ref 0.17–1.22)
MONOCYTES NFR BLD AUTO: 12 % (ref 4–12)
NEUTROPHILS # BLD AUTO: 7.66 THOUSANDS/ÂΜL (ref 1.85–7.62)
NEUTS SEG NFR BLD AUTO: 73 % (ref 43–75)
NRBC BLD AUTO-RTO: 0 /100 WBCS
PLATELET # BLD AUTO: 237 THOUSANDS/UL (ref 149–390)
PMV BLD AUTO: 11 FL (ref 8.9–12.7)
RBC # BLD AUTO: 4.11 MILLION/UL (ref 3.81–5.12)
WBC # BLD AUTO: 10.48 THOUSAND/UL (ref 4.31–10.16)

## 2025-07-09 PROCEDURE — 71045 X-RAY EXAM CHEST 1 VIEW: CPT

## 2025-07-09 PROCEDURE — 85025 COMPLETE CBC W/AUTO DIFF WBC: CPT | Performed by: INTERNAL MEDICINE

## 2025-07-09 PROCEDURE — 99232 SBSQ HOSP IP/OBS MODERATE 35: CPT | Performed by: INTERNAL MEDICINE

## 2025-07-09 RX ORDER — METOPROLOL SUCCINATE 50 MG/1
50 TABLET, EXTENDED RELEASE ORAL ONCE
Status: COMPLETED | OUTPATIENT
Start: 2025-07-09 | End: 2025-07-09

## 2025-07-09 RX ORDER — DILTIAZEM HYDROCHLORIDE 30 MG/1
30 TABLET, FILM COATED ORAL EVERY 6 HOURS SCHEDULED
Status: DISCONTINUED | OUTPATIENT
Start: 2025-07-09 | End: 2025-07-11 | Stop reason: HOSPADM

## 2025-07-09 RX ORDER — MAGNESIUM SULFATE HEPTAHYDRATE 40 MG/ML
2 INJECTION, SOLUTION INTRAVENOUS ONCE
Status: COMPLETED | OUTPATIENT
Start: 2025-07-09 | End: 2025-07-09

## 2025-07-09 RX ORDER — METOPROLOL SUCCINATE 100 MG/1
200 TABLET, EXTENDED RELEASE ORAL DAILY
Status: DISCONTINUED | OUTPATIENT
Start: 2025-07-10 | End: 2025-07-10

## 2025-07-09 RX ADMIN — METOPROLOL SUCCINATE 50 MG: 50 TABLET, EXTENDED RELEASE ORAL at 12:42

## 2025-07-09 RX ADMIN — DILTIAZEM HYDROCHLORIDE 30 MG: 30 TABLET ORAL at 17:32

## 2025-07-09 RX ADMIN — METOPROLOL SUCCINATE 50 MG: 50 TABLET, EXTENDED RELEASE ORAL at 10:35

## 2025-07-09 RX ADMIN — FUROSEMIDE 20 MG: 20 TABLET ORAL at 08:25

## 2025-07-09 RX ADMIN — AMIODARONE HYDROCHLORIDE 100 MG: 200 TABLET ORAL at 08:26

## 2025-07-09 RX ADMIN — POTASSIUM CHLORIDE 10 MEQ: 750 TABLET, EXTENDED RELEASE ORAL at 08:25

## 2025-07-09 RX ADMIN — METOPROLOL SUCCINATE 100 MG: 100 TABLET, EXTENDED RELEASE ORAL at 08:25

## 2025-07-09 RX ADMIN — PRAVASTATIN SODIUM 10 MG: 20 TABLET ORAL at 17:29

## 2025-07-09 RX ADMIN — MAGNESIUM SULFATE HEPTAHYDRATE 2 G: 40 INJECTION, SOLUTION INTRAVENOUS at 10:35

## 2025-07-09 RX ADMIN — LEVOTHYROXINE SODIUM 25 MCG: 0.03 TABLET ORAL at 06:01

## 2025-07-09 NOTE — ASSESSMENT & PLAN NOTE
Rates poorly controlled home metoprolol increased from 100 mg to 200 mg daily, will also add Cardizem 30 mg every 6  On amiodarone and metoprolol as an outpatient  Holding eliquis due to epistaxis plan to resume on 7/10  Will check an echocardiogram, patient does not feel her atrial fibrillation and relatively elevated rates

## 2025-07-09 NOTE — PROGRESS NOTES
Progress Note - Hospitalist   Name: Madeline Good 79 y.o. female I MRN: [id number]  Unit/Bed#: -01 I Date of Admission: 7/7/2025   Date of Service: 7/9/2025 I Hospital Day: 1    Assessment & Plan  Severe epistaxis    Epistaxis due to Eliquis, resolved with holding Eliquis and nasal packing  Bilateral packing placed in ED followed by rapid rhino  Hold eliquis  Patient currently without further bleeding, will remove left nasal packing due topatients request due to discomfrt, will maintain right sided packing  Discussed with ent  Essential hypertension  Continue metoprolol  Mixed hyperlipidemia  Continue statin  Persistent atrial fibrillation (HCC)  Rates poorly controlled home metoprolol increased from 100 mg to 200 mg daily, will also add Cardizem 30 mg every 6  On amiodarone and metoprolol as an outpatient  Holding eliquis due to epistaxis plan to resume on 7/10  Will check an echocardiogram, patient does not feel her atrial fibrillation and relatively elevated rates  Stage 3a chronic kidney disease (HCC)  Lab Results   Component Value Date    EGFR 58 07/08/2025    EGFR 51 07/07/2025    EGFR 50 12/23/2024    CREATININE 0.92 07/08/2025    CREATININE 1.03 07/07/2025    CREATININE 1.05 12/23/2024   Cr stable at baseline  Avoid nephrotoxic agents  Monitor with AM labs  Acquired hypothyroidism  Continue synthroid  Chronic heart failure with preserved ejection fraction (HFpEF) (HCC)  Wt Readings from Last 3 Encounters:   07/08/25 75.3 kg (166 lb)   06/17/25 75.7 kg (166 lb 14.2 oz)   06/13/25 76.2 kg (168 lb)   Not in exacerbation at this time  Euvolemic on exam  Continue lasix 20 mg daily          VTE Pharmacologic Prophylaxis:   Low Risk (Score 0-2) - Encourage Ambulation.    Mobility:   Basic Mobility Inpatient Raw Score: 20  JH-HLM Goal: 6: Walk 10 steps or more  JH-HLM Achieved: 6: Walk 10 steps or more  JH-HLM Goal achieved. Continue to encourage appropriate mobility.    Patient Centered Rounds: I performed  bedside rounds with nursing staff today.   Discussions with Specialists or Other Care Team Provider:     Education and Discussions with Family / Patient: Updated  (significant other) at bedside.    Current Length of Stay: 1 day(s)  Current Patient Status: Inpatient   Certification Statement: The patient will continue to require additional inpatient hospital stay due to afib rvr  Discharge Plan: Anticipate discharge tomorrow to home.    Code Status: Level 1 - Full Code    Subjective   Patient denies palpitation chest pain lightheadedness dizziness, denies any recurrence of her epistaxis    Objective :  Temp:  [97.3 °F (36.3 °C)-97.7 °F (36.5 °C)] 97.3 °F (36.3 °C)  HR:  [116-138] 119  BP: (117-139)/(80-99) 117/80  Resp:  [18-20] 19  SpO2:  [87 %-93 %] 87 %  O2 Device: None (Room air)    Body mass index is 29.41 kg/m².     Input and Output Summary (last 24 hours):     Intake/Output Summary (Last 24 hours) at 7/9/2025 1748  Last data filed at 7/9/2025 1706  Gross per 24 hour   Intake 490 ml   Output 610 ml   Net -120 ml       Physical Exam  Vitals and nursing note reviewed.   Constitutional:       General: She is not in acute distress.     Appearance: She is well-developed. She is not toxic-appearing or diaphoretic.   HENT:      Head: Normocephalic and atraumatic.     Eyes:      General: No scleral icterus.     Conjunctiva/sclera: Conjunctivae normal.       Cardiovascular:      Rate and Rhythm: Tachycardia present. Rhythm irregular.      Heart sounds: No murmur heard.     No friction rub. No gallop.   Pulmonary:      Effort: Pulmonary effort is normal. No respiratory distress.      Breath sounds: Normal breath sounds. No stridor. No wheezing, rhonchi or rales.   Chest:      Chest wall: No tenderness.   Abdominal:      General: There is no distension.      Palpations: Abdomen is soft. There is no mass.      Tenderness: There is no abdominal tenderness. There is no guarding or rebound.     Musculoskeletal:          General: No swelling or tenderness.      Cervical back: Neck supple.     Skin:     General: Skin is warm and dry.      Capillary Refill: Capillary refill takes less than 2 seconds.     Neurological:      Mental Status: She is alert and oriented to person, place, and time.     Psychiatric:         Mood and Affect: Mood normal.           Lines/Drains:        Telemetry:  Telemetry Orders (From admission, onward)               24 Hour Telemetry Monitoring  Continuous x 24 Hours (Telem)        Expiring   Question:  Reason for 24 Hour Telemetry  Answer:  Arrhythmias requiring acute medical intervention / PPM or ICD malfunction                     Telemetry Reviewed: afib hr averaging 120  Indication for Continued Telemetry Use: Arrthymias requiring medical therapy               Lab Results: I have reviewed the following results:   Results from last 7 days   Lab Units 07/09/25  0543   WBC Thousand/uL 10.48*   HEMOGLOBIN g/dL 14.8   HEMATOCRIT % 42.6   PLATELETS Thousands/uL 237   SEGS PCT % 73   LYMPHO PCT % 11*   MONO PCT % 12   EOS PCT % 3     Results from last 7 days   Lab Units 07/08/25  0456   SODIUM mmol/L 139   POTASSIUM mmol/L 3.7   CHLORIDE mmol/L 104   CO2 mmol/L 27   BUN mg/dL 23   CREATININE mg/dL 0.92   ANION GAP mmol/L 8   CALCIUM mg/dL 9.3   GLUCOSE RANDOM mg/dL 114                       Recent Cultures (last 7 days):         Imaging Results Review: No pertinent imaging studies reviewed.  Other Study Results Review: No additional pertinent studies reviewed.    Last 24 Hours Medication List:     Current Facility-Administered Medications:     amiodarone tablet 100 mg, Daily    diltiazem (CARDIZEM) tablet 30 mg, Q6H TIFFANIE    furosemide (LASIX) tablet 20 mg, Daily    levothyroxine tablet 25 mcg, Early Morning    mesalamine (CANASA) rectal suppository 1,000 mg, HS    [START ON 7/10/2025] metoprolol succinate (TOPROL-XL) 24 hr tablet 200 mg, Daily    potassium chloride (Klor-Con M10) CR tablet 10 mEq, Daily     pravastatin (PRAVACHOL) tablet 10 mg, Daily With Dinner    Administrative Statements   Today, Patient Was Seen By: Dave Pedersen DO      **Please Note: This note may have been constructed using a voice recognition system.**

## 2025-07-09 NOTE — PROGRESS NOTES
OTOHNS attending    Documenting from past phone calls    Contacted 7/7/25 for epistaxis.  Pt was initially controlled but then bled on floor. Spoke with primary hospitalist and rec contralteral packing which was s uccessful to stop bleeding    Next day pt very uncomfortable with packing.  Discussed with hospitalist on 7/8 and recommended removal of packing if necessary, with caveat for replacement if bleeds    Removed and no bleeding    Seen by ENT team 7/8 evening    Fup plan as per Halle Larios note.  Ok to discharge and fup thurs for packing removal in office    Thanks!

## 2025-07-09 NOTE — PLAN OF CARE
Problem: PAIN - ADULT  Goal: Verbalizes/displays adequate comfort level or baseline comfort level  Description: Interventions:  - Encourage patient to monitor pain and request assistance  - Assess pain using appropriate pain scale  - Administer analgesics as ordered based on type and severity of pain and evaluate response  - Implement non-pharmacological measures as appropriate and evaluate response  - Consider cultural and social influences on pain and pain management  - Notify physician/advanced practitioner if interventions unsuccessful or patient reports new pain  - Educate patient/family on pain management process including their role and importance of  reporting pain   - Provide non-pharmacologic/complimentary pain relief interventions  Outcome: Progressing     Problem: INFECTION - ADULT  Goal: Absence or prevention of progression during hospitalization  Description: INTERVENTIONS:  - Assess and monitor for signs and symptoms of infection  - Monitor lab/diagnostic results  - Monitor all insertion sites, i.e. indwelling lines, tubes, and drains  - Monitor endotracheal if appropriate and nasal secretions for changes in amount and color  - [place] appropriate cooling/warming therapies per order  - Administer medications as ordered  - Instruct and encourage patient and family to use good hand hygiene technique  - Identify and instruct in appropriate isolation precautions for identified infection/condition  Outcome: Progressing  Goal: Absence of fever/infection during neutropenic period  Description: INTERVENTIONS:  - Monitor WBC  - Perform strict hand hygiene  - Limit to healthy visitors only  - No plants, dried, fresh or silk flowers with rogers in patient room  Outcome: Progressing     Problem: SAFETY ADULT  Goal: Patient will remain free of falls  Description: INTERVENTIONS:  - Educate patient/family on patient safety including physical limitations  - Instruct patient to call for assistance with activity   -  Consider consulting OT/PT to assist with strengthening/mobility based on AM PAC & JH-HLM score  - Consult OT/PT to assist with strengthening/mobility   - Keep Call bell within reach  - Keep bed low and locked with side rails adjusted as appropriate  - Keep care items and personal belongings within reach  - Initiate and maintain comfort rounds  - Make Fall Risk Sign visible to staff  - Apply yellow socks and bracelet for high fall risk patients  - Consider moving patient to room near nurses station  Outcome: Progressing  Goal: Maintain or return to baseline ADL function  Description: INTERVENTIONS:  -  Assess patient's ability to carry out ADLs; assess patient's baseline for ADL function and identify physical deficits which impact ability to perform ADLs (bathing, care of mouth/teeth, toileting, grooming, dressing, etc.)  - Assess/evaluate cause of self-care deficits   - Assess range of motion  - Assess patient's mobility; develop plan if impaired  - Assess patient's need for assistive devices and provide as appropriate  - Encourage maximum independence but intervene and supervise when necessary  - Involve family in performance of ADLs  - Assess for home care needs following discharge   - Consider OT consult to assist with ADL evaluation and planning for discharge  - Provide patient education as appropriate  - Monitor functional capacity and physical performance, use of AM PAC & JH-HLM   - Monitor gait, balance and fatigue with ambulation    Outcome: Progressing  Goal: Maintains/Returns to pre admission functional level  Description: INTERVENTIONS:  - Perform AM-PAC 6 Click Basic Mobility/ Daily Activity assessment daily.  - Set and communicate daily mobility goal to care team and patient/family/caregiver.   - Collaborate with rehabilitation services on mobility goals if consulted  - Out of bed for toileting  - Record patient progress and toleration of activity level   Outcome: Progressing

## 2025-07-09 NOTE — ASSESSMENT & PLAN NOTE
Epistaxis due to Eliquis, resolved with holding Eliquis and nasal packing  Bilateral packing placed in ED followed by rapid rhino  Hold eliquis  Patient currently without further bleeding, will remove left nasal packing due topatients request due to discomfrt, will maintain right sided packing  Discussed with ent

## 2025-07-10 ENCOUNTER — APPOINTMENT (INPATIENT)
Dept: NON INVASIVE DIAGNOSTICS | Facility: HOSPITAL | Age: 80
End: 2025-07-10
Payer: MEDICARE

## 2025-07-10 ENCOUNTER — REMOTE DEVICE CLINIC VISIT (OUTPATIENT)
Dept: CARDIOLOGY CLINIC | Facility: CLINIC | Age: 80
End: 2025-07-10
Payer: MEDICARE

## 2025-07-10 DIAGNOSIS — Z95.0 CARDIAC PACEMAKER IN SITU: Primary | ICD-10-CM

## 2025-07-10 PROBLEM — I07.1 MODERATE TRICUSPID REGURGITATION: Status: ACTIVE | Noted: 2025-07-10

## 2025-07-10 PROBLEM — Z86.73 HISTORY OF TIA (TRANSIENT ISCHEMIC ATTACK): Status: ACTIVE | Noted: 2025-07-10

## 2025-07-10 PROBLEM — I25.10 CORONARY ARTERY CALCIFICATION SEEN ON CT SCAN: Status: ACTIVE | Noted: 2025-07-10

## 2025-07-10 PROBLEM — I48.0 PAROXYSMAL ATRIAL FIBRILLATION (HCC): Status: ACTIVE | Noted: 2019-02-09

## 2025-07-10 PROBLEM — I42.8 NICM (NONISCHEMIC CARDIOMYOPATHY) (HCC): Status: ACTIVE | Noted: 2025-07-10

## 2025-07-10 LAB
ANION GAP SERPL CALCULATED.3IONS-SCNC: 6 MMOL/L (ref 4–13)
AORTIC ROOT: 3.6 CM
ASCENDING AORTA: 3.8 CM
AV REGURGITATION PRESSURE HALF TIME: 403 MS
BASOPHILS # BLD AUTO: 0.08 THOUSANDS/ÂΜL (ref 0–0.1)
BASOPHILS NFR BLD AUTO: 1 % (ref 0–1)
BSA FOR ECHO PROCEDURE: 1.79 M2
BUN SERPL-MCNC: 13 MG/DL (ref 5–25)
CALCIUM SERPL-MCNC: 8.7 MG/DL (ref 8.4–10.2)
CHLORIDE SERPL-SCNC: 105 MMOL/L (ref 96–108)
CO2 SERPL-SCNC: 27 MMOL/L (ref 21–32)
CREAT SERPL-MCNC: 0.97 MG/DL (ref 0.6–1.3)
EOSINOPHIL # BLD AUTO: 0.45 THOUSAND/ÂΜL (ref 0–0.61)
EOSINOPHIL NFR BLD AUTO: 4 % (ref 0–6)
ERYTHROCYTE [DISTWIDTH] IN BLOOD BY AUTOMATED COUNT: 13.4 % (ref 11.6–15.1)
FRACTIONAL SHORTENING: 26 (ref 28–44)
GFR SERPL CREATININE-BSD FRML MDRD: 55 ML/MIN/1.73SQ M
GLUCOSE SERPL-MCNC: 123 MG/DL (ref 65–140)
HCT VFR BLD AUTO: 43 % (ref 34.8–46.1)
HGB BLD-MCNC: 14.6 G/DL (ref 11.5–15.4)
IMM GRANULOCYTES # BLD AUTO: 0.07 THOUSAND/UL (ref 0–0.2)
IMM GRANULOCYTES NFR BLD AUTO: 1 % (ref 0–2)
INTERVENTRICULAR SEPTUM IN DIASTOLE (PARASTERNAL SHORT AXIS VIEW): 1 CM
INTERVENTRICULAR SEPTUM: 1 CM (ref 0.6–1.1)
LAAS-AP2: 20.1 CM2
LAAS-AP4: 28.9 CM2
LEFT ATRIUM AREA SYSTOLE SINGLE PLANE A4C: 28 CM2
LEFT ATRIUM SIZE: 3.9 CM
LEFT ATRIUM VOLUME (MOD BIPLANE): 72 ML
LEFT ATRIUM VOLUME INDEX (MOD BIPLANE): 40.2 ML/M2
LEFT INTERNAL DIMENSION IN SYSTOLE: 2.9 CM (ref 2.1–4)
LEFT VENTRICULAR INTERNAL DIMENSION IN DIASTOLE: 3.9 CM (ref 3.5–6)
LEFT VENTRICULAR POSTERIOR WALL IN END DIASTOLE: 1.1 CM
LEFT VENTRICULAR STROKE VOLUME: 34 ML
LV EF US.2D.A4C+ESTIMATED: 53 %
LVSV (TEICH): 34 ML
LYMPHOCYTES # BLD AUTO: 0.92 THOUSANDS/ÂΜL (ref 0.6–4.47)
LYMPHOCYTES NFR BLD AUTO: 8 % (ref 14–44)
MCH RBC QN AUTO: 35.6 PG (ref 26.8–34.3)
MCHC RBC AUTO-ENTMCNC: 34 G/DL (ref 31.4–37.4)
MCV RBC AUTO: 105 FL (ref 82–98)
MONOCYTES # BLD AUTO: 1.39 THOUSAND/ÂΜL (ref 0.17–1.22)
MONOCYTES NFR BLD AUTO: 12 % (ref 4–12)
NEUTROPHILS # BLD AUTO: 8.91 THOUSANDS/ÂΜL (ref 1.85–7.62)
NEUTS SEG NFR BLD AUTO: 74 % (ref 43–75)
NRBC BLD AUTO-RTO: 0 /100 WBCS
PLATELET # BLD AUTO: 224 THOUSANDS/UL (ref 149–390)
PMV BLD AUTO: 11 FL (ref 8.9–12.7)
POTASSIUM SERPL-SCNC: 3.6 MMOL/L (ref 3.5–5.3)
RBC # BLD AUTO: 4.1 MILLION/UL (ref 3.81–5.12)
RIGHT VENTRICLE ID DIMENSION: 3.1 CM
SL CV AV DECELERATION TIME RETROGRADE: 1390 MS
SL CV AV PEAK GRADIENT RETROGRADE: 63 MMHG
SL CV LEFT ATRIUM LENGTH A2C: 6.5 CM
SL CV LV EF: 55
SL CV PED ECHO LEFT VENTRICLE DIASTOLIC VOLUME (MOD BIPLANE) 2D: 66 ML
SL CV PED ECHO LEFT VENTRICLE SYSTOLIC VOLUME (MOD BIPLANE) 2D: 32 ML
SODIUM SERPL-SCNC: 138 MMOL/L (ref 135–147)
TR MAX PG: 44 MMHG
TR PEAK VELOCITY: 3.3 M/S
TRICUSPID ANNULAR PLANE SYSTOLIC EXCURSION: 1.1 CM
TRICUSPID VALVE PEAK REGURGITATION VELOCITY: 3.32 M/S
WBC # BLD AUTO: 11.82 THOUSAND/UL (ref 4.31–10.16)

## 2025-07-10 PROCEDURE — 99222 1ST HOSP IP/OBS MODERATE 55: CPT | Performed by: INTERNAL MEDICINE

## 2025-07-10 PROCEDURE — 99232 SBSQ HOSP IP/OBS MODERATE 35: CPT | Performed by: STUDENT IN AN ORGANIZED HEALTH CARE EDUCATION/TRAINING PROGRAM

## 2025-07-10 PROCEDURE — 93294 REM INTERROG EVL PM/LDLS PM: CPT | Performed by: INTERNAL MEDICINE

## 2025-07-10 PROCEDURE — 93296 REM INTERROG EVL PM/IDS: CPT | Performed by: INTERNAL MEDICINE

## 2025-07-10 PROCEDURE — 93306 TTE W/DOPPLER COMPLETE: CPT

## 2025-07-10 PROCEDURE — 80048 BASIC METABOLIC PNL TOTAL CA: CPT | Performed by: INTERNAL MEDICINE

## 2025-07-10 PROCEDURE — 85025 COMPLETE CBC W/AUTO DIFF WBC: CPT | Performed by: INTERNAL MEDICINE

## 2025-07-10 PROCEDURE — 93306 TTE W/DOPPLER COMPLETE: CPT | Performed by: INTERNAL MEDICINE

## 2025-07-10 RX ORDER — METOPROLOL SUCCINATE 100 MG/1
200 TABLET, EXTENDED RELEASE ORAL DAILY
Status: DISCONTINUED | OUTPATIENT
Start: 2025-07-11 | End: 2025-07-10

## 2025-07-10 RX ORDER — METOPROLOL SUCCINATE 100 MG/1
200 TABLET, EXTENDED RELEASE ORAL DAILY
Status: DISCONTINUED | OUTPATIENT
Start: 2025-07-10 | End: 2025-07-11 | Stop reason: HOSPADM

## 2025-07-10 RX ADMIN — APIXABAN 5 MG: 5 TABLET, FILM COATED ORAL at 12:23

## 2025-07-10 RX ADMIN — METOPROLOL SUCCINATE 200 MG: 100 TABLET, EXTENDED RELEASE ORAL at 23:37

## 2025-07-10 RX ADMIN — DILTIAZEM HYDROCHLORIDE 30 MG: 30 TABLET ORAL at 00:20

## 2025-07-10 RX ADMIN — DILTIAZEM HYDROCHLORIDE 30 MG: 30 TABLET ORAL at 05:59

## 2025-07-10 RX ADMIN — POTASSIUM CHLORIDE 10 MEQ: 750 TABLET, EXTENDED RELEASE ORAL at 09:08

## 2025-07-10 RX ADMIN — AMIODARONE HYDROCHLORIDE 100 MG: 200 TABLET ORAL at 09:08

## 2025-07-10 RX ADMIN — APIXABAN 5 MG: 5 TABLET, FILM COATED ORAL at 17:29

## 2025-07-10 RX ADMIN — LEVOTHYROXINE SODIUM 25 MCG: 0.03 TABLET ORAL at 05:59

## 2025-07-10 RX ADMIN — PRAVASTATIN SODIUM 10 MG: 20 TABLET ORAL at 17:29

## 2025-07-10 RX ADMIN — DILTIAZEM HYDROCHLORIDE 30 MG: 30 TABLET ORAL at 23:38

## 2025-07-10 NOTE — PLAN OF CARE
Problem: PAIN - ADULT  Goal: Verbalizes/displays adequate comfort level or baseline comfort level  Description: Interventions:  - Encourage patient to monitor pain and request assistance  - Assess pain using appropriate pain scale  - Administer analgesics as ordered based on type and severity of pain and evaluate response  - Implement non-pharmacological measures as appropriate and evaluate response  - Consider cultural and social influences on pain and pain management  - Notify physician/advanced practitioner if interventions unsuccessful or patient reports new pain  - Educate patient/family on pain management process including their role and importance of  reporting pain   - Provide non-pharmacologic/complimentary pain relief interventions  Outcome: Progressing     Problem: INFECTION - ADULT  Goal: Absence or prevention of progression during hospitalization  Description: INTERVENTIONS:  - Assess and monitor for signs and symptoms of infection  - Monitor lab/diagnostic results  - Monitor all insertion sites, i.e. indwelling lines, tubes, and drains  - Monitor endotracheal if appropriate and nasal secretions for changes in amount and color  - [place] appropriate cooling/warming therapies per order  - Administer medications as ordered  - Instruct and encourage patient and family to use good hand hygiene technique  - Identify and instruct in appropriate isolation precautions for identified infection/condition  Outcome: Progressing     Problem: SAFETY ADULT  Goal: Patient will remain free of falls  Description: INTERVENTIONS:  - Educate patient/family on patient safety including physical limitations  - Instruct patient to call for assistance with activity   - Consider consulting OT/PT to assist with strengthening/mobility based on AM PAC & JH-HLM score  - Consult OT/PT to assist with strengthening/mobility   - Keep Call bell within reach  - Keep bed low and locked with side rails adjusted as appropriate  - Keep  care items and personal belongings within reach  - Initiate and maintain comfort rounds  - Make Fall Risk Sign visible to staff  - Offer Toileting every 2 Hours, in advance of need  - Initiate/Maintain 24/7 alarm  - Obtain necessary fall risk management equipment: bed alarm  - Apply yellow socks and bracelet for high fall risk patients  - Consider moving patient to room near nurses station  Outcome: Progressing

## 2025-07-10 NOTE — ASSESSMENT & PLAN NOTE
Paroxysmal atrial fibrillation/flutter s/p multiple LUIZA/DCCV and ablation (10/2023)  She was noted to be in atrial fibrillation with RVR on telemetry.  Toprol was uptitrated to 200 mg daily, and she was initiated on Cardizem 30 mg every 6 hours.  Her rates have overall improved, although she is still slightly tachycardic during examination today.  She did not receive her a.m. dose of Toprol yet in the setting of soft BPs.  Rate control: Continue Toprol 200 mg daily with hold parameters adjusted, Cardizem 30 mg every 6 hours.  Recommend transitioning to long-acting Cardizem 120 mg daily prior to discharge.  Antiarrhythmic: Continue amiodarone 100 mg daily  Recommend resuming anticoagulation with Eliquis once stable from primary team/ENT standpoint.  [id number].    She was advised to closely follow-up with EP for consideration of repeat ablation +/- watchman implant.

## 2025-07-10 NOTE — PROGRESS NOTES
Progress Note - Hospitalist   Name: Madeline Good 79 y.o. female I MRN: [id number]  Unit/Bed#: -01 I Date of Admission: 7/7/2025   Date of Service: 7/10/2025 I Hospital Day: 2    Assessment & Plan  Severe epistaxis  Bilateral anterior epistaxis.  Primary right sided with likely collateral flow to the left per ENT.  Right-sided packing in place.  Currently no episode of epistaxis noted.  Hemoglobin 14.6.  Care discussed with ENT via [employer] secure chat currently recommendation is to resume Eliquis and will be seen by ENT in the afternoon for right-sided nasal packing to be removed.    Essential hypertension  Blood pressure currently soft at times.  Patient is on Cardizem 30 mg every 6 hours, Toprol-XL 20 mg  Mixed hyperlipidemia  Continue statin.  Persistent atrial fibrillation (HCC)  Rate poorly controlled.  Continue Toprol- mg daily, currently on Cardizem 30 mg every 6 hours  Cardiac recommended transitioning to long-acting Cardizem 120 mg daily for discharge.  Resume Eliquis since cleared by ENT  Stage 3a chronic kidney disease (HCC)  Lab Results   Component Value Date    EGFR 55 07/10/2025    EGFR 58 07/08/2025    EGFR 51 07/07/2025    CREATININE 0.97 07/10/2025    CREATININE 0.92 07/08/2025    CREATININE 1.03 07/07/2025   Creatinine stable at baseline.  Avoid nephrotoxic agents  Monitor with AM labs  Acquired hypothyroidism  Continue home dose of Synthroid.  Chronic heart failure with preserved ejection fraction (HFpEF) ([facility])  Wt Readings from Last 3 Encounters:   07/10/25 75.3 kg (166 lb)   06/17/25 75.7 kg (166 lb 14.2 oz)   06/13/25 76.2 kg (168 lb)   Not in exacerbation at this time  Euvolemic on exam.  Continue home dose of   p.o. Lasix 20 mg daily.          VTE Pharmacologic Prophylaxis:   Moderate Risk (Score 3-4) - Pharmacological DVT Prophylaxis Ordered: apixaban (Eliquis).    Mobility:   Basic Mobility Inpatient Raw Score: 20  JH-HLM Goal: 6: Walk 10 steps or more  JH-HLM Achieved: 7: Walk 25  feet or more      Patient Centered Rounds: I performed bedside rounds with nursing staff today.   Discussions with Specialists or Other Care Team Provider: Cardiology and ENT    Education and Discussions with Family / Patient: Updated  () at bedside.    Current Length of Stay: 2 day(s)  Current Patient Status: Inpatient   Certification Statement: The patient will continue to require additional inpatient hospital stay due to resuming Eliquis, monitoring CBC  Discharge Plan: Anticipate discharge tomorrow to home.    Code Status: Level 1 - Full Code    Subjective   Seen during a.m. rounds.  Patient appears comfortable not in distress.  She is sitting in a chair.  No further episode of restlessness reported.  Right nasal packing in place.  Patient currently denies chest pain, palpitation, any other complaints.  Reports feeling well and she is eager to go home however agreeable for inpatient care.    Objective :  Temp:  [97.3 °F (36.3 °C)-99 °F (37.2 °C)] 97.3 °F (36.3 °C)  HR:  [] 124  BP: (104-121)/(65-89) 104/74  Resp:  [16-19] 16  SpO2:  [87 %-94 %] 90 %  O2 Device: None (Room air)    Body mass index is 29.41 kg/m².     Input and Output Summary (last 24 hours):     Intake/Output Summary (Last 24 hours) at 7/10/2025 1046  Last data filed at 7/10/2025 0834  Gross per 24 hour   Intake 560 ml   Output 1260 ml   Net -700 ml       Physical Exam  Constitutional:       General: She is not in acute distress.     Appearance: Normal appearance. She is not ill-appearing, toxic-appearing or diaphoretic.   HENT:      Nose:      Comments: Right nasal packing in place.    Cardiovascular:      Rate and Rhythm: Tachycardia present. Rhythm irregular.      Pulses: Normal pulses.   Pulmonary:      Effort: Pulmonary effort is normal. No respiratory distress.      Breath sounds: Normal breath sounds. No wheezing.   Abdominal:      General: There is no distension.      Palpations: Abdomen is soft.      Tenderness:  There is no abdominal tenderness.     Musculoskeletal:      Right lower leg: No edema.      Left lower leg: No edema.     Neurological:      Mental Status: She is alert and oriented to person, place, and time. Mental status is at baseline.     Psychiatric:         Mood and Affect: Mood normal.         Behavior: Behavior normal.           Lines/Drains:        Telemetry:  Telemetry Orders (From admission, onward)               24 Hour Telemetry Monitoring  Continuous x 24 Hours (Telem)        Expiring   Question:  Reason for 24 Hour Telemetry  Answer:  Arrhythmias requiring acute medical intervention / PPM or ICD malfunction                     Telemetry Reviewed: Atrial fibrillation. HR averaging 110-120  Indication for Continued Telemetry Use: Arrthymias requiring medical therapy               Lab Results: I have reviewed the following results:   Results from last 7 days   Lab Units 07/10/25  0554   WBC Thousand/uL 11.82*   HEMOGLOBIN g/dL 14.6   HEMATOCRIT % 43.0   PLATELETS Thousands/uL 224   SEGS PCT % 74   LYMPHO PCT % 8*   MONO PCT % 12   EOS PCT % 4     Results from last 7 days   Lab Units 07/10/25  0554   SODIUM mmol/L 138   POTASSIUM mmol/L 3.6   CHLORIDE mmol/L 105   CO2 mmol/L 27   BUN mg/dL 13   CREATININE mg/dL 0.97   ANION GAP mmol/L 6   CALCIUM mg/dL 8.7   GLUCOSE RANDOM mg/dL 123                       Recent Cultures (last 7 days):         Imaging Results Review: I personally reviewed the following image studies in PACS and associated radiology reports: chest xray. My interpretation of the radiology images/reports is: Negative for acute finding..      Last 24 Hours Medication List:     Current Facility-Administered Medications:     amiodarone tablet 100 mg, Daily    diltiazem (CARDIZEM) tablet 30 mg, Q6H TIFFANIE    furosemide (LASIX) tablet 20 mg, Daily    levothyroxine tablet 25 mcg, Early Morning    mesalamine (CANASA) rectal suppository 1,000 mg, HS    [START ON 7/11/2025] metoprolol succinate  (TOPROL-XL) 24 hr tablet 200 mg, Daily    potassium chloride (Klor-Con M10) CR tablet 10 mEq, Daily    pravastatin (PRAVACHOL) tablet 10 mg, Daily With Dinner    Administrative Statements   Today, Patient Was Seen By: Leon Gerber MD  I have spent a total time of   minutes in caring for this patient on the day of the visit/encounter including Diagnostic results, Prognosis, Risks and benefits of tx options, Instructions for management, Patient and family education, Risk factor reductions, Impressions, Counseling / Coordination of care, Documenting in the medical record, Reviewing/placing orders in the medical record (including tests, medications, and/or procedures), Obtaining or reviewing history  , and Communicating with other healthcare professionals .    **Please Note: This note may have been constructed using a voice recognition system.**

## 2025-07-10 NOTE — ASSESSMENT & PLAN NOTE
Lab Results   Component Value Date    EGFR 55 07/10/2025    EGFR 58 07/08/2025    EGFR 51 07/07/2025    CREATININE 0.97 07/10/2025    CREATININE 0.92 07/08/2025    CREATININE 1.03 07/07/2025   Management per primary team

## 2025-07-10 NOTE — ASSESSMENT & PLAN NOTE
Wt Readings from Last 3 Encounters:   07/10/25 75.3 kg (166 lb)   06/17/25 75.7 kg (166 lb 14.2 oz)   06/13/25 76.2 kg (168 lb)   Appears euvolemic on exam  Diuretic: Continue Lasix 20 mg daily  GDMT: Per above  Recommend low-sodium diet, daily weights, strict I's and O's.  Recommend continuing to monitor and replete electrolytes as needed.

## 2025-07-10 NOTE — ASSESSMENT & PLAN NOTE
Continue pravastatin.  Will hold off on initiation of aspirin in the setting of increased bleeding ulcer on Eliquis with recent epistaxis.

## 2025-07-10 NOTE — CASE MANAGEMENT
Case Management Assessment & Discharge Planning Note    Patient name Madeline Good  Location /-01 MRN [id number]  : 1945 Date 7/10/2025       Current Admission Date: 2025  Current Admission Diagnosis:Severe epistaxis   Patient Active Problem List    Diagnosis Date Noted    Severe epistaxis 2025    Rectal bleeding 2025    History of proctitis 2025    Chronic heart failure with preserved ejection fraction (HFpEF) ([facility]) 2025    On amiodarone therapy 10/04/2024    Acquired hypothyroidism 2023    Iron deficiency anemia 2023    Acute on chronic systolic congestive heart failure ([facility]) 2022    SSS (sick sinus syndrome) ([facility]) 2022    Stage 3a chronic kidney disease ([facility]) 2021    Other insomnia 2021    Chronic obstructive pulmonary disease ([facility]) 2021    Persistent atrial fibrillation ([facility]) 2019    Myocardiopathy ([facility]) 2018    Anticoagulant long-term use 2018    Nonrheumatic mitral valve regurgitation 2018    Nonrheumatic aortic valve insufficiency 2018    Nonrheumatic tricuspid valve regurgitation 2018    Essential hypertension 2018    Mixed hyperlipidemia 2018    Migraine with aura and without status migrainosus, not intractable 2016      LOS (days): 2  Geometric Mean LOS (GMLOS) (days): 3.6  Days to GMLOS:1.6     OBJECTIVE:    Risk of Unplanned Readmission Score: 10.63         Current admission status: Inpatient       Preferred Pharmacy:   [facility] - [address] [address]  Phone: [phone] Fax: [phone]    [id number] [address]  [address]  [facility] [address]  Phone: [phone] Fax: [phone]    [facility] - [place], [place] - [address]  [address]  [facility] [address]  [facility]  [address]  Phone: [phone] Fax: [phone]    Primary Care Provider: Arnold Payton DO    Primary Insurance: MEDICARE  Secondary Insurance: AARP    ASSESSMENT:  Active Health Care Proxies       Alexey Good Health Care Agent - Spouse   Primary Phone: [phone] (Mobile)  Home Phone: [phone]                 Advance Directives  Does patient have a Health Care POA?: Yes  Does patient have Advance Directives?: Yes  Advance Directives: Living will, Power of  for health care  Primary Contact: Alexey Good         Readmission Root Cause  30 Day Readmission: No    Patient Information  Admitted from:: Home  Mental Status: Alert  During Assessment patient was accompanied by: Spouse  Assessment information provided by:: Patient  Primary Caregiver: Self  Support Systems: Self, Spouse/significant other  County of Residence: [place]  What [facility] do you live in?: [place]  Home entry access options. Select all that apply.: No steps to enter home  Type of Current Residence: 2 story home  Upon entering residence, is there a bedroom on the main floor (no further steps)?: No  A bedroom is located on the following floor levels of residence (select all that apply):: 2nd Floor  Upon entering residence, is there a bathroom on the main floor (no further steps)?: Yes  Number of steps to 2nd floor from main floor: One Flight  Living Arrangements: Lives w/ Spouse/significant other  Is patient a ?: No    Activities of Daily Living Prior to Admission  Functional Status: Independent  Completes ADLs independently?: Yes  Ambulates independently?: Yes  Does patient use assisted devices?: No  Does patient currently own DME?: No  Does patient have a history of Outpatient Therapy (PT/OT)?: No  Does the patient have a history of Short-Term Rehab?: No  Does patient have a history of HHC?: No  Does patient currently have HHC?: No         Patient Information Continued  Income Source: Pension/custodial  Does patient  have prescription coverage?: Yes  Can the patient afford their medications and any related supplies (such as glucometers or test strips)?: Yes  Does patient receive dialysis treatments?: No  Does patient have a history of substance abuse?: No  Does patient have a history of Mental Health Diagnosis?: No         Means of Transportation  Means of Transport to Appts:: Family transport          DISCHARGE DETAILS:    Discharge planning discussed with:: Patient and   Freedom of Choice: Yes  Comments - Freedom of Choice: CM introduced self/role and discussed FOC with patient who demonstrated understanding and had no questions. IPTA< lives with , no dME, and prefers to return home. No CM needs anticipated.  CM contacted family/caregiver?: Yes  Were Treatment Team discharge recommendations reviewed with patient/caregiver?: Yes  Did patient/caregiver verbalize understanding of patient care needs?: Yes  Were patient/caregiver advised of the risks associated with not following Treatment Team discharge recommendations?: Yes    Contacts  Patient Contacts:   Relationship to Patient:: Family  Contact Method: In Person  Reason/Outcome: Continuity of Care, Discharge Planning    Requested Home Health Care         Is the patient interested in HHC at discharge?: No    DME Referral Provided  Referral made for DME?: No    Other Referral/Resources/Interventions Provided:  Interventions: Declines resources         Treatment Team Recommendation: Home  Expected Discharge Disposition: Home or Self Care  Additional Discharge Dispositions: Home or Self Care  Transport at Discharge : Family

## 2025-07-10 NOTE — PROGRESS NOTES
"Assessment/Plan:   Right Epistaxis:   - Right rhino rocket removed and vessel cauterized   - Nosebleed precautions - cautious nose blowing, saline spray   - Acute epistaxis instructions: For any repeat bleeding spray oxymetazoline onto tissue, cotton ball, or nasal tampon and place into the side where bleeding is coming from. Pinch and hold the nostrils completely closed for 10 minutes without releasing pressure. If bleeding continues repeat 2 times. If bleeding persists after second attempt please contact [facility] on call.       Dispo: per primary/ cardiology       Halle Frye PA-C   Otolaryngology - Head and Neck Surgery   Please contact ENT Secure Chat Call Role for any questions or concerns.        Otolaryngology HN/FPRS Progress Note:    Subjective: Right epistaxis with Rhino Rocket in place.  She was restarted on her Eliquis today.  No bleeding surrounding.  No bleeding from left nare.  Oing well. No acute events overnight.     Objective:   BP 99/70   Pulse (!) 115   Temp 98.1 °F (36.7 °C)   Resp 16   Ht 5' 3\" (1.6 m)   Wt 75.3 kg (166 lb)   SpO2 90%   BMI 29.41 kg/m²     Physical Exam   Gen: NAD, A/O x 3.  Neuro: CN 2-12 intact except as below  Lungs: Breathing easily. No stertor or stridor  CV: RRR. Good distal perfusion  Neck: Soft and flat  Abd: Soft NTND    Procedure: Control of epistaxis     Indications: Persistent/recurrent epistaxis.    Procedure in detail: After informed verbal consent was obtained the nose was anesthetized using 4% lidocaine and neosynephrine spray. After adequate time for anesthesia the nasal cavity was evaluated head light and nasal speculum demonstrating the below listed findings. The anterior septum was cauterized using silver nitrate.The patient tolerated the procedure well.     Findings: Prominent vessel on right middle turbinate cauterized with silver nitrate.      "

## 2025-07-10 NOTE — ASSESSMENT & PLAN NOTE
NICM with EF recovery-EF 55%  Etiology: Suspected tachycardia mediated in the setting of atrial fibrillation/flutter  GDMT:   Beta Blocker: Continue Toprol per above  Recommend reassessment of GDMT in the outpatient setting.

## 2025-07-10 NOTE — ASSESSMENT & PLAN NOTE
Lab Results   Component Value Date    EGFR 55 07/10/2025    EGFR 58 07/08/2025    EGFR 51 07/07/2025    CREATININE 0.97 07/10/2025    CREATININE 0.92 07/08/2025    CREATININE 1.03 07/07/2025   Creatinine stable at baseline.  Avoid nephrotoxic agents  Monitor with AM labs

## 2025-07-10 NOTE — ASSESSMENT & PLAN NOTE
Rate poorly controlled.  Continue Toprol- mg daily, currently on Cardizem 30 mg every 6 hours  Cardiac recommended transitioning to long-acting Cardizem 120 mg daily for discharge.  Resume Eliquis since cleared by ENT

## 2025-07-10 NOTE — CASE MANAGEMENT
Case Management Progress Note    Patient name Madeline Good  Location /-01 MRN [id number]  : 1945 Date 2025       LOS (days): 1  Geometric Mean LOS (GMLOS) (days): 2.2  Days to GMLOS:1        OBJECTIVE:        Current admission status: Inpatient  Preferred Pharmacy:   [facility] - [place] [address] [facility]  [address]  [facility] [address]  Phone: [phone] Fax: [phone]    [id number] - [place], [place] - [address]  [address]  [facility] [address]  Phone: [phone] Fax: [phone]    [facility] - [place], [place] - [address]  [address]  [facility] [address]  Phone: [phone] Fax: [phone]    Primary Care Provider: Arnold Payton DO    Primary Insurance: MEDICARE  Secondary Insurance: AARP    PROGRESS NOTE:  CASE REVIEWED IN SLIM ROUNDS- NO ANTICIPATED CM NEEDS    CM reviewed chart.  Patient has an AMPAC of 20, active health insurance, PCP, supportive family.  Prior CM  Hx indicates she was independent  with self care , ambulation with no hx of OP therapy, STR or HHC.    Contact CM if needs are identified for a safe disposition.

## 2025-07-10 NOTE — ASSESSMENT & PLAN NOTE
EEG preliminary read (not final) on the initial recording hour(s) = x 20 min    No seizures recorded.  Mild slowing noted, nonspecific.  Final report to follow tomorrow morning after completion of study.    [facility] EEG Reading Room Ph#: [phone]  Epilepsy Answering Service after 5PM and before 8:30AM: Ph#: [phone] Management per primary team.  On pravastatin.  Not on aspirin as noted above.

## 2025-07-10 NOTE — CONSULTS
Consultation - Cardiology   Madeline Good 79 y.o. female MRN: [id number]  Unit/Bed#: -01 Encounter: [id number]  07/10/25  9:53 AM    Assessment/ Plan:   Assessment & Plan  Severe epistaxis  Management per primary team/ENT    Paroxysmal atrial fibrillation ([facility])  Paroxysmal atrial fibrillation/flutter s/p multiple LUIZA/DCCV and ablation (10/2023)  She was noted to be in atrial fibrillation with RVR on telemetry.  Toprol was uptitrated to 200 mg daily, and she was initiated on Cardizem 30 mg every 6 hours.  Her rates have overall improved, although she is still slightly tachycardic during examination today.  She did not receive her a.m. dose of Toprol yet in the setting of soft BPs.  Rate control: Continue Toprol 200 mg daily with hold parameters adjusted, Cardizem 30 mg every 6 hours.  Recommend transitioning to long-acting Cardizem 120 mg daily prior to discharge.  Antiarrhythmic: Continue amiodarone 100 mg daily  Recommend resuming anticoagulation with Eliquis once stable from primary team/ENT standpoint.  [id number].    She was advised to closely follow-up with EP for consideration of repeat ablation +/- watchman implant.    NICM (nonischemic cardiomyopathy) ([facility])  NICM with EF recovery-EF 55%  Etiology: Suspected tachycardia mediated in the setting of atrial fibrillation/flutter  GDMT:   Beta Blocker: Continue Toprol per above  Recommend reassessment of GDMT in the outpatient setting.    Chronic heart failure with preserved ejection fraction (HFpEF) ([facility])  Wt Readings from Last 3 Encounters:   07/10/25 75.3 kg (166 lb)   06/17/25 75.7 kg (166 lb 14.2 oz)   06/13/25 76.2 kg (168 lb)   Appears euvolemic on exam  Diuretic: Continue Lasix 20 mg daily  GDMT: Per above  Recommend low-sodium diet, daily weights, strict I's and O's.  Recommend continuing to monitor and replete electrolytes as needed.    Coronary artery calcification seen on CT scan  Continue pravastatin.  Will hold off on initiation of aspirin  in the setting of increased bleeding ulcer on Eliquis with recent epistaxis.    Presence of cardiac pacemaker  SSS s/p MDT DC PPM (12/2018)  Continue to follow with the device clinic    Essential hypertension  Continue Toprol, Cardizem, Lasix    Mixed hyperlipidemia  Continue pravastatin    Moderate tricuspid regurgitation  Recommend routine surveillance    History of TIA (transient ischemic attack)  Management per primary team.  On pravastatin.  Not on aspirin as noted above.    Stage 3a chronic kidney disease (HCC)  Lab Results   Component Value Date    EGFR 55 07/10/2025    EGFR 58 07/08/2025    EGFR 51 07/07/2025    CREATININE 0.97 07/10/2025    CREATININE 0.92 07/08/2025    CREATININE 1.03 07/07/2025   Management per primary team        Cardiology will sign-off. Please reach out with any further questions or concerns. Please reach out if HR is not well-controlled after resuming Toprol.       History of Present Illness   Physician Requesting Consult: Leon LEWIS MD    Reason for Consult / Principal Problem: Atrial fibrillation    HPI: Madeline Good is a 79 y.o. year old female with PMHx of paroxysmal atrial fibrillation/flutter s/p multiple LUIZA/DCCV and ablation (10/2023), chronic CHF, NICM with EF recovery (suspected tachycardia mediated), coronary artery calcifications on CT scan, sick sinus syndrome s/p PPM (12/2018), hypertension, hyperlipidemia, aortic insufficiency, tricuspid regurgitation, history of TIA, CKD 3A, COPD, hypothyroidism, anemia, hiatal hernia who presents with epistaxis.    She presented to the hospital on 7/7/2025 for a intermittent nosebleeds which had started 2 days before hand.  She had noted constant nosebleed for about 1 hour prior to arriving to the ER.  She noted losing a large amount of blood at home and feeling dizzy, which prompted her to proceed to the ER for further evaluation.  Hemoglobin was 16.5 upon presentation.  She underwent bilateral nasal packing and eventually a  rapid Rhino placement.  She was evaluated by ENT.    She was noted to be in atrial fibrillation with RVR yesterday.  Metoprolol was increased to 200 mg daily, and she was initiated on Cardizem 30 mg every 6 hours.  Cardiology was consulted for further evaluation.  Her rates overall improved, although she is slightly tachycardic this morning in the setting of missing her a.m. dose of Toprol due to soft BPs.    She denies chest pain, shortness of breath, palpitations, lightheadedness, presyncope.      Her recent device interrogations demonstrate paroxysmal atrial fibrillation, with most recent interrogation demonstrating 26.7% AT/AF burden.    Echocardiogram today demonstrated EF 55%, mild aortic regurgitation, mild mitral regurgitation, moderate tricuspid regurgitation        She follows with Dr. Gomez as her outpatient cardiologist, and with Dr. Fajardo from EP.  She has a history of persistent atrial fibrillation/flutter.  Initially diagnosed with atrial fibrillation on EKG at her PCPs office.  She was cardioverted 12/2018 and was in sinus rhythm for a while, but went back into atrial fibrillation.  She was started on rhythm control with sotalol, but required subsequent cardioversion 11/2022.  She was placed on amiodarone in the past, but started having breakthrough atrial fibrillation.  She underwent cryoablation with pulmonary vein isolation, posterior wall isolation, and ablation of the mitral isthmus dependent flutter 10/2023.    Inpatient consult to Cardiology  Consult performed by: Crystal Webb PA-C  Consult ordered by: Dave Pedersen DO          EKG: EKG not obtained this admission.  Most recent EKG from 4/2025 demonstrated atrial paced and V sensed rhythm, prolonged AV conduction  Tele: Atrial fibrillation, HR 90s-110s    Review of Systems   Constitutional:  Negative for diaphoresis, fever and unexpected weight change.   HENT:  Positive for nosebleeds. Negative for ear pain.    Eyes:  Negative for  "pain and redness.   Respiratory:  Negative for cough and shortness of breath.    Cardiovascular:  Negative for chest pain, palpitations and leg swelling.   Gastrointestinal:  Negative for abdominal pain and vomiting.   Genitourinary:  Negative for dysuria.   Skin:  Negative for color change and rash.   Neurological:  Negative for dizziness, syncope and light-headedness.   Hematological:  Does not bruise/bleed easily.   Psychiatric/Behavioral:  Negative for agitation and behavioral problems.    All other systems reviewed and are negative.      Historical Information   Past Medical History[1]  Past Surgical History[2]  Social History     Substance and Sexual Activity   Alcohol Use Yes    Alcohol/week: 7.0 standard drinks of alcohol    Types: 7 Glasses of wine per week    Comment: occassional     Social History     Substance and Sexual Activity   Drug Use No     Tobacco Use History[3]    Family History: Family History[4]    Meds/Allergies   all current active meds have been reviewed  Allergies[5]    Objective   Vitals: Blood pressure 104/74, pulse (!) 124, temperature (!) 97.3 °F (36.3 °C), resp. rate 16, height 5' 3\" (1.6 m), weight 75.3 kg (166 lb), SpO2 90%., Body mass index is 29.41 kg/m².,   Orthostatic Blood Pressures      Flowsheet Row Most Recent Value   Blood Pressure 104/74 filed at 07/10/2025 0912   Patient Position - Orthostatic VS Sitting filed at 2025 2121            Systolic (24hrs), Av , Min:104 , Max:121     Diastolic (24hrs), Av, Min:65, Max:89        Intake/Output Summary (Last 24 hours) at 7/10/2025 0953  Last data filed at 7/10/2025 0834  Gross per 24 hour   Intake 560 ml   Output 1260 ml   Net -700 ml       Invasive Devices       Peripheral Intravenous Line  Duration             Peripheral IV 25 Left Wrist 2 days                        Physical Exam:    GEN: Alert and oriented x 3, in no acute distress.  Well appearing and well nourished.   HEENT: Sclera anicteric, " conjunctivae pink, mucous membranes moist. Oropharynx clear.  Nasal packing in place.  NECK: Supple, no significant JVD. Trachea midline.   HEART: Tachycardic, irregular rhythm, normal S1 and S2, no murmurs.   LUNGS: Clear to auscultation bilaterally; no wheezes, rales, or rhonchi. No increased work of breathing or signs of respiratory distress.   ABDOMEN: Soft, nontender, non-distended.   EXTREMITIES: Skin warm and well perfused, no clubbing, cyanosis, or edema.  NEURO: No focal findings. Normal speech. Mood and affect normal.   SKIN: Normal without suspicious lesions on exposed skin.      Lab Results:     Troponins:       CBC with diff:   Results from last 7 days   Lab Units 07/10/25  0554 07/09/25  0543 07/08/25  0456 07/07/25  2222 07/07/25  1621   WBC Thousand/uL 11.82* 10.48* 11.09* 10.77* 10.62*   HEMOGLOBIN g/dL 14.6 14.8 16.1* 16.4* 16.5*   HEMATOCRIT % 43.0 42.6 47.5* 47.0* 47.8*   MCV fL 105* 104* 104* 103* 104*   PLATELETS Thousands/uL 224 237 256 282 263   RBC Million/uL 4.10 4.11 4.57 4.55 4.62   MCH pg 35.6* 36.0* 35.2* 36.0* 35.7*   MCHC g/dL 34.0 34.7 33.9 34.9 34.5   RDW % 13.4 13.3 13.3 13.2 13.2   MPV fL 11.0 11.0 11.2 11.2 10.7   NRBC AUTO /100 WBCs 0 0 0  --  0         CMP:   Results from last 7 days   Lab Units 07/10/25  0554 07/08/25  0456 07/07/25  1621   POTASSIUM mmol/L 3.6 3.7 3.5   CHLORIDE mmol/L 105 104 104   CO2 mmol/L 27 27 25   BUN mg/dL 13 23 16   CREATININE mg/dL 0.97 0.92 1.03   CALCIUM mg/dL 8.7 9.3 9.1   EGFR ml/min/1.73sq m 55 58 51       ** Please Note: Fluency DirectDictation voice to text software may have been used in the creation of this document. **         [1]   Past Medical History:  Diagnosis Date    Aphasia, mixed 07/28/2017    Atrial fibrillation (HCC)     CHF (congestive heart failure) (HCC)     Colon polyp     Headache     Hyperlipidemia     Hypertension     Lyme disease     Shortness of breath     TIA (transient ischemic attack)     Vertigo    [2]   Past Surgical  History:  Procedure Laterality Date    CARDIAC ELECTROPHYSIOLOGY PROCEDURE N/A 10/19/2023    Procedure: Cardiac eps/afib ablation PVI/POST WALL;  Surgeon: Piero Fajardo MD;  Location:  CARDIAC CATH LAB;  Service: Cardiology    CARDIAC PACEMAKER PLACEMENT  12/2019    COLONOSCOPY      MT SIGMOIDOSCOPY FLX DX W/COLLJ SPEC BR/WA IF PFRMD N/A 11/28/2017    Procedure: SIGMOIDOSCOPY FLEXIBLE;  Surgeon: Kavon Fernandez MD;  Location: [facility];  Service: Gastroenterology    TONSILLECTOMY     [3]   Social History  Tobacco Use   Smoking Status Never   Smokeless Tobacco Never   Tobacco Comments    no smoking for 46 years   [4]   Family History  Problem Relation Name Age of Onset    Lung cancer Mother      Dementia Father Dad     Alzheimer's disease Father Dad     Other Father Dad         Cardiac Disorder     Lung cancer Maternal Grandmother      Breast cancer Paternal Aunt Auntie J 40    No Known Problems Paternal Aunt      No Known Problems Paternal Aunt     [5] No Known Allergies

## 2025-07-10 NOTE — ASSESSMENT & PLAN NOTE
Blood pressure currently soft at times.  Patient is on Cardizem 30 mg every 6 hours, Toprol-XL 20 mg

## 2025-07-10 NOTE — ASSESSMENT & PLAN NOTE
Wt Readings from Last 3 Encounters:   07/10/25 75.3 kg (166 lb)   06/17/25 75.7 kg (166 lb 14.2 oz)   06/13/25 76.2 kg (168 lb)   Not in exacerbation at this time  Euvolemic on exam.  Continue home dose of   p.o. Lasix 20 mg daily.

## 2025-07-10 NOTE — ASSESSMENT & PLAN NOTE
Bilateral anterior epistaxis.  Primary right sided with likely collateral flow to the left per ENT.  Right-sided packing in place.  Currently no episode of epistaxis noted.  Hemoglobin 14.6.  Care discussed with ENT via [employer] secure chat currently recommendation is to resume Eliquis and will be seen by ENT in the afternoon for right-sided nasal packing to be removed.

## 2025-07-11 ENCOUNTER — TRANSITIONAL CARE MANAGEMENT (OUTPATIENT)
Dept: FAMILY MEDICINE CLINIC | Facility: CLINIC | Age: 80
End: 2025-07-11

## 2025-07-11 VITALS
BODY MASS INDEX: 29.41 KG/M2 | HEIGHT: 63 IN | WEIGHT: 166 LBS | TEMPERATURE: 97.7 F | HEART RATE: 70 BPM | OXYGEN SATURATION: 93 % | RESPIRATION RATE: 16 BRPM | DIASTOLIC BLOOD PRESSURE: 60 MMHG | SYSTOLIC BLOOD PRESSURE: 90 MMHG

## 2025-07-11 LAB
ERYTHROCYTE [DISTWIDTH] IN BLOOD BY AUTOMATED COUNT: 13.2 % (ref 11.6–15.1)
HCT VFR BLD AUTO: 40.6 % (ref 34.8–46.1)
HGB BLD-MCNC: 13.6 G/DL (ref 11.5–15.4)
MCH RBC QN AUTO: 35.1 PG (ref 26.8–34.3)
MCHC RBC AUTO-ENTMCNC: 33.5 G/DL (ref 31.4–37.4)
MCV RBC AUTO: 105 FL (ref 82–98)
PLATELET # BLD AUTO: 213 THOUSANDS/UL (ref 149–390)
PMV BLD AUTO: 10.9 FL (ref 8.9–12.7)
RBC # BLD AUTO: 3.87 MILLION/UL (ref 3.81–5.12)
WBC # BLD AUTO: 10.02 THOUSAND/UL (ref 4.31–10.16)

## 2025-07-11 PROCEDURE — 99239 HOSP IP/OBS DSCHRG MGMT >30: CPT | Performed by: STUDENT IN AN ORGANIZED HEALTH CARE EDUCATION/TRAINING PROGRAM

## 2025-07-11 PROCEDURE — 85027 COMPLETE CBC AUTOMATED: CPT | Performed by: STUDENT IN AN ORGANIZED HEALTH CARE EDUCATION/TRAINING PROGRAM

## 2025-07-11 RX ORDER — DILTIAZEM HYDROCHLORIDE 120 MG/1
120 CAPSULE, COATED, EXTENDED RELEASE ORAL DAILY
Qty: 30 CAPSULE | Refills: 0 | Status: SHIPPED | OUTPATIENT
Start: 2025-07-11 | End: 2025-07-14 | Stop reason: SDUPTHER

## 2025-07-11 RX ORDER — METOPROLOL SUCCINATE 200 MG/1
200 TABLET, EXTENDED RELEASE ORAL DAILY
Qty: 30 TABLET | Refills: 0 | Status: SHIPPED | OUTPATIENT
Start: 2025-07-12 | End: 2025-07-11

## 2025-07-11 RX ORDER — METOPROLOL SUCCINATE 200 MG/1
200 TABLET, EXTENDED RELEASE ORAL DAILY
Qty: 30 TABLET | Refills: 0 | Status: SHIPPED | OUTPATIENT
Start: 2025-07-12

## 2025-07-11 RX ORDER — DILTIAZEM HYDROCHLORIDE 120 MG/1
120 CAPSULE, COATED, EXTENDED RELEASE ORAL DAILY
Qty: 30 CAPSULE | Refills: 0 | Status: SHIPPED | OUTPATIENT
Start: 2025-07-11 | End: 2025-07-11

## 2025-07-11 RX ADMIN — APIXABAN 5 MG: 5 TABLET, FILM COATED ORAL at 09:27

## 2025-07-11 RX ADMIN — METOPROLOL SUCCINATE 200 MG: 100 TABLET, EXTENDED RELEASE ORAL at 09:27

## 2025-07-11 RX ADMIN — POTASSIUM CHLORIDE 10 MEQ: 750 TABLET, EXTENDED RELEASE ORAL at 09:27

## 2025-07-11 RX ADMIN — DILTIAZEM HYDROCHLORIDE 30 MG: 30 TABLET ORAL at 05:37

## 2025-07-11 RX ADMIN — LEVOTHYROXINE SODIUM 25 MCG: 0.03 TABLET ORAL at 05:37

## 2025-07-11 RX ADMIN — AMIODARONE HYDROCHLORIDE 100 MG: 200 TABLET ORAL at 09:28

## 2025-07-11 NOTE — ASSESSMENT & PLAN NOTE
Bilateral anterior epistaxis.  Primary right sided with likely collateral flow to the left per ENT.  Right-sided packing in place.  Currently no episode of epistaxis noted.  Hemoglobin 14.6.  Care discussed with ENT via [facility] secure chat currently recommendation is to resume Eliquis and will be seen by ENT in the afternoon for right-sided nasal packing to be removed.     Occupational Therapy   CoTreatment    Name: Lily Green  MRN: [id number]  Admitting Diagnosis:  Malignant carcinoid tumor of bronchus  7 Days Post-Op    Recommendations:     Discharge Recommendations: Moderate Intensity Therapy      Assessment:     Lily Green is a 72 y.o. female with a medical diagnosis of Malignant carcinoid tumor of bronchus.  Performance deficits affecting function are weakness, impaired endurance, impaired self care skills, impaired functional mobility, gait instability, impaired balance, decreased safety awareness. Fair tolerance for activity this date as she is limited by anxiety.     Rehab Prognosis:  Good; patient would benefit from acute skilled OT services to address these deficits and reach maximum level of function.       Plan:     Patient to be seen 4 x/week to address the above listed problems via self-care/home management, therapeutic activities, therapeutic exercises, neuromuscular re-education, cognitive retraining  Plan of Care Expires: 02/02/24  Plan of Care Reviewed with: patient, caregiver    Subjective     Pt agreeable to therapy.   Pt with few verbalizations.     Pain/Comfort:  Pain Rating 1: 0/10    Objective:     Communicated with: nsg prior to session.  Patient found in chair with sitter/caregiver  present.   Pt with CT x 2, tele, pulse ox, BP cuff and PICC  Cotx completed this date to optimize functional performance and safety given impaired tolerance for activity in setting of ICU     General Precautions: Standard, fall      Occupational Performance:     Functional Mobility/Transfers:  Sit>Stand with MIN A x 2 first trial  Sit>stand with MOD A x 2  second trial    Activities of Daily Living:  Pt completed combing hair and washing face seated with MIN A  to initiate task. Unable to perform task in stand due to impaired standing balance/endurance.   UE/LE dressing; TOTAL A     AMPAC 6 Click ADL: 10    Treatment & Education:  Pt awake and following  commands. Pt with flat affect and blank stare at times. Pt with onset of shallow fast breathing before and following activity. Increased time, cues and encouragement needed to allow pt to participate with tasks.   First stand, pt achieved full upright standing and then bent forward with hips flexed to 90 degrees and maintained this position despite cues to return to upright standing.   Second stand trial, few steps taken with RW and MOD A x 2 with constant cues to remained engaged with task.     Education provided re: impacts of prolonged immobility, importance of continued UE HEP and  importance/benefits  of participation with therapy.   Caregiver verb understanding, but pt without response.     Education provided re: role of OT and safety with functional mobility/ADL skills.     Patient left up in chair with all lines intact, call button in reach, nsg notified, and sitter/caregiver present    GOALS:   Multidisciplinary Problems       Occupational Therapy Goals          Problem: Occupational Therapy    Goal Priority Disciplines Outcome Interventions   Occupational Therapy Goal     OT, PT/OT Ongoing, Progressing    Description: Goals to be met by: 02/2/24     Patient will increase functional independence with ADLs by performing:    UE Dressing with Supervision.  Grooming while standing at sink with Modified Craig.  Toileting from toilet with Modified Craig for hygiene and clothing management.   Supine to sit with Supervision.  Toilet transfer to toilet with Supervision.                         Time Tracking:     OT Date of Treatment: 01/23/24  OT Start Time: 0831  OT Stop Time: 0847  OT Total Time (min): 16 min    Billable Minutes:Therapeutic Activity 16    OT/RICHARD: OT          1/23/2024

## 2025-07-11 NOTE — ASSESSMENT & PLAN NOTE
Wt Readings from Last 3 Encounters:   07/10/25 75.3 kg (166 lb)   06/17/25 75.7 kg (166 lb 14.2 oz)   06/13/25 76.2 kg (168 lb)   Not in exacerbation at this time  Echo report noted with EF of 55%.  Euvolemic on exam.  Continue home dose of   p.o. Lasix 20 mg daily.

## 2025-07-11 NOTE — ASSESSMENT & PLAN NOTE
Bilateral anterior epistaxis.  Primary right sided with likely collateral flow to the left per ENT.  Right-sided packing in place.  Currently no episode of epistaxis noted.  Hemoglobin 13.6.  ENT again saw her on 7/10/25 and right Rhino Rocket was removed and vessel was cauterized.  Eliquis was removed yesterday and currently no signs of epistaxis noted.  Currently she is stable from this perspective for discharge.

## 2025-07-11 NOTE — DISCHARGE INSTR - AVS FIRST PAGE
Recommend close follow-up with primary care provider within 1 to 2 weeks of discharge.  Recommended outpatient follow-up with cardiology.

## 2025-07-11 NOTE — ASSESSMENT & PLAN NOTE
Continue pravastatin.  Holding off on aspirin in the setting of increased bleeding risk since patient is on [facility] cardiology.

## 2025-07-11 NOTE — DISCHARGE SUMMARY
Discharge Summary - Hospitalist   Name: Madeline Good 79 y.o. female I MRN: [id number]  Unit/Bed#: -01 I Date of Admission: 7/7/2025   Date of Service: 7/11/2025 I Hospital Day: 3     Assessment & Plan  Severe epistaxis  Bilateral anterior epistaxis.  Primary right sided with likely collateral flow to the left per ENT.  Right-sided packing in place.  Currently no episode of epistaxis noted.  Hemoglobin 13.6.  ENT again saw her on 7/10/25 and right Rhino Rocket was removed and vessel was cauterized.  Eliquis was removed yesterday and currently no signs of epistaxis noted.  Currently she is stable from this perspective for discharge.      Essential hypertension  Blood pressure currently soft at times however patient is asymptomatic and tolerating medications well.  Continue Toprol- mg daily, Cardizem  milligrams daily.  Cleared by cardiology for discharge.  Mixed hyperlipidemia  Continue statin.  Paroxysmal atrial fibrillation (HCC)  Heart rate currently reasonably controlled.  Discharging home on Toprol- mg daily, Cardizem  mg daily per cardiology recommendation.  Stage 3a chronic kidney disease ([facility])  Lab Results   Component Value Date    EGFR 55 07/10/2025    EGFR 58 07/08/2025    EGFR 51 07/07/2025    CREATININE 0.97 07/10/2025    CREATININE 0.92 07/08/2025    CREATININE 1.03 07/07/2025   Creatinine stable at baseline.  Avoid nephrotoxic agents  Monitor with AM labs  Acquired hypothyroidism  Continue home dose of Synthroid.  Chronic heart failure with preserved ejection fraction (HFpEF) ([facility])  Wt Readings from Last 3 Encounters:   07/10/25 75.3 kg (166 lb)   06/17/25 75.7 kg (166 lb 14.2 oz)   06/13/25 76.2 kg (168 lb)   Not in exacerbation at this time  Echo report noted with EF of 55%.  Euvolemic on exam.  Continue home dose of   p.o. Lasix 20 mg daily.        NICM (nonischemic cardiomyopathy) ([facility])  Present on admission history of nonischemic cardiomyopathy with recovered  EF.  Currently EF 55%.  Continue Toprol-.  Lasix 20 mg daily.  Recommend reassessment for GDMT in outpatient settings as blood pressure low at times.  Coronary artery calcification seen on CT scan  Continue pravastatin.  Holding off on aspirin in the setting of increased bleeding risk since patient is on EliquisPer cardiology.  Presence of cardiac pacemaker  History of sick sinus syndrome status post pacemaker in place.  Moderate tricuspid regurgitation  Recommend routine surveillance with cardiology.  History of TIA (transient ischemic attack)  Not on aspirin.  Currently on pravastatin, Eliquis.     Medical Problems       Resolved Problems  Date Reviewed: 7/9/2025   None       Discharging Physician / Practitioner: Leon Gerber MD  PCP: Arnold Payton DO  Admission Date:   Admission Orders (From admission, onward)       Ordered        07/08/25 1412  INPATIENT ADMISSION  Once            07/07/25 1840  Place in Observation  Once                          Discharge Date: 07/11/25    Next Steps for Physician/AP Assuming Care:  Outpatient follow-up with PCP to repeat blood work for CBC, hemoglobin trend.  Also recommend blood pressure monitoring with PCP and adjust medication as needed.  Outpatient follow-up with cardiology.    Medication Changes for Discharge & Rationale:     See after visit summary for reconciled discharge medications provided to patient and/or family.     Consultations During Hospital Stay:  ENT, cardiology      Hospital Course:     Madeline Good is a 79 y.o. female patient past medical history of nonischemic cardiomyopathy with now recovered EF, sick sinus syndrome status post pacemaker, paroxysmal A-fib, history of multiple LUIZA and cardioversions and ablation, hypertension, hyperlipidemia, CKD, CAD, who originally presented to the hospital on 7/7/2025 due to severe pistaxis.  Patient presented with severe epistaxis however lab work largely unremarkable with hemoglobin was around 16 however  "patient had required bilateral nasal packing and ENT consult.  ENT believed bleeding was likely from right side and likely collateral bleeding from left.  Patient was seen by ENT and Rhino Rocket's were removed and right-sided vessel was cauterized.  epistaxis has resolved and current hemoglobin stable around 13-14 range.  Eliquis was resumed as per ENT clearance.  Patient was also seen by cardiology due to A-fib RVR and home dose of Toprol-XL increased to 200 mg, added Cardizem 120 milligram.  Currently rate reasonably controlled.  Blood pressure soft at times however patient is asymptomatic.  Patient has been cleared by cardiology for discharge.  She is eager to go home.  No other events reported.  Recommend close follow-up with PCP to repeat CBC as well as close follow-up with cardiology.  Currently patient is hemodynamically stable for discharge.  No other events reported.  Refer to earlier notes for further clarification.          Please see above list of diagnoses and related plan for additional information.     Discharge Day Visit / Exam:   Subjective: Appears comfortable not in distress.  Heart rate reasonably controlled.  Blood pressure soft at times however patient is asymptomatic.  Eliquis has been resumed and no epistaxis noted.  Currently patient is eager to go home.  No other events reported.    Vitals: Blood Pressure: 90/60 (07/11/25 1201)  Pulse: 70 (07/11/25 1131)  Temperature: 97.7 °F (36.5 °C) (07/11/25 1131)  Temp Source: Oral (07/11/25 0310)  Respirations: 16 (07/11/25 0310)  Height: 5' 3\" (160 cm) (07/10/25 0901)  Weight - Scale: 75.3 kg (166 lb) (07/10/25 0901)  SpO2: 93 % (07/11/25 1131)  Physical Exam  Constitutional:       General: She is not in acute distress.     Appearance: Normal appearance. She is not ill-appearing, toxic-appearing or diaphoretic.     Cardiovascular:      Rate and Rhythm: Normal rate. Rhythm irregular.      Pulses: Normal pulses.   Pulmonary:      Effort: Pulmonary " effort is normal. No respiratory distress.      Breath sounds: Normal breath sounds. No wheezing.   Abdominal:      General: There is no distension.      Palpations: Abdomen is soft.      Tenderness: There is no abdominal tenderness.     Musculoskeletal:      Right lower leg: No edema.      Left lower leg: No edema.     Neurological:      Mental Status: She is alert and oriented to person, place, and time. Mental status is at baseline.     Psychiatric:         Mood and Affect: Mood normal.         Behavior: Behavior normal.          Discussion with Family: Updated  () at bedside.    Discharge instructions/Information to patient and family:   See after visit summary for information provided to patient and family.      Provisions for Follow-Up Care:  See after visit summary for information related to follow-up care and any pertinent home health orders.      Mobility at time of Discharge:   Basic Mobility Inpatient Raw Score: 22  JH-HLM Goal: 7: Walk 25 feet or more  JH-HLM Achieved: 7: Walk 25 feet or more       Disposition:   Home    Planned Readmission:     Administrative Statements   Discharge Statement:  I have spent a total time of 35  minutes in caring for this patient on the day of the visit/encounter. >30 minutes of time was spent on: Diagnostic results, Prognosis, Instructions for management, Patient and family education, Risk factor reductions, Impressions, Counseling / Coordination of care, Documenting in the medical record, Reviewing / ordering tests, medicine, procedures  , and Communicating with other healthcare professionals .    **Please Note: This note may have been constructed using a voice recognition system**

## 2025-07-11 NOTE — ASSESSMENT & PLAN NOTE
Blood pressure currently soft at times however patient is asymptomatic and tolerating medications well.  Continue Toprol- mg daily, Cardizem  milligrams daily.  Cleared by cardiology for discharge.

## 2025-07-11 NOTE — ASSESSMENT & PLAN NOTE
Present on admission history of nonischemic cardiomyopathy with recovered EF.  Currently EF 55%.  Continue Toprol-.  Lasix 20 mg daily.  Recommend reassessment for GDMT in outpatient settings as blood pressure low at times.

## 2025-07-11 NOTE — ASSESSMENT & PLAN NOTE
Heart rate currently reasonably controlled.  Discharging home on Toprol- mg daily, Cardizem  mg daily per cardiology recommendation.

## 2025-07-14 ENCOUNTER — OFFICE VISIT (OUTPATIENT)
Dept: FAMILY MEDICINE CLINIC | Facility: CLINIC | Age: 80
End: 2025-07-14
Payer: MEDICARE

## 2025-07-14 VITALS
HEIGHT: 63 IN | RESPIRATION RATE: 18 BRPM | OXYGEN SATURATION: 97 % | TEMPERATURE: 96.4 F | DIASTOLIC BLOOD PRESSURE: 70 MMHG | BODY MASS INDEX: 28.42 KG/M2 | WEIGHT: 160.4 LBS | HEART RATE: 124 BPM | SYSTOLIC BLOOD PRESSURE: 118 MMHG

## 2025-07-14 DIAGNOSIS — R04.0 SEVERE EPISTAXIS: Primary | ICD-10-CM

## 2025-07-14 DIAGNOSIS — I49.5 SSS (SICK SINUS SYNDROME) (HCC): ICD-10-CM

## 2025-07-14 DIAGNOSIS — I48.19 PERSISTENT ATRIAL FIBRILLATION (HCC): ICD-10-CM

## 2025-07-14 PROBLEM — I48.0 PAROXYSMAL ATRIAL FIBRILLATION (HCC): Status: RESOLVED | Noted: 2019-02-09 | Resolved: 2025-07-14

## 2025-07-14 PROCEDURE — 99496 TRANSJ CARE MGMT HIGH F2F 7D: CPT | Performed by: FAMILY MEDICINE

## 2025-07-14 RX ORDER — DILTIAZEM HYDROCHLORIDE 120 MG/1
120 CAPSULE, COATED, EXTENDED RELEASE ORAL DAILY
Qty: 30 CAPSULE | Refills: 0 | Status: SHIPPED | OUTPATIENT
Start: 2025-07-14

## 2025-07-14 NOTE — ASSESSMENT & PLAN NOTE
Rate controlled, continue current therapy, I did send a prescription of her diltiazem to the local pharmacy as she has not received the from her mail away pharmacy yet.  Orders:  •  diltiazem (CARDIZEM CD) 120 mg 24 hr capsule; Take 1 capsule (120 mg total) by mouth in the morning.

## 2025-07-14 NOTE — PROGRESS NOTES
Results for orders placed or performed in visit on 07/10/25   Cardiac EP device report    Narrative    MDT DC PM/ACTIVE SYSTEM IS MRI CONDITIONAL  CARELINK TRANSMISSION: BATTERY VOLTAGE ADEQUATE (6.6 YRS). AP-40%, -11%. ALL AVAILABLE LEAD PARAMETERS WITHIN NORMAL LIMITS. 1 DEVICE CLASSIFIED NSVT EPISODE- SVT ON EGM. 1 SVT-AF EPISODE @ 158 BPM LASTING 54 SEC. 1,170 AT/AF EPISODES, AVG HR-  BPM, & CURRENTLY IN AFLUTTER. AT/AF BURDEN SINCE 4/9/25-56.6%. S/P AFIB & AFLUTTER ABLATIONS ON 10/19/23. PVC SINGLES COUNT SINCE 4/9/25-31/H. PT ON ELIQUIS, AMIO, DILTIAZEM & METOPROLOL. NORMAL DEVICE FUNCTION. GV

## 2025-07-14 NOTE — PROGRESS NOTES
Transition of Care Visit:  Name: Madeline Good      : 1945      MRN: [id number]  Encounter Provider: Arnold Payton DO  Encounter Date: 2025   Encounter department: [facility] [address]    Assessment & Plan  Severe epistaxis  She is to call if she has any further episodes of bleeding.  I did encourage her to get a humidifier to run in her bedroom at night.       SSS (sick sinus syndrome) (HCC)  Stable, continue current therapy.       Persistent atrial fibrillation (HCC)  Rate controlled, continue current therapy, I did send a prescription of her diltiazem to the local pharmacy as she has not received the from her mail away pharmacy yet.  Orders:  •  diltiazem (CARDIZEM CD) 120 mg 24 hr capsule; Take 1 capsule (120 mg total) by mouth in the morning.         History of Present Illness     Transitional Care Management Review:   Madeline Good is a 79 y.o. female here for TCM follow up.     During the TCM phone call patient stated:  TCM Call (since 2025)     Date and time call was made  2025  2:13 PM    Hospital care reviewed  Records reviewed    Patient was hospitialized at  [facility]    Date of Admission  25    Date of discharge  25    Diagnosis  Severe epistaxis Principal problem    Disposition  Home    Were the patients medications reviewed and updated  Yes    Current Symptoms  None      TCM Call (since 2025)     Post hospital issues  None    Scheduled for follow up?  Yes    Did you obtain your prescribed medications  Yes    Do you need help managing your prescriptions or medications  No    Is transportation to your appointment needed  No    I have advised the patient to call PCP with any new or worsening symptoms  Lynsey Carmona    Living Arrangements  Spouse or Significiant other; Family members    Support System  None        Patient comes in today for transitional care management, she was admitted to [facility]  "after having multiple episodes of uncontrollable epistaxis.  While in the hospital she did have the nostrils packed and was seen by ENT.  They were able to successfully remove the packing and cauterize the area.  Since returning home on the 11th she has not had any further episodes of bleeding.  Her discharge hemoglobin was 13.      Review of Systems   Constitutional:  Negative for chills, fatigue and fever.   HENT:  Negative for congestion, ear pain, hearing loss, postnasal drip, rhinorrhea and sore throat.    Eyes:  Negative for pain and visual disturbance.   Respiratory:  Negative for chest tightness, shortness of breath and wheezing.    Cardiovascular:  Negative for chest pain and leg swelling.   Gastrointestinal:  Negative for abdominal distention, abdominal pain, constipation, diarrhea and vomiting.   Endocrine: Negative for cold intolerance and heat intolerance.   Genitourinary:  Negative for difficulty urinating, frequency and urgency.   Musculoskeletal:  Negative for arthralgias and gait problem.   Skin:  Negative for color change.   Neurological:  Negative for dizziness, tremors, syncope, numbness and headaches.   Hematological:  Negative for adenopathy.   Psychiatric/Behavioral:  Negative for agitation, confusion and sleep disturbance. The patient is not nervous/anxious.      Objective   /70 (BP Location: Left arm, Cuff Size: Standard)   Pulse (!) 124   Temp (!) 96.4 °F (35.8 °C)   Resp 18   Ht 5' 3\" (1.6 m)   Wt 72.8 kg (160 lb 6.4 oz)   SpO2 97%   BMI 28.41 kg/m²     Physical Exam  Constitutional:       Appearance: She is well-developed.   HENT:      Head: Normocephalic and atraumatic.      Right Ear: External ear normal.      Left Ear: External ear normal.      Nose: Nose normal.      Mouth/Throat:      Pharynx: Oropharynx is clear.     Eyes:      Extraocular Movements: Extraocular movements intact.      Conjunctiva/sclera: Conjunctivae normal.      Pupils: Pupils are equal, round, and " reactive to light.     Neck:      Thyroid: No thyromegaly.     Cardiovascular:      Rate and Rhythm: Normal rate and regular rhythm.      Heart sounds: Normal heart sounds. No murmur heard.  Pulmonary:      Effort: Pulmonary effort is normal.   Abdominal:      General: Bowel sounds are normal. There is no distension.      Palpations: Abdomen is soft.     Musculoskeletal:         General: Normal range of motion.      Cervical back: Normal range of motion and neck supple.     Skin:     General: Skin is warm.     Neurological:      Mental Status: She is alert and oriented to person, place, and time.     Psychiatric:         Mood and Affect: Mood normal.       Medications have been reviewed by provider in current encounter

## 2025-07-14 NOTE — ASSESSMENT & PLAN NOTE
She is to call if she has any further episodes of bleeding.  I did encourage her to get a humidifier to run in her bedroom at night.

## 2025-07-15 DIAGNOSIS — E03.9 ACQUIRED HYPOTHYROIDISM: ICD-10-CM

## 2025-07-15 RX ORDER — LEVOTHYROXINE SODIUM 25 UG/1
25 TABLET ORAL
Qty: 90 TABLET | Refills: 3 | Status: SHIPPED | OUTPATIENT
Start: 2025-07-15

## 2025-07-21 ENCOUNTER — RESULTS FOLLOW-UP (OUTPATIENT)
Dept: NON INVASIVE DIAGNOSTICS | Facility: HOSPITAL | Age: 80
End: 2025-07-21

## 2025-08-14 ENCOUNTER — OFFICE VISIT (OUTPATIENT)
Dept: CARDIOLOGY CLINIC | Facility: CLINIC | Age: 80
End: 2025-08-14
Payer: MEDICARE

## (undated) DEVICE — PINNACLE INTRODUCER SHEATH: Brand: PINNACLE

## (undated) DEVICE — Device: Brand: PROTRACK PIGTAIL WIRE

## (undated) DEVICE — CATH ABLATION FLEXCATH ADVANCE OD12 FR STEERABLE

## (undated) DEVICE — INTRO SHEATH 9FR 24CM ARROW-FLEX

## (undated) DEVICE — NEEDLE TRANSSEPTAL BRK-1 71CM

## (undated) DEVICE — CATH COAXIAL UMBILICAL

## (undated) DEVICE — CABLE CATHETER ACHIEVE MAPPING

## (undated) DEVICE — CATH ULTRASOUND ACUNAV ICE 8FR 90CM GE VIVID-I

## (undated) DEVICE — CATH ABLATION CRYOCATH ARCTIC FRONT ADV PRO 28MM

## (undated) DEVICE — DGW .035 FC J3MM 150CM TEF: Brand: EMERALD

## (undated) DEVICE — CATH EP 5FR QUAD SUPREME CRD

## (undated) DEVICE — CATH EP  INQUIRY 6F 2-5-2MM  MED CRV STERABLE  DECAPOLAR 110CM

## (undated) DEVICE — CABLE CRYOCATH ELECTRICAL UMBILICAL

## (undated) DEVICE — GUIDE SHEATH SRO 8.5 FR

## (undated) DEVICE — CATH EP ADVISOR HD GRID MAPPING 8F

## (undated) DEVICE — TUBING SET COOL POINT

## (undated) DEVICE — CATH EP ACHIEVE 20 MM MAPPING LOOP

## (undated) DEVICE — CATH ABLATION TACTICATH SE BI-DIR FJ CRV

## (undated) DEVICE — CATH EP 5FR QUAD SUPREME JSN

## (undated) DEVICE — REF PATCH ENSITE X

## (undated) DEVICE — CATH EP 7FR DI-DIR 10-POLE DEFL CS 2-8-2 FJ

## (undated) DEVICE — GUIDE SHEATH SLO 8.5 FR